# Patient Record
Sex: MALE | Race: WHITE | NOT HISPANIC OR LATINO | Employment: OTHER | URBAN - METROPOLITAN AREA
[De-identification: names, ages, dates, MRNs, and addresses within clinical notes are randomized per-mention and may not be internally consistent; named-entity substitution may affect disease eponyms.]

---

## 2017-01-03 ENCOUNTER — TELEPHONE (OUTPATIENT)
Dept: FAMILY MEDICINE | Facility: CLINIC | Age: 77
End: 2017-01-03

## 2017-01-03 NOTE — TELEPHONE ENCOUNTER
----- Message from Finn Jhaveri sent at 1/3/2017  3:46 PM CST -----  Contact: patient  Domonique Hernandez Jr.  MRN: [id number]  : 1940  PCP: Emiliano Cam  Home Phone      [phone]  Work Phone      Not on file.  Mobile          [phone]      MESSAGE: spoke with [facility] - they state they sent request for C-pap results to us on Friday -- please check for it & advise patient    Call [phone]    PCP: Dorina

## 2017-01-04 ENCOUNTER — TELEPHONE (OUTPATIENT)
Dept: FAMILY MEDICINE | Facility: CLINIC | Age: 77
End: 2017-01-04

## 2017-01-10 ENCOUNTER — OFFICE VISIT (OUTPATIENT)
Dept: NEUROLOGY | Facility: CLINIC | Age: 77
End: 2017-01-10
Payer: MEDICARE

## 2017-01-10 VITALS
HEART RATE: 70 BPM | WEIGHT: 253.31 LBS | SYSTOLIC BLOOD PRESSURE: 130 MMHG | BODY MASS INDEX: 38.39 KG/M2 | HEIGHT: 68 IN | RESPIRATION RATE: 18 BRPM | DIASTOLIC BLOOD PRESSURE: 76 MMHG

## 2017-01-10 DIAGNOSIS — G31.84 MILD COGNITIVE IMPAIRMENT: Primary | ICD-10-CM

## 2017-01-10 PROCEDURE — 99213 OFFICE O/P EST LOW 20 MIN: CPT | Mod: S$GLB,,, | Performed by: PSYCHIATRY & NEUROLOGY

## 2017-01-10 PROCEDURE — 1157F ADVNC CARE PLAN IN RCRD: CPT | Mod: S$GLB,,, | Performed by: PSYCHIATRY & NEUROLOGY

## 2017-01-10 PROCEDURE — 99999 PR PBB SHADOW E&M-EST. PATIENT-LVL III: CPT | Mod: PBBFAC,,, | Performed by: PSYCHIATRY & NEUROLOGY

## 2017-01-10 PROCEDURE — 1160F RVW MEDS BY RX/DR IN RCRD: CPT | Mod: S$GLB,,, | Performed by: PSYCHIATRY & NEUROLOGY

## 2017-01-10 PROCEDURE — 3075F SYST BP GE 130 - 139MM HG: CPT | Mod: S$GLB,,, | Performed by: PSYCHIATRY & NEUROLOGY

## 2017-01-10 PROCEDURE — 3078F DIAST BP <80 MM HG: CPT | Mod: S$GLB,,, | Performed by: PSYCHIATRY & NEUROLOGY

## 2017-01-10 PROCEDURE — 1159F MED LIST DOCD IN RCRD: CPT | Mod: S$GLB,,, | Performed by: PSYCHIATRY & NEUROLOGY

## 2017-01-10 PROCEDURE — 1125F AMNT PAIN NOTED PAIN PRSNT: CPT | Mod: S$GLB,,, | Performed by: PSYCHIATRY & NEUROLOGY

## 2017-01-10 NOTE — MR AVS SNAPSHOT
[facility]  [address] [address]  Phone: [phone]  Fax: [phone]                  Domonique Hernandez Jr.   1/10/2017 1:45 PM   Office Visit    Description:  Male : 1940   Provider:  Jewel Gusman MD   Department:  [facility]           Reason for Visit     Memory Loss           Diagnoses this Visit        Comments    Mild cognitive impairment    -  Primary            To Do List           Future Appointments        Provider Department Dept Phone    3/6/2017 10:30 AM SARIKA SAUER [facility] [phone]    3/14/2017 2:00 PM Emiliano Cam MD [facility] [phone]      Goals (5 Years of Data)     None      Follow-Up and Disposition     Return in about 6 months (around 7/10/2017).      [facility]     [facility] On Call Nurse Care Line -  Assistance  Registered nurses in the [facility] On Call Center provide clinical advisement, health education, appointment booking, and other advisory services.  Call for this free service at [phone].             Medications           Message regarding Medications     Verify the changes and/or additions to your medication regime listed below are the same as discussed with your clinician today.  If any of these changes or additions are incorrect, please notify your healthcare provider.        STOP taking these medications     venlafaxine (EFFEXOR-XR) 75 MG 24 hr capsule Take 1 capsule (75 mg total) by mouth once daily.           Verify that the below list of medications is an accurate representation of the medications you are currently taking.  If none reported, the list may be blank. If incorrect, please contact your healthcare provider. Carry this list with you in case of emergency.           Current Medications     albuterol-ipratropium 2.5mg-0.5mg/3mL (DUO-NEB) 0.5 mg-3 mg(2.5 mg base)/3 mL nebulizer solution Take 3 mLs by nebulization every 6 (six) hours as  "needed for Wheezing.    allopurinol (ZYLOPRIM) 300 MG tablet TAKE 1 TABLET ONE TIME DAILY    amlodipine (NORVASC) 2.5 MG tablet Take 1 tablet (2.5 mg total) by mouth once daily.    aspirin-caffeine (JESSICA BACK & BODY) 500-32.5 mg Tab Take by mouth.    carvedilol (COREG) 25 MG tablet Take 1 tablet (25 mg total) by mouth 2 (two) times daily with meals.    chlorthalidone (HYGROTEN) 25 MG Tab TAKE 1 TABLET ONE TIME DAILY    dutasteride (AVODART) 0.5 mg capsule Take 1 capsule (0.5 mg total) by mouth once daily.    fluticasone (FLONASE) 50 mcg/actuation nasal spray 1 spray by Each Nare route 2 (two) times daily.    levothyroxine (SYNTHROID) 88 MCG tablet TAKE 1 TABLET ONE TIME DAILY    multivitamin (ONE DAILY MULTIVITAMIN) per tablet Take 1 tablet by mouth once daily. Centrum Silver for Men    nystatin-triamcinolone (MYCOLOG II) cream Apply topically 2 (two) times daily.    simvastatin (ZOCOR) 40 MG tablet TAKE 1 TABLET EVERY EVENING    tramadol (ULTRAM) 50 mg tablet Take 1 tablet (50 mg total) by mouth every 6 (six) hours as needed.    umeclidinium-vilanterol (ANORO ELLIPTA) 62.5-25 mcg/actuation DsDv Inhale 1 Dose into the lungs once daily.    cetirizine (ZYRTEC) 10 MG tablet TAKE 1 TABLET EVERY DAY    promethazine-codeine 6.25-10 mg/5 ml (PHENERGAN WITH CODEINE) 6.25-10 mg/5 mL syrup Take 5 mLs by mouth every 6 (six) hours as needed for Cough.    sildenafil (VIAGRA) 100 MG tablet Take 1 tablet (100 mg total) by mouth daily as needed for Erectile Dysfunction.    tamsulosin (FLOMAX) 0.4 mg Cp24 TAKE 1 CAPSULE ONE TIME DAILY           Clinical Reference Information           Vital Signs - Last Recorded  Most recent update: 1/10/2017  1:53 PM by Geno Diggs MA    BP Pulse Resp Ht Wt BMI    130/76 (BP Location: Right arm, Patient Position: Sitting, BP Method: Manual) 70 18 5' 8" (1.727 m) 114.9 kg (253 lb 4.9 oz) 38.52 kg/m2      Blood Pressure          Most Recent Value    BP  130/76      Allergies as of 1/10/2017  "    Percocet [Oxycodone-acetaminophen]    Percodan [Oxycodone Hcl-oxycodone-asa]      Immunizations Administered on Date of Encounter - 1/10/2017     None

## 2017-01-10 NOTE — PROGRESS NOTES
HPI: Domonique Hernandez Jr. is a 76 y.o. male complaining of minor short term memory loss for 2 years. Suspect mild cognitive impairment.   Since the last visit, he thinks his memory is improved  He has not reason for this.   He states he has not been following diet.  He has been physically active.  Memory is forgetful of short term details   He performs all ADLs without difficulty and uses a computer without difficulty. He is noting this about 2 times per week if that.   Review of Systems   Constitutional: Negative for fever.   Eyes: Negative for double vision.   Respiratory: Negative for hemoptysis.    Cardiovascular: Negative for chest pain.   Gastrointestinal: Negative for blood in stool.   Genitourinary: Negative for hematuria.   Musculoskeletal: Negative for falls.   Skin: Negative for rash.   Neurological: Negative for seizures and headaches.   Psychiatric/Behavioral: Positive for memory loss.         I have reviewed all of this patient's past medical and surgical histories as well as family and social histories and active allergies and medications as documented in the electronic medical record.        Exam:  Gen Appearance, well developed/nourished in no apparent distress  CV: 2+ distal pulses with no edema or swelling  Neuro:  MS: Awake, alert, oriented to place, person, time, situation. Sustains attention. Recent recall is intact/remote memory intact, Language is full to spontaneous speech//comprehension. Fund of Knowledge is full.  Mood is euthymic  CN: Optic discs are flat with normal vasculature, PERRL, Extraoccular movements and visual fields are full. Normal facial sensation and strength, Hearing symmetric, Tongue and Palate are midline and strong. Shoulder Shrug symmetric and strong.  Motor: Normal bulk, tone, no abnormal movements. 5/5 strength bilateral upper/lower extremities with 2+ reflexes and clonus  Sensory: symmetric to temp, and vibration.  Romberg negative  Cerebellar: Finger-nose,Heal-shin,  Rapid alternating movements intact  Gait: Normal stance, no ataxia      Labs:TSH, B12: showed mild elevation in TSh    Imagin2016 MRI brain: Age-appropriate generalized volume loss with mild/moderate degree of T2/FLAIR signal abnormality within the supratentorial white matter  while nonspecific suggest chronic microvascular ischemic change.    No evidence for acute infarction or enhancing lesion.      Assessment/Plan: Domonique Hernandez Jr. is a 76 y.o. male complaining of minor short term memory loss for 2 years. Suspect mild cognitive impairment.   I recommend:     1. Repeat TSH for mild and non-specific elevation prior-- this is planned with upcoming labs with PCP.   3. Discussed likely diagnosis of MCI otherwise, prognosis, and treatment. He is staying physically active and will consider better diet.   4. Memory symptoms not improved with restarting Effexor, but mood is improved  5. No restrictions/ patient is functioning well.   RTC 6 months- invite significant other to next visit suggested

## 2017-01-16 ENCOUNTER — OFFICE VISIT (OUTPATIENT)
Dept: FAMILY MEDICINE | Facility: CLINIC | Age: 77
End: 2017-01-16
Payer: MEDICARE

## 2017-01-16 VITALS
BODY MASS INDEX: 38.01 KG/M2 | HEART RATE: 64 BPM | RESPIRATION RATE: 16 BRPM | DIASTOLIC BLOOD PRESSURE: 70 MMHG | SYSTOLIC BLOOD PRESSURE: 140 MMHG | TEMPERATURE: 99 F | WEIGHT: 250 LBS

## 2017-01-16 DIAGNOSIS — J44.9 CHRONIC OBSTRUCTIVE PULMONARY DISEASE, UNSPECIFIED COPD TYPE: ICD-10-CM

## 2017-01-16 DIAGNOSIS — I10 ESSENTIAL HYPERTENSION: Primary | ICD-10-CM

## 2017-01-16 DIAGNOSIS — M1A.9XX0 CHRONIC GOUT WITHOUT TOPHUS, UNSPECIFIED CAUSE, UNSPECIFIED SITE: ICD-10-CM

## 2017-01-16 DIAGNOSIS — E03.4 HYPOTHYROIDISM DUE TO ACQUIRED ATROPHY OF THYROID: ICD-10-CM

## 2017-01-16 DIAGNOSIS — J32.9 OTHER SINUSITIS, UNSPECIFIED CHRONICITY: ICD-10-CM

## 2017-01-16 PROCEDURE — G0008 ADMIN INFLUENZA VIRUS VAC: HCPCS | Mod: S$GLB,,, | Performed by: FAMILY MEDICINE

## 2017-01-16 PROCEDURE — 99499 UNLISTED E&M SERVICE: CPT | Mod: S$GLB,,, | Performed by: FAMILY MEDICINE

## 2017-01-16 PROCEDURE — 99214 OFFICE O/P EST MOD 30 MIN: CPT | Mod: 25,S$GLB,, | Performed by: FAMILY MEDICINE

## 2017-01-16 PROCEDURE — 1159F MED LIST DOCD IN RCRD: CPT | Mod: S$GLB,,, | Performed by: FAMILY MEDICINE

## 2017-01-16 PROCEDURE — 1160F RVW MEDS BY RX/DR IN RCRD: CPT | Mod: S$GLB,,, | Performed by: FAMILY MEDICINE

## 2017-01-16 PROCEDURE — 3077F SYST BP >= 140 MM HG: CPT | Mod: S$GLB,,, | Performed by: FAMILY MEDICINE

## 2017-01-16 PROCEDURE — 3078F DIAST BP <80 MM HG: CPT | Mod: S$GLB,,, | Performed by: FAMILY MEDICINE

## 2017-01-16 PROCEDURE — 1157F ADVNC CARE PLAN IN RCRD: CPT | Mod: S$GLB,,, | Performed by: FAMILY MEDICINE

## 2017-01-16 PROCEDURE — 99999 PR PBB SHADOW E&M-EST. PATIENT-LVL III: CPT | Mod: PBBFAC,,, | Performed by: FAMILY MEDICINE

## 2017-01-16 PROCEDURE — 1126F AMNT PAIN NOTED NONE PRSNT: CPT | Mod: S$GLB,,, | Performed by: FAMILY MEDICINE

## 2017-01-16 PROCEDURE — 90662 IIV NO PRSV INCREASED AG IM: CPT | Mod: S$GLB,,, | Performed by: FAMILY MEDICINE

## 2017-01-16 RX ORDER — PROMETHAZINE HYDROCHLORIDE AND CODEINE PHOSPHATE 6.25; 1 MG/5ML; MG/5ML
5 SOLUTION ORAL EVERY 6 HOURS PRN
Qty: 150 ML | Refills: 1 | Status: SHIPPED | OUTPATIENT
Start: 2017-01-16 | End: 2017-05-05 | Stop reason: SDUPTHER

## 2017-01-16 RX ORDER — AZITHROMYCIN 250 MG/1
TABLET, FILM COATED ORAL
Qty: 6 TABLET | Refills: 0 | Status: SHIPPED | OUTPATIENT
Start: 2017-01-16 | End: 2017-05-05 | Stop reason: SDUPTHER

## 2017-01-16 NOTE — PROGRESS NOTES
Subjective:       Patient ID: Domonique Hernandez Jr. is a 76 y.o. male.    Chief Complaint: Cough; Hoarse; and Immunizations    HPI Comments: Patient here for check up and follow up for HYPERTENSION.   Today his blood pressure is perfect.  He is taking Coreg and a small dose of Norvasc.  He tolerates these well.  His family is starting to drive him around.  Patient is also having trouble with memory.  His girlfriend feels like he needs be further evaluated.  He has problems remembering names and he sometimes forgets what he was going to do.  Got worse after stopping Effexor.  Seems to be more anxious.    Patient also states his CPAP machine is not working very good.  This could also be contributing to his forgetfulness.  He requires 7 cm water.  He needs a new machine.  He is urinating better now that he is on Flomax and Proscar.  He is still having delayed ejaculation.  Stopping Effexor did not make much of a difference.  He is doing well on Effexor XR 75 mg po daily.  Patient brought all of his medication in.  This was reviewed.    Cough   This is a new problem. The current episode started in the past 7 days. The problem has been gradually worsening. The cough is productive of purulent sputum. Pertinent negatives include no chest pain, chills, fever, headaches, postnasal drip, rash, sore throat or shortness of breath.   Hypertension   Pertinent negatives include no chest pain, headaches, palpitations or shortness of breath.     Review of Systems   Constitutional: Negative for activity change, chills, diaphoresis, fatigue, fever and unexpected weight change.   HENT: Negative for congestion, nosebleeds, postnasal drip, sinus pressure, sore throat, trouble swallowing and voice change.    Eyes: Negative for photophobia and visual disturbance.   Respiratory: Positive for cough. Negative for apnea, choking, chest tightness and shortness of breath.    Cardiovascular: Negative for chest pain, palpitations and leg swelling.    Gastrointestinal: Negative for abdominal pain and blood in stool.   Endocrine: Negative for cold intolerance, heat intolerance, polydipsia and polyphagia.   Genitourinary: Negative for decreased urine volume, difficulty urinating and dysuria.   Musculoskeletal: Positive for arthralgias and back pain. Negative for joint swelling.   Skin: Negative for color change, pallor and rash.   Neurological: Negative for dizziness, weakness, numbness and headaches.   Hematological: Negative for adenopathy. Does not bruise/bleed easily.   Psychiatric/Behavioral: Negative for behavioral problems, decreased concentration, dysphoric mood and sleep disturbance. The patient is not nervous/anxious.        Objective:      Vitals:    01/16/17 1002   BP: (!) 140/70   Pulse: 64   Resp: 16   Temp: 98.8 °F (37.1 °C)     Physical Exam   Constitutional: He is oriented to person, place, and time. He appears well-developed and well-nourished.   Eyes: EOM are normal. Pupils are equal, round, and reactive to light.   Neck: No JVD present.   No carotid bruits   Cardiovascular: Normal rate, regular rhythm, normal heart sounds and intact distal pulses.  Exam reveals no gallop.    No murmur heard.  Pulmonary/Chest: Effort normal and breath sounds normal.   Musculoskeletal: He exhibits no edema or tenderness.   Lymphadenopathy:     He has no cervical adenopathy.   Neurological: He is alert and oriented to person, place, and time. He has normal reflexes. He displays normal reflexes. No cranial nerve deficit. He exhibits normal muscle tone. Coordination normal.   Psychiatric: He has a normal mood and affect. His behavior is normal. Judgment and thought content normal.       Lab Results   Component Value Date    TSH 4.283 (H) 09/12/2016     BMP  Lab Results   Component Value Date     08/28/2016    K 3.6 08/28/2016     08/28/2016    CO2 22 (L) 08/28/2016    BUN 21 08/28/2016    CREATININE 0.8 08/28/2016    CALCIUM 9.6 08/28/2016    ANIONGAP  12 08/28/2016    ESTGFRAFRICA >60 08/28/2016    EGFRNONAA >60 08/28/2016       No results found for: LDLCALC    Assessment:       1. Essential hypertension    2. Other sinusitis, unspecified chronicity        Plan:   Essential hypertension  -     zoster vaccine live, PF, (ZOSTAVAX, PF,) 19,400 unit/0.65 mL injection; Inject 19,400 Units into the skin once.  Dispense: 1 vial; Refill: 0    Other sinusitis, unspecified chronicity  -     azithromycin (Z-KATIUSKA) 250 MG tablet; 2 po day 1, then 1 po q day  Dispense: 6 tablet; Refill: 0  -     promethazine-codeine 6.25-10 mg/5 ml (PHENERGAN WITH CODEINE) 6.25-10 mg/5 mL syrup; Take 5 mLs by mouth every 6 (six) hours as needed for Cough.  Dispense: 150 mL; Refill: 1    Other orders  -     Influenza - High Dose (65+) (PF) (IM)    Memory loss/MCI        Continue follow up with Dr. Gusman    Essential hypertension  Two gram sodium diet.    Weight loss discussed.    Try to walk 2 miles per day.    The following medications will be restarted today  -     carvedilol (COREG) 25 MG tablet; Take 1 tablet (25 mg total) by mouth 2 (two) times daily with meals.  -     amlodipine (NORVASC) 2.5 MG tablet; Take 1 tablet (2.5 mg total) by mouth once daily.    KARL on CPAP  Continue CPAP at 7 cm water pressure  Needs new machine    Depression, unspecified depression type  - Continue venlafaxine (EFFEXOR-XR) 75 MG 24 hr capsule; Take 1 capsule (75 mg total) by mouth once daily.  Dispense: 30 capsule; Refill: 5    Delayed ejaculation  Try stopping Flomax to see if that makes a difference.    Obstructive sleep apnea  -     CPAP FOR HOME USE    Hyperlipidemia, unspecified hyperlipidemia type  -     Lipid panel; Future    Essential hypertension  -     Comprehensive metabolic panel; Future    Chronic gout without tophus, unspecified cause, unspecified site  -     CBC auto differential; Future  -     Uric acid; Future    Hypothyroidism due to acquired atrophy of thyroid  -     TSH;  Future    Benign non-nodular prostatic hyperplasia with lower urinary tract symptoms  Continue Proscar and Flomax.      RTC in 3 months

## 2017-01-16 NOTE — MR AVS SNAPSHOT
[facility]  [address]  [facility] [address]  Phone: [phone]  Fax: [phone]                  Domonique Hernandez Jr.   2017 10:30 AM   Office Visit    Description:  Male : 1940   Provider:  Emiliano Cam MD   Department:  [facility]           Reason for Visit     Cough     Hoarse     Immunizations           Diagnoses this Visit        Comments    Essential hypertension    -  Primary     Other sinusitis, unspecified chronicity         Chronic gout without tophus, unspecified cause, unspecified site         Hypothyroidism due to acquired atrophy of thyroid         Chronic obstructive pulmonary disease, unspecified COPD type                To Do List           Future Appointments        Provider Department Dept Phone    3/6/2017 10:30 AM SARIKA SAUER [facility] [phone]    3/14/2017 2:00 PM Emiliano Cam MD [facility] [phone]    2017 1:30 PM Trey Garcia MD [facility] - Urology 4th Floor [phone]    2017 4:45 PM Jewel Gusman MD [facility] [phone]      Goals (5 Years of Data)     None       These Medications        Disp Refills Start End    azithromycin (Z-KATIUSKA) 250 MG tablet 6 tablet 0 2017     2 po day 1, then 1 po q day    Pharmacy: [facility] - [address] Ph #: [phone]       promethazine-codeine 6.25-10 mg/5 ml (PHENERGAN WITH CODEINE) 6.25-10 mg/5 mL syrup 150 mL 1 2017     Take 5 mLs by mouth every 6 (six) hours as needed for Cough. - Oral    Pharmacy: [facility] - [place], [place] - [id number] [place] [address] Ph #: [phone]       zoster vaccine live, PF, (ZOSTAVAX, PF,) 19,400 unit/0.65 mL injection 1 vial 0 2017    Inject 19,400 Units into the skin once. - Subcutaneous    Pharmacy: [facility] - [place], [place] - [address] [facility] Ph #: [phone]         [facility] On Call     [facility] On  Call Nurse Care Line -  Assistance  Registered nurses in the [facility] On Call Center provide clinical advisement, health education, appointment booking, and other advisory services.  Call for this free service at [phone].             Medications           Message regarding Medications     Verify the changes and/or additions to your medication regime listed below are the same as discussed with your clinician today.  If any of these changes or additions are incorrect, please notify your healthcare provider.        START taking these NEW medications        Refills    azithromycin (Z-KATIUSKA) 250 MG tablet 0    Si po day 1, then 1 po q day    Class: Normal    zoster vaccine live, PF, (ZOSTAVAX, PF,) 19,400 unit/0.65 mL injection 0    Sig: Inject 19,400 Units into the skin once.    Class: Print    Route: Subcutaneous           Verify that the below list of medications is an accurate representation of the medications you are currently taking.  If none reported, the list may be blank. If incorrect, please contact your healthcare provider. Carry this list with you in case of emergency.           Current Medications     allopurinol (ZYLOPRIM) 300 MG tablet TAKE 1 TABLET ONE TIME DAILY    amlodipine (NORVASC) 2.5 MG tablet Take 1 tablet (2.5 mg total) by mouth once daily.    aspirin-caffeine (JESSICA BACK & BODY) 500-32.5 mg Tab Take by mouth.    carvedilol (COREG) 25 MG tablet Take 1 tablet (25 mg total) by mouth 2 (two) times daily with meals.    cetirizine (ZYRTEC) 10 MG tablet TAKE 1 TABLET EVERY DAY    chlorthalidone (HYGROTEN) 25 MG Tab TAKE 1 TABLET ONE TIME DAILY    fluticasone (FLONASE) 50 mcg/actuation nasal spray 1 spray by Each Nare route 2 (two) times daily.    levothyroxine (SYNTHROID) 88 MCG tablet TAKE 1 TABLET ONE TIME DAILY    multivitamin (ONE DAILY MULTIVITAMIN) per tablet Take 1 tablet by mouth once daily. Centrum Silver for Men    nystatin-triamcinolone (MYCOLOG II) cream Apply topically 2 (two)  times daily.    sildenafil (VIAGRA) 100 MG tablet Take 1 tablet (100 mg total) by mouth daily as needed for Erectile Dysfunction.    simvastatin (ZOCOR) 40 MG tablet TAKE 1 TABLET EVERY EVENING    tamsulosin (FLOMAX) 0.4 mg Cp24 TAKE 1 CAPSULE ONE TIME DAILY    tramadol (ULTRAM) 50 mg tablet Take 1 tablet (50 mg total) by mouth every 6 (six) hours as needed.    umeclidinium-vilanterol (ANORO ELLIPTA) 62.5-25 mcg/actuation DsDv Inhale 1 Dose into the lungs once daily.    albuterol-ipratropium 2.5mg-0.5mg/3mL (DUO-NEB) 0.5 mg-3 mg(2.5 mg base)/3 mL nebulizer solution Take 3 mLs by nebulization every 6 (six) hours as needed for Wheezing.    azithromycin (Z-KATIUSKA) 250 MG tablet 2 po day 1, then 1 po q day    dutasteride (AVODART) 0.5 mg capsule Take 1 capsule (0.5 mg total) by mouth once daily.    promethazine-codeine 6.25-10 mg/5 ml (PHENERGAN WITH CODEINE) 6.25-10 mg/5 mL syrup Take 5 mLs by mouth every 6 (six) hours as needed for Cough.    zoster vaccine live, PF, (ZOSTAVAX, PF,) 19,400 unit/0.65 mL injection Inject 19,400 Units into the skin once.           Clinical Reference Information           Vital Signs - Last Recorded  Most recent update: 1/16/2017 10:07 AM by Eliseo Payan LPN    BP Pulse Temp Resp Wt BMI    (!) 140/70 (BP Location: Left arm, Patient Position: Sitting, BP Method: Manual) 64 98.8 °F (37.1 °C) (Tympanic) 16 113.4 kg (250 lb) 38.01 kg/m2      Blood Pressure          Most Recent Value    BP  (!)  140/70      Allergies as of 1/16/2017     Percocet [Oxycodone-acetaminophen]    Percodan [Oxycodone Hcl-oxycodone-asa]      Immunizations Administered on Date of Encounter - 1/16/2017     Name Date Dose VIS Date Route    Influenza - High Dose 1/16/2017 0.5 mL 8/7/2015 Intramuscular      Orders Placed During Today's Visit      Normal Orders This Visit    Influenza - High Dose (65+) (PF) (IM)

## 2017-02-07 RX ORDER — TRAMADOL HYDROCHLORIDE 50 MG/1
50 TABLET ORAL EVERY 6 HOURS PRN
Qty: 30 TABLET | Refills: 5 | Status: SHIPPED | OUTPATIENT
Start: 2017-02-07 | End: 2017-05-23

## 2017-03-06 ENCOUNTER — CLINICAL SUPPORT (OUTPATIENT)
Dept: FAMILY MEDICINE | Facility: CLINIC | Age: 77
End: 2017-03-06
Payer: MEDICARE

## 2017-03-06 DIAGNOSIS — I10 ESSENTIAL HYPERTENSION: ICD-10-CM

## 2017-03-06 DIAGNOSIS — E03.4 HYPOTHYROIDISM DUE TO ACQUIRED ATROPHY OF THYROID: ICD-10-CM

## 2017-03-06 DIAGNOSIS — E78.5 HYPERLIPIDEMIA, UNSPECIFIED HYPERLIPIDEMIA TYPE: ICD-10-CM

## 2017-03-06 DIAGNOSIS — M1A.9XX0 CHRONIC GOUT WITHOUT TOPHUS, UNSPECIFIED CAUSE, UNSPECIFIED SITE: ICD-10-CM

## 2017-03-06 LAB
ALBUMIN SERPL BCP-MCNC: 3.6 G/DL
ALP SERPL-CCNC: 126 U/L
ALT SERPL W/O P-5'-P-CCNC: 19 U/L
ANION GAP SERPL CALC-SCNC: 9 MMOL/L
AST SERPL-CCNC: 25 U/L
BASOPHILS # BLD AUTO: 0.04 K/UL
BASOPHILS NFR BLD: 0.6 %
BILIRUB SERPL-MCNC: 0.4 MG/DL
BUN SERPL-MCNC: 20 MG/DL
CALCIUM SERPL-MCNC: 9.9 MG/DL
CHLORIDE SERPL-SCNC: 107 MMOL/L
CHOLEST/HDLC SERPL: 4.9 {RATIO}
CO2 SERPL-SCNC: 24 MMOL/L
CREAT SERPL-MCNC: 1.1 MG/DL
DIFFERENTIAL METHOD: ABNORMAL
EOSINOPHIL # BLD AUTO: 0.3 K/UL
EOSINOPHIL NFR BLD: 3.9 %
ERYTHROCYTE [DISTWIDTH] IN BLOOD BY AUTOMATED COUNT: 14.7 %
EST. GFR  (AFRICAN AMERICAN): >60 ML/MIN/1.73 M^2
EST. GFR  (NON AFRICAN AMERICAN): >60 ML/MIN/1.73 M^2
GLUCOSE SERPL-MCNC: 90 MG/DL
HCT VFR BLD AUTO: 38.7 %
HDL/CHOLESTEROL RATIO: 20.3 %
HDLC SERPL-MCNC: 133 MG/DL
HDLC SERPL-MCNC: 27 MG/DL
HGB BLD-MCNC: 13 G/DL
LDLC SERPL CALC-MCNC: 78.2 MG/DL
LYMPHOCYTES # BLD AUTO: 1.2 K/UL
LYMPHOCYTES NFR BLD: 18 %
MCH RBC QN AUTO: 31 PG
MCHC RBC AUTO-ENTMCNC: 33.6 %
MCV RBC AUTO: 92 FL
MONOCYTES # BLD AUTO: 0.6 K/UL
MONOCYTES NFR BLD: 8.6 %
NEUTROPHILS # BLD AUTO: 4.7 K/UL
NEUTROPHILS NFR BLD: 68.9 %
NONHDLC SERPL-MCNC: 106 MG/DL
PLATELET # BLD AUTO: 205 K/UL
PMV BLD AUTO: 11.1 FL
POTASSIUM SERPL-SCNC: 4.1 MMOL/L
PROT SERPL-MCNC: 7.2 G/DL
RBC # BLD AUTO: 4.2 M/UL
SODIUM SERPL-SCNC: 140 MMOL/L
TRIGL SERPL-MCNC: 139 MG/DL
TSH SERPL DL<=0.005 MIU/L-ACNC: 3.22 UIU/ML
URATE SERPL-MCNC: 6.3 MG/DL
WBC # BLD AUTO: 6.84 K/UL

## 2017-03-06 PROCEDURE — 84550 ASSAY OF BLOOD/URIC ACID: CPT

## 2017-03-06 PROCEDURE — 36415 COLL VENOUS BLD VENIPUNCTURE: CPT | Mod: S$GLB,,, | Performed by: FAMILY MEDICINE

## 2017-03-06 PROCEDURE — 84443 ASSAY THYROID STIM HORMONE: CPT

## 2017-03-06 PROCEDURE — 80061 LIPID PANEL: CPT

## 2017-03-06 PROCEDURE — 80053 COMPREHEN METABOLIC PANEL: CPT

## 2017-03-06 PROCEDURE — 85025 COMPLETE CBC W/AUTO DIFF WBC: CPT

## 2017-03-08 DIAGNOSIS — I10 ESSENTIAL HYPERTENSION: ICD-10-CM

## 2017-03-10 RX ORDER — CARVEDILOL 25 MG/1
TABLET ORAL
Qty: 180 TABLET | Refills: 1 | Status: SHIPPED | OUTPATIENT
Start: 2017-03-10 | End: 2017-03-14 | Stop reason: SDUPTHER

## 2017-03-14 ENCOUNTER — OFFICE VISIT (OUTPATIENT)
Dept: FAMILY MEDICINE | Facility: CLINIC | Age: 77
End: 2017-03-14
Payer: MEDICARE

## 2017-03-14 VITALS
HEART RATE: 88 BPM | SYSTOLIC BLOOD PRESSURE: 128 MMHG | BODY MASS INDEX: 39.08 KG/M2 | HEIGHT: 67 IN | DIASTOLIC BLOOD PRESSURE: 72 MMHG | WEIGHT: 249 LBS

## 2017-03-14 DIAGNOSIS — G47.33 OBSTRUCTIVE SLEEP APNEA: ICD-10-CM

## 2017-03-14 DIAGNOSIS — M17.32 POST-TRAUMATIC OSTEOARTHRITIS OF LEFT KNEE: Primary | ICD-10-CM

## 2017-03-14 DIAGNOSIS — E78.5 HYPERLIPIDEMIA, UNSPECIFIED HYPERLIPIDEMIA TYPE: ICD-10-CM

## 2017-03-14 DIAGNOSIS — R05.9 COUGH: ICD-10-CM

## 2017-03-14 DIAGNOSIS — E03.4 HYPOTHYROIDISM DUE TO ACQUIRED ATROPHY OF THYROID: ICD-10-CM

## 2017-03-14 DIAGNOSIS — F32.A DEPRESSION, UNSPECIFIED DEPRESSION TYPE: ICD-10-CM

## 2017-03-14 DIAGNOSIS — N52.9 ERECTILE DYSFUNCTION, UNSPECIFIED ERECTILE DYSFUNCTION TYPE: ICD-10-CM

## 2017-03-14 DIAGNOSIS — I10 ESSENTIAL HYPERTENSION: ICD-10-CM

## 2017-03-14 DIAGNOSIS — F32.0 MAJOR DEPRESSIVE DISORDER, SINGLE EPISODE, MILD: ICD-10-CM

## 2017-03-14 PROCEDURE — 99499 UNLISTED E&M SERVICE: CPT | Mod: S$PBB,,, | Performed by: FAMILY MEDICINE

## 2017-03-14 PROCEDURE — 1159F MED LIST DOCD IN RCRD: CPT | Mod: S$GLB,,, | Performed by: FAMILY MEDICINE

## 2017-03-14 PROCEDURE — 1160F RVW MEDS BY RX/DR IN RCRD: CPT | Mod: S$GLB,,, | Performed by: FAMILY MEDICINE

## 2017-03-14 PROCEDURE — 3074F SYST BP LT 130 MM HG: CPT | Mod: S$GLB,,, | Performed by: FAMILY MEDICINE

## 2017-03-14 PROCEDURE — 3078F DIAST BP <80 MM HG: CPT | Mod: S$GLB,,, | Performed by: FAMILY MEDICINE

## 2017-03-14 PROCEDURE — 20610 DRAIN/INJ JOINT/BURSA W/O US: CPT | Mod: S$GLB,,, | Performed by: FAMILY MEDICINE

## 2017-03-14 PROCEDURE — 99999 PR PBB SHADOW E&M-EST. PATIENT-LVL II: CPT | Mod: PBBFAC,,, | Performed by: FAMILY MEDICINE

## 2017-03-14 PROCEDURE — 99214 OFFICE O/P EST MOD 30 MIN: CPT | Mod: 25,S$GLB,, | Performed by: FAMILY MEDICINE

## 2017-03-14 PROCEDURE — 1157F ADVNC CARE PLAN IN RCRD: CPT | Mod: S$GLB,,, | Performed by: FAMILY MEDICINE

## 2017-03-14 RX ORDER — CARVEDILOL 25 MG/1
TABLET ORAL
Qty: 180 TABLET | Refills: 1 | Status: SHIPPED | OUTPATIENT
Start: 2017-03-14 | End: 2017-11-29 | Stop reason: SDUPTHER

## 2017-03-14 RX ORDER — VENLAFAXINE HYDROCHLORIDE 150 MG/1
150 CAPSULE, EXTENDED RELEASE ORAL DAILY
Qty: 90 CAPSULE | Refills: 1 | Status: SHIPPED | OUTPATIENT
Start: 2017-03-14 | End: 2017-03-14 | Stop reason: SDUPTHER

## 2017-03-14 RX ORDER — BENZONATATE 100 MG/1
100 CAPSULE ORAL 3 TIMES DAILY PRN
Qty: 60 CAPSULE | Refills: 2 | Status: SHIPPED | OUTPATIENT
Start: 2017-03-14 | End: 2017-04-13

## 2017-03-14 RX ORDER — AMLODIPINE BESYLATE 2.5 MG/1
2.5 TABLET ORAL DAILY
Qty: 90 TABLET | Refills: 1 | Status: SHIPPED | OUTPATIENT
Start: 2017-03-14 | End: 2017-05-26 | Stop reason: SDUPTHER

## 2017-03-14 RX ORDER — SILDENAFIL 100 MG/1
100 TABLET, FILM COATED ORAL DAILY PRN
Qty: 18 TABLET | Refills: 3 | Status: CANCELLED | OUTPATIENT
Start: 2017-03-14 | End: 2018-03-14

## 2017-03-14 RX ORDER — VENLAFAXINE HYDROCHLORIDE 150 MG/1
150 CAPSULE, EXTENDED RELEASE ORAL DAILY
Qty: 90 CAPSULE | Refills: 1 | Status: SHIPPED | OUTPATIENT
Start: 2017-03-14 | End: 2017-05-26 | Stop reason: SDUPTHER

## 2017-03-14 NOTE — MR AVS SNAPSHOT
[facility]  [address]  [facility] [address]  Phone: [phone]  Fax: [phone]                  Domonique Hernandez Jr.   3/14/2017 2:00 PM   Office Visit    Description:  Male : 1940   Provider:  Emiliano Cam MD   [facility]           Reason for Visit     Knee Pain     Follow-up           Diagnoses this Visit        Comments    Post-traumatic osteoarthritis of left knee    -  Primary     Erectile dysfunction, unspecified erectile dysfunction type         Hyperlipidemia, unspecified hyperlipidemia type         Essential hypertension         Hypothyroidism due to acquired atrophy of thyroid         Obstructive sleep apnea         Major depressive disorder, single episode, mild         Depression, unspecified depression type         Cough                To Do List           Future Appointments        Provider Department Dept Phone    2017 1:30 PM Trey Garcia MD [facility] - Urology 4th Floor [phone]    2017 2:15 PM Emiliano Cam MD [facility] [phone]    2017 4:45 PM Jewel Gusman MD [facility] - Neurology [phone]      Goals (5 Years of Data)     None      Follow-Up and Disposition     Return in about 3 months (around 2017).       These Medications        Disp Refills Start End    amlodipine (NORVASC) 2.5 MG tablet 90 tablet 1 3/14/2017     Take 1 tablet (2.5 mg total) by mouth once daily. - Oral    Pharmacy: [facility] - [facility] [address] [facility] Ph #: [phone]       carvedilol (COREG) 25 MG tablet 180 tablet 1 3/14/2017     TAKE 1 TABLET (25 MG TOTAL) BY MOUTH 2 (TWO) TIMES DAILY WITH MEALS.    Pharmacy: [facility] - [facility] [address] [facility] Ph #: [phone]       venlafaxine (EFFEXOR-XR) 150 MG Cp24 90 capsule 1 3/14/2017 3/14/2018    Take 1 capsule (150 mg total) by mouth once daily. - Oral    Pharmacy:  [facility] - [facility] [address] [facility] Ph #: [phone]       benzonatate (TESSALON) 100 MG capsule 60 capsule 2 3/14/2017 4/13/2017    Take 1 capsule (100 mg total) by mouth 3 (three) times daily as needed for Cough. - Oral    Pharmacy: [facility] - [place], [place] - [address] [facility] Ph #: [phone]         [facility]     [facility] Care [facility] - 24/7 Assistance  Registered nurses in the [facility] provide clinical advisement, health education, appointment booking, and other advisory services.  Call for this free service at [phone].             Medications           Message regarding Medications     Verify the changes and/or additions to your medication regime listed below are the same as discussed with your clinician today.  If any of these changes or additions are incorrect, please notify your healthcare provider.        START taking these NEW medications        Refills    venlafaxine (EFFEXOR-XR) 150 MG Cp24 1    Sig: Take 1 capsule (150 mg total) by mouth once daily.    Class: Normal    Route: Oral           Verify that the below list of medications is an accurate representation of the medications you are currently taking.  If none reported, the list may be blank. If incorrect, please contact your healthcare provider. Carry this list with you in case of emergency.           Current Medications     allopurinol (ZYLOPRIM) 300 MG tablet TAKE 1 TABLET ONE TIME DAILY    amlodipine (NORVASC) 2.5 MG tablet Take 1 tablet (2.5 mg total) by mouth once daily.    aspirin-caffeine (JESSICA BACK & BODY) 500-32.5 mg Tab Take by mouth.    carvedilol (COREG) 25 MG tablet TAKE 1 TABLET (25 MG TOTAL) BY MOUTH 2 (TWO) TIMES DAILY WITH MEALS.    cetirizine (ZYRTEC) 10 MG tablet TAKE 1 TABLET EVERY DAY    chlorthalidone (HYGROTEN) 25 MG Tab TAKE 1 TABLET ONE TIME DAILY    fluticasone (FLONASE) 50 mcg/actuation nasal spray 1 spray by Each Nare route 2 (two) times  "daily.    levothyroxine (SYNTHROID) 88 MCG tablet TAKE 1 TABLET ONE TIME DAILY    multivitamin (ONE DAILY MULTIVITAMIN) per tablet Take 1 tablet by mouth once daily. Centrum Silver for Men    nystatin-triamcinolone (MYCOLOG II) cream Apply topically 2 (two) times daily.    sildenafil (VIAGRA) 100 MG tablet Take 1 tablet (100 mg total) by mouth daily as needed for Erectile Dysfunction.    simvastatin (ZOCOR) 40 MG tablet TAKE 1 TABLET EVERY EVENING    tamsulosin (FLOMAX) 0.4 mg Cp24 TAKE 1 CAPSULE ONE TIME DAILY    tramadol (ULTRAM) 50 mg tablet Take 1 tablet (50 mg total) by mouth every 6 (six) hours as needed.    umeclidinium-vilanterol (ANORO ELLIPTA) 62.5-25 mcg/actuation DsDv Inhale 1 Dose into the lungs once daily.    albuterol-ipratropium 2.5mg-0.5mg/3mL (DUO-NEB) 0.5 mg-3 mg(2.5 mg base)/3 mL nebulizer solution Take 3 mLs by nebulization every 6 (six) hours as needed for Wheezing.    azithromycin (Z-KATIUSKA) 250 MG tablet 2 po day 1, then 1 po q day    benzonatate (TESSALON) 100 MG capsule Take 1 capsule (100 mg total) by mouth 3 (three) times daily as needed for Cough.    dutasteride (AVODART) 0.5 mg capsule Take 1 capsule (0.5 mg total) by mouth once daily.    promethazine-codeine 6.25-10 mg/5 ml (PHENERGAN WITH CODEINE) 6.25-10 mg/5 mL syrup Take 5 mLs by mouth every 6 (six) hours as needed for Cough.    venlafaxine (EFFEXOR-XR) 150 MG Cp24 Take 1 capsule (150 mg total) by mouth once daily.           Clinical Reference Information           Your Vitals Were     BP Pulse Height Weight BMI    128/72 (BP Location: Left arm, Patient Position: Sitting, BP Method: Manual) 88 5' 7" (1.702 m) 112.9 kg (249 lb) 39 kg/m2      Blood Pressure          Most Recent Value    BP  128/72      Allergies as of 3/14/2017     Percocet [Oxycodone-acetaminophen]    Percodan [Oxycodone Hcl-oxycodone-asa]      Immunizations Administered on Date of Encounter - 3/14/2017     None      Orders Placed During Today's Visit      Normal " Orders This Visit    Arthrocentesis       Language Assistance Services     ATTENTION: Language assistance services are available, free of charge. Please call [phone].      ATENCIÓN: Si habla mark, tiene a avelar disposición servicios gratuitos de asistencia lingüística. Llame al [phone].     CHÚ Ý: N?u b?n nói Ti?ng Vi?t, có các d?ch v? h? tr? ngôn ng? mi?n phí dành cho b?n. G?i s? [phone].         [facility] complies with applicable Federal civil rights laws and does not discriminate on the basis of race, color, national origin, age, disability, or sex.

## 2017-03-14 NOTE — PROGRESS NOTES
Subjective:       Patient ID: Domonique Hernandez Jr. is a 76 y.o. male.    Chief Complaint: Knee Pain and Follow-up    HPI Comments: Patient here for check up and follow up for HYPERTENSION.   Today his blood pressure is perfect.  He is taking Coreg and a small dose of Norvasc.  He tolerates these well.  His family is starting to drive him around.  Patient is also having trouble with memory.  His girlfriend feels like he needs be further evaluated.  He has problems remembering names and he sometimes forgets what he was going to do.  Got worse after stopping Effexor.  Seems to be more anxious.    Patient also states his CPAP machine is not working very good.  This could also be contributing to his forgetfulness.  He requires 7 cm water.  He needs a new machine.  He is urinating better now that he is on Flomax and Proscar.  He is still having delayed ejaculation.  Stopping Effexor did not make much of a difference.  He is doing well on Effexor XR 75 mg po daily.  His left knee is bothering him.  He would like to get it injected.  He has a history of osteoarthritis.    Review of Systems   Constitutional: Negative for activity change, diaphoresis, fatigue and unexpected weight change.   HENT: Negative for congestion, nosebleeds, sinus pressure, trouble swallowing and voice change.    Eyes: Negative for photophobia and visual disturbance.   Respiratory: Negative for apnea, choking and chest tightness.    Cardiovascular: Negative for leg swelling.   Gastrointestinal: Negative for abdominal pain and blood in stool.   Endocrine: Negative for cold intolerance, heat intolerance, polydipsia and polyphagia.   Genitourinary: Negative for decreased urine volume, difficulty urinating and dysuria.   Musculoskeletal: Positive for arthralgias and back pain. Negative for joint swelling.   Skin: Negative for color change and pallor.   Neurological: Negative for dizziness and weakness.   Hematological: Negative for adenopathy. Does not  bruise/bleed easily.   Psychiatric/Behavioral: Negative for behavioral problems, decreased concentration, dysphoric mood and sleep disturbance. The patient is not nervous/anxious.        Objective:      Vitals:    03/14/17 1359   BP: 128/72   Pulse: 88     Physical Exam   Constitutional: He is oriented to person, place, and time. He appears well-developed and well-nourished.   Eyes: EOM are normal. Pupils are equal, round, and reactive to light.   Neck: No JVD present.   No carotid bruits   Cardiovascular: Normal rate, regular rhythm, normal heart sounds and intact distal pulses.  Exam reveals no gallop.    No murmur heard.  Pulmonary/Chest: Effort normal and breath sounds normal.   Musculoskeletal: He exhibits no edema or tenderness.   Lymphadenopathy:     He has no cervical adenopathy.   Neurological: He is alert and oriented to person, place, and time. He has normal reflexes. He displays normal reflexes. No cranial nerve deficit. He exhibits normal muscle tone. Coordination normal.   Psychiatric: He has a normal mood and affect. His behavior is normal. Judgment and thought content normal.       Lab Results   Component Value Date    TSH 3.220 03/06/2017     BMP  Lab Results   Component Value Date     03/06/2017    K 4.1 03/06/2017     03/06/2017    CO2 24 03/06/2017    BUN 20 03/06/2017    CREATININE 1.1 03/06/2017    CALCIUM 9.9 03/06/2017    ANIONGAP 9 03/06/2017    ESTGFRAFRICA >60 03/06/2017    EGFRNONAA >60 03/06/2017       Lab Results   Component Value Date    LDLCALC 78.2 03/06/2017       Assessment:       1. Post-traumatic osteoarthritis of left knee    2. Erectile dysfunction, unspecified erectile dysfunction type    3. Hyperlipidemia, unspecified hyperlipidemia type    4. Essential hypertension    5. Hypothyroidism due to acquired atrophy of thyroid    6. Obstructive sleep apnea    7. Major depressive disorder, single episode, mild    8. Depression, unspecified depression type    9. Cough         Plan:     Post-traumatic osteoarthritis of left knee  -     Arthrocentesis    Erectile dysfunction, unspecified erectile dysfunction type    Hyperlipidemia, unspecified hyperlipidemia type    Essential hypertension  -     amlodipine (NORVASC) 2.5 MG tablet; Take 1 tablet (2.5 mg total) by mouth once daily.  Dispense: 90 tablet; Refill: 1  -     carvedilol (COREG) 25 MG tablet; TAKE 1 TABLET (25 MG TOTAL) BY MOUTH 2 (TWO) TIMES DAILY WITH MEALS.  Dispense: 180 tablet; Refill: 1    Hypothyroidism due to acquired atrophy of thyroid    Obstructive sleep apnea    Major depressive disorder, single episode, mild    Depression, unspecified depression type  -     Discontinue: venlafaxine (EFFEXOR-XR) 150 MG Cp24; Take 1 capsule (150 mg total) by mouth once daily.  Dispense: 90 capsule; Refill: 1  -     venlafaxine (EFFEXOR-XR) 150 MG Cp24; Take 1 capsule (150 mg total) by mouth once daily.  Dispense: 90 capsule; Refill: 1    Cough  -     benzonatate (TESSALON) 100 MG capsule; Take 1 capsule (100 mg total) by mouth 3 (three) times daily as needed for Cough.  Dispense: 60 capsule; Refill: 2    Other orders  -     Cancel: sildenafil (VIAGRA) 100 MG tablet; Take 1 tablet (100 mg total) by mouth daily as needed for Erectile Dysfunction.  Dispense: 18 tablet; Refill: 3      Memory loss/MCI        Continue follow up with Dr. Gusman    Essential hypertension  Two gram sodium diet.    Weight loss discussed.    Try to walk 2 miles per day.    The following medications will be restarted today  -     carvedilol (COREG) 25 MG tablet; Take 1 tablet (25 mg total) by mouth 2 (two) times daily with meals.  -     amlodipine (NORVASC) 2.5 MG tablet; Take 1 tablet (2.5 mg total) by mouth once daily.    KARL on CPAP  Continue CPAP at 7 cm water pressure    Depression, unspecified depression type  - Continue venlafaxine (EFFEXOR-XR) 150 MG 24 hr capsule; take 1 capsule daily     Delayed ejaculation  Try stopping Flomax to see if that makes  a difference.   recommend referral to urology    Hyperlipidemia, unspecified hyperlipidemia type  Low fat diet.  Avoid sweets.  A 10 pound weight loss by the next visit as a  good goal.  Increase consumption of fruits and vegetables, fish and chicken.  Use medications below:  -     Continue Zocor 40 mg daily    Chronic gout without tophus, unspecified cause, unspecified site  Continue allopurinol 300 mg daily     Hypothyroidism due to acquired atrophy of thyroid  Continue Synthroid 88 µg by mouth daily     Benign non-nodular prostatic hyperplasia with lower urinary tract symptoms  Continue Proscar and Flomax.   recommend patient see urology for his erectile dysfunction    Osteoarthritis of the left knee   After obtaining verbal consent, and per orders of Dr. Cam, injection of left knee given by Emiliano Cam. Patient felt much better. Patient remained in clinic for 20 minutes afterwards, and did not have any adverse reactions.  Used 2 cc of marcaine and 40 mg Kenalog    RTC in 3 months.

## 2017-05-05 ENCOUNTER — OFFICE VISIT (OUTPATIENT)
Dept: FAMILY MEDICINE | Facility: CLINIC | Age: 77
End: 2017-05-05
Payer: MEDICARE

## 2017-05-05 VITALS
BODY MASS INDEX: 37.44 KG/M2 | SYSTOLIC BLOOD PRESSURE: 160 MMHG | TEMPERATURE: 98 F | RESPIRATION RATE: 18 BRPM | HEIGHT: 67 IN | WEIGHT: 238.56 LBS | HEART RATE: 66 BPM | OXYGEN SATURATION: 98 % | DIASTOLIC BLOOD PRESSURE: 98 MMHG

## 2017-05-05 DIAGNOSIS — I70.0 THORACIC AORTIC ATHEROSCLEROSIS: ICD-10-CM

## 2017-05-05 DIAGNOSIS — J20.9 SUBACUTE BRONCHITIS: Primary | ICD-10-CM

## 2017-05-05 DIAGNOSIS — R05.9 COUGH: ICD-10-CM

## 2017-05-05 DIAGNOSIS — J32.9 OTHER SINUSITIS, UNSPECIFIED CHRONICITY: ICD-10-CM

## 2017-05-05 DIAGNOSIS — J41.8 MIXED SIMPLE AND MUCOPURULENT CHRONIC BRONCHITIS: ICD-10-CM

## 2017-05-05 DIAGNOSIS — I10 ESSENTIAL HYPERTENSION: ICD-10-CM

## 2017-05-05 DIAGNOSIS — J30.1 SEASONAL ALLERGIC RHINITIS DUE TO POLLEN: ICD-10-CM

## 2017-05-05 PROCEDURE — 99499 UNLISTED E&M SERVICE: CPT | Mod: S$PBB,,, | Performed by: FAMILY MEDICINE

## 2017-05-05 PROCEDURE — 99214 OFFICE O/P EST MOD 30 MIN: CPT | Mod: 25,S$GLB,, | Performed by: FAMILY MEDICINE

## 2017-05-05 PROCEDURE — 3077F SYST BP >= 140 MM HG: CPT | Mod: S$GLB,,, | Performed by: FAMILY MEDICINE

## 2017-05-05 PROCEDURE — 3080F DIAST BP >= 90 MM HG: CPT | Mod: S$GLB,,, | Performed by: FAMILY MEDICINE

## 2017-05-05 PROCEDURE — 1159F MED LIST DOCD IN RCRD: CPT | Mod: S$GLB,,, | Performed by: FAMILY MEDICINE

## 2017-05-05 PROCEDURE — 96372 THER/PROPH/DIAG INJ SC/IM: CPT | Mod: S$GLB,,, | Performed by: FAMILY MEDICINE

## 2017-05-05 PROCEDURE — 99999 PR PBB SHADOW E&M-EST. PATIENT-LVL III: CPT | Mod: PBBFAC,,, | Performed by: FAMILY MEDICINE

## 2017-05-05 PROCEDURE — 1126F AMNT PAIN NOTED NONE PRSNT: CPT | Mod: S$GLB,,, | Performed by: FAMILY MEDICINE

## 2017-05-05 PROCEDURE — 1160F RVW MEDS BY RX/DR IN RCRD: CPT | Mod: S$GLB,,, | Performed by: FAMILY MEDICINE

## 2017-05-05 PROCEDURE — 1157F ADVNC CARE PLAN IN RCRD: CPT | Mod: S$GLB,,, | Performed by: FAMILY MEDICINE

## 2017-05-05 RX ORDER — AZITHROMYCIN 250 MG/1
TABLET, FILM COATED ORAL
Qty: 6 TABLET | Refills: 0 | Status: SHIPPED | OUTPATIENT
Start: 2017-05-05 | End: 2017-05-18

## 2017-05-05 RX ORDER — CETIRIZINE HYDROCHLORIDE 10 MG/1
10 TABLET ORAL DAILY
Qty: 90 TABLET | Refills: 1 | Status: SHIPPED | OUTPATIENT
Start: 2017-05-05 | End: 2017-05-05

## 2017-05-05 RX ORDER — CETIRIZINE HYDROCHLORIDE 10 MG/1
10 TABLET ORAL DAILY
Qty: 90 TABLET | Refills: 1 | Status: SHIPPED | OUTPATIENT
Start: 2017-05-05 | End: 2017-07-26 | Stop reason: SDUPTHER

## 2017-05-05 RX ORDER — BENZONATATE 100 MG/1
100 CAPSULE ORAL 3 TIMES DAILY PRN
Qty: 30 CAPSULE | Refills: 0 | Status: SHIPPED | OUTPATIENT
Start: 2017-05-05 | End: 2017-06-04

## 2017-05-05 RX ORDER — METHYLPREDNISOLONE ACETATE 40 MG/ML
40 INJECTION, SUSPENSION INTRA-ARTICULAR; INTRALESIONAL; INTRAMUSCULAR; SOFT TISSUE
Status: COMPLETED | OUTPATIENT
Start: 2017-05-05 | End: 2017-05-05

## 2017-05-05 RX ORDER — PROMETHAZINE HYDROCHLORIDE AND CODEINE PHOSPHATE 6.25; 1 MG/5ML; MG/5ML
5 SOLUTION ORAL EVERY 6 HOURS PRN
Qty: 150 ML | Refills: 1 | Status: SHIPPED | OUTPATIENT
Start: 2017-05-05 | End: 2018-02-26

## 2017-05-05 RX ADMIN — METHYLPREDNISOLONE ACETATE 40 MG: 40 INJECTION, SUSPENSION INTRA-ARTICULAR; INTRALESIONAL; INTRAMUSCULAR; SOFT TISSUE at 09:05

## 2017-05-05 NOTE — MR AVS SNAPSHOT
[facility]  [address]  [place] [address]  Phone: [phone]  Fax: [phone]                  Domonique Hernandez Jr.   2017 8:30 AM   Office Visit    Description:  Male : 1940   Provider:  Lucia Leach MD   Department:  [facility]           Reason for Visit     URI           Diagnoses this Visit        Comments    Subacute bronchitis    -  Primary     Seasonal allergic rhinitis due to pollen         Other sinusitis, unspecified chronicity                To Do List           Future Appointments        Provider Department Dept Phone    2017 8:45 AM Trey Garcia MD [facility] - Urology 4th Floor [phone]    2017 2:15 PM Emiliano Cam MD [facility] [phone]    2017 4:45 PM Jewel Gusman MD [facility] - Neurology [phone]      Goals (5 Years of Data)     None       These Medications        Disp Refills Start End    cetirizine (ZYRTEC) 10 MG tablet 90 tablet 1 2017     Take 1 tablet (10 mg total) by mouth once daily. - Oral    Pharmacy: [address] Ph #: [phone]       azithromycin (Z-KATIUSKA) 250 MG tablet 6 tablet 0 2017     2 po day 1, then 1 po q day    Pharmacy: [address] Ph #: [phone]       promethazine-codeine 6.25-10 mg/5 ml (PHENERGAN WITH CODEINE) 6.25-10 mg/5 mL syrup 150 mL 1 2017     Take 5 mLs by mouth every 6 (six) hours as needed for Cough. - Oral    Pharmacy: [address] Ph #: [phone]       benzonatate (TESSALON) 100 MG capsule 30 capsule 0 2017    Take 1 capsule (100 mg total) by mouth 3 (three) times daily as needed. - Oral    Pharmacy: [address] Ph #: [phone]         [facility]     [facility] Care [facility] -  Assistance  Unless otherwise directed  by your provider, please contact [facility], our nurse care line that is available for 24/7 assistance.     Registered nurses in the [facility] provide: appointment scheduling, clinical advisement, health education, and other advisory services.  Call: [phone] (toll free)               Medications           Message regarding Medications     Verify the changes and/or additions to your medication regime listed below are the same as discussed with your clinician today.  If any of these changes or additions are incorrect, please notify your healthcare provider.        START taking these NEW medications        Refills    benzonatate (TESSALON) 100 MG capsule 0    Sig: Take 1 capsule (100 mg total) by mouth 3 (three) times daily as needed.    Class: Normal    Route: Oral      These medications were administered today        Dose Freq    methylPREDNISolone acetate injection 40 mg 40 mg Clinic/HOD 1 time    Sig: Inject 1 mL (40 mg total) into the muscle one time.    Class: Normal    Route: Intramuscular      CHANGE how you are taking these medications     Start Taking Instead of    cetirizine (ZYRTEC) 10 MG tablet cetirizine (ZYRTEC) 10 MG tablet    Dosage:  Take 1 tablet (10 mg total) by mouth once daily. Dosage:  TAKE 1 TABLET EVERY DAY    Reason for Change:  Reorder       STOP taking these medications     chlorthalidone (HYGROTEN) 25 MG Tab TAKE 1 TABLET ONE TIME DAILY           Verify that the below list of medications is an accurate representation of the medications you are currently taking.  If none reported, the list may be blank. If incorrect, please contact your healthcare provider. Carry this list with you in case of emergency.           Current Medications     allopurinol (ZYLOPRIM) 300 MG tablet TAKE 1 TABLET ONE TIME DAILY    amlodipine (NORVASC) 2.5 MG tablet Take 1 tablet (2.5 mg total) by mouth once daily.    aspirin-caffeine (JESSICA BACK & BODY) 500-32.5 mg Tab Take by mouth.     "azithromycin (Z-KATIUSKA) 250 MG tablet 2 po day 1, then 1 po q day    carvedilol (COREG) 25 MG tablet TAKE 1 TABLET (25 MG TOTAL) BY MOUTH 2 (TWO) TIMES DAILY WITH MEALS.    cetirizine (ZYRTEC) 10 MG tablet Take 1 tablet (10 mg total) by mouth once daily.    fluticasone (FLONASE) 50 mcg/actuation nasal spray 1 spray by Each Nare route 2 (two) times daily.    levothyroxine (SYNTHROID) 88 MCG tablet TAKE 1 TABLET ONE TIME DAILY    multivitamin (ONE DAILY MULTIVITAMIN) per tablet Take 1 tablet by mouth once daily. Centrum Silver for Men    nystatin-triamcinolone (MYCOLOG II) cream Apply topically 2 (two) times daily.    promethazine-codeine 6.25-10 mg/5 ml (PHENERGAN WITH CODEINE) 6.25-10 mg/5 mL syrup Take 5 mLs by mouth every 6 (six) hours as needed for Cough.    sildenafil (VIAGRA) 100 MG tablet Take 1 tablet (100 mg total) by mouth daily as needed for Erectile Dysfunction.    simvastatin (ZOCOR) 40 MG tablet TAKE 1 TABLET EVERY EVENING    tamsulosin (FLOMAX) 0.4 mg Cp24 TAKE 1 CAPSULE ONE TIME DAILY    tramadol (ULTRAM) 50 mg tablet Take 1 tablet (50 mg total) by mouth every 6 (six) hours as needed.    umeclidinium-vilanterol (ANORO ELLIPTA) 62.5-25 mcg/actuation DsDv Inhale 1 Dose into the lungs once daily.    venlafaxine (EFFEXOR-XR) 150 MG Cp24 Take 1 capsule (150 mg total) by mouth once daily.    albuterol-ipratropium 2.5mg-0.5mg/3mL (DUO-NEB) 0.5 mg-3 mg(2.5 mg base)/3 mL nebulizer solution Take 3 mLs by nebulization every 6 (six) hours as needed for Wheezing.    benzonatate (TESSALON) 100 MG capsule Take 1 capsule (100 mg total) by mouth 3 (three) times daily as needed.    dutasteride (AVODART) 0.5 mg capsule Take 1 capsule (0.5 mg total) by mouth once daily.           Clinical Reference Information           Your Vitals Were     BP Pulse Temp Resp Height Weight    160/98 66 97.6 °F (36.4 °C) (Tympanic) 18 5' 7" (1.702 m) 108.2 kg (238 lb 8.6 oz)    SpO2 BMI             98% 37.36 kg/m2         Blood Pressure      "     Most Recent Value    BP  (!)  160/98      Allergies as of 5/5/2017     Percocet [Oxycodone-acetaminophen]    Percodan [Oxycodone Hcl-oxycodone-asa]      Immunizations Administered on Date of Encounter - 5/5/2017     None      Language Assistance Services     ATTENTION: Language assistance services are available, free of charge. Please call [phone].      ATENCIÓN: Si habla español, tiene a avelar disposición servicios gratuitos de asistencia lingüística. Llame al [phone].     [facility] Ý: N?u b?n nói Ti?ng Vi?t, có các d?ch v? h? tr? ngôn ng? mi?n phí dành cho b?n. G?i s? [phone].         [facility] complies with applicable Federal civil rights laws and does not discriminate on the basis of race, color, national origin, age, disability, or sex.

## 2017-05-05 NOTE — PROGRESS NOTES
Subjective:       Patient ID: Domonique Hernandez Jr. is a 76 y.o. male.    Chief Complaint: URI    HPI  76 year old male comes in with c/o 1 week of congestion, malaise, fatigue, headache. He hasn't had any fevers. He notes that he didn't take his BP meds this morning. He says he was in a hurry to get here today. He does have copd and says that he has been coughing but hasn't been using any of his regular treatments.     PMH, PSH, ALLERGIES, SH, FH reviewed in nurse's notes above  Medications reconciled in the nurse's notes      Review of Systems   Constitutional: Negative for chills and fever.   HENT: Positive for congestion, rhinorrhea and sore throat. Negative for ear pain, postnasal drip and trouble swallowing.    Eyes: Negative for redness and itching.   Respiratory: Positive for cough. Negative for shortness of breath and wheezing.    Cardiovascular: Negative for chest pain and palpitations.   Gastrointestinal: Negative for abdominal pain, diarrhea, nausea and vomiting.   Genitourinary: Negative for dysuria and frequency.   Musculoskeletal: Positive for back pain.   Skin: Negative for rash.   Neurological: Negative for weakness and headaches.       Objective:      Physical Exam   Constitutional: He is oriented to person, place, and time. He appears well-developed. No distress.   HENT:   Head: Normocephalic and atraumatic.   Eyes: Conjunctivae are normal. Pupils are equal, round, and reactive to light.   Neck: Normal range of motion. Neck supple. No thyromegaly present.   Cardiovascular: Normal rate, regular rhythm, normal heart sounds and intact distal pulses.    Pulmonary/Chest: Effort normal and breath sounds normal. No respiratory distress. He has no wheezes.   Abdominal: Soft. Bowel sounds are normal. There is no tenderness.   Musculoskeletal: Normal range of motion. He exhibits no edema.   Lymphadenopathy:     He has no cervical adenopathy.   Neurological: He is alert and oriented to person, place, and time.    Skin: Skin is warm and dry. No rash noted.   Innumerable skin lesions - appear to be SK   Psychiatric: He has a normal mood and affect. His behavior is normal.   Nursing note and vitals reviewed.       Assessment/Plan:       Domonique was seen today for uri.    Diagnoses and all orders for this visit:    Subacute bronchitis  -     methylPREDNISolone acetate injection 40 mg; Inject 1 mL (40 mg total) into the muscle one time.  -     azithromycin (Z-KATIUSKA) 250 MG tablet; 2 po day 1, then 1 po q day    Seasonal allergic rhinitis due to pollen  -     cetirizine (ZYRTEC) 10 MG tablet; Take 1 tablet (10 mg total) by mouth once daily.  -     Discontinue: cetirizine (ZYRTEC) 10 MG tablet; Take 1 tablet (10 mg total) by mouth once daily.    Other sinusitis, unspecified chronicity  -     azithromycin (Z-KATIUSKA) 250 MG tablet; 2 po day 1, then 1 po q day  -     promethazine-codeine 6.25-10 mg/5 ml (PHENERGAN WITH CODEINE) 6.25-10 mg/5 mL syrup; Take 5 mLs by mouth every 6 (six) hours as needed for Cough.    Essential hypertension    Mixed simple and mucopurulent chronic bronchitis    Cough    Thoracic aortic atherosclerosis    Other orders  -     benzonatate (TESSALON) 100 MG capsule; Take 1 capsule (100 mg total) by mouth 3 (three) times daily as needed.    RTC if condition acutely worsens or any other concerns, otherwise RTC as scheduled    Old XR reviewed - 2015. Atherosclerosis only. No major chronic lung changes.    No wheezing today, but hx of copd noted. patinet non compliant with meds.    Initial BP elevated. Discussed taking meds daily and on time.   Will monitor BP and report back if BP remains above normal limits at home.

## 2017-05-11 RX ORDER — ALBUTEROL SULFATE 90 UG/1
2 AEROSOL, METERED RESPIRATORY (INHALATION) EVERY 6 HOURS PRN
Qty: 18 G | Refills: 5 | Status: SHIPPED | OUTPATIENT
Start: 2017-05-11 | End: 2021-03-19

## 2017-05-11 NOTE — TELEPHONE ENCOUNTER
----- Message from Lindsey Nieto sent at 2017  4:35 PM CDT -----  Contact: self  Domonique Toscanomichelle Moran.  MRN: [id number]  : 1940  PCP: Emiliano Cam  Home Phone      [phone]  Work Phone      Not on file.  Mobile          [phone]      MESSAGE:   Needs a refill on albuterol inhaler last time came in forgot to get it    Phone:  [phone]    Pharmacy:  [facility]

## 2017-05-17 ENCOUNTER — OFFICE VISIT (OUTPATIENT)
Dept: UROLOGY | Facility: CLINIC | Age: 77
End: 2017-05-17
Payer: MEDICARE

## 2017-05-17 VITALS
HEART RATE: 69 BPM | DIASTOLIC BLOOD PRESSURE: 83 MMHG | BODY MASS INDEX: 36.38 KG/M2 | HEIGHT: 68 IN | WEIGHT: 240.06 LBS | SYSTOLIC BLOOD PRESSURE: 157 MMHG

## 2017-05-17 DIAGNOSIS — E78.5 HYPERLIPIDEMIA, UNSPECIFIED HYPERLIPIDEMIA TYPE: ICD-10-CM

## 2017-05-17 DIAGNOSIS — N40.1 BPH WITH URINARY OBSTRUCTION: ICD-10-CM

## 2017-05-17 DIAGNOSIS — N52.01 ERECTILE DYSFUNCTION DUE TO ARTERIAL INSUFFICIENCY: Primary | ICD-10-CM

## 2017-05-17 DIAGNOSIS — I10 ESSENTIAL HYPERTENSION: ICD-10-CM

## 2017-05-17 DIAGNOSIS — N13.8 BPH WITH URINARY OBSTRUCTION: ICD-10-CM

## 2017-05-17 PROCEDURE — 99999 PR PBB SHADOW E&M-EST. PATIENT-LVL III: CPT | Mod: PBBFAC,,, | Performed by: UROLOGY

## 2017-05-17 PROCEDURE — 1159F MED LIST DOCD IN RCRD: CPT | Mod: S$GLB,,, | Performed by: UROLOGY

## 2017-05-17 PROCEDURE — 99499 UNLISTED E&M SERVICE: CPT | Mod: S$GLB,,, | Performed by: UROLOGY

## 2017-05-17 PROCEDURE — 99214 OFFICE O/P EST MOD 30 MIN: CPT | Mod: S$GLB,,, | Performed by: UROLOGY

## 2017-05-17 PROCEDURE — 3079F DIAST BP 80-89 MM HG: CPT | Mod: S$GLB,,, | Performed by: UROLOGY

## 2017-05-17 PROCEDURE — 1126F AMNT PAIN NOTED NONE PRSNT: CPT | Mod: S$GLB,,, | Performed by: UROLOGY

## 2017-05-17 PROCEDURE — 1160F RVW MEDS BY RX/DR IN RCRD: CPT | Mod: S$GLB,,, | Performed by: UROLOGY

## 2017-05-17 PROCEDURE — 3077F SYST BP >= 140 MM HG: CPT | Mod: S$GLB,,, | Performed by: UROLOGY

## 2017-05-17 RX ORDER — TADALAFIL 20 MG/1
20 TABLET ORAL DAILY PRN
Qty: 10 TABLET | Refills: 11 | Status: SHIPPED | OUTPATIENT
Start: 2017-05-17 | End: 2017-05-23

## 2017-05-17 NOTE — PATIENT INSTRUCTIONS
Oral Medications for Erectile Dysfunction  Prescription oral medications can be used for ED. They often work well. But, like all medications, they can have side effects. Also, they cant be used if a man has certain health problems or takes certain other medications. Talk with your doctor about oral ED medication. Ask whether it is right for you.  Types of Oral ED Medications  There are three types of prescription oral ED medications. Each one increases blood flow to the penis. When the penis is stimulated, an erection results. The three types are:  · Sildenafil citrate (Viagra)  · Tadalafil (Cialis)  · Vardenafil HCl (Levitra)  What Oral ED Medications Dont Do  There are some things ED medications cant do.  · They dont cure the cause of your ED. Without the medication, youll still have trouble getting an erection.  · They cant produce an erection without sexual stimulation.  · They wont increase sexual desire. They also wont solve any other sexual issues. Psychological, emotional, or relationship issues will not be fixed.  Taking Oral ED Medications Safely  · Do not combine ED medications with other treatments unless your doctor tells you to.  · Dont take ED medications more often or in larger doses than prescribed.  · Tell your doctor your health history. Mention all medications you take. This includes over-the-counter drugs, supplements, and herbs.  · Ask your doctor about whether you can drink alcohol while taking ED medication.  Possible Side Effects of Oral ED Medications  · Headache  · Facial flushing  · Runny or stuffy nose  · Indigestion  · Distortion of your color vision for a short time  · Sudden vision loss or hearing loss (rare)  Risks of Oral ED Medications   · Do not take ED medications if you take medications containing nitrates. The combination may drop your blood pressure to a dangerous level. Nitrates include nitroglycerin (a drug for angina). They are also in poppers, an inhaled  recreational drug. If youre not sure whether youre taking nitrates, ask your doctor or pharmacist.  · Medications called alpha-blockers can interact with ED medications. They can cause a sudden drop in blood pressure. Alpha-blockers are a common treatment for prostate problems. They also treat high blood pressure. Be sure your doctor knows if you take these medications.  · If youve had a heart attack or have heart disease and you have not had sex for a while, talk to your doctor. Having sex again can put extra strain on your heart. Your doctor can confirm that your heart is healthy enough for sex.  · It is rare, but some men taking ED medications have had sudden vision loss. This may be more likely if other health problems are present. These include high blood pressure and diabetes. Ask your doctor if you are at risk for this type of vision loss.  · In rare cases, an erection may last too long. This can badly damage the blood vessels in your penis. If an erection lasts longer than 4 hours, go to the emergency room right away.       © 5167-6135 [employer], [address] [place] [address], [place] [address]. All rights reserved. This information is not intended as a substitute for professional medical care. Always follow your healthcare professional's instructions.

## 2017-05-17 NOTE — PROGRESS NOTES
"CHIEF COMPLAINT:    Mr. Hernandez is a 76 y.o. male presenting with ED.    PRESENTING ILLNESS:    Domonique Hernandez Jr. is a 76 y.o. male who presents with ED. This has been present for > 1 year. He has tried Viagra with minimal results, however he got the pills from ""[employer]." He has not had his testosterone checked.     He does have urinary frequency, urgency, nocturia x 1. Denies incontinence. He is currently taking Flomax for his LUTS. He was previously taking Avodart however has stopped this. Denies hematuria and dysuria. He is pleased with how he voids.  No hematuria.  No dysuria.    REVIEW OF SYSTEMS:    Domonique Hernandez Jr. denies any history of headache, blurred vision, fever, nausea, vomiting, chills, abdominal pain, bleeding per rectum, cough, SOB, recent loss of consciousness, recent mental status changes, seizures, dizziness, or upper or lower extremity weakness.    MAI  1. 1  2. 2  3. 2  4. 1  5. 1      PATIENT HISTORY:    Past Medical History:   Diagnosis Date    Anxiety     Arthritis     Chronic gout     COPD (chronic obstructive pulmonary disease)     Depression     Emphysema of lung     Generalized headaches     GERD (gastroesophageal reflux disease)     Hyperlipidemia     Hypertension     Hypothyroidism     Obesity     Sleep apnea     On CPAP    Thyroid disease        Past Surgical History:   Procedure Laterality Date    Bilateral mastoidectomies Bilateral 1984    for ear infection    COLONOSCOPY  2010    Eardrum repair  1984    Not sure which side    HERNIA REPAIR  1984    Umbilical-screen    KNEE ARTHROSCOPY Left 1989    ROTATOR CUFF REPAIR Left 2002    Left       Family History   Problem Relation Age of Onset    Cancer Mother      Mother    Cancer Father     No Known Problems Sister     Kidney disease Brother     Stroke Son     Stroke Son     Atrial fibrillation Son        Social History     Social History    Marital status:      Spouse name: N/A    Number of children: " N/A    Years of education: N/A     Occupational History    Not on file.     Social History Main Topics    Smoking status: Never Smoker    Smokeless tobacco: Never Used    Alcohol use No    Drug use: No    Sexual activity: Yes     Partners: Female     Other Topics Concern    Not on file     Social History Narrative       Allergies:  Percocet [oxycodone-acetaminophen] and Percodan [oxycodone hcl-oxycodone-asa]    Medications:    Current Outpatient Prescriptions:     albuterol 90 mcg/actuation inhaler, Inhale 2 puffs into the lungs every 6 (six) hours as needed for Wheezing. Rescue, Disp: 18 g, Rfl: 5    allopurinol (ZYLOPRIM) 300 MG tablet, TAKE 1 TABLET ONE TIME DAILY, Disp: 90 tablet, Rfl: 3    amlodipine (NORVASC) 2.5 MG tablet, Take 1 tablet (2.5 mg total) by mouth once daily., Disp: 90 tablet, Rfl: 1    aspirin-caffeine (JESSICA BACK & BODY) 500-32.5 mg Tab, Take by mouth., Disp: , Rfl:     azithromycin (Z-KATIUSKA) 250 MG tablet, 2 po day 1, then 1 po q day, Disp: 6 tablet, Rfl: 0    benzonatate (TESSALON) 100 MG capsule, Take 1 capsule (100 mg total) by mouth 3 (three) times daily as needed., Disp: 30 capsule, Rfl: 0    carvedilol (COREG) 25 MG tablet, TAKE 1 TABLET (25 MG TOTAL) BY MOUTH 2 (TWO) TIMES DAILY WITH MEALS., Disp: 180 tablet, Rfl: 1    cetirizine (ZYRTEC) 10 MG tablet, Take 1 tablet (10 mg total) by mouth once daily., Disp: 90 tablet, Rfl: 1    fluticasone (FLONASE) 50 mcg/actuation nasal spray, 1 spray by Each Nare route 2 (two) times daily., Disp: 1 Bottle, Rfl: 11    levothyroxine (SYNTHROID) 88 MCG tablet, TAKE 1 TABLET ONE TIME DAILY, Disp: 90 tablet, Rfl: 3    multivitamin (ONE DAILY MULTIVITAMIN) per tablet, Take 1 tablet by mouth once daily. Centrum Silver for Men, Disp: , Rfl:     nystatin-triamcinolone (MYCOLOG II) cream, Apply topically 2 (two) times daily., Disp: 1 Tube, Rfl: 3    promethazine-codeine 6.25-10 mg/5 ml (PHENERGAN WITH CODEINE) 6.25-10 mg/5 mL syrup, Take 5  mLs by mouth every 6 (six) hours as needed for Cough., Disp: 150 mL, Rfl: 1    sildenafil (VIAGRA) 100 MG tablet, Take 1 tablet (100 mg total) by mouth daily as needed for Erectile Dysfunction., Disp: 18 tablet, Rfl: 3    simvastatin (ZOCOR) 40 MG tablet, TAKE 1 TABLET EVERY EVENING, Disp: 90 tablet, Rfl: 3    tamsulosin (FLOMAX) 0.4 mg Cp24, TAKE 1 CAPSULE ONE TIME DAILY, Disp: 90 capsule, Rfl: 3    tramadol (ULTRAM) 50 mg tablet, Take 1 tablet (50 mg total) by mouth every 6 (six) hours as needed., Disp: 30 tablet, Rfl: 5    umeclidinium-vilanterol (ANORO ELLIPTA) 62.5-25 mcg/actuation DsDv, Inhale 1 Dose into the lungs once daily., Disp: 60 each, Rfl: 5    venlafaxine (EFFEXOR-XR) 150 MG Cp24, Take 1 capsule (150 mg total) by mouth once daily., Disp: 90 capsule, Rfl: 1    albuterol-ipratropium 2.5mg-0.5mg/3mL (DUO-NEB) 0.5 mg-3 mg(2.5 mg base)/3 mL nebulizer solution, Take 3 mLs by nebulization every 6 (six) hours as needed for Wheezing., Disp: 120 vial, Rfl: 5    dutasteride (AVODART) 0.5 mg capsule, Take 1 capsule (0.5 mg total) by mouth once daily., Disp: 30 capsule, Rfl: 11    PHYSICAL EXAMINATION:    The patient generally appears in good health, is appropriately interactive, and is in no apparent distress.     Eyes: anicteric sclerae, moist conjunctivae; no lid-lag; PERRLA     HENT: Atraumatic; oropharynx clear with moist mucous membranes and no mucosal ulcerations;normal hard and soft palate.  No evidence of lymphadenopathy.    Neck: Trachea midline.  No thyromegaly.    Musculoskeletal: No abnormal gait.    Skin: No lesions.    Mental: Cooperative with normal affect.  Is oriented to time, place, and person.    Neuro: Grossly intact.    Chest: Normal inspiratory effort.   No accessory muscles.  No audible wheezes.  Respirations symmetric on inspiration and expiration.    Heart: Regular rhythm.      Abdomen:  Soft, non-tender. No masses or organomegaly. Bladder is not palpable. No evidence of flank  discomfort. No evidence of inguinal hernia.    Genitourinary: The penis is not circumcised with no evidence of plaques or induration. The urethral meatus is normal. The testes, epididymides, and cord structures are normal in size and contour bilaterally. The scrotum is normal in size and contour.    Normal anal sphincter tone. No rectal mass.    The prostate is 25 g. Normal landmarks. Lateral sulci. Median furrow intact.  No nodularity or induration. Seminal vesicles are normal.    Extremities: No clubbing, cyanosis, or edema      LABS:      Lab Results   Component Value Date    PSA 1.3 12/31/2015    PSADIAG 0.73 04/11/2016       IMPRESSION:    Encounter Diagnoses   Name Primary?    Erectile dysfunction due to arterial insufficiency Yes    BPH with urinary obstruction     Essential hypertension     Hyperlipidemia, unspecified hyperlipidemia type    HTN, controlled  Hyperlipidemia, controlled    PLAN:     1. He is interested in trying Cialis for his ED. A new Rx was given. Side effects were discussed.  Discussed that pills from online are almost universally counterfeit.  2. He may continue Flomax for his LUTS.  3. Will draw a T as this hasn't been done.  4. RTC 3 months to re-evaluate.    Copy to:

## 2017-05-17 NOTE — MR AVS SNAPSHOT
[facility] - Urology 4th Floor  [address]  [facility] [address]  Phone: [phone]                  Domonique Hernandez Jr.   2017 8:45 AM   Office Visit    Description:  Male : 1940   Provider:  Trey Garcia MD   Department:  [facility] - Urology 4th Floor           Diagnoses this Visit        Comments    Erectile dysfunction due to arterial insufficiency    -  Primary     BPH with urinary obstruction         Essential hypertension         Hyperlipidemia, unspecified hyperlipidemia type                To Do List           Future Appointments        Provider Department Dept Phone    2017 2:15 PM Emiliano Cam MD [facility] Family Medicine [phone]    2017 4:45 PM Jewel Gusman MD [facility] - Neurology [phone]      Goals (5 Years of Data)     None      Follow-Up and Disposition     Return in about 3 months (around 2017).       These Medications        Disp Refills Start End    tadalafil (CIALIS) 20 MG Tab 10 tablet 11 2017     Take 1 tablet (20 mg total) by mouth daily as needed. Take 1 hour before intercourse - Oral    Pharmacy: [facility] - [address] Ph #: [phone]         [facility] On Call     [facility] On Call Nurse Care Line -  Assistance  Unless otherwise directed by your provider, please contact [facility], our nurse care line that is available for  assistance.     Registered nurses in the [facility] provide: appointment scheduling, clinical advisement, health education, and other advisory services.  Call: [phone] (toll free)               Medications           Message regarding Medications     Verify the changes and/or additions to your medication regime listed below are the same as discussed with your clinician today.  If any of these changes or additions are incorrect, please notify your healthcare provider.        START taking these NEW medications         Refills    tadalafil (CIALIS) 20 MG Tab 11    Sig: Take 1 tablet (20 mg total) by mouth daily as needed. Take 1 hour before intercourse    Class: Normal    Route: Oral           Verify that the below list of medications is an accurate representation of the medications you are currently taking.  If none reported, the list may be blank. If incorrect, please contact your healthcare provider. Carry this list with you in case of emergency.           Current Medications     albuterol 90 mcg/actuation inhaler Inhale 2 puffs into the lungs every 6 (six) hours as needed for Wheezing. Rescue    albuterol-ipratropium 2.5mg-0.5mg/3mL (DUO-NEB) 0.5 mg-3 mg(2.5 mg base)/3 mL nebulizer solution Take 3 mLs by nebulization every 6 (six) hours as needed for Wheezing.    allopurinol (ZYLOPRIM) 300 MG tablet TAKE 1 TABLET ONE TIME DAILY    amlodipine (NORVASC) 2.5 MG tablet Take 1 tablet (2.5 mg total) by mouth once daily.    aspirin-caffeine (JESSICA BACK & BODY) 500-32.5 mg Tab Take by mouth.    azithromycin (Z-KATIUSKA) 250 MG tablet 2 po day 1, then 1 po q day    benzonatate (TESSALON) 100 MG capsule Take 1 capsule (100 mg total) by mouth 3 (three) times daily as needed.    carvedilol (COREG) 25 MG tablet TAKE 1 TABLET (25 MG TOTAL) BY MOUTH 2 (TWO) TIMES DAILY WITH MEALS.    cetirizine (ZYRTEC) 10 MG tablet Take 1 tablet (10 mg total) by mouth once daily.    dutasteride (AVODART) 0.5 mg capsule Take 1 capsule (0.5 mg total) by mouth once daily.    fluticasone (FLONASE) 50 mcg/actuation nasal spray 1 spray by Each Nare route 2 (two) times daily.    levothyroxine (SYNTHROID) 88 MCG tablet TAKE 1 TABLET ONE TIME DAILY    multivitamin (ONE DAILY MULTIVITAMIN) per tablet Take 1 tablet by mouth once daily. Centrum Silver for Men    nystatin-triamcinolone (MYCOLOG II) cream Apply topically 2 (two) times daily.    promethazine-codeine 6.25-10 mg/5 ml (PHENERGAN WITH CODEINE) 6.25-10 mg/5 mL syrup Take 5 mLs by mouth every 6 (six) hours as  "needed for Cough.    sildenafil (VIAGRA) 100 MG tablet Take 1 tablet (100 mg total) by mouth daily as needed for Erectile Dysfunction.    simvastatin (ZOCOR) 40 MG tablet TAKE 1 TABLET EVERY EVENING    tadalafil (CIALIS) 20 MG Tab Take 1 tablet (20 mg total) by mouth daily as needed. Take 1 hour before intercourse    tamsulosin (FLOMAX) 0.4 mg Cp24 TAKE 1 CAPSULE ONE TIME DAILY    tramadol (ULTRAM) 50 mg tablet Take 1 tablet (50 mg total) by mouth every 6 (six) hours as needed.    umeclidinium-vilanterol (ANORO ELLIPTA) 62.5-25 mcg/actuation DsDv Inhale 1 Dose into the lungs once daily.    venlafaxine (EFFEXOR-XR) 150 MG Cp24 Take 1 capsule (150 mg total) by mouth once daily.           Clinical Reference Information           Your Vitals Were     BP Pulse Height Weight BMI    157/83 (BP Location: Left arm, Patient Position: Sitting, BP Method: Automatic) 69 5' 8" (1.727 m) 108.9 kg (240 lb 1.3 oz) 36.5 kg/m2      Blood Pressure          Most Recent Value    BP  (!)  157/83      Allergies as of 5/17/2017     Percocet [Oxycodone-acetaminophen]    Percodan [Oxycodone Hcl-oxycodone-asa]      Immunizations Administered on Date of Encounter - 5/17/2017     None      Orders Placed During Today's Visit     Future Labs/Procedures Expected by Expires    Testosterone  5/17/2017 7/16/2018      Instructions    Oral Medications for Erectile Dysfunction  Prescription oral medications can be used for ED. They often work well. But, like all medications, they can have side effects. Also, they cant be used if a man has certain health problems or takes certain other medications. Talk with your doctor about oral ED medication. Ask whether it is right for you.  Types of Oral ED Medications  There are three types of prescription oral ED medications. Each one increases blood flow to the penis. When the penis is stimulated, an erection results. The three types are:  · Sildenafil citrate (Viagra)  · Tadalafil (Cialis)  · Vardenafil HCl " (Levitra)  What Oral ED Medications Dont Do  There are some things ED medications cant do.  · They dont cure the cause of your ED. Without the medication, youll still have trouble getting an erection.  · They cant produce an erection without sexual stimulation.  · They wont increase sexual desire. They also wont solve any other sexual issues. Psychological, emotional, or relationship issues will not be fixed.  Taking Oral ED Medications Safely  · Do not combine ED medications with other treatments unless your doctor tells you to.  · Dont take ED medications more often or in larger doses than prescribed.  · Tell your doctor your health history. Mention all medications you take. This includes over-the-counter drugs, supplements, and herbs.  · Ask your doctor about whether you can drink alcohol while taking ED medication.  Possible Side Effects of Oral ED Medications  · Headache  · Facial flushing  · Runny or stuffy nose  · Indigestion  · Distortion of your color vision for a short time  · Sudden vision loss or hearing loss (rare)  Risks of Oral ED Medications   · Do not take ED medications if you take medications containing nitrates. The combination may drop your blood pressure to a dangerous level. Nitrates include nitroglycerin (a drug for angina). They are also in poppers, an inhaled recreational drug. If youre not sure whether youre taking nitrates, ask your doctor or pharmacist.  · Medications called alpha-blockers can interact with ED medications. They can cause a sudden drop in blood pressure. Alpha-blockers are a common treatment for prostate problems. They also treat high blood pressure. Be sure your doctor knows if you take these medications.  · If youve had a heart attack or have heart disease and you have not had sex for a while, talk to your doctor. Having sex again can put extra strain on your heart. Your doctor can confirm that your heart is healthy enough for sex.  · It is rare, but some  men taking ED medications have had sudden vision loss. This may be more likely if other health problems are present. These include high blood pressure and diabetes. Ask your doctor if you are at risk for this type of vision loss.  · In rare cases, an erection may last too long. This can badly damage the blood vessels in your penis. If an erection lasts longer than 4 hours, go to the emergency room right away.       © 2374-9161 [employer], [address] [place] [address], [place] [address]. All rights reserved. This information is not intended as a substitute for professional medical care. Always follow your healthcare professional's instructions.         Language Assistance Services     ATTENTION: Language assistance services are available, free of charge. Please call [phone].      ATENCIÓN: Si thola mark, tiene a avelar disposición servicios gratuitos de asistencia lingüística. Llame al [phone].     CHELA Ý: N?u b?n nói Ti?ng Vi?t, có các d?ch v? h? tr? ngôn ng? mi?n phí dành cho b?n. G?i s? [phone].         [facility] 4th Floor complies with applicable Federal civil rights laws and does not discriminate on the basis of race, color, national origin, age, disability, or sex.

## 2017-05-18 ENCOUNTER — TELEPHONE (OUTPATIENT)
Dept: UROLOGY | Facility: CLINIC | Age: 77
End: 2017-05-18

## 2017-05-18 ENCOUNTER — HOSPITAL ENCOUNTER (EMERGENCY)
Facility: HOSPITAL | Age: 77
Discharge: HOME | End: 2017-05-18
Attending: SURGERY
Payer: MEDICARE

## 2017-05-18 VITALS
HEIGHT: 68 IN | BODY MASS INDEX: 36.07 KG/M2 | SYSTOLIC BLOOD PRESSURE: 129 MMHG | DIASTOLIC BLOOD PRESSURE: 94 MMHG | HEART RATE: 86 BPM | TEMPERATURE: 97 F | WEIGHT: 238 LBS

## 2017-05-18 DIAGNOSIS — M54.50 LOW BACK PAIN: ICD-10-CM
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
## 2017-05-18 DIAGNOSIS — N52.01 ERECTILE DYSFUNCTION DUE TO ARTERIAL INSUFFICIENCY: Primary | ICD-10-CM

## 2017-05-18 DIAGNOSIS — S39.012A LUMBAR STRAIN, INITIAL ENCOUNTER: Primary | ICD-10-CM

## 2017-05-18 PROCEDURE — 96372 THER/PROPH/DIAG INJ SC/IM: CPT

## 2017-05-18 PROCEDURE — 99283 EMERGENCY DEPT VISIT LOW MDM: CPT | Mod: 25

## 2017-05-18 PROCEDURE — 63600175 PHARM REV CODE 636 W HCPCS: Performed by: SURGERY

## 2017-05-18 RX ORDER — KETOROLAC TROMETHAMINE 30 MG/ML
30 INJECTION, SOLUTION INTRAMUSCULAR; INTRAVENOUS
Status: COMPLETED | OUTPATIENT
Start: 2017-05-18 | End: 2017-05-18

## 2017-05-18 RX ORDER — HYDROCODONE BITARTRATE AND ACETAMINOPHEN 5; 325 MG/1; MG/1
1 TABLET ORAL EVERY 4 HOURS PRN
Qty: 20 TABLET | Refills: 0 | Status: SHIPPED | OUTPATIENT
Start: 2017-05-18 | End: 2018-01-24 | Stop reason: SDUPTHER

## 2017-05-18 RX ADMIN — KETOROLAC TROMETHAMINE 30 MG: 30 INJECTION, SOLUTION INTRAMUSCULAR at 08:05
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
## 2017-05-19 NOTE — DISCHARGE INSTRUCTIONS
Back Sprain or Strain    Injury to the muscles (strain) or ligaments (sprain) around the spine can be troubling. Injury may occur after a sudden forceful twisting or bending force such as in a car accident, after a simple awkward movement, or after lifting something heavy with poor body positioning. In any case, muscle spasm is often present and adds to the pain.  Thankfully, most people feel better in 1 to 2 weeks, and most of the rest in 1 to 2 months. Most people can remain active. Unless you had a forceful or traumatic physical injury such as a car accident or fall, X-rays may not be ordered for the first evaluation of a back sprain or strain. If pain continues and does not respond to medical treatment, your healthcare provider may then order X-rays and other tests.  Home care  The following guidelines will help you care for your injury at home:  · When in bed, try to find a comfortable position. A firm mattress is best. Try lying flat on your back with pillows under your knees. You can also try lying on your side with your knees bent up toward your chest and a pillow between your knees.  · Don't sit for long periods. Try not to take long car rides or take other trips that have you sitting for a long time. This puts more stress on the lower back than standing or walking.  · During the first 24 to 72 hours after an injury or flare-up, apply an ice pack to the painful area for 20 minutes. Then remove it for 20 minutes. Do this for 60 to 90 minutes, or several times a day. This will reduce swelling and pain. Be sure to wrap the ice pack in a thin towel or plastic to protect your skin.  · You can start with ice, then switch to heat. Heat from a hot shower, hot bath, or heating pad reduces pain and works well for muscle spasms. Put heat on the painful area for 20 minutes, then remove for 20 minutes. Do this for 60 to 90 minutes, or several times a day. Do not use a heating pad while sleeping. It can burn the  skin.  · You can alternate the ice and heat. Talk with your healthcare provider to find out the best treatment or therapy for your back pain.  · Therapeutic massage will help relax the back muscles without stretching them.  · Be aware of safe lifting methods. Do not lift anything over 15 pounds until all of the pain is gone.  Medicines  Talk to your healthcare provider before using medicines, especially if you have other health problems or are taking other medicines.  · You may use acetaminophen or ibuprofen to control pain, unless another pain medicine was prescribed. If you have chronic conditions like diabetes, liver or kidney disease, stomach ulcers, or gastrointestinal bleeding, or are taking blood-thinner medicines, talk with your doctor before taking any medicines.  · Be careful if you are given prescription medicines, narcotics, or medicine for muscle spasm. They can cause drowsiness, and affect your coordination, reflexes, and judgment. Do not drive or operate heavy machinery when taking these types of medicines. Only take pain medicine as prescribed by your healthcare provider.  Follow-up care  Follow up with your healthcare provider, or as advised. You may need physical therapy or more tests if your symptoms get worse.  If you had X-rays your healthcare provider may be checking for any broken bones, breaks, or fractures. Bruises and sprains can sometimes hurt as much as a fracture. These injuries can take time to heal completely. If your symptoms dont improve or they get worse, talk with your healthcare provider. You may need a repeat X-ray or other tests.  Call 911  Call for emergency care if any of the following occur:  · Trouble breathing  · Confused  · Very drowsy or trouble awakening  · Fainting or loss of consciousness  · Rapid or very slow heart rate  · Loss of bowel or bladder control  When to seek medical advice  Call your healthcare provider right away if any of the following occur:  · Pain  gets worse or spreads to your arms or legs  · Weakness or numbness in one or both arms or legs  · Numbness in the groin or genital area  Date Last Reviewed: 6/1/2016 © 2000-2016 [employer]. [address] [place] [address], [place] [address]. All rights reserved. This information is not intended as a substitute for professional medical care. Always follow your healthcare professional's instructions.          Self-Care for Low Back Pain    Most people have low back pain now and then. In many cases, it isnt serious and self-care can help. Sometimes low back pain can be a sign of a bigger problem. Call your healthcare provider if your pain returns often or gets worse over time. For the long-term care of your back, get regular exercise, lose any excess weight and learn good posture.  Take a short rest  Lying down during the day may be beneficial for short periods of time if severe pain increases with sitting or standing. Long-term bed rest could be detrimental.  Reduce pain and swelling  Cold reduces swelling. Both cold and heat can reduce pain. Protect your skin by placing a towel between your body and the ice or heat source.  · For the first few days, apply an ice pack for 15 to 20 minutes .  · After the first few days, try heat for 15 minutes at a time to ease pain. Never sleep on a heating pad.  · Over-the-counter medicine can help control pain and swelling. Try aspirin or ibuprofen.  Exercise  Exercise can help your back heal. It also helps your back get stronger and more flexible, preventing any reinjury. Ask your healthcare provider about specific exercises for your back.  Use good posture to avoid reinjury  · When moving, bend at the hips and knees. Dont bend at the waist or twist around.  · When lifting, keep the object close to your body. Dont try to lift more than you can handle.  · When sitting, keep your lower back supported. Use a rolled-up towel as needed.  Seek immediate medical care  if:  · Youre unable to stand or walk.  · You have a temperature over 100.4°F (38.0°C)  · You have frequent, painful, or bloody urination.  · You have severe abdominal pain.  · You have a sharp, stabbing pain.  · Your pain is constant.  · You have pain or numbness in your leg.  · You feel pain in a new area of your back.  · You notice that the pain isnt decreasing after more than a week.   Date Last Reviewed: 9/29/2015 © 2000-2016 [employer]. [address] [place] [address]. All rights reserved. This information is not intended as a substitute for professional medical care. Always follow your healthcare professional's instructions.
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
## 2017-05-22 ENCOUNTER — TELEPHONE (OUTPATIENT)
Dept: UROLOGY | Facility: CLINIC | Age: 77
End: 2017-05-22

## 2017-05-22 NOTE — TELEPHONE ENCOUNTER
Pt notified of low testosterone level, and repeat labs needed. F/u appts scheduled. Pt aware and verbalized understanding.

## 2017-05-23 ENCOUNTER — OFFICE VISIT (OUTPATIENT)
Dept: FAMILY MEDICINE | Facility: CLINIC | Age: 77
End: 2017-05-23
Payer: MEDICARE

## 2017-05-23 VITALS
SYSTOLIC BLOOD PRESSURE: 117 MMHG | HEIGHT: 68 IN | BODY MASS INDEX: 36.43 KG/M2 | RESPIRATION RATE: 20 BRPM | WEIGHT: 240.38 LBS | HEART RATE: 58 BPM | DIASTOLIC BLOOD PRESSURE: 74 MMHG

## 2017-05-23 DIAGNOSIS — N52.01 ERECTILE DYSFUNCTION DUE TO ARTERIAL INSUFFICIENCY: ICD-10-CM

## 2017-05-23 DIAGNOSIS — L30.4 INTERTRIGO: ICD-10-CM

## 2017-05-23 DIAGNOSIS — S39.012A LUMBAR STRAIN, INITIAL ENCOUNTER: ICD-10-CM
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
## 2017-05-23 LAB
PROLACTIN SERPL IA-MCNC: 10.9 NG/ML
TESTOST SERPL-MCNC: 277 NG/DL

## 2017-05-23 PROCEDURE — 1160F RVW MEDS BY RX/DR IN RCRD: CPT | Mod: S$GLB,,, | Performed by: FAMILY MEDICINE

## 2017-05-23 PROCEDURE — 1157F ADVNC CARE PLAN IN RCRD: CPT | Mod: S$GLB,,, | Performed by: FAMILY MEDICINE

## 2017-05-23 PROCEDURE — 3078F DIAST BP <80 MM HG: CPT | Mod: S$GLB,,, | Performed by: FAMILY MEDICINE

## 2017-05-23 PROCEDURE — 99213 OFFICE O/P EST LOW 20 MIN: CPT | Mod: S$GLB,,, | Performed by: FAMILY MEDICINE

## 2017-05-23 PROCEDURE — 99999 PR PBB SHADOW E&M-EST. PATIENT-LVL III: CPT | Mod: PBBFAC,,, | Performed by: FAMILY MEDICINE

## 2017-05-23 PROCEDURE — 99499 UNLISTED E&M SERVICE: CPT | Mod: S$GLB,,, | Performed by: FAMILY MEDICINE

## 2017-05-23 PROCEDURE — 84403 ASSAY OF TOTAL TESTOSTERONE: CPT

## 2017-05-23 PROCEDURE — 36415 COLL VENOUS BLD VENIPUNCTURE: CPT | Mod: S$GLB,,, | Performed by: FAMILY MEDICINE

## 2017-05-23 PROCEDURE — 84146 ASSAY OF PROLACTIN: CPT

## 2017-05-23 PROCEDURE — 3074F SYST BP LT 130 MM HG: CPT | Mod: S$GLB,,, | Performed by: FAMILY MEDICINE

## 2017-05-23 PROCEDURE — 1159F MED LIST DOCD IN RCRD: CPT | Mod: S$GLB,,, | Performed by: FAMILY MEDICINE

## 2017-05-23 RX ORDER — NYSTATIN 100000 [USP'U]/G
POWDER TOPICAL 2 TIMES DAILY
Qty: 60 G | Refills: 5 | Status: SHIPPED | OUTPATIENT
Start: 2017-05-23 | End: 2017-08-14 | Stop reason: SDUPTHER
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
## 2017-05-24 ENCOUNTER — TELEPHONE (OUTPATIENT)
Dept: UROLOGY | Facility: CLINIC | Age: 77
End: 2017-05-24

## 2017-05-24 NOTE — TELEPHONE ENCOUNTER
Pt notified of results. F/U appt scheduled for 5/31/17 10am to discuss with . Pt verbalized understanding.

## 2017-05-24 NOTE — TELEPHONE ENCOUNTER
----- Message from Connie Butler sent at 5/24/2017  1:37 PM CDT -----  Contact: Self   Pt is returning call and would like to be called back today he says it's important [phone]

## 2017-05-24 NOTE — TELEPHONE ENCOUNTER
Returned pts call, pt inquiring if we called him this morning. Informed pt that I had not. Pt inquiring if labs results available. Pt informed results have not been reviewed by . As soon as results were reviewed, we would be able to let him know. Pt verbalized understanding.

## 2017-05-26 DIAGNOSIS — I10 ESSENTIAL HYPERTENSION: ICD-10-CM

## 2017-05-26 DIAGNOSIS — F32.A DEPRESSION, UNSPECIFIED DEPRESSION TYPE: ICD-10-CM

## 2017-05-26 RX ORDER — VENLAFAXINE HYDROCHLORIDE 150 MG/1
CAPSULE, EXTENDED RELEASE ORAL
Qty: 90 CAPSULE | Refills: 1 | Status: SHIPPED | OUTPATIENT
Start: 2017-05-26 | End: 2017-12-04 | Stop reason: SDUPTHER

## 2017-05-26 RX ORDER — AMLODIPINE BESYLATE 2.5 MG/1
TABLET ORAL
Qty: 90 TABLET | Refills: 1 | Status: SHIPPED | OUTPATIENT
Start: 2017-05-26 | End: 2017-11-23

## 2017-05-31 ENCOUNTER — OFFICE VISIT (OUTPATIENT)
Dept: UROLOGY | Facility: CLINIC | Age: 77
End: 2017-05-31
Payer: MEDICARE

## 2017-05-31 VITALS
WEIGHT: 240.75 LBS | DIASTOLIC BLOOD PRESSURE: 78 MMHG | HEIGHT: 68 IN | SYSTOLIC BLOOD PRESSURE: 147 MMHG | HEART RATE: 71 BPM | BODY MASS INDEX: 36.49 KG/M2

## 2017-05-31 DIAGNOSIS — I10 ESSENTIAL HYPERTENSION: ICD-10-CM

## 2017-05-31 DIAGNOSIS — N40.1 BPH WITH URINARY OBSTRUCTION: ICD-10-CM

## 2017-05-31 DIAGNOSIS — E78.5 HYPERLIPIDEMIA, UNSPECIFIED HYPERLIPIDEMIA TYPE: ICD-10-CM

## 2017-05-31 DIAGNOSIS — Z79.899 ENCOUNTER FOR LONG-TERM CURRENT USE OF MEDICATION: ICD-10-CM

## 2017-05-31 DIAGNOSIS — N13.8 BPH WITH URINARY OBSTRUCTION: ICD-10-CM

## 2017-05-31 DIAGNOSIS — N52.01 ERECTILE DYSFUNCTION DUE TO ARTERIAL INSUFFICIENCY: ICD-10-CM

## 2017-05-31 DIAGNOSIS — E29.1 MALE HYPOGONADISM: Primary | ICD-10-CM

## 2017-05-31 PROCEDURE — 99999 PR PBB SHADOW E&M-EST. PATIENT-LVL III: CPT | Mod: PBBFAC,,, | Performed by: UROLOGY

## 2017-05-31 PROCEDURE — 1126F AMNT PAIN NOTED NONE PRSNT: CPT | Mod: S$GLB,,, | Performed by: UROLOGY

## 2017-05-31 PROCEDURE — 99214 OFFICE O/P EST MOD 30 MIN: CPT | Mod: S$GLB,,, | Performed by: UROLOGY

## 2017-05-31 PROCEDURE — 1159F MED LIST DOCD IN RCRD: CPT | Mod: S$GLB,,, | Performed by: UROLOGY

## 2017-05-31 PROCEDURE — 99499 UNLISTED E&M SERVICE: CPT | Mod: S$GLB,,, | Performed by: UROLOGY

## 2017-05-31 RX ORDER — TESTOSTERONE 20.25 MG/1.25G
GEL TOPICAL
Qty: 1 BOTTLE | Refills: 5 | Status: SHIPPED | OUTPATIENT
Start: 2017-05-31 | End: 2017-09-06 | Stop reason: SDUPTHER

## 2017-05-31 NOTE — PATIENT INSTRUCTIONS
What is this medicine?  TESTOSTERONE (ingrid TOS ter one) is the main male hormone. It supports normal male traits such as muscle growth, facial hair, and deep voice. This gel is used in males to treat low testosterone levels.  This medicine may be used for other purposes; ask your health care provider or pharmacist if you have questions.  What should I tell my health care provider before I take this medicine?  They need to know if you have any of these conditions:  · breast cancer  · diabetes  · heart disease  · if a female partner is pregnant or trying to get pregnant  · kidney disease  · liver disease  · lung disease  · prostate cancer, enlargement  · an unusual or allergic reaction to testosterone, soy proteins, other medicines, foods, dyes, or preservatives  · pregnant or trying to get pregnant  · breast-feeding  How should I use this medicine?  This medicine is for external use only. This medicine is applied at the same time every day (preferably in the morning) to clean, dry, intact skin. If you take a bath or shower in the morning, apply the gel after the bath or shower. Follow the directions on the prescription label. Make sure that you are using your testosterone gel product correctly and applying it only to the appropriate skin area (see below). Allow the skin to dry a few minutes then cover with clothing to prevent others from coming in contact with the medicine on your skin. The gel is flammable. Avoid fire, flame, or smoking until the gel has dried. Wash your hands with soap and water after use.  For AndroGel Packets: Open the packet(s) needed for your dose. You can put the entire dose into your palm all at once or just a little at a time to apply. If you prefer, you can instead squeeze the gel directly onto the area you are applying it to. Apply on the shoulders as directed. Do not apply to the scrotum or genitals. Be sure you use the correct total dose. It is best to wait 5 to 6 hours after application  of the gel before showering or swimming.  For AndroGel 1%: Pump the dose into the palm of your hand. You can put the entire dose into your palm all at once or just a little at a time to apply. If you prefer, you can instead pump the gel directly onto the area you are applying it to. Apply on the shoulders as directed. Do not apply to the scrotum or genitals. Be sure you use the correct total dose. It is best to wait for 5 to 6 hours after application of the gel before showering or swimming.  For AndroGel 1.62%: Pump the dose into the palm of your hand. Dispense one pump of gel at a time into the palm of your hand before applying it. If you prefer, you can instead pump the gel directly onto the area you are applying it to. Apply on the shoulders and upper arms as directed. Do not apply to other parts of the body including the abdomen or genitals. Be sure you use the correct total dose. It is best to wait 2 hours after application of the gel before washing, showering, or swimming.  For Testim: Open the tube(s) needed for your dose. Squeeze the gel from the tube into the palm of your hand. Apply on the shoulders or upper arms as directed. Do not apply to the scrotum, genitals, or abdomen. Be sure you use the correct total dose. Do not shower or swim for at least 2 hours after application of the gel.  For Fortesta: Use the multi-dose pump to pump the gel directly onto the area you are applying it to. Apply on the thighs as directed. Do not apply to the abdomen, penis, scrotum, shoulders or upper arms. Gently rub the gel onto the skin using your finger. Be sure you use the correct total dose. Do not shower or swim for at least 2 hours after application of the gel.  For Axiron: Use the multi-dose pump to pump the gel into the applicator.  Apply under the arm as directed.  Do not apply to other locations.  Do not shower or swim for at least 2 hours after application of the gel.  A special MedGuide will be given to you by  the pharmacist with each prescription and refill. Be sure to read this information carefully each time.  Talk to your pediatrician regarding the use of this medicine in children. Special care may be needed.  Overdosage: If you think you have taken too much of this medicine contact a poison control center or emergency room at once.  NOTE: This medicine is only for you. Do not share this medicine with others.  What if I miss a dose?  If you miss a dose, use it as soon as you can. If it is almost time for your next dose, use only that dose. Do not use double or extra doses.  What may interact with this medicine?  · medicines for diabetes  · medicines that treat or prevent blood clots like warfarin  · oxyphenbutazone  · propranolol  · steroid medicines like prednisone or cortisone  This list may not describe all possible interactions. Give your health care provider a list of all the medicines, herbs, non-prescription drugs, or dietary supplements you use. Also tell them if you smoke, drink alcohol, or use illegal drugs. Some items may interact with your medicine.  What should I watch for while using this medicine?  Visit your doctor or health care professional for regular checks on your progress. They will need to check the level of testosterone in your blood.  This medicine can transfer from your body to others. If a person or pet comes in contact with the area where this medicine was applied to your skin, they may have a serious risk of side effects. If you cannot avoid skin-to-skin contact with another person, make sure the site where this medicine was applied is covered with clothing. If accidental contact happens, the skin of the person or pet should be washed right away with soap and water. Also, a female partner who is pregnant or trying to get pregnant should avoid contact with the gel or treated skin.  This medicine may affect blood sugar levels. If you have diabetes, check with your doctor or health care  professional before you change your diet or the dose of your diabetic medicine.  This drug is banned from use in athletes by most athletic organizations.  What side effects may I notice from receiving this medicine?  Side effects that you should report to your doctor or health care professional as soon as possible:  · allergic reactions like skin rash, itching or hives, swelling of the face, lips, or tongue  · breast enlargement  · breathing problems  · changes in mood, especially anger, depression, or rage  · dark urine  · general ill feeling or flu-like symptoms  · light-colored stools  · loss of appetite, nausea  · nausea, vomiting  · right upper belly pain  · stomach pain  · swelling of ankles  · too frequent or persistent erections  · trouble passing urine or change in the amount of urine  · unusually weak or tired  · yellowing of the eyes or skin  Side effects that usually do not require medical attention (report to your doctor or health care professional if they continue or are bothersome):  · acne  · change in sex drive or performance  · hair loss  · headache  This list may not describe all possible side effects. Call your doctor for medical advice about side effects. You may report side effects to FDA at 6-933-FDA-1241.  Where should I keep my medicine?  Keep out of the reach of children. This medicine can be abused. Keep your medicine in a safe place to protect it from theft. Do not share this medicine with anyone. Selling or giving away this medicine is dangerous and against the law.  Store at room temperature between 15 to 30 degrees C (59 to 86 degrees F). Keep closed until use. Protect from heat and light. This medicine is flammable. Avoid exposure to heat, fire, flame, and smoking. Throw away any unused medicine after the expiration date.  NOTE:This sheet is a summary. It may not cover all possible information. If you have questions about this medicine, talk to your doctor, pharmacist, or health care  provider. Copyright© 2011 [employer]

## 2017-05-31 NOTE — PROGRESS NOTES
"CHIEF COMPLAINT:    Mr. Hernandez is a 77 y.o. male presenting with ED.    PRESENTING ILLNESS:    Domonique Hernandez Jr. is a 77 y.o. male who presents with ED. This has been present for > 1 year. He has tried Viagra with minimal results, however he got the pills from "[facility] His T was checked which returned low.  He's here to discuss.     He does have urinary frequency, urgency, nocturia x 1. Denies incontinence. He is currently taking Flomax for his LUTS. He was previously taking Avodart however has stopped this. Denies hematuria and dysuria. He is pleased with how he voids.  No hematuria.  No dysuria.    REVIEW OF SYSTEMS:    Domonique Hernandez Jr. denies any history of headache, blurred vision, fever, nausea, vomiting, chills, abdominal pain, bleeding per rectum, cough, SOB, recent loss of consciousness, recent mental status changes, seizures, dizziness, or upper or lower extremity weakness.    MAI  1. 1  2. 1  3. 1  4. 1  5. 1     PATIENT HISTORY:    Past Medical History:   Diagnosis Date    Anxiety     Arthritis     Chronic gout     COPD (chronic obstructive pulmonary disease)     Depression     Emphysema of lung     Generalized headaches     GERD (gastroesophageal reflux disease)     Hyperlipidemia     Hypertension     Hypothyroidism     Obesity     Sleep apnea     On CPAP    Thyroid disease        Past Surgical History:   Procedure Laterality Date    Bilateral mastoidectomies Bilateral 1984    for ear infection    COLONOSCOPY  2010    Eardrum repair  1984    Not sure which side    HERNIA REPAIR  1984    Umbilical-screen    KNEE ARTHROSCOPY Left 1989    ROTATOR CUFF REPAIR Left 2002    Left       Family History   Problem Relation Age of Onset    Cancer Mother      Mother    Cancer Father     No Known Problems Sister     Kidney disease Brother     Stroke Son     Stroke Son     Atrial fibrillation Son        Social History     Social History    Marital status:      Spouse name: N/A    " Number of children: N/A    Years of education: N/A     Occupational History    Not on file.     Social History Main Topics    Smoking status: Never Smoker    Smokeless tobacco: Never Used    Alcohol use No    Drug use: No    Sexual activity: Yes     Partners: Female     Other Topics Concern    Not on file     Social History Narrative    No narrative on file       Allergies:  Percocet [oxycodone-acetaminophen] and Percodan [oxycodone hcl-oxycodone-asa]    Medications:    Current Outpatient Prescriptions:     albuterol 90 mcg/actuation inhaler, Inhale 2 puffs into the lungs every 6 (six) hours as needed for Wheezing. Rescue, Disp: 18 g, Rfl: 5    albuterol-ipratropium 2.5mg-0.5mg/3mL (DUO-NEB) 0.5 mg-3 mg(2.5 mg base)/3 mL nebulizer solution, Take 3 mLs by nebulization every 6 (six) hours as needed for Wheezing., Disp: 120 vial, Rfl: 5    allopurinol (ZYLOPRIM) 300 MG tablet, TAKE 1 TABLET ONE TIME DAILY, Disp: 90 tablet, Rfl: 3    amlodipine (NORVASC) 2.5 MG tablet, TAKE 1 TABLET EVERY DAY, Disp: 90 tablet, Rfl: 1    aspirin-caffeine (JESSICA BACK & BODY) 500-32.5 mg Tab, Take by mouth., Disp: , Rfl:     benzonatate (TESSALON) 100 MG capsule, Take 1 capsule (100 mg total) by mouth 3 (three) times daily as needed., Disp: 30 capsule, Rfl: 0    carvedilol (COREG) 25 MG tablet, TAKE 1 TABLET (25 MG TOTAL) BY MOUTH 2 (TWO) TIMES DAILY WITH MEALS., Disp: 180 tablet, Rfl: 1    cetirizine (ZYRTEC) 10 MG tablet, Take 1 tablet (10 mg total) by mouth once daily., Disp: 90 tablet, Rfl: 1    fluticasone (FLONASE) 50 mcg/actuation nasal spray, 1 spray by Each Nare route 2 (two) times daily., Disp: 1 Bottle, Rfl: 11    hydrocodone-acetaminophen 5-325mg (NORCO) 5-325 mg per tablet, Take 1 tablet by mouth every 4 (four) hours as needed for Pain., Disp: 20 tablet, Rfl: 0    levothyroxine (SYNTHROID) 88 MCG tablet, TAKE 1 TABLET ONE TIME DAILY, Disp: 90 tablet, Rfl: 3    multivitamin (ONE DAILY MULTIVITAMIN) per  tablet, Take 1 tablet by mouth once daily. Centrum Silver for Men, Disp: , Rfl:     nystatin (MYCOSTATIN) powder, Apply topically 2 (two) times daily., Disp: 60 g, Rfl: 5    nystatin-triamcinolone (MYCOLOG II) cream, Apply topically 2 (two) times daily., Disp: 1 Tube, Rfl: 3    promethazine-codeine 6.25-10 mg/5 ml (PHENERGAN WITH CODEINE) 6.25-10 mg/5 mL syrup, Take 5 mLs by mouth every 6 (six) hours as needed for Cough., Disp: 150 mL, Rfl: 1    simvastatin (ZOCOR) 40 MG tablet, TAKE 1 TABLET EVERY EVENING, Disp: 90 tablet, Rfl: 3    tamsulosin (FLOMAX) 0.4 mg Cp24, TAKE 1 CAPSULE ONE TIME DAILY, Disp: 90 capsule, Rfl: 3    testosterone (ANDROGEL) 20.25 mg/1.25 gram (1.62 %) GlPm, Apply 2 pumps to shoulders daily, Disp: 1 Bottle, Rfl: 5    umeclidinium-vilanterol (ANORO ELLIPTA) 62.5-25 mcg/actuation DsDv, Inhale 1 Dose into the lungs once daily., Disp: 60 each, Rfl: 5    venlafaxine (EFFEXOR-XR) 150 MG Cp24, TAKE 1 CAPSULE EVERY DAY, Disp: 90 capsule, Rfl: 1    PHYSICAL EXAMINATION:    The patient generally appears in good health, is appropriately interactive, and is in no apparent distress.     Eyes: anicteric sclerae, moist conjunctivae; no lid-lag; PERRLA     HENT: Atraumatic; oropharynx clear with moist mucous membranes and no mucosal ulcerations;normal hard and soft palate.  No evidence of lymphadenopathy.    Neck: Trachea midline.  No thyromegaly.    Musculoskeletal: No abnormal gait.    Skin: No lesions.    Mental: Cooperative with normal affect.  Is oriented to time, place, and person.    Neuro: Grossly intact.    Chest: Normal inspiratory effort.   No accessory muscles.  No audible wheezes.  Respirations symmetric on inspiration and expiration.    Heart: Regular rhythm.      Abdomen:  Soft, non-tender. No masses or organomegaly. Bladder is not palpable. No evidence of flank discomfort. No evidence of inguinal hernia.    Genitourinary: The penis is not circumcised with no evidence of plaques or  induration. The urethral meatus is normal. The testes, epididymides, and cord structures are normal in size and contour bilaterally. The scrotum is normal in size and contour.    Normal anal sphincter tone. No rectal mass.    The prostate is 25 g. Normal landmarks. Lateral sulci. Median furrow intact.  No nodularity or induration. Seminal vesicles are normal.    Extremities: No clubbing, cyanosis, or edema      LABS:      Lab Results   Component Value Date    PSA 1.3 12/31/2015    PSADIAG 0.73 04/11/2016       IMPRESSION:    Encounter Diagnoses   Name Primary?    Male hypogonadism Yes    Erectile dysfunction due to arterial insufficiency     BPH with urinary obstruction     Hyperlipidemia, unspecified hyperlipidemia type     Essential hypertension     Encounter for long-term current use of medication    HTN, controlled  Hyperlipidemia, controlled    PLAN:     1. Continue to try Cialis for his ED.   2. He may continue Flomax for his LUTS.  3. Discussed the risks and benefits of testosterone replacement today.  This included possible cardiac risks.  He would like to try this.  Will check a T in 2 weeks and adjust the dose of TRT if necessary.  He can then RTC 3 months with T, PSA, CBC, hepatic panel, lipid panel.  A new Rx for Androgel 1.62% was given today.     Copy to:

## 2017-05-31 NOTE — LETTER
May 31, 2017      Emiliano Cam MD  [address] [place] [address]           [facility] - Urology 4th Floor  [address]  [place] [address]  Phone: [phone]          Patient: Domonique Hernandez Jr.   MR Number: [id number]   YOB: 1940   Date of Visit: 5/31/2017       Dear Dr. Emiliano Cam:    Thank you for referring Domonique Hernandez to me for evaluation. Attached you will find relevant portions of my assessment and plan of care.    If you have questions, please do not hesitate to call me. I look forward to following Domonique Hernandez along with you.    Sincerely,    Trey Garcia MD    Enclosure  CC:  No Recipients    If you would like to receive this communication electronically, please contact [email or web address] or [phone] to request more information on [employer] Link access.    For providers and/or their staff who would like to refer a patient to [facility], please contact us through our one-stop-shop provider referral line, [facility], at [phone].    If you feel you have received this communication in error or would no longer like to receive these types of communications, please e-mail [email or web address]

## 2017-06-14 ENCOUNTER — OFFICE VISIT (OUTPATIENT)
Dept: FAMILY MEDICINE | Facility: CLINIC | Age: 77
End: 2017-06-14
Payer: MEDICARE

## 2017-06-14 VITALS
BODY MASS INDEX: 36.43 KG/M2 | SYSTOLIC BLOOD PRESSURE: 158 MMHG | DIASTOLIC BLOOD PRESSURE: 84 MMHG | RESPIRATION RATE: 18 BRPM | HEIGHT: 68 IN | HEART RATE: 72 BPM | WEIGHT: 240.38 LBS

## 2017-06-14 DIAGNOSIS — E03.4 HYPOTHYROIDISM DUE TO ACQUIRED ATROPHY OF THYROID: ICD-10-CM

## 2017-06-14 DIAGNOSIS — L82.1 SEBORRHEIC KERATOSIS: ICD-10-CM

## 2017-06-14 DIAGNOSIS — F32.0 MAJOR DEPRESSIVE DISORDER, SINGLE EPISODE, MILD: ICD-10-CM

## 2017-06-14 DIAGNOSIS — M1A.9XX0 CHRONIC GOUT WITHOUT TOPHUS, UNSPECIFIED CAUSE, UNSPECIFIED SITE: Primary | ICD-10-CM

## 2017-06-14 DIAGNOSIS — I10 ESSENTIAL HYPERTENSION: ICD-10-CM

## 2017-06-14 DIAGNOSIS — J41.8 MIXED SIMPLE AND MUCOPURULENT CHRONIC BRONCHITIS: ICD-10-CM

## 2017-06-14 DIAGNOSIS — E78.5 HYPERLIPIDEMIA, UNSPECIFIED HYPERLIPIDEMIA TYPE: ICD-10-CM

## 2017-06-14 PROCEDURE — 99214 OFFICE O/P EST MOD 30 MIN: CPT | Mod: 25,S$GLB,, | Performed by: FAMILY MEDICINE

## 2017-06-14 PROCEDURE — 99499 UNLISTED E&M SERVICE: CPT | Mod: S$GLB,,, | Performed by: FAMILY MEDICINE

## 2017-06-14 PROCEDURE — 99999 PR PBB SHADOW E&M-EST. PATIENT-LVL II: CPT | Mod: PBBFAC,,, | Performed by: FAMILY MEDICINE

## 2017-06-14 PROCEDURE — 17000 DESTRUCT PREMALG LESION: CPT | Mod: S$GLB,,, | Performed by: FAMILY MEDICINE

## 2017-06-14 PROCEDURE — 1159F MED LIST DOCD IN RCRD: CPT | Mod: S$GLB,,, | Performed by: FAMILY MEDICINE

## 2017-06-14 NOTE — PROGRESS NOTES
Subjective:       Patient ID: Domonique Hernandez Jr. is a 77 y.o. male.    Chief Complaint: Follow-up (3month) and Otalgia (Itching)    Patient here for check up and follow up for HYPERTENSION.   Today his blood pressure is perfect.  He is taking Coreg and a small dose of Norvasc.  He tolerates these well.  His family is starting to drive him around.  Patient is also having trouble with memory.  His girlfriend feels like he needs be further evaluated.  He has problems remembering names and he sometimes forgets what he was going to do.  Got worse after stopping Effexor.  Seems to be more anxious.    Patient also states his CPAP machine is not working very good.  This could also be contributing to his forgetfulness.  He requires 7 cm water.  He needs a new machine.  He is urinating better now that he is on Flomax and Proscar.  He is still having delayed ejaculation.  Stopping Effexor did not make much of a difference.  He is doing well on Effexor XR 75 mg po daily.  Patient brought all of his medication in.  This was reviewed.      Cough   This is a new problem. The current episode started in the past 7 days. The problem has been gradually worsening. The cough is productive of purulent sputum. Associated symptoms include ear pain. Pertinent negatives include no chest pain, chills, fever, headaches, postnasal drip, rash, sore throat or shortness of breath.   Hypertension   Pertinent negatives include no chest pain, headaches, palpitations or shortness of breath.   Otalgia    Associated symptoms include coughing. Pertinent negatives include no abdominal pain, headaches, rash or sore throat.     Review of Systems   Constitutional: Negative for activity change, chills, diaphoresis, fatigue, fever and unexpected weight change.   HENT: Positive for ear pain. Negative for congestion, nosebleeds, postnasal drip, sinus pressure, sore throat, trouble swallowing and voice change.    Eyes: Negative for photophobia and visual  disturbance.   Respiratory: Positive for cough. Negative for apnea, choking, chest tightness and shortness of breath.    Cardiovascular: Negative for chest pain, palpitations and leg swelling.   Gastrointestinal: Negative for abdominal pain and blood in stool.   Endocrine: Negative for cold intolerance, heat intolerance, polydipsia and polyphagia.   Genitourinary: Negative for decreased urine volume, difficulty urinating and dysuria.   Musculoskeletal: Positive for arthralgias and back pain. Negative for joint swelling.   Skin: Negative for color change, pallor and rash.   Neurological: Negative for dizziness, weakness, numbness and headaches.   Hematological: Negative for adenopathy. Does not bruise/bleed easily.   Psychiatric/Behavioral: Negative for behavioral problems, decreased concentration, dysphoric mood and sleep disturbance. The patient is not nervous/anxious.        Objective:      Vitals:    06/14/17 1431   BP: (!) 158/84   Pulse: 72   Resp: 18     Physical Exam   Constitutional: He is oriented to person, place, and time. He appears well-developed and well-nourished.   Eyes: EOM are normal. Pupils are equal, round, and reactive to light.   Neck: No JVD present.   No carotid bruits   Cardiovascular: Normal rate, regular rhythm, normal heart sounds and intact distal pulses.  Exam reveals no gallop.    No murmur heard.  Pulmonary/Chest: Effort normal and breath sounds normal.   Musculoskeletal: He exhibits no edema or tenderness.   Lymphadenopathy:     He has no cervical adenopathy.   Neurological: He is alert and oriented to person, place, and time. He has normal reflexes. He displays normal reflexes. No cranial nerve deficit. He exhibits normal muscle tone. Coordination normal.   Psychiatric: He has a normal mood and affect. His behavior is normal. Judgment and thought content normal.       Lab Results   Component Value Date    TSH 3.220 03/06/2017     BMP  Lab Results   Component Value Date      03/06/2017    K 4.1 03/06/2017     03/06/2017    CO2 24 03/06/2017    BUN 20 03/06/2017    CREATININE 1.1 03/06/2017    CALCIUM 9.9 03/06/2017    ANIONGAP 9 03/06/2017    ESTGFRAFRICA >60 03/06/2017    EGFRNONAA >60 03/06/2017       Lab Results   Component Value Date    LDLCALC 78.2 03/06/2017       Assessment:       1. Chronic gout without tophus, unspecified cause, unspecified site    2. Mixed simple and mucopurulent chronic bronchitis    3. Hyperlipidemia, unspecified hyperlipidemia type    4. Essential hypertension    5. Hypothyroidism due to acquired atrophy of thyroid    6. Major depressive disorder, single episode, mild    7. Seborrheic keratosis        Plan:   Essential hypertension  -     zoster vaccine live, PF, (ZOSTAVAX, PF,) 19,400 unit/0.65 mL injection; Inject 19,400 Units into the skin once.  Dispense: 1 vial; Refill: 0    Other sinusitis, unspecified chronicity  -     azithromycin (Z-KATIUSKA) 250 MG tablet; 2 po day 1, then 1 po q day  Dispense: 6 tablet; Refill: 0  -     promethazine-codeine 6.25-10 mg/5 ml (PHENERGAN WITH CODEINE) 6.25-10 mg/5 mL syrup; Take 5 mLs by mouth every 6 (six) hours as needed for Cough.  Dispense: 150 mL;       Refill: 1    Memory loss/MCI        Continue follow up with Dr. Gusman    Essential hypertension  Two gram sodium diet.    Weight loss discussed.    Try to walk 2 miles per day.    The following medications will be restarted today  -     carvedilol (COREG) 25 MG tablet; Take 1 tablet (25 mg total) by mouth 2 (two) times daily with meals.  -     amlodipine (NORVASC) 2.5 MG tablet; Take 1 tablet (2.5 mg total) by mouth once daily.    KARL on CPAP  Continue CPAP at 7 cm water pressure  Needs new machine    Depression, unspecified depression type  - Continue venlafaxine (EFFEXOR-XR) 150 MG 24 hr capsule; Take 1 capsule by mouth once daily.  Dispense: 30 capsule; Refill: 5    Delayed ejaculation  Try stopping Flomax to see if that makes a  difference.    Obstructive sleep apnea  -     CPAP FOR HOME USE    Hyperlipidemia, unspecified hyperlipidemia type  -     Lipid panel; Future    Essential hypertension  -     Comprehensive metabolic panel; Future    Chronic gout without tophus, unspecified cause, unspecified site  -     CBC auto differential; Future  -     Uric acid; Future    Hypothyroidism due to acquired atrophy of thyroid  -     TSH; Future    Benign non-nodular prostatic hyperplasia with lower urinary tract symptoms  Continue Proscar and Flomax.    RTC in 3 months

## 2017-07-11 ENCOUNTER — OFFICE VISIT (OUTPATIENT)
Dept: NEUROLOGY | Facility: CLINIC | Age: 77
End: 2017-07-11
Payer: MEDICARE

## 2017-07-11 VITALS
HEIGHT: 68 IN | WEIGHT: 245.38 LBS | DIASTOLIC BLOOD PRESSURE: 88 MMHG | SYSTOLIC BLOOD PRESSURE: 136 MMHG | BODY MASS INDEX: 37.19 KG/M2 | HEART RATE: 72 BPM | RESPIRATION RATE: 14 BRPM

## 2017-07-11 DIAGNOSIS — G31.84 MILD COGNITIVE IMPAIRMENT: Primary | ICD-10-CM

## 2017-07-11 PROCEDURE — 99213 OFFICE O/P EST LOW 20 MIN: CPT | Mod: S$GLB,,, | Performed by: PSYCHIATRY & NEUROLOGY

## 2017-07-11 PROCEDURE — 99999 PR PBB SHADOW E&M-EST. PATIENT-LVL IV: CPT | Mod: PBBFAC,,, | Performed by: PSYCHIATRY & NEUROLOGY

## 2017-07-11 PROCEDURE — 1125F AMNT PAIN NOTED PAIN PRSNT: CPT | Mod: S$GLB,,, | Performed by: PSYCHIATRY & NEUROLOGY

## 2017-07-11 PROCEDURE — 1159F MED LIST DOCD IN RCRD: CPT | Mod: S$GLB,,, | Performed by: PSYCHIATRY & NEUROLOGY
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
## 2017-07-26 DIAGNOSIS — J30.1 SEASONAL ALLERGIC RHINITIS DUE TO POLLEN: ICD-10-CM

## 2017-08-10 ENCOUNTER — OFFICE VISIT (OUTPATIENT)
Dept: DERMATOLOGY | Facility: CLINIC | Age: 77
End: 2017-08-10
Payer: MEDICARE

## 2017-08-10 DIAGNOSIS — L30.4 INTERTRIGO: ICD-10-CM

## 2017-08-10 DIAGNOSIS — L82.1 SEBORRHEIC KERATOSIS: Primary | ICD-10-CM

## 2017-08-10 DIAGNOSIS — L91.8 SKIN TAG: ICD-10-CM

## 2017-08-10 DIAGNOSIS — Z12.83 SCREENING EXAM FOR SKIN CANCER: ICD-10-CM

## 2017-08-10 DIAGNOSIS — R20.9 SKIN SENSATION DISTURBANCE: ICD-10-CM

## 2017-08-10 PROCEDURE — 1126F AMNT PAIN NOTED NONE PRSNT: CPT | Mod: S$GLB,,, | Performed by: DERMATOLOGY

## 2017-08-10 PROCEDURE — 1159F MED LIST DOCD IN RCRD: CPT | Mod: S$GLB,,, | Performed by: DERMATOLOGY

## 2017-08-10 PROCEDURE — 99999 PR PBB SHADOW E&M-EST. PATIENT-LVL II: CPT | Mod: PBBFAC,,, | Performed by: DERMATOLOGY

## 2017-08-10 PROCEDURE — 3008F BODY MASS INDEX DOCD: CPT | Mod: S$GLB,,, | Performed by: DERMATOLOGY

## 2017-08-10 PROCEDURE — 11200 RMVL SKIN TAGS UP TO&INC 15: CPT | Mod: S$GLB,,, | Performed by: DERMATOLOGY

## 2017-08-10 PROCEDURE — 99202 OFFICE O/P NEW SF 15 MIN: CPT | Mod: 25,S$GLB,, | Performed by: DERMATOLOGY

## 2017-08-10 RX ORDER — CETIRIZINE HYDROCHLORIDE 10 MG/1
TABLET ORAL
Qty: 90 TABLET | Refills: 1 | Status: SHIPPED | OUTPATIENT
Start: 2017-08-10 | End: 2018-01-13 | Stop reason: SDUPTHER

## 2017-08-10 NOTE — PROGRESS NOTES
Subjective:       Patient ID:  Domonique Hernandez Jr. is a 77 y.o. male who presents for   Chief Complaint   Patient presents with    Skin Check     UBSE       Patient complains of lesion(s)  Location: scalp and groin   Duration: 3mos  Symptoms: itching/ flaking  Relieving factors/Previous treatments: ketoconazole shampoo & Nystatin powder    Last seen derm 3 mos ago in [place] for itching of scalp and groin- significantly improved since using medication.    Denies any personal h/o or fam h/o NMSC or Mm.           Review of Systems   Skin: Positive for itching. Negative for daily sunscreen use and activity-related sunscreen use.   Hematologic/Lymphatic: Bruises/bleeds easily.        Objective:    Physical Exam   Constitutional: He appears well-developed and well-nourished. No distress.   Neurological: He is alert and oriented to person, place, and time. He is not disoriented.   Psychiatric: He has a normal mood and affect.   Skin:   Areas Examined (abnormalities noted in diagram):   Scalp / Hair Palpated and Inspected  Head / Face Inspection Performed  Neck Inspection Performed  Chest / Axilla Inspection Performed  Abdomen Inspection Performed  Back Inspection Performed  RUE Inspected  LUE Inspection Performed  Nails and Digits Inspection Performed                   Diagram Legend     Erythematous scaling macule/papule c/w actinic keratosis       Vascular papule c/w angioma      Pigmented verrucoid papule/plaque c/w seborrheic keratosis      Yellow umbilicated papule c/w sebaceous hyperplasia      Irregularly shaped tan macule c/w lentigo     1-2 mm smooth white papules consistent with Milia      Movable subcutaneous cyst with punctum c/w epidermal inclusion cyst      Subcutaneous movable cyst c/w pilar cyst      Firm pink to brown papule c/w dermatofibroma      Pedunculated fleshy papule(s) c/w skin tag(s)      Evenly pigmented macule c/w junctional nevus     Mildly variegated pigmented, slightly irregular-bordered  macule c/w mildly atypical nevus      Flesh colored to evenly pigmented papule c/w intradermal nevus       Pink pearly papule/plaque c/w basal cell carcinoma      Erythematous hyperkeratotic cursted plaque c/w SCC      Surgical scar with no sign of skin cancer recurrence      Open and closed comedones      Inflammatory papules and pustules      Verrucoid papule consistent consistent with wart     Erythematous eczematous patches and plaques     Dystrophic onycholytic nail with subungual debris c/w onychomycosis     Umbilicated papule    Erythematous-base heme-crusted tan verrucoid plaque consistent with inflamed seborrheic keratosis     Erythematous Silvery Scaling Plaque c/w Psoriasis     See annotation      Assessment / Plan:        Seborrheic keratosis  These are benign inherited growths without a malignant potential. Reassurance given to patient. No treatment is necessary.     Skin Tags/pedunculated Sk's with skin sensation disturbance  Verbal consent obtained. 10 lesions removed with scissor snip removal after anesthesia with 1% lidocaine with epinephrine. Hemostasis achieved with aluminum chloride and hyfrecation. No complications.    Screening exam for skin cancer  Area(s) of previous NMSC evaluated with no signs of recurrence.    Upper body skin examination performed today including at least 6 points as noted in physical examination. No lesions suspicious for malignancy noted.      Intertrigo - no rash; + itching  Cool blow dry after showering. Once clear, use Zeasorb AF powder for maintenance.             Return in about 1 year (around 8/10/2018).

## 2017-08-10 NOTE — PATIENT INSTRUCTIONS
Cool blow dry after showering. Once clear, use Zeasorb AF powder for maintenance.    Wound Care    A band aid and vaseline ointment has been placed over the site.  This should remain in place for 24 hours.  It is recommended that you keep the area dry for the first 24 hours.  After 24 hours, you may remove the band aid and wash the area with warm soap and water and apply Vaseline jelly.  Many patients prefer to use Neosporin or Bacitracin ointment.  This is acceptable; however, know that you can develop an allergy to this medication even if you have used it safely for years.  It is important to keep the area moist.  Letting it dry out and get air slows healing time, and will worsen the scar.  Band aid is optional after first 24 hours.      If you notice increasing redness, tenderness, pain, or yellow drainage at the site, please notify your doctor.  These are signs of an infection.    If your site is bleeding, apply firm pressure for 15 minutes straight.  Repeat for another 15 minutes, if it is still bleeding.   If the surgical site continues to bleed, then please contact your doctor.      [address] [place] [address]/ [phone] [email or web address]/ [email or web address]

## 2017-08-11 ENCOUNTER — NURSE TRIAGE (OUTPATIENT)
Dept: ADMINISTRATIVE | Facility: CLINIC | Age: 77
End: 2017-08-11

## 2017-08-11 NOTE — TELEPHONE ENCOUNTER
Reason for Disposition   [1] Prescription not at pharmacy AND [2] was prescribed today by PCP    Protocols used: ST MEDICATION QUESTION CALL-A-AH    Patient called for prescription for Nystatin. Also, he signed up for my [facility], but he does not have access to the AVS as was discussed by Dr. Greene his dermatologist on yesterday. Patient informed a message would be sent to Dr. Cam and Dr. Greene concerning his request for Nystatin. Patient verbalized understanding.

## 2017-08-14 ENCOUNTER — TELEPHONE (OUTPATIENT)
Dept: FAMILY MEDICINE | Facility: CLINIC | Age: 77
End: 2017-08-14

## 2017-08-14 DIAGNOSIS — L30.4 INTERTRIGO: ICD-10-CM

## 2017-08-14 RX ORDER — NYSTATIN 100000 [USP'U]/G
POWDER TOPICAL 2 TIMES DAILY
Qty: 60 G | Refills: 5 | Status: SHIPPED | OUTPATIENT
Start: 2017-08-14 | End: 2017-08-15 | Stop reason: SDUPTHER

## 2017-08-15 DIAGNOSIS — L30.4 INTERTRIGO: ICD-10-CM

## 2017-08-16 RX ORDER — NYSTATIN 100000 [USP'U]/G
POWDER TOPICAL 2 TIMES DAILY
Qty: 60 G | Refills: 5 | Status: SHIPPED | OUTPATIENT
Start: 2017-08-16 | End: 2018-09-14 | Stop reason: SDUPTHER

## 2017-08-30 ENCOUNTER — LAB VISIT (OUTPATIENT)
Dept: LAB | Facility: HOSPITAL | Age: 77
End: 2017-08-30
Attending: UROLOGY
Payer: MEDICARE

## 2017-08-30 DIAGNOSIS — E29.1 MALE HYPOGONADISM: ICD-10-CM

## 2017-08-30 DIAGNOSIS — Z79.899 ENCOUNTER FOR LONG-TERM CURRENT USE OF MEDICATION: ICD-10-CM

## 2017-08-30 DIAGNOSIS — N13.8 BPH WITH URINARY OBSTRUCTION: ICD-10-CM

## 2017-08-30 DIAGNOSIS — N40.1 BPH WITH URINARY OBSTRUCTION: ICD-10-CM

## 2017-08-30 LAB
ALBUMIN SERPL BCP-MCNC: 3.5 G/DL
ALP SERPL-CCNC: 108 U/L
ALT SERPL W/O P-5'-P-CCNC: 18 U/L
AST SERPL-CCNC: 19 U/L
BASOPHILS # BLD AUTO: 0.03 K/UL
BASOPHILS NFR BLD: 0.4 %
BILIRUB DIRECT SERPL-MCNC: 0.2 MG/DL
BILIRUB SERPL-MCNC: 0.4 MG/DL
CHOLEST SERPL-MCNC: 133 MG/DL
CHOLEST/HDLC SERPL: 4.2 {RATIO}
COMPLEXED PSA SERPL-MCNC: 1.4 NG/ML
DIFFERENTIAL METHOD: ABNORMAL
EOSINOPHIL # BLD AUTO: 0.3 K/UL
EOSINOPHIL NFR BLD: 4.2 %
ERYTHROCYTE [DISTWIDTH] IN BLOOD BY AUTOMATED COUNT: 14.2 %
HCT VFR BLD AUTO: 42.4 %
HDLC SERPL-MCNC: 32 MG/DL
HDLC SERPL: 24.1 %
HGB BLD-MCNC: 14.1 G/DL
LDLC SERPL CALC-MCNC: 82.2 MG/DL
LYMPHOCYTES # BLD AUTO: 1.4 K/UL
LYMPHOCYTES NFR BLD: 18.9 %
MCH RBC QN AUTO: 31.5 PG
MCHC RBC AUTO-ENTMCNC: 33.3 G/DL
MCV RBC AUTO: 95 FL
MONOCYTES # BLD AUTO: 0.4 K/UL
MONOCYTES NFR BLD: 6 %
NEUTROPHILS # BLD AUTO: 5.2 K/UL
NEUTROPHILS NFR BLD: 70.5 %
NONHDLC SERPL-MCNC: 101 MG/DL
PLATELET # BLD AUTO: 166 K/UL
PMV BLD AUTO: 10.3 FL
PROT SERPL-MCNC: 6.9 G/DL
RBC # BLD AUTO: 4.47 M/UL
TESTOST SERPL-MCNC: 360 NG/DL
TRIGL SERPL-MCNC: 94 MG/DL
WBC # BLD AUTO: 7.39 K/UL

## 2017-08-30 PROCEDURE — 80076 HEPATIC FUNCTION PANEL: CPT

## 2017-08-30 PROCEDURE — 84153 ASSAY OF PSA TOTAL: CPT

## 2017-08-30 PROCEDURE — 36415 COLL VENOUS BLD VENIPUNCTURE: CPT

## 2017-08-30 PROCEDURE — 80061 LIPID PANEL: CPT

## 2017-08-30 PROCEDURE — 85025 COMPLETE CBC W/AUTO DIFF WBC: CPT

## 2017-08-30 PROCEDURE — 84403 ASSAY OF TOTAL TESTOSTERONE: CPT

## 2017-09-01 ENCOUNTER — TELEPHONE (OUTPATIENT)
Dept: UROLOGY | Facility: CLINIC | Age: 77
End: 2017-09-01

## 2017-09-05 ENCOUNTER — CLINICAL SUPPORT (OUTPATIENT)
Dept: FAMILY MEDICINE | Facility: CLINIC | Age: 77
End: 2017-09-05
Payer: MEDICARE

## 2017-09-05 DIAGNOSIS — E78.5 HYPERLIPIDEMIA, UNSPECIFIED HYPERLIPIDEMIA TYPE: ICD-10-CM

## 2017-09-05 DIAGNOSIS — I10 ESSENTIAL HYPERTENSION: ICD-10-CM

## 2017-09-05 DIAGNOSIS — M1A.9XX0 CHRONIC GOUT WITHOUT TOPHUS, UNSPECIFIED CAUSE, UNSPECIFIED SITE: ICD-10-CM

## 2017-09-05 LAB
ALT SERPL W/O P-5'-P-CCNC: 20 U/L
ANION GAP SERPL CALC-SCNC: 8 MMOL/L
BUN SERPL-MCNC: 16 MG/DL
CALCIUM SERPL-MCNC: 9.9 MG/DL
CHLORIDE SERPL-SCNC: 106 MMOL/L
CHOLEST SERPL-MCNC: 136 MG/DL
CHOLEST/HDLC SERPL: 4.5 {RATIO}
CO2 SERPL-SCNC: 26 MMOL/L
CREAT SERPL-MCNC: 0.9 MG/DL
EST. GFR  (AFRICAN AMERICAN): >60 ML/MIN/1.73 M^2
EST. GFR  (NON AFRICAN AMERICAN): >60 ML/MIN/1.73 M^2
GLUCOSE SERPL-MCNC: 94 MG/DL
HDLC SERPL-MCNC: 30 MG/DL
HDLC SERPL: 22.1 %
LDLC SERPL CALC-MCNC: 77.6 MG/DL
NONHDLC SERPL-MCNC: 106 MG/DL
POTASSIUM SERPL-SCNC: 3.9 MMOL/L
SODIUM SERPL-SCNC: 140 MMOL/L
TRIGL SERPL-MCNC: 142 MG/DL
URATE SERPL-MCNC: 5.5 MG/DL

## 2017-09-05 PROCEDURE — 80061 LIPID PANEL: CPT

## 2017-09-05 PROCEDURE — 36415 COLL VENOUS BLD VENIPUNCTURE: CPT | Mod: S$GLB,,, | Performed by: FAMILY MEDICINE

## 2017-09-05 PROCEDURE — 80048 BASIC METABOLIC PNL TOTAL CA: CPT

## 2017-09-05 PROCEDURE — 84460 ALANINE AMINO (ALT) (SGPT): CPT

## 2017-09-05 PROCEDURE — 84550 ASSAY OF BLOOD/URIC ACID: CPT

## 2017-09-06 ENCOUNTER — OFFICE VISIT (OUTPATIENT)
Dept: UROLOGY | Facility: CLINIC | Age: 77
End: 2017-09-06
Payer: MEDICARE

## 2017-09-06 VITALS
HEART RATE: 62 BPM | WEIGHT: 247.13 LBS | HEIGHT: 68 IN | BODY MASS INDEX: 37.46 KG/M2 | SYSTOLIC BLOOD PRESSURE: 151 MMHG | DIASTOLIC BLOOD PRESSURE: 73 MMHG

## 2017-09-06 DIAGNOSIS — N40.1 BPH WITH URINARY OBSTRUCTION: ICD-10-CM

## 2017-09-06 DIAGNOSIS — N52.01 ERECTILE DYSFUNCTION DUE TO ARTERIAL INSUFFICIENCY: ICD-10-CM

## 2017-09-06 DIAGNOSIS — Z79.899 ENCOUNTER FOR LONG-TERM (CURRENT) USE OF HIGH-RISK MEDICATION: ICD-10-CM

## 2017-09-06 DIAGNOSIS — I10 ESSENTIAL HYPERTENSION: ICD-10-CM

## 2017-09-06 DIAGNOSIS — E78.5 HYPERLIPIDEMIA, UNSPECIFIED HYPERLIPIDEMIA TYPE: ICD-10-CM

## 2017-09-06 DIAGNOSIS — N13.8 BPH WITH URINARY OBSTRUCTION: ICD-10-CM

## 2017-09-06 DIAGNOSIS — E29.1 MALE HYPOGONADISM: Primary | ICD-10-CM

## 2017-09-06 PROCEDURE — 99214 OFFICE O/P EST MOD 30 MIN: CPT | Mod: S$GLB,,, | Performed by: UROLOGY

## 2017-09-06 PROCEDURE — 3077F SYST BP >= 140 MM HG: CPT | Mod: S$GLB,,, | Performed by: UROLOGY

## 2017-09-06 PROCEDURE — 1159F MED LIST DOCD IN RCRD: CPT | Mod: S$GLB,,, | Performed by: UROLOGY

## 2017-09-06 PROCEDURE — 3078F DIAST BP <80 MM HG: CPT | Mod: S$GLB,,, | Performed by: UROLOGY

## 2017-09-06 PROCEDURE — 1126F AMNT PAIN NOTED NONE PRSNT: CPT | Mod: S$GLB,,, | Performed by: UROLOGY

## 2017-09-06 PROCEDURE — 99999 PR PBB SHADOW E&M-EST. PATIENT-LVL III: CPT | Mod: PBBFAC,,, | Performed by: UROLOGY

## 2017-09-06 PROCEDURE — 99499 UNLISTED E&M SERVICE: CPT | Mod: S$GLB,,, | Performed by: UROLOGY

## 2017-09-06 PROCEDURE — 3008F BODY MASS INDEX DOCD: CPT | Mod: S$GLB,,, | Performed by: UROLOGY

## 2017-09-06 RX ORDER — TESTOSTERONE 20.25 MG/1.25G
GEL TOPICAL
Qty: 1 BOTTLE | Refills: 5 | Status: SHIPPED | OUTPATIENT
Start: 2017-09-06 | End: 2019-03-04 | Stop reason: SDUPTHER

## 2017-09-06 NOTE — PROGRESS NOTES
"CHIEF COMPLAINT:    Mr. Hernandez is a 77 y.o. male presenting with ED.    PRESENTING ILLNESS:    Domonique Hernandez Jr. is a 77 y.o. male who presents with ED. This has been present for > 1 year. He has tried Viagra with minimal results, however he got the pills from "[facility] His T was checked which returned low.  He was then started on TRT.  While on TRT, he has better erections and more energy.  His erections are better, but they're not great.  However, this doesn't bother him.    He does have urinary frequency, urgency, nocturia x 1. Denies incontinence. He is currently taking Flomax for his LUTS. He was previously taking Avodart however has stopped this. Denies hematuria and dysuria. He is pleased with how he voids.  No hematuria.  No dysuria.    REVIEW OF SYSTEMS:    Domonique Hernandez Jr. denies any history of headache, blurred vision, fever, nausea, vomiting, chills, abdominal pain, bleeding per rectum, cough, SOB, recent loss of consciousness, recent mental status changes, seizures, dizziness, or upper or lower extremity weakness.    MAI Questionnaire   1. 4   2. 2   3. 4   4. 3   5. 2      PATIENT HISTORY:    Past Medical History:   Diagnosis Date    Anxiety     Arthritis     Chronic gout     COPD (chronic obstructive pulmonary disease)     Depression     Emphysema of lung     Generalized headaches     GERD (gastroesophageal reflux disease)     Hyperlipidemia     Hypertension     Hypothyroidism     Obesity     Sleep apnea     On CPAP    Thyroid disease        Past Surgical History:   Procedure Laterality Date    Bilateral mastoidectomies Bilateral 1984    for ear infection    COLONOSCOPY  2010    Eardrum repair  1984    Not sure which side    HERNIA REPAIR  1984    Umbilical-screen    KNEE ARTHROSCOPY Left 1989    ROTATOR CUFF REPAIR Left 2002    Left       Family History   Problem Relation Age of Onset    Cancer Mother      Mother    Cancer Father     No Known Problems Sister     Kidney " disease Brother     Stroke Son     Stroke Son     Atrial fibrillation Son     Melanoma Neg Hx        Social History     Social History    Marital status:      Spouse name: N/A    Number of children: N/A    Years of education: N/A     Occupational History    Not on file.     Social History Main Topics    Smoking status: Never Smoker    Smokeless tobacco: Never Used    Alcohol use No    Drug use: No    Sexual activity: Yes     Partners: Female     Other Topics Concern    Not on file     Social History Narrative    No narrative on file       Allergies:  Percocet [oxycodone-acetaminophen] and Percodan [oxycodone hcl-oxycodone-asa]    Medications:    Current Outpatient Prescriptions:     albuterol 90 mcg/actuation inhaler, Inhale 2 puffs into the lungs every 6 (six) hours as needed for Wheezing. Rescue, Disp: 18 g, Rfl: 5    allopurinol (ZYLOPRIM) 300 MG tablet, TAKE 1 TABLET ONE TIME DAILY, Disp: 90 tablet, Rfl: 3    amlodipine (NORVASC) 2.5 MG tablet, TAKE 1 TABLET EVERY DAY, Disp: 90 tablet, Rfl: 1    aspirin-caffeine (JESSICA BACK & BODY) 500-32.5 mg Tab, Take by mouth., Disp: , Rfl:     carvedilol (COREG) 25 MG tablet, TAKE 1 TABLET (25 MG TOTAL) BY MOUTH 2 (TWO) TIMES DAILY WITH MEALS., Disp: 180 tablet, Rfl: 1    cetirizine (ZYRTEC) 10 MG tablet, TAKE 1 TABLET EVERY DAY, Disp: 90 tablet, Rfl: 1    fluticasone (FLONASE) 50 mcg/actuation nasal spray, 1 spray by Each Nare route 2 (two) times daily., Disp: 1 Bottle, Rfl: 11    hydrocodone-acetaminophen 5-325mg (NORCO) 5-325 mg per tablet, Take 1 tablet by mouth every 4 (four) hours as needed for Pain., Disp: 20 tablet, Rfl: 0    levothyroxine (SYNTHROID) 88 MCG tablet, TAKE 1 TABLET ONE TIME DAILY, Disp: 90 tablet, Rfl: 3    multivitamin (ONE DAILY MULTIVITAMIN) per tablet, Take 1 tablet by mouth once daily. Centrum Silver for Men, Disp: , Rfl:     nystatin (MYCOSTATIN) powder, Apply topically 2 (two) times daily., Disp: 60 g, Rfl: 5     promethazine-codeine 6.25-10 mg/5 ml (PHENERGAN WITH CODEINE) 6.25-10 mg/5 mL syrup, Take 5 mLs by mouth every 6 (six) hours as needed for Cough., Disp: 150 mL, Rfl: 1    simvastatin (ZOCOR) 40 MG tablet, TAKE 1 TABLET EVERY EVENING, Disp: 90 tablet, Rfl: 3    tamsulosin (FLOMAX) 0.4 mg Cp24, TAKE 1 CAPSULE ONE TIME DAILY, Disp: 90 capsule, Rfl: 3    testosterone (ANDROGEL) 20.25 mg/1.25 gram (1.62 %) GlPm, Apply 2 pumps to shoulders daily, Disp: 1 Bottle, Rfl: 5    umeclidinium-vilanterol (ANORO ELLIPTA) 62.5-25 mcg/actuation DsDv, Inhale 1 Dose into the lungs once daily., Disp: 60 each, Rfl: 5    venlafaxine (EFFEXOR-XR) 150 MG Cp24, TAKE 1 CAPSULE EVERY DAY, Disp: 90 capsule, Rfl: 1    albuterol-ipratropium 2.5mg-0.5mg/3mL (DUO-NEB) 0.5 mg-3 mg(2.5 mg base)/3 mL nebulizer solution, Take 3 mLs by nebulization every 6 (six) hours as needed for Wheezing., Disp: 120 vial, Rfl: 5    PHYSICAL EXAMINATION:    The patient generally appears in good health, is appropriately interactive, and is in no apparent distress.     Eyes: anicteric sclerae, moist conjunctivae; no lid-lag; PERRLA     HENT: Atraumatic; oropharynx clear with moist mucous membranes and no mucosal ulcerations;normal hard and soft palate.  No evidence of lymphadenopathy.    Neck: Trachea midline.  No thyromegaly.    Musculoskeletal: No abnormal gait.    Skin: No lesions.    Mental: Cooperative with normal affect.  Is oriented to time, place, and person.    Neuro: Grossly intact.    Chest: Normal inspiratory effort.   No accessory muscles.  No audible wheezes.  Respirations symmetric on inspiration and expiration.    Heart: Regular rhythm.      Abdomen:  Soft, non-tender. No masses or organomegaly. Bladder is not palpable. No evidence of flank discomfort. No evidence of inguinal hernia.    Genitourinary: The penis is not circumcised with no evidence of plaques or induration. The urethral meatus is normal. The testes, epididymides, and cord structures  are normal in size and contour bilaterally. The scrotum is normal in size and contour.    Normal anal sphincter tone. No rectal mass.    The prostate is 25 g. Normal landmarks. Lateral sulci. Median furrow intact.  No nodularity or induration. Seminal vesicles are normal.    Extremities: No clubbing, cyanosis, or edema      LABS:      Lab Results   Component Value Date    PSA 1.3 12/31/2015    PSADIAG 1.4 08/30/2017    PSADIAG 0.73 04/11/2016       IMPRESSION:    Encounter Diagnoses   Name Primary?    Male hypogonadism Yes    Erectile dysfunction due to arterial insufficiency     BPH with urinary obstruction     Hyperlipidemia, unspecified hyperlipidemia type     Essential hypertension     Encounter for long-term (current) use of high-risk medication    HTN, controlled  Hyperlipidemia, controlled    PLAN:     1. He may continue Flomax for his LUTS.  2. Continue Androgel for the TRT.  Refilled today.  3. Will observe his ED as it doesn't bother him.  4. RTC 6 months with T, PSA, CBC, hepatic panel, lipid panel.    Copy to:

## 2017-09-12 ENCOUNTER — OFFICE VISIT (OUTPATIENT)
Dept: FAMILY MEDICINE | Facility: CLINIC | Age: 77
End: 2017-09-12
Payer: MEDICARE

## 2017-09-12 VITALS
HEIGHT: 68 IN | HEART RATE: 80 BPM | BODY MASS INDEX: 37.41 KG/M2 | RESPIRATION RATE: 20 BRPM | DIASTOLIC BLOOD PRESSURE: 75 MMHG | SYSTOLIC BLOOD PRESSURE: 150 MMHG | WEIGHT: 246.81 LBS

## 2017-09-12 DIAGNOSIS — E03.4 HYPOTHYROIDISM DUE TO ACQUIRED ATROPHY OF THYROID: ICD-10-CM

## 2017-09-12 DIAGNOSIS — R30.0 DYSURIA: ICD-10-CM

## 2017-09-12 DIAGNOSIS — K21.9 GASTROESOPHAGEAL REFLUX DISEASE, ESOPHAGITIS PRESENCE NOT SPECIFIED: ICD-10-CM

## 2017-09-12 DIAGNOSIS — M1A.9XX0 CHRONIC GOUT WITHOUT TOPHUS, UNSPECIFIED CAUSE, UNSPECIFIED SITE: Primary | ICD-10-CM

## 2017-09-12 DIAGNOSIS — I10 ESSENTIAL HYPERTENSION: ICD-10-CM

## 2017-09-12 DIAGNOSIS — G47.33 OBSTRUCTIVE SLEEP APNEA: ICD-10-CM

## 2017-09-12 DIAGNOSIS — N39.0 URINARY TRACT INFECTION WITHOUT HEMATURIA, SITE UNSPECIFIED: ICD-10-CM

## 2017-09-12 DIAGNOSIS — E78.5 HYPERLIPIDEMIA, UNSPECIFIED HYPERLIPIDEMIA TYPE: ICD-10-CM

## 2017-09-12 DIAGNOSIS — R82.90 ABNORMAL URINALYSIS: ICD-10-CM

## 2017-09-12 DIAGNOSIS — F41.9 ANXIETY: ICD-10-CM

## 2017-09-12 LAB
BACTERIA SPEC CULT: NORMAL
BILIRUB SERPL-MCNC: NORMAL MG/DL
BLOOD URINE, POC: 250
CASTS: NORMAL
COLOR, POC UA: NORMAL
CRYSTALS: NORMAL
GLUCOSE UR QL STRIP: NORMAL
KETONES UR QL STRIP: NORMAL
LEUKOCYTE ESTERASE URINE, POC: NORMAL
NITRITE, POC UA: NORMAL
PH, POC UA: 5
PROTEIN, POC: 500
RBC CELLS COUNTED: NORMAL
SPECIFIC GRAVITY, POC UA: 1.02
UROBILINOGEN, POC UA: NORMAL
WHITE BLOOD CELLS: NORMAL

## 2017-09-12 PROCEDURE — 99999 PR PBB SHADOW E&M-EST. PATIENT-LVL III: CPT | Mod: PBBFAC,,, | Performed by: FAMILY MEDICINE

## 2017-09-12 PROCEDURE — 3078F DIAST BP <80 MM HG: CPT | Mod: S$GLB,,, | Performed by: FAMILY MEDICINE

## 2017-09-12 PROCEDURE — 87086 URINE CULTURE/COLONY COUNT: CPT

## 2017-09-12 PROCEDURE — 3077F SYST BP >= 140 MM HG: CPT | Mod: S$GLB,,, | Performed by: FAMILY MEDICINE

## 2017-09-12 PROCEDURE — 1126F AMNT PAIN NOTED NONE PRSNT: CPT | Mod: S$GLB,,, | Performed by: FAMILY MEDICINE

## 2017-09-12 PROCEDURE — 1159F MED LIST DOCD IN RCRD: CPT | Mod: S$GLB,,, | Performed by: FAMILY MEDICINE

## 2017-09-12 PROCEDURE — 99214 OFFICE O/P EST MOD 30 MIN: CPT | Mod: 25,S$GLB,, | Performed by: FAMILY MEDICINE

## 2017-09-12 PROCEDURE — 3008F BODY MASS INDEX DOCD: CPT | Mod: S$GLB,,, | Performed by: FAMILY MEDICINE

## 2017-09-12 PROCEDURE — 87088 URINE BACTERIA CULTURE: CPT

## 2017-09-12 PROCEDURE — 99499 UNLISTED E&M SERVICE: CPT | Mod: S$GLB,,, | Performed by: FAMILY MEDICINE

## 2017-09-12 PROCEDURE — 87147 CULTURE TYPE IMMUNOLOGIC: CPT

## 2017-09-12 PROCEDURE — 81002 URINALYSIS NONAUTO W/O SCOPE: CPT | Mod: S$GLB,,, | Performed by: FAMILY MEDICINE

## 2017-09-12 RX ORDER — CIPROFLOXACIN 500 MG/1
500 TABLET ORAL 2 TIMES DAILY
Qty: 20 TABLET | Refills: 0 | Status: SHIPPED | OUTPATIENT
Start: 2017-09-12 | End: 2017-09-22

## 2017-09-12 NOTE — PROGRESS NOTES
Subjective:       Patient ID: Domonique Hernandez Jr. is a 77 y.o. male.    Chief Complaint: Follow-up (3 mo check)    Patient here for check up and follow up for HYPERTENSION.   Today his blood pressure is perfect.  He is taking Coreg and a small dose of Norvasc.  He tolerates these well.    Patient is also having trouble with memory.  His girlfriend feels like he needs be further evaluated.  He has problems remembering names and he sometimes forgets what he was going to do.  He saw neurologist and so far not dimentia.   Got worse after stopping Effexor.  He is now back on it and feels better.  Patient also states his CPAP machine is not working very good.  This could also be contributing to his forgetfulness.  He requires 7 cm water.  He needs a new machine.  He is urinating better now that he is on Flomax and Proscar.  He is still having delayed ejaculation.  Stopping Effexor did not make much of a difference.  He is doing well on Effexor XR 75 mg po daily.  Patient brought all of his medication in.  This was reviewed.  Patient uses testosterone gel.  He tolerates it well.  His urologist feels it is okay to take this.  Having dysuria      Otalgia    Associated symptoms include coughing. Pertinent negatives include no abdominal pain, headaches, rash or sore throat.   Cough   This is a new problem. The current episode started in the past 7 days. The problem has been gradually worsening. The cough is productive of purulent sputum. Associated symptoms include ear pain. Pertinent negatives include no chest pain, chills, fever, headaches, postnasal drip, rash, sore throat or shortness of breath.   Hypertension   Pertinent negatives include no chest pain, headaches, palpitations or shortness of breath.     Review of Systems   Constitutional: Negative for activity change, chills, diaphoresis, fatigue, fever and unexpected weight change.   HENT: Positive for ear pain. Negative for congestion, nosebleeds, postnasal drip, sinus  pressure, sore throat, trouble swallowing and voice change.    Eyes: Negative for photophobia and visual disturbance.   Respiratory: Positive for cough. Negative for apnea, choking, chest tightness and shortness of breath.    Cardiovascular: Negative for chest pain, palpitations and leg swelling.   Gastrointestinal: Negative for abdominal pain and blood in stool.   Endocrine: Negative for cold intolerance, heat intolerance, polydipsia and polyphagia.   Genitourinary: Negative for decreased urine volume, difficulty urinating and dysuria.   Musculoskeletal: Positive for arthralgias and back pain. Negative for joint swelling.   Skin: Negative for color change, pallor and rash.   Neurological: Negative for dizziness, weakness, numbness and headaches.   Hematological: Negative for adenopathy. Does not bruise/bleed easily.   Psychiatric/Behavioral: Negative for behavioral problems, decreased concentration, dysphoric mood and sleep disturbance. The patient is not nervous/anxious.        Objective:      Vitals:    09/12/17 1408   BP: (!) 150/75   Pulse: 80   Resp:      Physical Exam   Constitutional: He is oriented to person, place, and time. He appears well-developed and well-nourished.   Eyes: EOM are normal. Pupils are equal, round, and reactive to light.   Neck: No JVD present.   No carotid bruits   Cardiovascular: Normal rate, regular rhythm, normal heart sounds and intact distal pulses.  Exam reveals no gallop.    No murmur heard.  Pulmonary/Chest: Effort normal and breath sounds normal.   Musculoskeletal: He exhibits no edema or tenderness.   Lymphadenopathy:     He has no cervical adenopathy.   Neurological: He is alert and oriented to person, place, and time. He has normal reflexes. He displays normal reflexes. No cranial nerve deficit. He exhibits normal muscle tone. Coordination normal.   Psychiatric: He has a normal mood and affect. His behavior is normal. Judgment and thought content normal.       Lab Results    Component Value Date    TSH 3.220 03/06/2017     BMP  Lab Results   Component Value Date     09/05/2017    K 3.9 09/05/2017     09/05/2017    CO2 26 09/05/2017    BUN 16 09/05/2017    CREATININE 0.9 09/05/2017    CALCIUM 9.9 09/05/2017    ANIONGAP 8 09/05/2017    ESTGFRAFRICA >60 09/05/2017    EGFRNONAA >60 09/05/2017       Lab Results   Component Value Date    LDLCALC 77.6 09/05/2017     UA - TNTC WBC's  Assessment:       1. Chronic gout without tophus, unspecified cause, unspecified site    2. Anxiety    3. Gastroesophageal reflux disease, esophagitis presence not specified    4. Hyperlipidemia, unspecified hyperlipidemia type    5. Essential hypertension    6. 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 Obstructive sleep apnea    8. Dysuria    9. Abnormal urinalysis    10. Urinary tract infection without hematuria, site unspecified        Plan:   Essential hypertension  -     zoster vaccine live, PF, (ZOSTAVAX, PF,) 19,400 unit/0.65 mL injection; Inject 19,400 Units into the skin once.  Dispense: 1 vial; Refill: 0    Other sinusitis, unspecified chronicity  -     azithromycin (Z-KATIUSKA) 250 MG tablet; 2 po day 1, then 1 po q day  Dispense: 6 tablet; Refill: 0  -     promethazine-codeine 6.25-10 mg/5 ml (PHENERGAN WITH CODEINE) 6.25-10 mg/5 mL syrup; Take 5 mLs by mouth every 6 (six) hours as needed for Cough.  Dispense: 150 mL;       Refill: 1    Memory loss/MCI        Continue follow up with Dr. Gusman    Essential hypertension  Two gram sodium diet.    Weight loss discussed.    Try to walk 2 miles per day.    The following medications will be restarted today  -     carvedilol (COREG) 25 MG tablet; Take 1 tablet (25 mg total) by mouth 2 (two) times daily with meals.  -     amlodipine (NORVASC) 2.5 MG tablet; Take 1 tablet (2.5 mg total) by mouth once daily.    KARL on CPAP  Continue CPAP at 7 cm water pressure  Needs new machine    Depression, unspecified depression type  - Continue  venlafaxine (EFFEXOR-XR) 150 MG 24 hr capsule; Take 1 capsule by mouth once daily.  Dispense: 30 capsule; Refill: 5    Delayed ejaculation  Try stopping Flomax to see if that makes a difference.    Obstructive sleep apnea  -     CPAP FOR HOME USE    Hyperlipidemia, unspecified hyperlipidemia type  -     Lipid panel; Future    Essential hypertension  -     Comprehensive metabolic panel; Future    Chronic gout without tophus, unspecified cause, unspecified site  -     CBC auto differential; Future  -     Uric acid; Future    Hypothyroidism due to acquired atrophy of thyroid  -     TSH; Future    Benign non-nodular prostatic hyperplasia with lower urinary tract symptoms  Continue Proscar and Flomax.  Keep appointment with urology    Chronic gout without tophus, unspecified cause, unspecified site  -     Uric acid; Future    Anxiety    Gastroesophageal reflux disease, esophagitis presence not specified    Hyperlipidemia, unspecified hyperlipidemia type  -     ALT (SGPT); Future  -     Basic metabolic panel; Future  -     Lipid panel; Future    Essential hypertension    Hypothyroidism due to acquired atrophy of thyroid  -     TSH; Future    Obstructive sleep apnea    Dysuria  -     POCT urinalysis, dipstick or tablet reag  -     POCT URINE SEDIMENT EXAM  -     ciprofloxacin HCl (CIPRO) 500 MG tablet; Take 1 tablet (500 mg total) by mouth 2 (two) times daily.  Dispense: 20 tablet; Refill: 0    Abnormal urinalysis  -     Urine culture    Urinary tract infection without hematuria, site unspecified  -     ciprofloxacin HCl (CIPRO) 500 MG tablet; Take 1 tablet (500 mg total) by mouth 2 (two) times daily.  Dispense: 20 tablet; Refill: 0    RTC in 6 months

## 2017-09-14 LAB — BACTERIA UR CULT: NORMAL

## 2017-09-20 ENCOUNTER — TELEPHONE (OUTPATIENT)
Dept: FAMILY MEDICINE | Facility: CLINIC | Age: 77
End: 2017-09-20

## 2017-09-20 ENCOUNTER — OFFICE VISIT (OUTPATIENT)
Dept: FAMILY MEDICINE | Facility: CLINIC | Age: 77
End: 2017-09-20
Payer: MEDICARE

## 2017-09-20 VITALS
HEIGHT: 68 IN | HEART RATE: 72 BPM | WEIGHT: 245.19 LBS | BODY MASS INDEX: 37.16 KG/M2 | SYSTOLIC BLOOD PRESSURE: 138 MMHG | DIASTOLIC BLOOD PRESSURE: 82 MMHG | RESPIRATION RATE: 18 BRPM

## 2017-09-20 DIAGNOSIS — T36.8X5A: ICD-10-CM

## 2017-09-20 DIAGNOSIS — N30.00 ACUTE CYSTITIS WITHOUT HEMATURIA: ICD-10-CM

## 2017-09-20 DIAGNOSIS — R07.9 CHEST PAIN, UNSPECIFIED TYPE: ICD-10-CM

## 2017-09-20 DIAGNOSIS — K21.9 GASTROESOPHAGEAL REFLUX DISEASE WITHOUT ESOPHAGITIS: Primary | ICD-10-CM

## 2017-09-20 PROCEDURE — 3075F SYST BP GE 130 - 139MM HG: CPT | Mod: S$GLB,,, | Performed by: FAMILY MEDICINE

## 2017-09-20 PROCEDURE — 3008F BODY MASS INDEX DOCD: CPT | Mod: S$GLB,,, | Performed by: FAMILY MEDICINE

## 2017-09-20 PROCEDURE — 1159F MED LIST DOCD IN RCRD: CPT | Mod: S$GLB,,, | Performed by: FAMILY MEDICINE

## 2017-09-20 PROCEDURE — 3079F DIAST BP 80-89 MM HG: CPT | Mod: S$GLB,,, | Performed by: FAMILY MEDICINE

## 2017-09-20 PROCEDURE — 99214 OFFICE O/P EST MOD 30 MIN: CPT | Mod: S$GLB,,, | Performed by: FAMILY MEDICINE

## 2017-09-20 PROCEDURE — 99999 PR PBB SHADOW E&M-EST. PATIENT-LVL IV: CPT | Mod: PBBFAC,,, | Performed by: FAMILY MEDICINE

## 2017-09-20 PROCEDURE — 81000 URINALYSIS NONAUTO W/SCOPE: CPT | Mod: S$GLB,,, | Performed by: FAMILY MEDICINE

## 2017-09-20 PROCEDURE — 1125F AMNT PAIN NOTED PAIN PRSNT: CPT | Mod: S$GLB,,, | Performed by: FAMILY MEDICINE

## 2017-09-20 PROCEDURE — 99499 UNLISTED E&M SERVICE: CPT | Mod: S$GLB,,, | Performed by: FAMILY MEDICINE

## 2017-09-20 RX ORDER — PANTOPRAZOLE SODIUM 40 MG/1
40 TABLET, DELAYED RELEASE ORAL DAILY
Qty: 30 TABLET | Refills: 5 | Status: SHIPPED | OUTPATIENT
Start: 2017-09-20 | End: 2017-11-17

## 2017-09-20 NOTE — TELEPHONE ENCOUNTER
----- Message from Finn Jhaveri sent at 2017 12:00 PM CDT -----  Contact: Patient  Domonique Hernandez Jr.  MRN: [id number]  : 1940  PCP: Emiliano Cam  Home Phone      [phone]  Work Phone      Not on file.  Mobile          [phone]      MESSAGE: weakness - excessive sweating -- requesting appt with Dr Emiliano ALMEIDA    Call [phone]    PCP: Dorina

## 2017-09-21 ENCOUNTER — TELEPHONE (OUTPATIENT)
Dept: FAMILY MEDICINE | Facility: CLINIC | Age: 77
End: 2017-09-21

## 2017-09-21 NOTE — TELEPHONE ENCOUNTER
----- Message from Finn Jhaveri sent at 2017 11:39 AM CDT -----  Contact: Patient  Domonique Hernandez Jr.  MRN: [id number]  : 1940  PCP: Emiliano Cam  Home Phone      [phone]  Work Phone      Not on file.  Mobile          [phone]      MESSAGE: was seen yesterday by Dr Cummings -- has decided to see a different cardiologist - they are requesting a patient history from him -- please advise    Call [phone]    PCP: Dorina

## 2017-09-22 LAB
BACTERIA SPEC CULT: NORMAL
BILIRUB SERPL-MCNC: NORMAL MG/DL
BLOOD URINE, POC: 50
CASTS: NORMAL
COLOR, POC UA: YELLOW
CRYSTALS: NORMAL
GLUCOSE UR QL STRIP: NORMAL
KETONES UR QL STRIP: NORMAL
LEUKOCYTE ESTERASE URINE, POC: POSITIVE
NITRITE, POC UA: NORMAL
PH, POC UA: 5
PROTEIN, POC: NORMAL
RBC CELLS COUNTED: 5
SPECIFIC GRAVITY, POC UA: 1.02
UROBILINOGEN, POC UA: NORMAL
WHITE BLOOD CELLS: 10

## 2017-10-11 ENCOUNTER — OFFICE VISIT (OUTPATIENT)
Dept: FAMILY MEDICINE | Facility: CLINIC | Age: 77
End: 2017-10-11
Payer: MEDICARE

## 2017-10-11 VITALS
HEIGHT: 68 IN | HEART RATE: 80 BPM | SYSTOLIC BLOOD PRESSURE: 118 MMHG | BODY MASS INDEX: 36.83 KG/M2 | DIASTOLIC BLOOD PRESSURE: 60 MMHG | WEIGHT: 243 LBS | RESPIRATION RATE: 18 BRPM

## 2017-10-11 DIAGNOSIS — I10 ESSENTIAL HYPERTENSION: ICD-10-CM

## 2017-10-11 DIAGNOSIS — G47.33 OBSTRUCTIVE SLEEP APNEA: ICD-10-CM

## 2017-10-11 DIAGNOSIS — J41.8 MIXED SIMPLE AND MUCOPURULENT CHRONIC BRONCHITIS: ICD-10-CM

## 2017-10-11 DIAGNOSIS — E78.5 HYPERLIPIDEMIA, UNSPECIFIED HYPERLIPIDEMIA TYPE: ICD-10-CM

## 2017-10-11 DIAGNOSIS — M47.816 LUMBAR FACET ARTHROPATHY: Primary | ICD-10-CM

## 2017-10-11 DIAGNOSIS — F32.0 MAJOR DEPRESSIVE DISORDER, SINGLE EPISODE, MILD: ICD-10-CM

## 2017-10-11 PROCEDURE — 99214 OFFICE O/P EST MOD 30 MIN: CPT | Mod: S$GLB,,, | Performed by: FAMILY MEDICINE

## 2017-10-11 PROCEDURE — 90662 IIV NO PRSV INCREASED AG IM: CPT | Mod: S$GLB,,, | Performed by: FAMILY MEDICINE

## 2017-10-11 PROCEDURE — 99499 UNLISTED E&M SERVICE: CPT | Mod: S$GLB,,, | Performed by: FAMILY MEDICINE

## 2017-10-11 PROCEDURE — G0008 ADMIN INFLUENZA VIRUS VAC: HCPCS | Mod: S$GLB,,, | Performed by: FAMILY MEDICINE

## 2017-10-11 PROCEDURE — 99999 PR PBB SHADOW E&M-EST. PATIENT-LVL III: CPT | Mod: PBBFAC,,, | Performed by: FAMILY MEDICINE

## 2017-10-11 RX ORDER — HYDROCODONE BITARTRATE AND ACETAMINOPHEN 5; 325 MG/1; MG/1
1 TABLET ORAL EVERY 4 HOURS PRN
Qty: 20 TABLET | Refills: 0 | Status: CANCELLED | OUTPATIENT
Start: 2017-10-11

## 2017-10-11 RX ORDER — TRAMADOL HYDROCHLORIDE 50 MG/1
50 TABLET ORAL EVERY 6 HOURS PRN
Qty: 30 TABLET | Refills: 5 | Status: SHIPPED | OUTPATIENT
Start: 2017-10-11 | End: 2018-06-26 | Stop reason: SDUPTHER

## 2017-10-11 NOTE — PROGRESS NOTES
Subjective:       Patient ID: Domonique Hernandez Jr. is a 77 y.o. male.    Chief Complaint: Follow-up (3 week f/u) and Sinus Problem    Patient here for check up and follow up for chest pain and not feeling well while taking Cipro.  He stopped taking the Cipro and his symptoms resolved.  He sees cardiology on a regular basis.  Patient has HYPERTENSION.   Today his blood pressure is perfect.  He is taking Coreg and a small dose of Norvasc.  He tolerates these well.    Patient is also having trouble with memory.  His girlfriend feels like he needs be further evaluated.  He has problems remembering names and he sometimes forgets what he was going to do.  He saw neurologist and so far not dimentia.   Got worse after stopping Effexor.  He is now back on it and feels better.  Patient also states his CPAP machine is not working very good.  This could also be contributing to his forgetfulness.  He requires 7 cm water.  He needs a new machine.  He is urinating better now that he is on Flomax and Proscar.  He is still having delayed ejaculation.  Stopping Effexor did not make much of a difference.  He is doing well on Effexor XR 75 mg po daily.  Patient brought all of his medication in.  This was reviewed.  Patient uses testosterone gel.  He tolerates it well.  His urologist feels it is okay to take this.  Having dysuria      Otalgia    Associated symptoms include coughing. Pertinent negatives include no abdominal pain, headaches, rash or sore throat.   Cough   This is a new problem. The current episode started in the past 7 days. The problem has been gradually worsening. The cough is productive of purulent sputum. Associated symptoms include ear pain. Pertinent negatives include no chest pain, chills, fever, headaches, postnasal drip, rash, sore throat or shortness of breath.   Hypertension   Pertinent negatives include no chest pain, headaches, palpitations or shortness of breath.   Sinus Problem   Associated symptoms include  coughing and ear pain. Pertinent negatives include no chills, congestion, diaphoresis, headaches, shortness of breath, sinus pressure or sore throat.     Review of Systems   Constitutional: Negative for activity change, chills, diaphoresis, fatigue, fever and unexpected weight change.   HENT: Positive for ear pain. Negative for congestion, nosebleeds, postnasal drip, sinus pressure, sore throat, trouble swallowing and voice change.    Eyes: Negative for photophobia and visual disturbance.   Respiratory: Positive for cough. Negative for apnea, choking, chest tightness and shortness of breath.    Cardiovascular: Negative for chest pain, palpitations and leg swelling.   Gastrointestinal: Negative for abdominal pain and blood in stool.   Endocrine: Negative for cold intolerance, heat intolerance, polydipsia and polyphagia.   Genitourinary: Negative for decreased urine volume, difficulty urinating and dysuria.   Musculoskeletal: Positive for arthralgias and back pain. Negative for joint swelling.   Skin: Negative for color change, pallor and rash.   Neurological: Negative for dizziness, weakness, numbness and headaches.   Hematological: Negative for adenopathy. Does not bruise/bleed easily.   Psychiatric/Behavioral: Negative for behavioral problems, decreased concentration, dysphoric mood and sleep disturbance. The patient is not nervous/anxious.        Objective:      Vitals:    10/11/17 1049   BP: 118/60   Pulse: 80   Resp: 18     Physical Exam   Constitutional: He is oriented to person, place, and time. He appears well-developed and well-nourished.   Eyes: EOM are normal. Pupils are equal, round, and reactive to light.   Neck: No JVD present.   No carotid bruits   Cardiovascular: Normal rate, regular rhythm, normal heart sounds and intact distal pulses.  Exam reveals no gallop.    No murmur heard.  Pulmonary/Chest: Effort normal and breath sounds normal.   Musculoskeletal: He exhibits no edema or tenderness.    Lymphadenopathy:     He has no cervical adenopathy.   Neurological: He is alert and oriented to person, place, and time. He has normal reflexes. He displays normal reflexes. No cranial nerve deficit. He exhibits normal muscle tone. Coordination normal.   Psychiatric: He has a normal mood and affect. His behavior is normal. Judgment and thought content normal.       Lab Results   Component Value Date    TSH 3.220 03/06/2017     BMP  Lab Results   Component Value Date     09/05/2017    K 3.9 09/05/2017     09/05/2017    CO2 26 09/05/2017    BUN 16 09/05/2017    CREATININE 0.9 09/05/2017    CALCIUM 9.9 09/05/2017    ANIONGAP 8 09/05/2017    ESTGFRAFRICA >60 09/05/2017    EGFRNONAA >60 09/05/2017       Lab Results   Component Value Date    LDLCALC 77.6 09/05/2017     Assessment:       1. Lumbar facet arthropathy    2. Essential hypertension    3. Hyperlipidemia, unspecified hyperlipidemia type    4. Mixed simple and mucopurulent chronic bronchitis    5. Major depressive disorder, single episode, mild    6. Obstructive sleep apnea        Plan:   Essential hypertension  Amlodipine 2.5 mg po daily  Coreg 25 mg po bid    Memory loss/MCI        Continue follow up with Dr. Gusman    Essential hypertension  Two gram sodium diet.    Weight loss discussed.    Try to walk 2 miles per day.    The following medications will be restarted today  -     carvedilol (COREG) 25 MG tablet; Take 1 tablet (25 mg total) by mouth 2 (two) times daily with meals.  -     amlodipine (NORVASC) 2.5 MG tablet; Take 1 tablet (2.5 mg total) by mouth once daily.    KARL on CPAP  Continue CPAP at 7 cm water pressure  Needs new machine    Depression, unspecified depression type  - Continue venlafaxine (EFFEXOR-XR) 150 MG 24 hr capsule; Take 1 capsule by mouth once daily.  Dispense: 30 capsule; Refill: 5    Delayed ejaculation  Try stopping Flomax to see if that makes a difference.    Obstructive sleep apnea  -     CPAP FOR HOME  USE    Hyperlipidemia, unspecified hyperlipidemia type  -     Lipid panel; Future  Zocor 40 mg daily    Essential hypertension  -     Comprehensive metabolic panel; Future    Chronic gout without tophus, unspecified cause, unspecified site  -     CBC auto differential; Future  -     Uric acid; Future    Hypothyroidism due to acquired atrophy of thyroid  -     TSH; Future    Benign non-nodular prostatic hyperplasia with lower urinary tract symptoms  Continue Proscar and Flomax.  Keep appointment with urology    Lumbar facet arthropathy  -     tramadol (ULTRAM) 50 mg tablet; Take 1 tablet (50 mg total) by mouth every 6 (six) hours as needed.  Dispense: 30 tablet; Refill: 5    Essential hypertension    Hyperlipidemia, unspecified hyperlipidemia type    Mixed simple and mucopurulent chronic bronchitis    Major depressive disorder, single episode, mild    Obstructive sleep apnea    Other orders  -     Cancel: hydrocodone-acetaminophen 5-325mg (NORCO) 5-325 mg per tablet; Take 1 tablet by mouth every 4 (four) hours as needed for Pain.  Dispense: 20 tablet; Refill: 0    RTC in 6 months

## 2017-10-17 ENCOUNTER — OFFICE VISIT (OUTPATIENT)
Dept: FAMILY MEDICINE | Facility: CLINIC | Age: 77
End: 2017-10-17
Payer: MEDICARE

## 2017-10-17 VITALS
WEIGHT: 248.63 LBS | HEIGHT: 68 IN | SYSTOLIC BLOOD PRESSURE: 124 MMHG | BODY MASS INDEX: 37.68 KG/M2 | RESPIRATION RATE: 18 BRPM | DIASTOLIC BLOOD PRESSURE: 66 MMHG | HEART RATE: 68 BPM

## 2017-10-17 DIAGNOSIS — M76.892 TENDINITIS OF LEFT KNEE: Primary | ICD-10-CM

## 2017-10-17 PROCEDURE — 99212 OFFICE O/P EST SF 10 MIN: CPT | Mod: 25,S$GLB,, | Performed by: FAMILY MEDICINE

## 2017-10-17 PROCEDURE — 99999 PR PBB SHADOW E&M-EST. PATIENT-LVL II: CPT | Mod: PBBFAC,,, | Performed by: FAMILY MEDICINE

## 2017-10-17 PROCEDURE — 20551 NJX 1 TENDON ORIGIN/INSJ: CPT | Mod: S$GLB,,, | Performed by: FAMILY MEDICINE

## 2017-10-17 RX ORDER — BUPIVACAINE HYDROCHLORIDE 5 MG/ML
2 INJECTION, SOLUTION EPIDURAL; INTRACAUDAL
Status: COMPLETED | OUTPATIENT
Start: 2017-10-17 | End: 2017-10-17

## 2017-10-17 RX ORDER — TRIAMCINOLONE ACETONIDE 40 MG/ML
40 INJECTION, SUSPENSION INTRA-ARTICULAR; INTRAMUSCULAR
Status: COMPLETED | OUTPATIENT
Start: 2017-10-17 | End: 2017-10-17

## 2017-10-17 RX ADMIN — BUPIVACAINE HYDROCHLORIDE 10 MG: 5 INJECTION, SOLUTION EPIDURAL; INTRACAUDAL at 09:10

## 2017-10-17 RX ADMIN — TRIAMCINOLONE ACETONIDE 40 MG: 40 INJECTION, SUSPENSION INTRA-ARTICULAR; INTRAMUSCULAR at 09:10

## 2017-10-17 NOTE — PROGRESS NOTES
SUBJECTIVE:  Domonique Hernandez Jr. is a 77 y.o. male who sustained a left knee injury 1 week(s) ago. Mechanism of injury: walking. Immediate symptoms: delayed pain. Symptoms have been gradual since that time. Prior history of related problems: no prior problems with this area in the past.    OBJECTIVE:  Vital signs as noted above.  Appearance: alert, well appearing, and in no distress, oriented to person, place, and time and overweight.  Knee exam: normal exam, no swelling, tenderness, instability; ligaments intact, FROM, antalgic gait, soft tissue tenderness over pez ansuerinus.  X-ray: not indicated.    ASSESSMENT:  Knee ligament injury    PLAN:  rest the injured area as much as practical, apply ice packs  See orders for this visit as documented in the electronic medical record.  After obtaining verbal consent, and per orders of Dr. Cam, injection of left medial knee given by Emiliano Cam. Patient felt much better. Patient remained in clinic for 20 minutes afterwards, and did not have any adverse reactions.  Used 2 cc of marcaine and 40 mg Kenalog

## 2017-10-30 ENCOUNTER — TELEPHONE (OUTPATIENT)
Dept: FAMILY MEDICINE | Facility: CLINIC | Age: 77
End: 2017-10-30

## 2017-10-30 ENCOUNTER — OFFICE VISIT (OUTPATIENT)
Dept: FAMILY MEDICINE | Facility: CLINIC | Age: 77
End: 2017-10-30
Payer: MEDICARE

## 2017-10-30 VITALS
HEART RATE: 84 BPM | DIASTOLIC BLOOD PRESSURE: 82 MMHG | WEIGHT: 246 LBS | TEMPERATURE: 97 F | SYSTOLIC BLOOD PRESSURE: 148 MMHG | HEIGHT: 68 IN | BODY MASS INDEX: 37.28 KG/M2 | RESPIRATION RATE: 20 BRPM

## 2017-10-30 DIAGNOSIS — J41.8 MIXED SIMPLE AND MUCOPURULENT CHRONIC BRONCHITIS: ICD-10-CM

## 2017-10-30 DIAGNOSIS — J01.90 ACUTE SINUSITIS, RECURRENCE NOT SPECIFIED, UNSPECIFIED LOCATION: Primary | ICD-10-CM

## 2017-10-30 PROCEDURE — 99999 PR PBB SHADOW E&M-EST. PATIENT-LVL III: CPT | Mod: PBBFAC,,, | Performed by: FAMILY MEDICINE

## 2017-10-30 PROCEDURE — 99499 UNLISTED E&M SERVICE: CPT | Mod: S$GLB,,, | Performed by: FAMILY MEDICINE

## 2017-10-30 PROCEDURE — 99213 OFFICE O/P EST LOW 20 MIN: CPT | Mod: S$GLB,,, | Performed by: FAMILY MEDICINE

## 2017-10-30 RX ORDER — AZITHROMYCIN 250 MG/1
TABLET, FILM COATED ORAL
Qty: 6 TABLET | Refills: 0 | Status: SHIPPED | OUTPATIENT
Start: 2017-10-30 | End: 2017-11-17

## 2017-10-30 NOTE — PROGRESS NOTES
Chief complaint: Sinus congestion    History of present illness: Pt is 75 y.o. male follow-up for severe coughing.  He was doing much better.  He went to [facility] and content of respiratory infection and now he is coughing again.  Cough is nonproductive and better.  Patient is better.  Less coughing and wheezing.  This is worse at night.  Using Duoneb 2-3 times per day and Pulmicort once daily.  He saw ENT and is getting better.    Review of systems:  Constitutional-no weight loss, weight gain  HEENT-allergy symptoms such as itchy watery eyes, post nasal drip, itchy palate, come and go.  Respiratory-no wheezing, see history of present illness  Neurological-no weakness or numbness    Past medical history, family history, social history-same as note dated today    Medications-all reviewed and verified in nurses notes.    Physical exam:   Filed Vitals:    10/20/15 1735   BP: 134/86   Pulse: 96   Temp: 98.7 °F (37.1 °C)   Resp: 20     Gen.-alert, oriented, no apparent distress.  Coughing.  Head-positive facial tenderness over the frontal and maxillary sinuses  Eyes: Pupils equal round reactive to light and accommodation, extraocular muscles intact, conjunctiva clear  Ears: Tympanic membranes are clear and mobile, no fluid present.  Nose: Injected mucous membranes, erythematous, mucopurulent discharge  Throat:  Injected red streaky mucosa,  tonsils normal  Neck: Shotty, tender anterior lymphadenopathy  Heart: Regular rate and rhythm, no murmurs, rubs or gallops  Lungs: expiratory ronchi     Assessment/Plan:   Acute sinusitis, recurrence not specified, unspecified location  - Cee    COPD (chronic obstructive pulmonary disease)  Pulmicort to twice daily  Duoneb to three times daily    Keep appointment

## 2017-10-30 NOTE — TELEPHONE ENCOUNTER
----- Message from Marti Larios sent at 10/30/2017  1:23 PM CDT -----  Contact: Self  Domonique Hernandez Jr.  MRN: [id number]  : 1940  PCP: Emiliano Cam  Home Phone      [phone]  Work Phone      Not on file.  Mobile          [phone]    MESSAGE:   Requesting appointment today for upper respiratory problems, coughing, congestion since Friday morning.  Would like to come in today if possible.    Phone:  [phone]

## 2017-11-17 ENCOUNTER — OFFICE VISIT (OUTPATIENT)
Dept: FAMILY MEDICINE | Facility: CLINIC | Age: 77
End: 2017-11-17
Payer: MEDICARE

## 2017-11-17 VITALS
BODY MASS INDEX: 37.83 KG/M2 | DIASTOLIC BLOOD PRESSURE: 82 MMHG | RESPIRATION RATE: 18 BRPM | HEART RATE: 80 BPM | WEIGHT: 249.63 LBS | HEIGHT: 68 IN | SYSTOLIC BLOOD PRESSURE: 148 MMHG

## 2017-11-17 DIAGNOSIS — R30.0 DYSURIA: Primary | ICD-10-CM

## 2017-11-17 DIAGNOSIS — N40.1 BPH WITH OBSTRUCTION/LOWER URINARY TRACT SYMPTOMS: ICD-10-CM

## 2017-11-17 DIAGNOSIS — N13.8 BPH WITH OBSTRUCTION/LOWER URINARY TRACT SYMPTOMS: ICD-10-CM

## 2017-11-17 DIAGNOSIS — N39.0 URINARY TRACT INFECTION WITHOUT HEMATURIA, SITE UNSPECIFIED: ICD-10-CM

## 2017-11-17 PROCEDURE — 81000 URINALYSIS NONAUTO W/SCOPE: CPT | Mod: S$GLB,,, | Performed by: FAMILY MEDICINE

## 2017-11-17 PROCEDURE — 99214 OFFICE O/P EST MOD 30 MIN: CPT | Mod: S$GLB,,, | Performed by: FAMILY MEDICINE

## 2017-11-17 PROCEDURE — 87086 URINE CULTURE/COLONY COUNT: CPT

## 2017-11-17 PROCEDURE — 99999 PR PBB SHADOW E&M-EST. PATIENT-LVL III: CPT | Mod: PBBFAC,,, | Performed by: FAMILY MEDICINE

## 2017-11-17 RX ORDER — CIPROFLOXACIN 500 MG/1
500 TABLET ORAL EVERY 12 HOURS
Qty: 20 TABLET | Refills: 0 | Status: SHIPPED | OUTPATIENT
Start: 2017-11-17 | End: 2018-01-24

## 2017-11-17 RX ORDER — CIPROFLOXACIN 500 MG/1
TABLET ORAL
COMMUNITY
Start: 2017-09-12 | End: 2017-11-17

## 2017-11-17 NOTE — PROGRESS NOTES
Subjective:       Patient ID: Domonique Hernandez Jr. is a 77 y.o. male.    Chief Complaint: Urinary Frequency    Having increased urinary frequency.  Mild dysuria.  Backache.      Review of Systems   Respiratory: Negative for shortness of breath.    Cardiovascular: Negative for chest pain and palpitations.   Genitourinary: Positive for decreased urine volume, difficulty urinating and urgency. Negative for dysuria and hematuria.       Objective:      Vitals:    11/17/17 1603   BP: (!) 148/82   Pulse: 80   Resp: 18     Physical Exam   Constitutional: He appears well-developed and well-nourished.   Cardiovascular: Normal rate, regular rhythm, normal heart sounds and intact distal pulses.    No murmur heard.  Pulmonary/Chest: Effort normal and breath sounds normal.   Musculoskeletal: He exhibits no edema.       Assessment:       1. Dysuria    2. Urinary tract infection without hematuria, site unspecified    3. BPH with obstruction/lower urinary tract symptoms        Plan:   Domonique was seen today for urinary frequency.    Diagnoses and all orders for this visit:    Dysuria  -     POCT URINE DIPSTICK WITH MICROSCOPE, AUTOMATED  -     POCT Urine Sediment Exam  -     Urine culture  -     ciprofloxacin HCl (CIPRO) 500 MG tablet; Take 1 tablet (500 mg total) by mouth every 12 (twelve) hours.  -     Ambulatory referral to Urology    Urinary tract infection without hematuria, site unspecified  -     Urine culture  -     ciprofloxacin HCl (CIPRO) 500 MG tablet; Take 1 tablet (500 mg total) by mouth every 12 (twelve) hours.  -     Ambulatory referral to Urology    BPH with obstruction/lower urinary tract symptoms  -     Urine culture  -     ciprofloxacin HCl (CIPRO) 500 MG tablet; Take 1 tablet (500 mg total) by mouth every 12 (twelve) hours.  -     Ambulatory referral to Urology

## 2017-11-19 LAB — BACTERIA UR CULT: NORMAL

## 2017-11-20 ENCOUNTER — TELEPHONE (OUTPATIENT)
Dept: UROLOGY | Facility: CLINIC | Age: 77
End: 2017-11-20

## 2017-11-20 LAB
BILIRUB SERPL-MCNC: NORMAL MG/DL
BLOOD URINE, POC: 250
COLOR, POC UA: NORMAL
GLUCOSE UR QL STRIP: NORMAL
KETONES UR QL STRIP: NORMAL
LEUKOCYTE ESTERASE URINE, POC: NORMAL
NITRITE, POC UA: NORMAL
PH, POC UA: 5
PROTEIN, POC: 30
SPECIFIC GRAVITY, POC UA: 1.02
UROBILINOGEN, POC UA: NORMAL

## 2017-11-20 NOTE — TELEPHONE ENCOUNTER
Returned pts call. Spoke with pt. Pt request call back due to not able to hear due to static on phone line.

## 2017-11-20 NOTE — TELEPHONE ENCOUNTER
----- Message from Rae Betancourt sent at 11/20/2017  8:19 AM CST -----  Contact: self  Pt stated that his PCP told him to be seen asap by Dr Garcia.  Pt has an infected prostate and is in pain. He is currently on antibiotics and would like to be seen today if possible.     Please call pt at [phone]

## 2017-11-20 NOTE — TELEPHONE ENCOUNTER
Spoke with pt again. Pt still unable to understand/hear due to poor phone connection. Pt request call back.

## 2017-11-22 ENCOUNTER — OFFICE VISIT (OUTPATIENT)
Dept: UROLOGY | Facility: CLINIC | Age: 77
End: 2017-11-22
Payer: MEDICARE

## 2017-11-22 VITALS
SYSTOLIC BLOOD PRESSURE: 148 MMHG | HEIGHT: 68 IN | BODY MASS INDEX: 37.79 KG/M2 | HEART RATE: 78 BPM | DIASTOLIC BLOOD PRESSURE: 91 MMHG | WEIGHT: 249.31 LBS

## 2017-11-22 DIAGNOSIS — N40.1 BENIGN PROSTATIC HYPERPLASIA WITH NOCTURIA: ICD-10-CM

## 2017-11-22 DIAGNOSIS — N41.9 PROSTATITIS, UNSPECIFIED PROSTATITIS TYPE: Primary | ICD-10-CM

## 2017-11-22 DIAGNOSIS — R35.1 BENIGN PROSTATIC HYPERPLASIA WITH NOCTURIA: ICD-10-CM

## 2017-11-22 DIAGNOSIS — N52.01 ERECTILE DYSFUNCTION DUE TO ARTERIAL INSUFFICIENCY: ICD-10-CM

## 2017-11-22 PROCEDURE — 99499 UNLISTED E&M SERVICE: CPT | Mod: S$GLB,,, | Performed by: NURSE PRACTITIONER

## 2017-11-22 PROCEDURE — 99999 PR PBB SHADOW E&M-EST. PATIENT-LVL V: CPT | Mod: PBBFAC,,, | Performed by: NURSE PRACTITIONER

## 2017-11-22 PROCEDURE — 99213 OFFICE O/P EST LOW 20 MIN: CPT | Mod: S$GLB,,, | Performed by: NURSE PRACTITIONER

## 2017-11-22 RX ORDER — SILDENAFIL CITRATE 20 MG/1
TABLET ORAL
Qty: 90 TABLET | Refills: 3 | Status: SHIPPED | OUTPATIENT
Start: 2017-11-22 | End: 2017-11-27 | Stop reason: SDUPTHER

## 2017-11-22 RX ORDER — DUTASTERIDE 0.5 MG/1
0.5 CAPSULE, LIQUID FILLED ORAL DAILY
Qty: 30 CAPSULE | Refills: 11 | Status: SHIPPED | OUTPATIENT
Start: 2017-11-22 | End: 2018-04-13 | Stop reason: SDUPTHER

## 2017-11-22 NOTE — PATIENT INSTRUCTIONS
Prostatitis    The prostate gland is located deep inside the body at the base of the bladder. Prostatitis is an inflammation of the prostate gland. This can occur with or without infection. Most cases of prostatitis are long term (chronic). Most do not involve a bacterial infection.  · Chronic prostatitis is more common in older men. It is usually an inflammatory condition and not an infection. But, bacterial infection can also cause chronic prostatitis. It can cause pain in the rectum, urethra, bladder, or scrotum. It can also make you unable to fully empty the bladder.  You may urinate often, or have burning with urination. Prostatitis may also cause painful ejaculation and erectile dysfunction.  · Sudden onset (acute) prostatitis usually occurs in men younger than 35. It is from a bacterial infection. You may have severe symptoms such as fever, chills, muscle aches, and pain in the area between the scrotum and anus (perineum). You may have a hard time urinating, or have pain or burning when urinating. There may be blood or pus in the urine.  Your healthcare provider may do a culture test on prostate fluids or discharge from the penis. This will help determine if bacteria are the cause. Treatment can include antibiotics, anti-inflammatory medicine, prostate medicines, and stool softeners.  Home care  These guidelines will help you care for yourself at home:  · Rest at home until the fever is gone and you are feeling better.  · A hot sitz bath may offer some relief. Fill a tub with 6 inches of hot water. Allow the water to run so you can keep it hot for 10 to 15 minutes.  · Drink plenty of fluids. Do not drink alcohol or caffeine until all symptoms are gone.  · If your healthcare gives you an antibiotic, take it exactly as you are told. Take it until it is all gone.  · Constipation causes straining and pain. Avoid constipation by eating natural laxatives such as prunes, fresh fruits, and whole-grain cereals. If  needed, use a mild over-the-counter (OTC) laxative for constipation. An OTC stool softener may be used to keep the stools soft.  · If sex is uncomfortable or painful, avoid until symptoms get better.  · You may use OTC medicines for pain and fever, unless another medicine was given. If you have chronic liver or kidney disease, talk with your healthcare provider before using these medicines. Also talk with your provider if you've ever had a stomach ulcer or GI bleeding.  Follow-up care  Follow up with your healthcare provider, a urologist, or as advised to be sure you are responding to treatment. Your healthcare provider may want to see you after you finish your antibiotics to be sure the infection has cleared. If a culture was taken, you may call for the results as directed. A culture test can help your healthcare provider know if you are on the correct antibiotic.  Call 911  Call 911 if any of these occur:  · Weakness, dizziness, or fainting  When to seek medical advice  Call your healthcare provider right away if any of these occur:  · Fever of 101.4°F (38°C) or higher after 3 days of treatment, or as advised  · Unable to pass urine for 8 hours  · Pressure or pain in your bladder gets worse  · Painful swelling of the testicle or scrotum  Date Last Reviewed: 10/1/2016  © 6698-0612 [employer]. [address] [place] [address], [place] [address]. All rights reserved. This information is not intended as a substitute for professional medical care. Always follow your healthcare professional's instructions.

## 2017-11-22 NOTE — PROGRESS NOTES
"CHIEF COMPLAINT:    Mr. Hernandez is a 77 y.o. male presenting with prostatitis.     PRESENTING ILLNESS:    Domonique Hernandez Jr. is a 77 y.o. male who presents with prostatitis.   Last seen with Dr. Garcia on 9/2017.     He reports today for difficulty urinating, frequency, nocturia every 2 hours, and urgency since last week. Denies dysuria and hematuria. Denies fever, chills, nausea, or vomiting. Reports he has a good FOS 80% of the time and complete emptying. He was seen with Dr. Cam on 11/17/17 and diagnosed with prostatitis. He was started on Cipro BID.  He states since taking cipro his symptoms have improved. He takes flomax daily.     ED has been present for > 1 year. He has tried Viagra with minimal results, however he got the pills from "[facility] His T was checked which returned low.  He was then started on TRT.  While on TRT, he has better erections and more energy.  His erections are better, but they're not great.  He is interested in meds today. Denies chest pain and the use of nitroglycerin     REVIEW OF SYSTEMS:    Domonique Hernandez Jr. denies any history of headache, blurred vision, fever, nausea, vomiting, chills, abdominal pain, bleeding per rectum, cough, SOB, recent loss of consciousness, recent mental status changes, seizures, dizziness, or upper or lower extremity weakness.    MAI Questionnaire   1. 4   2. 2   3. 4   4. 3   5. 2      PATIENT HISTORY:    Past Medical History:   Diagnosis Date    Anxiety     Arthritis     Chronic gout     COPD (chronic obstructive pulmonary disease)     Depression     Emphysema of lung     Generalized headaches     GERD (gastroesophageal reflux disease)     Hyperlipidemia     Hypertension     Hypothyroidism     Obesity     Sleep apnea     On CPAP    Thyroid disease        Past Surgical History:   Procedure Laterality Date    Bilateral mastoidectomies Bilateral 1984    for ear infection    COLONOSCOPY  2010    Eardrum repair  1984    Not sure which " side    HERNIA REPAIR  1984    Umbilical-screen    KNEE ARTHROSCOPY Left 1989    ROTATOR CUFF REPAIR Left 2002    Left       Family History   Problem Relation Age of Onset    Cancer Mother      Mother    Cancer Father     No Known Problems Sister     Kidney disease Brother     Stroke Son     Stroke Son     Atrial fibrillation Son     Melanoma Neg Hx        Social History     Social History    Marital status:      Spouse name: N/A    Number of children: N/A    Years of education: N/A     Occupational History    Not on file.     Social History Main Topics    Smoking status: Never Smoker    Smokeless tobacco: Never Used    Alcohol use No    Drug use: No    Sexual activity: Yes     Partners: Female     Other Topics Concern    Not on file     Social History Narrative    No narrative on file       Allergies:  Percocet [oxycodone-acetaminophen] and Percodan [oxycodone hcl-oxycodone-asa]    Medications:    Current Outpatient Prescriptions:     albuterol 90 mcg/actuation inhaler, Inhale 2 puffs into the lungs every 6 (six) hours as needed for Wheezing. Rescue, Disp: 18 g, Rfl: 5    allopurinol (ZYLOPRIM) 300 MG tablet, TAKE 1 TABLET ONE TIME DAILY, Disp: 90 tablet, Rfl: 3    amlodipine (NORVASC) 2.5 MG tablet, TAKE 1 TABLET EVERY DAY, Disp: 90 tablet, Rfl: 1    aspirin-caffeine (JESSICA BACK & BODY) 500-32.5 mg Tab, Take by mouth., Disp: , Rfl:     carvedilol (COREG) 25 MG tablet, TAKE 1 TABLET (25 MG TOTAL) BY MOUTH 2 (TWO) TIMES DAILY WITH MEALS., Disp: 180 tablet, Rfl: 1    cetirizine (ZYRTEC) 10 MG tablet, TAKE 1 TABLET EVERY DAY, Disp: 90 tablet, Rfl: 1    ciprofloxacin HCl (CIPRO) 500 MG tablet, Take 1 tablet (500 mg total) by mouth every 12 (twelve) hours., Disp: 20 tablet, Rfl: 0    fluticasone (FLONASE) 50 mcg/actuation nasal spray, 1 spray by Each Nare route 2 (two) times daily., Disp: 1 Bottle, Rfl: 11    hydrocodone-acetaminophen 5-325mg (NORCO) 5-325 mg per tablet, Take 1  tablet by mouth every 4 (four) hours as needed for Pain., Disp: 20 tablet, Rfl: 0    levothyroxine (SYNTHROID) 88 MCG tablet, TAKE 1 TABLET ONE TIME DAILY, Disp: 90 tablet, Rfl: 3    multivitamin (ONE DAILY MULTIVITAMIN) per tablet, Take 1 tablet by mouth once daily. Centrum Silver for Men, Disp: , Rfl:     nystatin (MYCOSTATIN) powder, Apply topically 2 (two) times daily., Disp: 60 g, Rfl: 5    promethazine-codeine 6.25-10 mg/5 ml (PHENERGAN WITH CODEINE) 6.25-10 mg/5 mL syrup, Take 5 mLs by mouth every 6 (six) hours as needed for Cough., Disp: 150 mL, Rfl: 1    simvastatin (ZOCOR) 40 MG tablet, TAKE 1 TABLET EVERY EVENING, Disp: 90 tablet, Rfl: 3    tamsulosin (FLOMAX) 0.4 mg Cp24, TAKE 1 CAPSULE ONE TIME DAILY, Disp: 90 capsule, Rfl: 3    testosterone (ANDROGEL) 20.25 mg/1.25 gram (1.62 %) GlPm, Apply 2 pumps to shoulders daily, Disp: 1 Bottle, Rfl: 5    tramadol (ULTRAM) 50 mg tablet, Take 1 tablet (50 mg total) by mouth every 6 (six) hours as needed., Disp: 30 tablet, Rfl: 5    umeclidinium-vilanterol (ANORO ELLIPTA) 62.5-25 mcg/actuation DsDv, Inhale 1 Dose into the lungs once daily., Disp: 60 each, Rfl: 5    venlafaxine (EFFEXOR-XR) 150 MG Cp24, TAKE 1 CAPSULE EVERY DAY, Disp: 90 capsule, Rfl: 1    albuterol-ipratropium 2.5mg-0.5mg/3mL (DUO-NEB) 0.5 mg-3 mg(2.5 mg base)/3 mL nebulizer solution, Take 3 mLs by nebulization every 6 (six) hours as needed for Wheezing., Disp: 120 vial, Rfl: 5    dutasteride (AVODART) 0.5 mg capsule, Take 1 capsule (0.5 mg total) by mouth once daily., Disp: 30 capsule, Rfl: 11    sildenafil (REVATIO) 20 mg Tab, Take 3-5 tablets PO, Disp: 90 tablet, Rfl: 3    PHYSICAL EXAMINATION:    The patient generally appears in good health, is appropriately interactive, and is in no apparent distress.     Eyes: anicteric sclerae, moist conjunctivae; no lid-lag; PERRLA     HENT: Atraumatic; oropharynx clear with moist mucous membranes and no mucosal ulcerations;normal hard and soft  palate.  No evidence of lymphadenopathy.    Neck: Trachea midline.  No thyromegaly.    Musculoskeletal: No abnormal gait.    Skin: No lesions.    Mental: Cooperative with normal affect.  Is oriented to time, place, and person.    Neuro: Grossly intact.    Chest: Normal inspiratory effort.   No accessory muscles.  No audible wheezes.  Respirations symmetric on inspiration and expiration.    Heart: Regular rhythm.      Abdomen:  Soft, non-tender. No masses or organomegaly. Bladder is not palpable. No evidence of flank discomfort.     Extremities: No clubbing, cyanosis, or edema      LABS:  UA today + blood and no bacteria.     Lab Results   Component Value Date    PSA 1.3 12/31/2015    PSADIAG 1.4 08/30/2017    PSADIAG 0.73 04/11/2016       IMPRESSION:    Encounter Diagnoses   Name Primary?    Prostatitis, unspecified prostatitis type Yes    Erectile dysfunction due to arterial insufficiency     Benign prostatic hyperplasia with nocturia       HTN, controlled  Hyperlipidemia, controlled    PLAN:   -Discussed prostatitis- nonbacterial vs bacterial.   -Discussed scheduled NSAIDS BID for 2 weeks.  -Continue Flomax for his LUTS.  -He wants to restart avodart. Discussed side effects, indications, and MOA for avodart. Prescription sent to the pharmacy. Pt verbalized understanding.  -Sildenafil sent for ED. Discussed side effects, indications, and MOA for sildenafil. Prescription handed to him. Pt verbalized understanding.        -Continue Androgel for the TRT.    -RTC 1 month to reassess symtpoms.

## 2017-11-23 ENCOUNTER — HOSPITAL ENCOUNTER (EMERGENCY)
Facility: HOSPITAL | Age: 77
Discharge: HOME | End: 2017-11-23
Attending: SURGERY
Payer: MEDICARE

## 2017-11-23 VITALS — SYSTOLIC BLOOD PRESSURE: 171 MMHG | HEART RATE: 57 BPM | DIASTOLIC BLOOD PRESSURE: 89 MMHG

## 2017-11-23 DIAGNOSIS — I10 HTN (HYPERTENSION): ICD-10-CM

## 2017-11-23 DIAGNOSIS — I10 ESSENTIAL HYPERTENSION: ICD-10-CM

## 2017-11-23 LAB
ALBUMIN SERPL BCP-MCNC: 3.4 G/DL
ALP SERPL-CCNC: 106 U/L
ALT SERPL W/O P-5'-P-CCNC: 20 U/L
ANION GAP SERPL CALC-SCNC: 10 MMOL/L
AST SERPL-CCNC: 17 U/L
BACTERIA #/AREA URNS HPF: ABNORMAL /HPF
BASOPHILS # BLD AUTO: 0.04 K/UL
BASOPHILS NFR BLD: 0.4 %
BILIRUB SERPL-MCNC: 0.4 MG/DL
BILIRUB UR QL STRIP: NEGATIVE
BNP SERPL-MCNC: 33 PG/ML
BUN SERPL-MCNC: 20 MG/DL
CALCIUM SERPL-MCNC: 10 MG/DL
CHLORIDE SERPL-SCNC: 105 MMOL/L
CK MB SERPL-MCNC: 2.9 NG/ML
CK MB SERPL-RTO: 3.8 %
CK SERPL-CCNC: 77 U/L
CK SERPL-CCNC: 77 U/L
CLARITY UR: CLEAR
CO2 SERPL-SCNC: 26 MMOL/L
COLOR UR: YELLOW
CREAT SERPL-MCNC: 1 MG/DL
DIFFERENTIAL METHOD: ABNORMAL
EOSINOPHIL # BLD AUTO: 0.3 K/UL
EOSINOPHIL NFR BLD: 2.8 %
ERYTHROCYTE [DISTWIDTH] IN BLOOD BY AUTOMATED COUNT: 14.8 %
EST. GFR  (AFRICAN AMERICAN): >60 ML/MIN/1.73 M^2
EST. GFR  (NON AFRICAN AMERICAN): >60 ML/MIN/1.73 M^2
GLUCOSE SERPL-MCNC: 129 MG/DL
GLUCOSE UR QL STRIP: NEGATIVE
HCT VFR BLD AUTO: 45.5 %
HGB BLD-MCNC: 15.1 G/DL
HGB UR QL STRIP: ABNORMAL
HYALINE CASTS #/AREA URNS LPF: 0 /LPF
KETONES UR QL STRIP: NEGATIVE
LEUKOCYTE ESTERASE UR QL STRIP: NEGATIVE
LYMPHOCYTES # BLD AUTO: 1.6 K/UL
LYMPHOCYTES NFR BLD: 17.2 %
MCH RBC QN AUTO: 30.5 PG
MCHC RBC AUTO-ENTMCNC: 33.2 G/DL
MCV RBC AUTO: 92 FL
MICROSCOPIC COMMENT: ABNORMAL
MONOCYTES # BLD AUTO: 0.7 K/UL
MONOCYTES NFR BLD: 7.9 %
NEUTROPHILS # BLD AUTO: 6.6 K/UL
NEUTROPHILS NFR BLD: 71.7 %
NITRITE UR QL STRIP: NEGATIVE
PH UR STRIP: 7 [PH] (ref 5–8)
PLATELET # BLD AUTO: 179 K/UL
PMV BLD AUTO: 10.6 FL
POTASSIUM SERPL-SCNC: 4 MMOL/L
PROT SERPL-MCNC: 6.9 G/DL
PROT UR QL STRIP: ABNORMAL
RBC # BLD AUTO: 4.95 M/UL
RBC #/AREA URNS HPF: 15 /HPF (ref 0–4)
SODIUM SERPL-SCNC: 141 MMOL/L
SP GR UR STRIP: 1.02 (ref 1–1.03)
TROPONIN I SERPL DL<=0.01 NG/ML-MCNC: 0.01 NG/ML
URN SPEC COLLECT METH UR: ABNORMAL
UROBILINOGEN UR STRIP-ACNC: NEGATIVE EU/DL
WBC # BLD AUTO: 9.24 K/UL
WBC #/AREA URNS HPF: 5 /HPF (ref 0–5)

## 2017-11-23 PROCEDURE — 93005 ELECTROCARDIOGRAM TRACING: CPT

## 2017-11-23 PROCEDURE — 85025 COMPLETE CBC W/AUTO DIFF WBC: CPT

## 2017-11-23 PROCEDURE — 36415 COLL VENOUS BLD VENIPUNCTURE: CPT

## 2017-11-23 PROCEDURE — 80053 COMPREHEN METABOLIC PANEL: CPT

## 2017-11-23 PROCEDURE — 84484 ASSAY OF TROPONIN QUANT: CPT

## 2017-11-23 PROCEDURE — 25000003 PHARM REV CODE 250: Performed by: SURGERY

## 2017-11-23 PROCEDURE — 83880 ASSAY OF NATRIURETIC PEPTIDE: CPT

## 2017-11-23 PROCEDURE — 82553 CREATINE MB FRACTION: CPT

## 2017-11-23 PROCEDURE — 81000 URINALYSIS NONAUTO W/SCOPE: CPT

## 2017-11-23 PROCEDURE — 93010 ELECTROCARDIOGRAM REPORT: CPT | Mod: ,,, | Performed by: INTERNAL MEDICINE

## 2017-11-23 PROCEDURE — 99284 EMERGENCY DEPT VISIT MOD MDM: CPT

## 2017-11-23 RX ORDER — AMLODIPINE BESYLATE 5 MG/1
5 TABLET ORAL
Status: DISCONTINUED | OUTPATIENT
Start: 2017-11-23 | End: 2017-11-23

## 2017-11-23 RX ORDER — AMLODIPINE BESYLATE 5 MG/1
5 TABLET ORAL DAILY
Qty: 30 TABLET | Refills: 0 | Status: SHIPPED | OUTPATIENT
Start: 2017-11-23 | End: 2017-11-29 | Stop reason: SDUPTHER

## 2017-11-23 RX ORDER — CLONIDINE HYDROCHLORIDE 0.1 MG/1
0.2 TABLET ORAL
Status: COMPLETED | OUTPATIENT
Start: 2017-11-23 | End: 2017-11-23

## 2017-11-23 RX ORDER — CLONIDINE HYDROCHLORIDE 0.1 MG/1
0.1 TABLET ORAL
Status: COMPLETED | OUTPATIENT
Start: 2017-11-23 | End: 2017-11-23

## 2017-11-23 RX ADMIN — CLONIDINE HYDROCHLORIDE 0.1 MG: 0.1 TABLET ORAL at 04:11

## 2017-11-23 RX ADMIN — CLONIDINE HYDROCHLORIDE 0.2 MG: 0.1 TABLET ORAL at 02:11

## 2017-11-23 NOTE — ED TRIAGE NOTES
Patient's bloodpressure has been high.  He states he has been feeling tired and nauseated and attributes it to the hypertension.  This has been going on for 1 day.

## 2017-11-23 NOTE — ED PROVIDER NOTES
[facility]                                     November 23, 2017                   Chief Complaint  77 y.o. male with Hypertension     History of Present Illness  Domonique Hernandez JrTiffanie presents to the emergency room with elevated blood pressure today   Patient and his blood pressure home, states that his wrist BP machine read elevated  Pt is currently asymptomatic, states he has long-standing issues with hypertension  Patient has no chest pain, shortness of breath and a normal neurologic exam today   The patient is normal sinus rhythm on EKG, his only complaint is hypertension PTA    The history is provided by the patient    Past Medical History   -- Anxiety    -- Arthritis    -- Chronic gout    -- COPD (chronic obstructive pulmonary disease)    -- Depression    -- Emphysema of lung    -- Generalized headaches    -- GERD (gastroesophageal reflux disease)    -- Hyperlipidemia    -- Hypertension    -- Hypothyroidism    -- Obesity    -- Sleep apnea    -- Thyroid disease      Past Surgical History   -- Bilateral mastoidectomies     -- COLONOSCOPY     -- Eardrum repair     -- HERNIA REPAIR     -- KNEE ARTHROSCOPY     -- ROTATOR CUFF REPAIR        ALLERGIES: Percocet and Percodan    Review of Systems and Physical Exam     Review of Systems  -- Constitution - no fever, denies fatigue, no weakness, no chills  -- Eyes - no tearing or redness, no visual disturbance  -- Ear, Nose - no tinnitus or earache, no nasal congestion or discharge  -- Mouth,Throat - no sore throat, no toothache, normal voice, normal swallowing  -- Respiratory - denies cough and congestion, no shortness of breath, no COOL  -- Cardiovascular - denies chest pain, no palpitations, denies claudication  -- Gastrointestinal - denies abdominal pain, nausea, vomiting, or diarrhea  -- Musculoskeletal - denies back pain, negative for myalgias and arthralgias   -- Neurological - no headache, denies weakness or seizure; no LOC  -- Skin - denies  pallor, rash, or changes in skin. no hives or welts noted    Vital Signs  -- Blood pressure is 171/89 and his pulse is 57     Physical Exam  -- Nursing note and vitals reviewed  -- Constitutional: Appears well-developed and well-nourished  -- Head: Atraumatic. Normocephalic. No obvious abnormality  -- Eyes: Pupils are equal and reactive to light. Normal conjunctiva and lids  -- Nose: Nose normal in appearance, nares grossly normal. No discharge  -- Throat: Mucous membranes moist, pharynx normal, normal tonsils. No lesions   -- Ears: External ears and TM normal bilaterally. Normal hearing and no drainage  -- Neck: Normal range of motion. Neck supple. No masses, trachea midline  -- Cardiac: Normal rate, regular rhythm and normal heart sounds  -- Pulmonary: Normal respiratory effort, breath sounds clear to auscultation  -- Abdominal: Soft, no tenderness. Normal bowel sounds. Normal liver edge  -- Musculoskeletal: Normal range of motion, no effusions. Joints stable   -- Neurological: No focal deficits. Showed good interaction with staff  -- Vascular: Posterior tibial, dorsalis pedis and radial pulses 2+ bilaterally      Emergency Room Course     Labs  --    -- K 4.0   --    -- CO2 26   -- BUN 20   -- CREATININE 1.0   --  (H)   -- ALKPHOS 106   -- AST 17   -- ALT 20   -- BILITOT 0.4   -- ALBUMIN 3.4 (L)   -- PROT 6.9   -- WBC 9.24   -- HGB 15.1   -- HCT 45.5   --    -- CPK 77   -- CPK 77   -- CPKMB 2.9   -- TROPONINI 0.007   -- BNP 33     EKG  -- The EKG findings today were without concerning findings from baseline    Radiology  -- The CT of the head performed in the ER today was negative for acute pathology  -- Chest x-ray showed no infiltrate and showed no acute pathology    Medications Given  -- amLODIPine tablet 5 mg (not administered)   -- cloNIDine tablet 0.2 mg (0.2 mg Oral Given 11/23/17 1401)   -- cloNIDine tablet 0.1 mg (0.1 mg Oral Given 11/23/17 1601)     Diagnosis  -- Diagnoses of  HTN (hypertension) and Essential hypertension were pertinent to this visit.    Disposition and Plan  -- Disposition: home  -- Condition: stable  -- Follow-up: Patient to follow up with Emiliano Cam MD in 1-2 days.  -- I advised the patient that we have found no life threatening condition today  -- At this time, I believe the patient is clinically stable for discharge.   -- The patient acknowledges that close follow up with a MD is required   -- Patient agrees to comply with all instruction and direction given in the ER    This note is dictated on Dragon Natural Speaking word recognition program.  There are word recognition mistakes that are occasionally missed on review.           Uriel Damico MD  11/23/17 1981

## 2017-11-27 DIAGNOSIS — N52.01 ERECTILE DYSFUNCTION DUE TO ARTERIAL INSUFFICIENCY: ICD-10-CM

## 2017-11-27 NOTE — TELEPHONE ENCOUNTER
----- Message from Linda Toussaint sent at 2017 10:41 AM CST -----  Contact: SELF  .Domonique Hernandez Jr.  MRN: [id number]  : 1940  PCP: Emiliano Cam  Home Phone      [phone]  Work Phone      Not on file.  Mobile          [phone]      MESSAGE: WANTS TO SPEAK WITH NURSE ABOUT MEDICATION. PLEASE CALL     PHONE: [phone]

## 2017-11-28 RX ORDER — SILDENAFIL CITRATE 20 MG/1
TABLET ORAL
Qty: 90 TABLET | Refills: 3 | Status: SHIPPED | OUTPATIENT
Start: 2017-11-28 | End: 2018-01-24 | Stop reason: SDUPTHER

## 2017-11-29 ENCOUNTER — OFFICE VISIT (OUTPATIENT)
Dept: FAMILY MEDICINE | Facility: CLINIC | Age: 77
End: 2017-11-29
Payer: MEDICARE

## 2017-11-29 VITALS
WEIGHT: 250 LBS | RESPIRATION RATE: 18 BRPM | BODY MASS INDEX: 37.89 KG/M2 | SYSTOLIC BLOOD PRESSURE: 132 MMHG | HEIGHT: 68 IN | DIASTOLIC BLOOD PRESSURE: 84 MMHG | HEART RATE: 72 BPM

## 2017-11-29 DIAGNOSIS — I10 ESSENTIAL HYPERTENSION: ICD-10-CM

## 2017-11-29 PROCEDURE — 99999 PR PBB SHADOW E&M-EST. PATIENT-LVL IV: CPT | Mod: PBBFAC,,, | Performed by: FAMILY MEDICINE

## 2017-11-29 PROCEDURE — 99499 UNLISTED E&M SERVICE: CPT | Mod: S$GLB,,, | Performed by: FAMILY MEDICINE

## 2017-11-29 PROCEDURE — 99214 OFFICE O/P EST MOD 30 MIN: CPT | Mod: S$GLB,,, | Performed by: FAMILY MEDICINE

## 2017-11-29 RX ORDER — CARVEDILOL 25 MG/1
TABLET ORAL
Qty: 180 TABLET | Refills: 1 | Status: SHIPPED | OUTPATIENT
Start: 2017-11-29 | End: 2018-01-24 | Stop reason: SDUPTHER

## 2017-11-29 RX ORDER — VALSARTAN AND HYDROCHLOROTHIAZIDE 80; 12.5 MG/1; MG/1
1 TABLET, FILM COATED ORAL DAILY
Qty: 30 TABLET | Refills: 2 | Status: SHIPPED | OUTPATIENT
Start: 2017-11-29 | End: 2017-12-27 | Stop reason: DRUGHIGH

## 2017-11-29 RX ORDER — AMLODIPINE BESYLATE 5 MG/1
5 TABLET ORAL DAILY
Qty: 30 TABLET | Refills: 2 | Status: SHIPPED | OUTPATIENT
Start: 2017-11-29 | End: 2018-01-24 | Stop reason: SDUPTHER

## 2017-11-29 RX ORDER — AMLODIPINE BESYLATE 2.5 MG/1
TABLET ORAL
COMMUNITY
Start: 2017-10-04 | End: 2017-11-29

## 2017-11-29 NOTE — PROGRESS NOTES
Subjective:       Patient ID: Domonique Hernandez Jr. is a 77 y.o. male.    Chief Complaint: Hypertension    Patient here for check up and follow up for HYPERTENSION.   Went to ER for elevated BP.  He is taking Coreg and a small dose of Norvasc.  He was give clonindine and increased his Norvasc to 5 mg daily.  He tolerates these well.   Patient is doing well on Effexor  mg daily.    Patient states his CPAP machine is working very good.  He requires 7 cm water.   He is urinating better now that he is on Flomax and Avodart.  He is still having delayed ejaculation.  Stopping Effexor did not make much of a difference.  Patient uses Viagra for EGD.  He takes Synthroid for his hypothyroidism.  He tolerates this well.  He denies having symptoms such as heat or cold intolerance.  His weight is stable.  He denies any swelling in her thyroid.  Patient is taking His statin every day for hyperlipidemia.  She is feeling well on the medication.  He denies side effects such as myalgias.          Otalgia    Associated symptoms include coughing. Pertinent negatives include no abdominal pain, headaches, rash or sore throat.   Cough   This is a new problem. The current episode started in the past 7 days. The problem has been gradually worsening. The cough is productive of purulent sputum. Associated symptoms include ear pain. Pertinent negatives include no chest pain, chills, fever, headaches, postnasal drip, rash, sore throat or shortness of breath.   Hypertension   Pertinent negatives include no chest pain, headaches, palpitations or shortness of breath.     Review of Systems   Constitutional: Negative for activity change, chills, diaphoresis, fatigue, fever and unexpected weight change.   HENT: Positive for ear pain. Negative for congestion, nosebleeds, postnasal drip, sinus pressure, sore throat, trouble swallowing and voice change.    Eyes: Negative for photophobia and visual disturbance.   Respiratory: Positive for cough.  Negative for apnea, choking, chest tightness and shortness of breath.    Cardiovascular: Negative for chest pain, palpitations and leg swelling.   Gastrointestinal: Negative for abdominal pain and blood in stool.   Endocrine: Negative for cold intolerance, heat intolerance, polydipsia and polyphagia.   Genitourinary: Negative for decreased urine volume, difficulty urinating and dysuria.   Musculoskeletal: Positive for arthralgias and back pain. Negative for joint swelling.   Skin: Negative for color change, pallor and rash.   Neurological: Negative for dizziness, weakness, numbness and headaches.   Hematological: Negative for adenopathy. Does not bruise/bleed easily.   Psychiatric/Behavioral: Negative for behavioral problems, decreased concentration, dysphoric mood and sleep disturbance. The patient is not nervous/anxious.        Objective:      Vitals:    11/29/17 0909   BP: 132/84   Pulse: 72   Resp: 18   repeat /90  Physical Exam   Constitutional: He is oriented to person, place, and time. He appears well-developed and well-nourished.   Eyes: EOM are normal. Pupils are equal, round, and reactive to light.   Neck: No JVD present.   No carotid bruits   Cardiovascular: Normal rate, regular rhythm, normal heart sounds and intact distal pulses.  Exam reveals no gallop.    No murmur heard.  Pulmonary/Chest: Effort normal and breath sounds normal.   Musculoskeletal: He exhibits no edema or tenderness.   Lymphadenopathy:     He has no cervical adenopathy.   Neurological: He is alert and oriented to person, place, and time. He has normal reflexes. He displays normal reflexes. No cranial nerve deficit. He exhibits normal muscle tone. Coordination normal.   Psychiatric: He has a normal mood and affect. His behavior is normal. Judgment and thought content normal.       Lab Results   Component Value Date    TSH 3.220 03/06/2017     BMP  Lab Results   Component Value Date     11/23/2017    K 4.0 11/23/2017    CL  105 11/23/2017    CO2 26 11/23/2017    BUN 20 11/23/2017    CREATININE 1.0 11/23/2017    CALCIUM 10.0 11/23/2017    ANIONGAP 10 11/23/2017    ESTGFRAFRICA >60 11/23/2017    EGFRNONAA >60 11/23/2017       Lab Results   Component Value Date    LDLCALC 77.6 09/05/2017       Assessment:       1. Essential hypertension        Plan:     Essential hypertension  Two gram sodium diet.    Weight loss discussed.    Try to walk 2 miles per day.    The following medications will be restarted today  -     carvedilol (COREG) 25 MG tablet; Take 1 tablet (25 mg total) by mouth 2 (two) times daily with meals.  -     amlodipine (NORVASC) 5 MG tablet; Take 1 tablet by mouth once daily.  -     Add Diovan HCT 80/12.5 mg  daily    KARL on CPAP  Continue CPAP at 7 cm water pressure  Needs new machine    Depression, unspecified depression type  - Continue venlafaxine (EFFEXOR-XR) 150 MG 24 hr capsule; Take 1 capsule by mouth once daily.  Dispense: 30 capsule; Refill: 5    Hyperlipidemia, unspecified hyperlipidemia type  -     Lipid panel; Future    Chronic gout without tophus, unspecified cause, unspecified site  -     CBC auto differential; Future  -     Uric acid; Future    Hypothyroidism due to acquired atrophy of thyroid  -   Continue Synthroid 88 µg by mouth daily    Benign non-nodular prostatic hyperplasia with lower urinary tract symptoms  Continue Avodart and Flomax.    RTC in 4 weeks

## 2017-12-04 DIAGNOSIS — F32.A DEPRESSION, UNSPECIFIED DEPRESSION TYPE: ICD-10-CM

## 2017-12-05 RX ORDER — AMLODIPINE BESYLATE 2.5 MG/1
TABLET ORAL
Qty: 90 TABLET | Refills: 1 | Status: SHIPPED | OUTPATIENT
Start: 2017-12-05 | End: 2017-12-27

## 2017-12-05 RX ORDER — VENLAFAXINE HYDROCHLORIDE 150 MG/1
CAPSULE, EXTENDED RELEASE ORAL
Qty: 90 CAPSULE | Refills: 1 | Status: SHIPPED | OUTPATIENT
Start: 2017-12-05 | End: 2018-09-06 | Stop reason: SDUPTHER

## 2017-12-20 ENCOUNTER — OFFICE VISIT (OUTPATIENT)
Dept: UROLOGY | Facility: CLINIC | Age: 77
End: 2017-12-20
Payer: MEDICARE

## 2017-12-20 VITALS — BODY MASS INDEX: 38.09 KG/M2 | WEIGHT: 251.31 LBS | HEIGHT: 68 IN

## 2017-12-20 DIAGNOSIS — N40.1 BPH WITH URINARY OBSTRUCTION: Primary | ICD-10-CM

## 2017-12-20 DIAGNOSIS — N13.8 BPH WITH URINARY OBSTRUCTION: Primary | ICD-10-CM

## 2017-12-20 DIAGNOSIS — N52.01 ERECTILE DYSFUNCTION DUE TO ARTERIAL INSUFFICIENCY: ICD-10-CM

## 2017-12-20 PROCEDURE — 99499 UNLISTED E&M SERVICE: CPT | Mod: S$GLB,,, | Performed by: NURSE PRACTITIONER

## 2017-12-20 PROCEDURE — 99999 PR PBB SHADOW E&M-EST. PATIENT-LVL IV: CPT | Mod: PBBFAC,,, | Performed by: NURSE PRACTITIONER

## 2017-12-20 PROCEDURE — 99212 OFFICE O/P EST SF 10 MIN: CPT | Mod: S$GLB,,, | Performed by: NURSE PRACTITIONER

## 2017-12-20 NOTE — PROGRESS NOTES
CHIEF COMPLAINT:    Mr. Hernandez is a 77 y.o. male presenting with f/u prostatitis.     PRESENTING ILLNESS:    Domonique Hernandez Jr. is a 77 y.o. male who presents with f/u prostatitis.   Last seen with Dr. Garcia on 9/2017 and me on 11/22/17.     Today he reports improvements with his urination and pleased. He denies difficulty urinating, frequency, nocturia, and urgency. Denies dysuria and hematuria. Reports he has a good FOS 80% of the time and complete emptying. He completed 2 weeks of NSAIDS and was taking cipro off and on. He takes flomax and avodart daily.    ED has been present for > 1 year. He states that Sildenafil 40 mg works well. He is pleased with the results. He gets it for an affordable cost. His T was checked which returned low.  He was then started on TRT.  While on TRT, he has better erections and more energy.  His erections are better, but they're not great.  Denies chest pain and the use of nitroglycerin.     REVIEW OF SYSTEMS:    Domonique Hernandez Jr. denies any history of headache, blurred vision, fever, nausea, vomiting, chills, abdominal pain, bleeding per rectum, cough, SOB, recent loss of consciousness, recent mental status changes, seizures, dizziness, or upper or lower extremity weakness.    MAI Questionnaire   1. 4   2. 2   3. 4   4. 3   5. 2      PATIENT HISTORY:    Past Medical History:   Diagnosis Date    Anxiety     Arthritis     Chronic gout     COPD (chronic obstructive pulmonary disease)     Depression     Emphysema of lung     Generalized headaches     GERD (gastroesophageal reflux disease)     Hyperlipidemia     Hypertension     Hypothyroidism     Obesity     Sleep apnea     On CPAP    Thyroid disease        Past Surgical History:   Procedure Laterality Date    Bilateral mastoidectomies Bilateral 1984    for ear infection    COLONOSCOPY  2010    Eardrum repair  1984    Not sure which side    HERNIA REPAIR  1984    Umbilical-screen    KNEE ARTHROSCOPY Left 1989     ROTATOR CUFF REPAIR Left 2002    Left       Family History   Problem Relation Age of Onset    Cancer Mother      Mother    Cancer Father     No Known Problems Sister     Kidney disease Brother     Stroke Son     Stroke Son     Atrial fibrillation Son     Melanoma Neg Hx        Social History     Social History    Marital status:      Spouse name: N/A    Number of children: N/A    Years of education: N/A     Occupational History    Not on file.     Social History Main Topics    Smoking status: Never Smoker    Smokeless tobacco: Never Used    Alcohol use No    Drug use: No    Sexual activity: Yes     Partners: Female     Other Topics Concern    Not on file     Social History Narrative    No narrative on file       Allergies:  Percocet [oxycodone-acetaminophen] and Percodan [oxycodone hcl-oxycodone-asa]    Medications:    Current Outpatient Prescriptions:     albuterol 90 mcg/actuation inhaler, Inhale 2 puffs into the lungs every 6 (six) hours as needed for Wheezing. Rescue, Disp: 18 g, Rfl: 5    allopurinol (ZYLOPRIM) 300 MG tablet, TAKE 1 TABLET ONE TIME DAILY, Disp: 90 tablet, Rfl: 3    amLODIPine (NORVASC) 2.5 MG tablet, TAKE 1 TABLET EVERY DAY, Disp: 90 tablet, Rfl: 1    amLODIPine (NORVASC) 5 MG tablet, Take 1 tablet (5 mg total) by mouth once daily., Disp: 30 tablet, Rfl: 2    aspirin-caffeine (JESSICA BACK & BODY) 500-32.5 mg Tab, Take by mouth., Disp: , Rfl:     carvedilol (COREG) 25 MG tablet, TAKE 1 TABLET (25 MG TOTAL) BY MOUTH 2 (TWO) TIMES DAILY WITH MEALS., Disp: 180 tablet, Rfl: 1    cetirizine (ZYRTEC) 10 MG tablet, TAKE 1 TABLET EVERY DAY, Disp: 90 tablet, Rfl: 1    ciprofloxacin HCl (CIPRO) 500 MG tablet, Take 1 tablet (500 mg total) by mouth every 12 (twelve) hours., Disp: 20 tablet, Rfl: 0    dutasteride (AVODART) 0.5 mg capsule, Take 1 capsule (0.5 mg total) by mouth once daily., Disp: 30 capsule, Rfl: 11    fluticasone (FLONASE) 50 mcg/actuation nasal spray, 1  spray by Each Nare route 2 (two) times daily., Disp: 1 Bottle, Rfl: 11    hydrocodone-acetaminophen 5-325mg (NORCO) 5-325 mg per tablet, Take 1 tablet by mouth every 4 (four) hours as needed for Pain., Disp: 20 tablet, Rfl: 0    levothyroxine (SYNTHROID) 88 MCG tablet, TAKE 1 TABLET ONE TIME DAILY, Disp: 90 tablet, Rfl: 3    multivitamin (ONE DAILY MULTIVITAMIN) per tablet, Take 1 tablet by mouth once daily. Centrum Silver for Men, Disp: , Rfl:     nystatin (MYCOSTATIN) powder, Apply topically 2 (two) times daily., Disp: 60 g, Rfl: 5    promethazine-codeine 6.25-10 mg/5 ml (PHENERGAN WITH CODEINE) 6.25-10 mg/5 mL syrup, Take 5 mLs by mouth every 6 (six) hours as needed for Cough., Disp: 150 mL, Rfl: 1    sildenafil (REVATIO) 20 mg Tab, Take 3-5 tablets PO, Disp: 90 tablet, Rfl: 3    simvastatin (ZOCOR) 40 MG tablet, TAKE 1 TABLET EVERY EVENING, Disp: 90 tablet, Rfl: 3    tamsulosin (FLOMAX) 0.4 mg Cp24, TAKE 1 CAPSULE ONE TIME DAILY, Disp: 90 capsule, Rfl: 3    testosterone (ANDROGEL) 20.25 mg/1.25 gram (1.62 %) GlPm, Apply 2 pumps to shoulders daily, Disp: 1 Bottle, Rfl: 5    tramadol (ULTRAM) 50 mg tablet, Take 1 tablet (50 mg total) by mouth every 6 (six) hours as needed., Disp: 30 tablet, Rfl: 5    umeclidinium-vilanterol (ANORO ELLIPTA) 62.5-25 mcg/actuation DsDv, Inhale 1 Dose into the lungs once daily., Disp: 60 each, Rfl: 5    valsartan-hydrochlorothiazide (DIOVAN-HCT) 80-12.5 mg per tablet, Take 1 tablet by mouth once daily., Disp: 30 tablet, Rfl: 2    venlafaxine (EFFEXOR-XR) 150 MG Cp24, TAKE 1 CAPSULE EVERY DAY, Disp: 90 capsule, Rfl: 1    albuterol-ipratropium 2.5mg-0.5mg/3mL (DUO-NEB) 0.5 mg-3 mg(2.5 mg base)/3 mL nebulizer solution, Take 3 mLs by nebulization every 6 (six) hours as needed for Wheezing., Disp: 120 vial, Rfl: 5    PHYSICAL EXAMINATION:    The patient generally appears in good health, is appropriately interactive, and is in no apparent distress.     Eyes: anicteric sclerae,  moist conjunctivae; no lid-lag; PERRLA     HENT: Atraumatic; oropharynx clear with moist mucous membranes and no mucosal ulcerations;normal hard and soft palate.  No evidence of lymphadenopathy.    Neck: Trachea midline.  No thyromegaly.    Musculoskeletal: No abnormal gait.    Skin: No lesions.    Mental: Cooperative with normal affect.  Is oriented to time, place, and person.    Neuro: Grossly intact.    Chest: Normal inspiratory effort.   No accessory muscles.  No audible wheezes.  Respirations symmetric on inspiration and expiration.    Heart: Regular rhythm.      Abdomen:  Soft, non-tender. No masses or organomegaly. Bladder is not palpable. No evidence of flank discomfort.     Extremities: No clubbing, cyanosis, or edema      LABS:  UA today no blood or bacteria.     Lab Results   Component Value Date    PSA 1.3 12/31/2015    PSADIAG 1.4 08/30/2017    PSADIAG 0.73 04/11/2016       IMPRESSION:    Encounter Diagnoses   Name Primary?    BPH with urinary obstruction Yes    Erectile dysfunction due to arterial insufficiency       HTN, controlled  Hyperlipidemia, controlled    PLAN:   -Discussed plan of care,   -Discussed antibiotic resistance and to stop taking cipro prn.   -Continue Flomax BID and avodart for his LUTS  -Continue Sildenafil for ED        -Continue Androgel for the TRT.    -RTC on 4/2018 with labs to monitor T.

## 2017-12-27 ENCOUNTER — OFFICE VISIT (OUTPATIENT)
Dept: FAMILY MEDICINE | Facility: CLINIC | Age: 77
End: 2017-12-27
Payer: MEDICARE

## 2017-12-27 VITALS
HEIGHT: 68 IN | HEART RATE: 84 BPM | DIASTOLIC BLOOD PRESSURE: 68 MMHG | SYSTOLIC BLOOD PRESSURE: 116 MMHG | BODY MASS INDEX: 38.31 KG/M2 | WEIGHT: 252.81 LBS

## 2017-12-27 DIAGNOSIS — E03.4 HYPOTHYROIDISM DUE TO ACQUIRED ATROPHY OF THYROID: ICD-10-CM

## 2017-12-27 DIAGNOSIS — I10 ESSENTIAL HYPERTENSION: Primary | ICD-10-CM

## 2017-12-27 DIAGNOSIS — E78.5 HYPERLIPIDEMIA, UNSPECIFIED HYPERLIPIDEMIA TYPE: ICD-10-CM

## 2017-12-27 LAB
ANION GAP SERPL CALC-SCNC: 8 MMOL/L
BUN SERPL-MCNC: 19 MG/DL
CALCIUM SERPL-MCNC: 9.5 MG/DL
CHLORIDE SERPL-SCNC: 106 MMOL/L
CO2 SERPL-SCNC: 27 MMOL/L
CREAT SERPL-MCNC: 1 MG/DL
EST. GFR  (AFRICAN AMERICAN): >60 ML/MIN/1.73 M^2
EST. GFR  (NON AFRICAN AMERICAN): >60 ML/MIN/1.73 M^2
GLUCOSE SERPL-MCNC: 159 MG/DL
POTASSIUM SERPL-SCNC: 4 MMOL/L
SODIUM SERPL-SCNC: 141 MMOL/L

## 2017-12-27 PROCEDURE — 99999 PR PBB SHADOW E&M-EST. PATIENT-LVL III: CPT | Mod: PBBFAC,,, | Performed by: FAMILY MEDICINE

## 2017-12-27 PROCEDURE — 99214 OFFICE O/P EST MOD 30 MIN: CPT | Mod: S$GLB,,, | Performed by: FAMILY MEDICINE

## 2017-12-27 PROCEDURE — 99499 UNLISTED E&M SERVICE: CPT | Mod: S$GLB,,, | Performed by: FAMILY MEDICINE

## 2017-12-27 PROCEDURE — 36415 COLL VENOUS BLD VENIPUNCTURE: CPT | Mod: S$GLB,,, | Performed by: FAMILY MEDICINE

## 2017-12-27 PROCEDURE — 80048 BASIC METABOLIC PNL TOTAL CA: CPT

## 2017-12-27 RX ORDER — VALSARTAN 80 MG/1
80 TABLET ORAL DAILY
Qty: 30 TABLET | Refills: 1 | Status: SHIPPED | OUTPATIENT
Start: 2017-12-27 | End: 2018-01-24 | Stop reason: SDUPTHER

## 2017-12-27 NOTE — PROGRESS NOTES
Subjective:       Patient ID: Domonique Hernandez Jr. is a 77 y.o. male.    Chief Complaint: Follow-up (4 week)    Patient here for check up and follow up for HYPERTENSION.   Went to ER for elevated BP one month ago.  I saw him the next day and increased his Coreg to 25 mg twice daily, increase amlodipine to 5 mg once daily.  Diovan HCT 80/12.5 mg was added.  Now his blood pressure is almost too low.  He is feeling fine on current medications however.  Patient is doing well on Effexor  mg daily.    Patient states his CPAP machine is working very good.  He requires 7 cm water.   He is urinating better now that he is on Flomax and Avodart.  He is still having delayed ejaculation.  Stopping Effexor did not make much of a difference.  Patient uses Viagra for EGD.  He takes Synthroid for his hypothyroidism.  He tolerates this well.  He denies having symptoms such as heat or cold intolerance.  His weight is stable.  He denies any swelling in her thyroid.  Patient is taking His statin every day for hyperlipidemia.  She is feeling well on the medication.  He denies side effects such as myalgias.      Hypertension   Pertinent negatives include no chest pain, headaches, palpitations or shortness of breath.   Otalgia    Pertinent negatives include no abdominal pain, coughing, headaches, rash or sore throat.   Cough   This is a new problem. The current episode started in the past 7 days. The problem has been gradually worsening. The cough is productive of purulent sputum. Pertinent negatives include no chest pain, chills, ear pain, fever, headaches, postnasal drip, rash, sore throat or shortness of breath.     Review of Systems   Constitutional: Negative for activity change, chills, diaphoresis, fatigue, fever and unexpected weight change.   HENT: Negative for congestion, ear pain, nosebleeds, postnasal drip, sinus pressure, sore throat, trouble swallowing and voice change.    Eyes: Negative for photophobia and visual  disturbance.   Respiratory: Negative for apnea, cough, choking, chest tightness and shortness of breath.    Cardiovascular: Negative for chest pain, palpitations and leg swelling.   Gastrointestinal: Negative for abdominal pain and blood in stool.   Endocrine: Negative for cold intolerance, heat intolerance, polydipsia and polyphagia.   Genitourinary: Negative for decreased urine volume, difficulty urinating and dysuria.   Musculoskeletal: Positive for arthralgias and back pain. Negative for joint swelling.   Skin: Negative for color change, pallor and rash.   Neurological: Negative for dizziness, weakness, numbness and headaches.   Hematological: Negative for adenopathy. Does not bruise/bleed easily.   Psychiatric/Behavioral: Negative for behavioral problems, decreased concentration, dysphoric mood and sleep disturbance. The patient is not nervous/anxious.        Objective:      Vitals:    12/27/17 1107   BP: 116/68   Pulse: 84   repeat /90  Physical Exam   Constitutional: He is oriented to person, place, and time. He appears well-developed and well-nourished.   Eyes: EOM are normal. Pupils are equal, round, and reactive to light.   Neck: No JVD present.   No carotid bruits   Cardiovascular: Normal rate, regular rhythm, normal heart sounds and intact distal pulses.  Exam reveals no gallop and no friction rub.    No murmur heard.  Pulmonary/Chest: Effort normal and breath sounds normal.   Musculoskeletal: He exhibits no edema or tenderness.   Lymphadenopathy:     He has no cervical adenopathy.   Neurological: He is alert and oriented to person, place, and time. He has normal reflexes. He displays normal reflexes. No cranial nerve deficit. He exhibits normal muscle tone. Coordination normal.   Psychiatric: He has a normal mood and affect. His behavior is normal. Judgment and thought content normal.       Lab Results   Component Value Date    TSH 3.220 03/06/2017     BMP  Lab Results   Component Value Date    NA  141 11/23/2017    K 4.0 11/23/2017     11/23/2017    CO2 26 11/23/2017    BUN 20 11/23/2017    CREATININE 1.0 11/23/2017    CALCIUM 10.0 11/23/2017    ANIONGAP 10 11/23/2017    ESTGFRAFRICA >60 11/23/2017    EGFRNONAA >60 11/23/2017       Lab Results   Component Value Date    LDLCALC 77.6 09/05/2017       Assessment:       1. Essential hypertension    2. Hyperlipidemia, unspecified hyperlipidemia type    3. Hypothyroidism due to acquired atrophy of thyroid        Plan:     Essential hypertension  Two gram sodium diet.    Weight loss discussed.    Try to walk 2 miles per day.    The following medications will be restarted today  -     carvedilol (COREG) 25 MG tablet; Take 1 tablet (25 mg total) by mouth 2 (two) times daily with meals.  -     amlodipine (NORVASC) 5 MG tablet; Take 1 tablet by mouth once daily.  -     Stop Diovan HCT 80/12.5 mg  Daily  -     Start Diovan 80 mg po daily  Check BMP    KARL on CPAP  Continue CPAP at 7 cm water pressure    Depression, unspecified depression type  - Continue venlafaxine (EFFEXOR-XR) 150 MG 24 hr capsule; Take 1 capsule by mouth once daily.  Dispense: 30 capsule; Refill: 5    Hyperlipidemia, unspecified hyperlipidemia type  Continue Zocor 40 mg by mouth daily    Chronic gout without tophus, unspecified cause, unspecified site  Continue allopurinol 300 mg daily    Hypothyroidism due to acquired atrophy of thyroid  -   Continue Synthroid 88 µg by mouth daily    Benign non-nodular prostatic hyperplasia with lower urinary tract symptoms  Continue Avodart and Flomax.    RTC in 4 weeks

## 2017-12-29 NOTE — PROGRESS NOTES
Patient, Domonique Hernandez Jr. (MRN #[id number]), presented with a recorded BMI of 38.44 kg/m^2 and a documented comorbidity(s):  - Hypertension  - Hyperlipidemia  to which the severe obesity is a contributing factor. This is consistent with the definition of severe obesity (BMI 35.0-35.9) with comorbidity (ICD-10 E66.01, Z68.35). The patient's severe obesity was monitored, evaluated, addressed and/or treated. This addendum to the medical record is made on 12/29/2017.

## 2018-01-09 RX ORDER — TAMSULOSIN HYDROCHLORIDE 0.4 MG/1
1 CAPSULE ORAL DAILY
Qty: 90 CAPSULE | Refills: 3 | Status: SHIPPED | OUTPATIENT
Start: 2018-01-09 | End: 2018-03-12 | Stop reason: SDUPTHER

## 2018-01-09 NOTE — TELEPHONE ENCOUNTER
----- Message from Lucia Gonzales MA sent at 2018  8:16 AM CST -----  Contact: Self  Domonique Hernandez Jr.  MRN: [id number]  : 1940  PCP: Emiliano Cam  Home Phone      [phone]  Work Phone      Not on file.  Mobile          [phone]      MESSAGE: Needs refill on Flomax  Pharmacy : [facility]

## 2018-01-13 DIAGNOSIS — J30.1 SEASONAL ALLERGIC RHINITIS DUE TO POLLEN: ICD-10-CM

## 2018-01-15 RX ORDER — CETIRIZINE HYDROCHLORIDE 10 MG/1
TABLET ORAL
Qty: 90 TABLET | Refills: 1 | Status: SHIPPED | OUTPATIENT
Start: 2018-01-15 | End: 2018-09-10 | Stop reason: SDUPTHER

## 2018-01-24 ENCOUNTER — OFFICE VISIT (OUTPATIENT)
Dept: FAMILY MEDICINE | Facility: CLINIC | Age: 78
End: 2018-01-24
Payer: MEDICARE

## 2018-01-24 VITALS
HEIGHT: 68 IN | WEIGHT: 258 LBS | SYSTOLIC BLOOD PRESSURE: 126 MMHG | HEART RATE: 76 BPM | DIASTOLIC BLOOD PRESSURE: 68 MMHG | BODY MASS INDEX: 39.1 KG/M2

## 2018-01-24 DIAGNOSIS — E03.4 HYPOTHYROIDISM DUE TO ACQUIRED ATROPHY OF THYROID: Primary | ICD-10-CM

## 2018-01-24 DIAGNOSIS — N52.01 ERECTILE DYSFUNCTION DUE TO ARTERIAL INSUFFICIENCY: ICD-10-CM

## 2018-01-24 DIAGNOSIS — M1A.9XX0 CHRONIC GOUT WITHOUT TOPHUS, UNSPECIFIED CAUSE, UNSPECIFIED SITE: ICD-10-CM

## 2018-01-24 DIAGNOSIS — E78.5 HYPERLIPIDEMIA, UNSPECIFIED HYPERLIPIDEMIA TYPE: ICD-10-CM

## 2018-01-24 DIAGNOSIS — R73.9 ELEVATED BLOOD SUGAR: ICD-10-CM

## 2018-01-24 DIAGNOSIS — I10 ESSENTIAL HYPERTENSION: ICD-10-CM

## 2018-01-24 DIAGNOSIS — M47.816 LUMBAR FACET ARTHROPATHY: ICD-10-CM

## 2018-01-24 LAB
ANION GAP SERPL CALC-SCNC: 9 MMOL/L
BUN SERPL-MCNC: 18 MG/DL
CALCIUM SERPL-MCNC: 9.9 MG/DL
CHLORIDE SERPL-SCNC: 101 MMOL/L
CO2 SERPL-SCNC: 28 MMOL/L
CREAT SERPL-MCNC: 0.9 MG/DL
EST. GFR  (AFRICAN AMERICAN): >60 ML/MIN/1.73 M^2
EST. GFR  (NON AFRICAN AMERICAN): >60 ML/MIN/1.73 M^2
ESTIMATED AVG GLUCOSE: 108 MG/DL
GLUCOSE SERPL-MCNC: 90 MG/DL
HBA1C MFR BLD HPLC: 5.4 %
POTASSIUM SERPL-SCNC: 4.2 MMOL/L
SODIUM SERPL-SCNC: 138 MMOL/L

## 2018-01-24 PROCEDURE — 36415 COLL VENOUS BLD VENIPUNCTURE: CPT | Mod: S$GLB,,, | Performed by: FAMILY MEDICINE

## 2018-01-24 PROCEDURE — 83036 HEMOGLOBIN GLYCOSYLATED A1C: CPT

## 2018-01-24 PROCEDURE — 80048 BASIC METABOLIC PNL TOTAL CA: CPT

## 2018-01-24 PROCEDURE — 99499 UNLISTED E&M SERVICE: CPT | Mod: S$GLB,,, | Performed by: FAMILY MEDICINE

## 2018-01-24 PROCEDURE — 99999 PR PBB SHADOW E&M-EST. PATIENT-LVL III: CPT | Mod: PBBFAC,,, | Performed by: FAMILY MEDICINE

## 2018-01-24 PROCEDURE — 99214 OFFICE O/P EST MOD 30 MIN: CPT | Mod: S$GLB,,, | Performed by: FAMILY MEDICINE

## 2018-01-24 RX ORDER — AMLODIPINE BESYLATE 2.5 MG/1
TABLET ORAL
COMMUNITY
Start: 2017-12-06 | End: 2018-01-24

## 2018-01-24 RX ORDER — ALLOPURINOL 300 MG/1
TABLET ORAL
Qty: 90 TABLET | Refills: 1 | Status: SHIPPED | OUTPATIENT
Start: 2018-01-24 | End: 2018-06-26 | Stop reason: SDUPTHER

## 2018-01-24 RX ORDER — SIMVASTATIN 40 MG/1
40 TABLET, FILM COATED ORAL NIGHTLY
Qty: 90 TABLET | Refills: 1 | Status: SHIPPED | OUTPATIENT
Start: 2018-01-24 | End: 2018-09-10 | Stop reason: SDUPTHER

## 2018-01-24 RX ORDER — HYDROCODONE BITARTRATE AND ACETAMINOPHEN 5; 325 MG/1; MG/1
1 TABLET ORAL EVERY 4 HOURS PRN
Qty: 20 TABLET | Refills: 0 | Status: SHIPPED | OUTPATIENT
Start: 2018-01-24 | End: 2018-08-28

## 2018-01-24 RX ORDER — VALSARTAN 80 MG/1
80 TABLET ORAL DAILY
Qty: 90 TABLET | Refills: 1 | Status: SHIPPED | OUTPATIENT
Start: 2018-01-24 | End: 2018-04-13 | Stop reason: SDUPTHER

## 2018-01-24 RX ORDER — SILDENAFIL CITRATE 20 MG/1
TABLET ORAL
Qty: 90 TABLET | Refills: 3 | Status: SHIPPED | OUTPATIENT
Start: 2018-01-24 | End: 2019-03-04 | Stop reason: SDUPTHER

## 2018-01-24 RX ORDER — LEVOTHYROXINE SODIUM 88 UG/1
TABLET ORAL
Qty: 90 TABLET | Refills: 1 | Status: SHIPPED | OUTPATIENT
Start: 2018-01-24 | End: 2018-03-12 | Stop reason: SDUPTHER

## 2018-01-24 RX ORDER — VALSARTAN AND HYDROCHLOROTHIAZIDE 80; 12.5 MG/1; MG/1
TABLET, FILM COATED ORAL
COMMUNITY
Start: 2018-01-10 | End: 2018-04-13 | Stop reason: SDUPTHER

## 2018-01-24 RX ORDER — CARVEDILOL 25 MG/1
TABLET ORAL
Qty: 180 TABLET | Refills: 1 | Status: SHIPPED | OUTPATIENT
Start: 2018-01-24 | End: 2018-09-06 | Stop reason: SDUPTHER

## 2018-01-24 RX ORDER — AMLODIPINE BESYLATE 5 MG/1
5 TABLET ORAL DAILY
Qty: 90 TABLET | Refills: 1 | Status: SHIPPED | OUTPATIENT
Start: 2018-01-24 | End: 2018-06-26 | Stop reason: SDUPTHER

## 2018-01-24 NOTE — PROGRESS NOTES
Subjective:       Patient ID: Domonique Hernandez Jr. is a 77 y.o. male.    Chief Complaint: Follow-up (4 week f/u)    Patient here for check up and follow up for HYPERTENSION.   Went to ER for elevated BP one month ago.  I saw him the next day and increased his Coreg to 25 mg twice daily, increase amlodipine to 5 mg once daily.  Diovan HCT 80 mg was added.  Now his blood pressure is almost too low.  He is feeling fine on current medications however.  Patient is doing well on Effexor  mg daily.    Patient states his CPAP machine is working very good.  He requires 7 cm water.   He is urinating better now that he is on Flomax and Avodart.  He is still having delayed ejaculation.  Stopping Effexor did not make much of a difference.  Patient uses Viagra for EGD.  He takes Synthroid for his hypothyroidism.  He tolerates this well.  He denies having symptoms such as heat or cold intolerance.  His weight is stable.  He denies any swelling in her thyroid.  Patient is taking His statin every day for hyperlipidemia.  She is feeling well on the medication.  He denies side effects such as myalgias.      Hypertension   Pertinent negatives include no chest pain, headaches, palpitations or shortness of breath.   Otalgia    Pertinent negatives include no abdominal pain, coughing, headaches, rash or sore throat.   Cough   This is a new problem. The current episode started in the past 7 days. The problem has been gradually worsening. The cough is productive of purulent sputum. Pertinent negatives include no chest pain, chills, ear pain, fever, headaches, postnasal drip, rash, sore throat or shortness of breath.     Review of Systems   Constitutional: Negative for activity change, chills, diaphoresis, fatigue, fever and unexpected weight change.   HENT: Negative for congestion, ear pain, nosebleeds, postnasal drip, sinus pressure, sore throat, trouble swallowing and voice change.    Eyes: Negative for photophobia and visual  disturbance.   Respiratory: Negative for apnea, cough, choking, chest tightness and shortness of breath.    Cardiovascular: Negative for chest pain, palpitations and leg swelling.   Gastrointestinal: Negative for abdominal pain and blood in stool.   Endocrine: Negative for cold intolerance, heat intolerance, polydipsia and polyphagia.   Genitourinary: Negative for decreased urine volume, difficulty urinating and dysuria.   Musculoskeletal: Positive for arthralgias and back pain. Negative for joint swelling.   Skin: Negative for color change, pallor and rash.   Neurological: Negative for dizziness, weakness, numbness and headaches.   Hematological: Negative for adenopathy. Does not bruise/bleed easily.   Psychiatric/Behavioral: Negative for behavioral problems, decreased concentration, dysphoric mood and sleep disturbance. The patient is not nervous/anxious.        Objective:      Vitals:    01/24/18 1020   BP: 126/68   Pulse: 76   repeat /90  Physical Exam   Constitutional: He is oriented to person, place, and time. He appears well-developed and well-nourished.   Eyes: EOM are normal. Pupils are equal, round, and reactive to light.   Neck: No JVD present.   No carotid bruits   Cardiovascular: Normal rate, regular rhythm, normal heart sounds and intact distal pulses.  Exam reveals no gallop and no friction rub.    No murmur heard.  Pulmonary/Chest: Effort normal and breath sounds normal.   Musculoskeletal: He exhibits no edema or tenderness.   Lymphadenopathy:     He has no cervical adenopathy.   Neurological: He is alert and oriented to person, place, and time. He has normal reflexes. He displays normal reflexes. No cranial nerve deficit. He exhibits normal muscle tone. Coordination normal.   Psychiatric: He has a normal mood and affect. His behavior is normal. Judgment and thought content normal.       Lab Results   Component Value Date    TSH 3.220 03/06/2017     BMP  Lab Results   Component Value Date    NA  141 12/27/2017    K 4.0 12/27/2017     12/27/2017    CO2 27 12/27/2017    BUN 19 12/27/2017    CREATININE 1.0 12/27/2017    CALCIUM 9.5 12/27/2017    ANIONGAP 8 12/27/2017    ESTGFRAFRICA >60 12/27/2017    EGFRNONAA >60 12/27/2017       Lab Results   Component Value Date    LDLCALC 77.6 09/05/2017       Assessment:       1. Hypothyroidism due to acquired atrophy of thyroid    2. Essential hypertension    3. Lumbar facet arthropathy    4. Erectile dysfunction due to arterial insufficiency    5. Hyperlipidemia, unspecified hyperlipidemia type    6. Chronic gout without tophus, unspecified cause, unspecified site    7. Elevated blood sugar        Plan:     Essential hypertension  Two gram sodium diet.    Weight loss discussed.    Try to walk 2 miles per day.    The following medications will be restarted today  -     carvedilol (COREG) 25 MG tablet; Take 1 tablet (25 mg total) by mouth 2 (two) times daily with meals.  -     amlodipine (NORVASC) 5 MG tablet; Take 1 tablet by mouth once daily.  -     Diovan 80 mg po daily  Check BMP    KARL on CPAP  Continue CPAP at 7 cm water pressure    Depression, unspecified depression type  - Continue venlafaxine (EFFEXOR-XR) 150 MG 24 hr capsule; Take 1 capsule by mouth once daily.  Dispense: 30 capsule; Refill: 5    Hyperlipidemia, unspecified hyperlipidemia type  Continue Zocor 40 mg by mouth daily    Chronic gout without tophus, unspecified cause, unspecified site  Continue allopurinol 300 mg daily    Hypothyroidism due to acquired atrophy of thyroid  -   Continue Synthroid 88 µg by mouth daily    Benign non-nodular prostatic hyperplasia with lower urinary tract symptoms  Continue Avodart and Flomax.    Hypothyroidism due to acquired atrophy of thyroid  -     levothyroxine (SYNTHROID) 88 MCG tablet; TAKE 1 TABLET ONE TIME DAILY  Dispense: 90 tablet; Refill: 1    Essential hypertension  -     valsartan (DIOVAN) 80 MG tablet; Take 1 tablet (80 mg total) by mouth once daily.   Dispense: 90 tablet; Refill: 1  -     carvedilol (COREG) 25 MG tablet; TAKE 1 TABLET (25 MG TOTAL) BY MOUTH 2 (TWO) TIMES DAILY WITH MEALS.  Dispense: 180 tablet; Refill: 1  -     amLODIPine (NORVASC) 5 MG tablet; Take 1 tablet (5 mg total) by mouth once daily.  Dispense: 90 tablet; Refill: 1    Lumbar facet arthropathy  -     hydrocodone-acetaminophen 5-325mg (NORCO) 5-325 mg per tablet; Take 1 tablet by mouth every 4 (four) hours as needed for Pain.  Dispense: 20 tablet; Refill: 0    Erectile dysfunction due to arterial insufficiency  -     sildenafil, antihypertensive, (REVATIO) 20 mg Tab; Take 3-5 tablets PO  Dispense: 90 tablet; Refill: 3    Hyperlipidemia, unspecified hyperlipidemia type  -     simvastatin (ZOCOR) 40 MG tablet; Take 1 tablet (40 mg total) by mouth every evening.  Dispense: 90 tablet; Refill: 1  -     Basic metabolic panel; Future    Chronic gout without tophus, unspecified cause, unspecified site  -     allopurinol (ZYLOPRIM) 300 MG tablet; TAKE 1 TABLET ONE TIME DAILY  Dispense: 90 tablet; Refill: 1    Elevated blood sugar  -     Hemoglobin A1c; Future      RTC 3 months

## 2018-02-12 ENCOUNTER — CLINICAL SUPPORT (OUTPATIENT)
Dept: FAMILY MEDICINE | Facility: CLINIC | Age: 78
End: 2018-02-12
Payer: MEDICARE

## 2018-02-12 DIAGNOSIS — E03.4 HYPOTHYROIDISM DUE TO ACQUIRED ATROPHY OF THYROID: ICD-10-CM

## 2018-02-12 DIAGNOSIS — M1A.9XX0 CHRONIC GOUT WITHOUT TOPHUS, UNSPECIFIED CAUSE, UNSPECIFIED SITE: ICD-10-CM

## 2018-02-12 DIAGNOSIS — E78.5 HYPERLIPIDEMIA, UNSPECIFIED HYPERLIPIDEMIA TYPE: ICD-10-CM

## 2018-02-12 LAB
ALT SERPL W/O P-5'-P-CCNC: 19 U/L
ANION GAP SERPL CALC-SCNC: 9 MMOL/L
BUN SERPL-MCNC: 30 MG/DL
CALCIUM SERPL-MCNC: 9.5 MG/DL
CHLORIDE SERPL-SCNC: 107 MMOL/L
CHOLEST SERPL-MCNC: 147 MG/DL
CHOLEST/HDLC SERPL: 5.1 {RATIO}
CO2 SERPL-SCNC: 24 MMOL/L
CREAT SERPL-MCNC: 1.3 MG/DL
EST. GFR  (AFRICAN AMERICAN): >60 ML/MIN/1.73 M^2
EST. GFR  (NON AFRICAN AMERICAN): 53 ML/MIN/1.73 M^2
GLUCOSE SERPL-MCNC: 134 MG/DL
HDLC SERPL-MCNC: 29 MG/DL
HDLC SERPL: 19.7 %
LDLC SERPL CALC-MCNC: 84.6 MG/DL
NONHDLC SERPL-MCNC: 118 MG/DL
POTASSIUM SERPL-SCNC: 4.2 MMOL/L
SODIUM SERPL-SCNC: 140 MMOL/L
T4 FREE SERPL-MCNC: 0.93 NG/DL
TRIGL SERPL-MCNC: 167 MG/DL
TSH SERPL DL<=0.005 MIU/L-ACNC: 5.39 UIU/ML
URATE SERPL-MCNC: 6.6 MG/DL

## 2018-02-12 PROCEDURE — 84439 ASSAY OF FREE THYROXINE: CPT

## 2018-02-12 PROCEDURE — 80048 BASIC METABOLIC PNL TOTAL CA: CPT

## 2018-02-12 PROCEDURE — 99999 PR PBB SHADOW E&M-EST. PATIENT-LVL I: CPT | Mod: PBBFAC,,,

## 2018-02-12 PROCEDURE — 80061 LIPID PANEL: CPT

## 2018-02-12 PROCEDURE — 84550 ASSAY OF BLOOD/URIC ACID: CPT

## 2018-02-12 PROCEDURE — 84443 ASSAY THYROID STIM HORMONE: CPT

## 2018-02-12 PROCEDURE — 36415 COLL VENOUS BLD VENIPUNCTURE: CPT | Mod: S$GLB,,, | Performed by: FAMILY MEDICINE

## 2018-02-12 PROCEDURE — 84460 ALANINE AMINO (ALT) (SGPT): CPT

## 2018-02-12 NOTE — PROGRESS NOTES
Tell patient that labs TSH is a little elevated.  Is he taking his synthroid daily.  If he is the increase dose by taking 2 tabs of synthroid on Sunday, one tab daily Mon-Sat

## 2018-02-14 ENCOUNTER — TELEPHONE (OUTPATIENT)
Dept: FAMILY MEDICINE | Facility: CLINIC | Age: 78
End: 2018-02-14

## 2018-02-14 NOTE — TELEPHONE ENCOUNTER
----- Message from Alejandra Morales sent at 2018  2:47 PM CST -----  Contact: Self  Domonique Hernandez Jr.  MRN: [id number]  : 1940  PCP: Emiliano Cam  Home Phone      [phone]  Work Phone      Not on file.  Mobile          [phone]      MESSAGE:   Patient was returning a call with information about a medication he was taking. He couldn't remember who it was. Please advise,    Domonique [phone]

## 2018-02-22 ENCOUNTER — TELEPHONE (OUTPATIENT)
Dept: FAMILY MEDICINE | Facility: CLINIC | Age: 78
End: 2018-02-22

## 2018-02-22 NOTE — TELEPHONE ENCOUNTER
----- Message from Finn Jhaveri sent at 2018 11:40 AM CST -----  Contact: Patient  Domonique Hernandez Jr.  MRN: [id number]  : 1940  PCP: Emiliano Cam  Home Phone      [phone]  Work Phone      Not on file.  Mobile          [phone]      MESSAGE: mouth & side of face swollen - sinus issues -- requesting appt today    Call [phone]    PCP: Dorina

## 2018-02-26 ENCOUNTER — PES CALL (OUTPATIENT)
Dept: ADMINISTRATIVE | Facility: CLINIC | Age: 78
End: 2018-02-26

## 2018-02-26 ENCOUNTER — OFFICE VISIT (OUTPATIENT)
Dept: FAMILY MEDICINE | Facility: CLINIC | Age: 78
End: 2018-02-26
Payer: MEDICARE

## 2018-02-26 VITALS
RESPIRATION RATE: 18 BRPM | HEART RATE: 72 BPM | WEIGHT: 257 LBS | SYSTOLIC BLOOD PRESSURE: 132 MMHG | BODY MASS INDEX: 38.95 KG/M2 | DIASTOLIC BLOOD PRESSURE: 64 MMHG | HEIGHT: 68 IN | TEMPERATURE: 98 F

## 2018-02-26 DIAGNOSIS — L82.1 SEBORRHEIC KERATOSES: ICD-10-CM

## 2018-02-26 DIAGNOSIS — J01.90 ACUTE SINUSITIS, RECURRENCE NOT SPECIFIED, UNSPECIFIED LOCATION: ICD-10-CM

## 2018-02-26 DIAGNOSIS — J41.1 MUCOPURULENT CHRONIC BRONCHITIS: Primary | ICD-10-CM

## 2018-02-26 DIAGNOSIS — L91.8 SKIN TAG: ICD-10-CM

## 2018-02-26 PROCEDURE — 99213 OFFICE O/P EST LOW 20 MIN: CPT | Mod: 25,S$GLB,, | Performed by: FAMILY MEDICINE

## 2018-02-26 PROCEDURE — 3008F BODY MASS INDEX DOCD: CPT | Mod: S$GLB,,, | Performed by: FAMILY MEDICINE

## 2018-02-26 PROCEDURE — 1126F AMNT PAIN NOTED NONE PRSNT: CPT | Mod: S$GLB,,, | Performed by: FAMILY MEDICINE

## 2018-02-26 PROCEDURE — 99999 PR PBB SHADOW E&M-EST. PATIENT-LVL III: CPT | Mod: PBBFAC,,, | Performed by: FAMILY MEDICINE

## 2018-02-26 PROCEDURE — 1159F MED LIST DOCD IN RCRD: CPT | Mod: S$GLB,,, | Performed by: FAMILY MEDICINE

## 2018-02-26 PROCEDURE — 17110 DESTRUCTION B9 LES UP TO 14: CPT | Mod: S$GLB,,, | Performed by: FAMILY MEDICINE

## 2018-02-26 PROCEDURE — 99499 UNLISTED E&M SERVICE: CPT | Mod: S$GLB,,, | Performed by: FAMILY MEDICINE

## 2018-02-26 PROCEDURE — 96372 THER/PROPH/DIAG INJ SC/IM: CPT | Mod: 59,S$GLB,, | Performed by: FAMILY MEDICINE

## 2018-02-26 RX ORDER — AMLODIPINE BESYLATE 2.5 MG/1
2.5 TABLET ORAL 2 TIMES DAILY
COMMUNITY
Start: 2018-02-12 | End: 2018-07-11 | Stop reason: SDUPTHER

## 2018-02-26 RX ORDER — AZITHROMYCIN 250 MG/1
TABLET, FILM COATED ORAL
Qty: 6 TABLET | Refills: 0 | Status: SHIPPED | OUTPATIENT
Start: 2018-02-26 | End: 2018-03-26

## 2018-02-26 RX ORDER — METHYLPREDNISOLONE ACETATE 40 MG/ML
40 INJECTION, SUSPENSION INTRA-ARTICULAR; INTRALESIONAL; INTRAMUSCULAR; SOFT TISSUE
Status: COMPLETED | OUTPATIENT
Start: 2018-02-26 | End: 2018-02-26

## 2018-02-26 RX ADMIN — METHYLPREDNISOLONE ACETATE 40 MG: 40 INJECTION, SUSPENSION INTRA-ARTICULAR; INTRALESIONAL; INTRAMUSCULAR; SOFT TISSUE at 02:02

## 2018-02-26 NOTE — PROGRESS NOTES
Chief complaint: Sinus congestion    History of present illness: Pt is 77 y.o. male complaints of sinus congestion, facial pressure, sore throat, ear ache.  Patient states glands are swollen and neck.  Patient has a cough.  This started 5 days ago.  Patient denies chest pain, shortness of breath, fever.  Patient has itchy watery eyes, itchy scratchy throat.  The patient complains of decreased hearing.  Cough is productive every morning.  Productive of mucous.  Patient has an irritating skin tag on his waistline and an irritating seborrheic kerotisis lesion on left ear lobe    Review of systems:  Constitutional-no weight loss, weight gain  HEENT-allergy symptoms such as itchy watery eyes, post nasal drip, itchy palate, come and go.  Respiratory-no wheezing, see history of present illness  Neurological-no weakness or numbness    Past medical history, family history, social history-same as note dated today    Medications-all reviewed and verified in nurses notes.    Physical exam: Vital signs-reviewed and verified in nurses notes  Gen.-alert, oriented, no apparent distress.  Coughing.  Head-positive facial tenderness over the frontal and maxillary sinuses  Eyes: Pupils equal round reactive to light and accommodation, extraocular muscles intact, conjunctiva clear  Ears: Tympanic membranes are clear and mobile, no fluid present.  Nose: Injected mucous membranes, erythematous, mucopurulent discharge  Throat:  Injected red streaky mucosa,  tonsils normal  Neck: Shotty, tender anterior lymphadenopathy  Heart: Regular rate and rhythm, no murmurs, rubs or gallops  Lungs:Lungs were clear to auscultation and percussion, and with normal diaphragmatic excursion. No wheezes or rales were noted.   Skin tag on waist line is irritated.  SK on left ear lobe inflamed    Assessment/Plan:   Mucopurulent chronic bronchitis  -     methylPREDNISolone acetate injection 40 mg; Inject 1 mL (40 mg total) into the muscle one time.  -      azithromycin (Z-KATIUSKA) 250 MG tablet; 2 po day 1, then 1 po q day  Dispense: 6 tablet; Refill: 0  -     dextromethorphan-guaifenesin  mg (MUCINEX DM)  mg per 12 hr tablet; Take 1 tablet by mouth 2 (two) times daily as needed.  Dispense: 20 tablet; Refill: 2    Acute sinusitis, recurrence not specified, unspecified location  -     methylPREDNISolone acetate injection 40 mg; Inject 1 mL (40 mg total) into the muscle one time.  -     azithromycin (Z-KATIUSKA) 250 MG tablet; 2 po day 1, then 1 po q day  Dispense: 6 tablet; Refill: 0  -     dextromethorphan-guaifenesin  mg (MUCINEX DM)  mg per 12 hr tablet; Take 1 tablet by mouth 2 (two) times daily as needed.  Dispense: 20 tablet; Refill: 2    Add Duoneb treatments bid    Simply saline nasal lavage q.2 to 3 hours p.r.n.  Avoid dust, allergens, other sinus irritants.  Handwashing technique discussed to prevent spreading germs.    SK on ear destroyed  Skin tag on left ear destroyed

## 2018-03-02 ENCOUNTER — OFFICE VISIT (OUTPATIENT)
Dept: OTOLARYNGOLOGY | Facility: CLINIC | Age: 78
End: 2018-03-02
Payer: MEDICARE

## 2018-03-02 ENCOUNTER — CLINICAL SUPPORT (OUTPATIENT)
Dept: AUDIOLOGY | Facility: CLINIC | Age: 78
End: 2018-03-02
Payer: MEDICARE

## 2018-03-02 VITALS
WEIGHT: 255.75 LBS | HEART RATE: 68 BPM | SYSTOLIC BLOOD PRESSURE: 135 MMHG | BODY MASS INDEX: 38.88 KG/M2 | DIASTOLIC BLOOD PRESSURE: 78 MMHG

## 2018-03-02 DIAGNOSIS — H61.23 EXCESSIVE CERUMEN IN BOTH EAR CANALS: ICD-10-CM

## 2018-03-02 DIAGNOSIS — L29.9 EAR ITCHING: Primary | ICD-10-CM

## 2018-03-02 DIAGNOSIS — H90.3 SENSORINEURAL HEARING LOSS, BILATERAL: Primary | ICD-10-CM

## 2018-03-02 DIAGNOSIS — H90.3 SENSORINEURAL HEARING LOSS (SNHL) OF BOTH EARS: ICD-10-CM

## 2018-03-02 PROCEDURE — 99999 PR PBB SHADOW E&M-EST. PATIENT-LVL IV: CPT | Mod: PBBFAC,,, | Performed by: NURSE PRACTITIONER

## 2018-03-02 PROCEDURE — 99203 OFFICE O/P NEW LOW 30 MIN: CPT | Mod: 25,S$GLB,, | Performed by: NURSE PRACTITIONER

## 2018-03-02 PROCEDURE — 3075F SYST BP GE 130 - 139MM HG: CPT | Mod: S$GLB,,, | Performed by: NURSE PRACTITIONER

## 2018-03-02 PROCEDURE — 3078F DIAST BP <80 MM HG: CPT | Mod: S$GLB,,, | Performed by: NURSE PRACTITIONER

## 2018-03-02 PROCEDURE — 69210 REMOVE IMPACTED EAR WAX UNI: CPT | Mod: S$GLB,,, | Performed by: NURSE PRACTITIONER

## 2018-03-02 PROCEDURE — 92550 TYMPANOMETRY & REFLEX THRESH: CPT | Mod: S$GLB,,, | Performed by: AUDIOLOGIST

## 2018-03-02 PROCEDURE — 92557 COMPREHENSIVE HEARING TEST: CPT | Mod: S$GLB,,, | Performed by: AUDIOLOGIST

## 2018-03-02 RX ORDER — BETAMETHASONE VALERATE 0.1 %
LOTION (ML) TOPICAL 2 TIMES DAILY
Qty: 60 ML | Refills: 3 | Status: SHIPPED | OUTPATIENT
Start: 2018-03-02 | End: 2018-04-18

## 2018-03-02 NOTE — PROGRESS NOTES
Subjective:       Patient ID: Domonique Hernandez Jr. is a 77 y.o. male.    Chief Complaint: ITCHY EAR    Hearing Loss:   Chronicity:  Chronic  Onset:  Over 10 years ago  Progression since onset:  Gradually worsening  Frequency:  Constantly  Pain scale:  0/10  Hearing loss characteristics:  Moderate, trouble hearing TV, difficult on telephone, impaired select discrimination and worse background noiseNo dizziness, no vertigo, no ear congestion, no ear pain, no fever, no headaches, no tinnitus, no buzzing, no rhinorrhea, no aural fullness, no fluctuance, no imbalance, no otalgia, no otorrhea, no facial weakness, no visual disturbances and not masked by noise.  Aggravated by:  Nothing  Treatments tried:  NothingNo stroke, TIA, or systemic emboli, no neurologic disease, no noise exposure, no dizziness, no ear surgery, no environmental allergies, no ear tubes, no ototoxic drugs, no ear infections and no recent URI.     He also c/o a several week h/o daily, worsening B ear itching. Nothing aggravates or alleviates symptoms. No treatments tried.    Past Medical History: Patient has a past medical history of Anxiety; Arthritis; Chronic gout; COPD (chronic obstructive pulmonary disease); Depression; Emphysema of lung; Generalized headaches; GERD (gastroesophageal reflux disease); Hyperlipidemia; Hypertension; Hypothyroidism; Obesity; Sleep apnea; and Thyroid disease.    Past Surgical History: Patient has a past surgical history that includes Bilateral mastoidectomies (Bilateral, 1984); Eardrum repair (1984); Rotator cuff repair (Left, 2002); Colonoscopy (2010); Knee arthroscopy (Left, 1989); and Hernia repair (1984).    Social History: Patient reports that he has never smoked. He has never used smokeless tobacco. He reports that he does not drink alcohol or use drugs.    Family History: family history includes Atrial fibrillation in his son; Cancer in his father and mother; Kidney disease in his brother; No Known Problems in his  sister; Stroke in his son and son.    Medications:   Current Outpatient Prescriptions   Medication Sig    albuterol 90 mcg/actuation inhaler Inhale 2 puffs into the lungs every 6 (six) hours as needed for Wheezing. Rescue    albuterol-ipratropium 2.5mg-0.5mg/3mL (DUO-NEB) 0.5 mg-3 mg(2.5 mg base)/3 mL nebulizer solution Take 3 mLs by nebulization every 6 (six) hours as needed for Wheezing.    allopurinol (ZYLOPRIM) 300 MG tablet TAKE 1 TABLET ONE TIME DAILY    amLODIPine (NORVASC) 2.5 MG tablet     amLODIPine (NORVASC) 5 MG tablet Take 1 tablet (5 mg total) by mouth once daily.    aspirin-caffeine (JESSICA BACK & BODY) 500-32.5 mg Tab Take by mouth.    azithromycin (Z-KATIUSKA) 250 MG tablet 2 po day 1, then 1 po q day    betamethasone valerate 0.1% (VALISONE) 0.1 % Lotn Apply topically 2 (two) times daily.    carvedilol (COREG) 25 MG tablet TAKE 1 TABLET (25 MG TOTAL) BY MOUTH 2 (TWO) TIMES DAILY WITH MEALS.    cetirizine (ZYRTEC) 10 MG tablet TAKE 1 TABLET EVERY DAY    dextromethorphan-guaifenesin  mg (MUCINEX DM)  mg per 12 hr tablet Take 1 tablet by mouth 2 (two) times daily as needed.    dutasteride (AVODART) 0.5 mg capsule Take 1 capsule (0.5 mg total) by mouth once daily.    fluticasone (FLONASE) 50 mcg/actuation nasal spray 1 spray by Each Nare route 2 (two) times daily.    hydrocodone-acetaminophen 5-325mg (NORCO) 5-325 mg per tablet Take 1 tablet by mouth every 4 (four) hours as needed for Pain.    levothyroxine (SYNTHROID) 88 MCG tablet TAKE 1 TABLET ONE TIME DAILY    multivitamin (ONE DAILY MULTIVITAMIN) per tablet Take 1 tablet by mouth once daily. Centrum Silver for Men    nystatin (MYCOSTATIN) powder Apply topically 2 (two) times daily.    sildenafil, antihypertensive, (REVATIO) 20 mg Tab Take 3-5 tablets PO    simvastatin (ZOCOR) 40 MG tablet Take 1 tablet (40 mg total) by mouth every evening.    tamsulosin (FLOMAX) 0.4 mg Cp24 Take 1 capsule (0.4 mg total) by mouth once  daily.    testosterone (ANDROGEL) 20.25 mg/1.25 gram (1.62 %) GlPm Apply 2 pumps to shoulders daily    tramadol (ULTRAM) 50 mg tablet Take 1 tablet (50 mg total) by mouth every 6 (six) hours as needed.    umeclidinium-vilanterol (ANORO ELLIPTA) 62.5-25 mcg/actuation DsDv Inhale 1 Dose into the lungs once daily.    valsartan (DIOVAN) 80 MG tablet Take 1 tablet (80 mg total) by mouth once daily.    valsartan-hydrochlorothiazide (DIOVAN-HCT) 80-12.5 mg per tablet     venlafaxine (EFFEXOR-XR) 150 MG Cp24 TAKE 1 CAPSULE EVERY DAY     No current facility-administered medications for this visit.        Allergies: Patient is allergic to percocet [oxycodone-acetaminophen] and percodan [oxycodone hcl-oxycodone-asa].    Review of Systems   Constitutional: Negative for activity change, appetite change, chills, diaphoresis, fatigue, fever and unexpected weight change.   HENT: Positive for hearing loss. Negative for congestion, dental problem, drooling, ear discharge, ear pain, facial swelling, mouth sores, nosebleeds, postnasal drip, rhinorrhea, sinus pain, sinus pressure, sneezing, sore throat, tinnitus, trouble swallowing and voice change.         B ear itching   Eyes: Negative for pain and visual disturbance.   Respiratory: Negative for cough, choking, chest tightness, shortness of breath, wheezing and stridor.    Cardiovascular: Negative for chest pain.   Gastrointestinal: Negative for nausea and vomiting.   Musculoskeletal: Negative for gait problem, neck pain and neck stiffness.   Skin: Negative for color change, rash and wound.   Allergic/Immunologic: Negative for environmental allergies.   Neurological: Negative for dizziness, vertigo, seizures, syncope, facial asymmetry, speech difficulty, weakness, light-headedness, numbness and headaches.   Psychiatric/Behavioral: Negative for agitation, confusion and decreased concentration. The patient is not nervous/anxious.        Objective:       /78 (BP Location:  Right arm, Patient Position: Sitting, BP Method: Large (Automatic))   Pulse 68   Wt 116 kg (255 lb 11.7 oz)   BMI 38.88 kg/m²     Physical Exam   Constitutional: He is oriented to person, place, and time. He appears well-developed and well-nourished.   HENT:   Head: Normocephalic and atraumatic. Not macrocephalic and not microcephalic. Head is without raccoon's eyes, without Merchant's sign, without abrasion, without contusion, without laceration, without right periorbital erythema and without left periorbital erythema. Hair is normal.   Right Ear: Tympanic membrane, external ear and ear canal normal. No lacerations. No drainage, swelling or tenderness. No foreign bodies. No mastoid tenderness. Tympanic membrane is not injected, not scarred, not perforated, not erythematous, not retracted and not bulging. Tympanic membrane mobility is normal. No middle ear effusion. No hemotympanum. Decreased hearing is noted.   Left Ear: Tympanic membrane, external ear and ear canal normal. No lacerations. No drainage, swelling or tenderness. No foreign bodies. No mastoid tenderness. Tympanic membrane is not injected, not scarred, not perforated, not erythematous, not retracted and not bulging. Tympanic membrane mobility is normal.  No middle ear effusion. No hemotympanum. Decreased hearing is noted.   Nose: Nose normal. No mucosal edema, rhinorrhea, nose lacerations, sinus tenderness, nasal deformity, septal deviation or nasal septal hematoma. No epistaxis.  No foreign bodies. Right sinus exhibits no maxillary sinus tenderness and no frontal sinus tenderness. Left sinus exhibits no maxillary sinus tenderness and no frontal sinus tenderness.   Mouth/Throat: Uvula is midline, oropharynx is clear and moist and mucous membranes are normal. Mucous membranes are not pale, not dry and not cyanotic. No oral lesions. No trismus in the jaw. No dental abscesses, uvula swelling, lacerations or dental caries. No oropharyngeal exudate,  posterior oropharyngeal edema, posterior oropharyngeal erythema or tonsillar abscesses.   PROCEDURE NOTE:  Excessive dry, flaky ceruminosis is noted in both EACs.  Wax was removed by manual debridement and suctioning utilizing the assistance of binocular microscopy revealing EACs and TMs WNL. The patient tolerated this procedure without difficulty.       No AOM or OE.  No mastoid, tenderness, swelling, or redness.  Facial nerve intact.     Eyes: Conjunctivae, EOM and lids are normal. Pupils are equal, round, and reactive to light.   Neck: Trachea normal and normal range of motion. Neck supple. No spinous process tenderness and no muscular tenderness present. No neck rigidity. No edema, no erythema and normal range of motion present. No thyroid mass and no thyromegaly present.   Pulmonary/Chest: Effort normal.   Abdominal: Soft.   Musculoskeletal: Normal range of motion.   Lymphadenopathy:        Head (right side): No submental, no submandibular, no tonsillar, no preauricular and no posterior auricular adenopathy present.        Head (left side): No submental, no submandibular, no tonsillar, no preauricular, no posterior auricular and no occipital adenopathy present.     He has no cervical adenopathy.   Neurological: He is alert and oriented to person, place, and time. No cranial nerve deficit or sensory deficit.   Skin: Skin is warm and dry.   Psychiatric: He has a normal mood and affect. His behavior is normal. Judgment and thought content normal.   Nursing note and vitals reviewed.      As a result of this patients history and examination findings, a comprehensive audiogram was ordered to determine the level of hearing/hearing loss.        Assessment:       1. Ear itching    2. Excessive cerumen in both ear canals    3. Sensorineural hearing loss (SNHL) of both ears        Plan:       Valisone lotion twice per day to both ears.  Audiogram Reviewed: B Mild to Profound SNHL.Type A AU tympanograms.  Noise and Hearing  Protection Pamphlet provided.  Hearing conservation strongly recommended.  Trial of amplification bilaterally also recommended.  Re-check of hearing in 18-24 months or sooner if subjective change noted.  Schedule HAC.  F/U with PCP as per schedule.  RTC prn.

## 2018-03-02 NOTE — PATIENT INSTRUCTIONS
Valisone lotion twice per day to both ears.  Audiogram Reviewed: B Mild to Profound SNHL.Type A AU tympanograms.  Noise and Hearing Protection Pamphlet provided.  Hearing conservation strongly recommended.  Trial of amplification bilaterally also recommended.  Re-check of hearing in 18-24 months or sooner if subjective change noted.  Schedule HAC.  F/U with PCP as per schedule.  RTC prn.

## 2018-03-12 ENCOUNTER — OFFICE VISIT (OUTPATIENT)
Dept: FAMILY MEDICINE | Facility: CLINIC | Age: 78
End: 2018-03-12
Payer: MEDICARE

## 2018-03-12 VITALS
BODY MASS INDEX: 38.77 KG/M2 | WEIGHT: 255.81 LBS | DIASTOLIC BLOOD PRESSURE: 76 MMHG | HEART RATE: 78 BPM | HEIGHT: 68 IN | RESPIRATION RATE: 16 BRPM | OXYGEN SATURATION: 97 % | SYSTOLIC BLOOD PRESSURE: 126 MMHG

## 2018-03-12 DIAGNOSIS — G47.33 OBSTRUCTIVE SLEEP APNEA: ICD-10-CM

## 2018-03-12 DIAGNOSIS — J41.8 MIXED SIMPLE AND MUCOPURULENT CHRONIC BRONCHITIS: ICD-10-CM

## 2018-03-12 DIAGNOSIS — R73.9 ELEVATED BLOOD SUGAR: ICD-10-CM

## 2018-03-12 DIAGNOSIS — E03.4 HYPOTHYROIDISM DUE TO ACQUIRED ATROPHY OF THYROID: ICD-10-CM

## 2018-03-12 DIAGNOSIS — E78.5 HYPERLIPIDEMIA, UNSPECIFIED HYPERLIPIDEMIA TYPE: ICD-10-CM

## 2018-03-12 DIAGNOSIS — N40.1 BPH WITH URINARY OBSTRUCTION: Primary | ICD-10-CM

## 2018-03-12 DIAGNOSIS — N13.8 BPH WITH URINARY OBSTRUCTION: Primary | ICD-10-CM

## 2018-03-12 DIAGNOSIS — M1A.9XX0 CHRONIC GOUT WITHOUT TOPHUS, UNSPECIFIED CAUSE, UNSPECIFIED SITE: ICD-10-CM

## 2018-03-12 DIAGNOSIS — I10 ESSENTIAL HYPERTENSION: ICD-10-CM

## 2018-03-12 PROCEDURE — 99499 UNLISTED E&M SERVICE: CPT | Mod: S$GLB,,, | Performed by: FAMILY MEDICINE

## 2018-03-12 PROCEDURE — 99999 PR PBB SHADOW E&M-EST. PATIENT-LVL III: CPT | Mod: PBBFAC,,, | Performed by: FAMILY MEDICINE

## 2018-03-12 PROCEDURE — 99214 OFFICE O/P EST MOD 30 MIN: CPT | Mod: S$GLB,,, | Performed by: FAMILY MEDICINE

## 2018-03-12 PROCEDURE — 3078F DIAST BP <80 MM HG: CPT | Mod: CPTII,S$GLB,, | Performed by: FAMILY MEDICINE

## 2018-03-12 PROCEDURE — 36415 COLL VENOUS BLD VENIPUNCTURE: CPT | Mod: S$GLB,,, | Performed by: FAMILY MEDICINE

## 2018-03-12 PROCEDURE — 3074F SYST BP LT 130 MM HG: CPT | Mod: CPTII,S$GLB,, | Performed by: FAMILY MEDICINE

## 2018-03-12 PROCEDURE — 83036 HEMOGLOBIN GLYCOSYLATED A1C: CPT

## 2018-03-12 RX ORDER — TAMSULOSIN HYDROCHLORIDE 0.4 MG/1
CAPSULE ORAL
Qty: 90 CAPSULE | Refills: 1 | Status: SHIPPED | OUTPATIENT
Start: 2018-03-12 | End: 2018-09-06 | Stop reason: SDUPTHER

## 2018-03-12 RX ORDER — LEVOTHYROXINE SODIUM 100 UG/1
TABLET ORAL
Qty: 90 TABLET | Refills: 1 | Status: SHIPPED | OUTPATIENT
Start: 2018-03-12 | End: 2018-04-13 | Stop reason: SDUPTHER

## 2018-03-12 NOTE — PROGRESS NOTES
Subjective:       Patient ID: Domonique Hernandez Jr. is a 77 y.o. male.    Chief Complaint: Follow-up (6m checkup)    Patient here for check up and follow up for HYPERTENSION.   Went to ER for elevated BP one month ago.  I saw him the next day and increased his Coreg to 25 mg twice daily, increase amlodipine to 5 mg once daily.  Diovan HCT 80 mg was added.  Now his blood pressure is almost too low.  He is feeling fine on current medications however.  Patient is doing well on Effexor  mg daily.    Patient states his CPAP machine is working very good.  He requires 7 cm water.   He is urinating better now that he is on Flomax and Avodart.  He is still having delayed ejaculation.  Stopping Effexor did not make much of a difference.  Patient uses Viagra for EGD.  He takes Synthroid for his hypothyroidism.  He tolerates this well.  He denies having symptoms such as heat or cold intolerance.  His weight is stable.  He denies any swelling in her thyroid.  Patient is taking His statin every day for hyperlipidemia.  She is feeling well on the medication.  He denies side effects such as myalgias.      Hypertension   Pertinent negatives include no chest pain, headaches, palpitations or shortness of breath.   Otalgia    Pertinent negatives include no abdominal pain, coughing, headaches, rash or sore throat.   Cough   This is a new problem. The current episode started in the past 7 days. The problem has been gradually worsening. The cough is productive of purulent sputum. Pertinent negatives include no chest pain, chills, ear pain, fever, headaches, postnasal drip, rash, sore throat or shortness of breath.     Review of Systems   Constitutional: Negative for activity change, chills, diaphoresis, fatigue, fever and unexpected weight change.   HENT: Negative for congestion, ear pain, nosebleeds, postnasal drip, sinus pressure, sore throat, trouble swallowing and voice change.    Eyes: Negative for photophobia and visual  disturbance.   Respiratory: Negative for apnea, cough, choking, chest tightness and shortness of breath.    Cardiovascular: Negative for chest pain, palpitations and leg swelling.   Gastrointestinal: Negative for abdominal pain and blood in stool.   Endocrine: Negative for cold intolerance, heat intolerance, polydipsia and polyphagia.   Genitourinary: Negative for decreased urine volume, difficulty urinating and dysuria.   Musculoskeletal: Positive for arthralgias and back pain. Negative for joint swelling.   Skin: Negative for color change, pallor and rash.   Neurological: Negative for dizziness, weakness, numbness and headaches.   Hematological: Negative for adenopathy. Does not bruise/bleed easily.   Psychiatric/Behavioral: Negative for behavioral problems, decreased concentration, dysphoric mood and sleep disturbance. The patient is not nervous/anxious.        Objective:      Vitals:    03/12/18 1423   BP: 126/76   Pulse: 78   Resp: 16     Physical Exam   Constitutional: He is oriented to person, place, and time. He appears well-developed and well-nourished.   Eyes: EOM are normal. Pupils are equal, round, and reactive to light.   Neck: No JVD present.   No carotid bruits   Cardiovascular: Normal rate, regular rhythm, normal heart sounds and intact distal pulses.  Exam reveals no gallop and no friction rub.    No murmur heard.  Pulmonary/Chest: Effort normal and breath sounds normal.   Musculoskeletal: He exhibits no edema or tenderness.   Lymphadenopathy:     He has no cervical adenopathy.   Neurological: He is alert and oriented to person, place, and time. He has normal reflexes. He displays normal reflexes. No cranial nerve deficit. He exhibits normal muscle tone. Coordination normal.   Psychiatric: He has a normal mood and affect. His behavior is normal. Judgment and thought content normal.       Lab Results   Component Value Date    TSH 5.389 (H) 02/12/2018     BMP  Lab Results   Component Value Date    NA  140 02/12/2018    K 4.2 02/12/2018     02/12/2018    CO2 24 02/12/2018    BUN 30 (H) 02/12/2018    CREATININE 1.3 02/12/2018    CALCIUM 9.5 02/12/2018    ANIONGAP 9 02/12/2018    ESTGFRAFRICA >60 02/12/2018    EGFRNONAA 53 (A) 02/12/2018       Lab Results   Component Value Date    LDLCALC 84.6 02/12/2018     Lab Results   Component Value Date    URICACID 6.6 02/12/2018       Assessment:       1. BPH with urinary obstruction    2. Hyperlipidemia, unspecified hyperlipidemia type    3. Essential hypertension    4. Hypothyroidism due to acquired atrophy of thyroid    5. Obstructive sleep apnea    6. Chronic gout without tophus, unspecified cause, unspecified site    7. Mixed simple and mucopurulent chronic bronchitis    8. Elevated blood sugar        Plan:     Essential hypertension  Two gram sodium diet.    Weight loss discussed.    Try to walk 2 miles per day.    The following medications will be restarted today  -     carvedilol (COREG) 25 MG tablet; Take 1 tablet (25 mg total) by mouth 2 (two) times daily with meals.  -     amlodipine (NORVASC) 5 MG tablet; Take 1 tablet by mouth once daily.  -     Diovan 80 mg po daily  Check BMP    KARL on CPAP  Continue CPAP at 7 cm water pressure    Depression, unspecified depression type  - Continue venlafaxine (EFFEXOR-XR) 150 MG 24 hr capsule; Take 1 capsule by mouth once daily.  Dispense: 30 capsule; Refill: 5    Hyperlipidemia, unspecified hyperlipidemia type  Continue Zocor 40 mg by mouth daily    Chronic gout without tophus, unspecified cause, unspecified site  Continue allopurinol 300 mg daily    Hypothyroidism due to acquired atrophy of thyroid  -   Increase Synthroid to 100 µg by mouth daily    Benign non-nodular prostatic hyperplasia with lower urinary tract symptoms  Continue Avodart and Flomax.    BPH with urinary obstruction  -     tamsulosin (FLOMAX) 0.4 mg Cp24; 1 to 2 tabs po daily  Dispense: 90 capsule; Refill: 1    Hyperlipidemia, unspecified  hyperlipidemia type  -     ALT (SGPT); Future  -     Lipid panel; Future    Essential hypertension  -     Basic metabolic panel; Future    Hypothyroidism due to acquired atrophy of thyroid  -     TSH; Future  -     T4, free; Future  -     levothyroxine (SYNTHROID) 100 MCG tablet; TAKE 1 TABLET ONE TIME DAILY  Dispense: 90 tablet; Refill: 1    Obstructive sleep apnea    Chronic gout without tophus, unspecified cause, unspecified site  -     Uric acid; Future    Mixed simple and mucopurulent chronic bronchitis    Elevated blood sugar  -     Hemoglobin A1c; Future      RTC 3 months

## 2018-03-13 LAB
ESTIMATED AVG GLUCOSE: 108 MG/DL
HBA1C MFR BLD HPLC: 5.4 %

## 2018-03-13 NOTE — PROGRESS NOTES
Patient, Domonique Hernandez Jr. (MRN #[id number]), presented with a recorded BMI of 38.89 kg/m^2 and a documented comorbidity(s):  - Obstructive Sleep Apnea  - Hypertension  - Hyperlipidemia  to which the severe obesity is a contributing factor. This is consistent with the definition of severe obesity (BMI 35.0-35.9) with comorbidity (ICD-10 E66.01, Z68.35). The patient's severe obesity was monitored, evaluated, addressed and/or treated. This addendum to the medical record is made on 03/13/2018.

## 2018-03-26 ENCOUNTER — OFFICE VISIT (OUTPATIENT)
Dept: FAMILY MEDICINE | Facility: CLINIC | Age: 78
End: 2018-03-26
Payer: MEDICARE

## 2018-03-26 VITALS
TEMPERATURE: 99 F | RESPIRATION RATE: 18 BRPM | HEIGHT: 68 IN | WEIGHT: 257.63 LBS | HEART RATE: 70 BPM | OXYGEN SATURATION: 98 % | SYSTOLIC BLOOD PRESSURE: 116 MMHG | BODY MASS INDEX: 39.05 KG/M2 | DIASTOLIC BLOOD PRESSURE: 64 MMHG

## 2018-03-26 DIAGNOSIS — H66.001 ACUTE SUPPURATIVE OTITIS MEDIA OF RIGHT EAR WITHOUT SPONTANEOUS RUPTURE OF TYMPANIC MEMBRANE, RECURRENCE NOT SPECIFIED: Primary | ICD-10-CM

## 2018-03-26 PROCEDURE — 99999 PR PBB SHADOW E&M-EST. PATIENT-LVL III: CPT | Mod: PBBFAC,,, | Performed by: FAMILY MEDICINE

## 2018-03-26 PROCEDURE — 99213 OFFICE O/P EST LOW 20 MIN: CPT | Mod: 25,S$GLB,, | Performed by: FAMILY MEDICINE

## 2018-03-26 PROCEDURE — 96372 THER/PROPH/DIAG INJ SC/IM: CPT | Mod: S$GLB,,, | Performed by: FAMILY MEDICINE

## 2018-03-26 RX ORDER — METHYLPREDNISOLONE ACETATE 40 MG/ML
40 INJECTION, SUSPENSION INTRA-ARTICULAR; INTRALESIONAL; INTRAMUSCULAR; SOFT TISSUE
Status: COMPLETED | OUTPATIENT
Start: 2018-03-26 | End: 2018-03-26

## 2018-03-26 RX ORDER — AMOXICILLIN AND CLAVULANATE POTASSIUM 875; 125 MG/1; MG/1
1 TABLET, FILM COATED ORAL 2 TIMES DAILY
Qty: 20 TABLET | Refills: 0 | Status: SHIPPED | OUTPATIENT
Start: 2018-03-26 | End: 2018-04-18 | Stop reason: ALTCHOICE

## 2018-03-26 RX ADMIN — METHYLPREDNISOLONE ACETATE 40 MG: 40 INJECTION, SUSPENSION INTRA-ARTICULAR; INTRALESIONAL; INTRAMUSCULAR; SOFT TISSUE at 10:03

## 2018-04-13 DIAGNOSIS — I10 ESSENTIAL HYPERTENSION: ICD-10-CM

## 2018-04-13 DIAGNOSIS — R35.1 BENIGN PROSTATIC HYPERPLASIA WITH NOCTURIA: ICD-10-CM

## 2018-04-13 DIAGNOSIS — N40.1 BENIGN PROSTATIC HYPERPLASIA WITH NOCTURIA: ICD-10-CM

## 2018-04-13 DIAGNOSIS — E03.4 HYPOTHYROIDISM DUE TO ACQUIRED ATROPHY OF THYROID: ICD-10-CM

## 2018-04-13 RX ORDER — VALSARTAN 80 MG/1
80 TABLET ORAL DAILY
Qty: 90 TABLET | Refills: 1 | Status: SHIPPED | OUTPATIENT
Start: 2018-04-13 | End: 2018-09-10 | Stop reason: SDUPTHER

## 2018-04-13 RX ORDER — LEVOTHYROXINE SODIUM 100 UG/1
TABLET ORAL
Qty: 90 TABLET | Refills: 1 | Status: SHIPPED | OUTPATIENT
Start: 2018-04-13 | End: 2018-06-26 | Stop reason: SDUPTHER

## 2018-04-13 RX ORDER — DUTASTERIDE 0.5 MG/1
0.5 CAPSULE, LIQUID FILLED ORAL DAILY
Qty: 90 CAPSULE | Refills: 1 | Status: SHIPPED | OUTPATIENT
Start: 2018-04-13 | End: 2018-09-10 | Stop reason: SDUPTHER

## 2018-04-18 ENCOUNTER — OFFICE VISIT (OUTPATIENT)
Dept: INTERNAL MEDICINE | Facility: CLINIC | Age: 78
End: 2018-04-18
Payer: MEDICARE

## 2018-04-18 VITALS
HEIGHT: 67 IN | HEART RATE: 66 BPM | WEIGHT: 255.5 LBS | RESPIRATION RATE: 18 BRPM | SYSTOLIC BLOOD PRESSURE: 144 MMHG | TEMPERATURE: 99 F | BODY MASS INDEX: 40.1 KG/M2 | DIASTOLIC BLOOD PRESSURE: 86 MMHG | OXYGEN SATURATION: 98 %

## 2018-04-18 DIAGNOSIS — N18.30 STAGE 3 CHRONIC KIDNEY DISEASE: ICD-10-CM

## 2018-04-18 DIAGNOSIS — H90.3 SENSORINEURAL HEARING LOSS (SNHL) OF BOTH EARS: ICD-10-CM

## 2018-04-18 DIAGNOSIS — F41.9 ANXIETY: ICD-10-CM

## 2018-04-18 DIAGNOSIS — E29.1 MALE HYPOGONADISM: ICD-10-CM

## 2018-04-18 DIAGNOSIS — F32.0 MAJOR DEPRESSIVE DISORDER, SINGLE EPISODE, MILD: ICD-10-CM

## 2018-04-18 DIAGNOSIS — E78.2 MIXED HYPERLIPIDEMIA: ICD-10-CM

## 2018-04-18 DIAGNOSIS — I10 ESSENTIAL HYPERTENSION: ICD-10-CM

## 2018-04-18 DIAGNOSIS — J41.8 MIXED SIMPLE AND MUCOPURULENT CHRONIC BRONCHITIS: ICD-10-CM

## 2018-04-18 DIAGNOSIS — M17.32 POST-TRAUMATIC OSTEOARTHRITIS OF LEFT KNEE: ICD-10-CM

## 2018-04-18 DIAGNOSIS — I65.23 CAROTID ARTERY CALCIFICATION, BILATERAL: ICD-10-CM

## 2018-04-18 DIAGNOSIS — N40.1 BPH WITH URINARY OBSTRUCTION: ICD-10-CM

## 2018-04-18 DIAGNOSIS — F52.32 DELAYED EJACULATION: ICD-10-CM

## 2018-04-18 DIAGNOSIS — M1A.49X0 OTHER SECONDARY CHRONIC GOUT OF MULTIPLE SITES WITHOUT TOPHUS: ICD-10-CM

## 2018-04-18 DIAGNOSIS — E03.4 HYPOTHYROIDISM DUE TO ACQUIRED ATROPHY OF THYROID: ICD-10-CM

## 2018-04-18 DIAGNOSIS — I70.0 AORTIC CALCIFICATION: ICD-10-CM

## 2018-04-18 DIAGNOSIS — M47.816 LUMBAR FACET ARTHROPATHY: ICD-10-CM

## 2018-04-18 DIAGNOSIS — Z87.81 HISTORY OF VERTEBRAL COMPRESSION FRACTURE: ICD-10-CM

## 2018-04-18 DIAGNOSIS — K21.9 GASTROESOPHAGEAL REFLUX DISEASE WITHOUT ESOPHAGITIS: ICD-10-CM

## 2018-04-18 DIAGNOSIS — G47.33 OSA ON CPAP: ICD-10-CM

## 2018-04-18 DIAGNOSIS — E66.2 CLASS 2 OBESITY WITH ALVEOLAR HYPOVENTILATION, SERIOUS COMORBIDITY, AND BODY MASS INDEX (BMI) OF 39.0 TO 39.9 IN ADULT: ICD-10-CM

## 2018-04-18 DIAGNOSIS — N52.01 ERECTILE DYSFUNCTION DUE TO ARTERIAL INSUFFICIENCY: ICD-10-CM

## 2018-04-18 DIAGNOSIS — M85.80 OSTEOPENIA DETERMINED BY X-RAY: ICD-10-CM

## 2018-04-18 DIAGNOSIS — I70.0 THORACIC AORTIC ATHEROSCLEROSIS: ICD-10-CM

## 2018-04-18 DIAGNOSIS — Z00.00 ENCOUNTER FOR PREVENTIVE HEALTH EXAMINATION: Primary | ICD-10-CM

## 2018-04-18 DIAGNOSIS — N13.8 BPH WITH URINARY OBSTRUCTION: ICD-10-CM

## 2018-04-18 PROCEDURE — 3077F SYST BP >= 140 MM HG: CPT | Mod: CPTII,S$GLB,, | Performed by: NURSE PRACTITIONER

## 2018-04-18 PROCEDURE — 3079F DIAST BP 80-89 MM HG: CPT | Mod: CPTII,S$GLB,, | Performed by: NURSE PRACTITIONER

## 2018-04-18 PROCEDURE — 99999 PR PBB SHADOW E&M-EST. PATIENT-LVL V: CPT | Mod: PBBFAC,,, | Performed by: NURSE PRACTITIONER

## 2018-04-18 PROCEDURE — 1126F AMNT PAIN NOTED NONE PRSNT: CPT | Mod: S$GLB,,, | Performed by: NURSE PRACTITIONER

## 2018-04-18 PROCEDURE — 1170F FXNL STATUS ASSESSED: CPT | Mod: S$GLB,,, | Performed by: NURSE PRACTITIONER

## 2018-04-18 PROCEDURE — 1160F RVW MEDS BY RX/DR IN RCRD: CPT | Mod: S$GLB,,, | Performed by: NURSE PRACTITIONER

## 2018-04-18 PROCEDURE — 99499 UNLISTED E&M SERVICE: CPT | Mod: S$GLB,,, | Performed by: NURSE PRACTITIONER

## 2018-04-18 PROCEDURE — G0439 PPPS, SUBSEQ VISIT: HCPCS | Mod: S$GLB,,, | Performed by: NURSE PRACTITIONER

## 2018-04-18 PROCEDURE — 1159F MED LIST DOCD IN RCRD: CPT | Mod: S$GLB,,, | Performed by: NURSE PRACTITIONER

## 2018-04-18 PROCEDURE — 3048F LDL-C <100 MG/DL: CPT | Mod: S$GLB,,, | Performed by: NURSE PRACTITIONER

## 2018-04-18 PROCEDURE — 1158F ADVNC CARE PLAN TLK DOCD: CPT | Mod: S$GLB,,, | Performed by: NURSE PRACTITIONER

## 2018-04-18 NOTE — PATIENT INSTRUCTIONS
Counseling and Referral of Other Preventative  (Italic type indicates deductible and co-insurance are waived)    Patient Name: Domonique Hernandez  Today's Date: 4/18/2018    Health Maintenance       Date Due Completion Date    Pneumococcal (65+) (2 of 2 - PCV13) 08/22/2014 8/22/2013    High Dose Statin 04/18/2019 4/18/2018    Colonoscopy 12/03/2022 12/3/2012    Override on 2/23/2012: (N/S) (diverticulosis)    Override on 1/23/2012: Done    Lipid Panel 02/12/2023 2/12/2018    TETANUS VACCINE 04/18/2028 4/18/2018 (Not Clinical)    Override on 4/18/2018: Not Clinically Appropriate        No orders of the defined types were placed in this encounter.    The following information is provided to all patients.  This information is to help you find resources for any of the problems found today that may be affecting your health:                Living healthy guide: www.[facility].[place].gov      Understanding Diabetes: www.diabetes.org      Eating healthy: www.cdc.gov/healthyweight      CDC home safety checklist: www.cdc.gov/steadi/patient.html      Agency on Aging: www.goea.[place].[facility]      Alcoholics anonymous (AA): www.aa.org      Physical Activity: www.violet.nih.gov/yz0yene      Tobacco use: www.quitwithusla.org

## 2018-04-18 NOTE — PROGRESS NOTES
"Domonique Hernandez presented for a  Medicare AWV and comprehensive Health Risk Assessment today. The following components were reviewed and updated:    · Medical history  · Family History  · Social history  · Allergies and Current Medications  · Health Risk Assessment  · Health Maintenance  · Care Team     ** See Completed Assessments for Annual Wellness Visit within the encounter summary.**       The following assessments were completed:  · Living Situation  · CAGE  · Depression Screening  · Timed Get Up and Go  · Whisper Test  · Cognitive Function Screening  · Nutrition Screening  · ADL Screening  · PAQ Screening    Vitals:    04/18/18 1444   BP: (!) 144/86   Pulse: 66   Resp: 18   Temp: 98.9 °F (37.2 °C)   TempSrc: Tympanic   SpO2: 98%   Weight: 115.9 kg (255 lb 8.2 oz)   Height: 5' 7.32" (1.71 m)     Body mass index is 39.64 kg/m².  Physical Exam   Constitutional: He is oriented to person, place, and time. He appears well-developed and well-nourished.   Eyes: EOM are normal. Pupils are equal, round, and reactive to light.   Neck: No JVD present.   No carotid bruits   Cardiovascular: Normal rate, regular rhythm, normal heart sounds and intact distal pulses.  Exam reveals no gallop and no friction rub.    No murmur heard.  Pulmonary/Chest: Effort normal and breath sounds normal.   Musculoskeletal: He exhibits no edema or tenderness.   Lymphadenopathy:     He has no cervical adenopathy.   Neurological: He is alert and oriented to person, place, and time. He has normal reflexes. He displays normal reflexes. No cranial nerve deficit. He exhibits normal muscle tone. Coordination normal.   Psychiatric: He has a normal mood and affect. His behavior is normal. Judgment and thought content normal.         Diagnoses and health risks identified today and associated recommendations/orders:    1. Encounter for preventive health examination  PLAN: begin progressive daily aerobic exercise program, follow a low fat, low cholesterol " diet, attempt to lose weight, reduce salt in diet and cooking, reduce exposure to stress, improve dietary compliance, use calcium 1 gram daily with Vit D, continue current medications, continue current healthy lifestyle patterns and return for routine annual checkups    2. Essential hypertension    3. Mixed hyperlipidemia    4. Carotid artery calcification, bilateral    5. Aortic calcification    6. Thoracic aortic atherosclerosis    7. Mixed simple and mucopurulent chronic bronchitis    8. Hypothyroidism due to acquired atrophy of thyroid    9. Stage 3 chronic kidney disease    10. Other secondary chronic gout of multiple sites without tophus    11. BPH with urinary obstruction    12. KARL on CPAP    13. Class 2 obesity with alveolar hypoventilation, serious comorbidity, and body mass index (BMI) of 39.0 to 39.9 in adult    14. Gastroesophageal reflux disease without esophagitis    15. Lumbar facet arthropathy    16. Post-traumatic osteoarthritis of left knee    17. Osteopenia determined by x-ray    18. History of vertebral compression fracture    19. Sensorineural hearing loss (SNHL) of both ears    20. Major depressive disorder, single episode, mild    21. Anxiety    22. Male hypogonadism    23. Delayed ejaculation    24. Erectile dysfunction due to arterial insufficiency      Provided Tarro with a 5-10 year written screening schedule and personal prevention plan. Recommendations were developed using the USPSTF age appropriate recommendations. Education, counseling, and referrals were provided as needed. After Visit Summary printed and given to patient which includes a list of additional screenings\tests needed.    No Follow-up on file.    Gretta Rodas NP

## 2018-06-06 NOTE — PROGRESS NOTES
Chief complaint: Sinus congestion    History of present illness: Pt is 77 y.o. male complaints of sinus congestion, facial pressure, sore throat, ear ache.  Patient states glands are swollen and neck.  Patient has a cough.  This started 5 days ago.  Patient denies chest pain, shortness of breath, fever.  Patient has itchy watery eyes, itchy scratchy throat.  The patient complains of decreased hearing.  Cough is nonproductive    Review of systems:  Constitutional-no weight loss, weight gain  HEENT-allergy symptoms such as itchy watery eyes, post nasal drip, itchy palate, come and go.  Respiratory-no wheezing, see history of present illness  Neurological-no weakness or numbness    Past medical history, family history, social history-same as note dated today    Medications-all reviewed and verified in nurses notes.    Physical exam:   Vitals:    03/26/18 0953   BP: 116/64   Pulse: 70   Resp: 18   Temp: 98.5 °F (36.9 °C)       Gen.-alert, oriented, no apparent distress.  Coughing.  Head-positive facial tenderness over the frontal and maxillary sinuses  Eyes: Pupils equal round reactive to light and accommodation, extraocular muscles intact, conjunctiva clear  Ears: Tympanic membranes are clear and mobile on the left.  Right TM dull, full of fluid  Nose: Injected mucous membranes, erythematous, mucopurulent discharge  Throat:  Injected red streaky mucosa,  tonsils normal  Neck: Shotty, tender anterior lymphadenopathy  Heart: Regular rate and rhythm, no murmurs, rubs or gallops  Lungs:Lungs were clear to auscultation and percussion, and with normal diaphragmatic excursion. No wheezes or rales were noted.     Assessment/Plan:   Acute suppurative otitis media of right ear without spontaneous rupture of tympanic membrane, recurrence not specified  -     amoxicillin-clavulanate 875-125mg (AUGMENTIN) 875-125 mg per tablet; Take 1 tablet by mouth 2 (two) times daily.  Dispense: 20 tablet; Refill: 0  -     methylPREDNISolone  acetate injection 40 mg; Inject 1 mL (40 mg total) into the muscle one time.      Sinusitis, acute  - Augmentin 875 mg po bid  - cetirizine (ZYRTEC) 10 MG tablet; Take 1 tablet (10 mg total) by mouth once daily.  - diphenhydrAMINE (BENADRYL) 25 mg capsule; Take 1 each (25 mg total) by mouth nightly as needed for Itching.  - methylPREDNISolone acetate injection 60 mg; Inject 1.5 mLs (60 mg total) into the muscle one time.  Mucinex  Simply saline nasal lavage q.2 to 3 hours p.r.n.  Avoid dust, allergens, other sinus irritants.  Handwashing technique discussed to prevent spreading germs.       Other

## 2018-06-19 ENCOUNTER — OFFICE VISIT (OUTPATIENT)
Dept: FAMILY MEDICINE | Facility: CLINIC | Age: 78
End: 2018-06-19
Payer: MEDICARE

## 2018-06-19 VITALS
WEIGHT: 259.63 LBS | HEIGHT: 67 IN | DIASTOLIC BLOOD PRESSURE: 82 MMHG | SYSTOLIC BLOOD PRESSURE: 136 MMHG | RESPIRATION RATE: 20 BRPM | BODY MASS INDEX: 40.75 KG/M2 | HEART RATE: 78 BPM

## 2018-06-19 DIAGNOSIS — S29.019A THORACIC MYOFASCIAL STRAIN, INITIAL ENCOUNTER: Primary | ICD-10-CM

## 2018-06-19 PROCEDURE — 99999 PR PBB SHADOW E&M-EST. PATIENT-LVL IV: CPT | Mod: PBBFAC,,, | Performed by: FAMILY MEDICINE

## 2018-06-19 PROCEDURE — 3075F SYST BP GE 130 - 139MM HG: CPT | Mod: CPTII,S$GLB,, | Performed by: FAMILY MEDICINE

## 2018-06-19 PROCEDURE — 99213 OFFICE O/P EST LOW 20 MIN: CPT | Mod: 25,S$GLB,, | Performed by: FAMILY MEDICINE

## 2018-06-19 PROCEDURE — 3079F DIAST BP 80-89 MM HG: CPT | Mod: CPTII,S$GLB,, | Performed by: FAMILY MEDICINE

## 2018-06-19 PROCEDURE — 20550 NJX 1 TENDON SHEATH/LIGAMENT: CPT | Mod: RT,S$GLB,, | Performed by: FAMILY MEDICINE

## 2018-06-19 RX ORDER — BUPIVACAINE HYDROCHLORIDE 5 MG/ML
2 INJECTION, SOLUTION EPIDURAL; INTRACAUDAL
Status: COMPLETED | OUTPATIENT
Start: 2018-06-19 | End: 2018-06-19

## 2018-06-19 RX ORDER — TRIAMCINOLONE ACETONIDE 40 MG/ML
40 INJECTION, SUSPENSION INTRA-ARTICULAR; INTRAMUSCULAR
Status: COMPLETED | OUTPATIENT
Start: 2018-06-19 | End: 2018-06-19

## 2018-06-19 RX ADMIN — TRIAMCINOLONE ACETONIDE 40 MG: 40 INJECTION, SUSPENSION INTRA-ARTICULAR; INTRAMUSCULAR at 06:06

## 2018-06-19 RX ADMIN — BUPIVACAINE HYDROCHLORIDE 10 MG: 5 INJECTION, SOLUTION EPIDURAL; INTRACAUDAL at 06:06

## 2018-06-19 NOTE — PROGRESS NOTES
Subjective:       Patient ID: Domonique Hernandez Jr. is a 78 y.o. male.    Chief Complaint: Shoulder Pain (R shoulder )    Been doing a lot of lifting.  Now with pain right medial shoulder pain.        Review of Systems   Constitutional: Negative for unexpected weight change.   Respiratory: Negative for cough, chest tightness and shortness of breath.    Cardiovascular: Negative for chest pain and leg swelling.   Gastrointestinal: Negative for abdominal pain.   Genitourinary: Negative for difficulty urinating.   Musculoskeletal: Positive for arthralgias and back pain. Negative for joint swelling.   Skin: Negative for rash.   Neurological: Negative for numbness.   Hematological: Negative for adenopathy.       Objective:      Vitals:    06/19/18 1736   BP: 136/82   Pulse: 78   Resp: 20     Physical Exam   Constitutional: He is oriented to person, place, and time. He appears well-developed and well-nourished.   Eyes: EOM are normal.   Cardiovascular: Normal rate, regular rhythm, normal heart sounds and intact distal pulses.  Exam reveals no gallop and no friction rub.    No murmur heard.  Pulmonary/Chest: Effort normal and breath sounds normal.   Musculoskeletal: He exhibits tenderness. He exhibits no edema.        Arms:  Area of pain   Neurological: He is alert and oriented to person, place, and time. He has normal reflexes. No cranial nerve deficit.       Assessment:       1. Thoracic myofascial strain, initial encounter        Plan:   Domonique was seen today for shoulder pain.    Diagnoses and all orders for this visit:    Thoracic myofascial strain, initial encounter  -     Arthrocentesis    After obtaining verbal consent, and per orders of Dr. Cam, injection of medial shoulder blade area given by Emiliano Cam. Patient felt much better. Patient remained in clinic for 20 minutes afterwards, and did not have any adverse reactions.  Used 2 cc of marcaine and 40 mg Kenalog

## 2018-06-26 DIAGNOSIS — M47.816 LUMBAR FACET ARTHROPATHY: ICD-10-CM

## 2018-06-26 DIAGNOSIS — M1A.9XX0 CHRONIC GOUT WITHOUT TOPHUS, UNSPECIFIED CAUSE, UNSPECIFIED SITE: ICD-10-CM

## 2018-06-26 DIAGNOSIS — I10 ESSENTIAL HYPERTENSION: ICD-10-CM

## 2018-06-26 DIAGNOSIS — E03.4 HYPOTHYROIDISM DUE TO ACQUIRED ATROPHY OF THYROID: ICD-10-CM

## 2018-06-26 RX ORDER — LEVOTHYROXINE SODIUM 100 UG/1
TABLET ORAL
Qty: 90 TABLET | Refills: 1 | Status: SHIPPED | OUTPATIENT
Start: 2018-06-26 | End: 2018-09-10 | Stop reason: SDUPTHER

## 2018-06-26 RX ORDER — TRAMADOL HYDROCHLORIDE 50 MG/1
50 TABLET ORAL EVERY 6 HOURS PRN
Qty: 30 TABLET | Refills: 5 | Status: SHIPPED | OUTPATIENT
Start: 2018-06-26 | End: 2018-09-10 | Stop reason: SDUPTHER

## 2018-06-26 RX ORDER — AMLODIPINE BESYLATE 5 MG/1
5 TABLET ORAL DAILY
Qty: 90 TABLET | Refills: 1 | Status: SHIPPED | OUTPATIENT
Start: 2018-06-26 | End: 2018-09-10 | Stop reason: SDUPTHER

## 2018-06-26 RX ORDER — ALLOPURINOL 300 MG/1
TABLET ORAL
Qty: 90 TABLET | Refills: 1 | Status: SHIPPED | OUTPATIENT
Start: 2018-06-26 | End: 2018-09-10 | Stop reason: SDUPTHER

## 2018-06-26 NOTE — TELEPHONE ENCOUNTER
----- Message from Marti Larios sent at 2018 11:03 AM CDT -----  Contact: Self  Domonique Hernandez Jr.  MRN: [id number]  : 1940  PCP: Emiliano Cam  Home Phone      [phone]  Work Phone      Not on file.  Mobile          [phone]    MESSAGE:   Needs RX  allopurinol (ZYLOPRIM) 300 MG tablet  amLODIPine (NORVASC) 5 MG tablet  levothyroxine (SYNTHROID) 100 MCG tablet  tramadol (ULTRAM) 50 mg tablet    Pharmacy:[facility]    Phone: [phone]

## 2018-07-11 ENCOUNTER — OFFICE VISIT (OUTPATIENT)
Dept: FAMILY MEDICINE | Facility: CLINIC | Age: 78
End: 2018-07-11
Payer: MEDICARE

## 2018-07-11 VITALS
SYSTOLIC BLOOD PRESSURE: 121 MMHG | DIASTOLIC BLOOD PRESSURE: 68 MMHG | RESPIRATION RATE: 20 BRPM | HEART RATE: 80 BPM | WEIGHT: 252.81 LBS | BODY MASS INDEX: 39.68 KG/M2 | HEIGHT: 67 IN

## 2018-07-11 DIAGNOSIS — M17.32 POST-TRAUMATIC OSTEOARTHRITIS OF LEFT KNEE: Primary | ICD-10-CM

## 2018-07-11 PROCEDURE — 3074F SYST BP LT 130 MM HG: CPT | Mod: CPTII,S$GLB,, | Performed by: FAMILY MEDICINE

## 2018-07-11 PROCEDURE — 99999 PR PBB SHADOW E&M-EST. PATIENT-LVL III: CPT | Mod: PBBFAC,,, | Performed by: FAMILY MEDICINE

## 2018-07-11 PROCEDURE — 20610 DRAIN/INJ JOINT/BURSA W/O US: CPT | Mod: LT,S$GLB,, | Performed by: FAMILY MEDICINE

## 2018-07-11 PROCEDURE — 3078F DIAST BP <80 MM HG: CPT | Mod: CPTII,S$GLB,, | Performed by: FAMILY MEDICINE

## 2018-07-11 PROCEDURE — 99212 OFFICE O/P EST SF 10 MIN: CPT | Mod: 25,S$GLB,, | Performed by: FAMILY MEDICINE

## 2018-07-11 RX ORDER — TRIAMCINOLONE ACETONIDE 40 MG/ML
40 INJECTION, SUSPENSION INTRA-ARTICULAR; INTRAMUSCULAR
Status: COMPLETED | OUTPATIENT
Start: 2018-07-11 | End: 2018-07-11

## 2018-07-11 RX ORDER — BUPIVACAINE HYDROCHLORIDE 5 MG/ML
2 INJECTION, SOLUTION EPIDURAL; INTRACAUDAL
Status: COMPLETED | OUTPATIENT
Start: 2018-07-11 | End: 2018-07-11

## 2018-07-11 RX ADMIN — TRIAMCINOLONE ACETONIDE 40 MG: 40 INJECTION, SUSPENSION INTRA-ARTICULAR; INTRAMUSCULAR at 04:07

## 2018-07-11 RX ADMIN — BUPIVACAINE HYDROCHLORIDE 10 MG: 5 INJECTION, SOLUTION EPIDURAL; INTRACAUDAL at 04:07

## 2018-07-11 NOTE — PROGRESS NOTES
Subjective:       Patient ID: Domonique Hernandez Jr. is a 78 y.o. male.    Chief Complaint: Knee Pain (left knee; requesting steroid inj)    His left knee is bothering him.  He would like to get it injected.  He has a history of osteoarthritis.      Review of Systems   Constitutional: Negative for activity change, diaphoresis, fatigue and unexpected weight change.   HENT: Negative for congestion, nosebleeds, sinus pressure, trouble swallowing and voice change.    Eyes: Negative for photophobia and visual disturbance.   Respiratory: Negative for apnea, choking and chest tightness.    Cardiovascular: Negative for leg swelling.   Gastrointestinal: Negative for abdominal pain and blood in stool.   Endocrine: Negative for cold intolerance, heat intolerance, polydipsia and polyphagia.   Genitourinary: Negative for decreased urine volume, difficulty urinating and dysuria.   Musculoskeletal: Positive for arthralgias and back pain. Negative for joint swelling.   Skin: Negative for color change and pallor.   Neurological: Negative for dizziness and weakness.   Hematological: Negative for adenopathy. Does not bruise/bleed easily.   Psychiatric/Behavioral: Negative for behavioral problems, decreased concentration, dysphoric mood and sleep disturbance. The patient is not nervous/anxious.        Objective:      Vitals:    07/11/18 1552   BP: 121/68   Pulse: 80   Resp: 20     Physical Exam   Constitutional: He is oriented to person, place, and time. He appears well-developed and well-nourished.   Eyes: EOM are normal. Pupils are equal, round, and reactive to light.   Neck: No JVD present.   No carotid bruits   Cardiovascular: Normal rate, regular rhythm, normal heart sounds and intact distal pulses.  Exam reveals no gallop and no friction rub.    No murmur heard.  Pulmonary/Chest: Effort normal and breath sounds normal.   Musculoskeletal: He exhibits tenderness. He exhibits no edema.        Left knee: He exhibits abnormal alignment.  Tenderness found.   Lymphadenopathy:     He has no cervical adenopathy.   Neurological: He is alert and oriented to person, place, and time. He has normal reflexes. He displays normal reflexes. No cranial nerve deficit. He exhibits normal muscle tone. Coordination normal.   Psychiatric: He has a normal mood and affect. His behavior is normal. Judgment and thought content normal.       Lab Results   Component Value Date    TSH 5.389 (H) 02/12/2018     BMP  Lab Results   Component Value Date     02/12/2018    K 4.2 02/12/2018     02/12/2018    CO2 24 02/12/2018    BUN 30 (H) 02/12/2018    CREATININE 1.3 02/12/2018    CALCIUM 9.5 02/12/2018    ANIONGAP 9 02/12/2018    ESTGFRAFRICA >60 02/12/2018    EGFRNONAA 53 (A) 02/12/2018       Lab Results   Component Value Date    LDLCALC 84.6 02/12/2018       Assessment:       1. Post-traumatic osteoarthritis of left knee        Plan:     Post-traumatic osteoarthritis of left knee  -     Arthrocentesis      Osteoarthritis of the left knee   After obtaining verbal consent, and per orders of Dr. Cam, injection of left knee given by Emiliano Cam. Patient felt much better. Patient remained in clinic for 20 minutes afterwards, and did not have any adverse reactions.  Used 2 cc of marcaine and 40 mg Kenalog    RTC in 3 months.

## 2018-07-30 ENCOUNTER — OFFICE VISIT (OUTPATIENT)
Dept: FAMILY MEDICINE | Facility: CLINIC | Age: 78
End: 2018-07-30
Payer: MEDICARE

## 2018-07-30 VITALS
HEART RATE: 80 BPM | WEIGHT: 251.81 LBS | HEIGHT: 67 IN | SYSTOLIC BLOOD PRESSURE: 124 MMHG | DIASTOLIC BLOOD PRESSURE: 68 MMHG | RESPIRATION RATE: 18 BRPM | BODY MASS INDEX: 39.52 KG/M2

## 2018-07-30 DIAGNOSIS — I87.8 VENOUS STASIS OF LOWER EXTREMITY: Primary | ICD-10-CM

## 2018-07-30 PROCEDURE — 99213 OFFICE O/P EST LOW 20 MIN: CPT | Mod: S$GLB,,, | Performed by: FAMILY MEDICINE

## 2018-07-30 PROCEDURE — 3078F DIAST BP <80 MM HG: CPT | Mod: CPTII,S$GLB,, | Performed by: FAMILY MEDICINE

## 2018-07-30 PROCEDURE — 99999 PR PBB SHADOW E&M-EST. PATIENT-LVL III: CPT | Mod: PBBFAC,,, | Performed by: FAMILY MEDICINE

## 2018-07-30 PROCEDURE — 3074F SYST BP LT 130 MM HG: CPT | Mod: CPTII,S$GLB,, | Performed by: FAMILY MEDICINE

## 2018-07-30 NOTE — PROGRESS NOTES
Subjective:       Patient ID: Domonique Hernandez Jr. is a 78 y.o. male.    Chief Complaint: Foot Swelling (left foot)    4 day history of left leg tenderness and swelling.      Review of Systems   Constitutional: Negative for activity change, chills, fatigue, fever and unexpected weight change.   HENT: Negative for sore throat and trouble swallowing.    Respiratory: Negative for cough, chest tightness and shortness of breath.    Cardiovascular: Positive for leg swelling. Negative for chest pain and palpitations.   Gastrointestinal: Negative for abdominal pain.   Endocrine: Negative for cold intolerance and heat intolerance.   Genitourinary: Negative for difficulty urinating.   Musculoskeletal: Negative for back pain and joint swelling.   Skin: Negative for rash.   Neurological: Negative for numbness.   Hematological: Negative for adenopathy.   Psychiatric/Behavioral: Negative for decreased concentration.       Objective:      Vitals:    07/30/18 1624   BP: 124/68   Pulse: 80   Resp: 18     Physical Exam   Constitutional: He appears well-developed and well-nourished.   Eyes: EOM are normal.   Cardiovascular: Normal rate, regular rhythm, normal heart sounds and intact distal pulses.  Exam reveals no gallop and no friction rub.    No murmur heard.  Pulmonary/Chest: Effort normal and breath sounds normal.   Musculoskeletal: He exhibits edema and tenderness.   Negative Carolina's sign.    Left leg with mild swelling compared to right   Neurological: He has normal reflexes.       Assessment:       1. Venous stasis of lower extremity        Plan:   Domonique was seen today for foot swelling.    Diagnoses and all orders for this visit:    Venous stasis of lower extremity of left leg  -     COMPRESSION STOCKINGS  Elevate

## 2018-08-14 ENCOUNTER — TELEPHONE (OUTPATIENT)
Dept: FAMILY MEDICINE | Facility: CLINIC | Age: 78
End: 2018-08-14

## 2018-08-14 DIAGNOSIS — M17.32 POST-TRAUMATIC OSTEOARTHRITIS OF LEFT KNEE: Primary | ICD-10-CM

## 2018-08-14 DIAGNOSIS — M47.816 LUMBAR FACET ARTHROPATHY: ICD-10-CM

## 2018-08-14 NOTE — TELEPHONE ENCOUNTER
----- Message from Finn Jhaveri sent at 2018  4:02 PM CDT -----  Contact: Patient  Domonique Hernandez Jr.  MRN: [id number]  : 1940  PCP: Emiliano Cam  Home Phone      [phone]  Work Phone      Not on file.  Mobile          [phone]      MESSAGE: requesting referral to Orthopedic for back & knee     Call [phone]    PCP: Dorina

## 2018-08-14 NOTE — TELEPHONE ENCOUNTER
Post-traumatic osteoarthritis of left knee  -     Ambulatory referral to Orthopedics    Lumbar facet arthropathy  -     Ambulatory referral to Orthopedics

## 2018-08-16 NOTE — TELEPHONE ENCOUNTER
appt with Dr Sandoval in [place] office for 8/29/18 @3:00pm       Pt's referral, progress notes faxed-pt notified

## 2018-08-28 ENCOUNTER — OFFICE VISIT (OUTPATIENT)
Dept: NEUROLOGY | Facility: CLINIC | Age: 78
End: 2018-08-28
Payer: MEDICARE

## 2018-08-28 VITALS
WEIGHT: 249 LBS | SYSTOLIC BLOOD PRESSURE: 128 MMHG | HEIGHT: 67 IN | HEART RATE: 70 BPM | OXYGEN SATURATION: 98 % | BODY MASS INDEX: 39.08 KG/M2 | RESPIRATION RATE: 14 BRPM | DIASTOLIC BLOOD PRESSURE: 72 MMHG

## 2018-08-28 DIAGNOSIS — E29.1 MALE HYPOGONADISM: ICD-10-CM

## 2018-08-28 DIAGNOSIS — E03.4 HYPOTHYROIDISM DUE TO ACQUIRED ATROPHY OF THYROID: ICD-10-CM

## 2018-08-28 DIAGNOSIS — F41.9 ANXIETY: ICD-10-CM

## 2018-08-28 DIAGNOSIS — N18.30 STAGE 3 CHRONIC KIDNEY DISEASE: ICD-10-CM

## 2018-08-28 DIAGNOSIS — G47.33 OSA ON CPAP: ICD-10-CM

## 2018-08-28 DIAGNOSIS — J41.8 MIXED SIMPLE AND MUCOPURULENT CHRONIC BRONCHITIS: ICD-10-CM

## 2018-08-28 DIAGNOSIS — G31.84 MILD COGNITIVE IMPAIRMENT: ICD-10-CM

## 2018-08-28 DIAGNOSIS — F32.0 MAJOR DEPRESSIVE DISORDER, SINGLE EPISODE, MILD: Primary | ICD-10-CM

## 2018-08-28 DIAGNOSIS — E78.2 MIXED HYPERLIPIDEMIA: ICD-10-CM

## 2018-08-28 DIAGNOSIS — I10 ESSENTIAL HYPERTENSION: ICD-10-CM

## 2018-08-28 DIAGNOSIS — M85.80 OSTEOPENIA DETERMINED BY X-RAY: ICD-10-CM

## 2018-08-28 PROCEDURE — 99214 OFFICE O/P EST MOD 30 MIN: CPT | Mod: S$GLB,,, | Performed by: NURSE PRACTITIONER

## 2018-08-28 PROCEDURE — 99999 PR PBB SHADOW E&M-EST. PATIENT-LVL V: CPT | Mod: PBBFAC,,, | Performed by: NURSE PRACTITIONER

## 2018-08-28 PROCEDURE — 3078F DIAST BP <80 MM HG: CPT | Mod: CPTII,S$GLB,, | Performed by: NURSE PRACTITIONER

## 2018-08-28 PROCEDURE — 99499 UNLISTED E&M SERVICE: CPT | Mod: S$GLB,,, | Performed by: NURSE PRACTITIONER

## 2018-08-28 PROCEDURE — 3074F SYST BP LT 130 MM HG: CPT | Mod: CPTII,S$GLB,, | Performed by: NURSE PRACTITIONER

## 2018-08-28 NOTE — PROGRESS NOTES
HPI: Domonique Hernandez Jr. is a 77 y.o. male complaining of minor short term memory loss since 2014. Suspect mild cognitive impairment. He has HTN, hyperlipidemia, CKD Stage III, hypothyroidism, hypogonadism, KARL, and osteopenia.     He presents today for a yearly follow up visit. His memory is improved since his last visit. Mood is stable; however, his brother passed away last night, and his son is dying of lung cancer currently. His wife passed away a few years ago.     He does his own bills and keeping up his with his house without difficulty. He has a good appetite, and is sleeping well.    Review of Systems   Constitutional: Negative for fever.   Eyes: Negative for double vision.   Respiratory: Negative for hemoptysis.    Cardiovascular: Negative for chest pain.   Gastrointestinal: Negative for blood in stool.   Genitourinary: Negative for hematuria.   Musculoskeletal: Negative for falls.   Skin: Negative for rash.   Neurological: Negative for seizures and headaches.   Psychiatric/Behavioral: The patient does not have insomnia.        I have reviewed all of this patient's past medical and surgical histories as well as family and social histories and active allergies and medications as documented in the electronic medical record.    Exam:  Gen Appearance, well developed/nourished in no apparent distress  CV: 2+ distal pulses with no edema or swelling  Neuro:  MS: Awake, alert, oriented to place, person, time, situation. Sustains attention. Recent recall is intact/remote memory intact, Language is full to spontaneous speech/comprehension. Fund of Knowledge is full. Mood is euthymic  CN: Optic discs are flat with normal vasculature, PERRL, Extraoccular movements and visual fields are full. Normal facial sensation and strength, Hearing symmetric, Tongue and Palate are midline and strong. Shoulder Shrug symmetric and strong.  Motor: Normal bulk, tone, no abnormal movements. 5/5 strength bilateral upper/lower extremities  with 2+ reflexes and clonus  Sensory: symmetric to temp, and vibration.  Romberg negative  Cerebellar: Finger-nose,Heal-shin, Rapid alternating movements intact  Gait: Normal stance, no ataxia    Labs:  3/2017 TSH normal    Imagin/2016 MRI brain: Age-appropriate generalized volume loss with mild/moderate degree of T2/FLAIR signal abnormality within the supratentorial white matter  while nonspecific suggest chronic microvascular ischemic change.    No evidence for acute infarction or enhancing lesion.    Assessment/Plan: Domonique Hernandez Jr. is a 78 y.o. male complaining of minor short term memory loss since . Suspect mild cognitive impairment. He has HTN, hyperlipidemia, CKD Stage III, hypothyroidism, hypogonadism, KARL, and osteopenia.     I recommend:   1. Memory improved since last visit. Mood is stable. MCI suspected at last visit.   2. He continues on Effexor for his mood.   3. No restrictions/ patient is functioning well.   4. Consider his hypothyroidism, hypogonadism, KARL, and CKD, which any worsening of his memory.     RTC 1 year

## 2018-08-29 ENCOUNTER — HOSPITAL ENCOUNTER (OUTPATIENT)
Dept: RADIOLOGY | Facility: HOSPITAL | Age: 78
Discharge: HOME | End: 2018-08-29
Attending: ORTHOPAEDIC SURGERY
Payer: MEDICARE

## 2018-08-29 DIAGNOSIS — M25.562 LEFT KNEE PAIN: ICD-10-CM

## 2018-08-29 PROCEDURE — 73562 X-RAY EXAM OF KNEE 3: CPT | Mod: TC,LT

## 2018-09-06 DIAGNOSIS — I10 ESSENTIAL HYPERTENSION: ICD-10-CM

## 2018-09-06 DIAGNOSIS — N13.8 BPH WITH URINARY OBSTRUCTION: ICD-10-CM

## 2018-09-06 DIAGNOSIS — N40.1 BPH WITH URINARY OBSTRUCTION: ICD-10-CM

## 2018-09-06 DIAGNOSIS — F32.A DEPRESSION, UNSPECIFIED DEPRESSION TYPE: ICD-10-CM

## 2018-09-07 RX ORDER — VENLAFAXINE HYDROCHLORIDE 150 MG/1
CAPSULE, EXTENDED RELEASE ORAL
Qty: 90 CAPSULE | Refills: 1 | Status: SHIPPED | OUTPATIENT
Start: 2018-09-07 | End: 2019-03-04 | Stop reason: SDUPTHER

## 2018-09-07 RX ORDER — TAMSULOSIN HYDROCHLORIDE 0.4 MG/1
CAPSULE ORAL
Qty: 180 CAPSULE | Refills: 1 | Status: SHIPPED | OUTPATIENT
Start: 2018-09-07 | End: 2019-03-04 | Stop reason: SDUPTHER

## 2018-09-07 RX ORDER — CARVEDILOL 25 MG/1
TABLET ORAL
Qty: 180 TABLET | Refills: 1 | Status: SHIPPED | OUTPATIENT
Start: 2018-09-07 | End: 2019-03-04 | Stop reason: SDUPTHER

## 2018-09-10 ENCOUNTER — OFFICE VISIT (OUTPATIENT)
Dept: FAMILY MEDICINE | Facility: CLINIC | Age: 78
End: 2018-09-10
Payer: MEDICARE

## 2018-09-10 VITALS
RESPIRATION RATE: 18 BRPM | HEIGHT: 67 IN | BODY MASS INDEX: 39.11 KG/M2 | SYSTOLIC BLOOD PRESSURE: 158 MMHG | HEART RATE: 88 BPM | DIASTOLIC BLOOD PRESSURE: 70 MMHG | WEIGHT: 249.19 LBS

## 2018-09-10 DIAGNOSIS — N40.1 BENIGN PROSTATIC HYPERPLASIA WITH NOCTURIA: ICD-10-CM

## 2018-09-10 DIAGNOSIS — M47.816 LUMBAR FACET ARTHROPATHY: ICD-10-CM

## 2018-09-10 DIAGNOSIS — E03.4 HYPOTHYROIDISM DUE TO ACQUIRED ATROPHY OF THYROID: ICD-10-CM

## 2018-09-10 DIAGNOSIS — E78.5 HYPERLIPIDEMIA, UNSPECIFIED HYPERLIPIDEMIA TYPE: ICD-10-CM

## 2018-09-10 DIAGNOSIS — J30.1 SEASONAL ALLERGIC RHINITIS DUE TO POLLEN: ICD-10-CM

## 2018-09-10 DIAGNOSIS — I10 ESSENTIAL HYPERTENSION: ICD-10-CM

## 2018-09-10 DIAGNOSIS — R35.1 BENIGN PROSTATIC HYPERPLASIA WITH NOCTURIA: ICD-10-CM

## 2018-09-10 DIAGNOSIS — M1A.9XX0 CHRONIC GOUT WITHOUT TOPHUS, UNSPECIFIED CAUSE, UNSPECIFIED SITE: ICD-10-CM

## 2018-09-10 LAB
ALT SERPL W/O P-5'-P-CCNC: 23 U/L
ANION GAP SERPL CALC-SCNC: 8 MMOL/L
BUN SERPL-MCNC: 22 MG/DL
CALCIUM SERPL-MCNC: 9.6 MG/DL
CHLORIDE SERPL-SCNC: 108 MMOL/L
CHOLEST SERPL-MCNC: 228 MG/DL
CHOLEST/HDLC SERPL: 8.8 {RATIO}
CO2 SERPL-SCNC: 23 MMOL/L
CREAT SERPL-MCNC: 1.1 MG/DL
EST. GFR  (AFRICAN AMERICAN): >60 ML/MIN/1.73 M^2
EST. GFR  (NON AFRICAN AMERICAN): >60 ML/MIN/1.73 M^2
GLUCOSE SERPL-MCNC: 102 MG/DL
HDLC SERPL-MCNC: 26 MG/DL
HDLC SERPL: 11.4 %
LDLC SERPL CALC-MCNC: 142.6 MG/DL
NONHDLC SERPL-MCNC: 202 MG/DL
POTASSIUM SERPL-SCNC: 3.6 MMOL/L
SODIUM SERPL-SCNC: 139 MMOL/L
T4 FREE SERPL-MCNC: 0.9 NG/DL
TRIGL SERPL-MCNC: 297 MG/DL
TSH SERPL DL<=0.005 MIU/L-ACNC: 2.74 UIU/ML
TSH SERPL DL<=0.005 MIU/L-ACNC: 2.74 UIU/ML
URATE SERPL-MCNC: 8.4 MG/DL

## 2018-09-10 PROCEDURE — 84439 ASSAY OF FREE THYROXINE: CPT

## 2018-09-10 PROCEDURE — 36415 COLL VENOUS BLD VENIPUNCTURE: CPT | Mod: PBBFAC

## 2018-09-10 PROCEDURE — 99214 OFFICE O/P EST MOD 30 MIN: CPT | Mod: S$PBB,,, | Performed by: FAMILY MEDICINE

## 2018-09-10 PROCEDURE — 80061 LIPID PANEL: CPT

## 2018-09-10 PROCEDURE — 1101F PT FALLS ASSESS-DOCD LE1/YR: CPT | Mod: CPTII,,, | Performed by: FAMILY MEDICINE

## 2018-09-10 PROCEDURE — 99213 OFFICE O/P EST LOW 20 MIN: CPT | Mod: PBBFAC | Performed by: FAMILY MEDICINE

## 2018-09-10 PROCEDURE — 84460 ALANINE AMINO (ALT) (SGPT): CPT

## 2018-09-10 PROCEDURE — 3077F SYST BP >= 140 MM HG: CPT | Mod: CPTII,,, | Performed by: FAMILY MEDICINE

## 2018-09-10 PROCEDURE — 84443 ASSAY THYROID STIM HORMONE: CPT

## 2018-09-10 PROCEDURE — 84550 ASSAY OF BLOOD/URIC ACID: CPT

## 2018-09-10 PROCEDURE — 80048 BASIC METABOLIC PNL TOTAL CA: CPT

## 2018-09-10 PROCEDURE — 3078F DIAST BP <80 MM HG: CPT | Mod: CPTII,,, | Performed by: FAMILY MEDICINE

## 2018-09-10 PROCEDURE — 99999 PR PBB SHADOW E&M-EST. PATIENT-LVL III: CPT | Mod: PBBFAC,,, | Performed by: FAMILY MEDICINE

## 2018-09-10 RX ORDER — AMLODIPINE BESYLATE 5 MG/1
5 TABLET ORAL DAILY
Qty: 90 TABLET | Refills: 1 | Status: SHIPPED | OUTPATIENT
Start: 2018-09-10 | End: 2019-03-04 | Stop reason: SDUPTHER

## 2018-09-10 RX ORDER — TRAMADOL HYDROCHLORIDE 50 MG/1
50 TABLET ORAL EVERY 6 HOURS PRN
Qty: 30 TABLET | Refills: 5 | Status: SHIPPED | OUTPATIENT
Start: 2018-09-10 | End: 2018-10-17 | Stop reason: SDUPTHER

## 2018-09-10 RX ORDER — CETIRIZINE HYDROCHLORIDE 10 MG/1
10 TABLET ORAL DAILY
Qty: 90 TABLET | Refills: 1 | Status: SHIPPED | OUTPATIENT
Start: 2018-09-10 | End: 2019-03-04 | Stop reason: SDUPTHER

## 2018-09-10 RX ORDER — ALLOPURINOL 300 MG/1
TABLET ORAL
Qty: 90 TABLET | Refills: 1 | Status: SHIPPED | OUTPATIENT
Start: 2018-09-10 | End: 2019-03-04 | Stop reason: SDUPTHER

## 2018-09-10 RX ORDER — DUTASTERIDE 0.5 MG/1
0.5 CAPSULE, LIQUID FILLED ORAL DAILY
Qty: 90 CAPSULE | Refills: 1 | Status: SHIPPED | OUTPATIENT
Start: 2018-09-10 | End: 2019-03-04 | Stop reason: SDUPTHER

## 2018-09-10 RX ORDER — SIMVASTATIN 40 MG/1
40 TABLET, FILM COATED ORAL NIGHTLY
Qty: 90 TABLET | Refills: 1 | Status: SHIPPED | OUTPATIENT
Start: 2018-09-10 | End: 2018-09-11 | Stop reason: SDUPTHER

## 2018-09-10 RX ORDER — LEVOTHYROXINE SODIUM 100 UG/1
TABLET ORAL
Qty: 90 TABLET | Refills: 1 | Status: SHIPPED | OUTPATIENT
Start: 2018-09-10 | End: 2019-03-04 | Stop reason: SDUPTHER

## 2018-09-10 RX ORDER — VALSARTAN 80 MG/1
80 TABLET ORAL DAILY
Qty: 90 TABLET | Refills: 1 | Status: SHIPPED | OUTPATIENT
Start: 2018-09-10 | End: 2019-03-04 | Stop reason: SDUPTHER

## 2018-09-10 NOTE — PROGRESS NOTES
Subjective:       Patient ID: Domonique Hernandez Jr. is a 78 y.o. male.    Chief Complaint: Follow-up (6 month follow up)    Patient here for check up and follow up for HYPERTENSION.   Went to ER for elevated BP one month ago.  I saw him the next day and increased his Coreg to 25 mg twice daily, increase amlodipine to 5 mg once daily.  Diovan HCT 80 mg was added.  Now his blood pressure is almost too low.  He is feeling fine on current medications however.  Patient is doing well on Effexor  mg daily.    Patient states his CPAP machine is working very good.  He requires 7 cm water.   He is urinating better now that he is on Flomax and Avodart.  He is still having delayed ejaculation.  Stopping Effexor did not make much of a difference.  Patient uses Viagra for EGD.  He takes Synthroid for his hypothyroidism.  He tolerates this well.  He denies having symptoms such as heat or cold intolerance.  His weight is stable.  He denies any swelling in her thyroid.  Patient is taking His statin every day for hyperlipidemia.  She is feeling well on the medication.  He denies side effects such as myalgias.      Hypertension   Pertinent negatives include no chest pain, headaches, palpitations or shortness of breath.   Otalgia    Pertinent negatives include no abdominal pain, coughing, headaches, rash or sore throat.   Cough   This is a new problem. The current episode started in the past 7 days. The problem has been gradually worsening. The cough is productive of purulent sputum. Pertinent negatives include no chest pain, chills, ear pain, fever, headaches, postnasal drip, rash, sore throat or shortness of breath.     Review of Systems   Constitutional: Negative for activity change, chills, diaphoresis, fatigue, fever and unexpected weight change.   HENT: Negative for congestion, ear pain, nosebleeds, postnasal drip, sinus pressure, sore throat, trouble swallowing and voice change.    Eyes: Negative for photophobia and visual  disturbance.   Respiratory: Negative for apnea, cough, choking, chest tightness and shortness of breath.    Cardiovascular: Negative for chest pain, palpitations and leg swelling.   Gastrointestinal: Negative for abdominal pain and blood in stool.   Endocrine: Negative for cold intolerance, heat intolerance, polydipsia and polyphagia.   Genitourinary: Negative for decreased urine volume, difficulty urinating and dysuria.   Musculoskeletal: Positive for arthralgias and back pain. Negative for joint swelling.   Skin: Negative for color change, pallor and rash.   Neurological: Negative for dizziness, weakness, numbness and headaches.   Hematological: Negative for adenopathy. Does not bruise/bleed easily.   Psychiatric/Behavioral: Negative for behavioral problems, decreased concentration, dysphoric mood and sleep disturbance. The patient is not nervous/anxious.        Objective:      Vitals:    09/10/18 1435   BP: (!) 158/70   Pulse: 88   Resp: 18     Physical Exam   Constitutional: He is oriented to person, place, and time. He appears well-developed and well-nourished.   Eyes: EOM are normal. Pupils are equal, round, and reactive to light.   Neck: No JVD present.   No carotid bruits   Cardiovascular: Normal rate, regular rhythm, normal heart sounds and intact distal pulses. Exam reveals no gallop and no friction rub.   No murmur heard.  Pulmonary/Chest: Effort normal and breath sounds normal.   Musculoskeletal: He exhibits no edema or tenderness.   Lymphadenopathy:     He has no cervical adenopathy.   Neurological: He is alert and oriented to person, place, and time. He has normal reflexes. He displays normal reflexes. No cranial nerve deficit. He exhibits normal muscle tone. Coordination normal.   Psychiatric: He has a normal mood and affect. His behavior is normal. Judgment and thought content normal.       Lab Results   Component Value Date    TSH 5.389 (H) 02/12/2018     BMP  Lab Results   Component Value Date      02/12/2018    K 4.2 02/12/2018     02/12/2018    CO2 24 02/12/2018    BUN 30 (H) 02/12/2018    CREATININE 1.3 02/12/2018    CALCIUM 9.5 02/12/2018    ANIONGAP 9 02/12/2018    ESTGFRAFRICA >60 02/12/2018    EGFRNONAA 53 (A) 02/12/2018       Lab Results   Component Value Date    LDLCALC 84.6 02/12/2018     Lab Results   Component Value Date    URICACID 6.6 02/12/2018       Assessment:       1. Chronic gout without tophus, unspecified cause, unspecified site    2. Essential hypertension    3. Seasonal allergic rhinitis due to pollen    4. Benign prostatic hyperplasia with nocturia    5. Hypothyroidism due to acquired atrophy of thyroid    6. Hyperlipidemia, unspecified hyperlipidemia type    7. Lumbar facet arthropathy        Plan:     Essential hypertension  Two gram sodium diet.    Weight loss discussed.    Try to walk 2 miles per day.    The following medications will be restarted today  -     carvedilol (COREG) 25 MG tablet; Take 1 tablet (25 mg total) by mouth 2 (two) times daily with meals.  -     amlodipine (NORVASC) 5 MG tablet; Take 1 tablet by mouth once daily.  -     Diovan 80 mg po daily  Check BMP    KARL on CPAP  Continue CPAP at 7 cm water pressure    Depression, unspecified depression type  - Continue venlafaxine (EFFEXOR-XR) 150 MG 24 hr capsule; Take 1 capsule by mouth once daily.  Dispense: 30 capsule; Refill: 5    Hyperlipidemia, unspecified hyperlipidemia type  Continue Zocor 40 mg by mouth daily    Chronic gout without tophus, unspecified cause, unspecified site  Continue allopurinol 300 mg daily    Hypothyroidism due to acquired atrophy of thyroid  -  Synthroid to 100 µg by mouth daily    Benign non-nodular prostatic hyperplasia with lower urinary tract symptoms  Continue Avodart and Flomax.    Chronic gout without tophus, unspecified cause, unspecified site  -     allopurinol (ZYLOPRIM) 300 MG tablet; TAKE 1 TABLET ONE TIME DAILY  Dispense: 90 tablet; Refill: 1  -     Uric acid;  Future    Essential hypertension  -     amLODIPine (NORVASC) 5 MG tablet; Take 1 tablet (5 mg total) by mouth once daily.  Dispense: 90 tablet; Refill: 1  -     valsartan (DIOVAN) 80 MG tablet; Take 1 tablet (80 mg total) by mouth once daily.  Dispense: 90 tablet; Refill: 1  -     ALT (SGPT); Future  -     Basic metabolic panel; Future    Seasonal allergic rhinitis due to pollen  -     cetirizine (ZYRTEC) 10 MG tablet; Take 1 tablet (10 mg total) by mouth once daily.  Dispense: 90 tablet; Refill: 1    Benign prostatic hyperplasia with nocturia  -     dutasteride (AVODART) 0.5 mg capsule; Take 1 capsule (0.5 mg total) by mouth once daily.  Dispense: 90 capsule; Refill: 1    Hypothyroidism due to acquired atrophy of thyroid  -     levothyroxine (SYNTHROID) 100 MCG tablet; TAKE 1 TABLET ONE TIME DAILY  Dispense: 90 tablet; Refill: 1  -     TSH; Future    Hyperlipidemia, unspecified hyperlipidemia type  -     simvastatin (ZOCOR) 40 MG tablet; Take 1 tablet (40 mg total) by mouth every evening.  Dispense: 90 tablet; Refill: 1  -     Lipid panel; Future    Lumbar facet arthropathy  -     traMADol (ULTRAM) 50 mg tablet; Take 1 tablet (50 mg total) by mouth every 6 (six) hours as needed.  Dispense: 30 tablet; Refill: 5      RTC 6 months

## 2018-09-11 DIAGNOSIS — E78.5 HYPERLIPIDEMIA, UNSPECIFIED HYPERLIPIDEMIA TYPE: ICD-10-CM

## 2018-09-11 RX ORDER — SIMVASTATIN 40 MG/1
40 TABLET, FILM COATED ORAL NIGHTLY
Qty: 90 TABLET | Refills: 1 | Status: SHIPPED | OUTPATIENT
Start: 2018-09-11 | End: 2019-03-04 | Stop reason: SDUPTHER

## 2018-09-11 NOTE — TELEPHONE ENCOUNTER
----- Message from Emiliano Cam MD sent at 9/11/2018  8:02 AM CDT -----  Cholesterol and uric acid level is elevated.  Is he taking his Zocor?  Is he taking his allopurinol?  I do not think so.  Tell him to restart these.       full weight-bearing

## 2018-09-11 NOTE — PROGRESS NOTES
Cholesterol and uric acid level is elevated.  Is he taking his Zocor?  Is he taking his allopurinol?  I do not think so.  Tell him to restart these.

## 2018-09-14 DIAGNOSIS — L30.4 INTERTRIGO: ICD-10-CM

## 2018-09-14 RX ORDER — NYSTATIN 100000 [USP'U]/G
POWDER TOPICAL 2 TIMES DAILY
Qty: 15 G | Refills: 10 | Status: SHIPPED | OUTPATIENT
Start: 2018-09-14 | End: 2019-05-17

## 2018-10-10 ENCOUNTER — OFFICE VISIT (OUTPATIENT)
Dept: FAMILY MEDICINE | Facility: CLINIC | Age: 78
End: 2018-10-10
Payer: MEDICARE

## 2018-10-10 VITALS
RESPIRATION RATE: 16 BRPM | WEIGHT: 254.44 LBS | HEIGHT: 67 IN | BODY MASS INDEX: 39.93 KG/M2 | SYSTOLIC BLOOD PRESSURE: 134 MMHG | HEART RATE: 68 BPM | DIASTOLIC BLOOD PRESSURE: 78 MMHG

## 2018-10-10 DIAGNOSIS — M54.50 LUMBAR PAIN: Primary | ICD-10-CM

## 2018-10-10 PROCEDURE — 3078F DIAST BP <80 MM HG: CPT | Mod: CPTII,,, | Performed by: FAMILY MEDICINE

## 2018-10-10 PROCEDURE — 3075F SYST BP GE 130 - 139MM HG: CPT | Mod: CPTII,,, | Performed by: FAMILY MEDICINE

## 2018-10-10 PROCEDURE — 99214 OFFICE O/P EST MOD 30 MIN: CPT | Mod: S$PBB,,, | Performed by: FAMILY MEDICINE

## 2018-10-10 PROCEDURE — 99999 PR PBB SHADOW E&M-EST. PATIENT-LVL V: CPT | Mod: PBBFAC,,, | Performed by: FAMILY MEDICINE

## 2018-10-10 PROCEDURE — 90662 IIV NO PRSV INCREASED AG IM: CPT | Mod: PBBFAC

## 2018-10-10 PROCEDURE — 1101F PT FALLS ASSESS-DOCD LE1/YR: CPT | Mod: CPTII,,, | Performed by: FAMILY MEDICINE

## 2018-10-10 PROCEDURE — 99215 OFFICE O/P EST HI 40 MIN: CPT | Mod: PBBFAC | Performed by: FAMILY MEDICINE

## 2018-10-10 RX ORDER — DICLOFENAC SODIUM 10 MG/G
2 GEL TOPICAL 4 TIMES DAILY
Qty: 3 TUBE | Refills: 5 | Status: SHIPPED | OUTPATIENT
Start: 2018-10-10 | End: 2018-11-18

## 2018-10-10 NOTE — PROGRESS NOTES
Subjective:       Patient ID: Domonique Hernandez Jr. is a 78 y.o. male.    Chief Complaint: Back Pain (lower back )    HPI  78 year old male comes in with c/o lower back pain. He says that about a year ago he had an accident. He says that he was seen by a physician at that point and did better. However, he says that over the last year he has had pain here and there. He now notes that his pain has just been persistent so he would like to do something about it. He says that he has worsened pain with riding in the car. The pain is on the bottom bilatearlly and does radiate up on the right side. No bowel/bladder incontinence. He has been taking aleve for his pain and takes tramadol which does help.     PMH, PSH, ALLERGIES, SH, FH reviewed in nurse's notes above  Medications reconciled in the nurse's notes      Review of Systems   Constitutional: Negative for chills and fever.   HENT: Negative for congestion, ear pain, postnasal drip, rhinorrhea, sore throat and trouble swallowing.    Eyes: Negative for redness and itching.   Respiratory: Negative for cough, shortness of breath and wheezing.    Cardiovascular: Negative for chest pain and palpitations.   Gastrointestinal: Negative for abdominal pain, diarrhea, nausea and vomiting.   Genitourinary: Negative for dysuria and frequency.        Urinary hesitancy   Musculoskeletal: Positive for back pain and gait problem.   Skin: Negative for rash.   Neurological: Negative for weakness and headaches.       Objective:      Physical Exam   Constitutional: He is oriented to person, place, and time. He appears well-developed. No distress.   HENT:   Head: Normocephalic and atraumatic.   Eyes: Conjunctivae are normal. Pupils are equal, round, and reactive to light.   Neck: Normal range of motion. Neck supple. No thyromegaly present.   Cardiovascular: Normal rate, regular rhythm, normal heart sounds and intact distal pulses.   Pulmonary/Chest: Effort normal and breath sounds normal. No  respiratory distress. He has no wheezes.   Abdominal: Soft. Bowel sounds are normal. There is no tenderness.   Musculoskeletal: Normal range of motion. He exhibits no edema.   No focal spine tenderness    Neg SLR    Antalgic gait and slow to stand   Lymphadenopathy:     He has no cervical adenopathy.   Neurological: He is alert and oriented to person, place, and time.   Skin: Skin is warm and dry. No rash noted.   Psychiatric: He has a normal mood and affect. His behavior is normal.   Nursing note and vitals reviewed.       Assessment/Plan:       Problem List Items Addressed This Visit     None      Visit Diagnoses     Lumbar pain    -  Primary    Relevant Medications    diclofenac sodium (VOLTAREN) 1 % Gel    Other Relevant Orders    X-Ray Lumbar Spine AP And Lateral    Ambulatory Referral to Physical/Occupational Therapy        RTC if condition acutely worsens or any other concerns, otherwise RTC as scheduled    H/o L1 fracture. If xr neg and still with pain after pt - needs to have MRI.

## 2018-10-11 ENCOUNTER — APPOINTMENT (OUTPATIENT)
Dept: RADIOLOGY | Facility: CLINIC | Age: 78
End: 2018-10-11
Attending: FAMILY MEDICINE
Payer: MEDICARE

## 2018-10-11 DIAGNOSIS — M54.50 LUMBAR PAIN: ICD-10-CM

## 2018-10-11 PROCEDURE — 72100 X-RAY EXAM L-S SPINE 2/3 VWS: CPT | Mod: TC,PO

## 2018-10-11 PROCEDURE — 72100 X-RAY EXAM L-S SPINE 2/3 VWS: CPT | Mod: 26,,, | Performed by: RADIOLOGY

## 2018-10-12 ENCOUNTER — TELEPHONE (OUTPATIENT)
Dept: FAMILY MEDICINE | Facility: CLINIC | Age: 78
End: 2018-10-12

## 2018-10-12 NOTE — TELEPHONE ENCOUNTER
----- Message from Finn Jhaveri sent at 10/12/2018 11:52 AM CDT -----  Contact: Patient  Domonique Hernandez Jr.  MRN: [id number]  : 1940  PCP: Emiliano Cam  Home Phone      [phone]  Work Phone      Not on file.  Mobile          [phone]      MESSAGE: requesting appt with Dr Leach -- needs meds & MRI -- would like appt ASAP    Call [phone]    PCP: Dorina

## 2018-10-12 NOTE — TELEPHONE ENCOUNTER
Spoke to pt, appt made to see Dr. Leach to discuss getting tests and MRI on 10/17/18. Pt aware, verbalized understanding.

## 2018-10-17 ENCOUNTER — OFFICE VISIT (OUTPATIENT)
Dept: FAMILY MEDICINE | Facility: CLINIC | Age: 78
End: 2018-10-17
Payer: MEDICARE

## 2018-10-17 VITALS
HEART RATE: 68 BPM | WEIGHT: 251.56 LBS | RESPIRATION RATE: 20 BRPM | BODY MASS INDEX: 39.48 KG/M2 | DIASTOLIC BLOOD PRESSURE: 80 MMHG | SYSTOLIC BLOOD PRESSURE: 140 MMHG | HEIGHT: 67 IN

## 2018-10-17 DIAGNOSIS — M47.816 LUMBAR FACET ARTHROPATHY: ICD-10-CM

## 2018-10-17 PROCEDURE — 99213 OFFICE O/P EST LOW 20 MIN: CPT | Mod: S$PBB,,, | Performed by: FAMILY MEDICINE

## 2018-10-17 PROCEDURE — 1101F PT FALLS ASSESS-DOCD LE1/YR: CPT | Mod: CPTII,,, | Performed by: FAMILY MEDICINE

## 2018-10-17 PROCEDURE — 3077F SYST BP >= 140 MM HG: CPT | Mod: CPTII,,, | Performed by: FAMILY MEDICINE

## 2018-10-17 PROCEDURE — 3079F DIAST BP 80-89 MM HG: CPT | Mod: CPTII,,, | Performed by: FAMILY MEDICINE

## 2018-10-17 PROCEDURE — 99999 PR PBB SHADOW E&M-EST. PATIENT-LVL IV: CPT | Mod: PBBFAC,,, | Performed by: FAMILY MEDICINE

## 2018-10-17 PROCEDURE — 99214 OFFICE O/P EST MOD 30 MIN: CPT | Mod: PBBFAC | Performed by: FAMILY MEDICINE

## 2018-10-17 RX ORDER — TRAMADOL HYDROCHLORIDE 50 MG/1
50 TABLET ORAL EVERY 6 HOURS PRN
Qty: 30 TABLET | Refills: 5 | OUTPATIENT
Start: 2018-10-17 | End: 2018-11-18

## 2018-10-17 NOTE — PROGRESS NOTES
Subjective:       Patient ID: Domonique Hernandez Jr. is a 78 y.o. male.    Chief Complaint: Follow-up    HPI  78 year old male comes in with c/o lower back pain. He says that about a year ago he had an accident. He says that he was seen by a physician at that point and did better. However, he says that over the last year he has had pain here and there. He now notes that his pain has just been persistent so he would like to do something about it. He says that he has worsened pain with riding in the car. The pain is on the bottom bilatearlly and does radiate up on the right side. No bowel/bladder incontinence. He has been taking aleve for his pain and takes tramadol which does help.     Today he comes back and says he is feeling much better. He has started with therapy and feels good.    PMH, PSH, ALLERGIES, SH, FH reviewed in nurse's notes above  Medications reconciled in the nurse's notes      Review of Systems   Constitutional: Negative for chills and fever.   HENT: Negative for congestion, ear pain, postnasal drip, rhinorrhea, sore throat and trouble swallowing.    Eyes: Negative for redness and itching.   Respiratory: Negative for cough, shortness of breath and wheezing.    Cardiovascular: Negative for chest pain and palpitations.   Gastrointestinal: Negative for abdominal pain, diarrhea, nausea and vomiting.   Genitourinary: Negative for dysuria and frequency.        Urinary hesitancy   Musculoskeletal: Positive for back pain and gait problem.   Skin: Negative for rash.   Neurological: Negative for weakness and headaches.       Objective:      Physical Exam   Constitutional: He is oriented to person, place, and time. He appears well-developed. No distress.   HENT:   Head: Normocephalic and atraumatic.   Eyes: Conjunctivae are normal. Pupils are equal, round, and reactive to light.   Neck: Normal range of motion. Neck supple. No thyromegaly present.   Cardiovascular: Normal rate, regular rhythm, normal heart sounds  and intact distal pulses.   Pulmonary/Chest: Effort normal and breath sounds normal. No respiratory distress. He has no wheezes.   Abdominal: Soft. Bowel sounds are normal. There is no tenderness.   Musculoskeletal: Normal range of motion. He exhibits no edema.   No focal spine tenderness    Neg SLR    Antalgic gait and slow to stand   Lymphadenopathy:     He has no cervical adenopathy.   Neurological: He is alert and oriented to person, place, and time.   Skin: Skin is warm and dry. No rash noted.   Psychiatric: He has a normal mood and affect. His behavior is normal.   Nursing note and vitals reviewed.       Assessment/Plan:       Problem List Items Addressed This Visit        Orthopedic    Lumbar facet arthropathy    Relevant Medications    traMADol (ULTRAM) 50 mg tablet        RTC if condition acutely worsens or any other concerns, otherwise RTC as scheduled    XR with degeneration. Today with improved symptoms. Cont on PT. Still with concern for stenosis. If still with symptoms, discussed need for potential MRI.

## 2018-10-25 ENCOUNTER — OFFICE VISIT (OUTPATIENT)
Dept: FAMILY MEDICINE | Facility: CLINIC | Age: 78
End: 2018-10-25
Payer: MEDICARE

## 2018-10-25 VITALS
HEART RATE: 60 BPM | RESPIRATION RATE: 20 BRPM | DIASTOLIC BLOOD PRESSURE: 80 MMHG | BODY MASS INDEX: 40.71 KG/M2 | HEIGHT: 67 IN | SYSTOLIC BLOOD PRESSURE: 142 MMHG | WEIGHT: 259.38 LBS

## 2018-10-25 DIAGNOSIS — Z87.81 HISTORY OF VERTEBRAL COMPRESSION FRACTURE: Primary | ICD-10-CM

## 2018-10-25 DIAGNOSIS — M54.50 LUMBAR PAIN: ICD-10-CM

## 2018-10-25 DIAGNOSIS — M48.061 SPINAL STENOSIS OF LUMBAR REGION WITHOUT NEUROGENIC CLAUDICATION: ICD-10-CM

## 2018-10-25 DIAGNOSIS — M47.816 LUMBAR FACET ARTHROPATHY: ICD-10-CM

## 2018-10-25 PROCEDURE — 99213 OFFICE O/P EST LOW 20 MIN: CPT | Mod: S$PBB,,, | Performed by: FAMILY MEDICINE

## 2018-10-25 PROCEDURE — 3079F DIAST BP 80-89 MM HG: CPT | Mod: CPTII,,, | Performed by: FAMILY MEDICINE

## 2018-10-25 PROCEDURE — 99214 OFFICE O/P EST MOD 30 MIN: CPT | Mod: PBBFAC | Performed by: FAMILY MEDICINE

## 2018-10-25 PROCEDURE — 99999 PR PBB SHADOW E&M-EST. PATIENT-LVL IV: CPT | Mod: PBBFAC,,, | Performed by: FAMILY MEDICINE

## 2018-10-25 PROCEDURE — 3077F SYST BP >= 140 MM HG: CPT | Mod: CPTII,,, | Performed by: FAMILY MEDICINE

## 2018-10-25 PROCEDURE — 1101F PT FALLS ASSESS-DOCD LE1/YR: CPT | Mod: CPTII,,, | Performed by: FAMILY MEDICINE

## 2018-10-25 NOTE — PROGRESS NOTES
Subjective:       Patient ID: Domonique Hernandez Jr. is a 78 y.o. male.    Chief Complaint: Follow-up    HPI  78 year old male comes in for f/u of his back pain. He has been taking a bc powder and aleve for his back pain. He says that he has been doing well with PT he has gone a couple of times. He says jaime this pain is improving.    PMH, PSH, ALLERGIES, SH, FH reviewed in nurse's notes above  Medications reconciled in the nurse's notes    Review of Systems   Constitutional: Negative for chills and fever.   Respiratory: Negative for cough, shortness of breath and wheezing.    Cardiovascular: Negative for chest pain and palpitations.   Gastrointestinal: Negative for abdominal pain, diarrhea, nausea and vomiting.   Genitourinary: Negative for dysuria and frequency.   Musculoskeletal: Positive for back pain.   Neurological: Negative for weakness and headaches.       Objective:      Physical Exam   Constitutional: He is oriented to person, place, and time. He appears well-developed. No distress.   HENT:   Head: Normocephalic and atraumatic.   Eyes: Conjunctivae are normal. Pupils are equal, round, and reactive to light.   Neck: Normal range of motion. Neck supple. No thyromegaly present.   Cardiovascular: Normal rate, regular rhythm, normal heart sounds and intact distal pulses.   Pulmonary/Chest: Effort normal and breath sounds normal. No respiratory distress. He has no wheezes.   Abdominal: Soft. Bowel sounds are normal. There is no tenderness.   Musculoskeletal: Normal range of motion. He exhibits no edema.   Lymphadenopathy:     He has no cervical adenopathy.   Neurological: He is alert and oriented to person, place, and time.   Skin: Skin is warm and dry. No rash noted.   Psychiatric: He has a normal mood and affect. His behavior is normal.   Nursing note and vitals reviewed.       Assessment/Plan:       Problem List Items Addressed This Visit        Neuro    History of vertebral compression fracture - Primary        Orthopedic    Lumbar facet arthropathy      Other Visit Diagnoses     Lumbar pain        Spinal stenosis of lumbar region without neurogenic claudication          cont on pt until completion - 6 weeks.    RTC if condition acutely worsens or any other concerns, otherwise RTC as scheduled if not improved at end of therapy

## 2018-11-16 ENCOUNTER — TELEPHONE (OUTPATIENT)
Dept: FAMILY MEDICINE | Facility: CLINIC | Age: 78
End: 2018-11-16

## 2018-11-16 NOTE — TELEPHONE ENCOUNTER
----- Message from Madison Miller sent at 2018  8:23 AM CST -----  Contact: Self  Domonique Hernandez Jr.  MRN: [id number]  : 1940  PCP: Emiliano Cam  Home Phone      [phone]  Work Phone      Not on file.  Mobile          [phone]      MESSAGE:   Pt requests a sooner appointment than the  can schedule.  Does patient feel like they need to be seen today:  yes  What is the nature of the appointment:  Fell, hit chest, very painful  What visit type:  est  Did you check other providers/department schedules for availability:   Yes, only would like to see Dr. Cummings  Comments:     Phone:  [phone]

## 2018-11-18 ENCOUNTER — HOSPITAL ENCOUNTER (EMERGENCY)
Facility: HOSPITAL | Age: 78
Discharge: HOME | End: 2018-11-18
Attending: SURGERY
Payer: MEDICARE

## 2018-11-18 VITALS
RESPIRATION RATE: 18 BRPM | OXYGEN SATURATION: 95 % | DIASTOLIC BLOOD PRESSURE: 81 MMHG | SYSTOLIC BLOOD PRESSURE: 150 MMHG | HEART RATE: 79 BPM | TEMPERATURE: 98 F

## 2018-11-18 DIAGNOSIS — S20.212A RIB CONTUSION, LEFT, INITIAL ENCOUNTER: Primary | ICD-10-CM

## 2018-11-18 PROCEDURE — 96372 THER/PROPH/DIAG INJ SC/IM: CPT | Mod: HCWC

## 2018-11-18 PROCEDURE — 99284 EMERGENCY DEPT VISIT MOD MDM: CPT | Mod: 25,HCWC

## 2018-11-18 PROCEDURE — 63600175 PHARM REV CODE 636 W HCPCS: Mod: HCWC | Performed by: SURGERY

## 2018-11-18 RX ORDER — CYCLOBENZAPRINE HCL 10 MG
10 TABLET ORAL 3 TIMES DAILY PRN
Qty: 10 TABLET | Refills: 0 | Status: SHIPPED | OUTPATIENT
Start: 2018-11-18 | End: 2018-11-23

## 2018-11-18 RX ORDER — KETOROLAC TROMETHAMINE 30 MG/ML
30 INJECTION, SOLUTION INTRAMUSCULAR; INTRAVENOUS
Status: COMPLETED | OUTPATIENT
Start: 2018-11-18 | End: 2018-11-18

## 2018-11-18 RX ORDER — KETOROLAC TROMETHAMINE 10 MG/1
10 TABLET, FILM COATED ORAL EVERY 6 HOURS PRN
Qty: 15 TABLET | Refills: 0 | Status: SHIPPED | OUTPATIENT
Start: 2018-11-18 | End: 2018-11-29

## 2018-11-18 RX ORDER — METHYLPREDNISOLONE 4 MG/1
TABLET ORAL
Qty: 1 PACKAGE | Refills: 0 | Status: SHIPPED | OUTPATIENT
Start: 2018-11-18 | End: 2018-12-03

## 2018-11-18 RX ADMIN — KETOROLAC TROMETHAMINE 30 MG: 30 INJECTION, SOLUTION INTRAMUSCULAR; INTRAVENOUS at 10:11

## 2018-11-18 NOTE — ED PROVIDER NOTES
[facility]                                                 Chief Complaint  78 y.o. male with Chest Injury    History of Present Illness  Domonique Hernandez JrTiffanie presents to the emergency room with right rib pain this week  Patient had [place] this [place] with right rib pain  Patient denies any head trauma with the fall, denies any loss of consciousness  Patient awkwardly hit his right rib area with residual pain afterwards per history  Patient has clear lung sounds in all fields, normal cardiac evaluation in the ER  Patient states pain is elicited with any activity or deep breath after the injury    The history is provided by the patient   device was not used during this ER visit    Past Medical History   -- Anxiety     -- Arthritis     -- Chronic gout     -- COPD (chronic obstructive pulmonary disease)     -- Depression     -- Emphysema of lung     -- Generalized headaches     -- GERD (gastroesophageal reflux disease)     -- Hyperlipidemia     -- Hypertension     -- Hypothyroidism     -- Obesity     -- Sleep apnea     -- Thyroid disease        Past Surgical History   -- Bilateral mastoidectomies       -- COLONOSCOPY       -- Eardrum repair       -- HERNIA REPAIR       -- KNEE ARTHROSCOPY       -- ROTATOR CUFF REPAIR          ALLERGIES: Percocet and Percodan    Review of Systems and Physical Exam      Review of Systems  -- Constitution - no fever, denies fatigue, no weakness, no chills  -- Eyes - no tearing or redness, no visual disturbance  -- Ear, Nose - no tinnitus or earache, no nasal congestion or discharge  -- Mouth,Throat - no sore throat, no toothache, normal voice, normal swallowing  -- Respiratory - denies cough and congestion, no shortness of breath, no COOL  -- Cardiovascular - denies chest pain, no palpitations, denies claudication  -- Gastrointestinal - denies abdominal pain, nausea, vomiting, or diarrhea  -- Genitourinary - no dysuria,  denies flank pain, no hematuria, no STD risk  -- Musculoskeletal - right rib pain, negative for myalgias and arthralgias   -- Neurological - no headache, denies weakness or seizure; no LOC  -- Skin - denies pallor, rash, or changes in skin. no hives or welts noted  -- Psychiatric - Denies SI or HI, no psychosis or fractured thought noted     Vital Signs  His blood pressure is 150/81 and his pulse is 79.   His respiration is 18 and oxygen saturation is 95%.     Physical Exam  -- Nursing note and vitals reviewed  -- Constitutional: Appears well-developed and well-nourished  -- Head: Atraumatic. Normocephalic. No obvious abnormality  -- Eyes: Pupils are equal and reactive to light. Normal conjunctiva and lids  -- Cardiac: Normal rate, regular rhythm and normal heart sounds  -- Pulmonary: Normal respiratory effort, breath sounds clear to auscultation  -- Abdominal: Soft, no tenderness. Normal bowel sounds. Normal liver edge  -- Musculoskeletal: Normal range of motion, no effusions. Joints stable   -- Neurological: No focal deficits. Showed good interaction with staff  -- Vascular: Posterior tibial, dorsalis pedis and radial pulses 2+ bilaterally    -- Lymphatics: No cervical or peripheral lymphadenopathy. No edema noted  -- Skin: Warm and dry. No evidence of rash or cellulitis  -- Psychiatric: Normal mood and affect. Bedside behavior is appropriate    Emergency Room Course      CT Chest  1. There is no acute or significant abnormality.  There is no pneumothorax or pleural effusion.  There is no displaced or depressed right rib fracture.  There is bibasilar, right greater than left, atelectasis or scar.   2. Old granulomatous disease.   3. Extensive degenerative changes present throughout the thoracic spine without obvious acute fracture or traumatic subluxation.  There is significant degenerative disc disease at the T2-3 level.  These changes could also represent sequelae from remote trauma.  There is mild superior  endplate depression of L1 which appears chronic.     Medications Given  -- IM 30 mg Toradol given today in the ER    Diagnosis  -- The encounter diagnosis was Rib contusion, left, initial encounter.    Disposition and Plan  -- Disposition: home  -- Condition: stable  -- Follow-up: Patient to follow up with Emiliano Cam MD in 1-2 days.  -- I advised the patient that we have found no life threatening condition today  -- At this time, I believe the patient is clinically stable for discharge.   -- The patient acknowledges that close follow up with a MD is required   -- Patient agrees to comply with all instruction and direction given in the ER    This note is dictated on M*Modal word recognition program.  There are word recognition mistakes that are occasionally missed on review.         Uriel Damico MD  11/18/18 [id number]

## 2018-11-29 ENCOUNTER — TELEPHONE (OUTPATIENT)
Dept: HEPATOLOGY | Facility: HOSPITAL | Age: 78
End: 2018-11-29

## 2018-11-29 ENCOUNTER — OFFICE VISIT (OUTPATIENT)
Dept: FAMILY MEDICINE | Facility: CLINIC | Age: 78
End: 2018-11-29
Payer: MEDICARE

## 2018-11-29 VITALS
BODY MASS INDEX: 40.52 KG/M2 | RESPIRATION RATE: 20 BRPM | HEART RATE: 80 BPM | DIASTOLIC BLOOD PRESSURE: 68 MMHG | SYSTOLIC BLOOD PRESSURE: 128 MMHG | WEIGHT: 258.19 LBS | HEIGHT: 67 IN

## 2018-11-29 DIAGNOSIS — J44.1 COPD EXACERBATION: ICD-10-CM

## 2018-11-29 DIAGNOSIS — G89.29 CHRONIC MIDLINE LOW BACK PAIN WITHOUT SCIATICA: ICD-10-CM

## 2018-11-29 DIAGNOSIS — K64.9 HEMORRHOIDS, UNSPECIFIED HEMORRHOID TYPE: Primary | ICD-10-CM

## 2018-11-29 DIAGNOSIS — M54.50 CHRONIC MIDLINE LOW BACK PAIN WITHOUT SCIATICA: ICD-10-CM

## 2018-11-29 DIAGNOSIS — K62.5 BRBPR (BRIGHT RED BLOOD PER RECTUM): ICD-10-CM

## 2018-11-29 DIAGNOSIS — D62 ACUTE BLOOD LOSS ANEMIA: Primary | ICD-10-CM

## 2018-11-29 LAB
BASOPHILS # BLD AUTO: 0.04 K/UL
BASOPHILS NFR BLD: 0.4 %
DIFFERENTIAL METHOD: ABNORMAL
EOSINOPHIL # BLD AUTO: 0.4 K/UL
EOSINOPHIL NFR BLD: 4.7 %
ERYTHROCYTE [DISTWIDTH] IN BLOOD BY AUTOMATED COUNT: 14.5 %
HCT VFR BLD AUTO: 38.2 %
HGB BLD-MCNC: 12.6 G/DL
LYMPHOCYTES # BLD AUTO: 1.3 K/UL
LYMPHOCYTES NFR BLD: 14.6 %
MCH RBC QN AUTO: 31.3 PG
MCHC RBC AUTO-ENTMCNC: 33 G/DL
MCV RBC AUTO: 95 FL
MONOCYTES # BLD AUTO: 1 K/UL
MONOCYTES NFR BLD: 11.5 %
NEUTROPHILS # BLD AUTO: 6.2 K/UL
NEUTROPHILS NFR BLD: 68.8 %
PLATELET # BLD AUTO: 219 K/UL
PMV BLD AUTO: 10.5 FL
RBC # BLD AUTO: 4.02 M/UL
WBC # BLD AUTO: 8.96 K/UL

## 2018-11-29 PROCEDURE — 36415 COLL VENOUS BLD VENIPUNCTURE: CPT | Mod: HCWC,S$GLB,, | Performed by: FAMILY MEDICINE

## 2018-11-29 PROCEDURE — 99999 PR PBB SHADOW E&M-EST. PATIENT-LVL IV: CPT | Mod: PBBFAC,HCWC,, | Performed by: FAMILY MEDICINE

## 2018-11-29 PROCEDURE — 3078F DIAST BP <80 MM HG: CPT | Mod: CPTII,HCWC,S$GLB, | Performed by: FAMILY MEDICINE

## 2018-11-29 PROCEDURE — 96372 THER/PROPH/DIAG INJ SC/IM: CPT | Mod: HCWC,S$GLB,, | Performed by: FAMILY MEDICINE

## 2018-11-29 PROCEDURE — 1101F PT FALLS ASSESS-DOCD LE1/YR: CPT | Mod: CPTII,HCWC,S$GLB, | Performed by: FAMILY MEDICINE

## 2018-11-29 PROCEDURE — 99214 OFFICE O/P EST MOD 30 MIN: CPT | Mod: 25,HCWC,S$GLB, | Performed by: FAMILY MEDICINE

## 2018-11-29 PROCEDURE — 85025 COMPLETE CBC W/AUTO DIFF WBC: CPT | Mod: HCWC

## 2018-11-29 PROCEDURE — 3074F SYST BP LT 130 MM HG: CPT | Mod: CPTII,HCWC,S$GLB, | Performed by: FAMILY MEDICINE

## 2018-11-29 RX ORDER — TRAMADOL HYDROCHLORIDE 50 MG/1
50 TABLET ORAL EVERY 12 HOURS PRN
Qty: 20 TABLET | Refills: 0 | Status: SHIPPED | OUTPATIENT
Start: 2018-11-29 | End: 2019-01-28 | Stop reason: SDUPTHER

## 2018-11-29 RX ORDER — HYDROCORTISONE 10 MG/G
1 CREAM TOPICAL 2 TIMES DAILY PRN
Qty: 30 G | Refills: 1 | Status: ON HOLD | OUTPATIENT
Start: 2018-11-29 | End: 2020-02-18 | Stop reason: HOSPADM

## 2018-11-29 RX ORDER — AZITHROMYCIN 250 MG/1
TABLET, FILM COATED ORAL
Qty: 6 TABLET | Refills: 0 | Status: SHIPPED | OUTPATIENT
Start: 2018-11-29 | End: 2018-12-03

## 2018-11-29 RX ORDER — METHYLPREDNISOLONE ACETATE 40 MG/ML
40 INJECTION, SUSPENSION INTRA-ARTICULAR; INTRALESIONAL; INTRAMUSCULAR; SOFT TISSUE
Status: COMPLETED | OUTPATIENT
Start: 2018-11-29 | End: 2018-11-29

## 2018-11-29 RX ADMIN — METHYLPREDNISOLONE ACETATE 40 MG: 40 INJECTION, SUSPENSION INTRA-ARTICULAR; INTRALESIONAL; INTRAMUSCULAR; SOFT TISSUE at 04:11

## 2018-11-29 NOTE — PROGRESS NOTES
Subjective:       Patient ID: Domonique Hernandez Jr. is a 78 y.o. male.    Chief Complaint: Follow-up (4 wk fu)    HPI  78 year old male comes in for f/u. He says he has been doing well with pt and his back is improving. However, he fell recently and hit his chest and he had to put his pt on hold. He says he needs to have it reordered.     He notes that he is having some blood in his stool. He feels that he must of busted a blood vessel. He denies any hard stool recently and has never had a hemorrhoid.    PMH, PSH, ALLERGIES, SH, FH reviewed in nurse's notes above  Medications reconciled in the nurse's notes    Review of Systems   Constitutional: Negative for chills and fever.   HENT: Negative for congestion, ear pain, postnasal drip, rhinorrhea, sore throat and trouble swallowing.    Eyes: Negative for redness and itching.   Respiratory: Positive for cough. Negative for shortness of breath and wheezing.    Cardiovascular: Negative for chest pain and palpitations.   Gastrointestinal: Positive for blood in stool. Negative for abdominal pain, diarrhea, nausea and vomiting.   Genitourinary: Negative for dysuria and frequency.   Musculoskeletal: Positive for back pain.   Skin: Negative for rash.   Neurological: Negative for weakness and headaches.       Objective:      Physical Exam   Constitutional: He is oriented to person, place, and time. He appears well-developed. No distress.   HENT:   Head: Normocephalic and atraumatic.   Eyes: Conjunctivae are normal. Pupils are equal, round, and reactive to light.   Neck: Normal range of motion. Neck supple. No thyromegaly present.   Cardiovascular: Normal rate, regular rhythm, normal heart sounds and intact distal pulses.   Pulmonary/Chest: Effort normal. No respiratory distress. He has wheezes.   Abdominal: Soft. Bowel sounds are normal. There is no tenderness.   Genitourinary:   Genitourinary Comments: 1:00 and 7:00 with black/bleeding hemorrhoids.    Blood proximal to these  hemorrhoids   Musculoskeletal: Normal range of motion. He exhibits no edema.   Lymphadenopathy:     He has no cervical adenopathy.   Neurological: He is alert and oriented to person, place, and time.   Skin: Skin is warm and dry. No rash noted.   Psychiatric: He has a normal mood and affect. His behavior is normal.   Nursing note and vitals reviewed.       Assessment/Plan:       Problem List Items Addressed This Visit     None      Visit Diagnoses     Hemorrhoids, unspecified hemorrhoid type    -  Primary    Relevant Medications    hydrocortisone (PROCTO-KATIUSKA) 1 % crpe    Chronic midline low back pain without sciatica        Relevant Medications    traMADol (ULTRAM) 50 mg tablet    Other Relevant Orders    Ambulatory Referral to Physical/Occupational Therapy    COPD exacerbation        Relevant Medications    azithromycin (Z-KATIUSKA) 250 MG tablet    methylPREDNISolone acetate injection 40 mg    BRBPR (bright red blood per rectum)        Relevant Orders    CBC auto differential      RTC if condition acutely worsens or any other concerns, otherwise RTC as scheduled

## 2018-11-30 ENCOUNTER — LAB VISIT (OUTPATIENT)
Dept: LAB | Facility: HOSPITAL | Age: 78
End: 2018-11-30
Attending: FAMILY MEDICINE
Payer: MEDICARE

## 2018-11-30 ENCOUNTER — TELEPHONE (OUTPATIENT)
Dept: FAMILY MEDICINE | Facility: CLINIC | Age: 78
End: 2018-11-30

## 2018-11-30 DIAGNOSIS — D62 ACUTE BLOOD LOSS ANEMIA: ICD-10-CM

## 2018-11-30 LAB
BASOPHILS # BLD AUTO: 0.04 K/UL
BASOPHILS NFR BLD: 0.4 %
DIFFERENTIAL METHOD: ABNORMAL
EOSINOPHIL # BLD AUTO: 0.3 K/UL
EOSINOPHIL NFR BLD: 2.9 %
ERYTHROCYTE [DISTWIDTH] IN BLOOD BY AUTOMATED COUNT: 14.3 %
HCT VFR BLD AUTO: 39.3 %
HGB BLD-MCNC: 12.8 G/DL
LYMPHOCYTES # BLD AUTO: 1.6 K/UL
LYMPHOCYTES NFR BLD: 16.3 %
MCH RBC QN AUTO: 30.8 PG
MCHC RBC AUTO-ENTMCNC: 32.6 G/DL
MCV RBC AUTO: 95 FL
MONOCYTES # BLD AUTO: 0.8 K/UL
MONOCYTES NFR BLD: 8.5 %
NEUTROPHILS # BLD AUTO: 7 K/UL
NEUTROPHILS NFR BLD: 71.9 %
PLATELET # BLD AUTO: 232 K/UL
PMV BLD AUTO: 10.4 FL
RBC # BLD AUTO: 4.15 M/UL
WBC # BLD AUTO: 9.79 K/UL

## 2018-11-30 PROCEDURE — 36415 COLL VENOUS BLD VENIPUNCTURE: CPT | Mod: HCWC

## 2018-11-30 PROCEDURE — 85025 COMPLETE CBC W/AUTO DIFF WBC: CPT | Mod: HCWC

## 2018-11-30 NOTE — TELEPHONE ENCOUNTER
Spoke with pt and notified him of results. Pt stated he did start bleeding again. Pt advised to report to ER to receive blood

## 2018-11-30 NOTE — TELEPHONE ENCOUNTER
----- Message from Lucia Leach MD sent at 11/29/2018  6:26 PM CST -----  Drop in blood counts as compared to last year.  Left message for patient on VM and advised that he go to ER if he sees any more blood tonight. Otherwise, will recheck blood counts in AM.  CBC placed. Please call him ASAP.  mh

## 2018-12-03 ENCOUNTER — TELEPHONE (OUTPATIENT)
Dept: FAMILY MEDICINE | Facility: CLINIC | Age: 78
End: 2018-12-03

## 2018-12-03 ENCOUNTER — OFFICE VISIT (OUTPATIENT)
Dept: FAMILY MEDICINE | Facility: CLINIC | Age: 78
End: 2018-12-03
Payer: MEDICARE

## 2018-12-03 ENCOUNTER — OFFICE VISIT (OUTPATIENT)
Dept: SURGERY | Facility: CLINIC | Age: 78
End: 2018-12-03
Payer: MEDICARE

## 2018-12-03 VITALS
SYSTOLIC BLOOD PRESSURE: 164 MMHG | RESPIRATION RATE: 20 BRPM | HEART RATE: 76 BPM | WEIGHT: 251.63 LBS | DIASTOLIC BLOOD PRESSURE: 76 MMHG | HEIGHT: 67 IN | BODY MASS INDEX: 39.49 KG/M2

## 2018-12-03 VITALS
BODY MASS INDEX: 38.09 KG/M2 | DIASTOLIC BLOOD PRESSURE: 80 MMHG | WEIGHT: 251.31 LBS | SYSTOLIC BLOOD PRESSURE: 154 MMHG | HEIGHT: 68 IN | HEART RATE: 102 BPM

## 2018-12-03 DIAGNOSIS — K64.9 HEMORRHOIDS, UNSPECIFIED HEMORRHOID TYPE: ICD-10-CM

## 2018-12-03 DIAGNOSIS — K64.8 HEMORRHOIDS, INTERNAL, WITH BLEEDING: Primary | ICD-10-CM

## 2018-12-03 DIAGNOSIS — K62.5 RECTAL BLEEDING: ICD-10-CM

## 2018-12-03 DIAGNOSIS — K57.31 DIVERTICULOSIS OF LARGE INTESTINE WITH HEMORRHAGE: ICD-10-CM

## 2018-12-03 DIAGNOSIS — K62.5 BRBPR (BRIGHT RED BLOOD PER RECTUM): Primary | ICD-10-CM

## 2018-12-03 LAB
BASOPHILS # BLD AUTO: 0.05 K/UL
BASOPHILS NFR BLD: 0.4 %
DIFFERENTIAL METHOD: ABNORMAL
EOSINOPHIL # BLD AUTO: 0.4 K/UL
EOSINOPHIL NFR BLD: 3.2 %
ERYTHROCYTE [DISTWIDTH] IN BLOOD BY AUTOMATED COUNT: 14.4 %
HCT VFR BLD AUTO: 41 %
HGB BLD-MCNC: 13.2 G/DL
LYMPHOCYTES # BLD AUTO: 1.8 K/UL
LYMPHOCYTES NFR BLD: 15.8 %
MCH RBC QN AUTO: 30.5 PG
MCHC RBC AUTO-ENTMCNC: 32.2 G/DL
MCV RBC AUTO: 95 FL
MONOCYTES # BLD AUTO: 0.8 K/UL
MONOCYTES NFR BLD: 6.9 %
NEUTROPHILS # BLD AUTO: 8.5 K/UL
NEUTROPHILS NFR BLD: 73.7 %
PLATELET # BLD AUTO: 273 K/UL
PMV BLD AUTO: 10.7 FL
RBC # BLD AUTO: 4.33 M/UL
WBC # BLD AUTO: 11.49 K/UL

## 2018-12-03 PROCEDURE — 46600 DIAGNOSTIC ANOSCOPY SPX: CPT | Mod: HCWC,S$GLB,, | Performed by: FAMILY MEDICINE

## 2018-12-03 PROCEDURE — 99213 OFFICE O/P EST LOW 20 MIN: CPT | Mod: HCWC,25,S$GLB, | Performed by: FAMILY MEDICINE

## 2018-12-03 PROCEDURE — 99999 PR PBB SHADOW E&M-EST. PATIENT-LVL III: CPT | Mod: PBBFAC,,, | Performed by: COLON & RECTAL SURGERY

## 2018-12-03 PROCEDURE — 85025 COMPLETE CBC W/AUTO DIFF WBC: CPT | Mod: HCWC

## 2018-12-03 PROCEDURE — 3077F SYST BP >= 140 MM HG: CPT | Mod: CPTII,HCWC,S$GLB, | Performed by: FAMILY MEDICINE

## 2018-12-03 PROCEDURE — 3077F SYST BP >= 140 MM HG: CPT | Mod: CPTII,S$GLB,, | Performed by: COLON & RECTAL SURGERY

## 2018-12-03 PROCEDURE — 99999 PR PBB SHADOW E&M-EST. PATIENT-LVL IV: CPT | Mod: PBBFAC,HCWC,, | Performed by: FAMILY MEDICINE

## 2018-12-03 PROCEDURE — 1101F PT FALLS ASSESS-DOCD LE1/YR: CPT | Mod: CPTII,HCWC,S$GLB, | Performed by: FAMILY MEDICINE

## 2018-12-03 PROCEDURE — 99203 OFFICE O/P NEW LOW 30 MIN: CPT | Mod: 25,S$GLB,, | Performed by: COLON & RECTAL SURGERY

## 2018-12-03 PROCEDURE — 1101F PT FALLS ASSESS-DOCD LE1/YR: CPT | Mod: CPTII,S$GLB,, | Performed by: COLON & RECTAL SURGERY

## 2018-12-03 PROCEDURE — 3079F DIAST BP 80-89 MM HG: CPT | Mod: CPTII,S$GLB,, | Performed by: COLON & RECTAL SURGERY

## 2018-12-03 PROCEDURE — 3078F DIAST BP <80 MM HG: CPT | Mod: CPTII,HCWC,S$GLB, | Performed by: FAMILY MEDICINE

## 2018-12-03 PROCEDURE — 46221 LIGATION OF HEMORRHOID(S): CPT | Mod: S$GLB,,, | Performed by: COLON & RECTAL SURGERY

## 2018-12-03 PROCEDURE — 36415 COLL VENOUS BLD VENIPUNCTURE: CPT | Mod: HCWC,S$GLB,, | Performed by: FAMILY MEDICINE

## 2018-12-03 RX ORDER — METRONIDAZOLE 500 MG/1
500 TABLET ORAL EVERY 12 HOURS
Qty: 14 TABLET | Refills: 0 | Status: SHIPPED | OUTPATIENT
Start: 2018-12-03 | End: 2019-01-28 | Stop reason: ALTCHOICE

## 2018-12-03 RX ORDER — CIPROFLOXACIN 500 MG/1
500 TABLET ORAL 2 TIMES DAILY
Qty: 14 TABLET | Refills: 0 | Status: SHIPPED | OUTPATIENT
Start: 2018-12-03 | End: 2019-01-23 | Stop reason: ALTCHOICE

## 2018-12-03 NOTE — PROGRESS NOTES
Subjective:       Patient ID: Domonique Hernandez Jr. is a 78 y.o. male.    Chief Complaint: Rectal Bleeding (not getting better) and Diarrhea    HPI  78 year old male comes in for rectal bleeding.  He notes that he is having some blood in his stool. He feels that he must of busted a blood vessel. He denies any hard stool recently and has never had a hemorrhoid.  He had colonoscopy 2010 which revealed large number of diverticuli.  Treated with hemorrhoid cream.  Still bleeding.    PMH, PSH, ALLERGIES, SH, FH reviewed in nurse's notes above  Medications reconciled in the nurse's notes    Review of Systems   Constitutional: Negative for chills and fever.   HENT: Negative for congestion, ear pain, postnasal drip, rhinorrhea, sore throat and trouble swallowing.    Eyes: Negative for redness and itching.   Respiratory: Positive for cough. Negative for shortness of breath and wheezing.    Cardiovascular: Negative for chest pain and palpitations.   Gastrointestinal: Positive for blood in stool. Negative for abdominal pain, diarrhea, nausea and vomiting.   Genitourinary: Negative for dysuria and frequency.   Musculoskeletal: Positive for back pain.   Skin: Negative for rash.   Neurological: Negative for weakness and headaches.       Objective:       Vitals:    12/03/18 1011   BP: (!) 164/76   Pulse: 76   Resp: 20       Physical Exam   Constitutional: He is oriented to person, place, and time. He appears well-developed. No distress.   HENT:   Head: Normocephalic and atraumatic.   Eyes: Conjunctivae are normal. Pupils are equal, round, and reactive to light.   Neck: Normal range of motion. Neck supple. No thyromegaly present.   Cardiovascular: Normal rate, regular rhythm, normal heart sounds and intact distal pulses.   Pulmonary/Chest: Effort normal. No respiratory distress. He has wheezes.   Abdominal: Soft. Bowel sounds are normal. There is no tenderness.   Genitourinary:   Genitourinary Comments: Anoscope shows mild hemorrhoids  but not bleeding.  Blood present further up the rectum   Musculoskeletal: Normal range of motion. He exhibits no edema.   Lymphadenopathy:     He has no cervical adenopathy.   Neurological: He is alert and oriented to person, place, and time.   Skin: Skin is warm and dry. No rash noted.   Psychiatric: He has a normal mood and affect. His behavior is normal.   Nursing note and vitals reviewed.       Assessment/Plan:       Problem List Items Addressed This Visit     None      Visit Diagnoses     BRBPR (bright red blood per rectum)    -  Primary    Relevant Orders    Endoscopy, anus    Ambulatory referral to Colorectal Surgery    CBC auto differential    Hemorrhoids, unspecified hemorrhoid type        Relevant Orders    Endoscopy, anus    Ambulatory referral to Colorectal Surgery    CBC auto differential    Diverticulosis of large intestine with hemorrhage        Relevant Medications    metroNIDAZOLE (FLAGYL) 500 MG tablet    ciprofloxacin HCl (CIPRO) 500 MG tablet          This could be diverticular bleed    Ref to Dr. Hodge

## 2018-12-03 NOTE — TELEPHONE ENCOUNTER
----- Message from Lucia Leach MD sent at 11/30/2018  5:45 PM CST -----  Please let mr. lopez know his counts are the same.  Cont to treat hemorrhoid.  If bleeding happens, he needs to be seen.  However, once bleeding stops, in a couple of weeks, will need a colonoscopy.      pt asked for the nurse to call him back, pt stated he seen  last weds (11/29/2018) and he is not doing any better, he is still bleeding from the rectum so he is curious what else he should do. Also would like to speak to someone about the blood work he had to go to the hospital for.      Spoke with pt, appt scheduled for this morning

## 2018-12-03 NOTE — TELEPHONE ENCOUNTER
----- Message from Alejandra Morales sent at 12/3/2018  8:55 AM CST -----  Contact: pt  Domonique Hernandez Jr.  MRN: [id number]  : 1940  PCP: Emiliano Cam  Home Phone      [phone]  Work Phone      Not on file.  Mobile          [phone]      MESSAGE:   Pt requests a sooner appointment than the  can schedule.  Does patient feel like they need to be seen today:  yes  What is the nature of the appointment:  Not doing better since last visit   What visit type:  EP  Did you check other providers/department schedules for availability:   yes  Comments: pt asked for the nurse to call him back, pt stated he seen  last  (2018) and he is not doing any better, he is still bleeding from the rectum so he is curious what else he should do. Also would like to speak to someone about the blood work he had to go to the hospital for.     Phone:  [phone]

## 2018-12-03 NOTE — LETTER
December 16, 2018      Emiliano Cam MD  [address] [place] [address]           [facility]  [address]  [facility] [address]  Phone: [phone]          Patient: Domonique Hernandez Jr.   MR Number: [id number]   YOB: 1940   Date of Visit: 12/3/2018       Dear Dr. Emiliano Cam:    Thank you for referring Domonique Hernandez to me for evaluation. Attached you will find relevant portions of my assessment and plan of care.    If you have questions, please do not hesitate to call me. I look forward to following Domonique Hernandez along with you.    Sincerely,    Joaquín Crawford MD    Enclosure  CC:  No Recipients    If you would like to receive this communication electronically, please contact [email or web address] or [phone] to request more information on [employer] access.    For providers and/or their staff who would like to refer a patient to [facility], please contact us through our one-stop-shop provider referral line, [facility], at [phone].    If you feel you have received this communication in error or would no longer like to receive these types of communications, please e-mail [email or web address]

## 2018-12-03 NOTE — TELEPHONE ENCOUNTER
----- Message from Lucia Leach MD sent at 11/30/2018  5:45 PM CST -----  Please let mr. lopez know his counts are the same.  Cont to treat hemorrhoid.  If bleeding happens, he needs to be seen.  However, once bleeding stops, in a couple of weeks, will need a colonoscopy.

## 2018-12-03 NOTE — PROGRESS NOTES
Subjective:       Patient ID: Domonique Hernandez Jr. is a 78 y.o. male.    Chief Complaint: Rectal Bleeding and Hemorrhoids    HPI 79 yo M with complaints of bright red blood with bowel movements for the past 4 days, though he has not had any bleeding today.  He has no pain with his bowel movements.  He denies any constipation.  He denies any abdominal pain. He does not take any blood thinners.  He had blood work done earlier today which showed a hemoglobin of 13.2.    Last colonoscopy - 2012, extensive sigmoid diverticulosis noted  + family hx of CRC - mother diagnosed in her 60's.      Review of patient's allergies indicates:   Allergen Reactions    Percocet [oxycodone-acetaminophen] Other (See Comments)     hyperactivity    Percodan [oxycodone hcl-oxycodone-asa] Other (See Comments)     hyperactivity       Past Medical History:   Diagnosis Date    Anxiety     Arthritis     Back pain     Chronic bronchitis     Chronic gout     COPD (chronic obstructive pulmonary disease)     Depression     Emphysema of lung     Generalized headaches     GERD (gastroesophageal reflux disease)     Hyperlipidemia     Hypertension     Hypothyroidism     Obesity     Peripheral vascular disease     Sleep apnea     On CPAP    Stage 3 chronic kidney disease 4/18/2018    Thyroid disease        Past Surgical History:   Procedure Laterality Date    Bilateral mastoidectomies Bilateral 1984    for ear infection    BLOCK-NERVE-MEDIAL BRANCH-LUMBAR (L3,4,5)  Bilateral 6/27/2014    [facility]    COLONOSCOPY  2010    Eardrum repair  1984    Not sure which side    HERNIA REPAIR  1984    Umbilical-screen    KNEE ARTHROSCOPY Left 1989    ROTATOR CUFF REPAIR Left 2002    Left       Current Outpatient Medications   Medication Sig Dispense Refill    albuterol 90 mcg/actuation inhaler Inhale 2 puffs into the lungs every 6 (six) hours as needed for Wheezing. Rescue 18 g 5    albuterol-ipratropium  2.5mg-0.5mg/3mL (DUO-NEB) 0.5 mg-3 mg(2.5 mg base)/3 mL nebulizer solution Take 3 mLs by nebulization every 6 (six) hours as needed for Wheezing. 120 vial 5    allopurinol (ZYLOPRIM) 300 MG tablet TAKE 1 TABLET ONE TIME DAILY 90 tablet 1    amLODIPine (NORVASC) 5 MG tablet Take 1 tablet (5 mg total) by mouth once daily. 90 tablet 1    carvedilol (COREG) 25 MG tablet TAKE 1 TABLET (25 MG TOTAL) BY MOUTH 2 (TWO) TIMES DAILY WITH MEALS. 180 tablet 1    cetirizine (ZYRTEC) 10 MG tablet Take 1 tablet (10 mg total) by mouth once daily. 90 tablet 1    ciprofloxacin HCl (CIPRO) 500 MG tablet Take 1 tablet (500 mg total) by mouth 2 (two) times daily. 14 tablet 0    dutasteride (AVODART) 0.5 mg capsule Take 1 capsule (0.5 mg total) by mouth once daily. 90 capsule 1    fluticasone (FLONASE) 50 mcg/actuation nasal spray 1 spray by Each Nare route 2 (two) times daily. 1 Bottle 11    hydrocortisone (PROCTO-KATIUSKA) 1 % crpe Place 1 applicator rectally 2 (two) times daily as needed. 30 g 1    ketoconazole (NIZORAL) 2 % shampoo USE TO WASH SCALP ONCE DAILY AS NEEDED 120 mL 5    levothyroxine (SYNTHROID) 100 MCG tablet TAKE 1 TABLET ONE TIME DAILY 90 tablet 1    metroNIDAZOLE (FLAGYL) 500 MG tablet Take 1 tablet (500 mg total) by mouth every 12 (twelve) hours. 14 tablet 0    multivitamin (ONE DAILY MULTIVITAMIN) per tablet Take 1 tablet by mouth once daily. Centrum Silver for Men      nystatin (MYCOSTATIN) powder APPLY TOPICALLY 2 (TWO) TIMES DAILY. 15 g 10    sildenafil, antihypertensive, (REVATIO) 20 mg Tab Take 3-5 tablets PO 90 tablet 3    simvastatin (ZOCOR) 40 MG tablet Take 1 tablet (40 mg total) by mouth every evening. 90 tablet 1    tamsulosin (FLOMAX) 0.4 mg Cap TAKE 1 TO 2 CAPSULES DAILY 180 capsule 1    testosterone (ANDROGEL) 20.25 mg/1.25 gram (1.62 %) GlPm Apply 2 pumps to shoulders daily 1 Bottle 5    traMADol (ULTRAM) 50 mg tablet Take 1 tablet (50 mg total) by mouth every 12 (twelve) hours as needed  for Pain. 20 tablet 0    umeclidinium-vilanterol (ANORO ELLIPTA) 62.5-25 mcg/actuation DsDv Inhale 1 Dose into the lungs once daily. 60 each 5    valsartan (DIOVAN) 80 MG tablet Take 1 tablet (80 mg total) by mouth once daily. 90 tablet 1    venlafaxine (EFFEXOR-XR) 150 MG Cp24 TAKE 1 CAPSULE EVERY DAY 90 capsule 1     No current facility-administered medications for this visit.        Family History   Problem Relation Age of Onset    Cancer Mother         Mother    Cancer Father     No Known Problems Sister     Kidney disease Brother     Stroke Son     Stroke Son     Atrial fibrillation Son     Melanoma Neg Hx        Social History     Socioeconomic History    Marital status:      Spouse name: None    Number of children: None    Years of education: None    Highest education level: None   Social Needs    Financial resource strain: None    Food insecurity - worry: None    Food insecurity - inability: None    Transportation needs - medical: None    Transportation needs - non-medical: None   Occupational History    None   Tobacco Use    Smoking status: Never Smoker    Smokeless tobacco: Never Used   Substance and Sexual Activity    Alcohol use: No    Drug use: No    Sexual activity: Yes     Partners: Female   Other Topics Concern    None   Social History Narrative    None       Review of Systems   Constitutional: Negative for chills and fever.   HENT: Negative for congestion and sinus pressure.    Eyes: Negative for visual disturbance.   Respiratory: Positive for shortness of breath. Negative for cough.    Cardiovascular: Negative for chest pain and palpitations.   Gastrointestinal: Positive for anal bleeding. Negative for abdominal distention, abdominal pain, blood in stool, constipation, diarrhea, nausea, rectal pain and vomiting.   Endocrine: Negative for cold intolerance and heat intolerance.   Genitourinary: Negative for dysuria and frequency.   Musculoskeletal: Positive for back  pain. Negative for arthralgias.   Skin: Negative for rash.   Allergic/Immunologic: Negative for immunocompromised state.   Neurological: Negative for dizziness, light-headedness and headaches.   Psychiatric/Behavioral: Negative for confusion. The patient is nervous/anxious.        Objective:      Physical Exam   Constitutional: He is oriented to person, place, and time. He appears well-developed and well-nourished.   HENT:   Head: Normocephalic and atraumatic.   Eyes: Conjunctivae are normal.   Pulmonary/Chest: Effort normal. No respiratory distress.   Abdominal: Soft. He exhibits no distension and no mass. There is no tenderness. There is no rebound and no guarding.   Genitourinary:   Genitourinary Comments: Perineum - normal perianal skin, no mass, no fissure, no external hemorrhoids  TATO - good tone, no mass  Anoscopy - Grade 2 internal hemorrhoids     Neurological: He is alert and oriented to person, place, and time.   Skin: Skin is warm and dry. No erythema.         Lab Results   Component Value Date    WBC 9.50 12/05/2018    HGB 13.0 (L) 12/05/2018    HCT 39.2 (L) 12/05/2018    MCV 94 12/05/2018     12/05/2018     BMP  Lab Results   Component Value Date     12/05/2018    K 4.2 12/05/2018     12/05/2018    CO2 26 12/05/2018    BUN 18 12/05/2018    CREATININE 1.0 12/05/2018    CALCIUM 9.7 12/05/2018    ANIONGAP 10 12/05/2018    ESTGFRAFRICA >60 12/05/2018    EGFRNONAA >60 12/05/2018     CMP  Sodium   Date Value Ref Range Status   12/05/2018 139 136 - 145 mmol/L Final     Potassium   Date Value Ref Range Status   12/05/2018 4.2 3.5 - 5.1 mmol/L Final     Chloride   Date Value Ref Range Status   12/05/2018 103 95 - 110 mmol/L Final     CO2   Date Value Ref Range Status   12/05/2018 26 23 - 29 mmol/L Final     Glucose   Date Value Ref Range Status   12/05/2018 116 (H) 70 - 110 mg/dL Final     BUN, Bld   Date Value Ref Range Status   12/05/2018 18 8 - 23 mg/dL Final     Creatinine   Date Value Ref  Range Status   12/05/2018 1.0 0.5 - 1.4 mg/dL Final     Calcium   Date Value Ref Range Status   12/05/2018 9.7 8.7 - 10.5 mg/dL Final     Total Protein   Date Value Ref Range Status   12/05/2018 7.0 6.0 - 8.4 g/dL Final     Albumin   Date Value Ref Range Status   12/05/2018 3.4 (L) 3.5 - 5.2 g/dL Final     Total Bilirubin   Date Value Ref Range Status   12/05/2018 0.6 0.1 - 1.0 mg/dL Final     Comment:     For infants and newborns, interpretation of results should be based  on gestational age, weight and in agreement with clinical  observations.  Premature Infant recommended reference ranges:  Up to 24 hours.............<8.0 mg/dL  Up to 48 hours............<12.0 mg/dL  3-5 days..................<15.0 mg/dL  6-29 days.................<15.0 mg/dL       Alkaline Phosphatase   Date Value Ref Range Status   12/05/2018 142 (H) 55 - 135 U/L Final     AST   Date Value Ref Range Status   12/05/2018 19 10 - 40 U/L Final     ALT   Date Value Ref Range Status   12/05/2018 21 10 - 44 U/L Final     Anion Gap   Date Value Ref Range Status   12/05/2018 10 8 - 16 mmol/L Final     eGFR if    Date Value Ref Range Status   12/05/2018 >60 >60 mL/min/1.73 m^2 Final     eGFR if non    Date Value Ref Range Status   12/05/2018 >60 >60 mL/min/1.73 m^2 Final     Comment:     Calculation used to obtain the estimated glomerular filtration  rate (eGFR) is the CKD-EPI equation.        No results found for: CEA        Assessment:       1. Hemorrhoids, internal, with bleeding    2. Rectal bleeding        Plan:   PROCEDURE:  Anoscopy - Diagnostic - Internal Hemorrhoids   rdGrdrrdarddrderd:rd rd3rd With informed consent 2 rubber bands were applied at right posterior and anterior midline positions.  The procedure was tolerated well.  The patient was given a handout which discussed their disease process, precautions, and instructions for follow-up and therapy.    Increase fiber/fluid intake  Daily fiber supplement  Colace 100 mg  bid  MiraLax as needed for constipation.    Will schedule for colonoscopy as well.    Joaquín Crawford MD, FACS, FASCRS  Senior Staff Surgeon  Department of Colon & Rectal Surgery

## 2018-12-05 ENCOUNTER — HOSPITAL ENCOUNTER (EMERGENCY)
Facility: HOSPITAL | Age: 78
Discharge: HOME | End: 2018-12-05
Attending: SURGERY
Payer: MEDICARE

## 2018-12-05 ENCOUNTER — TELEPHONE (OUTPATIENT)
Dept: SURGERY | Facility: CLINIC | Age: 78
End: 2018-12-05

## 2018-12-05 VITALS
OXYGEN SATURATION: 98 % | SYSTOLIC BLOOD PRESSURE: 139 MMHG | DIASTOLIC BLOOD PRESSURE: 80 MMHG | TEMPERATURE: 96 F | HEART RATE: 82 BPM | RESPIRATION RATE: 18 BRPM | BODY MASS INDEX: 37.54 KG/M2 | WEIGHT: 246.94 LBS

## 2018-12-05 DIAGNOSIS — I10 HYPERTENSION, UNSPECIFIED TYPE: Primary | ICD-10-CM

## 2018-12-05 LAB
ALBUMIN SERPL BCP-MCNC: 3.4 G/DL
ALP SERPL-CCNC: 142 U/L
ALT SERPL W/O P-5'-P-CCNC: 21 U/L
ANION GAP SERPL CALC-SCNC: 10 MMOL/L
AST SERPL-CCNC: 19 U/L
BASOPHILS # BLD AUTO: 0.05 K/UL
BASOPHILS NFR BLD: 0.5 %
BILIRUB SERPL-MCNC: 0.6 MG/DL
BUN SERPL-MCNC: 18 MG/DL
CALCIUM SERPL-MCNC: 9.7 MG/DL
CHLORIDE SERPL-SCNC: 103 MMOL/L
CO2 SERPL-SCNC: 26 MMOL/L
CREAT SERPL-MCNC: 1 MG/DL
DIFFERENTIAL METHOD: ABNORMAL
EOSINOPHIL # BLD AUTO: 0.3 K/UL
EOSINOPHIL NFR BLD: 2.8 %
ERYTHROCYTE [DISTWIDTH] IN BLOOD BY AUTOMATED COUNT: 14.3 %
EST. GFR  (AFRICAN AMERICAN): >60 ML/MIN/1.73 M^2
EST. GFR  (NON AFRICAN AMERICAN): >60 ML/MIN/1.73 M^2
GLUCOSE SERPL-MCNC: 116 MG/DL
HCT VFR BLD AUTO: 39.2 %
HGB BLD-MCNC: 13 G/DL
LYMPHOCYTES # BLD AUTO: 1.4 K/UL
LYMPHOCYTES NFR BLD: 14.5 %
MCH RBC QN AUTO: 31.3 PG
MCHC RBC AUTO-ENTMCNC: 33.2 G/DL
MCV RBC AUTO: 94 FL
MONOCYTES # BLD AUTO: 0.6 K/UL
MONOCYTES NFR BLD: 6.4 %
NEUTROPHILS # BLD AUTO: 7.2 K/UL
NEUTROPHILS NFR BLD: 75.8 %
PLATELET # BLD AUTO: 230 K/UL
PMV BLD AUTO: 9.9 FL
POTASSIUM SERPL-SCNC: 4.2 MMOL/L
PROT SERPL-MCNC: 7 G/DL
RBC # BLD AUTO: 4.16 M/UL
SODIUM SERPL-SCNC: 139 MMOL/L
TROPONIN I SERPL DL<=0.01 NG/ML-MCNC: <0.006 NG/ML
WBC # BLD AUTO: 9.5 K/UL

## 2018-12-05 PROCEDURE — 80053 COMPREHEN METABOLIC PANEL: CPT | Mod: HCWC

## 2018-12-05 PROCEDURE — 36415 COLL VENOUS BLD VENIPUNCTURE: CPT | Mod: HCWC

## 2018-12-05 PROCEDURE — 25000003 PHARM REV CODE 250: Mod: HCWC | Performed by: SURGERY

## 2018-12-05 PROCEDURE — 99283 EMERGENCY DEPT VISIT LOW MDM: CPT | Mod: HCWC

## 2018-12-05 PROCEDURE — 84484 ASSAY OF TROPONIN QUANT: CPT | Mod: HCWC

## 2018-12-05 PROCEDURE — 85025 COMPLETE CBC W/AUTO DIFF WBC: CPT | Mod: HCWC

## 2018-12-05 RX ORDER — CLONIDINE HYDROCHLORIDE 0.1 MG/1
0.2 TABLET ORAL
Status: COMPLETED | OUTPATIENT
Start: 2018-12-05 | End: 2018-12-05

## 2018-12-05 RX ADMIN — CLONIDINE HYDROCHLORIDE 0.2 MG: 0.1 TABLET ORAL at 08:12

## 2018-12-05 NOTE — TELEPHONE ENCOUNTER
----- Message from Bre Henson MA sent at 12/5/2018  1:40 PM CST -----  Contact: [phone]  Needs Advice    Reason for call: pt had a rubber band procedure for  Hemorrhoids on 12/3 and still passing blood and wanted to know how long was that to be expected?         Communication Preference: [phone]    Additional Information: please call

## 2018-12-05 NOTE — ED PROVIDER NOTES
Encounter Date: 12/5/2018       History     Chief Complaint   Patient presents with    Fatigue    Cough     Patient is a 78-year-old white male who woke up this morning and felt weak and dizzy.  He apparently has not taken his blood pressure medication in 3-4 days, presented for blood pressure check.  He states symptoms have pretty much resolved at this point, his blood pressure was 183/91 on triage.  He denies headache or dizziness.          Review of patient's allergies indicates:   Allergen Reactions    Percocet [oxycodone-acetaminophen] Other (See Comments)     hyperactivity    Percodan [oxycodone hcl-oxycodone-asa] Other (See Comments)     hyperactivity     Past Medical History:   Diagnosis Date    Anxiety     Arthritis     Back pain     Chronic bronchitis     Chronic gout     COPD (chronic obstructive pulmonary disease)     Depression     Emphysema of lung     Generalized headaches     GERD (gastroesophageal reflux disease)     Hyperlipidemia     Hypertension     Hypothyroidism     Obesity     Peripheral vascular disease     Sleep apnea     On CPAP    Stage 3 chronic kidney disease 4/18/2018    Thyroid disease      Past Surgical History:   Procedure Laterality Date    Bilateral mastoidectomies Bilateral 1984    for ear infection    BLOCK-NERVE-MEDIAL BRANCH-LUMBAR (L3,4,5)  Bilateral 6/27/2014    Performed by Idalia Vela MD at [facility]    COLONOSCOPY  2010    Eardrum repair  1984    Not sure which side    HERNIA REPAIR  1984    Umbilical-screen    KNEE ARTHROSCOPY Left 1989    ROTATOR CUFF REPAIR Left 2002    Left     Family History   Problem Relation Age of Onset    Cancer Mother         Mother    Cancer Father     No Known Problems Sister     Kidney disease Brother     Stroke Son     Stroke Son     Atrial fibrillation Son     Melanoma Neg Hx      Social History     Tobacco Use    Smoking status: Never Smoker    Smokeless tobacco: Never Used   Substance Use Topics     Alcohol use: No    Drug use: No     Review of Systems   Constitutional: Negative for fever.   HENT: Negative for sore throat.    Respiratory: Negative for shortness of breath.    Cardiovascular: Negative for chest pain.   Gastrointestinal: Negative for nausea.   Genitourinary: Negative for dysuria.   Musculoskeletal: Negative for back pain.   Skin: Negative for rash.   Neurological: Positive for weakness.   Hematological: Does not bruise/bleed easily.       Physical Exam     Initial Vitals [12/05/18 0739]   BP Pulse Resp Temp SpO2   (!) 183/91 86 18 96.2 °F (35.7 °C) 98 %      MAP       --         Physical Exam    Nursing note and vitals reviewed.  Constitutional: He appears well-developed and well-nourished.   HENT:   Head: Normocephalic and atraumatic.   Eyes: EOM are normal. Pupils are equal, round, and reactive to light.   Neck: Normal range of motion. Neck supple.   Cardiovascular: Normal rate.   Pulmonary/Chest: Breath sounds normal. No respiratory distress. He has no wheezes. He has no rhonchi. He has no rales.   Abdominal: Soft. He exhibits no distension.   Musculoskeletal: Normal range of motion.   Neurological: He is alert and oriented to person, place, and time.   Skin: Skin is warm and dry.   Psychiatric: He has a normal mood and affect. Thought content normal.         ED Course   Procedures  Labs Reviewed - No data to display       Imaging Results    None        /80                       Clinical Impression:   The encounter diagnosis was Hypertension, unspecified type.      Disposition:   Disposition: Discharged  Condition: Stable                        Jacob Nino Jr., MD  12/05/18 0914

## 2018-12-05 NOTE — TELEPHONE ENCOUNTER
Spoke with patient. States had episode of blood on tissue today after BM. Post RBL-12/3. Informed OK to see blood at this time. Denies pain, difficulty urinating or bleeding more that one cup. Instructed to call back if develops any other symptoms or concerns.

## 2018-12-05 NOTE — ED TRIAGE NOTES
Pt reports woke up this am feeling weak and wants BP checked. Reports feeling a lot better right now, also c/o cough for a week

## 2018-12-06 ENCOUNTER — OUTPATIENT CASE MANAGEMENT (OUTPATIENT)
Dept: ADMINISTRATIVE | Facility: OTHER | Age: 78
End: 2018-12-06

## 2018-12-06 NOTE — PROGRESS NOTES
The following patient has been assigned to Divine Myers, RN with Outpatient Complex Care Management for high risk screening.    Reason: High Risk Recently Discharged    Please contact [facility] at [phone] with any questions.    Thank you,    Danay Padgett    Outpatient Case Management

## 2018-12-11 ENCOUNTER — OUTPATIENT CASE MANAGEMENT (OUTPATIENT)
Dept: ADMINISTRATIVE | Facility: OTHER | Age: 78
End: 2018-12-11

## 2018-12-11 NOTE — PROGRESS NOTES
Summary  Chart reviewed in epic and call to pt.  He states he is doing well and does not need any assistance at this time.

## 2019-01-16 ENCOUNTER — OFFICE VISIT (OUTPATIENT)
Dept: INTERNAL MEDICINE | Facility: CLINIC | Age: 79
End: 2019-01-16
Payer: MEDICARE

## 2019-01-16 VITALS
BODY MASS INDEX: 38.99 KG/M2 | HEART RATE: 72 BPM | WEIGHT: 257.25 LBS | OXYGEN SATURATION: 97 % | TEMPERATURE: 98 F | SYSTOLIC BLOOD PRESSURE: 120 MMHG | HEIGHT: 68 IN | DIASTOLIC BLOOD PRESSURE: 76 MMHG

## 2019-01-16 DIAGNOSIS — J32.9 SUPPURATIVE SINUSITIS: Primary | ICD-10-CM

## 2019-01-16 PROCEDURE — 1101F PR PT FALLS ASSESS DOC 0-1 FALLS W/OUT INJ PAST YR: ICD-10-PCS | Mod: CPTII,S$GLB,, | Performed by: NURSE PRACTITIONER

## 2019-01-16 PROCEDURE — 96372 THER/PROPH/DIAG INJ SC/IM: CPT | Mod: 59,S$GLB,, | Performed by: NURSE PRACTITIONER

## 2019-01-16 PROCEDURE — 99999 PR PBB SHADOW E&M-EST. PATIENT-LVL V: CPT | Mod: PBBFAC,,, | Performed by: NURSE PRACTITIONER

## 2019-01-16 PROCEDURE — 3074F PR MOST RECENT SYSTOLIC BLOOD PRESSURE < 130 MM HG: ICD-10-PCS | Mod: CPTII,S$GLB,, | Performed by: NURSE PRACTITIONER

## 2019-01-16 PROCEDURE — 99214 PR OFFICE/OUTPT VISIT, EST, LEVL IV, 30-39 MIN: ICD-10-PCS | Mod: 25,S$GLB,, | Performed by: NURSE PRACTITIONER

## 2019-01-16 PROCEDURE — 1101F PT FALLS ASSESS-DOCD LE1/YR: CPT | Mod: CPTII,S$GLB,, | Performed by: NURSE PRACTITIONER

## 2019-01-16 PROCEDURE — 99999 PR PBB SHADOW E&M-EST. PATIENT-LVL V: ICD-10-PCS | Mod: PBBFAC,,, | Performed by: NURSE PRACTITIONER

## 2019-01-16 PROCEDURE — 99214 OFFICE O/P EST MOD 30 MIN: CPT | Mod: 25,S$GLB,, | Performed by: NURSE PRACTITIONER

## 2019-01-16 PROCEDURE — 3078F DIAST BP <80 MM HG: CPT | Mod: CPTII,S$GLB,, | Performed by: NURSE PRACTITIONER

## 2019-01-16 PROCEDURE — 3074F SYST BP LT 130 MM HG: CPT | Mod: CPTII,S$GLB,, | Performed by: NURSE PRACTITIONER

## 2019-01-16 PROCEDURE — 3078F PR MOST RECENT DIASTOLIC BLOOD PRESSURE < 80 MM HG: ICD-10-PCS | Mod: CPTII,S$GLB,, | Performed by: NURSE PRACTITIONER

## 2019-01-16 PROCEDURE — 96372 PR INJECTION,THERAP/PROPH/DIAG2ST, IM OR SUBCUT: ICD-10-PCS | Mod: 59,S$GLB,, | Performed by: NURSE PRACTITIONER

## 2019-01-16 RX ORDER — METHYLPREDNISOLONE ACETATE 40 MG/ML
40 INJECTION, SUSPENSION INTRA-ARTICULAR; INTRALESIONAL; INTRAMUSCULAR; SOFT TISSUE
Status: COMPLETED | OUTPATIENT
Start: 2019-01-16 | End: 2019-01-16

## 2019-01-16 RX ORDER — FLUTICASONE PROPIONATE 50 MCG
2 SPRAY, SUSPENSION (ML) NASAL 2 TIMES DAILY
Qty: 1 BOTTLE | Refills: 11 | Status: ON HOLD | OUTPATIENT
Start: 2019-01-16 | End: 2021-05-06 | Stop reason: HOSPADM

## 2019-01-16 RX ORDER — IPRATROPIUM BROMIDE 42 UG/1
2 SPRAY, METERED NASAL 4 TIMES DAILY
Qty: 15 ML | Refills: 2 | Status: ON HOLD | OUTPATIENT
Start: 2019-01-16 | End: 2020-02-18 | Stop reason: HOSPADM

## 2019-01-16 RX ADMIN — METHYLPREDNISOLONE ACETATE 40 MG: 40 INJECTION, SUSPENSION INTRA-ARTICULAR; INTRALESIONAL; INTRAMUSCULAR; SOFT TISSUE at 04:01

## 2019-01-16 NOTE — PROGRESS NOTES
Subjective:       Patient ID: Domonique Hernandez Jr. is a 78 y.o. male.    Chief Complaint: Sinusitis    Pt known to me. Presents with sinus congestion and cough.       Sinusitis   This is a new problem. The current episode started 1 to 4 weeks ago. The problem is unchanged. There has been no fever. His pain is at a severity of 0/10. He is experiencing no pain. Associated symptoms include congestion, coughing, a hoarse voice, sinus pressure and sneezing. Pertinent negatives include no chills, diaphoresis, ear pain, headaches, neck pain, shortness of breath, sore throat or swollen glands. Treatments tried: mucinex. The treatment provided no relief.     Review of Systems   Constitutional: Negative for activity change, appetite change, chills, diaphoresis, fatigue, fever and unexpected weight change.   HENT: Positive for congestion, hoarse voice, postnasal drip, rhinorrhea, sinus pressure and sneezing. Negative for ear pain, sinus pain, sore throat, tinnitus, trouble swallowing and voice change.    Eyes: Negative for pain and visual disturbance.   Respiratory: Positive for cough. Negative for chest tightness and shortness of breath.    Cardiovascular: Negative for chest pain, palpitations and leg swelling.   Gastrointestinal: Negative for abdominal pain, constipation, diarrhea, nausea and vomiting.   Endocrine: Negative.    Genitourinary: Negative for difficulty urinating.   Musculoskeletal: Negative for arthralgias, gait problem, myalgias and neck pain.   Skin: Negative for rash.   Allergic/Immunologic: Negative.    Neurological: Negative for speech difficulty and headaches.   Hematological: Negative.    Psychiatric/Behavioral: Negative.    All other systems reviewed and are negative.      Objective:      Physical Exam   Constitutional: He is oriented to person, place, and time. Vital signs are normal. He appears well-developed and well-nourished. He is cooperative. No distress.   HENT:   Head: Normocephalic and  atraumatic.   Right Ear: External ear and ear canal normal. A middle ear effusion is present.   Left Ear: Tympanic membrane, external ear and ear canal normal.   Nose: Mucosal edema present. No rhinorrhea.   Mouth/Throat: Oropharynx is clear and moist and mucous membranes are normal.   Eyes: Conjunctivae, EOM and lids are normal. Pupils are equal, round, and reactive to light.   Dennie lines and allergic shiners bilateral     Neck: Trachea normal, normal range of motion and phonation normal. Neck supple.   Cardiovascular: Normal rate, regular rhythm, normal heart sounds and intact distal pulses.   Pulmonary/Chest: Effort normal and breath sounds normal. No respiratory distress. He has no wheezes. He has no rales.   Abdominal: Soft. Normal appearance and bowel sounds are normal. There is no tenderness.   Neurological: He is alert and oriented to person, place, and time. No cranial nerve deficit.   Skin: Skin is warm, dry and intact. No rash noted.   Psychiatric: He has a normal mood and affect. His speech is normal and behavior is normal. Judgment and thought content normal. Cognition and memory are normal.   Nursing note and vitals reviewed.      Assessment:       1. Suppurative sinusitis        Plan:         1. Suppurative sinusitis  Symptomatic therapy suggested: rest, increase fluids, gargle prn for sore throat, apply heat to sinuses prn, use mist of vaporizer prn, OTC acetaminophen, cough suppressant of choice, avoid alcohol, aspirin and NSAID's, smoking and decongestants and call prn if symptoms persist or worsen. Call or return to clinic prn if these symptoms worsen or fail to improve as anticipated.  - methylPREDNISolone acetate injection 40 mg  - ipratropium (ATROVENT) 42 mcg (0.06 %) nasal spray; 2 sprays by Nasal route 4 (four) times daily.  Dispense: 15 mL; Refill: 2  - fluticasone (FLONASE) 50 mcg/actuation nasal spray; 2 sprays (100 mcg total) by Each Nare route 2 (two) times daily.  Dispense: 1 Bottle;  Refill: 11    Follow-up if symptoms worsen or fail to improve.  ALEJANDRA Anthony, FNP-C  Family/Internal Medicine  [facility]

## 2019-01-16 NOTE — PATIENT INSTRUCTIONS

## 2019-01-22 RX ORDER — BISACODYL 5 MG
20 TABLET, DELAYED RELEASE (ENTERIC COATED) ORAL ONCE
Qty: 4 TABLET | Refills: 0 | Status: ON HOLD | OUTPATIENT
Start: 2019-01-30 | End: 2019-01-31 | Stop reason: HOSPADM

## 2019-01-23 ENCOUNTER — HOSPITAL ENCOUNTER (OUTPATIENT)
Dept: PREADMISSION TESTING | Facility: HOSPITAL | Age: 79
Discharge: HOME | End: 2019-01-23
Attending: COLON & RECTAL SURGERY
Payer: MEDICARE

## 2019-01-23 ENCOUNTER — TELEPHONE (OUTPATIENT)
Dept: PREADMISSION TESTING | Facility: HOSPITAL | Age: 79
End: 2019-01-23

## 2019-01-23 ENCOUNTER — ANESTHESIA EVENT (OUTPATIENT)
Dept: ENDOSCOPY | Facility: HOSPITAL | Age: 79
End: 2019-01-23
Payer: MEDICARE

## 2019-01-23 VITALS — HEIGHT: 68 IN | BODY MASS INDEX: 38.49 KG/M2 | WEIGHT: 254 LBS

## 2019-01-23 DIAGNOSIS — K62.5 RECTAL BLEEDING: Primary | ICD-10-CM

## 2019-01-23 NOTE — DISCHARGE INSTRUCTIONS
Follow instructions as written on Dr. Crawford's Colonoscopy Prep Sheet      Colonoscopy Outpatient procedure instructions    Prep Review  Nothing to eat or drink after midnight unless your doctor tells you differently. Dr. Crawford patients have nothing to eat or drink after morning prep is complete      Take medications as instructed by your doctor.    Wear something comfortable that is easy for you to take off and put on.   Do not wear any makeup, jewelry, or body piercings. Leave valuables at home or let your family member keep them for you. Do not bring them to the Surgery area.     Date/Day of Procedure: Thursday 1-31-19    Arrival time: Someone will call you the workday day before the procedure           between 1pm and 5pm to give you an arrival time   If asked to report to the hospital before 7:00 am report to the Emergency Room.  If asked to report to the hospital at 7:00 a.m. or later report to Patient Registration  It is not necessary to report earlier than the time you are told.   Ignore any automated/computer generated calls telling to what time to report to the hospital.   Plan to be at the hospital for about 4 hours, however, it could be longer.

## 2019-01-23 NOTE — ANESTHESIA PREPROCEDURE EVALUATION
01/23/2019  Domonique Hernandez Jr. is a 78 y.o., male.    Anesthesia Evaluation    I have reviewed the Patient Summary Reports.    I have reviewed the Nursing Notes.   I have reviewed the Medications.     Review of Systems  Anesthesia Hx:  No problems with previous Anesthesia    Social:  Non-Smoker, No Alcohol Use    Hematology/Oncology:  Hematology Normal   Oncology Normal     EENT/Dental:EENT/Dental Normal   Cardiovascular:   Exercise tolerance: good Hypertension    Pulmonary:   COPD, mild Sleep Apnea, CPAP    Renal/:   Chronic Renal Disease    Hepatic/GI:   GERD, well controlled    Musculoskeletal:   Arthritis     Neurological:   Headaches    Endocrine:   Hypothyroidism    Psych:   Psychiatric History          Physical Exam  General:  Obesity    Airway/Jaw/Neck:  Airway Findings: Mouth Opening: Normal Tongue: Normal  General Airway Assessment: Adult  Mallampati: II  TM Distance: Normal, at least 6 cm  Jaw/Neck Findings:  Neck ROM: Normal ROM  Neck Findings:      Dental:  Dental Findings: In tact        Mental Status:  Mental Status Findings:  Cooperative         Anesthesia Plan  Type of Anesthesia, risks & benefits discussed:  Anesthesia Type:  general, MAC  Patient's Preference:   Intra-op Monitoring Plan: standard ASA monitors  Intra-op Monitoring Plan Comments:   Post Op Pain Control Plan: multimodal analgesia  Post Op Pain Control Plan Comments:   Induction:   IV  Beta Blocker:         Informed Consent: Patient understands risks and agrees with Anesthesia plan.  Questions answered. Anesthesia consent signed with patient.  ASA Score: 3     Day of Surgery Review of History & Physical:            Ready For Surgery From Anesthesia Perspective.

## 2019-01-23 NOTE — TELEPHONE ENCOUNTER
Attempted to call pt, LM to return call to clinic     needs appt for colonoscopy - surgery clearance before 1/31/19

## 2019-01-28 ENCOUNTER — OFFICE VISIT (OUTPATIENT)
Dept: FAMILY MEDICINE | Facility: CLINIC | Age: 79
End: 2019-01-28
Payer: MEDICARE

## 2019-01-28 VITALS
WEIGHT: 256 LBS | DIASTOLIC BLOOD PRESSURE: 70 MMHG | HEART RATE: 68 BPM | HEIGHT: 68 IN | SYSTOLIC BLOOD PRESSURE: 152 MMHG | RESPIRATION RATE: 18 BRPM | BODY MASS INDEX: 38.8 KG/M2

## 2019-01-28 DIAGNOSIS — G89.29 CHRONIC MIDLINE LOW BACK PAIN WITHOUT SCIATICA: ICD-10-CM

## 2019-01-28 DIAGNOSIS — Z01.818 PREOPERATIVE EVALUATION TO RULE OUT SURGICAL CONTRAINDICATION: Primary | ICD-10-CM

## 2019-01-28 DIAGNOSIS — E78.5 HYPERLIPIDEMIA, UNSPECIFIED HYPERLIPIDEMIA TYPE: ICD-10-CM

## 2019-01-28 DIAGNOSIS — I10 ESSENTIAL HYPERTENSION: ICD-10-CM

## 2019-01-28 DIAGNOSIS — M54.50 CHRONIC MIDLINE LOW BACK PAIN WITHOUT SCIATICA: ICD-10-CM

## 2019-01-28 DIAGNOSIS — K64.9 HEMORRHOIDS, UNSPECIFIED HEMORRHOID TYPE: ICD-10-CM

## 2019-01-28 PROCEDURE — 99999 PR PBB SHADOW E&M-EST. PATIENT-LVL III: ICD-10-PCS | Mod: PBBFAC,,, | Performed by: FAMILY MEDICINE

## 2019-01-28 PROCEDURE — 3077F SYST BP >= 140 MM HG: CPT | Mod: CPTII,S$GLB,, | Performed by: FAMILY MEDICINE

## 2019-01-28 PROCEDURE — 3078F DIAST BP <80 MM HG: CPT | Mod: CPTII,S$GLB,, | Performed by: FAMILY MEDICINE

## 2019-01-28 PROCEDURE — 99214 PR OFFICE/OUTPT VISIT, EST, LEVL IV, 30-39 MIN: ICD-10-PCS | Mod: S$GLB,,, | Performed by: FAMILY MEDICINE

## 2019-01-28 PROCEDURE — 1101F PT FALLS ASSESS-DOCD LE1/YR: CPT | Mod: CPTII,S$GLB,, | Performed by: FAMILY MEDICINE

## 2019-01-28 PROCEDURE — 3077F PR MOST RECENT SYSTOLIC BLOOD PRESSURE >= 140 MM HG: ICD-10-PCS | Mod: CPTII,S$GLB,, | Performed by: FAMILY MEDICINE

## 2019-01-28 PROCEDURE — 1101F PR PT FALLS ASSESS DOC 0-1 FALLS W/OUT INJ PAST YR: ICD-10-PCS | Mod: CPTII,S$GLB,, | Performed by: FAMILY MEDICINE

## 2019-01-28 PROCEDURE — 3078F PR MOST RECENT DIASTOLIC BLOOD PRESSURE < 80 MM HG: ICD-10-PCS | Mod: CPTII,S$GLB,, | Performed by: FAMILY MEDICINE

## 2019-01-28 PROCEDURE — 99214 OFFICE O/P EST MOD 30 MIN: CPT | Mod: S$GLB,,, | Performed by: FAMILY MEDICINE

## 2019-01-28 PROCEDURE — 99999 PR PBB SHADOW E&M-EST. PATIENT-LVL III: CPT | Mod: PBBFAC,,, | Performed by: FAMILY MEDICINE

## 2019-01-28 RX ORDER — TRAMADOL HYDROCHLORIDE 50 MG/1
50 TABLET ORAL EVERY 12 HOURS PRN
Qty: 20 TABLET | Refills: 0 | Status: SHIPPED | OUTPATIENT
Start: 2019-01-28 | End: 2019-03-04 | Stop reason: SDUPTHER

## 2019-01-28 NOTE — PROGRESS NOTES
Subjective:       Patient ID: Domonique Hernandez Jr. is a 78 y.o. male.    Chief Complaint: Surgery Clearance (colonoscopy scheduled 1/31/19)    Needs surgical clearance for colonoscopy by Dr. Crawford.  He denies dyspnea, chest pain, edema.  No fever or chills.      Review of Systems   Constitutional: Negative for activity change, chills, fatigue, fever and unexpected weight change.   HENT: Negative for sore throat and trouble swallowing.    Respiratory: Negative for cough, chest tightness and shortness of breath.    Cardiovascular: Negative for chest pain and leg swelling.   Gastrointestinal: Negative for abdominal pain.   Endocrine: Negative for cold intolerance and heat intolerance.   Genitourinary: Negative for difficulty urinating.   Musculoskeletal: Negative for back pain and joint swelling.   Skin: Negative for rash.   Neurological: Negative for numbness.   Hematological: Negative for adenopathy.   Psychiatric/Behavioral: Negative for decreased concentration.       Objective:      Vitals:    01/28/19 1053   BP: (!) 152/70   Pulse: 68   Resp: 18     Physical Exam   Constitutional: He is oriented to person, place, and time. He appears well-developed and well-nourished.   HENT:   Head: Normocephalic and atraumatic.   Eyes: EOM are normal. Pupils are equal, round, and reactive to light.   Neck: Normal range of motion. Neck supple. No JVD present.   Cardiovascular: Normal rate, normal heart sounds and intact distal pulses. Exam reveals no gallop and no friction rub.   No murmur heard.  Pulmonary/Chest: Effort normal and breath sounds normal.   Abdominal: Soft. There is no tenderness.   Musculoskeletal: He exhibits no edema or tenderness.   Neurological: He is alert and oriented to person, place, and time. No cranial nerve deficit.   Skin: No rash noted.   Psychiatric: He has a normal mood and affect. His behavior is normal. Judgment and thought content normal.       Assessment:       1. Preoperative evaluation to rule out  surgical contraindication    2. Chronic midline low back pain without sciatica    3. Hemorrhoids, unspecified hemorrhoid type    4. Essential hypertension    5. Hyperlipidemia, unspecified hyperlipidemia type        Plan:   Domonique was seen today for surgery clearance.    Diagnoses and all orders for this visit:    Preoperative evaluation to rule out surgical contraindication  Medically cleared for colonoscopy  Hold ASA 3 days before colonoscopy    Chronic midline low back pain without sciatica  -     traMADol (ULTRAM) 50 mg tablet; Take 1 tablet (50 mg total) by mouth every 12 (twelve) hours as needed for Pain.    Hemorrhoids, unspecified hemorrhoid type  Medically cleared for colonoscopy    Essential hypertension  Continue current meds    RTC as needed

## 2019-01-31 ENCOUNTER — ANESTHESIA (OUTPATIENT)
Dept: ENDOSCOPY | Facility: HOSPITAL | Age: 79
End: 2019-01-31
Payer: MEDICARE

## 2019-01-31 ENCOUNTER — HOSPITAL ENCOUNTER (OUTPATIENT)
Facility: HOSPITAL | Age: 79
Discharge: HOME | End: 2019-01-31
Attending: COLON & RECTAL SURGERY | Admitting: COLON & RECTAL SURGERY
Payer: MEDICARE

## 2019-01-31 VITALS
TEMPERATURE: 97 F | OXYGEN SATURATION: 97 % | RESPIRATION RATE: 20 BRPM | HEART RATE: 65 BPM | DIASTOLIC BLOOD PRESSURE: 102 MMHG | SYSTOLIC BLOOD PRESSURE: 162 MMHG

## 2019-01-31 DIAGNOSIS — Z12.11 COLON CANCER SCREENING: Primary | ICD-10-CM

## 2019-01-31 DIAGNOSIS — Z12.11 COLON CANCER SCREENING: ICD-10-CM

## 2019-01-31 PROCEDURE — 37000009 HC ANESTHESIA EA ADD 15 MINS: Performed by: COLON & RECTAL SURGERY

## 2019-01-31 PROCEDURE — 45378 PR COLONOSCOPY,DIAGNOSTIC: ICD-10-PCS | Mod: 53,,, | Performed by: COLON & RECTAL SURGERY

## 2019-01-31 PROCEDURE — 25000003 PHARM REV CODE 250: Performed by: COLON & RECTAL SURGERY

## 2019-01-31 PROCEDURE — 45378 DIAGNOSTIC COLONOSCOPY: CPT | Mod: 53,,, | Performed by: COLON & RECTAL SURGERY

## 2019-01-31 PROCEDURE — 45378 DIAGNOSTIC COLONOSCOPY: CPT | Performed by: COLON & RECTAL SURGERY

## 2019-01-31 PROCEDURE — 37000008 HC ANESTHESIA 1ST 15 MINUTES: Performed by: COLON & RECTAL SURGERY

## 2019-01-31 PROCEDURE — 63600175 PHARM REV CODE 636 W HCPCS: Performed by: NURSE ANESTHETIST, CERTIFIED REGISTERED

## 2019-01-31 PROCEDURE — 00811 ANES LWR INTST NDSC NOS: CPT | Mod: QZ | Performed by: NURSE ANESTHETIST, CERTIFIED REGISTERED

## 2019-01-31 RX ORDER — LIDOCAINE HCL/PF 100 MG/5ML
SYRINGE (ML) INTRAVENOUS
Status: DISCONTINUED | OUTPATIENT
Start: 2019-01-31 | End: 2019-01-31

## 2019-01-31 RX ORDER — SODIUM CHLORIDE, SODIUM LACTATE, POTASSIUM CHLORIDE, CALCIUM CHLORIDE 600; 310; 30; 20 MG/100ML; MG/100ML; MG/100ML; MG/100ML
INJECTION, SOLUTION INTRAVENOUS CONTINUOUS
Status: DISCONTINUED | OUTPATIENT
Start: 2019-01-31 | End: 2019-01-31 | Stop reason: HOSPADM

## 2019-01-31 RX ORDER — PROPOFOL 10 MG/ML
INJECTION, EMULSION INTRAVENOUS
Status: DISCONTINUED | OUTPATIENT
Start: 2019-01-31 | End: 2019-01-31

## 2019-01-31 RX ADMIN — SODIUM CHLORIDE, SODIUM LACTATE, POTASSIUM CHLORIDE, AND CALCIUM CHLORIDE: .6; .31; .03; .02 INJECTION, SOLUTION INTRAVENOUS at 08:01

## 2019-01-31 RX ADMIN — LIDOCAINE HYDROCHLORIDE 50 MG: 20 INJECTION, SOLUTION INTRAVENOUS at 08:01

## 2019-01-31 RX ADMIN — PROPOFOL 50 MG: 10 INJECTION, EMULSION INTRAVENOUS at 09:01

## 2019-01-31 RX ADMIN — PROPOFOL 100 MG: 10 INJECTION, EMULSION INTRAVENOUS at 08:01

## 2019-01-31 NOTE — H&P
Procedure : Colonoscopy    Indication(s):  rectal bleeding and family history of colon cancer (mother)    Review of patient's allergies indicates:   Allergen Reactions    Percocet [oxycodone-acetaminophen] Other (See Comments)     hyperactivity    Percodan [oxycodone hcl-oxycodone-asa] Other (See Comments)     hyperactivity       Past Medical History:   Diagnosis Date    Anxiety     Arthritis     Back pain     Chronic bronchitis     Chronic gout     COPD (chronic obstructive pulmonary disease)     Depression     Emphysema of lung     Generalized headaches     GERD (gastroesophageal reflux disease)     Hyperlipidemia     Hypertension     Hypothyroidism     Obesity     Peripheral vascular disease     Sleep apnea     On CPAP    Stage 3 chronic kidney disease 4/18/2018    Thyroid disease        Prior to Admission medications    Medication Sig Start Date End Date Taking? Authorizing Provider   albuterol 90 mcg/actuation inhaler Inhale 2 puffs into the lungs every 6 (six) hours as needed for Wheezing. Rescue 5/11/17  Yes Lucia Leach MD   allopurinol (ZYLOPRIM) 300 MG tablet TAKE 1 TABLET ONE TIME DAILY 9/10/18  Yes Emiliano Cam MD   amLODIPine (NORVASC) 5 MG tablet Take 1 tablet (5 mg total) by mouth once daily. 9/10/18  Yes Emiliano Cam MD   carvedilol (COREG) 25 MG tablet TAKE 1 TABLET (25 MG TOTAL) BY MOUTH 2 (TWO) TIMES DAILY WITH MEALS. 9/7/18  Yes Emiliano Cam MD   cetirizine (ZYRTEC) 10 MG tablet Take 1 tablet (10 mg total) by mouth once daily. 9/10/18  Yes Emiliano Cam MD   dutasteride (AVODART) 0.5 mg capsule Take 1 capsule (0.5 mg total) by mouth once daily. 9/10/18 9/10/19 Yes Emiliano Cam MD   ipratropium (ATROVENT) 42 mcg (0.06 %) nasal spray 2 sprays by Nasal route 4 (four) times daily. 1/16/19  Yes Gretta Rodas NP   levothyroxine (SYNTHROID) 100 MCG tablet TAKE 1 TABLET ONE TIME DAILY 9/10/18  Yes Emiliano Cam MD    multivitamin (ONE DAILY MULTIVITAMIN) per tablet Take 1 tablet by mouth once daily. Centrum Silver for Men   Yes Historical Provider, MD   nystatin (MYCOSTATIN) powder APPLY TOPICALLY 2 (TWO) TIMES DAILY. 9/14/18  Yes Deb Greene MD   sildenafil, antihypertensive, (REVATIO) 20 mg Tab Take 3-5 tablets PO 1/24/18  Yes Emiliano Cam MD   simvastatin (ZOCOR) 40 MG tablet Take 1 tablet (40 mg total) by mouth every evening. 9/11/18  Yes Emiliano Cam MD   tamsulosin (FLOMAX) 0.4 mg Cap TAKE 1 TO 2 CAPSULES DAILY 9/7/18  Yes Emiliano Cam MD   testosterone (ANDROGEL) 20.25 mg/1.25 gram (1.62 %) GlPm Apply 2 pumps to shoulders daily 9/6/17  Yes Trey Garcia MD   traMADol (ULTRAM) 50 mg tablet Take 1 tablet (50 mg total) by mouth every 12 (twelve) hours as needed for Pain. 1/28/19  Yes Emiliano Cam MD   valsartan (DIOVAN) 80 MG tablet Take 1 tablet (80 mg total) by mouth once daily. 9/10/18 9/10/19 Yes Emiliano Cam MD   venlafaxine (EFFEXOR-XR) 150 MG Cp24 TAKE 1 CAPSULE EVERY DAY 9/7/18  Yes Emiliano Cam MD   albuterol-ipratropium 2.5mg-0.5mg/3mL (DUO-NEB) 0.5 mg-3 mg(2.5 mg base)/3 mL nebulizer solution Take 3 mLs by nebulization every 6 (six) hours as needed for Wheezing. 8/14/15 1/28/19  Emiliano Cam MD   bisacodyl (DULCOLAX) 5 mg EC tablet TAKE 4 TABLETS BY MOUTH ONCE PER DR. TINEO'S INSTRUCTION SHEET FOR 1 DOSE 1/30/19 1/30/19  Joaquín Tineo MD   fluticasone (FLONASE) 50 mcg/actuation nasal spray 2 sprays (100 mcg total) by Each Nare route 2 (two) times daily. 1/16/19   Gretta Rodas NP   hydrocortisone (PROCTO-KATIUSKA) 1 % crpe Place 1 applicator rectally 2 (two) times daily as needed. 11/29/18   Lucia Leach MD   ketoconazole (NIZORAL) 2 % shampoo USE TO WASH SCALP ONCE DAILY AS NEEDED 6/6/17 1/28/19  Sylvain Luna MD   polyethylene glycol (COLYTE) 240-22.72-6.72 -5.84 gram SolR TAKE 4,000 MILLILITERS BY MOUTH ONCE PER DR. TINEO'S INSTRUCTION  SHEET FOR 1 DOSE 1/30/19 1/30/19  Joaquín Crawford MD   umeclidinium-vilanterol (ANORO ELLIPTA) 62.5-25 mcg/actuation DsDv Inhale 1 Dose into the lungs once daily. 4/6/16   Emiliano Cam MD       Sedation Problems: NO    Family History   Problem Relation Age of Onset    Cancer Mother         Mother    Cancer Father     No Known Problems Sister     Kidney disease Brother     Stroke Son     Stroke Son     Atrial fibrillation Son     Melanoma Neg Hx        Fam Hx of Sedation Problems: NO    Social History     Socioeconomic History    Marital status:      Spouse name: Not on file    Number of children: Not on file    Years of education: Not on file    Highest education level: Not on file   Social Needs    Financial resource strain: Not on file    Food insecurity - worry: Not on file    Food insecurity - inability: Not on file    Transportation needs - medical: Not on file    Transportation needs - non-medical: Not on file   Occupational History    Not on file   Tobacco Use    Smoking status: Never Smoker    Smokeless tobacco: Never Used   Substance and Sexual Activity    Alcohol use: No    Drug use: No    Sexual activity: Yes     Partners: Female   Other Topics Concern    Not on file   Social History Narrative    Not on file       Review of Systems -     Respiratory ROS: no cough, shortness of breath, or wheezing  Cardiovascular ROS: no chest pain or dyspnea on exertion  Gastrointestinal ROS: positive for - blood in stools  Musculoskeletal ROS: negative  Neurological ROS: no TIA or stroke symptoms        Physical Exam:  General: no distress  Head: normocephalic  Airway:  normal oropharynx, airway normal  Neck: supple, symmetrical, trachea midline  Lungs:  normal respiratory effort  Heart: regular rate and rhythm  Abdomen: soft, non-tender non-distented; bowel sounds normal; no masses,  no organomegaly  Extremities: no cyanosis or edema, or clubbing       Deep Sedation: Mallampati  Score per anesthesia     SedationPlan :Moderate     ASA : III    Patient is medically cleared for anesthesia.    Anesthesia/Surgery risks, benefits and alternative options discussed and understood by patient/family.

## 2019-01-31 NOTE — OP NOTE
Patient Name: Domonique Hernandez Jr.   Procedure Date: 2019  MRN: [id number]  : 1940  Age: 78 y.o.  Gender: male   Referring Physician :  Emiliano Cam MD  Plan for Procedure: Monitored anaesthesia care  Indication: rectal bleeding and family history of colon cancer (mother)  Procedure:   Colonoscopy    Surgeon(s) and Role:     * Joaquín Crawford MD - Primary    Complications: None     Medicines: monitored anesthesia care    Procedure:  Prior to the procedure, a History and Physical was performed, and patient medications, allergies and sensitivities were reviewed.  The patient's tolerance of previous anesthesia was reviewed. The risks and benefits of the procedure and the sedation options and risks were discussed with the patient.  All questions were answered and informed consent was obtained.    After I obtained informed consent, the scope was passed under direct vision.  Throughout the procedure, the patient's blood pressure, pulse, and oxygen saturations were monitored continuously.  The Olympus scope CF - SK825X was introduced through the anus and advanced to the transverse colon.  The colonoscopy was performed without difficulty.  The patient tolerated the procedure well.  The quality of the bowel preparation was poor    Findings:  diverticulosis,  Moderate in degree, involving the sigmoid  hemorrhoids internal, Moderate in size  Poor prep, semiformed stool throughout colon, precluded completion of procedure and adequate mucosal inspection    EBL: none    Impression:  Moderate colonic diverticulosis involving  the sigmoid  Moderate internal hemorrhoids  INADEQUATE PREP, INCOMPLETE COLONOSCOPY    Recommendation:  Repeat exam with extended prep

## 2019-01-31 NOTE — DISCHARGE SUMMARY
Discharge Note  Short Stay      SUMMARY     Admit Date: 1/31/2019    Attending Physician: Joaquín Crawford MD     Discharge Physician: Joaquín Crawford MD    Discharge Date: 1/31/2019 9:17 AM    Admission Diagnosis: rectal bleeding and family history of colon cancer (mother)    Final Diagnosis:  Diverticulosis, internal hemorrhoids, INADEQUATE PREP - INCOMPLETE COLONOSCOPY    Procedures Performed:    Colonoscopy    Disposition: Home or Self Care    Condition at Discharge:  Stable    Patient Instructions:   Current Discharge Medication List      CONTINUE these medications which have NOT CHANGED    Details   albuterol 90 mcg/actuation inhaler Inhale 2 puffs into the lungs every 6 (six) hours as needed for Wheezing. Rescue  Qty: 18 g, Refills: 5      allopurinol (ZYLOPRIM) 300 MG tablet TAKE 1 TABLET ONE TIME DAILY  Qty: 90 tablet, Refills: 1    Associated Diagnoses: Chronic gout without tophus, unspecified cause, unspecified site      amLODIPine (NORVASC) 5 MG tablet Take 1 tablet (5 mg total) by mouth once daily.  Qty: 90 tablet, Refills: 1    Associated Diagnoses: Essential hypertension      carvedilol (COREG) 25 MG tablet TAKE 1 TABLET (25 MG TOTAL) BY MOUTH 2 (TWO) TIMES DAILY WITH MEALS.  Qty: 180 tablet, Refills: 1    Associated Diagnoses: Essential hypertension      cetirizine (ZYRTEC) 10 MG tablet Take 1 tablet (10 mg total) by mouth once daily.  Qty: 90 tablet, Refills: 1    Associated Diagnoses: Seasonal allergic rhinitis due to pollen      dutasteride (AVODART) 0.5 mg capsule Take 1 capsule (0.5 mg total) by mouth once daily.  Qty: 90 capsule, Refills: 1    Associated Diagnoses: Benign prostatic hyperplasia with nocturia      ipratropium (ATROVENT) 42 mcg (0.06 %) nasal spray 2 sprays by Nasal route 4 (four) times daily.  Qty: 15 mL, Refills: 2    Associated Diagnoses: Suppurative sinusitis      levothyroxine (SYNTHROID) 100 MCG tablet TAKE 1 TABLET ONE TIME DAILY  Qty: 90 tablet, Refills: 1    Comments: This  is an increased dose  Associated Diagnoses: Hypothyroidism due to acquired atrophy of thyroid      multivitamin (ONE DAILY MULTIVITAMIN) per tablet Take 1 tablet by mouth once daily. Centrum Silver for Men      nystatin (MYCOSTATIN) powder APPLY TOPICALLY 2 (TWO) TIMES DAILY.  Qty: 15 g, Refills: 10    Associated Diagnoses: Intertrigo      sildenafil, antihypertensive, (REVATIO) 20 mg Tab Take 3-5 tablets PO  Qty: 90 tablet, Refills: 3    Associated Diagnoses: Erectile dysfunction due to arterial insufficiency      simvastatin (ZOCOR) 40 MG tablet Take 1 tablet (40 mg total) by mouth every evening.  Qty: 90 tablet, Refills: 1    Associated Diagnoses: Hyperlipidemia, unspecified hyperlipidemia type      tamsulosin (FLOMAX) 0.4 mg Cap TAKE 1 TO 2 CAPSULES DAILY  Qty: 180 capsule, Refills: 1    Associated Diagnoses: BPH with urinary obstruction      testosterone (ANDROGEL) 20.25 mg/1.25 gram (1.62 %) GlPm Apply 2 pumps to shoulders daily  Qty: 1 Bottle, Refills: 5      traMADol (ULTRAM) 50 mg tablet Take 1 tablet (50 mg total) by mouth every 12 (twelve) hours as needed for Pain.  Qty: 20 tablet, Refills: 0    Associated Diagnoses: Chronic midline low back pain without sciatica      valsartan (DIOVAN) 80 MG tablet Take 1 tablet (80 mg total) by mouth once daily.  Qty: 90 tablet, Refills: 1    Associated Diagnoses: Essential hypertension      venlafaxine (EFFEXOR-XR) 150 MG Cp24 TAKE 1 CAPSULE EVERY DAY  Qty: 90 capsule, Refills: 1    Associated Diagnoses: Depression, unspecified depression type      albuterol-ipratropium 2.5mg-0.5mg/3mL (DUO-NEB) 0.5 mg-3 mg(2.5 mg base)/3 mL nebulizer solution Take 3 mLs by nebulization every 6 (six) hours as needed for Wheezing.  Qty: 120 vial, Refills: 5    Associated Diagnoses: COPD (chronic obstructive pulmonary disease)      fluticasone (FLONASE) 50 mcg/actuation nasal spray 2 sprays (100 mcg total) by Each Nare route 2 (two) times daily.  Qty: 1 Bottle, Refills: 11    Associated  Diagnoses: Suppurative sinusitis      hydrocortisone (PROCTO-KATIUSKA) 1 % crpe Place 1 applicator rectally 2 (two) times daily as needed.  Qty: 30 g, Refills: 1    Associated Diagnoses: Hemorrhoids, unspecified hemorrhoid type      ketoconazole (NIZORAL) 2 % shampoo USE TO WASH SCALP ONCE DAILY AS NEEDED  Qty: 120 mL, Refills: 5      umeclidinium-vilanterol (ANORO ELLIPTA) 62.5-25 mcg/actuation DsDv Inhale 1 Dose into the lungs once daily.  Qty: 60 each, Refills: 5    Associated Diagnoses: Chronic obstructive pulmonary disease, unspecified COPD type; Acute exacerbation of chronic obstructive pulmonary disease (COPD)         STOP taking these medications       bisacodyl (DULCOLAX) 5 mg EC tablet Comments:   Reason for Stopping:         polyethylene glycol (COLYTE) 240-22.72-6.72 -5.84 gram SolR Comments:   Reason for Stopping:               Discharge Procedure Orders (must include Diet, Follow-up, Activity)   Discharge Procedure Orders (must include Diet, Follow-up, Activity)   Activity as tolerated        Repeat colonoscopy with extended prep.

## 2019-01-31 NOTE — ANESTHESIA POSTPROCEDURE EVALUATION
Anesthesia Post Evaluation    Patient: Domonique Hernandez Jr.    Procedure(s) Performed: Procedure(s) (LRB):  COLONOSCOPY (N/A)    Final Anesthesia Type: general  Patient location during evaluation: PACU  Patient participation: Yes- Able to Participate  Level of consciousness: awake and alert, oriented and awake  Post-procedure vital signs: reviewed and stable  Pain management: adequate  Airway patency: patent  PONV status at discharge: No PONV  Anesthetic complications: no      Cardiovascular status: blood pressure returned to baseline, hemodynamically stable and stable  Respiratory status: unassisted, spontaneous ventilation and room air  Hydration status: euvolemic  Follow-up not needed.        Visit Vitals  BP (!) 162/102 (BP Location: Left arm, Patient Position: Lying)   Pulse 65   Temp 36.1 °C (96.9 °F)   Resp 20   SpO2 97%       Pain/Arben Score: Arben Score: 8 (1/31/2019  9:15 AM)

## 2019-01-31 NOTE — TRANSFER OF CARE
Anesthesia Transfer of Care Note    Patient: Domonique Hernandez Jr.    Procedure(s) Performed: Procedure(s) (LRB):  COLONOSCOPY (N/A)    Patient location: PACU    Anesthesia Type: general    Transport from OR: Transported from OR on 6-10 L/min O2 by face mask with adequate spontaneous ventilation    Post pain: adequate analgesia    Post assessment: no apparent anesthetic complications and tolerated procedure well    Post vital signs: stable    Level of consciousness: sedated    Nausea/Vomiting: no nausea/vomiting    Complications: none    Transfer of care protocol was followed      Last vitals:   Visit Vitals  BP (!) 162/102 (BP Location: Left arm, Patient Position: Lying)   Pulse 65   Temp 36.1 °C (96.9 °F)   Resp 20   SpO2 97%

## 2019-02-04 ENCOUNTER — TELEPHONE (OUTPATIENT)
Dept: FAMILY MEDICINE | Facility: CLINIC | Age: 79
End: 2019-02-04

## 2019-02-04 DIAGNOSIS — G47.33 OSA ON CPAP: ICD-10-CM

## 2019-02-04 DIAGNOSIS — J44.1 COPD EXACERBATION: Primary | ICD-10-CM

## 2019-02-06 ENCOUNTER — OFFICE VISIT (OUTPATIENT)
Dept: FAMILY MEDICINE | Facility: CLINIC | Age: 79
End: 2019-02-06
Payer: MEDICARE

## 2019-02-06 VITALS
SYSTOLIC BLOOD PRESSURE: 160 MMHG | HEIGHT: 68 IN | HEART RATE: 80 BPM | BODY MASS INDEX: 38.65 KG/M2 | RESPIRATION RATE: 16 BRPM | WEIGHT: 255 LBS | DIASTOLIC BLOOD PRESSURE: 82 MMHG

## 2019-02-06 DIAGNOSIS — R30.0 DYSURIA: Primary | ICD-10-CM

## 2019-02-06 DIAGNOSIS — N41.0 ACUTE PROSTATITIS: ICD-10-CM

## 2019-02-06 LAB
BACTERIA SPEC CULT: ABNORMAL
BILIRUB SERPL-MCNC: NORMAL MG/DL
BLOOD URINE, POC: NORMAL
CASTS: ABNORMAL
COLOR, POC UA: NORMAL
CRYSTALS: ABNORMAL
GLUCOSE UR QL STRIP: NORMAL
KETONES UR QL STRIP: NORMAL
LEUKOCYTE ESTERASE URINE, POC: NORMAL
NITRITE, POC UA: NORMAL
PH, POC UA: 6
PROTEIN, POC: NORMAL
RBC CELLS COUNTED: ABNORMAL
SPECIFIC GRAVITY, POC UA: 1.02
UROBILINOGEN, POC UA: NORMAL
WHITE BLOOD CELLS: 10

## 2019-02-06 PROCEDURE — 3079F DIAST BP 80-89 MM HG: CPT | Mod: CPTII,S$GLB,, | Performed by: FAMILY MEDICINE

## 2019-02-06 PROCEDURE — 81000 POCT URINE SEDIMENT EXAM: ICD-10-PCS | Mod: S$GLB,,, | Performed by: FAMILY MEDICINE

## 2019-02-06 PROCEDURE — 1101F PT FALLS ASSESS-DOCD LE1/YR: CPT | Mod: CPTII,S$GLB,, | Performed by: FAMILY MEDICINE

## 2019-02-06 PROCEDURE — 81000 URINALYSIS NONAUTO W/SCOPE: CPT | Mod: S$GLB,,, | Performed by: FAMILY MEDICINE

## 2019-02-06 PROCEDURE — 3079F PR MOST RECENT DIASTOLIC BLOOD PRESSURE 80-89 MM HG: ICD-10-PCS | Mod: CPTII,S$GLB,, | Performed by: FAMILY MEDICINE

## 2019-02-06 PROCEDURE — 99213 PR OFFICE/OUTPT VISIT, EST, LEVL III, 20-29 MIN: ICD-10-PCS | Mod: 25,S$GLB,, | Performed by: FAMILY MEDICINE

## 2019-02-06 PROCEDURE — 3077F SYST BP >= 140 MM HG: CPT | Mod: CPTII,S$GLB,, | Performed by: FAMILY MEDICINE

## 2019-02-06 PROCEDURE — 99999 PR PBB SHADOW E&M-EST. PATIENT-LVL III: CPT | Mod: PBBFAC,,, | Performed by: FAMILY MEDICINE

## 2019-02-06 PROCEDURE — 87086 URINE CULTURE/COLONY COUNT: CPT

## 2019-02-06 PROCEDURE — 99999 PR PBB SHADOW E&M-EST. PATIENT-LVL III: ICD-10-PCS | Mod: PBBFAC,,, | Performed by: FAMILY MEDICINE

## 2019-02-06 PROCEDURE — 99213 OFFICE O/P EST LOW 20 MIN: CPT | Mod: 25,S$GLB,, | Performed by: FAMILY MEDICINE

## 2019-02-06 PROCEDURE — 1101F PR PT FALLS ASSESS DOC 0-1 FALLS W/OUT INJ PAST YR: ICD-10-PCS | Mod: CPTII,S$GLB,, | Performed by: FAMILY MEDICINE

## 2019-02-06 PROCEDURE — 3077F PR MOST RECENT SYSTOLIC BLOOD PRESSURE >= 140 MM HG: ICD-10-PCS | Mod: CPTII,S$GLB,, | Performed by: FAMILY MEDICINE

## 2019-02-06 RX ORDER — CIPROFLOXACIN 500 MG/1
500 TABLET ORAL 2 TIMES DAILY
Qty: 20 TABLET | Refills: 0 | Status: SHIPPED | OUTPATIENT
Start: 2019-02-06 | End: 2019-02-16

## 2019-02-06 NOTE — PROGRESS NOTES
Chief complaint: Sinus congestion    History of present illness: 78 y.o.male complains of urinary frequency and burning.  No fever, chills, flank pain.      Review of systems:  Constitutional-no weight loss, weight gain  HEENT-allergy symptoms such as itchy watery eyes, post nasal drip, itchy palate, come and go.  Cardiovascular-no chest pain  Respiratory-no wheezing, see history of present illness  GI-no abdominal pain, melena, hematochezia  -no hematuria, no vaginal discharge, no vaginal bleeding  Neurological-no weakness or numbness    Past medical history, family history, social history-same as note dated[date]    Medications-all reviewed and verified in nurses notes.    Physical exam: Vital signs-reviewed and verified in nurses notes  Gen.-alert, oriented, no apparent distress.  Coughing.  Heart: Regular rate and rhythm, no murmurs, rubs or gallops  Lungs:Lungs were clear to auscultation and percussion, and with normal diaphragmatic excursion. No wheezes or rales were noted.   :  Mild flank tenderness.  Probably musculoskeletal.    Urinalysis-  10 white blood cells per high-power field, 3 red blood cells per high-power field    Assessment/plan:  Uti (lower urinary tract infection)  - POCT urinalysis, dipstick or tablet reag  - POCT URINE SEDIMENT EXAM  - ciprofloxacin (CIPRO) 500 MG tablet; Take 1 tablet (500 mg total) by mouth 2 (two) times daily.  Dispense: 10 tablet; Refill: 0  Encouraged patient to drink plenty of fluids.  Avoid bubble baths.  Urine should look clear like water in not yellow.  Return to clinic if symptoms do not improve.

## 2019-02-09 LAB — BACTERIA UR CULT: NORMAL

## 2019-03-04 ENCOUNTER — OFFICE VISIT (OUTPATIENT)
Dept: FAMILY MEDICINE | Facility: CLINIC | Age: 79
End: 2019-03-04
Payer: MEDICARE

## 2019-03-04 VITALS
BODY MASS INDEX: 38.16 KG/M2 | HEART RATE: 84 BPM | WEIGHT: 251 LBS | RESPIRATION RATE: 16 BRPM | DIASTOLIC BLOOD PRESSURE: 76 MMHG | SYSTOLIC BLOOD PRESSURE: 118 MMHG

## 2019-03-04 DIAGNOSIS — M1A.9XX0 CHRONIC GOUT WITHOUT TOPHUS, UNSPECIFIED CAUSE, UNSPECIFIED SITE: ICD-10-CM

## 2019-03-04 DIAGNOSIS — N40.1 BENIGN PROSTATIC HYPERPLASIA WITH NOCTURIA: ICD-10-CM

## 2019-03-04 DIAGNOSIS — R35.1 BENIGN PROSTATIC HYPERPLASIA WITH NOCTURIA: ICD-10-CM

## 2019-03-04 DIAGNOSIS — E78.5 HYPERLIPIDEMIA, UNSPECIFIED HYPERLIPIDEMIA TYPE: ICD-10-CM

## 2019-03-04 DIAGNOSIS — Z23 IMMUNIZATION DUE: ICD-10-CM

## 2019-03-04 DIAGNOSIS — E03.4 HYPOTHYROIDISM DUE TO ACQUIRED ATROPHY OF THYROID: ICD-10-CM

## 2019-03-04 DIAGNOSIS — J30.1 SEASONAL ALLERGIC RHINITIS DUE TO POLLEN: ICD-10-CM

## 2019-03-04 DIAGNOSIS — N40.1 BPH WITH URINARY OBSTRUCTION: ICD-10-CM

## 2019-03-04 DIAGNOSIS — N13.8 BPH WITH URINARY OBSTRUCTION: ICD-10-CM

## 2019-03-04 DIAGNOSIS — I10 ESSENTIAL HYPERTENSION: ICD-10-CM

## 2019-03-04 DIAGNOSIS — G89.29 CHRONIC MIDLINE LOW BACK PAIN WITHOUT SCIATICA: ICD-10-CM

## 2019-03-04 DIAGNOSIS — K62.5 RECTAL BLEEDING: Primary | ICD-10-CM

## 2019-03-04 DIAGNOSIS — F32.A DEPRESSION, UNSPECIFIED DEPRESSION TYPE: ICD-10-CM

## 2019-03-04 DIAGNOSIS — N52.01 ERECTILE DYSFUNCTION DUE TO ARTERIAL INSUFFICIENCY: ICD-10-CM

## 2019-03-04 DIAGNOSIS — M54.50 CHRONIC MIDLINE LOW BACK PAIN WITHOUT SCIATICA: ICD-10-CM

## 2019-03-04 PROCEDURE — G0009 PNEUMOCOCCAL CONJUGATE VACCINE 13-VALENT LESS THAN 5YO & GREATER THAN: ICD-10-PCS | Mod: S$GLB,,, | Performed by: FAMILY MEDICINE

## 2019-03-04 PROCEDURE — 90670 PNEUMOCOCCAL CONJUGATE VACCINE 13-VALENT LESS THAN 5YO & GREATER THAN: ICD-10-PCS | Mod: S$GLB,,, | Performed by: FAMILY MEDICINE

## 2019-03-04 PROCEDURE — 1101F PR PT FALLS ASSESS DOC 0-1 FALLS W/OUT INJ PAST YR: ICD-10-PCS | Mod: CPTII,S$GLB,, | Performed by: FAMILY MEDICINE

## 2019-03-04 PROCEDURE — 3074F SYST BP LT 130 MM HG: CPT | Mod: CPTII,S$GLB,, | Performed by: FAMILY MEDICINE

## 2019-03-04 PROCEDURE — G0009 ADMIN PNEUMOCOCCAL VACCINE: HCPCS | Mod: S$GLB,,, | Performed by: FAMILY MEDICINE

## 2019-03-04 PROCEDURE — 99499 RISK ADDL DX/OHS AUDIT: ICD-10-PCS | Mod: S$GLB,,, | Performed by: FAMILY MEDICINE

## 2019-03-04 PROCEDURE — 99999 PR PBB SHADOW E&M-EST. PATIENT-LVL III: CPT | Mod: PBBFAC,,, | Performed by: FAMILY MEDICINE

## 2019-03-04 PROCEDURE — 90670 PCV13 VACCINE IM: CPT | Mod: S$GLB,,, | Performed by: FAMILY MEDICINE

## 2019-03-04 PROCEDURE — 3078F DIAST BP <80 MM HG: CPT | Mod: CPTII,S$GLB,, | Performed by: FAMILY MEDICINE

## 2019-03-04 PROCEDURE — 3078F PR MOST RECENT DIASTOLIC BLOOD PRESSURE < 80 MM HG: ICD-10-PCS | Mod: CPTII,S$GLB,, | Performed by: FAMILY MEDICINE

## 2019-03-04 PROCEDURE — 99499 UNLISTED E&M SERVICE: CPT | Mod: S$GLB,,, | Performed by: FAMILY MEDICINE

## 2019-03-04 PROCEDURE — 99214 PR OFFICE/OUTPT VISIT, EST, LEVL IV, 30-39 MIN: ICD-10-PCS | Mod: 25,S$GLB,, | Performed by: FAMILY MEDICINE

## 2019-03-04 PROCEDURE — 99214 OFFICE O/P EST MOD 30 MIN: CPT | Mod: 25,S$GLB,, | Performed by: FAMILY MEDICINE

## 2019-03-04 PROCEDURE — 99999 PR PBB SHADOW E&M-EST. PATIENT-LVL III: ICD-10-PCS | Mod: PBBFAC,,, | Performed by: FAMILY MEDICINE

## 2019-03-04 PROCEDURE — 3074F PR MOST RECENT SYSTOLIC BLOOD PRESSURE < 130 MM HG: ICD-10-PCS | Mod: CPTII,S$GLB,, | Performed by: FAMILY MEDICINE

## 2019-03-04 PROCEDURE — 1101F PT FALLS ASSESS-DOCD LE1/YR: CPT | Mod: CPTII,S$GLB,, | Performed by: FAMILY MEDICINE

## 2019-03-04 RX ORDER — CARVEDILOL 25 MG/1
TABLET ORAL
Qty: 180 TABLET | Refills: 1 | Status: SHIPPED | OUTPATIENT
Start: 2019-03-04 | End: 2019-08-27 | Stop reason: SDUPTHER

## 2019-03-04 RX ORDER — AMLODIPINE BESYLATE 5 MG/1
5 TABLET ORAL DAILY
Qty: 90 TABLET | Refills: 1 | Status: SHIPPED | OUTPATIENT
Start: 2019-03-04 | End: 2019-08-19 | Stop reason: SDUPTHER

## 2019-03-04 RX ORDER — DUTASTERIDE 0.5 MG/1
0.5 CAPSULE, LIQUID FILLED ORAL DAILY
Qty: 90 CAPSULE | Refills: 1 | Status: SHIPPED | OUTPATIENT
Start: 2019-03-04 | End: 2019-08-27 | Stop reason: SDUPTHER

## 2019-03-04 RX ORDER — VALSARTAN 80 MG/1
80 TABLET ORAL DAILY
Qty: 90 TABLET | Refills: 1 | Status: SHIPPED | OUTPATIENT
Start: 2019-03-04 | End: 2019-08-19 | Stop reason: SDUPTHER

## 2019-03-04 RX ORDER — CETIRIZINE HYDROCHLORIDE 10 MG/1
10 TABLET ORAL DAILY
Qty: 90 TABLET | Refills: 1 | Status: SHIPPED | OUTPATIENT
Start: 2019-03-04 | End: 2021-03-19

## 2019-03-04 RX ORDER — LEVOTHYROXINE SODIUM 100 UG/1
TABLET ORAL
Qty: 90 TABLET | Refills: 1 | Status: SHIPPED | OUTPATIENT
Start: 2019-03-04 | End: 2019-08-19 | Stop reason: SDUPTHER

## 2019-03-04 RX ORDER — TRAMADOL HYDROCHLORIDE 50 MG/1
50 TABLET ORAL EVERY 12 HOURS PRN
Qty: 20 TABLET | Refills: 0 | Status: SHIPPED | OUTPATIENT
Start: 2019-03-04 | End: 2019-04-12 | Stop reason: SDUPTHER

## 2019-03-04 RX ORDER — TAMSULOSIN HYDROCHLORIDE 0.4 MG/1
CAPSULE ORAL
Qty: 180 CAPSULE | Refills: 1 | Status: SHIPPED | OUTPATIENT
Start: 2019-03-04 | End: 2019-08-19 | Stop reason: SDUPTHER

## 2019-03-04 RX ORDER — ALLOPURINOL 300 MG/1
TABLET ORAL
Qty: 90 TABLET | Refills: 1 | Status: SHIPPED | OUTPATIENT
Start: 2019-03-04 | End: 2019-08-19 | Stop reason: SDUPTHER

## 2019-03-04 RX ORDER — SILDENAFIL CITRATE 20 MG/1
TABLET ORAL
Qty: 90 TABLET | Refills: 3 | Status: SHIPPED | OUTPATIENT
Start: 2019-03-04 | End: 2019-03-04

## 2019-03-04 RX ORDER — SIMVASTATIN 40 MG/1
40 TABLET, FILM COATED ORAL NIGHTLY
Qty: 90 TABLET | Refills: 1 | Status: SHIPPED | OUTPATIENT
Start: 2019-03-04 | End: 2019-08-19 | Stop reason: SDUPTHER

## 2019-03-04 RX ORDER — TESTOSTERONE 20.25 MG/1.25G
GEL TOPICAL
Qty: 1 BOTTLE | Refills: 5 | Status: SHIPPED | OUTPATIENT
Start: 2019-03-04 | End: 2019-03-04

## 2019-03-04 RX ORDER — VENLAFAXINE HYDROCHLORIDE 150 MG/1
150 CAPSULE, EXTENDED RELEASE ORAL DAILY
Qty: 90 CAPSULE | Refills: 1 | Status: SHIPPED | OUTPATIENT
Start: 2019-03-04 | End: 2019-08-19 | Stop reason: SDUPTHER

## 2019-03-04 NOTE — PROGRESS NOTES
Subjective:       Patient ID: Domonique Hernandez Jr. is a 78 y.o. male.    Chief Complaint: 6 month check up    Patient here for check up and follow up for HYPERTENSION.   Went to ER for elevated BP one month ago.  I saw him the next day and increased his Coreg to 25 mg twice daily, increase amlodipine to 5 mg once daily.  Diovan HCT 80 mg was added.  Now his blood pressure is almost too low.  He is feeling fine on current medications however.  Patient is doing well on Effexor  mg daily.    Patient states his CPAP machine is working very good.  He requires 7 cm water.   He is urinating better now that he is on Flomax and Avodart.  He is still having delayed ejaculation.  Stopping Effexor did not make much of a difference.  Patient uses Viagra for EGD.  He takes Synthroid for his hypothyroidism.  He tolerates this well.  He denies having symptoms such as heat or cold intolerance.  His weight is stable.  He denies any swelling in her thyroid.  Patient is taking His statin every day for hyperlipidemia.  She is feeling well on the medication.  He denies side effects such as myalgias.      Hypertension   Pertinent negatives include no chest pain, headaches, palpitations or shortness of breath.   Otalgia    Pertinent negatives include no abdominal pain, coughing, headaches, rash or sore throat.   Cough   This is a new problem. The current episode started in the past 7 days. The problem has been gradually worsening. The cough is productive of purulent sputum. Pertinent negatives include no chest pain, chills, ear pain, fever, headaches, postnasal drip, rash, sore throat or shortness of breath.     Review of Systems   Constitutional: Negative for activity change, chills, diaphoresis, fatigue, fever and unexpected weight change.   HENT: Negative for congestion, ear pain, nosebleeds, postnasal drip, sinus pressure, sore throat, trouble swallowing and voice change.    Eyes: Negative for photophobia and visual disturbance.    Respiratory: Negative for apnea, cough, choking, chest tightness and shortness of breath.    Cardiovascular: Negative for chest pain, palpitations and leg swelling.   Gastrointestinal: Negative for abdominal pain and blood in stool.   Endocrine: Negative for cold intolerance, heat intolerance, polydipsia and polyphagia.   Genitourinary: Negative for decreased urine volume, difficulty urinating and dysuria.   Musculoskeletal: Positive for arthralgias and back pain. Negative for joint swelling.   Skin: Negative for color change, pallor and rash.   Neurological: Negative for dizziness, weakness, numbness and headaches.   Hematological: Negative for adenopathy. Does not bruise/bleed easily.   Psychiatric/Behavioral: Negative for behavioral problems, decreased concentration, dysphoric mood and sleep disturbance. The patient is not nervous/anxious.        Objective:      Vitals:    03/04/19 1404   BP: 118/76   Pulse: 84   Resp: 16     Physical Exam   Constitutional: He is oriented to person, place, and time. He appears well-developed and well-nourished.   Eyes: EOM are normal. Pupils are equal, round, and reactive to light.   Neck: No JVD present.   No carotid bruits   Cardiovascular: Normal rate, regular rhythm, normal heart sounds and intact distal pulses. Exam reveals no gallop and no friction rub.   No murmur heard.  Pulmonary/Chest: Effort normal and breath sounds normal.   Musculoskeletal: He exhibits no edema or tenderness.   Lymphadenopathy:     He has no cervical adenopathy.   Neurological: He is alert and oriented to person, place, and time. He has normal reflexes. He displays normal reflexes. No cranial nerve deficit. He exhibits normal muscle tone. Coordination normal.   Psychiatric: He has a normal mood and affect. His behavior is normal. Judgment and thought content normal.       Lab Results   Component Value Date    TSH 2.741 09/10/2018    TSH 2.741 09/10/2018     BMP  Lab Results   Component Value Date      12/05/2018    K 4.2 12/05/2018     12/05/2018    CO2 26 12/05/2018    BUN 18 12/05/2018    CREATININE 1.0 12/05/2018    CALCIUM 9.7 12/05/2018    ANIONGAP 10 12/05/2018    ESTGFRAFRICA >60 12/05/2018    EGFRNONAA >60 12/05/2018       Lab Results   Component Value Date    LDLCALC 142.6 09/10/2018     Lab Results   Component Value Date    URICACID 8.4 (H) 09/10/2018       Assessment:       1. Rectal bleeding    2. Chronic gout without tophus, unspecified cause, unspecified site    3. Essential hypertension    4. Seasonal allergic rhinitis due to pollen    5. Benign prostatic hyperplasia with nocturia    6. 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 Erectile dysfunction due to arterial insufficiency    8. Hyperlipidemia, unspecified hyperlipidemia type    9. BPH with urinary obstruction    10. Depression, unspecified depression type    11. Chronic midline low back pain without sciatica        Plan:     Essential hypertension  Two gram sodium diet.    Weight loss discussed.    Try to walk 2 miles per day.    The following medications will be restarted today  -     carvedilol (COREG) 25 MG tablet; Take 1 tablet (25 mg total) by mouth 2 (two) times daily with meals.  -     amlodipine (NORVASC) 5 MG tablet; Take 1 tablet by mouth once daily.  -     Diovan 80 mg po daily  Check BMP    KARL on CPAP  Continue CPAP at 7 cm water pressure    Depression, unspecified depression type  - Continue venlafaxine (EFFEXOR-XR) 150 MG 24 hr capsule; Take 1 capsule by mouth once daily.  Dispense: 30 capsule; Refill: 5    Hyperlipidemia, unspecified hyperlipidemia type  Continue Zocor 40 mg by mouth daily    Chronic gout without tophus, unspecified cause, unspecified site  Continue allopurinol 300 mg daily    Hypothyroidism due to acquired atrophy of thyroid  -  Synthroid to 100 µg by mouth daily    Benign non-nodular prostatic hyperplasia with lower urinary tract symptoms  Continue Avodart and Flomax.    Rectal  bleeding  -     CBC auto differential; Future; Expected date: 08/04/2019    Chronic gout without tophus, unspecified cause, unspecified site  -     allopurinol (ZYLOPRIM) 300 MG tablet; TAKE 1 TABLET ONE TIME DAILY  Dispense: 90 tablet; Refill: 1  -     Uric acid; Future; Expected date: 08/04/2019    Essential hypertension  -     amLODIPine (NORVASC) 5 MG tablet; Take 1 tablet (5 mg total) by mouth once daily.  Dispense: 90 tablet; Refill: 1  -     carvedilol (COREG) 25 MG tablet; Take 1 tablet by mouth 2 times daily with meals.  Dispense: 180 tablet; Refill: 1  -     valsartan (DIOVAN) 80 MG tablet; Take 1 tablet (80 mg total) by mouth once daily.  Dispense: 90 tablet; Refill: 1  -     Comprehensive metabolic panel; Future; Expected date: 08/04/2019    Seasonal allergic rhinitis due to pollen  -     cetirizine (ZYRTEC) 10 MG tablet; Take 1 tablet (10 mg total) by mouth once daily.  Dispense: 90 tablet; Refill: 1    Benign prostatic hyperplasia with nocturia  -     dutasteride (AVODART) 0.5 mg capsule; Take 1 capsule (0.5 mg total) by mouth once daily.  Dispense: 90 capsule; Refill: 1    Hypothyroidism due to acquired atrophy of thyroid  -     levothyroxine (SYNTHROID) 100 MCG tablet; TAKE 1 TABLET ONE TIME DAILY  Dispense: 90 tablet; Refill: 1  -     TSH; Future; Expected date: 08/04/2019    Erectile dysfunction due to arterial insufficiency  -     Discontinue: sildenafil (REVATIO) 20 mg Tab; Take 3-5 tablets PO  Dispense: 90 tablet; Refill: 3    Hyperlipidemia, unspecified hyperlipidemia type  -     simvastatin (ZOCOR) 40 MG tablet; Take 1 tablet (40 mg total) by mouth every evening.  Dispense: 90 tablet; Refill: 1  -     Lipid panel; Future; Expected date: 08/04/2019    BPH with urinary obstruction  -     tamsulosin (FLOMAX) 0.4 mg Cap; TAKE 1 TO 2 CAPSULES DAILY  Dispense: 180 capsule; Refill: 1    Depression, unspecified depression type  -     venlafaxine (EFFEXOR-XR) 150 MG Cp24; Take 1 capsule (150 mg total)  by mouth once daily.  Dispense: 90 capsule; Refill: 1    Chronic midline low back pain without sciatica  -     traMADol (ULTRAM) 50 mg tablet; Take 1 tablet (50 mg total) by mouth every 12 (twelve) hours as needed for Pain.  Dispense: 20 tablet; Refill: 0    Other orders  -     Discontinue: testosterone (ANDROGEL) 20.25 mg/1.25 gram (1.62 %) GlPm; Apply 2 pumps to shoulders daily  Dispense: 1 Bottle; Refill: 5      RTC 6 months

## 2019-03-06 NOTE — PROGRESS NOTES
Patient, Domonique Hernandez Jr. (MRN #[id number]), presented with a recorded BMI of 38.16 kg/m^2 and a documented comorbidity(s):  - Hypertension  - Hyperlipidemia  to which the severe obesity is a contributing factor. This is consistent with the definition of severe obesity (BMI 35.0-39.9) with comorbidity (ICD-10 E66.01, Z68.35). The patient's severe obesity was monitored, evaluated, addressed and/or treated. This addendum to the medical record is made on 03/06/2019.

## 2019-03-21 ENCOUNTER — OFFICE VISIT (OUTPATIENT)
Dept: FAMILY MEDICINE | Facility: CLINIC | Age: 79
End: 2019-03-21
Payer: MEDICARE

## 2019-03-21 VITALS
RESPIRATION RATE: 18 BRPM | WEIGHT: 256.38 LBS | HEART RATE: 80 BPM | DIASTOLIC BLOOD PRESSURE: 74 MMHG | SYSTOLIC BLOOD PRESSURE: 128 MMHG | BODY MASS INDEX: 38.86 KG/M2 | HEIGHT: 68 IN

## 2019-03-21 DIAGNOSIS — R35.0 FREQUENT URINATION: Primary | ICD-10-CM

## 2019-03-21 DIAGNOSIS — Z12.5 SCREENING FOR MALIGNANT NEOPLASM OF PROSTATE: ICD-10-CM

## 2019-03-21 DIAGNOSIS — M25.562 LEFT KNEE PAIN, UNSPECIFIED CHRONICITY: ICD-10-CM

## 2019-03-21 DIAGNOSIS — N41.9 PROSTATITIS, UNSPECIFIED PROSTATITIS TYPE: ICD-10-CM

## 2019-03-21 DIAGNOSIS — R53.83 FATIGUE, UNSPECIFIED TYPE: ICD-10-CM

## 2019-03-21 DIAGNOSIS — R82.90 ABNORMAL URINALYSIS: ICD-10-CM

## 2019-03-21 LAB
ALBUMIN SERPL BCP-MCNC: 3.5 G/DL
ALP SERPL-CCNC: 99 U/L
ALT SERPL W/O P-5'-P-CCNC: 21 U/L
ANION GAP SERPL CALC-SCNC: 10 MMOL/L
AST SERPL-CCNC: 25 U/L
BACTERIA SPEC CULT: ABNORMAL
BASOPHILS # BLD AUTO: 0.03 K/UL
BASOPHILS NFR BLD: 0.4 %
BILIRUB SERPL-MCNC: 0.5 MG/DL
BILIRUB SERPL-MCNC: NORMAL MG/DL
BLOOD URINE, POC: NORMAL
BUN SERPL-MCNC: 20 MG/DL
CALCIUM SERPL-MCNC: 10.1 MG/DL
CASTS: ABNORMAL
CHLORIDE SERPL-SCNC: 106 MMOL/L
CO2 SERPL-SCNC: 24 MMOL/L
COLOR, POC UA: NORMAL
COMPLEXED PSA SERPL-MCNC: 0.35 NG/ML
CREAT SERPL-MCNC: 1.2 MG/DL
CRYSTALS: ABNORMAL
DIFFERENTIAL METHOD: ABNORMAL
EOSINOPHIL # BLD AUTO: 0.2 K/UL
EOSINOPHIL NFR BLD: 3 %
ERYTHROCYTE [DISTWIDTH] IN BLOOD BY AUTOMATED COUNT: 14.7 %
EST. GFR  (AFRICAN AMERICAN): >60 ML/MIN/1.73 M^2
EST. GFR  (NON AFRICAN AMERICAN): 58 ML/MIN/1.73 M^2
GLUCOSE SERPL-MCNC: 113 MG/DL
GLUCOSE UR QL STRIP: NORMAL
HCT VFR BLD AUTO: 39.3 %
HGB BLD-MCNC: 12.7 G/DL
KETONES UR QL STRIP: NORMAL
LEUKOCYTE ESTERASE URINE, POC: NORMAL
LYMPHOCYTES # BLD AUTO: 1.3 K/UL
LYMPHOCYTES NFR BLD: 17.2 %
MCH RBC QN AUTO: 30.1 PG
MCHC RBC AUTO-ENTMCNC: 32.3 G/DL
MCV RBC AUTO: 93 FL
MONOCYTES # BLD AUTO: 0.6 K/UL
MONOCYTES NFR BLD: 7.6 %
NEUTROPHILS # BLD AUTO: 5.6 K/UL
NEUTROPHILS NFR BLD: 71.8 %
NITRITE, POC UA: NORMAL
PH, POC UA: 6
PLATELET # BLD AUTO: 222 K/UL
PMV BLD AUTO: 11.2 FL
POTASSIUM SERPL-SCNC: 4.1 MMOL/L
PROT SERPL-MCNC: 6.7 G/DL
PROTEIN, POC: NORMAL
RBC # BLD AUTO: 4.22 M/UL
RBC CELLS COUNTED: ABNORMAL
SODIUM SERPL-SCNC: 140 MMOL/L
SPECIFIC GRAVITY, POC UA: 1.01
TSH SERPL DL<=0.005 MIU/L-ACNC: 1.63 UIU/ML
URATE SERPL-MCNC: 6.4 MG/DL
UROBILINOGEN, POC UA: NORMAL
WBC # BLD AUTO: 7.75 K/UL
WHITE BLOOD CELLS: ABNORMAL

## 2019-03-21 PROCEDURE — 81000 URINALYSIS NONAUTO W/SCOPE: CPT | Mod: S$GLB,,, | Performed by: NURSE PRACTITIONER

## 2019-03-21 PROCEDURE — 85025 COMPLETE CBC W/AUTO DIFF WBC: CPT

## 2019-03-21 PROCEDURE — 84153 ASSAY OF PSA TOTAL: CPT

## 2019-03-21 PROCEDURE — 81000 POCT URINE SEDIMENT EXAM: ICD-10-PCS | Mod: S$GLB,,, | Performed by: NURSE PRACTITIONER

## 2019-03-21 PROCEDURE — 1101F PR PT FALLS ASSESS DOC 0-1 FALLS W/OUT INJ PAST YR: ICD-10-PCS | Mod: CPTII,S$GLB,, | Performed by: NURSE PRACTITIONER

## 2019-03-21 PROCEDURE — 36415 COLL VENOUS BLD VENIPUNCTURE: CPT | Mod: S$GLB,,, | Performed by: FAMILY MEDICINE

## 2019-03-21 PROCEDURE — 99999 PR PBB SHADOW E&M-EST. PATIENT-LVL V: CPT | Mod: PBBFAC,,, | Performed by: NURSE PRACTITIONER

## 2019-03-21 PROCEDURE — 3078F DIAST BP <80 MM HG: CPT | Mod: CPTII,S$GLB,, | Performed by: NURSE PRACTITIONER

## 2019-03-21 PROCEDURE — 84550 ASSAY OF BLOOD/URIC ACID: CPT

## 2019-03-21 PROCEDURE — 99214 OFFICE O/P EST MOD 30 MIN: CPT | Mod: 25,S$GLB,, | Performed by: NURSE PRACTITIONER

## 2019-03-21 PROCEDURE — 87086 URINE CULTURE/COLONY COUNT: CPT

## 2019-03-21 PROCEDURE — 80053 COMPREHEN METABOLIC PANEL: CPT

## 2019-03-21 PROCEDURE — 84443 ASSAY THYROID STIM HORMONE: CPT

## 2019-03-21 PROCEDURE — 36415 PR COLLECTION VENOUS BLOOD,VENIPUNCTURE: ICD-10-PCS | Mod: S$GLB,,, | Performed by: FAMILY MEDICINE

## 2019-03-21 PROCEDURE — 3074F PR MOST RECENT SYSTOLIC BLOOD PRESSURE < 130 MM HG: ICD-10-PCS | Mod: CPTII,S$GLB,, | Performed by: NURSE PRACTITIONER

## 2019-03-21 PROCEDURE — 96372 PR INJECTION,THERAP/PROPH/DIAG2ST, IM OR SUBCUT: ICD-10-PCS | Mod: S$GLB,,, | Performed by: NURSE PRACTITIONER

## 2019-03-21 PROCEDURE — 99999 PR PBB SHADOW E&M-EST. PATIENT-LVL V: ICD-10-PCS | Mod: PBBFAC,,, | Performed by: NURSE PRACTITIONER

## 2019-03-21 PROCEDURE — 99214 PR OFFICE/OUTPT VISIT, EST, LEVL IV, 30-39 MIN: ICD-10-PCS | Mod: 25,S$GLB,, | Performed by: NURSE PRACTITIONER

## 2019-03-21 PROCEDURE — 3078F PR MOST RECENT DIASTOLIC BLOOD PRESSURE < 80 MM HG: ICD-10-PCS | Mod: CPTII,S$GLB,, | Performed by: NURSE PRACTITIONER

## 2019-03-21 PROCEDURE — 3074F SYST BP LT 130 MM HG: CPT | Mod: CPTII,S$GLB,, | Performed by: NURSE PRACTITIONER

## 2019-03-21 PROCEDURE — 96372 THER/PROPH/DIAG INJ SC/IM: CPT | Mod: S$GLB,,, | Performed by: NURSE PRACTITIONER

## 2019-03-21 PROCEDURE — 1101F PT FALLS ASSESS-DOCD LE1/YR: CPT | Mod: CPTII,S$GLB,, | Performed by: NURSE PRACTITIONER

## 2019-03-21 RX ORDER — DOXYCYCLINE 100 MG/1
100 CAPSULE ORAL 2 TIMES DAILY
Qty: 20 CAPSULE | Refills: 0 | Status: SHIPPED | OUTPATIENT
Start: 2019-03-21 | End: 2019-03-31

## 2019-03-21 RX ORDER — KETOROLAC TROMETHAMINE 30 MG/ML
30 INJECTION, SOLUTION INTRAMUSCULAR; INTRAVENOUS
Status: COMPLETED | OUTPATIENT
Start: 2019-03-21 | End: 2019-03-21

## 2019-03-21 RX ADMIN — KETOROLAC TROMETHAMINE 30 MG: 30 INJECTION, SOLUTION INTRAMUSCULAR; INTRAVENOUS at 03:03

## 2019-03-21 NOTE — PROGRESS NOTES
Subjective:       Patient ID: Domonique Hernandez Jr. is a 78 y.o. male.    Chief Complaint: Leg Pain    Left knee pain - has had hyalgan injections with Dr. Sandoval. Frequent urination and frequent bowel movements.      Review of Systems   Constitutional: Positive for fatigue (for about 5 months ago). Negative for appetite change and fever.   HENT: Negative.  Negative for congestion, ear pain and sore throat.    Eyes: Negative.  Negative for visual disturbance.   Respiratory: Negative.  Negative for cough, shortness of breath and wheezing.    Cardiovascular: Negative.  Negative for chest pain and palpitations.   Gastrointestinal: Negative.  Negative for abdominal pain, diarrhea, nausea and vomiting.        Frequent BM's since colonoscopy in January.   Genitourinary: Positive for frequency (but small amounts). Negative for difficulty urinating.   Musculoskeletal: Positive for arthralgias (left knee pain) and back pain (lower back pain).   Skin: Negative.  Negative for rash.   Neurological: Negative.  Negative for headaches.   Psychiatric/Behavioral: Negative.  Negative for sleep disturbance. The patient is not nervous/anxious.    All other systems reviewed and are negative.      Objective:      Physical Exam   Constitutional: He is oriented to person, place, and time. He appears well-developed and well-nourished. No distress.   HENT:   Head: Normocephalic and atraumatic.   Eyes: Pupils are equal, round, and reactive to light.   Neck: Normal range of motion. Neck supple.   Cardiovascular: Normal rate, regular rhythm and normal heart sounds.   No murmur heard.  Pulmonary/Chest: Effort normal and breath sounds normal. No respiratory distress.   Musculoskeletal:   Favoring left knee   Neurological: He is alert and oriented to person, place, and time.   Skin: Skin is warm and dry.   Psychiatric: He has a normal mood and affect.   Nursing note and vitals reviewed.      Assessment:       1. Frequent urination    2. Fatigue,  unspecified type    3. Screening for malignant neoplasm of prostate    4. Left knee pain, unspecified chronicity    5. Abnormal urinalysis    6. Prostatitis, unspecified prostatitis type        Plan:     Domonique was seen today for leg pain.    Diagnoses and all orders for this visit:    Frequent urination  -     POCT urinalysis, dipstick or tablet reag  -     POCT URINE SEDIMENT EXAM    Fatigue, unspecified type  -     TSH  -     CBC auto differential  -     Comprehensive metabolic panel    Screening for malignant neoplasm of prostate  -     PSA, Screening    Left knee pain, unspecified chronicity  -     Uric acid  -     ketorolac injection 30 mg    Abnormal urinalysis  -     Urine culture    Prostatitis, unspecified prostatitis type  -     doxycycline (MONODOX) 100 MG capsule; Take 1 capsule (100 mg total) by mouth 2 (two) times daily. for 10 days    RTC next week to see Dr. Cummings

## 2019-03-23 LAB — BACTERIA UR CULT: NO GROWTH

## 2019-03-29 ENCOUNTER — OFFICE VISIT (OUTPATIENT)
Dept: FAMILY MEDICINE | Facility: CLINIC | Age: 79
End: 2019-03-29
Payer: MEDICARE

## 2019-03-29 VITALS
BODY MASS INDEX: 39.55 KG/M2 | WEIGHT: 252 LBS | SYSTOLIC BLOOD PRESSURE: 110 MMHG | HEIGHT: 67 IN | DIASTOLIC BLOOD PRESSURE: 70 MMHG | HEART RATE: 88 BPM | RESPIRATION RATE: 20 BRPM

## 2019-03-29 DIAGNOSIS — N41.9 PROSTATITIS, UNSPECIFIED PROSTATITIS TYPE: ICD-10-CM

## 2019-03-29 DIAGNOSIS — M25.562 LEFT KNEE PAIN, UNSPECIFIED CHRONICITY: Primary | ICD-10-CM

## 2019-03-29 PROCEDURE — 3074F SYST BP LT 130 MM HG: CPT | Mod: CPTII,S$GLB,, | Performed by: FAMILY MEDICINE

## 2019-03-29 PROCEDURE — 99213 OFFICE O/P EST LOW 20 MIN: CPT | Mod: S$GLB,,, | Performed by: FAMILY MEDICINE

## 2019-03-29 PROCEDURE — 1101F PT FALLS ASSESS-DOCD LE1/YR: CPT | Mod: CPTII,S$GLB,, | Performed by: FAMILY MEDICINE

## 2019-03-29 PROCEDURE — 1101F PR PT FALLS ASSESS DOC 0-1 FALLS W/OUT INJ PAST YR: ICD-10-PCS | Mod: CPTII,S$GLB,, | Performed by: FAMILY MEDICINE

## 2019-03-29 PROCEDURE — 3078F PR MOST RECENT DIASTOLIC BLOOD PRESSURE < 80 MM HG: ICD-10-PCS | Mod: CPTII,S$GLB,, | Performed by: FAMILY MEDICINE

## 2019-03-29 PROCEDURE — 3078F DIAST BP <80 MM HG: CPT | Mod: CPTII,S$GLB,, | Performed by: FAMILY MEDICINE

## 2019-03-29 PROCEDURE — 3074F PR MOST RECENT SYSTOLIC BLOOD PRESSURE < 130 MM HG: ICD-10-PCS | Mod: CPTII,S$GLB,, | Performed by: FAMILY MEDICINE

## 2019-03-29 PROCEDURE — 99999 PR PBB SHADOW E&M-EST. PATIENT-LVL III: ICD-10-PCS | Mod: PBBFAC,,, | Performed by: FAMILY MEDICINE

## 2019-03-29 PROCEDURE — 99213 PR OFFICE/OUTPT VISIT, EST, LEVL III, 20-29 MIN: ICD-10-PCS | Mod: S$GLB,,, | Performed by: FAMILY MEDICINE

## 2019-03-29 PROCEDURE — 99999 PR PBB SHADOW E&M-EST. PATIENT-LVL III: CPT | Mod: PBBFAC,,, | Performed by: FAMILY MEDICINE

## 2019-03-29 NOTE — PROGRESS NOTES
Subjective:       Patient ID: Domonique Hernandez Jr. is a 78 y.o. male.    Chief Complaint: Follow-up (1 week)    Patient here for follow up for urinary frequency, knee pain.  He has HYPERTENSION.   Went to ER for elevated BP one month ago.  I saw him the next day and increased his Coreg to 25 mg twice daily, increase amlodipine to 5 mg once daily.  Diovan HCT 80 mg was added.  Now his blood pressure is almost too low.  He is feeling fine on current medications however.  Patient is doing well on Effexor  mg daily.    Patient states his CPAP machine is working very good.  He requires 7 cm water.   He is urinating better now that he is on Flomax and Avodart.  He is still having delayed ejaculation.  Stopping Effexor did not make much of a difference.  Patient uses Viagra for EGD.  He takes Synthroid for his hypothyroidism.  He tolerates this well.  He denies having symptoms such as heat or cold intolerance.  His weight is stable.  He denies any swelling in her thyroid.  Patient is taking His statin every day for hyperlipidemia.  She is feeling well on the medication.  He denies side effects such as myalgias.    Hypertension   Pertinent negatives include no chest pain, headaches, palpitations or shortness of breath.   Otalgia    Pertinent negatives include no abdominal pain, coughing, headaches, rash or sore throat.   Cough   This is a new problem. The current episode started in the past 7 days. The problem has been gradually worsening. The cough is productive of purulent sputum. Pertinent negatives include no chest pain, chills, ear pain, fever, headaches, postnasal drip, rash, sore throat or shortness of breath.     Review of Systems   Constitutional: Negative for activity change, chills, diaphoresis, fatigue, fever and unexpected weight change.   HENT: Negative for congestion, ear pain, nosebleeds, postnasal drip, sinus pressure, sore throat, trouble swallowing and voice change.    Eyes: Negative for photophobia  and visual disturbance.   Respiratory: Negative for apnea, cough, choking, chest tightness and shortness of breath.    Cardiovascular: Negative for chest pain, palpitations and leg swelling.   Gastrointestinal: Negative for abdominal pain and blood in stool.   Endocrine: Negative for cold intolerance, heat intolerance, polydipsia and polyphagia.   Genitourinary: Positive for difficulty urinating. Negative for decreased urine volume and dysuria.        Improved urination   Musculoskeletal: Positive for arthralgias and back pain. Negative for joint swelling.   Skin: Negative for color change, pallor and rash.   Neurological: Negative for dizziness, weakness, numbness and headaches.   Hematological: Negative for adenopathy. Does not bruise/bleed easily.   Psychiatric/Behavioral: Negative for behavioral problems, decreased concentration, dysphoric mood and sleep disturbance. The patient is not nervous/anxious.        Objective:      Vitals:    03/29/19 1442   BP: 110/70   Pulse: 88   Resp: 20     Physical Exam   Constitutional: He is oriented to person, place, and time. He appears well-developed and well-nourished.   Eyes: Pupils are equal, round, and reactive to light. EOM are normal.   Neck: No JVD present.   No carotid bruits   Cardiovascular: Normal rate, regular rhythm, normal heart sounds and intact distal pulses. Exam reveals no gallop and no friction rub.   No murmur heard.  Pulmonary/Chest: Effort normal and breath sounds normal.   Musculoskeletal: He exhibits tenderness. He exhibits no edema.   Lymphadenopathy:     He has no cervical adenopathy.   Neurological: He is alert and oriented to person, place, and time. He has normal reflexes. He displays normal reflexes. No cranial nerve deficit. He exhibits normal muscle tone. Coordination normal.   Psychiatric: He has a normal mood and affect. His behavior is normal. Judgment and thought content normal.       Lab Results   Component Value Date    TSH 1.630  03/21/2019     BMP  Lab Results   Component Value Date     03/21/2019    K 4.1 03/21/2019     03/21/2019    CO2 24 03/21/2019    BUN 20 03/21/2019    CREATININE 1.2 03/21/2019    CALCIUM 10.1 03/21/2019    ANIONGAP 10 03/21/2019    ESTGFRAFRICA >60 03/21/2019    EGFRNONAA 58 (A) 03/21/2019       Lab Results   Component Value Date    LDLCALC 142.6 09/10/2018     Lab Results   Component Value Date    URICACID 6.4 03/21/2019       Assessment:       1. Left knee pain, unspecified chronicity    2. Prostatitis, unspecified prostatitis type        Plan:     Essential hypertension  Two gram sodium diet.    Weight loss discussed.    Try to walk 2 miles per day.    The following medications will be restarted today  -     carvedilol (COREG) 25 MG tablet; Take 1 tablet (25 mg total) by mouth 2 (two) times daily with meals.  -     amlodipine (NORVASC) 5 MG tablet; Take 1 tablet by mouth once daily.  -     Diovan 80 mg po daily  Check BMP    KARL on CPAP  Continue CPAP at 7 cm water pressure    Depression, unspecified depression type  - Continue venlafaxine (EFFEXOR-XR) 150 MG 24 hr capsule; Take 1 capsule by mouth once daily.  Dispense: 30 capsule; Refill: 5    Hyperlipidemia, unspecified hyperlipidemia type  Continue Zocor 40 mg by mouth daily    Chronic gout without tophus, unspecified cause, unspecified site  Continue allopurinol 300 mg daily    Hypothyroidism due to acquired atrophy of thyroid  -  Synthroid to 100 µg by mouth daily    Benign non-nodular prostatic hyperplasia with lower urinary tract symptoms  Continue Avodart and Flomax.  Finish Doxy    Left knee pain, unspecified chronicity    Prostatitis, unspecified prostatitis type      Doing well.    RTC as needed

## 2019-04-08 ENCOUNTER — TELEPHONE (OUTPATIENT)
Dept: FAMILY MEDICINE | Facility: CLINIC | Age: 79
End: 2019-04-08

## 2019-04-08 NOTE — TELEPHONE ENCOUNTER
----- Message from Madison Miller sent at 2019  5:25 PM CDT -----  Contact: Self  Domonique Hernandez Jr.  MRN: [id number]  : 1940  PCP: Emiliano Cam  Home Phone      [phone]  Work Phone      Not on file.  Mobile          [phone]      MESSAGE:   Pt requests a sooner appointment than the  can schedule.  Does patient feel like they need to be seen today:  tomorrow  What is the nature of the appointment:  UTI  What visit type:  est  Did you check other providers/department schedules for availability:   yes  Comments:     Phone:  [phone]

## 2019-04-09 ENCOUNTER — APPOINTMENT (OUTPATIENT)
Dept: RADIOLOGY | Facility: CLINIC | Age: 79
End: 2019-04-09
Attending: FAMILY MEDICINE
Payer: MEDICARE

## 2019-04-09 ENCOUNTER — OFFICE VISIT (OUTPATIENT)
Dept: FAMILY MEDICINE | Facility: CLINIC | Age: 79
End: 2019-04-09
Payer: MEDICARE

## 2019-04-09 VITALS
HEIGHT: 67 IN | HEART RATE: 80 BPM | SYSTOLIC BLOOD PRESSURE: 110 MMHG | BODY MASS INDEX: 39.34 KG/M2 | DIASTOLIC BLOOD PRESSURE: 76 MMHG | RESPIRATION RATE: 16 BRPM | WEIGHT: 250.63 LBS

## 2019-04-09 DIAGNOSIS — R35.0 FREQUENCY OF URINATION: Primary | ICD-10-CM

## 2019-04-09 DIAGNOSIS — J32.9 SINUSITIS, UNSPECIFIED CHRONICITY, UNSPECIFIED LOCATION: ICD-10-CM

## 2019-04-09 DIAGNOSIS — M25.562 ACUTE PAIN OF LEFT KNEE: ICD-10-CM

## 2019-04-09 PROCEDURE — 87086 URINE CULTURE/COLONY COUNT: CPT

## 2019-04-09 PROCEDURE — 81000 POCT URINE SEDIMENT EXAM: ICD-10-PCS | Mod: S$GLB,,, | Performed by: FAMILY MEDICINE

## 2019-04-09 PROCEDURE — 3288F FALL RISK ASSESSMENT DOCD: CPT | Mod: CPTII,S$GLB,, | Performed by: FAMILY MEDICINE

## 2019-04-09 PROCEDURE — 3288F PR FALLS RISK ASSESSMENT DOCUMENTED: ICD-10-PCS | Mod: CPTII,S$GLB,, | Performed by: FAMILY MEDICINE

## 2019-04-09 PROCEDURE — 1100F PTFALLS ASSESS-DOCD GE2>/YR: CPT | Mod: CPTII,S$GLB,, | Performed by: FAMILY MEDICINE

## 2019-04-09 PROCEDURE — 81000 URINALYSIS NONAUTO W/SCOPE: CPT | Mod: S$GLB,,, | Performed by: FAMILY MEDICINE

## 2019-04-09 PROCEDURE — 99999 PR PBB SHADOW E&M-EST. PATIENT-LVL IV: ICD-10-PCS | Mod: PBBFAC,,, | Performed by: FAMILY MEDICINE

## 2019-04-09 PROCEDURE — 73560 X-RAY EXAM OF KNEE 1 OR 2: CPT | Mod: 26,LT,, | Performed by: RADIOLOGY

## 2019-04-09 PROCEDURE — 3078F PR MOST RECENT DIASTOLIC BLOOD PRESSURE < 80 MM HG: ICD-10-PCS | Mod: CPTII,S$GLB,, | Performed by: FAMILY MEDICINE

## 2019-04-09 PROCEDURE — 99999 PR PBB SHADOW E&M-EST. PATIENT-LVL IV: CPT | Mod: PBBFAC,,, | Performed by: FAMILY MEDICINE

## 2019-04-09 PROCEDURE — 3074F SYST BP LT 130 MM HG: CPT | Mod: CPTII,S$GLB,, | Performed by: FAMILY MEDICINE

## 2019-04-09 PROCEDURE — 99214 PR OFFICE/OUTPT VISIT, EST, LEVL IV, 30-39 MIN: ICD-10-PCS | Mod: 25,S$GLB,, | Performed by: FAMILY MEDICINE

## 2019-04-09 PROCEDURE — 73560 X-RAY EXAM OF KNEE 1 OR 2: CPT | Mod: TC,PO,LT

## 2019-04-09 PROCEDURE — 3078F DIAST BP <80 MM HG: CPT | Mod: CPTII,S$GLB,, | Performed by: FAMILY MEDICINE

## 2019-04-09 PROCEDURE — 3074F PR MOST RECENT SYSTOLIC BLOOD PRESSURE < 130 MM HG: ICD-10-PCS | Mod: CPTII,S$GLB,, | Performed by: FAMILY MEDICINE

## 2019-04-09 PROCEDURE — 73560 XR KNEE 1 OR 2 VIEW LEFT: ICD-10-PCS | Mod: 26,LT,, | Performed by: RADIOLOGY

## 2019-04-09 PROCEDURE — 99214 OFFICE O/P EST MOD 30 MIN: CPT | Mod: 25,S$GLB,, | Performed by: FAMILY MEDICINE

## 2019-04-09 PROCEDURE — 1100F PR PT FALLS ASSESS DOC 2+ FALLS/FALL W/INJURY/YR: ICD-10-PCS | Mod: CPTII,S$GLB,, | Performed by: FAMILY MEDICINE

## 2019-04-09 RX ORDER — CIPROFLOXACIN 500 MG/1
500 TABLET ORAL 2 TIMES DAILY
Qty: 14 TABLET | Refills: 0 | Status: SHIPPED | OUTPATIENT
Start: 2019-04-09 | End: 2019-04-11 | Stop reason: SDUPTHER

## 2019-04-09 NOTE — PROGRESS NOTES
Subjective:       Patient ID: Domonique Hernandez Jr. is a 78 y.o. male.    Chief Complaint: Urinary Frequency (x 2 weeks); Urinary Retention (x 2 weeks); Sinus Problem (x 1 week); and Knee Pain    HPI  78 year old male comes in with c/o left knee pain. He says he has pressure in the knee and 'would like an xr.' He had 3 injections recently and this did improve his pain somewhat. His pain today is more in the lateral proximal tibial plateau.     HE has pressure in his right eye and congestion. No fevers.    He also notes that he is having issues with increased urinary frequency, increased nocturia, no dysuria. No pain in the groin. No fevers.    PMH, PSH, ALLERGIES, SH, FH reviewed in nurse's notes above  Medications reconciled in the nurse's notes       Review of Systems   Constitutional: Negative for chills and fever.   HENT: Positive for congestion. Negative for ear pain, postnasal drip, rhinorrhea, sore throat and trouble swallowing.    Eyes: Negative for redness and itching.   Respiratory: Negative for cough, shortness of breath and wheezing.    Cardiovascular: Negative for chest pain and palpitations.   Gastrointestinal: Negative for abdominal pain, diarrhea, nausea and vomiting.   Genitourinary: Positive for difficulty urinating and frequency. Negative for dysuria.   Skin: Negative for rash.   Neurological: Negative for weakness and headaches.       Objective:      Physical Exam   Constitutional: He is oriented to person, place, and time. He appears well-developed. No distress.   HENT:   Head: Normocephalic and atraumatic.   Tenderness to right maxillary sinus   Eyes: Pupils are equal, round, and reactive to light. Conjunctivae are normal.   Neck: Normal range of motion. Neck supple. No thyromegaly present.   Cardiovascular: Normal rate, regular rhythm, normal heart sounds and intact distal pulses.   Pulmonary/Chest: Effort normal and breath sounds normal. No respiratory distress. He has no wheezes.   Abdominal:  Soft. Bowel sounds are normal. There is no tenderness.   No suprapubic tenderness   Musculoskeletal: Normal range of motion. He exhibits tenderness. He exhibits no edema.   Tenderness to medial side of left tibia   Lymphadenopathy:     He has no cervical adenopathy.   Neurological: He is alert and oriented to person, place, and time.   Skin: Skin is warm and dry. No rash noted.   Psychiatric: He has a normal mood and affect. His behavior is normal.   Nursing note and vitals reviewed.       Assessment/Plan:       Problem List Items Addressed This Visit     None      Visit Diagnoses     Frequency of urination    -  Primary    Relevant Medications    ciprofloxacin HCl (CIPRO) 500 MG tablet    Other Relevant Orders    POCT URINE DIPSTICK WITH MICROSCOPE, AUTOMATED    PSA, total and free    POCT URINE SEDIMENT EXAM    Urine culture    Acute pain of left knee        Relevant Orders    X-Ray Knee 1 or 2 View Left    Sinusitis, unspecified chronicity, unspecified location          RTC if condition acutely worsens or any other concerns, otherwise RTC as scheduled

## 2019-04-10 LAB
BACTERIA SPEC CULT: NORMAL
BACTERIA UR CULT: NORMAL
BILIRUB SERPL-MCNC: NORMAL MG/DL
BLOOD URINE, POC: NORMAL
CASTS: NORMAL
COLOR, POC UA: NORMAL
CRYSTALS: NORMAL
GLUCOSE UR QL STRIP: NORMAL
KETONES UR QL STRIP: NORMAL
LEUKOCYTE ESTERASE URINE, POC: NORMAL
NITRITE, POC UA: NORMAL
PH, POC UA: 5
PROTEIN, POC: NORMAL
RBC CELLS COUNTED: NORMAL
SPECIFIC GRAVITY, POC UA: 1.02
UROBILINOGEN, POC UA: NORMAL
WHITE BLOOD CELLS: NORMAL

## 2019-04-11 ENCOUNTER — TELEPHONE (OUTPATIENT)
Dept: FAMILY MEDICINE | Facility: CLINIC | Age: 79
End: 2019-04-11

## 2019-04-11 RX ORDER — CIPROFLOXACIN 500 MG/1
500 TABLET ORAL 2 TIMES DAILY
Qty: 28 TABLET | Refills: 0 | Status: SHIPPED | OUTPATIENT
Start: 2019-04-11 | End: 2019-04-30

## 2019-04-12 DIAGNOSIS — G89.29 CHRONIC MIDLINE LOW BACK PAIN WITHOUT SCIATICA: ICD-10-CM

## 2019-04-12 DIAGNOSIS — M54.50 CHRONIC MIDLINE LOW BACK PAIN WITHOUT SCIATICA: ICD-10-CM

## 2019-04-12 NOTE — TELEPHONE ENCOUNTER
RX name and strength: Tramadol 50 MG   Last office visit: 04/09/19  Is this a 30-day or 90-day RX:  30  Pharmacy name and location:  [facility]  Comments:  Please call to let him know. He says he and dr. Leach discuss at last appt.    rx last printed on 3/4/19

## 2019-04-12 NOTE — TELEPHONE ENCOUNTER
Please check on his symptoms.  His urine culture is negative.  If he is improving, we need to keep him on the same medication for the complete 4 weeks.  2 more weeks called in.  charlee

## 2019-04-12 NOTE — TELEPHONE ENCOUNTER
----- Message from Alejandra Morales sent at 2019  1:58 PM CDT -----  Contact: Self  Domonique Hernandez Jr.  MRN: [id number]  : 1940  PCP: Emiliano Cam  Home Phone      [phone]  Work Phone      Not on file.  Mobile          [phone]      MESSAGE:   Pt requesting refill or new Rx.   Is this a refill or new RX:  new  RX name and strength: Tramadol 50 MG   Last office visit:   Is this a 30-day or 90-day RX:  30  Pharmacy name and location:  [facility]  Comments:  Please call to let him know. He says he and dr. Leach discuss at last appt.     Phone:  [phone]

## 2019-04-12 NOTE — TELEPHONE ENCOUNTER
Notified patient of results and patient verbalized understanding.   Pt stated his symptoms are improving so I advised pt that dr guerra would like him to continue Cipro for another 2 weeks and Rx was already called in. Pt verbally understood.

## 2019-04-15 RX ORDER — TRAMADOL HYDROCHLORIDE 50 MG/1
50 TABLET ORAL EVERY 12 HOURS PRN
Qty: 20 TABLET | Refills: 0 | Status: SHIPPED | OUTPATIENT
Start: 2019-04-15 | End: 2019-06-07 | Stop reason: SDUPTHER

## 2019-05-17 ENCOUNTER — OFFICE VISIT (OUTPATIENT)
Dept: FAMILY MEDICINE | Facility: CLINIC | Age: 79
End: 2019-05-17
Payer: MEDICARE

## 2019-05-17 VITALS
HEART RATE: 68 BPM | WEIGHT: 252.19 LBS | HEIGHT: 67 IN | SYSTOLIC BLOOD PRESSURE: 110 MMHG | BODY MASS INDEX: 39.58 KG/M2 | RESPIRATION RATE: 16 BRPM | DIASTOLIC BLOOD PRESSURE: 64 MMHG

## 2019-05-17 DIAGNOSIS — G47.33 OSA ON CPAP: Primary | ICD-10-CM

## 2019-05-17 PROCEDURE — 3078F PR MOST RECENT DIASTOLIC BLOOD PRESSURE < 80 MM HG: ICD-10-PCS | Mod: CPTII,S$GLB,, | Performed by: FAMILY MEDICINE

## 2019-05-17 PROCEDURE — 1100F PR PT FALLS ASSESS DOC 2+ FALLS/FALL W/INJURY/YR: ICD-10-PCS | Mod: CPTII,S$GLB,, | Performed by: FAMILY MEDICINE

## 2019-05-17 PROCEDURE — 1100F PTFALLS ASSESS-DOCD GE2>/YR: CPT | Mod: CPTII,S$GLB,, | Performed by: FAMILY MEDICINE

## 2019-05-17 PROCEDURE — 3074F SYST BP LT 130 MM HG: CPT | Mod: CPTII,S$GLB,, | Performed by: FAMILY MEDICINE

## 2019-05-17 PROCEDURE — 3288F PR FALLS RISK ASSESSMENT DOCUMENTED: ICD-10-PCS | Mod: CPTII,S$GLB,, | Performed by: FAMILY MEDICINE

## 2019-05-17 PROCEDURE — 3074F PR MOST RECENT SYSTOLIC BLOOD PRESSURE < 130 MM HG: ICD-10-PCS | Mod: CPTII,S$GLB,, | Performed by: FAMILY MEDICINE

## 2019-05-17 PROCEDURE — 3288F FALL RISK ASSESSMENT DOCD: CPT | Mod: CPTII,S$GLB,, | Performed by: FAMILY MEDICINE

## 2019-05-17 PROCEDURE — 3078F DIAST BP <80 MM HG: CPT | Mod: CPTII,S$GLB,, | Performed by: FAMILY MEDICINE

## 2019-05-17 PROCEDURE — 99999 PR PBB SHADOW E&M-EST. PATIENT-LVL III: CPT | Mod: PBBFAC,,, | Performed by: FAMILY MEDICINE

## 2019-05-17 PROCEDURE — 99999 PR PBB SHADOW E&M-EST. PATIENT-LVL III: ICD-10-PCS | Mod: PBBFAC,,, | Performed by: FAMILY MEDICINE

## 2019-05-17 PROCEDURE — 99214 PR OFFICE/OUTPT VISIT, EST, LEVL IV, 30-39 MIN: ICD-10-PCS | Mod: S$GLB,,, | Performed by: FAMILY MEDICINE

## 2019-05-17 PROCEDURE — 99214 OFFICE O/P EST MOD 30 MIN: CPT | Mod: S$GLB,,, | Performed by: FAMILY MEDICINE

## 2019-05-17 NOTE — PATIENT INSTRUCTIONS
Insomnia  Insomnia is repeated difficulty going to sleep or staying asleep, or both. Whether you have insomnia is not defined by a specific amount of sleep. Different people need different amounts of sleep, and you may need more or less sleep at different times of your life.  There are 3 major types of insomnia:  short-term, chronic, and other.  Short-term, or acute insomnia lasts less than 3 months.  The symptoms are temporary and can be linked directly to a stressor, such as the death of a loved one, financial problems, or a new physical problem.  Short-term insomnia stops when the stressor resolves or the person adapts to its presence.  Chronic insomnia occurs at least 3 times a week and lasts longer than 3 months.  Chronic insomnia can occur when either the cause of the sleeping problem is not clear, or the insomnia does not get better when the stressor is resolved. A number of other criteria are also used to make the diagnosis of chronic insomnia.   Other insomnia is the third type of insomnia-related sleep disorders.  This description applies to people who have problems getting to sleep or staying asleep, but do not meet all of the factors that describe either short-term or chronic insomnia.    Many things cause insomnia. Different people may have different causes. It can be from an underlying medical or psychological condition, or lifestyle. It can also be primary insomnia, which means no cause can be found.  Causes of insomnia include:  · Chronic medical problems- heart disease, gastrointestinal problems, hormonal changes, breathing problems  · Anxiety  · Stress  · Depression  · Pain  · Work schedule  · Sleep apnea  · Illegal drugs  · Certain medicines  Many different medidcines can affect your sleep, such as stimulants, caffeine, alcohol, some decongestants, and diet pills. Other medicines may include some types of blood pressure pills, steroids, asthma medicines, antihistamines, antidepressants,  seizure medicines and statins. Not all of these will affect your sleep, and they shouldnt be stopped without talking to your doctor.  Symptoms of insomnia can include:  · Lying awake for long periods at night before falling asleep  · Waking up several times during the night  · Waking up early in the morning and not being able to get back to sleep  · Feeling tired and not refreshed by sleep  · Not being able to function properly during the day and finding it hard to concentrate  · Irritability  · Tiredness and fatigue during the day  Home care  1. Review your medicines with your doctor or pharmacist to find out if they can cause insomnia. Not all medicines will affect your sleep, but they shouldn't be stopped without reviewing them with your doctor. There may be serious side effects and consequences from suddenly stopping your medicines. Not taking them may cause strokes, heart attacks, and many other problems.  2. Caffeine, smoking and alcohol also affect sleep. Limit your daily use and do not use these before bedtime. Alcohol may make you sleepy at first, but as its effects wear off, you may awaken a few hours later and have trouble returning to sleep.  3. Do not exercise, eat or drink large amounts of liquid within 2 hours of your bedtime.  4. Improve your sleep habits. Have a fixed bed and wake-up time. Try to keep noise, light and heat in your bedroom at a comfortable level. Try using earplugs or eyeshades if needed.   5. Avoid watching TV in bed.  6. If you do not fall asleep within 30 minutes, try to relax by reading or listening to soft music.  7. Limit daytime napping to one 30 minute period, early in the day.  8. Get regular exercise. Find other ways to lessen your stress level.  9. If a medicine was prescribed to help reset your sleep patterns, take it as directed. Sleeping pills are intended for short-term use, only. If taken for too long, the effect wears off while the risk of physical addiction and  psychological dependence increases.  Sleep diary  If the cause isnt obvious and it is not improving, try keeping a sleep diary for a couple of weeks. Include in it:  · The time you go to bed  · How long it takes to fall asleep  · How many times you wake up  · What time you wake up  · Your meal times and what you eat  · What time you drink alcohol  · Your exercise habits and times  Follow-up care  Follow up with your healthcare provider, or as advised. If X-rays or CT scans were done, you will be notified if there is a change in the reading, especially if it affects treatment.  Call 911  Call 911 if any of these occur:  · Trouble breathing  · Confusion or trouble waking  · Fainting or loss of consciousness  · Rapid heart rate  · New chest, arm, shoulder, neck or upper back pain  · Trouble with speech or vision, weakness of an arm or leg  · Trouble walking or talking, loss of balance, numbness or weakness in one side of your body, facial droop  When to seek medical advice  Call your healthcare provider right away if any of these occur:  · Extreme restlessness or irritability  · Confusion or hallucinations (seeing or hearing things that are not there)  · Anxiety, depression  · Several days without sleeping  Date Last Reviewed: 11/19/2015  © 7275-8690 [employer]. [address] [place] [address], [place] [address]. All rights reserved. This information is not intended as a substitute for professional medical care. Always follow your healthcare professional's instructions.

## 2019-05-17 NOTE — PROGRESS NOTES
Subjective:       Patient ID: Domonique Hernandez Jr. is a 78 y.o. male.    Chief Complaint: Sleep Apnea (still unable to sleep with nightly CPAP use)    Patient has a history of sleep apnea.  He has CPAP at a pressure of 7.  He tolerates this well.  He will wake up once or twice at night in order to urinate.  He denies waking up gasping for air.  During the day he will take several naps.  He feels like he is more fatigued during the day than normal.    Review of Systems   Constitutional: Negative for activity change, chills, fatigue, fever and unexpected weight change.   HENT: Negative for sore throat and trouble swallowing.    Respiratory: Negative for cough, chest tightness and shortness of breath.    Cardiovascular: Negative for chest pain and leg swelling.   Gastrointestinal: Negative for abdominal pain.   Endocrine: Negative for cold intolerance and heat intolerance.   Genitourinary: Positive for difficulty urinating.   Musculoskeletal: Positive for arthralgias. Negative for back pain and joint swelling.   Skin: Negative for rash.   Neurological: Negative for numbness.   Hematological: Negative for adenopathy.   Psychiatric/Behavioral: Negative for agitation, behavioral problems, decreased concentration and dysphoric mood.       Objective:      Vitals:    05/17/19 0846   BP: 110/64   Pulse: 68   Resp: 16     Physical Exam   Constitutional: He is oriented to person, place, and time. He appears well-developed and well-nourished.   Overweight   HENT:   Head: Normocephalic and atraumatic.   Right Ear: Tympanic membrane, external ear and ear canal normal.   Left Ear: Tympanic membrane, external ear and ear canal normal.   Nose: Nose normal.   Mouth/Throat: Oropharynx is clear and moist.   Eyes: Pupils are equal, round, and reactive to light. Conjunctivae and EOM are normal.   Neck: Normal range of motion. Neck supple. No tracheal deviation present. No thyromegaly present.   Cardiovascular: Normal rate, regular rhythm,  normal heart sounds and intact distal pulses. Exam reveals no gallop.   No murmur heard.  Pulmonary/Chest: Effort normal and breath sounds normal.   Abdominal: Soft. Bowel sounds are normal. He exhibits no distension and no mass. There is no tenderness. There is no rebound and no guarding. Hernia confirmed negative in the right inguinal area and confirmed negative in the left inguinal area.   Musculoskeletal: Normal range of motion.   Neurological: He is alert and oriented to person, place, and time. He has normal reflexes.   Skin: Skin is warm and dry.   Psychiatric: He has a normal mood and affect. His behavior is normal. Judgment and thought content normal.         Lab Results   Component Value Date    PSA 0.35 03/21/2019    PSA 1.3 12/31/2015     BMP  Lab Results   Component Value Date     03/21/2019    K 4.1 03/21/2019     03/21/2019    CO2 24 03/21/2019    BUN 20 03/21/2019    CREATININE 1.2 03/21/2019    CALCIUM 10.1 03/21/2019    ANIONGAP 10 03/21/2019    ESTGFRAFRICA >60 03/21/2019    EGFRNONAA 58 (A) 03/21/2019     Lab Results   Component Value Date    LDLCALC 142.6 09/10/2018     Lab Results   Component Value Date    WBC 7.75 03/21/2019    HGB 12.7 (L) 03/21/2019    HCT 39.3 (L) 03/21/2019    MCV 93 03/21/2019     03/21/2019     Lab Results   Component Value Date    TSH 1.630 03/21/2019       Assessment:       1. KARL on CPAP        Plan:   Domonique was seen today for sleep apnea.    Diagnoses and all orders for this visit:    KARL on CPAP  -     CPAP/BIPAP SUPPLIES    sleep hygiene  He will have his equipment evaluated to be sure it is working correctly  If daytime fatigue continues, consider repeat polysomnogram.

## 2019-06-06 ENCOUNTER — TELEPHONE (OUTPATIENT)
Dept: FAMILY MEDICINE | Facility: CLINIC | Age: 79
End: 2019-06-06

## 2019-06-06 DIAGNOSIS — G89.29 CHRONIC MIDLINE LOW BACK PAIN WITHOUT SCIATICA: ICD-10-CM

## 2019-06-06 DIAGNOSIS — M54.50 CHRONIC MIDLINE LOW BACK PAIN WITHOUT SCIATICA: ICD-10-CM

## 2019-06-06 NOTE — TELEPHONE ENCOUNTER
----- Message from Beverley Conklin sent at 2019 10:10 AM CDT -----  Contact: self  Domonique Hernandez Jr.  MRN: [id number]  : 1940  PCP: Emiliano Cam  Home Phone      [phone]  Work Phone      Not on file.  Mobile          [phone]      MESSAGE:   Pt requesting refill or new Rx.   Is this a refill or new RX:  refill  RX name and strength: traMADol (ULTRAM) 50 mg tablet  Last office visit:   Is this a 30-day or 90-day RX:  30  Pharmacy name and location:  [facility] in [place]  Comments:      Phone:  [phone]

## 2019-06-07 RX ORDER — TRAMADOL HYDROCHLORIDE 50 MG/1
50 TABLET ORAL EVERY 12 HOURS PRN
Qty: 20 TABLET | Refills: 0 | Status: SHIPPED | OUTPATIENT
Start: 2019-06-07 | End: 2019-07-17 | Stop reason: SDUPTHER

## 2019-06-07 NOTE — TELEPHONE ENCOUNTER
Chronic midline low back pain without sciatica  -     traMADol (ULTRAM) 50 mg tablet; Take 1 tablet (50 mg total) by mouth every 12 (twelve) hours as needed for pain.  Dispense: 20 tablet; Refill: 0

## 2019-07-03 ENCOUNTER — TELEPHONE (OUTPATIENT)
Dept: FAMILY MEDICINE | Facility: CLINIC | Age: 79
End: 2019-07-03

## 2019-07-03 ENCOUNTER — OFFICE VISIT (OUTPATIENT)
Dept: FAMILY MEDICINE | Facility: CLINIC | Age: 79
End: 2019-07-03
Payer: MEDICARE

## 2019-07-03 VITALS
SYSTOLIC BLOOD PRESSURE: 130 MMHG | HEART RATE: 76 BPM | BODY MASS INDEX: 38.92 KG/M2 | WEIGHT: 248 LBS | RESPIRATION RATE: 16 BRPM | HEIGHT: 67 IN | DIASTOLIC BLOOD PRESSURE: 80 MMHG

## 2019-07-03 DIAGNOSIS — L82.0 SEBORRHEIC KERATOSIS, INFLAMED: ICD-10-CM

## 2019-07-03 DIAGNOSIS — K64.8 INTERNAL HEMORRHOID: Primary | ICD-10-CM

## 2019-07-03 PROCEDURE — 99999 PR PBB SHADOW E&M-EST. PATIENT-LVL III: CPT | Mod: PBBFAC,,, | Performed by: FAMILY MEDICINE

## 2019-07-03 PROCEDURE — 1101F PR PT FALLS ASSESS DOC 0-1 FALLS W/OUT INJ PAST YR: ICD-10-PCS | Mod: CPTII,S$GLB,, | Performed by: FAMILY MEDICINE

## 2019-07-03 PROCEDURE — 99999 PR PBB SHADOW E&M-EST. PATIENT-LVL III: ICD-10-PCS | Mod: PBBFAC,,, | Performed by: FAMILY MEDICINE

## 2019-07-03 PROCEDURE — 99213 PR OFFICE/OUTPT VISIT, EST, LEVL III, 20-29 MIN: ICD-10-PCS | Mod: 25,S$GLB,, | Performed by: FAMILY MEDICINE

## 2019-07-03 PROCEDURE — 1101F PT FALLS ASSESS-DOCD LE1/YR: CPT | Mod: CPTII,S$GLB,, | Performed by: FAMILY MEDICINE

## 2019-07-03 PROCEDURE — 3075F SYST BP GE 130 - 139MM HG: CPT | Mod: CPTII,S$GLB,, | Performed by: FAMILY MEDICINE

## 2019-07-03 PROCEDURE — 3075F PR MOST RECENT SYSTOLIC BLOOD PRESS GE 130-139MM HG: ICD-10-PCS | Mod: CPTII,S$GLB,, | Performed by: FAMILY MEDICINE

## 2019-07-03 PROCEDURE — 17000 PR DESTRUCTION(LASER SURGERY,CRYOSURGERY,CHEMOSURGERY),PREMALIGNANT LESIONS,FIRST LESION: ICD-10-PCS | Mod: S$GLB,,, | Performed by: FAMILY MEDICINE

## 2019-07-03 PROCEDURE — 3079F PR MOST RECENT DIASTOLIC BLOOD PRESSURE 80-89 MM HG: ICD-10-PCS | Mod: CPTII,S$GLB,, | Performed by: FAMILY MEDICINE

## 2019-07-03 PROCEDURE — 3079F DIAST BP 80-89 MM HG: CPT | Mod: CPTII,S$GLB,, | Performed by: FAMILY MEDICINE

## 2019-07-03 PROCEDURE — 17000 DESTRUCT PREMALG LESION: CPT | Mod: S$GLB,,, | Performed by: FAMILY MEDICINE

## 2019-07-03 PROCEDURE — 99213 OFFICE O/P EST LOW 20 MIN: CPT | Mod: 25,S$GLB,, | Performed by: FAMILY MEDICINE

## 2019-07-03 RX ORDER — HYDROCORTISONE 25 MG/G
CREAM TOPICAL 2 TIMES DAILY
Qty: 30 G | Refills: 3 | Status: SHIPPED | OUTPATIENT
Start: 2019-07-03 | End: 2020-12-18 | Stop reason: ALTCHOICE

## 2019-07-03 NOTE — TELEPHONE ENCOUNTER
Pt called stating he started with dark red blood in his stool this AM. He reports he has been told in the past he has hemorrhoids but is not sure. States there was a large amount initially but seems to be subsiding.  Denies rectal or abdominal pain, no SOB or CP. No vomiting.  Appointment made for 12:30 this afternoon with Dr. Cummings. Advised patient that if symptoms worsened between now and then, such as increased bleeding, dizziness, SOB, etc to report to the ER. Verbalized understanding.

## 2019-07-03 NOTE — PROGRESS NOTES
Subjective:       Patient ID: Domonique Hernandez Jr. is a 79 y.o. male.    Chief Complaint: Rectal Bleeding (had dark red blood with BM this AM) and Colonoscopy (wants to schedule colonoscopy)    2 days of rectal bleeding.  Colonoscopy showed internal hemorrhoids and diverticulosis.  Colonoscopy was done by Dr. Crawford.  The prep was inadequate.  He will need another 1.  Has itchy mole on back    Review of Systems   Constitutional: Negative for activity change, chills, fatigue, fever and unexpected weight change.   HENT: Negative for sore throat and trouble swallowing.    Respiratory: Negative for cough, chest tightness and shortness of breath.    Cardiovascular: Negative for chest pain and leg swelling.   Gastrointestinal: Positive for anal bleeding. Negative for abdominal pain.   Endocrine: Negative for cold intolerance and heat intolerance.   Genitourinary: Negative for difficulty urinating.   Musculoskeletal: Negative for back pain and joint swelling.   Skin: Negative for rash.   Neurological: Negative for numbness.   Hematological: Negative for adenopathy.   Psychiatric/Behavioral: Negative for decreased concentration.       Objective:      Vitals:    07/03/19 1245   BP: 130/80   Pulse: 76   Resp: 16     Physical Exam   Constitutional: He appears well-developed and well-nourished.   Cardiovascular: Normal rate and regular rhythm.   Pulmonary/Chest: Effort normal and breath sounds normal.   Skin:   2x1 cm inflamed SK on back.         Assessment:       1. Internal hemorrhoid    2. Seborrheic keratosis, inflamed        Plan:   Domonique was seen today for rectal bleeding and colonoscopy.    Diagnoses and all orders for this visit:    Internal hemorrhoid  -     Ambulatory referral to Colorectal Surgery  -     hydrocortisone (ANUSOL-HC) 2.5 % rectal cream; Place rectally 2 (two) times daily.  Patient would probably benefit from banding of the hemorrhoids.    Seborrheic keratosis, inflamed  -     DESTROY BENIGN/PREMAL 1ST  LX  Destroyed using liquid nitrogen

## 2019-07-05 ENCOUNTER — TELEPHONE (OUTPATIENT)
Dept: SURGERY | Facility: CLINIC | Age: 79
End: 2019-07-05

## 2019-07-05 NOTE — TELEPHONE ENCOUNTER
----- Message from Kacey Gray sent at 7/4/2019  8:34 AM CDT -----  Contact: Pt   Pt was calling to get a sooner appt.    Pt would like a call back at [phone].    Thank You.

## 2019-07-11 ENCOUNTER — OFFICE VISIT (OUTPATIENT)
Dept: SURGERY | Facility: CLINIC | Age: 79
End: 2019-07-11
Payer: MEDICARE

## 2019-07-11 VITALS
HEIGHT: 67 IN | HEART RATE: 74 BPM | WEIGHT: 253.06 LBS | RESPIRATION RATE: 16 BRPM | SYSTOLIC BLOOD PRESSURE: 140 MMHG | DIASTOLIC BLOOD PRESSURE: 80 MMHG | BODY MASS INDEX: 39.72 KG/M2

## 2019-07-11 DIAGNOSIS — K64.8 HEMORRHOIDS, INTERNAL, WITH BLEEDING: Primary | ICD-10-CM

## 2019-07-11 DIAGNOSIS — K57.30 DIVERTICULOSIS OF LARGE INTESTINE WITHOUT HEMORRHAGE: ICD-10-CM

## 2019-07-11 DIAGNOSIS — Z12.11 COLON CANCER SCREENING: ICD-10-CM

## 2019-07-11 PROCEDURE — 99999 PR PBB SHADOW E&M-EST. PATIENT-LVL III: CPT | Mod: PBBFAC,,, | Performed by: COLON & RECTAL SURGERY

## 2019-07-11 PROCEDURE — 3077F PR MOST RECENT SYSTOLIC BLOOD PRESSURE >= 140 MM HG: ICD-10-PCS | Mod: CPTII,S$GLB,, | Performed by: COLON & RECTAL SURGERY

## 2019-07-11 PROCEDURE — 1101F PR PT FALLS ASSESS DOC 0-1 FALLS W/OUT INJ PAST YR: ICD-10-PCS | Mod: CPTII,S$GLB,, | Performed by: COLON & RECTAL SURGERY

## 2019-07-11 PROCEDURE — 99999 PR PBB SHADOW E&M-EST. PATIENT-LVL III: ICD-10-PCS | Mod: PBBFAC,,, | Performed by: COLON & RECTAL SURGERY

## 2019-07-11 PROCEDURE — 99213 PR OFFICE/OUTPT VISIT, EST, LEVL III, 20-29 MIN: ICD-10-PCS | Mod: S$GLB,,, | Performed by: COLON & RECTAL SURGERY

## 2019-07-11 PROCEDURE — 1101F PT FALLS ASSESS-DOCD LE1/YR: CPT | Mod: CPTII,S$GLB,, | Performed by: COLON & RECTAL SURGERY

## 2019-07-11 PROCEDURE — 3079F PR MOST RECENT DIASTOLIC BLOOD PRESSURE 80-89 MM HG: ICD-10-PCS | Mod: CPTII,S$GLB,, | Performed by: COLON & RECTAL SURGERY

## 2019-07-11 PROCEDURE — 99213 OFFICE O/P EST LOW 20 MIN: CPT | Mod: S$GLB,,, | Performed by: COLON & RECTAL SURGERY

## 2019-07-11 PROCEDURE — 3077F SYST BP >= 140 MM HG: CPT | Mod: CPTII,S$GLB,, | Performed by: COLON & RECTAL SURGERY

## 2019-07-11 PROCEDURE — 3079F DIAST BP 80-89 MM HG: CPT | Mod: CPTII,S$GLB,, | Performed by: COLON & RECTAL SURGERY

## 2019-07-11 NOTE — PROGRESS NOTES
Subjective:       Patient ID: Domonique Hernandez Jr. is a 79 y.o. male.    Chief Complaint: Follow-up and Rectal Bleeding (x2 days/stopped about 5/6 days ago)    HPI 78 yo M - seen by me December 2018 for rectal bleeding and was found to have internal hemorrhoids that were treated with elastic band ligation.  He underwent an attempted colonoscopy by me in January 2019 - he had an inadequate bowel prep and I was not able to advance the scope beyond the sigmoid colon.  He was found to have moderate sigmoid diverticulosis as well as some residual internal hemorrhoids.  He was supposed to schedule a repeat colonoscopy with an extended prep but failed to do so.    He comes in today having had another episode of painless bright red blood per rectum after bowel movements which lasted on and off for about a week.  Since he made the appointment to be seen for this, he has not had any further bleeding for the past week or so.  He denies any abdominal pain or anorectal pain.  Denies any constipation or change in bowel habits.  He is amenable to rescheduling his colonoscopy.      Review of patient's allergies indicates:   Allergen Reactions    Percocet [oxycodone-acetaminophen] Other (See Comments)     hyperactivity    Percodan [oxycodone hcl-oxycodone-asa] Other (See Comments)     hyperactivity       Past Medical History:   Diagnosis Date    Anxiety     Arthritis     Back pain     Chronic bronchitis     Chronic gout     COPD (chronic obstructive pulmonary disease)     Depression     Emphysema of lung     Generalized headaches     GERD (gastroesophageal reflux disease)     Hyperlipidemia     Hypertension     Hypothyroidism     Obesity     Peripheral vascular disease     Sleep apnea     On CPAP    Stage 3 chronic kidney disease 4/18/2018    Thyroid disease        Past Surgical History:   Procedure Laterality Date    Bilateral mastoidectomies Bilateral 1984    for ear infection    BLOCK-NERVE-MEDIAL BRANCH-LUMBAR  (L3,4,5)  Bilateral 6/27/2014    Per[facility]    COLONOSCOPY  2010    COLONOSCOPY N/A 1/31/2019    Performed by Joaquín Crawford MD at [facility] ENDO    Eardrum repair  1984    Not sure which side    HERNIA REPAIR  1984    Umbilical-screen    KNEE ARTHROSCOPY Left 1989    ROTATOR CUFF REPAIR Left 2002    Left       Current Outpatient Medications   Medication Sig Dispense Refill    albuterol 90 mcg/actuation inhaler Inhale 2 puffs into the lungs every 6 (six) hours as needed for Wheezing. Rescue 18 g 5    allopurinol (ZYLOPRIM) 300 MG tablet TAKE 1 TABLET ONE TIME DAILY 90 tablet 1    amLODIPine (NORVASC) 5 MG tablet Take 1 tablet (5 mg total) by mouth once daily. 90 tablet 1    carvedilol (COREG) 25 MG tablet Take 1 tablet by mouth 2 times daily with meals. 180 tablet 1    cetirizine (ZYRTEC) 10 MG tablet Take 1 tablet (10 mg total) by mouth once daily. 90 tablet 1    dutasteride (AVODART) 0.5 mg capsule Take 1 capsule (0.5 mg total) by mouth once daily. 90 capsule 1    fluticasone (FLONASE) 50 mcg/actuation nasal spray 2 sprays (100 mcg total) by Each Nare route 2 (two) times daily. 1 Bottle 11    hydrocortisone (ANUSOL-HC) 2.5 % rectal cream Place rectally 2 (two) times daily. 30 g 3    hydrocortisone (PROCTO-KATIUSKA) 1 % crpe Place 1 applicator rectally 2 (two) times daily as needed. 30 g 1    ipratropium (ATROVENT) 42 mcg (0.06 %) nasal spray 2 sprays by Nasal route 4 (four) times daily. 15 mL 2    levothyroxine (SYNTHROID) 100 MCG tablet TAKE 1 TABLET ONE TIME DAILY 90 tablet 1    multivitamin (ONE DAILY MULTIVITAMIN) per tablet Take 1 tablet by mouth once daily. Centrum Silver for Men      simvastatin (ZOCOR) 40 MG tablet Take 1 tablet (40 mg total) by mouth every evening. 90 tablet 1    tamsulosin (FLOMAX) 0.4 mg Cap TAKE 1 TO 2 CAPSULES DAILY 180 capsule 1    traMADol (ULTRAM) 50 mg tablet Take 1 tablet (50 mg total) by mouth every 12 (twelve) hours as needed for pain.  20 tablet 0    umeclidinium-vilanterol (ANORO ELLIPTA) 62.5-25 mcg/actuation DsDv Inhale 1 Dose into the lungs once daily. 60 each 5    valsartan (DIOVAN) 80 MG tablet Take 1 tablet (80 mg total) by mouth once daily. 90 tablet 1    venlafaxine (EFFEXOR-XR) 150 MG Cp24 Take 1 capsule (150 mg total) by mouth once daily. 90 capsule 1    albuterol-ipratropium 2.5mg-0.5mg/3mL (DUO-NEB) 0.5 mg-3 mg(2.5 mg base)/3 mL nebulizer solution Take 3 mLs by nebulization every 6 (six) hours as needed for Wheezing. 120 vial 5     No current facility-administered medications for this visit.        Family History   Problem Relation Age of Onset    Cancer Mother         Mother    Cancer Father     No Known Problems Sister     Kidney disease Brother     Stroke Son     Stroke Son     Atrial fibrillation Son     Melanoma Neg Hx        Social History     Socioeconomic History    Marital status:      Spouse name: Not on file    Number of children: Not on file    Years of education: Not on file    Highest education level: Not on file   Occupational History    Not on file   Social Needs    Financial resource strain: Not on file    Food insecurity:     Worry: Not on file     Inability: Not on file    Transportation needs:     Medical: Not on file     Non-medical: Not on file   Tobacco Use    Smoking status: Never Smoker    Smokeless tobacco: Never Used   Substance and Sexual Activity    Alcohol use: No    Drug use: No    Sexual activity: Yes     Partners: Female   Lifestyle    Physical activity:     Days per week: Not on file     Minutes per session: Not on file    Stress: Not on file   Relationships    Social connections:     Talks on phone: Not on file     Gets together: Not on file     Attends Restoration service: Not on file     Active member of club or organization: Not on file     Attends meetings of clubs or organizations: Not on file     Relationship status: Not on file   Other Topics Concern    Not on  file   Social History Narrative    Not on file       Review of Systems   Constitutional: Negative for chills and fever.   HENT: Negative for congestion and sinus pressure.    Eyes: Negative for visual disturbance.   Respiratory: Negative for cough and shortness of breath.    Cardiovascular: Negative for chest pain and palpitations.   Gastrointestinal: Positive for anal bleeding ( Now resolved). Negative for abdominal distention, abdominal pain, blood in stool, constipation, diarrhea, nausea, rectal pain and vomiting.        GERD   Endocrine: Negative for cold intolerance and heat intolerance.   Genitourinary: Negative for dysuria and frequency.   Musculoskeletal: Positive for arthralgias and back pain.   Skin: Negative for rash.   Allergic/Immunologic: Negative for immunocompromised state.   Neurological: Positive for headaches. Negative for dizziness and light-headedness.   Psychiatric/Behavioral: Negative for confusion. The patient is nervous/anxious.        Objective:      Physical Exam   Constitutional: He is oriented to person, place, and time. He appears well-developed and well-nourished.   HENT:   Head: Normocephalic and atraumatic.   Eyes: Conjunctivae are normal.   Pulmonary/Chest: Effort normal. No respiratory distress.   Abdominal: Soft. He exhibits no distension and no mass. There is no tenderness. There is no rebound and no guarding.   Genitourinary:   Genitourinary Comments: Patient deferred anorectal examination until time of colonoscopy     Neurological: He is alert and oriented to person, place, and time.   Skin: Skin is warm and dry. No erythema.         Lab Results   Component Value Date    WBC 7.75 03/21/2019    HGB 12.7 (L) 03/21/2019    HCT 39.3 (L) 03/21/2019    MCV 93 03/21/2019     03/21/2019     BMP  Lab Results   Component Value Date     03/21/2019    K 4.1 03/21/2019     03/21/2019    CO2 24 03/21/2019    BUN 20 03/21/2019    CREATININE 1.2 03/21/2019    CALCIUM 10.1  03/21/2019    ANIONGAP 10 03/21/2019    ESTGFRAFRICA >60 03/21/2019    EGFRNONAA 58 (A) 03/21/2019     CMP  Sodium   Date Value Ref Range Status   03/21/2019 140 136 - 145 mmol/L Final     Potassium   Date Value Ref Range Status   03/21/2019 4.1 3.5 - 5.1 mmol/L Final     Chloride   Date Value Ref Range Status   03/21/2019 106 95 - 110 mmol/L Final     CO2   Date Value Ref Range Status   03/21/2019 24 23 - 29 mmol/L Final     Glucose   Date Value Ref Range Status   03/21/2019 113 (H) 70 - 110 mg/dL Final     BUN, Bld   Date Value Ref Range Status   03/21/2019 20 8 - 23 mg/dL Final     Creatinine   Date Value Ref Range Status   03/21/2019 1.2 0.5 - 1.4 mg/dL Final     Calcium   Date Value Ref Range Status   03/21/2019 10.1 8.7 - 10.5 mg/dL Final     Total Protein   Date Value Ref Range Status   03/21/2019 6.7 6.0 - 8.4 g/dL Final     Albumin   Date Value Ref Range Status   03/21/2019 3.5 3.5 - 5.2 g/dL Final     Total Bilirubin   Date Value Ref Range Status   03/21/2019 0.5 0.1 - 1.0 mg/dL Final     Comment:     For infants and newborns, interpretation of results should be based  on gestational age, weight and in agreement with clinical  observations.  Premature Infant recommended reference ranges:  Up to 24 hours.............<8.0 mg/dL  Up to 48 hours............<12.0 mg/dL  3-5 days..................<15.0 mg/dL  6-29 days.................<15.0 mg/dL       Alkaline Phosphatase   Date Value Ref Range Status   03/21/2019 99 55 - 135 U/L Final     AST   Date Value Ref Range Status   03/21/2019 25 10 - 40 U/L Final     ALT   Date Value Ref Range Status   03/21/2019 21 10 - 44 U/L Final     Anion Gap   Date Value Ref Range Status   03/21/2019 10 8 - 16 mmol/L Final     eGFR if    Date Value Ref Range Status   03/21/2019 >60 >60 mL/min/1.73 m^2 Final     eGFR if non    Date Value Ref Range Status   03/21/2019 58 (A) >60 mL/min/1.73 m^2 Final     Comment:     Calculation used to obtain the  estimated glomerular filtration  rate (eGFR) is the CKD-EPI equation.        No results found for: CEA        Assessment:       1. Hemorrhoids, internal, with bleeding    2. Diverticulosis of large intestine without hemorrhage    3. Colon cancer screening    Hemorrhoidal bleeding has now resolved.    Plan:   He will be rescheduled for another attempt at colonoscopy at [facility] with an extended 2 day prep.  He states now that he was not able to finish the prep with his last colonoscopy as he was given the large volume GoLYTELY.  We will try to get him a smaller volume split dose prep for this procedure. I also instructed him to do a light diet 2 days prior to the procedure followed by clear liquid diet 1 day prior to the procedure.    Joaquín Crawford MD, FACS, FASCRS  Senior Staff Surgeon  Department of Colon & Rectal Surgery     This note was created using voice recognition software, and may contain some unrecognized transcriptional errors.

## 2019-07-11 NOTE — LETTER
July 11, 2019      Emiliano Cam MD  [address]  [facility] [address]           [facility] - Colon/Rectal Surg  [address] [facility] [address]  Phone: [phone]          Patient: Domonique Hernandez Jr.   MR Number: [id number]   YOB: 1940   Date of Visit: 7/11/2019       Dear Dr. Cam:    Thank you for referring Domonique Hernandez to me for evaluation. Attached you will find relevant portions of my assessment and plaeidenreichn of care.    If you have questions, please do not hesitate to call me. I look forward to following Domonique Hernandez along with you.    Sincerely,    Joaquín Crawford MD    Enclosure  CC:  No Recipients    If you would like to receive this communication electronically, please contact [email or web address] or [phone] to request more information on [employer] Link access.    For providers and/or their staff who would like to refer a patient to [facility], please contact us through our one-stop-shop provider referral line, [facility], at [phone].    If you feel you have received this communication in error or would no longer like to receive these types of communications, please e-mail [email or web address]

## 2019-07-16 ENCOUNTER — TELEPHONE (OUTPATIENT)
Dept: FAMILY MEDICINE | Facility: CLINIC | Age: 79
End: 2019-07-16

## 2019-07-16 NOTE — TELEPHONE ENCOUNTER
----- Message from Beverley Conklin sent at 2019 11:11 AM CDT -----  Contact: self  Domonique Hernandez Jr.  MRN: [id number]  : 1940  PCP: Emiliano Cam  Home Phone      [phone]  Work Phone      Not on file.  Mobile          [phone]      MESSAGE:   Patient would like to come in for a shot. He has a bad cold.    [phone]

## 2019-07-17 ENCOUNTER — OFFICE VISIT (OUTPATIENT)
Dept: FAMILY MEDICINE | Facility: CLINIC | Age: 79
End: 2019-07-17
Payer: MEDICARE

## 2019-07-17 VITALS
HEIGHT: 67 IN | OXYGEN SATURATION: 98 % | BODY MASS INDEX: 39.24 KG/M2 | SYSTOLIC BLOOD PRESSURE: 130 MMHG | WEIGHT: 250 LBS | TEMPERATURE: 98 F | HEART RATE: 68 BPM | RESPIRATION RATE: 16 BRPM | DIASTOLIC BLOOD PRESSURE: 60 MMHG

## 2019-07-17 DIAGNOSIS — M54.50 CHRONIC MIDLINE LOW BACK PAIN WITHOUT SCIATICA: ICD-10-CM

## 2019-07-17 DIAGNOSIS — J20.9 ACUTE BRONCHITIS, UNSPECIFIED ORGANISM: Primary | ICD-10-CM

## 2019-07-17 DIAGNOSIS — G89.29 CHRONIC MIDLINE LOW BACK PAIN WITHOUT SCIATICA: ICD-10-CM

## 2019-07-17 DIAGNOSIS — J41.1 MUCOPURULENT CHRONIC BRONCHITIS: ICD-10-CM

## 2019-07-17 PROCEDURE — 99999 PR PBB SHADOW E&M-EST. PATIENT-LVL III: ICD-10-PCS | Mod: PBBFAC,,, | Performed by: FAMILY MEDICINE

## 2019-07-17 PROCEDURE — 1101F PT FALLS ASSESS-DOCD LE1/YR: CPT | Mod: CPTII,S$GLB,, | Performed by: FAMILY MEDICINE

## 2019-07-17 PROCEDURE — 3075F PR MOST RECENT SYSTOLIC BLOOD PRESS GE 130-139MM HG: ICD-10-PCS | Mod: CPTII,S$GLB,, | Performed by: FAMILY MEDICINE

## 2019-07-17 PROCEDURE — 3078F PR MOST RECENT DIASTOLIC BLOOD PRESSURE < 80 MM HG: ICD-10-PCS | Mod: CPTII,S$GLB,, | Performed by: FAMILY MEDICINE

## 2019-07-17 PROCEDURE — 99999 PR PBB SHADOW E&M-EST. PATIENT-LVL III: CPT | Mod: PBBFAC,,, | Performed by: FAMILY MEDICINE

## 2019-07-17 PROCEDURE — 99213 PR OFFICE/OUTPT VISIT, EST, LEVL III, 20-29 MIN: ICD-10-PCS | Mod: 25,S$GLB,, | Performed by: FAMILY MEDICINE

## 2019-07-17 PROCEDURE — 96372 PR INJECTION,THERAP/PROPH/DIAG2ST, IM OR SUBCUT: ICD-10-PCS | Mod: S$GLB,,, | Performed by: FAMILY MEDICINE

## 2019-07-17 PROCEDURE — 99499 RISK ADDL DX/OHS AUDIT: ICD-10-PCS | Mod: S$GLB,,, | Performed by: FAMILY MEDICINE

## 2019-07-17 PROCEDURE — 3075F SYST BP GE 130 - 139MM HG: CPT | Mod: CPTII,S$GLB,, | Performed by: FAMILY MEDICINE

## 2019-07-17 PROCEDURE — 3078F DIAST BP <80 MM HG: CPT | Mod: CPTII,S$GLB,, | Performed by: FAMILY MEDICINE

## 2019-07-17 PROCEDURE — 99213 OFFICE O/P EST LOW 20 MIN: CPT | Mod: 25,S$GLB,, | Performed by: FAMILY MEDICINE

## 2019-07-17 PROCEDURE — 99499 UNLISTED E&M SERVICE: CPT | Mod: S$GLB,,, | Performed by: FAMILY MEDICINE

## 2019-07-17 PROCEDURE — 1101F PR PT FALLS ASSESS DOC 0-1 FALLS W/OUT INJ PAST YR: ICD-10-PCS | Mod: CPTII,S$GLB,, | Performed by: FAMILY MEDICINE

## 2019-07-17 PROCEDURE — 96372 THER/PROPH/DIAG INJ SC/IM: CPT | Mod: S$GLB,,, | Performed by: FAMILY MEDICINE

## 2019-07-17 RX ORDER — AZITHROMYCIN 250 MG/1
TABLET, FILM COATED ORAL
Qty: 6 TABLET | Refills: 0 | Status: SHIPPED | OUTPATIENT
Start: 2019-07-17 | End: 2019-09-16

## 2019-07-17 RX ORDER — TRAMADOL HYDROCHLORIDE 50 MG/1
50 TABLET ORAL EVERY 12 HOURS PRN
Qty: 20 TABLET | Refills: 0 | Status: SHIPPED | OUTPATIENT
Start: 2019-07-17 | End: 2019-09-16 | Stop reason: SDUPTHER

## 2019-07-17 RX ORDER — IPRATROPIUM BROMIDE AND ALBUTEROL SULFATE 2.5; .5 MG/3ML; MG/3ML
3 SOLUTION RESPIRATORY (INHALATION) EVERY 6 HOURS PRN
Qty: 360 ML | Refills: 5 | Status: SHIPPED | OUTPATIENT
Start: 2019-07-17 | End: 2021-03-19

## 2019-07-17 RX ORDER — METHYLPREDNISOLONE ACETATE 40 MG/ML
60 INJECTION, SUSPENSION INTRA-ARTICULAR; INTRALESIONAL; INTRAMUSCULAR; SOFT TISSUE
Status: COMPLETED | OUTPATIENT
Start: 2019-07-17 | End: 2019-07-17

## 2019-07-17 RX ADMIN — METHYLPREDNISOLONE ACETATE 60 MG: 40 INJECTION, SUSPENSION INTRA-ARTICULAR; INTRALESIONAL; INTRAMUSCULAR; SOFT TISSUE at 11:07

## 2019-07-17 NOTE — PROGRESS NOTES
Chief complaint: Sinus congestion    History of present illness: Pt is 79 y.o. male complaints of sinus congestion, facial pressure, sore throat, ear ache.  Patient states glands are swollen and neck.  Patient has a cough.  This started 5 days ago.  Patient denies chest pain, shortness of breath, fever.  Patient has itchy watery eyes, itchy scratchy throat.  The patient complains of decreased hearing.  Cough is nonproductive    Review of systems:  Constitutional-no weight loss, weight gain  HEENT-allergy symptoms such as itchy watery eyes, post nasal drip, itchy palate, come and go.  Respiratory-no wheezing, see history of present illness  Neurological-no weakness or numbness    Past medical history, family history, social history-same as note dated today    Medications-all reviewed and verified in nurses notes.    Physical exam: Vital signs-reviewed and verified in nurses notes  Gen.-alert, oriented, no apparent distress.  Coughing.  Head-positive facial tenderness over the frontal and maxillary sinuses  Eyes: Pupils equal round reactive to light and accommodation, extraocular muscles intact, conjunctiva clear  Ears: Tympanic membranes are clear and mobile, no fluid present.  Nose: Injected mucous membranes, erythematous, mucopurulent discharge  Throat:  Injected red streaky mucosa,  tonsils normal  Neck: Shotty, tender anterior lymphadenopathy  Heart: Regular rate and rhythm, no murmurs, rubs or gallops  Lungs: expiratory ronchi     Assessment/Plan:   Acute bronchitis, unspecified organism  -     methylPREDNISolone acetate injection 60 mg  -     azithromycin (Z-KATIUSKA) 250 MG tablet; 2 po day 1, then 1 po q day  Dispense: 6 tablet; Refill: 0    COPD (chronic obstructive pulmonary disease)  -     albuterol-ipratropium (DUO-NEB) 2.5 mg-0.5 mg/3 mL nebulizer solution; Take 3 mLs by nebulization every 6 (six) hours as needed for Wheezing.  Dispense: 120 vial; Refill: 5          Simply saline nasal lavage q.2 to 3 hours  p.r.n.  Avoid dust, allergens, other sinus irritants.  Handwashing technique discussed to prevent spreading germs.

## 2019-07-24 DIAGNOSIS — Z12.11 SPECIAL SCREENING FOR MALIGNANT NEOPLASMS, COLON: Primary | ICD-10-CM

## 2019-07-24 RX ORDER — SODIUM, POTASSIUM,MAG SULFATES 17.5-3.13G
SOLUTION, RECONSTITUTED, ORAL ORAL
Qty: 354 ML | Refills: 0 | Status: ON HOLD | OUTPATIENT
Start: 2019-07-24 | End: 2019-08-13 | Stop reason: HOSPADM

## 2019-08-13 ENCOUNTER — ANESTHESIA EVENT (OUTPATIENT)
Dept: ENDOSCOPY | Facility: HOSPITAL | Age: 79
End: 2019-08-13
Payer: MEDICARE

## 2019-08-13 ENCOUNTER — HOSPITAL ENCOUNTER (OUTPATIENT)
Facility: HOSPITAL | Age: 79
Discharge: HOME | End: 2019-08-13
Attending: COLON & RECTAL SURGERY | Admitting: COLON & RECTAL SURGERY
Payer: MEDICARE

## 2019-08-13 ENCOUNTER — ANESTHESIA (OUTPATIENT)
Dept: ENDOSCOPY | Facility: HOSPITAL | Age: 79
End: 2019-08-13
Payer: MEDICARE

## 2019-08-13 VITALS
BODY MASS INDEX: 39.08 KG/M2 | HEIGHT: 67 IN | DIASTOLIC BLOOD PRESSURE: 78 MMHG | TEMPERATURE: 98 F | RESPIRATION RATE: 19 BRPM | HEART RATE: 78 BPM | SYSTOLIC BLOOD PRESSURE: 150 MMHG | WEIGHT: 249 LBS | OXYGEN SATURATION: 99 %

## 2019-08-13 DIAGNOSIS — K62.5 RECTAL BLEEDING: ICD-10-CM

## 2019-08-13 DIAGNOSIS — Z12.11 COLON CANCER SCREENING: Primary | ICD-10-CM

## 2019-08-13 PROCEDURE — 37000009 HC ANESTHESIA EA ADD 15 MINS: Performed by: COLON & RECTAL SURGERY

## 2019-08-13 PROCEDURE — 63600175 PHARM REV CODE 636 W HCPCS: Performed by: NURSE ANESTHETIST, CERTIFIED REGISTERED

## 2019-08-13 PROCEDURE — E9220 PRA ENDO ANESTHESIA: ICD-10-PCS | Mod: ,,, | Performed by: NURSE ANESTHETIST, CERTIFIED REGISTERED

## 2019-08-13 PROCEDURE — 63600175 PHARM REV CODE 636 W HCPCS: Performed by: COLON & RECTAL SURGERY

## 2019-08-13 PROCEDURE — 37000008 HC ANESTHESIA 1ST 15 MINUTES: Performed by: COLON & RECTAL SURGERY

## 2019-08-13 PROCEDURE — G0105 COLORECTAL SCRN; HI RISK IND: HCPCS | Performed by: COLON & RECTAL SURGERY

## 2019-08-13 PROCEDURE — E9220 PRA ENDO ANESTHESIA: HCPCS | Mod: ,,, | Performed by: NURSE ANESTHETIST, CERTIFIED REGISTERED

## 2019-08-13 PROCEDURE — G0105 COLORECTAL SCRN; HI RISK IND: ICD-10-PCS | Mod: ,,, | Performed by: COLON & RECTAL SURGERY

## 2019-08-13 PROCEDURE — G0105 COLORECTAL SCRN; HI RISK IND: HCPCS | Mod: ,,, | Performed by: COLON & RECTAL SURGERY

## 2019-08-13 RX ORDER — SODIUM CHLORIDE 9 MG/ML
INJECTION, SOLUTION INTRAVENOUS CONTINUOUS
Status: DISCONTINUED | OUTPATIENT
Start: 2019-08-13 | End: 2019-08-13 | Stop reason: HOSPADM

## 2019-08-13 RX ORDER — ASPIRIN 81 MG/1
81 TABLET ORAL DAILY
COMMUNITY
End: 2020-05-26 | Stop reason: SDUPTHER

## 2019-08-13 RX ORDER — PROPOFOL 10 MG/ML
VIAL (ML) INTRAVENOUS CONTINUOUS PRN
Status: DISCONTINUED | OUTPATIENT
Start: 2019-08-13 | End: 2019-08-13

## 2019-08-13 RX ORDER — PROPOFOL 10 MG/ML
VIAL (ML) INTRAVENOUS
Status: DISCONTINUED | OUTPATIENT
Start: 2019-08-13 | End: 2019-08-13

## 2019-08-13 RX ORDER — SODIUM CHLORIDE 0.9 % (FLUSH) 0.9 %
10 SYRINGE (ML) INJECTION
Status: DISCONTINUED | OUTPATIENT
Start: 2019-08-13 | End: 2019-08-13 | Stop reason: HOSPADM

## 2019-08-13 RX ORDER — LIDOCAINE HCL/PF 100 MG/5ML
SYRINGE (ML) INTRAVENOUS
Status: DISCONTINUED | OUTPATIENT
Start: 2019-08-13 | End: 2019-08-13

## 2019-08-13 RX ADMIN — PROPOFOL 70 MG: 10 INJECTION, EMULSION INTRAVENOUS at 02:08

## 2019-08-13 RX ADMIN — SODIUM CHLORIDE: 0.9 INJECTION, SOLUTION INTRAVENOUS at 02:08

## 2019-08-13 RX ADMIN — LIDOCAINE HYDROCHLORIDE 60 MG: 20 INJECTION, SOLUTION INTRAVENOUS at 02:08

## 2019-08-13 RX ADMIN — PROPOFOL 125 MCG/KG/MIN: 10 INJECTION, EMULSION INTRAVENOUS at 02:08

## 2019-08-13 NOTE — TRANSFER OF CARE
"Anesthesia Transfer of Care Note    Patient: Domonique Hernandez Jr.    Procedure(s) Performed: Procedure(s) (LRB):  COLONOSCOPY (N/A)    Patient location: PACU    Anesthesia Type: general    Transport from OR: Transported from OR on 6-10 L/min O2 by face mask with adequate spontaneous ventilation    Post pain: adequate analgesia    Post assessment: no apparent anesthetic complications and tolerated procedure well    Level of consciousness: sedated    Nausea/Vomiting: no nausea/vomiting    Transfer of care protocol was followed      Last vitals:   Visit Vitals  BP (!) 174/90 (BP Location: Left arm, Patient Position: Lying)   Pulse 84   Temp 36.4 °C (97.5 °F) (Temporal)   Resp 16   Ht 5' 7" (1.702 m)   Wt 112.9 kg (249 lb)   SpO2 96%   BMI 39.00 kg/m²     "

## 2019-08-13 NOTE — [employer] PATIENT INSTRUCTIONS
Discharge Summary/Instructions after an Endoscopic Procedure  Patient Name: Domonique Hernandez  Patient MRN: [id number]  Patient YOB: 1940 Tuesday, August 13, 2019  Joaquín Crawford MD  RESTRICTIONS:  During your procedure today, you received medications for sedation.  These   medications may affect your judgment, balance and coordination.  Therefore,   for 24 hours, you have the following restrictions:   - DO NOT drive a car, operate machinery, make legal/financial decisions,   sign important papers or drink alcohol.    ACTIVITY:  Today: no heavy lifting, straining or running due to procedural   sedation/anesthesia.  The following day: return to full activity including work.  DIET:  Eat and drink normally unless instructed otherwise.     TREATMENT FOR COMMON SIDE EFFECTS:  - Mild abdominal pain, nausea, belching, bloating or excessive gas:  rest,   eat lightly and use a heating pad.  - Sore Throat: treat with throat lozenges and/or gargle with warm salt   water.  - Because air was used during the procedure, expelling large amounts of air   from your rectum or belching is normal.  - If a bowel prep was taken, you may not have a bowel movement for 1-3 days.    This is normal.  SYMPTOMS TO WATCH FOR AND REPORT TO YOUR PHYSICIAN:  1. Abdominal pain or bloating, other than gas cramps.  2. Chest pain.  3. Back pain.  4. Signs of infection such as: chills or fever occurring within 24 hours   after the procedure.  5. Rectal bleeding, which would show as bright red, maroon, or black stools.   (A tablespoon of blood from the rectum is not serious, especially if   hemorrhoids are present.)  6. Vomiting.  7. Weakness or dizziness.  GO DIRECTLY TO THE NEAREST EMERGENCY ROOM IF YOU HAVE ANY OF THE FOLLOWING:      Difficulty breathing              Chills and/or fever over 101 F   Persistent vomiting and/or vomiting blood   Severe abdominal pain   Severe chest pain   Black, tarry stools   Bleeding- more than one tablespoon   Any  other symptom or condition that you feel may need urgent attention  Your doctor recommends these additional instructions:  If any biopsies were taken, your doctors clinic will contact you in 1 to 2   weeks with any results.  - Discharge patient to home.   - High fiber diet.   - Continue present medications.   - Patient has a contact number available for emergencies.  The signs and   symptoms of potential delayed complications were discussed with the   patient.  Return to normal activities tomorrow.  Written discharge   instructions were provided to the patient.   - Repeat colonoscopy is not recommended not indicated due to age.  For questions, problems or results please call your physician - Joaquín Crawford MD at Work:  [phone].  [facility], EMERGENCY ROOM PHONE NUMBER: [phone]  IF A COMPLICATION OR EMERGENCY SITUATION ARISES AND YOU ARE UNABLE TO REACH   YOUR PHYSICIAN - GO DIRECTLY TO THE EMERGENCY ROOM.  Joaquín Crawford MD  8/13/2019 3:25:33 PM  This report has been verified and signed electronically.  [employer]

## 2019-08-13 NOTE — H&P
Procedure : Colonoscopy    Indication(s):  asymptomatic screening exam  Family hx of rectal cancer (Mother)    Review of patient's allergies indicates:   Allergen Reactions    Percocet [oxycodone-acetaminophen] Other (See Comments)     hyperactivity    Percodan [oxycodone hcl-oxycodone-asa] Other (See Comments)     hyperactivity       Past Medical History:   Diagnosis Date    Anxiety     Arthritis     Back pain     Chronic bronchitis     Chronic gout     COPD (chronic obstructive pulmonary disease)     Depression     Emphysema of lung     Generalized headaches     GERD (gastroesophageal reflux disease)     Hyperlipidemia     Hypertension     Hypothyroidism     Obesity     Peripheral vascular disease     Sleep apnea     On CPAP    Stage 3 chronic kidney disease 4/18/2018    Thyroid disease        Prior to Admission medications    Medication Sig Start Date End Date Taking? Authorizing Provider   albuterol 90 mcg/actuation inhaler Inhale 2 puffs into the lungs every 6 (six) hours as needed for Wheezing. Rescue 5/11/17  Yes Lucia Leach MD   allopurinol (ZYLOPRIM) 300 MG tablet TAKE 1 TABLET ONE TIME DAILY 3/4/19  Yes Emiliano Cam MD   amLODIPine (NORVASC) 5 MG tablet Take 1 tablet (5 mg total) by mouth once daily. 3/4/19  Yes Emiliano Cam MD   aspirin (ECOTRIN) 81 MG EC tablet Take 81 mg by mouth once daily.   Yes Historical Provider, MD   cetirizine (ZYRTEC) 10 MG tablet Take 1 tablet (10 mg total) by mouth once daily. 3/4/19  Yes Emiliano Cam MD   dutasteride (AVODART) 0.5 mg capsule Take 1 capsule (0.5 mg total) by mouth once daily. 3/4/19 3/3/20 Yes Emiliano Cam MD   fluticasone (FLONASE) 50 mcg/actuation nasal spray 2 sprays (100 mcg total) by Each Nare route 2 (two) times daily. 1/16/19  Yes Gretta Rodas NP   levothyroxine (SYNTHROID) 100 MCG tablet TAKE 1 TABLET ONE TIME DAILY 3/4/19  Yes Emiliano Cam MD   multivitamin (ONE DAILY  MULTIVITAMIN) per tablet Take 1 tablet by mouth once daily. Centrum Silver for Men   Yes Zulay Esparza MD   simvastatin (ZOCOR) 40 MG tablet Take 1 tablet (40 mg total) by mouth every evening. 3/4/19  Yes Emiliano Cam MD   sodium,potassium,mag sulfates (SUPREP BOWEL PREP KIT) 17.5-3.13-1.6 gram SolR Use as directed.  Dispense 1 kit. 7/24/19  Yes Joaquín Crawford MD   tamsulosin (FLOMAX) 0.4 mg Cap TAKE 1 TO 2 CAPSULES DAILY 3/4/19  Yes Emiliano Cam MD   traMADol (ULTRAM) 50 mg tablet Take 1 tablet (50 mg total) by mouth every 12 (twelve) hours as needed for pain. 7/17/19  Yes Emiliano Cam MD   valsartan (DIOVAN) 80 MG tablet Take 1 tablet (80 mg total) by mouth once daily. 3/4/19 3/3/20 Yes Emiliano Cam MD   venlafaxine (EFFEXOR-XR) 150 MG Cp24 Take 1 capsule (150 mg total) by mouth once daily. 3/4/19  Yes Emiliano Cam MD   albuterol-ipratropium (DUO-NEB) 2.5 mg-0.5 mg/3 mL nebulizer solution Inhale contents of 1 vial via nebulizer every 6 hours as needed for wheezing. 7/17/19 6/5/23  Emiliano Cam MD   azithromycin (Z-KATIUSKA) 250 MG tablet Take 2 tablets by mouth on day 1 then 1 tablet by mouth daily on days 2 through 5. 7/17/19   Emiliano Cam MD   carvedilol (COREG) 25 MG tablet Take 1 tablet by mouth 2 times daily with meals. 3/4/19   Emiliano Cam MD   hydrocortisone (ANUSOL-HC) 2.5 % rectal cream Place rectally 2 (two) times daily. 7/3/19   Emiliano Cam MD   hydrocortisone (PROCTO-KATIUSKA) 1 % crpe Place 1 applicator rectally 2 (two) times daily as needed. 11/29/18   Lucia Leach MD   ipratropium (ATROVENT) 42 mcg (0.06 %) nasal spray 2 sprays by Nasal route 4 (four) times daily. 1/16/19   Gretta Rodas NP   umeclidinium-vilanterol (ANORO ELLIPTA) 62.5-25 mcg/actuation DsDv Inhale 1 Dose into the lungs once daily. 4/6/16   Emiliano Cam MD       Sedation Problems: NO    Family History   Problem Relation Age of Onset     Cancer Mother 65        rectal    Cancer Father     No Known Problems Sister     Kidney disease Brother     Stroke Son     Stroke Son     Atrial fibrillation Son     Melanoma Neg Hx        Fam Hx of Sedation Problems: NO    Social History     Socioeconomic History    Marital status:      Spouse name: Not on file    Number of children: Not on file    Years of education: Not on file    Highest education level: Not on file   Occupational History    Not on file   Social Needs    Financial resource strain: Not on file    Food insecurity:     Worry: Not on file     Inability: Not on file    Transportation needs:     Medical: Not on file     Non-medical: Not on file   Tobacco Use    Smoking status: Never Smoker    Smokeless tobacco: Never Used   Substance and Sexual Activity    Alcohol use: No    Drug use: No    Sexual activity: Yes     Partners: Female   Lifestyle    Physical activity:     Days per week: Not on file     Minutes per session: Not on file    Stress: Not on file   Relationships    Social connections:     Talks on phone: Not on file     Gets together: Not on file     Attends Anabaptist service: Not on file     Active member of club or organization: Not on file     Attends meetings of clubs or organizations: Not on file     Relationship status: Not on file   Other Topics Concern    Not on file   Social History Narrative    Not on file       Review of Systems -     Respiratory ROS: no cough, shortness of breath, or wheezing  Cardiovascular ROS: no chest pain or dyspnea on exertion  Gastrointestinal ROS: no abdominal pain, change in bowel habits, or black or bloody stools  Musculoskeletal ROS: negative  Neurological ROS: no TIA or stroke symptoms        Physical Exam:  General: no distress  Head: normocephalic  Airway:  normal oropharynx, airway normal  Neck: supple, symmetrical, trachea midline  Lungs:  normal respiratory effort  Heart: regular rate and rhythm  Abdomen: soft,  non-tender non-distented; bowel sounds normal; no masses,  no organomegaly  Extremities: no cyanosis or edema, or clubbing       Deep Sedation: Mallampati Score per anesthesia     SedationPlan :Moderate     ASA : III    Patient is medically cleared for anesthesia.    Anesthesia/Surgery risks, benefits and alternative options discussed and understood by patient/family.

## 2019-08-13 NOTE — DISCHARGE INSTRUCTIONS

## 2019-08-13 NOTE — ANESTHESIA PREPROCEDURE EVALUATION
08/13/2019  Domonique Hernandez Jr. is a 79 y.o., male here for colonoscopy.    Pre-operative evaluation for Procedure(s) (LRB):  COLONOSCOPY (N/A)        Patient Active Problem List   Diagnosis    Hyperlipidemia    Hypertension    Anxiety    GERD (gastroesophageal reflux disease)    Hypothyroidism    Erectile dysfunction    KARL on CPAP    Lumbar facet arthropathy    COPD (chronic obstructive pulmonary disease)    Major depressive disorder, single episode, mild    Post-traumatic osteoarthritis of left knee    Class 2 obesity with alveolar hypoventilation, serious comorbidity, and body mass index (BMI) of 39.0 to 39.9 in adult    Thoracic aortic atherosclerosis    Delayed ejaculation    BPH with urinary obstruction    Male hypogonadism    Encounter for long-term current use of medication    Chronic gout without tophus    Osteopenia determined by x-ray    Aortic calcification    Carotid artery calcification, bilateral    Sensorineural hearing loss (SNHL) of both ears    Stage 3 chronic kidney disease    History of vertebral compression fracture    Mild cognitive impairment    Colon cancer screening       Review of patient's allergies indicates:   Allergen Reactions    Percocet [oxycodone-acetaminophen] Other (See Comments)     hyperactivity    Percodan [oxycodone hcl-oxycodone-asa] Other (See Comments)     hyperactivity       No current facility-administered medications on file prior to encounter.      Current Outpatient Medications on File Prior to Encounter   Medication Sig Dispense Refill    albuterol 90 mcg/actuation inhaler Inhale 2 puffs into the lungs every 6 (six) hours as needed for Wheezing. Rescue 18 g 5    allopurinol (ZYLOPRIM) 300 MG tablet TAKE 1 TABLET ONE TIME DAILY 90 tablet 1    amLODIPine (NORVASC) 5 MG tablet Take 1 tablet (5 mg total) by mouth once daily. 90 tablet  1    aspirin (ECOTRIN) 81 MG EC tablet Take 81 mg by mouth once daily.      cetirizine (ZYRTEC) 10 MG tablet Take 1 tablet (10 mg total) by mouth once daily. 90 tablet 1    dutasteride (AVODART) 0.5 mg capsule Take 1 capsule (0.5 mg total) by mouth once daily. 90 capsule 1    fluticasone (FLONASE) 50 mcg/actuation nasal spray 2 sprays (100 mcg total) by Each Nare route 2 (two) times daily. 1 Bottle 11    levothyroxine (SYNTHROID) 100 MCG tablet TAKE 1 TABLET ONE TIME DAILY 90 tablet 1    multivitamin (ONE DAILY MULTIVITAMIN) per tablet Take 1 tablet by mouth once daily. Centrum Silver for Men      simvastatin (ZOCOR) 40 MG tablet Take 1 tablet (40 mg total) by mouth every evening. 90 tablet 1    tamsulosin (FLOMAX) 0.4 mg Cap TAKE 1 TO 2 CAPSULES DAILY 180 capsule 1    valsartan (DIOVAN) 80 MG tablet Take 1 tablet (80 mg total) by mouth once daily. 90 tablet 1    venlafaxine (EFFEXOR-XR) 150 MG Cp24 Take 1 capsule (150 mg total) by mouth once daily. 90 capsule 1    carvedilol (COREG) 25 MG tablet Take 1 tablet by mouth 2 times daily with meals. 180 tablet 1    hydrocortisone (ANUSOL-HC) 2.5 % rectal cream Place rectally 2 (two) times daily. 30 g 3    hydrocortisone (PROCTO-KATIUSKA) 1 % crpe Place 1 applicator rectally 2 (two) times daily as needed. 30 g 1    ipratropium (ATROVENT) 42 mcg (0.06 %) nasal spray 2 sprays by Nasal route 4 (four) times daily. 15 mL 2    umeclidinium-vilanterol (ANORO ELLIPTA) 62.5-25 mcg/actuation DsDv Inhale 1 Dose into the lungs once daily. 60 each 5       Past Surgical History:   Procedure Laterality Date    Bilateral mastoidectomies Bilateral 1984    for ear infection    BLOCK-NERVE-MEDIAL BRANCH-LUMBAR (L3,4,5)  Bilateral 6/27/2014    Performed by [facility]ela [facility] at [facility] OR    COLONOSCOPY  2010    COLONOSCOPY N/A 1/31/2019    Performed by Joaquín Crawford MD at [facility] ENDO    Eardrum repair  1984    Not sure which side    HERNIA REPAIR  1984    Umbilical-screen     KNEE ARTHROSCOPY Left 1989    ROTATOR CUFF REPAIR Left 2002    Left       Social History     Socioeconomic History    Marital status:      Spouse name: Not on file    Number of children: Not on file    Years of education: Not on file    Highest education level: Not on file   Occupational History    Not on file   Social Needs    Financial resource strain: Not on file    Food insecurity:     Worry: Not on file     Inability: Not on file    Transportation needs:     Medical: Not on file     Non-medical: Not on file   Tobacco Use    Smoking status: Never Smoker    Smokeless tobacco: Never Used   Substance and Sexual Activity    Alcohol use: No    Drug use: No    Sexual activity: Yes     Partners: Female   Lifestyle    Physical activity:     Days per week: Not on file     Minutes per session: Not on file    Stress: Not on file   Relationships    Social connections:     Talks on phone: Not on file     Gets together: Not on file     Attends [employer]ist service: Not on file     Active member of club or organization: Not on file     Attends meetings of clubs or organizations: Not on file     Relationship status: Not on file   Other Topics Concern    Not on file   Social History Narrative    Not on file         CBC: No results for input(s): WBC, RBC, HGB, HCT, PLT, MCV, MCH, MCHC in the last 72 hours.    CMP: No results for input(s): NA, K, CL, CO2, BUN, CREATININE, GLU, MG, PHOS, CALCIUM, ALBUMIN, PROT, ALKPHOS, ALT, AST, BILITOT in the last 72 hours.    INR  No results for input(s): PT, INR, PROTIME, APTT in the last 72 hours.              2D Echo:  No results found for this or any previous visit.      Anesthesia Evaluation    I have reviewed the Patient Summary Reports.    I have reviewed the Nursing Notes.   I have reviewed the Medications.     Review of Systems  Anesthesia Hx:  No problems with previous Anesthesia    Hematology/Oncology:  Hematology Normal   Oncology Normal      EENT/Dental:EENT/Dental Normal   Cardiovascular:   Hypertension    Pulmonary:   COPD Sleep Apnea, CPAP    Renal/:   Chronic Renal Disease, CRI    Hepatic/GI:   GERD, well controlled    Musculoskeletal:   Arthritis     Neurological:   Headaches    Endocrine:   Hypothyroidism    Dermatological:  Skin Normal    Psych:   Psychiatric History          Physical Exam  General:  Well nourished, Obesity    Airway/Jaw/Neck:  Airway Findings: Mouth Opening: Normal Tongue: Normal  General Airway Assessment: Adult  Mallampati: II  Jaw/Neck Findings:  Neck ROM: Normal ROM     Eyes/Ears/Nose:  Eyes/Ears/Nose Findings:    Dental:  Dental Findings: In tact   Chest/Lungs:  Chest/Lungs Findings: Clear to auscultation, Normal Respiratory Rate     Heart/Vascular:  Heart Findings: Rate: Normal  Rhythm: Regular Rhythm  Sounds: Normal  Heart Murmur  Vascular Findings:        Mental Status:  Mental Status Findings:  Cooperative, Alert and Oriented         Anesthesia Plan  Type of Anesthesia, risks & benefits discussed:  Anesthesia Type:  general, MAC  Patient's Preference: General/MAC  Intra-op Monitoring Plan: standard ASA monitors  Intra-op Monitoring Plan Comments:   Post Op Pain Control Plan:   Post Op Pain Control Plan Comments: IV meds as needed  Induction:   IV  Beta Blocker:  Patient is on a Beta-Blocker and has received one dose within the past 24 hours (No further documentation required).       Informed Consent: Patient understands risks and agrees with Anesthesia plan.  Questions answered. Anesthesia consent signed with patient.  ASA Score: 3     Day of Surgery Review of History & Physical:    H&P update referred to the provider.     Anesthesia Plan Notes: Discussed anesthetic options, pt understands and agrees with plan        Ready For Surgery From Anesthesia Perspective.

## 2019-08-19 ENCOUNTER — CLINICAL SUPPORT (OUTPATIENT)
Dept: FAMILY MEDICINE | Facility: CLINIC | Age: 79
End: 2019-08-19
Payer: MEDICARE

## 2019-08-19 DIAGNOSIS — I10 ESSENTIAL HYPERTENSION: ICD-10-CM

## 2019-08-19 DIAGNOSIS — M54.50 CHRONIC MIDLINE LOW BACK PAIN WITHOUT SCIATICA: ICD-10-CM

## 2019-08-19 DIAGNOSIS — F32.A DEPRESSION, UNSPECIFIED DEPRESSION TYPE: ICD-10-CM

## 2019-08-19 DIAGNOSIS — E03.4 HYPOTHYROIDISM DUE TO ACQUIRED ATROPHY OF THYROID: ICD-10-CM

## 2019-08-19 DIAGNOSIS — E78.5 HYPERLIPIDEMIA, UNSPECIFIED HYPERLIPIDEMIA TYPE: ICD-10-CM

## 2019-08-19 DIAGNOSIS — N40.1 BPH WITH URINARY OBSTRUCTION: ICD-10-CM

## 2019-08-19 DIAGNOSIS — G89.29 CHRONIC MIDLINE LOW BACK PAIN WITHOUT SCIATICA: ICD-10-CM

## 2019-08-19 DIAGNOSIS — N13.8 BPH WITH URINARY OBSTRUCTION: ICD-10-CM

## 2019-08-19 DIAGNOSIS — M1A.9XX0 CHRONIC GOUT WITHOUT TOPHUS, UNSPECIFIED CAUSE, UNSPECIFIED SITE: ICD-10-CM

## 2019-08-19 DIAGNOSIS — K62.5 RECTAL BLEEDING: ICD-10-CM

## 2019-08-19 LAB
ALBUMIN SERPL BCP-MCNC: 3.5 G/DL (ref 3.5–5.2)
ALP SERPL-CCNC: 95 U/L (ref 55–135)
ALT SERPL W/O P-5'-P-CCNC: 21 U/L (ref 10–44)
ANION GAP SERPL CALC-SCNC: 8 MMOL/L (ref 8–16)
AST SERPL-CCNC: 19 U/L (ref 10–40)
BASOPHILS # BLD AUTO: 0.08 K/UL (ref 0–0.2)
BASOPHILS NFR BLD: 0.9 % (ref 0–1.9)
BILIRUB SERPL-MCNC: 0.5 MG/DL (ref 0.1–1)
BUN SERPL-MCNC: 17 MG/DL (ref 8–23)
CALCIUM SERPL-MCNC: 9.9 MG/DL (ref 8.7–10.5)
CHLORIDE SERPL-SCNC: 105 MMOL/L (ref 95–110)
CHOLEST SERPL-MCNC: 167 MG/DL (ref 120–199)
CHOLEST/HDLC SERPL: 5.4 {RATIO} (ref 2–5)
CO2 SERPL-SCNC: 28 MMOL/L (ref 23–29)
CREAT SERPL-MCNC: 1 MG/DL (ref 0.5–1.4)
DIFFERENTIAL METHOD: ABNORMAL
EOSINOPHIL # BLD AUTO: 0.4 K/UL (ref 0–0.5)
EOSINOPHIL NFR BLD: 4.7 % (ref 0–8)
ERYTHROCYTE [DISTWIDTH] IN BLOOD BY AUTOMATED COUNT: 14.1 % (ref 11.5–14.5)
EST. GFR  (AFRICAN AMERICAN): >60 ML/MIN/1.73 M^2
EST. GFR  (NON AFRICAN AMERICAN): >60 ML/MIN/1.73 M^2
GLUCOSE SERPL-MCNC: 108 MG/DL (ref 70–110)
HCT VFR BLD AUTO: 42.2 % (ref 40–54)
HDLC SERPL-MCNC: 31 MG/DL (ref 40–75)
HDLC SERPL: 18.6 % (ref 20–50)
HGB BLD-MCNC: 13.3 G/DL (ref 14–18)
IMM GRANULOCYTES # BLD AUTO: 0.03 K/UL (ref 0–0.04)
IMM GRANULOCYTES NFR BLD AUTO: 0.4 % (ref 0–0.5)
LDLC SERPL CALC-MCNC: 103.6 MG/DL (ref 63–159)
LYMPHOCYTES # BLD AUTO: 1.9 K/UL (ref 1–4.8)
LYMPHOCYTES NFR BLD: 22.8 % (ref 18–48)
MCH RBC QN AUTO: 30.5 PG (ref 27–31)
MCHC RBC AUTO-ENTMCNC: 31.5 G/DL (ref 32–36)
MCV RBC AUTO: 97 FL (ref 82–98)
MONOCYTES # BLD AUTO: 0.5 K/UL (ref 0.3–1)
MONOCYTES NFR BLD: 6.3 % (ref 4–15)
NEUTROPHILS # BLD AUTO: 5.5 K/UL (ref 1.8–7.7)
NEUTROPHILS NFR BLD: 64.9 % (ref 38–73)
NONHDLC SERPL-MCNC: 136 MG/DL
NRBC BLD-RTO: 0 /100 WBC
PLATELET # BLD AUTO: 227 K/UL (ref 150–350)
PMV BLD AUTO: 11.4 FL (ref 9.2–12.9)
POTASSIUM SERPL-SCNC: 4.2 MMOL/L (ref 3.5–5.1)
PROT SERPL-MCNC: 6.7 G/DL (ref 6–8.4)
RBC # BLD AUTO: 4.36 M/UL (ref 4.6–6.2)
SODIUM SERPL-SCNC: 141 MMOL/L (ref 136–145)
TRIGL SERPL-MCNC: 162 MG/DL (ref 30–150)
TSH SERPL DL<=0.005 MIU/L-ACNC: 3.47 UIU/ML (ref 0.4–4)
URATE SERPL-MCNC: 6.6 MG/DL (ref 3.4–7)
WBC # BLD AUTO: 8.45 K/UL (ref 3.9–12.7)

## 2019-08-19 PROCEDURE — 85025 COMPLETE CBC W/AUTO DIFF WBC: CPT

## 2019-08-19 PROCEDURE — 80053 COMPREHEN METABOLIC PANEL: CPT

## 2019-08-19 PROCEDURE — 80061 LIPID PANEL: CPT

## 2019-08-19 PROCEDURE — 84550 ASSAY OF BLOOD/URIC ACID: CPT

## 2019-08-19 PROCEDURE — 84443 ASSAY THYROID STIM HORMONE: CPT

## 2019-08-19 PROCEDURE — 36415 PR COLLECTION VENOUS BLOOD,VENIPUNCTURE: ICD-10-PCS | Mod: S$GLB,,, | Performed by: FAMILY MEDICINE

## 2019-08-19 PROCEDURE — 36415 COLL VENOUS BLD VENIPUNCTURE: CPT | Mod: S$GLB,,, | Performed by: FAMILY MEDICINE

## 2019-08-19 RX ORDER — TAMSULOSIN HYDROCHLORIDE 0.4 MG/1
CAPSULE ORAL
Qty: 180 CAPSULE | Refills: 1 | Status: SHIPPED | OUTPATIENT
Start: 2019-08-19 | End: 2020-02-07 | Stop reason: SDUPTHER

## 2019-08-19 RX ORDER — TRAMADOL HYDROCHLORIDE 50 MG/1
50 TABLET ORAL EVERY 12 HOURS PRN
Qty: 20 TABLET | Refills: 0 | Status: CANCELLED | OUTPATIENT
Start: 2019-08-19

## 2019-08-19 RX ORDER — VALSARTAN 80 MG/1
80 TABLET ORAL DAILY
Qty: 90 TABLET | Refills: 1 | Status: SHIPPED | OUTPATIENT
Start: 2019-08-19 | End: 2019-10-16 | Stop reason: SDUPTHER

## 2019-08-19 RX ORDER — AMLODIPINE BESYLATE 5 MG/1
5 TABLET ORAL DAILY
Qty: 90 TABLET | Refills: 1 | Status: SHIPPED | OUTPATIENT
Start: 2019-08-19 | End: 2020-05-26 | Stop reason: SDUPTHER

## 2019-08-19 RX ORDER — ALLOPURINOL 300 MG/1
TABLET ORAL
Qty: 90 TABLET | Refills: 1 | Status: SHIPPED | OUTPATIENT
Start: 2019-08-19 | End: 2020-05-26 | Stop reason: SDUPTHER

## 2019-08-19 RX ORDER — SIMVASTATIN 40 MG/1
40 TABLET, FILM COATED ORAL NIGHTLY
Qty: 90 TABLET | Refills: 1 | Status: SHIPPED | OUTPATIENT
Start: 2019-08-19 | End: 2020-05-26 | Stop reason: SDUPTHER

## 2019-08-19 RX ORDER — VENLAFAXINE HYDROCHLORIDE 150 MG/1
150 CAPSULE, EXTENDED RELEASE ORAL DAILY
Qty: 90 CAPSULE | Refills: 1 | Status: SHIPPED | OUTPATIENT
Start: 2019-08-19 | End: 2020-01-31

## 2019-08-19 RX ORDER — LEVOTHYROXINE SODIUM 100 UG/1
TABLET ORAL
Qty: 90 TABLET | Refills: 1 | Status: SHIPPED | OUTPATIENT
Start: 2019-08-19 | End: 2020-05-26 | Stop reason: SDUPTHER

## 2019-08-19 NOTE — TELEPHONE ENCOUNTER
Pt did labs today and requested refills , and to be sent to [facility]/[place]      Pt is ibprofen 800mg- not in med list- request

## 2019-08-20 ENCOUNTER — TELEPHONE (OUTPATIENT)
Dept: ENDOSCOPY | Facility: HOSPITAL | Age: 79
End: 2019-08-20

## 2019-08-27 ENCOUNTER — OFFICE VISIT (OUTPATIENT)
Dept: FAMILY MEDICINE | Facility: CLINIC | Age: 79
End: 2019-08-27
Payer: MEDICARE

## 2019-08-27 VITALS
DIASTOLIC BLOOD PRESSURE: 74 MMHG | BODY MASS INDEX: 39.55 KG/M2 | SYSTOLIC BLOOD PRESSURE: 122 MMHG | RESPIRATION RATE: 16 BRPM | HEIGHT: 67 IN | HEART RATE: 76 BPM | WEIGHT: 252 LBS

## 2019-08-27 DIAGNOSIS — E03.4 HYPOTHYROIDISM DUE TO ACQUIRED ATROPHY OF THYROID: ICD-10-CM

## 2019-08-27 DIAGNOSIS — J41.1 MUCOPURULENT CHRONIC BRONCHITIS: ICD-10-CM

## 2019-08-27 DIAGNOSIS — F32.A DEPRESSION, UNSPECIFIED DEPRESSION TYPE: ICD-10-CM

## 2019-08-27 DIAGNOSIS — E78.5 HYPERLIPIDEMIA, UNSPECIFIED HYPERLIPIDEMIA TYPE: ICD-10-CM

## 2019-08-27 DIAGNOSIS — M17.12 PRIMARY OSTEOARTHRITIS OF LEFT KNEE: Primary | ICD-10-CM

## 2019-08-27 DIAGNOSIS — N40.1 BENIGN PROSTATIC HYPERPLASIA WITH NOCTURIA: ICD-10-CM

## 2019-08-27 DIAGNOSIS — I10 ESSENTIAL HYPERTENSION: ICD-10-CM

## 2019-08-27 DIAGNOSIS — M1A.9XX0 CHRONIC GOUT WITHOUT TOPHUS, UNSPECIFIED CAUSE, UNSPECIFIED SITE: ICD-10-CM

## 2019-08-27 DIAGNOSIS — R35.0 FREQUENCY OF URINATION: ICD-10-CM

## 2019-08-27 DIAGNOSIS — E78.2 MIXED HYPERLIPIDEMIA: ICD-10-CM

## 2019-08-27 DIAGNOSIS — R35.1 BENIGN PROSTATIC HYPERPLASIA WITH NOCTURIA: ICD-10-CM

## 2019-08-27 DIAGNOSIS — G47.33 OSA ON CPAP: ICD-10-CM

## 2019-08-27 PROBLEM — N18.30 STAGE 3 CHRONIC KIDNEY DISEASE: Status: RESOLVED | Noted: 2018-04-18 | Resolved: 2019-08-27

## 2019-08-27 PROCEDURE — 99499 RISK ADDL DX/OHS AUDIT: ICD-10-PCS | Mod: S$GLB,,, | Performed by: FAMILY MEDICINE

## 2019-08-27 PROCEDURE — 1101F PR PT FALLS ASSESS DOC 0-1 FALLS W/OUT INJ PAST YR: ICD-10-PCS | Mod: CPTII,S$GLB,, | Performed by: FAMILY MEDICINE

## 2019-08-27 PROCEDURE — 20610 PR DRAIN/INJECT LARGE JOINT/BURSA: ICD-10-PCS | Mod: LT,S$GLB,, | Performed by: FAMILY MEDICINE

## 2019-08-27 PROCEDURE — 3078F DIAST BP <80 MM HG: CPT | Mod: CPTII,S$GLB,, | Performed by: FAMILY MEDICINE

## 2019-08-27 PROCEDURE — 99214 PR OFFICE/OUTPT VISIT, EST, LEVL IV, 30-39 MIN: ICD-10-PCS | Mod: 25,S$GLB,, | Performed by: FAMILY MEDICINE

## 2019-08-27 PROCEDURE — 99214 OFFICE O/P EST MOD 30 MIN: CPT | Mod: 25,S$GLB,, | Performed by: FAMILY MEDICINE

## 2019-08-27 PROCEDURE — 20610 DRAIN/INJ JOINT/BURSA W/O US: CPT | Mod: LT,S$GLB,, | Performed by: FAMILY MEDICINE

## 2019-08-27 PROCEDURE — 1101F PT FALLS ASSESS-DOCD LE1/YR: CPT | Mod: CPTII,S$GLB,, | Performed by: FAMILY MEDICINE

## 2019-08-27 PROCEDURE — 99999 PR PBB SHADOW E&M-EST. PATIENT-LVL III: ICD-10-PCS | Mod: PBBFAC,,, | Performed by: FAMILY MEDICINE

## 2019-08-27 PROCEDURE — 99499 UNLISTED E&M SERVICE: CPT | Mod: S$GLB,,, | Performed by: FAMILY MEDICINE

## 2019-08-27 PROCEDURE — 3074F PR MOST RECENT SYSTOLIC BLOOD PRESSURE < 130 MM HG: ICD-10-PCS | Mod: CPTII,S$GLB,, | Performed by: FAMILY MEDICINE

## 2019-08-27 PROCEDURE — 3078F PR MOST RECENT DIASTOLIC BLOOD PRESSURE < 80 MM HG: ICD-10-PCS | Mod: CPTII,S$GLB,, | Performed by: FAMILY MEDICINE

## 2019-08-27 PROCEDURE — 3074F SYST BP LT 130 MM HG: CPT | Mod: CPTII,S$GLB,, | Performed by: FAMILY MEDICINE

## 2019-08-27 PROCEDURE — 99999 PR PBB SHADOW E&M-EST. PATIENT-LVL III: CPT | Mod: PBBFAC,,, | Performed by: FAMILY MEDICINE

## 2019-08-27 RX ORDER — IBUPROFEN 800 MG/1
800 TABLET ORAL 3 TIMES DAILY
Qty: 15 TABLET | Refills: 5 | Status: SHIPPED | OUTPATIENT
Start: 2019-08-27 | End: 2019-10-25 | Stop reason: SDUPTHER

## 2019-08-27 RX ORDER — DUTASTERIDE 0.5 MG/1
0.5 CAPSULE, LIQUID FILLED ORAL DAILY
Qty: 90 CAPSULE | Refills: 1 | Status: SHIPPED | OUTPATIENT
Start: 2019-08-27 | End: 2020-05-26 | Stop reason: SDUPTHER

## 2019-08-27 RX ORDER — CARVEDILOL 25 MG/1
TABLET ORAL
Qty: 180 TABLET | Refills: 1 | Status: SHIPPED | OUTPATIENT
Start: 2019-08-27 | End: 2020-05-26 | Stop reason: SDUPTHER

## 2019-08-27 NOTE — PROGRESS NOTES
Subjective:       Patient ID: Domonique Hernandez Jr. is a 79 y.o. male.    Chief Complaint: Follow-up (6 month check up)    Patient here for follow up for urinary frequency, knee pain.  He has HYPERTENSION.   He takes Coreg to 25 mg twice daily, amlodipine to 5 mg once daily.  Diovan HCT 80 mg was added.  Now his blood pressure is almost too low.  He is feeling fine on current medications however.  Patient has depression.  Patient is doing well on Effexor  mg daily.    Patient states his CPAP machine is working very good.  He requires 7 cm water.   He is urinating better now that he is on Flomax and Avodart.  He is still having delayed ejaculation.  Stopping Effexor did not make much of a difference.  Patient uses Viagra for EGD.  He takes Synthroid for his hypothyroidism.  He tolerates this well.  He denies having symptoms such as heat or cold intolerance.  His weight is stable.  He denies any swelling in her thyroid.  Patient is taking His statin every day for hyperlipidemia.  She is feeling well on the medication.  He denies side effects such as myalgias.  Left knee painful.  Wants injection.    Hypertension   Pertinent negatives include no chest pain, headaches, palpitations or shortness of breath.   Otalgia    Pertinent negatives include no abdominal pain, coughing, headaches, rash or sore throat.   Cough   This is a new problem. The current episode started in the past 7 days. The problem has been gradually worsening. The cough is productive of purulent sputum. Pertinent negatives include no chest pain, chills, ear pain, fever, headaches, postnasal drip, rash, sore throat or shortness of breath.     Review of Systems   Constitutional: Negative for activity change, chills, diaphoresis, fatigue, fever and unexpected weight change.   HENT: Negative for congestion, ear pain, nosebleeds, postnasal drip, sinus pressure, sore throat, trouble swallowing and voice change.    Eyes: Negative for photophobia and visual  disturbance.   Respiratory: Negative for apnea, cough, choking, chest tightness and shortness of breath.    Cardiovascular: Negative for chest pain, palpitations and leg swelling.   Gastrointestinal: Negative for abdominal pain and blood in stool.   Endocrine: Negative for cold intolerance, heat intolerance, polydipsia and polyphagia.   Genitourinary: Positive for difficulty urinating. Negative for decreased urine volume and dysuria.        Improved urination   Musculoskeletal: Positive for arthralgias and back pain. Negative for joint swelling.   Skin: Negative for color change, pallor and rash.   Neurological: Negative for dizziness, weakness, numbness and headaches.   Hematological: Negative for adenopathy. Does not bruise/bleed easily.   Psychiatric/Behavioral: Negative for behavioral problems, decreased concentration, dysphoric mood and sleep disturbance. The patient is not nervous/anxious.        Objective:      Vitals:    08/27/19 1726   BP: 122/74   Pulse: 76   Resp: 16     Physical Exam   Constitutional: He is oriented to person, place, and time. He appears well-developed and well-nourished.   Eyes: Pupils are equal, round, and reactive to light. EOM are normal.   Neck: No JVD present.   No carotid bruits   Cardiovascular: Normal rate, regular rhythm, normal heart sounds and intact distal pulses. Exam reveals no gallop and no friction rub.   No murmur heard.  Pulmonary/Chest: Effort normal and breath sounds normal.   Musculoskeletal: He exhibits tenderness. He exhibits no edema.   Lymphadenopathy:     He has no cervical adenopathy.   Neurological: He is alert and oriented to person, place, and time. He has normal reflexes. He displays normal reflexes. No cranial nerve deficit. He exhibits normal muscle tone. Coordination normal.   Psychiatric: He has a normal mood and affect. His behavior is normal. Judgment and thought content normal.       Lab Results   Component Value Date    TSH 3.467 08/19/2019      BMP  Lab Results   Component Value Date     08/19/2019    K 4.2 08/19/2019     08/19/2019    CO2 28 08/19/2019    BUN 17 08/19/2019    CREATININE 1.0 08/19/2019    CALCIUM 9.9 08/19/2019    ANIONGAP 8 08/19/2019    ESTGFRAFRICA >60 08/19/2019    EGFRNONAA >60 08/19/2019       Lab Results   Component Value Date    LDLCALC 103.6 08/19/2019     Lab Results   Component Value Date    URICACID 6.6 08/19/2019       Assessment:       1. Primary osteoarthritis of left knee    2. Benign prostatic hyperplasia with nocturia    3. Essential hypertension    4. Mucopurulent chronic bronchitis    5. Mixed hyperlipidemia    6. 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 KARL on CPAP    8. Hyperlipidemia, unspecified hyperlipidemia type    9. Chronic gout without tophus, unspecified cause, unspecified site    10. Frequency of urination    11. Depression, unspecified depression type        Plan:     Essential hypertension  Two gram sodium diet.    Weight loss discussed.    Try to walk 2 miles per day.    The following medications will be restarted today  -     carvedilol (COREG) 25 MG tablet; Take 1 tablet (25 mg total) by mouth 2 (two) times daily with meals.  -     amlodipine (NORVASC) 5 MG tablet; Take 1 tablet by mouth once daily.  -     Diovan 80 mg po daily    KARL on CPAP  Continue CPAP at 7 cm water pressure    Depression, unspecified depression type  - Continue venlafaxine (EFFEXOR-XR) 150 MG 24 hr capsule; Take 1 capsule by mouth once daily.  Dispense: 30 capsule; Refill: 5    Hyperlipidemia, unspecified hyperlipidemia type  Continue Zocor 40 mg by mouth daily    Chronic gout without tophus, unspecified cause, unspecified site  Continue allopurinol 300 mg daily    Hypothyroidism due to acquired atrophy of thyroid  -  Synthroid to 100 µg by mouth daily    Benign non-nodular prostatic hyperplasia with lower urinary tract symptoms  Continue Avodart and Flomax.    Primary osteoarthritis of left knee  -      Arthrocentesis.  After obtaining verbal consent, and per orders of Dr. Cam, injection of left knee given by Emiliano Cam. Patient felt much better. Patient remained in clinic for 20 minutes afterwards, and did not have any adverse reactions.  Used 2 cc of marcaine and  40 mg Kenalog    -     ibuprofen (ADVIL,MOTRIN) 800 MG tablet; Take 1 tablet (800 mg total) by mouth 3 (three) times daily.  Dispense: 15 tablet; Refill: 5    Benign prostatic hyperplasia with nocturia  -     dutasteride (AVODART) 0.5 mg capsule; Take 1 capsule (0.5 mg total) by mouth once daily.  Dispense: 90 capsule; Refill: 1    Essential hypertension  -     carvedilol (COREG) 25 MG tablet; Take 1 tablet by mouth 2 times daily with meals.  Dispense: 180 tablet; Refill: 1    Mucopurulent chronic bronchitis    Mixed hyperlipidemia  -     Comprehensive metabolic panel; Future; Expected date: 01/27/2020    Hypothyroidism due to acquired atrophy of thyroid  -     TSH; Future; Expected date: 01/27/2020    KARL on CPAP    Hyperlipidemia, unspecified hyperlipidemia type  -     Lipid panel; Future; Expected date: 01/27/2020    Chronic gout without tophus, unspecified cause, unspecified site  -     Uric acid; Future; Expected date: 01/27/2020    Frequency of urination    Depression, unspecified depression type  Doing well.    RTC 6 months

## 2019-09-12 ENCOUNTER — TELEPHONE (OUTPATIENT)
Dept: FAMILY MEDICINE | Facility: CLINIC | Age: 79
End: 2019-09-12

## 2019-09-12 DIAGNOSIS — M25.569 KNEE PAIN, UNSPECIFIED CHRONICITY, UNSPECIFIED LATERALITY: Primary | ICD-10-CM

## 2019-09-12 NOTE — TELEPHONE ENCOUNTER
----- Message from Alejandra Morales sent at 2019  5:14 PM CDT -----  Contact: Self  Domonique Hernandez Jr.  MRN: [id number]  : 1940  PCP: Emiliano Cam  Home Phone      [phone]  Work Phone      Not on file.  Mobile          [phone]      MESSAGE:   Patient would like to get a referral.  Referral to what specialty:  Ortho?  Reason (be specific):  Knee pain. He thinks he will need it operated on.   Does the patient want the referral with a specific physician:  No, he says he thinks Dr. Cummings recommended someone before.  [facility]  [facility]  Does the patient already have the specialty clinic appointment scheduled:    If yes, what date is the appointment scheduled:     Comments:      Phone: [phone]    ##Advise the patient that once the physician approves this either a nurse or the  will return their call##

## 2019-09-13 NOTE — TELEPHONE ENCOUNTER
Knee pain, unspecified chronicity, unspecified laterality  -     Ambulatory referral/consult to Orthopedics; Future; Expected date: 09/20/2019

## 2019-09-16 ENCOUNTER — OFFICE VISIT (OUTPATIENT)
Dept: FAMILY MEDICINE | Facility: CLINIC | Age: 79
End: 2019-09-16
Payer: MEDICARE

## 2019-09-16 VITALS
HEIGHT: 67 IN | SYSTOLIC BLOOD PRESSURE: 120 MMHG | DIASTOLIC BLOOD PRESSURE: 70 MMHG | RESPIRATION RATE: 18 BRPM | BODY MASS INDEX: 39.18 KG/M2 | WEIGHT: 249.63 LBS | HEART RATE: 88 BPM

## 2019-09-16 DIAGNOSIS — M25.562 CHRONIC PAIN OF LEFT KNEE: Primary | ICD-10-CM

## 2019-09-16 DIAGNOSIS — G89.29 CHRONIC MIDLINE LOW BACK PAIN WITHOUT SCIATICA: ICD-10-CM

## 2019-09-16 DIAGNOSIS — G89.29 CHRONIC PAIN OF LEFT KNEE: Primary | ICD-10-CM

## 2019-09-16 DIAGNOSIS — M54.50 CHRONIC MIDLINE LOW BACK PAIN WITHOUT SCIATICA: ICD-10-CM

## 2019-09-16 DIAGNOSIS — M17.12 ARTHRITIS OF LEFT KNEE: ICD-10-CM

## 2019-09-16 PROCEDURE — 3078F DIAST BP <80 MM HG: CPT | Mod: CPTII,S$GLB,, | Performed by: FAMILY MEDICINE

## 2019-09-16 PROCEDURE — 3074F SYST BP LT 130 MM HG: CPT | Mod: CPTII,S$GLB,, | Performed by: FAMILY MEDICINE

## 2019-09-16 PROCEDURE — 99999 PR PBB SHADOW E&M-EST. PATIENT-LVL III: CPT | Mod: PBBFAC,,, | Performed by: FAMILY MEDICINE

## 2019-09-16 PROCEDURE — 99213 OFFICE O/P EST LOW 20 MIN: CPT | Mod: S$GLB,,, | Performed by: FAMILY MEDICINE

## 2019-09-16 PROCEDURE — 1101F PT FALLS ASSESS-DOCD LE1/YR: CPT | Mod: CPTII,S$GLB,, | Performed by: FAMILY MEDICINE

## 2019-09-16 PROCEDURE — 99213 PR OFFICE/OUTPT VISIT, EST, LEVL III, 20-29 MIN: ICD-10-PCS | Mod: S$GLB,,, | Performed by: FAMILY MEDICINE

## 2019-09-16 PROCEDURE — 99999 PR PBB SHADOW E&M-EST. PATIENT-LVL III: ICD-10-PCS | Mod: PBBFAC,,, | Performed by: FAMILY MEDICINE

## 2019-09-16 PROCEDURE — 3074F PR MOST RECENT SYSTOLIC BLOOD PRESSURE < 130 MM HG: ICD-10-PCS | Mod: CPTII,S$GLB,, | Performed by: FAMILY MEDICINE

## 2019-09-16 PROCEDURE — 3078F PR MOST RECENT DIASTOLIC BLOOD PRESSURE < 80 MM HG: ICD-10-PCS | Mod: CPTII,S$GLB,, | Performed by: FAMILY MEDICINE

## 2019-09-16 PROCEDURE — 1101F PR PT FALLS ASSESS DOC 0-1 FALLS W/OUT INJ PAST YR: ICD-10-PCS | Mod: CPTII,S$GLB,, | Performed by: FAMILY MEDICINE

## 2019-09-16 RX ORDER — TRAMADOL HYDROCHLORIDE 50 MG/1
50 TABLET ORAL EVERY 12 HOURS PRN
Qty: 30 TABLET | Refills: 0 | Status: SHIPPED | OUTPATIENT
Start: 2019-09-16 | End: 2019-10-25 | Stop reason: SDUPTHER

## 2019-09-16 NOTE — PROGRESS NOTES
Subjective:       Patient ID: Domonique Hernandez Jr. is a 79 y.o. male.    Chief Complaint: Knee Pain    Pt is a 79 y.o. male who presents for evaluation and management of   Encounter Diagnoses   Name Primary?    Chronic pain of left knee Yes    Arthritis of left knee     Chronic midline low back pain without sciatica    .  Doing well on current meds. Denies any side effects. Prevention is up to date.    Review of Systems   Musculoskeletal: Positive for arthralgias, gait problem and joint swelling.       Objective:      Physical Exam   Constitutional: He is oriented to person, place, and time. He appears well-developed and well-nourished.   HENT:   Head: Normocephalic and atraumatic.   Right Ear: External ear normal.   Left Ear: External ear normal.   Nose: Nose normal.   Mouth/Throat: Oropharynx is clear and moist.   Eyes: Pupils are equal, round, and reactive to light. Conjunctivae and EOM are normal. Right eye exhibits no discharge. Left eye exhibits no discharge. No scleral icterus.   Neck: Normal range of motion. Neck supple. No JVD present. No tracheal deviation present. No thyromegaly present.   Cardiovascular: Normal rate, regular rhythm, normal heart sounds and intact distal pulses.   No murmur heard.  Pulmonary/Chest: Effort normal and breath sounds normal. No respiratory distress. He has no wheezes. He has no rales. He exhibits no tenderness.   Abdominal: Soft. Bowel sounds are normal. He exhibits no distension and no mass. There is no tenderness. There is no rebound and no guarding.   Musculoskeletal: Normal range of motion.   Lymphadenopathy:     He has no cervical adenopathy.   Neurological: He is alert and oriented to person, place, and time. He has normal reflexes. He displays normal reflexes. No cranial nerve deficit. He exhibits normal muscle tone. Coordination normal.   Skin: Skin is warm and dry.   Psychiatric: He has a normal mood and affect. His behavior is normal. Judgment and thought content  normal.       Assessment:       1. Chronic pain of left knee    2. Arthritis of left knee    3. Chronic midline low back pain without sciatica        Plan:   Domonique was seen today for knee pain.    Diagnoses and all orders for this visit:    Chronic pain of left knee  -     Ambulatory referral/consult to Orthopedics; Future    Arthritis of left knee  -     Ambulatory referral/consult to Orthopedics; Future    Chronic midline low back pain without sciatica  -     traMADol (ULTRAM) 50 mg tablet; Take 1 tablet (50 mg total) by mouth every 12 (twelve) hours as needed for pain.      Problem List Items Addressed This Visit     None      Visit Diagnoses     Chronic pain of left knee    -  Primary    Relevant Orders    Ambulatory referral/consult to Orthopedics    Arthritis of left knee        Relevant Orders    Ambulatory referral/consult to Orthopedics    Chronic midline low back pain without sciatica        Relevant Medications    traMADol (ULTRAM) 50 mg tablet      has failed steroid and synvisc injections now   Referring to ortho for consideration of knee arthroplasty   No follow-ups on file.

## 2019-09-25 ENCOUNTER — PATIENT OUTREACH (OUTPATIENT)
Dept: ADMINISTRATIVE | Facility: OTHER | Age: 79
End: 2019-09-25

## 2019-10-02 DIAGNOSIS — E78.5 HYPERLIPIDEMIA, UNSPECIFIED HYPERLIPIDEMIA TYPE: ICD-10-CM

## 2019-10-02 RX ORDER — SIMVASTATIN 40 MG/1
TABLET, FILM COATED ORAL
Qty: 90 TABLET | Refills: 1 | Status: SHIPPED | OUTPATIENT
Start: 2019-10-02 | End: 2019-10-15 | Stop reason: SDUPTHER

## 2019-10-10 ENCOUNTER — OFFICE VISIT (OUTPATIENT)
Dept: INTERNAL MEDICINE | Facility: CLINIC | Age: 79
End: 2019-10-10
Payer: MEDICARE

## 2019-10-10 VITALS
BODY MASS INDEX: 39.35 KG/M2 | HEART RATE: 83 BPM | DIASTOLIC BLOOD PRESSURE: 68 MMHG | SYSTOLIC BLOOD PRESSURE: 120 MMHG | RESPIRATION RATE: 18 BRPM | OXYGEN SATURATION: 94 % | WEIGHT: 250.69 LBS | HEIGHT: 67 IN

## 2019-10-10 DIAGNOSIS — N30.00 ACUTE CYSTITIS WITHOUT HEMATURIA: Primary | ICD-10-CM

## 2019-10-10 DIAGNOSIS — R22.0 LIP SWELLING: ICD-10-CM

## 2019-10-10 DIAGNOSIS — I70.0 AORTIC CALCIFICATION: ICD-10-CM

## 2019-10-10 DIAGNOSIS — I70.0 THORACIC AORTIC ATHEROSCLEROSIS: ICD-10-CM

## 2019-10-10 DIAGNOSIS — R35.0 URINARY FREQUENCY: ICD-10-CM

## 2019-10-10 DIAGNOSIS — N18.30 STAGE 3 CHRONIC KIDNEY DISEASE: ICD-10-CM

## 2019-10-10 DIAGNOSIS — I10 ESSENTIAL HYPERTENSION: ICD-10-CM

## 2019-10-10 LAB
BILIRUB SERPL-MCNC: NEGATIVE MG/DL
BLOOD URINE, POC: NORMAL
COLOR, POC UA: NORMAL
GLUCOSE UR QL STRIP: NEGATIVE
KETONES UR QL STRIP: NEGATIVE
LEUKOCYTE ESTERASE URINE, POC: 2
NITRITE, POC UA: POSITIVE
PH, POC UA: 6
PROTEIN, POC: NORMAL
SPECIFIC GRAVITY, POC UA: 1.01
UROBILINOGEN, POC UA: NEGATIVE

## 2019-10-10 PROCEDURE — 3078F PR MOST RECENT DIASTOLIC BLOOD PRESSURE < 80 MM HG: ICD-10-PCS | Mod: CPTII,S$GLB,, | Performed by: NURSE PRACTITIONER

## 2019-10-10 PROCEDURE — 99499 RISK ADDL DX/OHS AUDIT: ICD-10-PCS | Mod: S$GLB,,, | Performed by: NURSE PRACTITIONER

## 2019-10-10 PROCEDURE — 87088 URINE BACTERIA CULTURE: CPT

## 2019-10-10 PROCEDURE — 3078F DIAST BP <80 MM HG: CPT | Mod: CPTII,S$GLB,, | Performed by: NURSE PRACTITIONER

## 2019-10-10 PROCEDURE — 1101F PT FALLS ASSESS-DOCD LE1/YR: CPT | Mod: CPTII,S$GLB,, | Performed by: NURSE PRACTITIONER

## 2019-10-10 PROCEDURE — 99214 OFFICE O/P EST MOD 30 MIN: CPT | Mod: 25,S$GLB,, | Performed by: NURSE PRACTITIONER

## 2019-10-10 PROCEDURE — 99499 UNLISTED E&M SERVICE: CPT | Mod: S$GLB,,, | Performed by: NURSE PRACTITIONER

## 2019-10-10 PROCEDURE — 99999 PR PBB SHADOW E&M-EST. PATIENT-LVL V: CPT | Mod: PBBFAC,,, | Performed by: NURSE PRACTITIONER

## 2019-10-10 PROCEDURE — 1101F PR PT FALLS ASSESS DOC 0-1 FALLS W/OUT INJ PAST YR: ICD-10-PCS | Mod: CPTII,S$GLB,, | Performed by: NURSE PRACTITIONER

## 2019-10-10 PROCEDURE — 87086 URINE CULTURE/COLONY COUNT: CPT

## 2019-10-10 PROCEDURE — 99214 PR OFFICE/OUTPT VISIT, EST, LEVL IV, 30-39 MIN: ICD-10-PCS | Mod: 25,S$GLB,, | Performed by: NURSE PRACTITIONER

## 2019-10-10 PROCEDURE — 3074F PR MOST RECENT SYSTOLIC BLOOD PRESSURE < 130 MM HG: ICD-10-PCS | Mod: CPTII,S$GLB,, | Performed by: NURSE PRACTITIONER

## 2019-10-10 PROCEDURE — 96372 PR INJECTION,THERAP/PROPH/DIAG2ST, IM OR SUBCUT: ICD-10-PCS | Mod: S$GLB,,, | Performed by: NURSE PRACTITIONER

## 2019-10-10 PROCEDURE — 81002 URINALYSIS NONAUTO W/O SCOPE: CPT | Mod: S$GLB,,, | Performed by: NURSE PRACTITIONER

## 2019-10-10 PROCEDURE — 99999 PR PBB SHADOW E&M-EST. PATIENT-LVL V: ICD-10-PCS | Mod: PBBFAC,,, | Performed by: NURSE PRACTITIONER

## 2019-10-10 PROCEDURE — 96372 THER/PROPH/DIAG INJ SC/IM: CPT | Mod: S$GLB,,, | Performed by: NURSE PRACTITIONER

## 2019-10-10 PROCEDURE — 3074F SYST BP LT 130 MM HG: CPT | Mod: CPTII,S$GLB,, | Performed by: NURSE PRACTITIONER

## 2019-10-10 PROCEDURE — 81002 POCT URINE DIPSTICK WITHOUT MICROSCOPE: ICD-10-PCS | Mod: S$GLB,,, | Performed by: NURSE PRACTITIONER

## 2019-10-10 RX ORDER — METHYLPREDNISOLONE ACETATE 40 MG/ML
40 INJECTION, SUSPENSION INTRA-ARTICULAR; INTRALESIONAL; INTRAMUSCULAR; SOFT TISSUE
Status: COMPLETED | OUTPATIENT
Start: 2019-10-10 | End: 2019-10-10

## 2019-10-10 RX ORDER — CIPROFLOXACIN 500 MG/1
500 TABLET ORAL 2 TIMES DAILY
Qty: 20 TABLET | Refills: 0 | Status: ON HOLD | OUTPATIENT
Start: 2019-10-10 | End: 2020-02-18 | Stop reason: HOSPADM

## 2019-10-10 RX ORDER — CEFTRIAXONE 1 G/1
1 INJECTION, POWDER, FOR SOLUTION INTRAMUSCULAR; INTRAVENOUS
Status: COMPLETED | OUTPATIENT
Start: 2019-10-10 | End: 2019-10-10

## 2019-10-10 RX ORDER — LIDOCAINE HYDROCHLORIDE 10 MG/ML
2.1 INJECTION INFILTRATION; PERINEURAL
Status: COMPLETED | OUTPATIENT
Start: 2019-10-10 | End: 2019-10-10

## 2019-10-10 RX ADMIN — CEFTRIAXONE 1 G: 1 INJECTION, POWDER, FOR SOLUTION INTRAMUSCULAR; INTRAVENOUS at 02:10

## 2019-10-10 RX ADMIN — LIDOCAINE HYDROCHLORIDE 2.1 ML: 10 INJECTION INFILTRATION; PERINEURAL at 02:10

## 2019-10-10 RX ADMIN — METHYLPREDNISOLONE ACETATE 40 MG: 40 INJECTION, SUSPENSION INTRA-ARTICULAR; INTRALESIONAL; INTRAMUSCULAR; SOFT TISSUE at 02:10

## 2019-10-10 NOTE — PROGRESS NOTES
Subjective:           Patient ID: Domonique Hernandez Jr. is a 79 y.o. male.    Chief Complaint: Extremity Weakness (bilateral legs x2 months, swellen lower lip, states swelling may be from CPAP, states CPAP feels lose to fit, denies any other complaints )    Domonique Hernandez Jr. is a 79 y.o. Male, new to me; known to fellow providers; with significant PMHX HTN, HLD, TAA, hypothyroidism,   Hx of vertebral compression fracture,lumbarr arthropathy , CKD III,  KARL- CPAP here today with c/o weakness and lip swelling.  Notes reviewed; last month seen for knee pain; referred to ortho; having knee injection with Lori.     Yesterday had an episode of weakness before going out for supper; didn't finish his meal. This am feeling much better.   Denies unilateral weakness, just kallie LE weakness, now better. No chest pain, SOB or palpitations; no back pain   last labs in August reviewed; creat stable  Denies N/V/D; no blood in urine or stools; has noted urinary frequency. No fever  Ao and thoracic atherosclerosis on asa, statin;       Also notes swelling to lower lip; tender to touch; thinks may be due to CPAP             Review of Systems   Constitutional: Negative.    HENT: Positive for facial swelling ( lower lip swelling). Negative for congestion, ear pain, sinus pressure, sneezing and sore throat.    Eyes: Negative.    Respiratory: Negative for cough, chest tightness, shortness of breath and wheezing.    Cardiovascular: Negative.  Negative for chest pain.   Gastrointestinal: Negative for abdominal pain, blood in stool, constipation, diarrhea, nausea and vomiting.   Genitourinary: Positive for frequency. Negative for difficulty urinating, dysuria and flank pain.   Musculoskeletal: Positive for arthralgias. Negative for back pain, gait problem and joint swelling.   Skin: Negative.  Negative for rash and wound.   Neurological: Positive for weakness. Negative for dizziness and headaches. Tremors: lower legs yesterday- better today.    Hematological: Negative.    Psychiatric/Behavioral: Negative.  Negative for behavioral problems and confusion.       Objective:      Physical Exam   Constitutional: He is oriented to person, place, and time. He appears well-developed and well-nourished.   HENT:   Lower lips with minimal swelling- no tenderness    Eyes: Pupils are equal, round, and reactive to light. Conjunctivae and EOM are normal.   Neck: No JVD present.   No carotid bruits   Cardiovascular: Normal rate, regular rhythm, normal heart sounds and intact distal pulses. Exam reveals no gallop and no friction rub.   No murmur heard.  RRR   Pulmonary/Chest: Effort normal and breath sounds normal. No stridor. No respiratory distress. He has no wheezes. He has no rales.   Lungs clear   Abdominal: Soft. Bowel sounds are normal. He exhibits no distension. There is no tenderness. There is no rebound and no guarding. No hernia.   Musculoskeletal: Normal range of motion. He exhibits tenderness (left knee ). He exhibits no edema.   Lymphadenopathy:     He has no cervical adenopathy.   Neurological: He is alert and oriented to person, place, and time. He has normal reflexes. He displays normal reflexes. No cranial nerve deficit. He exhibits normal muscle tone. Coordination normal.   Skin: Skin is warm and dry.   Multiple skin tags   Psychiatric: He has a normal mood and affect. His behavior is normal. Judgment and thought content normal.   Nursing note and vitals reviewed.      Assessment:       1. Acute cystitis without hematuria    2. Urinary frequency    3. Essential hypertension    4. Stage 3 chronic kidney disease    5. Lip swelling    6. Aortic calcification    7. Thoracic aortic atherosclerosis        Plan:   Domonique was seen today for extremity weakness.    Diagnoses and all orders for this visit:    Acute cystitis without hematuria  -     ciprofloxacin HCl (CIPRO) 500 MG tablet; Take 1 tablet (500 mg total) by mouth 2 (two) times daily.  -     Urine  culture  Rocephin 1GM   Essential hypertension    Stage 3 chronic kidney disease    Urinary frequency  -     POCT urine dipstick without microscope    Lip swelling  -     methylPREDNISolone acetate injection 40 mg  Monitor - discussed call for any new sores or lip changes; watch for cold sore; can send anti-viral rx     Problem List Items Addressed This Visit        Cardiac/Vascular    Hypertension    Thoracic aortic atherosclerosis    Aortic calcification      Other Visit Diagnoses     Acute cystitis without hematuria    -  Primary    Relevant Medications    ciprofloxacin HCl (CIPRO) 500 MG tablet    cefTRIAXone injection 1 g (Start on 10/10/2019  2:45 PM)    lidocaine HCL 10 mg/ml (1%) injection 2.1 mL (Start on 10/10/2019  2:45 PM)    Other Relevant Orders    Urine culture    Urinary frequency        Relevant Orders    POCT urine dipstick without microscope (Completed)    Stage 3 chronic kidney disease        Lip swelling        Relevant Medications    methylPREDNISolone acetate injection 40 mg      increased concern due to weakness yesterday; give Rocephin 1GM and start Cipro  BP and HR stable- no fever; no sepsis signs  F/U in 10-14 days with PCP  F/u culture; rule out prostatitis

## 2019-10-10 NOTE — PATIENT INSTRUCTIONS
Bladder Infection, Male (Adult)    You have a bladder infection.  Urine is normally free of bacteria. But bacteria can get into the urinary tract from the skin around the rectum or it may travel in the blood from elsewhere in the body.  This is called a urinary tract infection (UTI). An infection can occur anywhere in the urinary tract. It could be in a kidney (pyelonephritis)or in the bladder (cystitis) and urethra (urethritis). The urethra is the tube that drains the urine from the bladder through the tip of the penis.  The most common place for a UTI is in the bladder. This is called a bladder infection. Most bladder infections are easily treated. They are not serious unless the infection spreads up to the kidney.  The terms bladder infection, UTI, and cystitis are often used to describe the same thing, but they arent always the same. Cystitis is an inflammation of the bladder. The most common cause of cystitis is an infection.   Keep in mind:  · Infections in the urine are called UTIs.  · Cystitis is usually caused by a UTI.  · Not all UTIs and cases of cystitis are bladder infections.  · Bladder infections are the most common type of cystitis.  Symptoms of a bladder infection  The infection causes inflammation in the urethra and bladder. This inflammation causes many of the symptoms. The most common symptoms of a bladder infection are:  · Pain or burning when urinating  · Having to go more often than usual  · Feeling like you need to go right away  · Only a small amount comes out  · Blood in urine  · Discomfort in your belly (abdomen), usually in the lower abdomen, above the pubic bone  · Cloudy, strong, or bad smelling urine  · Unable to urinate (retention)  · Urinary incontinence  · Fever  · Loss of appetite  Older adults may also feel confused.  Causes of a bladder infection  Bladder infections are not contagious. You can't get one from someone else, from a toilet seat, or from sharing a bath.  The most  common cause of bladder infections is bacteria from the bowels. The bacteria get onto the skin around the opening of the urethra. From there they can get into the urine and travel up to the bladder. This causes inflammation and an infection. This usually happens because of:  · An enlarged prostate  · Poor cleaning of the genitals  · Procedures that put a tube in your bladder, like a Jeter catheter  · Bowel incontinence  · Older age  · Not emptying your bladder (The urine stays there, giving the bacteria a chance to grow.)  · Dehydration (This allows urine to stay in the bladder longer.)  · Constipation (This can cause the bowels to push on the bladder or urethra and keep the bladder from emptying.)  Treatment  Bladder infections are treated with antibiotics. They usually clear up quickly without complications. Treatment helps prevent a more serious kidney infection.  Medicines  Medicines can help in the treatment of a bladder infection:  · You may have been given phenazopyridine to ease burning when you urinate. It will cause your urine to be bright orange. It can stain clothing.  · You may have been prescribed antibiotics. Take this medicine until you have finished it, even if you feel better. Taking all of the medicine will make sure the infection has cleared.  You can use acetaminophen or ibuprofen for pain, fever, or discomfort, unless another medicine was prescribed. You can also alternate them, or use both together. They work differently and are a different class of medicines, so taking them together is not an overdose. If you have chronic liver or kidney disease, talk with your healthcare provider before using these medicines. Also talk with your provider if youve had a stomach ulcer or GI bleeding or are taking blood thinner medicines.  Home care  Here are some guidelines to help you care for yourself at home:  · Drink plenty of fluids, unless your healthcare provider told you not to. Fluids will prevent  dehydration and flush out your bladder.  · Use good personal hygiene. Wipe from front to back after using the toilet, and clean your penis regularly. If you arent circumcised, retract the foreskin when cleaning.  · Urinate more frequently, and dont try to hold it in for long periods of time, if possible.  · Wear loose-fitting clothes and cotton underwear. Avoid tight-fitting pants. This helps keep you clean and dry.  · Change your diet to prevent constipation. This means eating more fresh foods and more fiber, and less junk and fatty foods.  · Avoid sex until your symptoms are gone.  · Avoid caffeine, alcohol, and spicy foods. These can irritate the bladder.  Follow-up care  Follow up with your healthcare provider, or as advised if all symptoms have not cleared up within 5 days. It is important to keep your follow-up appointment. You can talk with your provider to see if you need more tests of the urinary tract. This is especially important if you have infections that keep coming back.  If a culture was done, you will be told if your treatment needs to be changed. If directed, you can call to find out the results.  If X-rays were taken, you will be told of any findings that may affect your care.  Call 911  Call 911 if any of these occur:  · Trouble breathing  · Difficulty waking up  · Feeling confused  · Fainting or loss of consciousness  · Rapid heart rate  When to seek medical advice  Call your healthcare provider right away if any of these occur:  · Fever of 100.4ºF (38ºC) or higher, or as directed by your healthcare provider  · Your symptoms dont improve after 2 days of treatment  · Back or abdominal pain that gets worse  · Repeated vomiting, or you arent able to keep medicine down  · Weakness or dizziness  Date Last Reviewed: 10/1/2016  © 2523-5684 [employer]. [address] [place] [address], [place] [address]. All rights reserved. This information is not intended as a substitute for professional  medical care. Always follow your healthcare professional's instructions.

## 2019-10-10 NOTE — PROGRESS NOTES
Depo Medrol injection given RUOQ, Ceftriaxone injection given LUOQ per order. No reaction noted. Patient instructed to wait 20 minutes after injection administration. Patient verbalized understanding with no questions or concerns.

## 2019-10-13 LAB — BACTERIA UR CULT: ABNORMAL

## 2019-10-15 ENCOUNTER — OFFICE VISIT (OUTPATIENT)
Dept: NEUROLOGY | Facility: CLINIC | Age: 79
End: 2019-10-15
Payer: MEDICARE

## 2019-10-15 VITALS
DIASTOLIC BLOOD PRESSURE: 70 MMHG | WEIGHT: 249.13 LBS | SYSTOLIC BLOOD PRESSURE: 120 MMHG | HEART RATE: 69 BPM | RESPIRATION RATE: 16 BRPM | BODY MASS INDEX: 39.1 KG/M2 | HEIGHT: 67 IN

## 2019-10-15 DIAGNOSIS — F39 MOOD DISORDER: ICD-10-CM

## 2019-10-15 DIAGNOSIS — G31.84 MILD COGNITIVE IMPAIRMENT: Primary | ICD-10-CM

## 2019-10-15 PROCEDURE — 3074F SYST BP LT 130 MM HG: CPT | Mod: CPTII,S$GLB,, | Performed by: PSYCHIATRY & NEUROLOGY

## 2019-10-15 PROCEDURE — 3288F PR FALLS RISK ASSESSMENT DOCUMENTED: ICD-10-PCS | Mod: CPTII,S$GLB,, | Performed by: PSYCHIATRY & NEUROLOGY

## 2019-10-15 PROCEDURE — 99999 PR PBB SHADOW E&M-EST. PATIENT-LVL III: CPT | Mod: PBBFAC,,, | Performed by: PSYCHIATRY & NEUROLOGY

## 2019-10-15 PROCEDURE — 1100F PTFALLS ASSESS-DOCD GE2>/YR: CPT | Mod: CPTII,S$GLB,, | Performed by: PSYCHIATRY & NEUROLOGY

## 2019-10-15 PROCEDURE — 3074F PR MOST RECENT SYSTOLIC BLOOD PRESSURE < 130 MM HG: ICD-10-PCS | Mod: CPTII,S$GLB,, | Performed by: PSYCHIATRY & NEUROLOGY

## 2019-10-15 PROCEDURE — 3288F FALL RISK ASSESSMENT DOCD: CPT | Mod: CPTII,S$GLB,, | Performed by: PSYCHIATRY & NEUROLOGY

## 2019-10-15 PROCEDURE — 99213 OFFICE O/P EST LOW 20 MIN: CPT | Mod: S$GLB,,, | Performed by: PSYCHIATRY & NEUROLOGY

## 2019-10-15 PROCEDURE — 1100F PR PT FALLS ASSESS DOC 2+ FALLS/FALL W/INJURY/YR: ICD-10-PCS | Mod: CPTII,S$GLB,, | Performed by: PSYCHIATRY & NEUROLOGY

## 2019-10-15 PROCEDURE — 99499 RISK ADDL DX/OHS AUDIT: ICD-10-PCS | Mod: S$GLB,,, | Performed by: PSYCHIATRY & NEUROLOGY

## 2019-10-15 PROCEDURE — 99213 PR OFFICE/OUTPT VISIT, EST, LEVL III, 20-29 MIN: ICD-10-PCS | Mod: S$GLB,,, | Performed by: PSYCHIATRY & NEUROLOGY

## 2019-10-15 PROCEDURE — 3078F DIAST BP <80 MM HG: CPT | Mod: CPTII,S$GLB,, | Performed by: PSYCHIATRY & NEUROLOGY

## 2019-10-15 PROCEDURE — 99499 UNLISTED E&M SERVICE: CPT | Mod: S$GLB,,, | Performed by: PSYCHIATRY & NEUROLOGY

## 2019-10-15 PROCEDURE — 99999 PR PBB SHADOW E&M-EST. PATIENT-LVL III: ICD-10-PCS | Mod: PBBFAC,,, | Performed by: PSYCHIATRY & NEUROLOGY

## 2019-10-15 PROCEDURE — 3078F PR MOST RECENT DIASTOLIC BLOOD PRESSURE < 80 MM HG: ICD-10-PCS | Mod: CPTII,S$GLB,, | Performed by: PSYCHIATRY & NEUROLOGY

## 2019-10-15 NOTE — PROGRESS NOTES
"HPI:Domonique Hernandez Jr. is a 79 y.o. male complaining of minor short term memory loss. Suspect mild cognitive impairment.     Patient is here for his yearly follow up  Since the last visit with me, the patient has been seeing NP, Lindsey Polanco, in Neurology in this clinic    This past year memory "has been pretty good." He is not noticing much difficulty.     Seeing Dr Sandoval about left knee pain    Mood is good. He remains on effexor.    He maintains his household well.    He is staying active and dieting    He wife passed away about 5 years.     Review of Systems   Constitutional: Negative for fever.   Eyes: Negative for double vision.   Respiratory: Negative for hemoptysis.    Cardiovascular: Negative for chest pain.   Gastrointestinal: Negative for blood in stool.   Genitourinary: Negative for hematuria.   Musculoskeletal: Negative for falls.   Skin: Negative for rash.   Neurological: Negative for seizures and headaches.   Psychiatric/Behavioral: The patient does not have insomnia.        I have reviewed all of this patient's past medical and surgical histories as well as family and social histories and active allergies and medications as documented in the electronic medical record.    Exam:  Gen Appearance, well developed/nourished in no apparent distress  CV: 2+ distal pulses with no edema or swelling  Neuro:  MS: Awake, alert, oriented to place, person, time, situation. Sustains attention. Recent recall is intact/remote memory intact, Language is full to spontaneous speech/comprehension. Fund of Knowledge is full.  Mood is euthymic  CN: Optic discs are flat with normal vasculature, PERRL, Extraoccular movements and visual fields are full. Normal facial sensation and strength, Hearing symmetric, Tongue and Palate are midline and strong. Shoulder Shrug symmetric and strong.  Motor: Normal bulk, tone, no abnormal movements. 5/5 strength bilateral upper/lower extremities with 2+ reflexes and clonus  Sensory: " symmetric to temp, and vibration.  Romberg negative  Cerebellar: Finger-nose,Heal-shin, Rapid alternating movements intact  Gait: Normal stance, no ataxia    Labs:3/2017 TSH normal    Imagin2016 MRI brain: Age-appropriate generalized volume loss with mild/moderate degree of T2/FLAIR signal abnormality within the supratentorial white matter  while nonspecific suggest chronic microvascular ischemic change.    No evidence for acute infarction or enhancing lesion.    Assessment/Plan: Domonique Hernandez Jr. is a 79 y.o. male complaining of minor short term memory loss. Suspect mild cognitive impairment.   I recommend:     I recommend:   1. Memory improved. Mood is stable. MCI suspected at last visit.   - He is staying physically active and will continue improved diet. Routine memory exercises suggested as well  2. He continues on Effexor for his mood per PCP- grief, now improved, likely contributed to memory challenges prior   3. No restrictions/ patient is functioning well.   4. Consider his hypothyroidism, hypogonadism, KARL, and CKD diagnosis, for  any worsening of his memory.      RTC 1 year

## 2019-10-16 DIAGNOSIS — I10 ESSENTIAL HYPERTENSION: ICD-10-CM

## 2019-10-16 RX ORDER — VALSARTAN 80 MG/1
80 TABLET ORAL DAILY
Qty: 90 TABLET | Refills: 1 | Status: SHIPPED | OUTPATIENT
Start: 2019-10-16 | End: 2020-05-26 | Stop reason: SDUPTHER

## 2019-10-25 DIAGNOSIS — M54.50 CHRONIC MIDLINE LOW BACK PAIN WITHOUT SCIATICA: ICD-10-CM

## 2019-10-25 DIAGNOSIS — M17.12 PRIMARY OSTEOARTHRITIS OF LEFT KNEE: ICD-10-CM

## 2019-10-25 DIAGNOSIS — G89.29 CHRONIC MIDLINE LOW BACK PAIN WITHOUT SCIATICA: ICD-10-CM

## 2019-10-25 NOTE — TELEPHONE ENCOUNTER
----- Message from Alejandra Morales sent at 10/25/2019  4:43 PM CDT -----  Contact: Self  Domonique Hernandez Jr.  MRN: [id number]  : 1940  PCP: Emiliano Cam  Home Phone      [phone]  Work Phone      Not on file.  Mobile          [phone]      MESSAGE:   Pt requesting refill or new Rx.   Is this a refill or new RX:  refill  RX name and strength:   ibuprofen   Tramadol 50 mg  Last office visit:   Is this a 30-day or 90-day RX:  30  Pharmacy name and location:  [facility]  Comments:      Phone:  [phone]

## 2019-10-28 RX ORDER — IBUPROFEN 800 MG/1
800 TABLET ORAL 3 TIMES DAILY
Qty: 15 TABLET | Refills: 5 | Status: SHIPPED | OUTPATIENT
Start: 2019-10-28 | End: 2020-02-11 | Stop reason: SDUPTHER

## 2019-10-28 RX ORDER — TRAMADOL HYDROCHLORIDE 50 MG/1
50 TABLET ORAL EVERY 12 HOURS PRN
Qty: 30 TABLET | Refills: 0 | Status: SHIPPED | OUTPATIENT
Start: 2019-10-28 | End: 2020-01-31

## 2019-11-04 ENCOUNTER — OFFICE VISIT (OUTPATIENT)
Dept: RHEUMATOLOGY | Facility: CLINIC | Age: 79
End: 2019-11-04
Payer: MEDICARE

## 2019-11-04 VITALS
HEART RATE: 83 BPM | SYSTOLIC BLOOD PRESSURE: 112 MMHG | DIASTOLIC BLOOD PRESSURE: 70 MMHG | WEIGHT: 253.06 LBS | HEIGHT: 68 IN | BODY MASS INDEX: 38.35 KG/M2

## 2019-11-04 PROCEDURE — 99999 PR PBB SHADOW E&M-EST. PATIENT-LVL IV: ICD-10-PCS | Mod: PBBFAC,,, | Performed by: INTERNAL MEDICINE

## 2019-11-04 PROCEDURE — 99203 PR OFFICE/OUTPT VISIT, NEW, LEVL III, 30-44 MIN: ICD-10-PCS | Mod: S$GLB,,, | Performed by: INTERNAL MEDICINE

## 2019-11-04 PROCEDURE — 1100F PR PT FALLS ASSESS DOC 2+ FALLS/FALL W/INJURY/YR: ICD-10-PCS | Mod: CPTII,S$GLB,, | Performed by: INTERNAL MEDICINE

## 2019-11-04 PROCEDURE — 3074F PR MOST RECENT SYSTOLIC BLOOD PRESSURE < 130 MM HG: ICD-10-PCS | Mod: CPTII,S$GLB,, | Performed by: INTERNAL MEDICINE

## 2019-11-04 PROCEDURE — 3074F SYST BP LT 130 MM HG: CPT | Mod: CPTII,S$GLB,, | Performed by: INTERNAL MEDICINE

## 2019-11-04 PROCEDURE — 3288F FALL RISK ASSESSMENT DOCD: CPT | Mod: CPTII,S$GLB,, | Performed by: INTERNAL MEDICINE

## 2019-11-04 PROCEDURE — 3078F PR MOST RECENT DIASTOLIC BLOOD PRESSURE < 80 MM HG: ICD-10-PCS | Mod: CPTII,S$GLB,, | Performed by: INTERNAL MEDICINE

## 2019-11-04 PROCEDURE — 3078F DIAST BP <80 MM HG: CPT | Mod: CPTII,S$GLB,, | Performed by: INTERNAL MEDICINE

## 2019-11-04 PROCEDURE — 3288F PR FALLS RISK ASSESSMENT DOCUMENTED: ICD-10-PCS | Mod: CPTII,S$GLB,, | Performed by: INTERNAL MEDICINE

## 2019-11-04 PROCEDURE — 1100F PTFALLS ASSESS-DOCD GE2>/YR: CPT | Mod: CPTII,S$GLB,, | Performed by: INTERNAL MEDICINE

## 2019-11-04 PROCEDURE — 99203 OFFICE O/P NEW LOW 30 MIN: CPT | Mod: S$GLB,,, | Performed by: INTERNAL MEDICINE

## 2019-11-04 PROCEDURE — 99999 PR PBB SHADOW E&M-EST. PATIENT-LVL IV: CPT | Mod: PBBFAC,,, | Performed by: INTERNAL MEDICINE

## 2019-11-04 ASSESSMENT — ROUTINE ASSESSMENT OF PATIENT INDEX DATA (RAPID3)
TOTAL RAPID3 SCORE: 2.56
PAIN SCORE: 0
MDHAQ FUNCTION SCORE: .5
PATIENT GLOBAL ASSESSMENT SCORE: 6
FATIGUE SCORE: 3
PSYCHOLOGICAL DISTRESS SCORE: 3.3
AM STIFFNESS SCORE: 0, NO

## 2019-11-04 NOTE — PROGRESS NOTES
"Subjective:       Patient ID: Domonique Hernandez Jr. is a 79 y.o. male.    Chief Complaint: Disease Management    HPI 79 year old male with PMH of COPD,PVD, KARL on CPAP,stage 3 CKD, hypothyroidism,gout, HTN here for evaluation. He has pain in left knee. Pain level is 6/10 aching and non radiating.  Reports he has been having pain for at least 6 months.  Denies any joint swelling. He takes aleve 2 a day. He reports he has some improvement with it. On occasion, he will take ibuprofen  800mg as needed.  He reports dancing will make the pain worse.  Prolonged walking makes pain worse. Resting improves the pain. He is on allopurinol but denies ever having gout and not sure why he takes allopurinol. He is seeing outside orthopedics who gives him ? synvisc injections . Patient thinks its synvisc.  Denies any joint stiffness. Denies oral ulcers, rashes, fevers, raynauds, pleurisy,photosensitivity or sob.    Past Medical History:   Diagnosis Date    Anxiety     Arthritis     Back pain     Chronic bronchitis     Chronic gout     COPD (chronic obstructive pulmonary disease)     Depression     Emphysema of lung     Generalized headaches     GERD (gastroesophageal reflux disease)     Hyperlipidemia     Hypertension     Hypothyroidism     Obesity     Peripheral vascular disease     Sleep apnea     On CPAP    Stage 3 chronic kidney disease 4/18/2018    Thyroid disease        Review of Systems      Objective:   /70   Pulse 83   Ht 5' 8" (1.727 m)   Wt 114.8 kg (253 lb 1.4 oz)   BMI 38.48 kg/m²      Physical Exam   Constitutional: He is oriented to person, place, and time and well-developed, well-nourished, and in no distress. No distress.   HENT:   Head: Normocephalic and atraumatic.   Right Ear: External ear normal.   Left Ear: External ear normal.   Mouth/Throat: No oropharyngeal exudate.   Eyes: Conjunctivae and EOM are normal. Pupils are equal, round, and reactive to light.   Neck: Normal range of motion. " Neck supple. No JVD present. No tracheal deviation present. No thyromegaly present.   Cardiovascular: Normal rate, regular rhythm and normal heart sounds.  Exam reveals no gallop and no friction rub.    No murmur heard.  Pulmonary/Chest: Effort normal and breath sounds normal. No stridor. No respiratory distress. He has no wheezes. He has no rales. He exhibits no tenderness.   Abdominal: Soft. Bowel sounds are normal. He exhibits no distension and no mass. There is no tenderness. There is no rebound and no guarding.   Lymphadenopathy:     He has no cervical adenopathy.   Neurological: He is alert and oriented to person, place, and time.   Skin: Skin is warm and dry. No rash noted. He is not diaphoretic. No erythema. No pallor.     Musculoskeletal: He exhibits no edema, tenderness or deformity.        Labs: reviewed  Left knee xray (2019): I personally reviewed; Mild tricompartment DJD change.  No data to display     Assessment:     79 year old male with PMH of COPD,PVD, KARL on CPAP,stage 3 CKD, hypothyroidism,gout, HTN here for evaluation of left knee pain. He reports known DJD of left knee and denies joint pain elsewhere.  Told him I do not think he has RA and to follow up with orthopedics for consideration of MRI.    No diagnosis found.        Plan:     encouraged weight loss*

## 2019-11-05 ENCOUNTER — OFFICE VISIT (OUTPATIENT)
Dept: FAMILY MEDICINE | Facility: CLINIC | Age: 79
End: 2019-11-05
Payer: MEDICARE

## 2019-11-05 VITALS
RESPIRATION RATE: 16 BRPM | HEIGHT: 68 IN | DIASTOLIC BLOOD PRESSURE: 74 MMHG | WEIGHT: 247.81 LBS | HEART RATE: 68 BPM | BODY MASS INDEX: 37.56 KG/M2 | SYSTOLIC BLOOD PRESSURE: 128 MMHG

## 2019-11-05 DIAGNOSIS — G47.33 OSA (OBSTRUCTIVE SLEEP APNEA): ICD-10-CM

## 2019-11-05 DIAGNOSIS — K06.8 PAIN IN GUMS: Primary | ICD-10-CM

## 2019-11-05 PROCEDURE — 1101F PT FALLS ASSESS-DOCD LE1/YR: CPT | Mod: CPTII,S$GLB,, | Performed by: FAMILY MEDICINE

## 2019-11-05 PROCEDURE — 99213 PR OFFICE/OUTPT VISIT, EST, LEVL III, 20-29 MIN: ICD-10-PCS | Mod: S$GLB,,, | Performed by: FAMILY MEDICINE

## 2019-11-05 PROCEDURE — 3078F PR MOST RECENT DIASTOLIC BLOOD PRESSURE < 80 MM HG: ICD-10-PCS | Mod: CPTII,S$GLB,, | Performed by: FAMILY MEDICINE

## 2019-11-05 PROCEDURE — 3074F PR MOST RECENT SYSTOLIC BLOOD PRESSURE < 130 MM HG: ICD-10-PCS | Mod: CPTII,S$GLB,, | Performed by: FAMILY MEDICINE

## 2019-11-05 PROCEDURE — 3078F DIAST BP <80 MM HG: CPT | Mod: CPTII,S$GLB,, | Performed by: FAMILY MEDICINE

## 2019-11-05 PROCEDURE — 3074F SYST BP LT 130 MM HG: CPT | Mod: CPTII,S$GLB,, | Performed by: FAMILY MEDICINE

## 2019-11-05 PROCEDURE — 99999 PR PBB SHADOW E&M-EST. PATIENT-LVL IV: ICD-10-PCS | Mod: PBBFAC,,, | Performed by: FAMILY MEDICINE

## 2019-11-05 PROCEDURE — 99999 PR PBB SHADOW E&M-EST. PATIENT-LVL IV: CPT | Mod: PBBFAC,,, | Performed by: FAMILY MEDICINE

## 2019-11-05 PROCEDURE — 99213 OFFICE O/P EST LOW 20 MIN: CPT | Mod: S$GLB,,, | Performed by: FAMILY MEDICINE

## 2019-11-05 PROCEDURE — 1101F PR PT FALLS ASSESS DOC 0-1 FALLS W/OUT INJ PAST YR: ICD-10-PCS | Mod: CPTII,S$GLB,, | Performed by: FAMILY MEDICINE

## 2019-11-05 RX ORDER — CHLORHEXIDINE GLUCONATE ORAL RINSE 1.2 MG/ML
15 SOLUTION DENTAL 2 TIMES DAILY
Qty: 473 ML | Refills: 0 | Status: SHIPPED | OUTPATIENT
Start: 2019-11-05 | End: 2019-11-21

## 2019-11-05 NOTE — PROGRESS NOTES
Subjective:       Patient ID: Domonique Hernandez Jr. is a 79 y.o. male.    Chief Complaint: Jaw Pain    HPI  79 year old male comes in with c/o pain in his gums. He says that he feels like this is because of the addition he bought for his cpap. He uses a light for it to be cleaned. He says that he is worried that this may be causing irritation.     PMH, PSH, ALLERGIES, SH, FH reviewed in nurse's notes above  Medications reconciled in the nurse's notes    Review of Systems   Constitutional: Negative for fever.   HENT: Negative for mouth sores and trouble swallowing.    Respiratory: Negative for cough and shortness of breath.    Cardiovascular: Negative for chest pain.       Objective:      Physical Exam   Constitutional: He is oriented to person, place, and time. He appears well-developed. No distress.   HENT:   Head: Normocephalic and atraumatic.   No blisters or ulcers noted.   Eyes: Pupils are equal, round, and reactive to light. Conjunctivae are normal.   Neck: Normal range of motion. Neck supple. No thyromegaly present.   Cardiovascular: Normal rate, regular rhythm, normal heart sounds and intact distal pulses.   Pulmonary/Chest: Effort normal and breath sounds normal. No respiratory distress. He has no wheezes.   Abdominal: Soft. Bowel sounds are normal. There is no tenderness.   Musculoskeletal: Normal range of motion. He exhibits no edema.   Lymphadenopathy:     He has no cervical adenopathy.   Neurological: He is alert and oriented to person, place, and time.   Skin: Skin is warm and dry. No rash noted.   Psychiatric: He has a normal mood and affect. His behavior is normal.   Nursing note and vitals reviewed.       Assessment/Plan:       Problem List Items Addressed This Visit     None      Visit Diagnoses     Pain in gums    -  Primary    Relevant Medications    chlorhexidine (PERIDEX) 0.12 % solution      will send to sleep store to adjust cpap mask.    RTC if condition acutely worsens or any other concerns,  otherwise RTC as scheduled

## 2019-12-02 ENCOUNTER — OFFICE VISIT (OUTPATIENT)
Dept: FAMILY MEDICINE | Facility: CLINIC | Age: 79
End: 2019-12-02
Payer: MEDICARE

## 2019-12-02 VITALS
DIASTOLIC BLOOD PRESSURE: 66 MMHG | HEIGHT: 68 IN | WEIGHT: 248.38 LBS | HEART RATE: 68 BPM | SYSTOLIC BLOOD PRESSURE: 122 MMHG | RESPIRATION RATE: 16 BRPM | BODY MASS INDEX: 37.64 KG/M2

## 2019-12-02 DIAGNOSIS — R51.9 FREQUENT HEADACHES: ICD-10-CM

## 2019-12-02 DIAGNOSIS — K59.00 CONSTIPATION, UNSPECIFIED CONSTIPATION TYPE: Primary | ICD-10-CM

## 2019-12-02 PROCEDURE — 1159F PR MEDICATION LIST DOCUMENTED IN MEDICAL RECORD: ICD-10-PCS | Mod: S$GLB,,, | Performed by: NURSE PRACTITIONER

## 2019-12-02 PROCEDURE — 3074F PR MOST RECENT SYSTOLIC BLOOD PRESSURE < 130 MM HG: ICD-10-PCS | Mod: CPTII,S$GLB,, | Performed by: NURSE PRACTITIONER

## 2019-12-02 PROCEDURE — 99213 PR OFFICE/OUTPT VISIT, EST, LEVL III, 20-29 MIN: ICD-10-PCS | Mod: S$GLB,,, | Performed by: NURSE PRACTITIONER

## 2019-12-02 PROCEDURE — 99999 PR PBB SHADOW E&M-EST. PATIENT-LVL V: CPT | Mod: PBBFAC,,, | Performed by: NURSE PRACTITIONER

## 2019-12-02 PROCEDURE — 1159F MED LIST DOCD IN RCRD: CPT | Mod: S$GLB,,, | Performed by: NURSE PRACTITIONER

## 2019-12-02 PROCEDURE — 1126F AMNT PAIN NOTED NONE PRSNT: CPT | Mod: S$GLB,,, | Performed by: NURSE PRACTITIONER

## 2019-12-02 PROCEDURE — 1126F PR PAIN SEVERITY QUANTIFIED, NO PAIN PRESENT: ICD-10-PCS | Mod: S$GLB,,, | Performed by: NURSE PRACTITIONER

## 2019-12-02 PROCEDURE — 3078F DIAST BP <80 MM HG: CPT | Mod: CPTII,S$GLB,, | Performed by: NURSE PRACTITIONER

## 2019-12-02 PROCEDURE — 99999 PR PBB SHADOW E&M-EST. PATIENT-LVL V: ICD-10-PCS | Mod: PBBFAC,,, | Performed by: NURSE PRACTITIONER

## 2019-12-02 PROCEDURE — 1101F PT FALLS ASSESS-DOCD LE1/YR: CPT | Mod: CPTII,S$GLB,, | Performed by: NURSE PRACTITIONER

## 2019-12-02 PROCEDURE — 3078F PR MOST RECENT DIASTOLIC BLOOD PRESSURE < 80 MM HG: ICD-10-PCS | Mod: CPTII,S$GLB,, | Performed by: NURSE PRACTITIONER

## 2019-12-02 PROCEDURE — 1101F PR PT FALLS ASSESS DOC 0-1 FALLS W/OUT INJ PAST YR: ICD-10-PCS | Mod: CPTII,S$GLB,, | Performed by: NURSE PRACTITIONER

## 2019-12-02 PROCEDURE — 99213 OFFICE O/P EST LOW 20 MIN: CPT | Mod: S$GLB,,, | Performed by: NURSE PRACTITIONER

## 2019-12-02 PROCEDURE — 3074F SYST BP LT 130 MM HG: CPT | Mod: CPTII,S$GLB,, | Performed by: NURSE PRACTITIONER

## 2019-12-02 RX ORDER — IBUPROFEN 800 MG/1
800 TABLET ORAL EVERY 6 HOURS PRN
Qty: 90 TABLET | Refills: 5 | Status: SHIPPED | OUTPATIENT
Start: 2019-12-02 | End: 2020-12-11

## 2019-12-02 NOTE — PROGRESS NOTES
Subjective:       Patient ID: Domonique Hernandez Jr. is a 79 y.o. male.    Chief Complaint: Constipation (has gotten better)    Had some constipation when he made the appointment. Has taken Miralax and now going regular - had BM this morning.    Review of Systems   Constitutional: Negative.  Negative for appetite change, fatigue and fever.   HENT: Negative.  Negative for congestion, ear pain and sore throat.    Eyes: Negative.  Negative for visual disturbance.   Respiratory: Negative.  Negative for cough, shortness of breath and wheezing.    Cardiovascular: Negative.  Negative for chest pain and palpitations.   Gastrointestinal: Positive for constipation (off and on ). Negative for abdominal pain, diarrhea, nausea and vomiting.   Genitourinary: Negative.  Negative for difficulty urinating.   Musculoskeletal: Negative.    Skin: Negative.  Negative for rash.   Neurological: Negative.  Negative for headaches.   Psychiatric/Behavioral: Negative.  Negative for sleep disturbance (wakes every 2 hours to urinate). The patient is not nervous/anxious.    All other systems reviewed and are negative.      Objective:      Physical Exam   Constitutional: He is oriented to person, place, and time. He appears well-developed and well-nourished. No distress.   HENT:   Head: Normocephalic and atraumatic.   Eyes: Pupils are equal, round, and reactive to light.   Neck: Normal range of motion. Neck supple.   Cardiovascular: Normal rate, regular rhythm and normal heart sounds.   No murmur heard.  Pulmonary/Chest: Effort normal and breath sounds normal. No respiratory distress.   Abdominal: Soft. Bowel sounds are normal. There is no tenderness.   Musculoskeletal: Normal range of motion.   Neurological: He is alert and oriented to person, place, and time.   Skin: Skin is warm and dry.   Psychiatric: He has a normal mood and affect.   Nursing note and vitals reviewed.      Assessment:       1. Constipation, unspecified constipation type    2.  Frequent headaches        Plan:   Domonique was seen today for constipation.    Diagnoses and all orders for this visit:    Constipation, unspecified constipation type - resolved at this time - uses Miralax.    Frequent headaches  -     ibuprofen (ADVIL,MOTRIN) 800 MG tablet; Take 1 tablet (800 mg total) by mouth every 6 (six) hours as needed for Pain (headaches).    RTC PRN

## 2020-01-17 ENCOUNTER — HOSPITAL ENCOUNTER (EMERGENCY)
Facility: HOSPITAL | Age: 80
Discharge: HOME | End: 2020-01-17
Attending: SURGERY
Payer: MEDICARE

## 2020-01-17 VITALS
TEMPERATURE: 98 F | BODY MASS INDEX: 36.89 KG/M2 | OXYGEN SATURATION: 98 % | HEART RATE: 95 BPM | RESPIRATION RATE: 18 BRPM | DIASTOLIC BLOOD PRESSURE: 81 MMHG | WEIGHT: 242.63 LBS | SYSTOLIC BLOOD PRESSURE: 174 MMHG

## 2020-01-17 DIAGNOSIS — J20.9 ACUTE BRONCHITIS, UNSPECIFIED ORGANISM: Primary | ICD-10-CM

## 2020-01-17 LAB
INFLUENZA A, MOLECULAR: NEGATIVE
INFLUENZA B, MOLECULAR: NEGATIVE
SPECIMEN SOURCE: NORMAL

## 2020-01-17 PROCEDURE — 99284 EMERGENCY DEPT VISIT MOD MDM: CPT | Mod: 25

## 2020-01-17 PROCEDURE — 87502 INFLUENZA DNA AMP PROBE: CPT

## 2020-01-17 PROCEDURE — 96372 THER/PROPH/DIAG INJ SC/IM: CPT

## 2020-01-17 PROCEDURE — 63600175 PHARM REV CODE 636 W HCPCS: Performed by: NURSE PRACTITIONER

## 2020-01-17 RX ORDER — PREDNISONE 20 MG/1
20 TABLET ORAL 2 TIMES DAILY
Qty: 10 TABLET | Refills: 0 | Status: SHIPPED | OUTPATIENT
Start: 2020-01-17 | End: 2020-01-22

## 2020-01-17 RX ORDER — AZITHROMYCIN 250 MG/1
250 TABLET, FILM COATED ORAL DAILY
Qty: 6 TABLET | Refills: 0 | Status: ON HOLD | OUTPATIENT
Start: 2020-01-17 | End: 2020-02-18 | Stop reason: HOSPADM

## 2020-01-17 RX ORDER — METHYLPREDNISOLONE SOD SUCC 125 MG
125 VIAL (EA) INJECTION
Status: COMPLETED | OUTPATIENT
Start: 2020-01-17 | End: 2020-01-17

## 2020-01-17 RX ORDER — BENZONATATE 100 MG/1
100 CAPSULE ORAL 3 TIMES DAILY PRN
Qty: 20 CAPSULE | Refills: 0 | Status: SHIPPED | OUTPATIENT
Start: 2020-01-17 | End: 2020-01-27

## 2020-01-17 RX ADMIN — METHYLPREDNISOLONE SODIUM SUCCINATE 125 MG: 125 INJECTION, POWDER, FOR SOLUTION INTRAMUSCULAR; INTRAVENOUS at 11:01

## 2020-01-17 NOTE — ED PROVIDER NOTES
Encounter Date: 1/17/2020       History     Chief Complaint   Patient presents with    Cough    Nasal Congestion     Domonique Hernandez Jr. is a 79 y.o. Male with PMH of anxiety, back pain, chronic bronchitis, gout, emphysema, GERD, hypertension, hyperlipidemia, hypothyroidism, CKD stage 3 who presents to the ED with reports of cough and congestion for 1 week.   Clear nasal congestion; denies sinus pressure or pain. Denies headaches or otalgia.   Non-productive, loose cough. Cough occurs throughout the day and night.   He denies associated sob or chest pain. He denies dizziness, light-headedness or feelings of syncope.   He does not endorse fever, chills, or body aches.     The history is provided by the patient.     Review of patient's allergies indicates:   Allergen Reactions    Percocet [oxycodone-acetaminophen] Other (See Comments)     hyperactivity    Percodan [oxycodone hcl-oxycodone-asa] Other (See Comments)     hyperactivity     Past Medical History:   Diagnosis Date    Anxiety     Arthritis     Back pain     Chronic bronchitis     Chronic gout     COPD (chronic obstructive pulmonary disease)     Depression     Emphysema of lung     Generalized headaches     GERD (gastroesophageal reflux disease)     Hyperlipidemia     Hypertension     Hypothyroidism     Obesity     Peripheral vascular disease     Sleep apnea     On CPAP    Stage 3 chronic kidney disease 4/18/2018    Thyroid disease      Past Surgical History:   Procedure Laterality Date    Bilateral mastoidectomies Bilateral 1984    for ear infection    COLONOSCOPY  2010    COLONOSCOPY N/A 1/31/2019    Procedure: COLONOSCOPY;  Surgeon: Joaquín Crawford MD;  Location: [facility];  Service: Colon and Rectal;  Laterality: N/A;    COLONOSCOPY N/A 8/13/2019    Procedure: COLONOSCOPY;  Surgeon: Joaquín Crawford MD;  Location: [facility] ([address] [place] [address]);  Service: Colon and Rectal;  Laterality: N/A;  Patient had inadequate prep with his last colonoscopy,  was unable to finish the large volume GoLYTELY prep.  Please give him a smaller volume split dose prep, such as Movi-Prep or SuPrep.  He should do a light diet of easily digested food 2 days prior to the procedure     Eardrum repair  1984    Not sure which side    HERNIA REPAIR  1984    Umbilical-screen    KNEE ARTHROSCOPY Left 1989    ROTATOR CUFF REPAIR Left 2002    Left     Family History   Problem Relation Age of Onset    Cancer Mother 65        rectal    Cancer Father     No Known Problems Sister     Kidney disease Brother     Stroke Son     Stroke Son     Atrial fibrillation Son     Melanoma Neg Hx      Social History     Tobacco Use    Smoking status: Never Smoker    Smokeless tobacco: Never Used   Substance Use Topics    Alcohol use: No    Drug use: No     Review of Systems   Constitutional: Negative.  Negative for appetite change, chills, fatigue and fever.   HENT: Positive for congestion, postnasal drip and rhinorrhea. Negative for ear discharge, ear pain, facial swelling, sinus pressure, sinus pain and sore throat.    Eyes: Negative.    Respiratory: Positive for cough. Negative for shortness of breath and wheezing.    Cardiovascular: Negative.  Negative for chest pain.   Gastrointestinal: Negative.  Negative for abdominal pain, nausea and vomiting.   Endocrine: Negative.    Genitourinary: Negative.  Negative for dysuria, frequency and urgency.   Musculoskeletal: Negative.  Negative for back pain and myalgias.   Skin: Negative.  Negative for rash.   Allergic/Immunologic: Negative.    Neurological: Negative.  Negative for dizziness, syncope, weakness and light-headedness.   Hematological: Negative.  Does not bruise/bleed easily.   Psychiatric/Behavioral: Negative.        Physical Exam     Initial Vitals [01/17/20 1015]   BP Pulse Resp Temp SpO2   (!) 159/95 90 20 97.3 °F (36.3 °C) 98 %      MAP       --         Physical Exam    Nursing note and vitals reviewed.  Constitutional: He appears  well-developed and well-nourished.   HENT:   Head: Normocephalic and atraumatic.   Right Ear: Tympanic membrane, external ear and ear canal normal.   Left Ear: Tympanic membrane, external ear and ear canal normal.   Nose: Rhinorrhea present.   Mouth/Throat: Uvula is midline, oropharynx is clear and moist and mucous membranes are normal.   Eyes: Conjunctivae and EOM are normal. Pupils are equal, round, and reactive to light.   Neck: Neck supple.   Cardiovascular: Normal rate, regular rhythm, normal heart sounds and intact distal pulses.   Pulmonary/Chest: Effort normal and breath sounds normal. No tachypnea. He has no decreased breath sounds. He has no wheezes. He has no rhonchi. He has no rales.   BBS CTA; RR even and non-labored. Oxygen saturation 98% on RA.    Abdominal: Soft. Bowel sounds are normal.   Musculoskeletal: Normal range of motion.   Neurological: He is alert and oriented to person, place, and time. He has normal strength.   Skin: Skin is warm and dry.   Psychiatric: He has a normal mood and affect. His behavior is normal. Judgment and thought content normal.         ED Course   Procedures  Labs Reviewed   INFLUENZA A & B BY MOLECULAR          Imaging Results          X-Ray Chest PA And Lateral (Final result)  Result time 01/17/20 10:39:45    Final result by Sanam Perez MD (01/17/20 10:39:45)                 Impression:      AS ABOVE.      Electronically signed by: Sanam Perez MD  Date:    01/17/2020  Time:    10:39             Narrative:    EXAMINATION:  XR CHEST PA AND LATERAL    CLINICAL HISTORY:  cough;    TECHNIQUE:  PA and lateral views of the chest were performed.    COMPARISON:  11/23/2017    FINDINGS:  THE HEART IS AT THE UPPER LIMITS OF NORMAL IN SIZE.  CALCIFIED ATHEROMATOUS DISEASE AFFECTS THE AORTA.  THE LUNGS ARE WELL EXPANDED AND CLEAR.  NO CONSOLIDATION OR PLEURAL EFFUSIONS ARE SEEN.  POSTOPERATIVE CHANGES OF THE LEFT SHOULDER IDENTIFIED.  AGE-APPROPRIATE DEGENERATIVE CHANGES  AFFECT THE SKELETON.  Wedge compression deformity of upper thoracic vertebra better characterized on prior CT scan.                                     Medications   methylPREDNISolone sodium succinate injection 125 mg (125 mg Intramuscular Given 1/17/20 1100)                                  Clinical Impression:       ICD-10-CM ICD-9-CM   1. Acute bronchitis, unspecified organism J20.9 466.0         Disposition:   Disposition: Discharged  Condition: Stable    New Prescriptions    AZITHROMYCIN (Z-KATIUSKA) 250 MG TABLET    Take first 2 tablets together on day 1, then 1 every day until finished.    BENZONATATE (TESSALON) 100 MG CAPSULE    Take 1 capsule (100 mg total) by mouth 3 (three) times daily as needed.    PREDNISONE (DELTASONE) 20 MG TABLET    Take 1 tablet (20 mg total) by mouth 2 (two) times daily for 5 days       The patient acknowledges that close follow up with medical provider is required. Instructed to follow up with PCP within 2 days. Patient was given specific return precautions. The patient agrees to comply with all instruction and directions given in the ER.              Edilia Swann NP  01/17/20 1101

## 2020-01-31 DIAGNOSIS — M54.50 CHRONIC MIDLINE LOW BACK PAIN WITHOUT SCIATICA: ICD-10-CM

## 2020-01-31 DIAGNOSIS — G89.29 CHRONIC MIDLINE LOW BACK PAIN WITHOUT SCIATICA: ICD-10-CM

## 2020-01-31 DIAGNOSIS — F32.A DEPRESSION, UNSPECIFIED DEPRESSION TYPE: ICD-10-CM

## 2020-02-02 RX ORDER — TRAMADOL HYDROCHLORIDE 50 MG/1
50 TABLET ORAL EVERY 12 HOURS PRN
Qty: 30 TABLET | Refills: 0 | Status: ON HOLD | OUTPATIENT
Start: 2020-02-02 | End: 2020-02-17

## 2020-02-02 RX ORDER — VENLAFAXINE HYDROCHLORIDE 150 MG/1
150 CAPSULE, EXTENDED RELEASE ORAL DAILY
Qty: 90 CAPSULE | Refills: 1 | Status: SHIPPED | OUTPATIENT
Start: 2020-02-02 | End: 2020-05-26 | Stop reason: SDUPTHER

## 2020-02-07 DIAGNOSIS — N40.1 BPH WITH URINARY OBSTRUCTION: ICD-10-CM

## 2020-02-07 DIAGNOSIS — N13.8 BPH WITH URINARY OBSTRUCTION: ICD-10-CM

## 2020-02-07 RX ORDER — TAMSULOSIN HYDROCHLORIDE 0.4 MG/1
CAPSULE ORAL
Qty: 180 CAPSULE | Refills: 1 | Status: SHIPPED | OUTPATIENT
Start: 2020-02-07 | End: 2020-02-11 | Stop reason: SDUPTHER

## 2020-02-07 NOTE — TELEPHONE ENCOUNTER
----- Message from Gladys Ceja sent at 2020  1:55 PM CST -----  Contact: self  Domonique Hernandez Jr.  MRN: [id number]  : 1940  PCP: Emiliano Cam  Home Phone      [phone]  Work Phone      Not on file.  Mobile          [phone]      MESSAGE:   Pt requesting refill or new Rx.   Is this a refill or new RX:  refill  RX name and strength: tamsulosin (FLOMAX) 0.4 mg Cap  Last office visit: 19  Is this a 30-day or 90-day RX:  90  Pharmacy name and location: [facility]  Comments:      Phone: [phone]

## 2020-02-11 DIAGNOSIS — N13.8 BPH WITH URINARY OBSTRUCTION: ICD-10-CM

## 2020-02-11 DIAGNOSIS — N40.1 BPH WITH URINARY OBSTRUCTION: ICD-10-CM

## 2020-02-11 DIAGNOSIS — M17.12 PRIMARY OSTEOARTHRITIS OF LEFT KNEE: ICD-10-CM

## 2020-02-11 RX ORDER — TAMSULOSIN HYDROCHLORIDE 0.4 MG/1
CAPSULE ORAL
Qty: 180 CAPSULE | Refills: 1 | Status: ON HOLD | OUTPATIENT
Start: 2020-02-11 | End: 2020-02-17

## 2020-02-11 RX ORDER — IBUPROFEN 800 MG/1
800 TABLET ORAL 3 TIMES DAILY
Qty: 15 TABLET | Refills: 5 | Status: ON HOLD | OUTPATIENT
Start: 2020-02-11 | End: 2020-02-18 | Stop reason: HOSPADM

## 2020-02-15 ENCOUNTER — HOSPITAL ENCOUNTER (EMERGENCY)
Facility: HOSPITAL | Age: 80
Discharge: HOME | End: 2020-02-15
Attending: EMERGENCY MEDICINE
Payer: MEDICARE

## 2020-02-15 VITALS
HEART RATE: 99 BPM | RESPIRATION RATE: 20 BRPM | SYSTOLIC BLOOD PRESSURE: 144 MMHG | BODY MASS INDEX: 37.44 KG/M2 | OXYGEN SATURATION: 96 % | DIASTOLIC BLOOD PRESSURE: 71 MMHG | WEIGHT: 246.25 LBS | TEMPERATURE: 97 F

## 2020-02-15 DIAGNOSIS — J40 BRONCHITIS: ICD-10-CM

## 2020-02-15 DIAGNOSIS — R05.9 COUGH: Primary | ICD-10-CM

## 2020-02-15 LAB
INFLUENZA A, MOLECULAR: NEGATIVE
INFLUENZA B, MOLECULAR: NEGATIVE
SPECIMEN SOURCE: NORMAL

## 2020-02-15 PROCEDURE — 87502 INFLUENZA DNA AMP PROBE: CPT

## 2020-02-15 PROCEDURE — 99283 EMERGENCY DEPT VISIT LOW MDM: CPT

## 2020-02-15 RX ORDER — BENZONATATE 100 MG/1
100 CAPSULE ORAL 3 TIMES DAILY PRN
Qty: 10 CAPSULE | Refills: 0 | Status: SHIPPED | OUTPATIENT
Start: 2020-02-15 | End: 2020-02-15 | Stop reason: SDUPTHER

## 2020-02-15 RX ORDER — BENZONATATE 100 MG/1
100 CAPSULE ORAL 3 TIMES DAILY PRN
Qty: 10 CAPSULE | Refills: 0 | Status: ON HOLD | OUTPATIENT
Start: 2020-02-15 | End: 2020-02-18 | Stop reason: HOSPADM

## 2020-02-15 NOTE — ED PROVIDER NOTES
Encounter Date: 2/15/2020       History     Chief Complaint   Patient presents with    General Illness     cough, and runny nose X 3 days     Patient has been sick with a cough for about one week.  No ear pain. rhinorrhea .  No dysuria or headache .  He thought he had just finished his antibiotics.        Review of patient's allergies indicates:   Allergen Reactions    Percocet [oxycodone-acetaminophen] Other (See Comments)     hyperactivity    Percodan [oxycodone hcl-oxycodone-asa] Other (See Comments)     hyperactivity     Past Medical History:   Diagnosis Date    Anxiety     Arthritis     Back pain     Chronic bronchitis     Chronic gout     COPD (chronic obstructive pulmonary disease)     Depression     Emphysema of lung     Generalized headaches     GERD (gastroesophageal reflux disease)     Hyperlipidemia     Hypertension     Hypothyroidism     Obesity     Peripheral vascular disease     Sleep apnea     On CPAP    Stage 3 chronic kidney disease 4/18/2018    Thyroid disease      Past Surgical History:   Procedure Laterality Date    Bilateral mastoidectomies Bilateral 1984    for ear infection    COLONOSCOPY  2010    COLONOSCOPY N/A 1/31/2019    Procedure: COLONOSCOPY;  Surgeon: Joaquín Crawford MD;  Location: [facility];  Service: Colon and Rectal;  Laterality: N/A;    COLONOSCOPY N/A 8/13/2019    Procedure: COLONOSCOPY;  Surgeon: Joaquín Crawford MD;  [facility]  Service: Colon and Rectal;  Laterality: N/A;  Patient had inadequate prep with his last colonoscopy, was unable to finish the large volume GoLYTELY prep.  Please give him a smaller volume split dose prep, such as Movi-Prep or SuPrep.  He should do a light diet of easily digested food 2 days prior to the procedure     Eardrum repair  1984    Not sure which side    HERNIA REPAIR  1984    Umbilical-screen    KNEE ARTHROSCOPY Left 1989    ROTATOR CUFF REPAIR Left 2002    Left     Family History   Problem  Relation Age of Onset    Cancer Mother 65        rectal    Cancer Father     No Known Problems Sister     Kidney disease Brother     Stroke Son     Stroke Son     Atrial fibrillation Son     Melanoma Neg Hx      Social History     Tobacco Use    Smoking status: Never Smoker    Smokeless tobacco: Never Used   Substance Use Topics    Alcohol use: No    Drug use: No     Review of Systems   Constitutional: Negative for chills and fever.   HENT: Positive for rhinorrhea. Negative for ear pain and sore throat.    Eyes: Negative.    Respiratory: Positive for cough.    Gastrointestinal: Negative for diarrhea, nausea and vomiting.   Musculoskeletal: Negative for back pain and neck pain.   Skin: Negative for rash.   Neurological: Negative for headaches.       Physical Exam     Initial Vitals [02/15/20 1055]   BP Pulse Resp Temp SpO2   (!) 141/74 (!) 118 20 97.3 °F (36.3 °C) 98 %      MAP       --         Physical Exam    Nursing note and vitals reviewed.  Constitutional: He appears well-developed and well-nourished.   HENT:   Right Ear: External ear normal.   Left Ear: External ear normal.   Mouth/Throat: Oropharynx is clear and moist. No oropharyngeal exudate.   rhinorrhea   Eyes: EOM are normal. Right eye exhibits no discharge. Left eye exhibits no discharge.   Neck: Normal range of motion.   Cardiovascular: Normal rate, regular rhythm and normal heart sounds.   No murmur heard.  Pulmonary/Chest: Breath sounds normal. No respiratory distress. He has no wheezes.   Abdominal: Soft. Bowel sounds are normal. He exhibits no distension. There is no tenderness.   Musculoskeletal: Normal range of motion. He exhibits no tenderness.   Neurological: He is alert and oriented to person, place, and time. GCS score is 15. GCS eye subscore is 4. GCS verbal subscore is 5. GCS motor subscore is 6.   Skin: Skin is warm and dry. No rash noted.         ED Course   Procedures  Labs Reviewed   INFLUENZA A & B BY MOLECULAR           Imaging Results    None          Medical Decision Making:   History:   Old Records Summarized: records from clinic visits.       <> Summary of Records: History of brochitis  Clinical Tests:   Lab Tests: Ordered and Reviewed       <> Summary of Lab: influenza screen negative                                 Clinical Impression:       ICD-10-CM ICD-9-CM   1. Cough R05 786.2   2. Bronchitis J40 490                             Renea Smith MD  02/15/20 1514       Renea Smith MD  02/15/20 1518

## 2020-02-15 NOTE — ED NOTES
Discharge instructions given to patient. Discussed medications prescribed and when next time due. Discussed follow up with family doctor. Instructed patient to return as needed.

## 2020-02-15 NOTE — ED TRIAGE NOTES
79 y.o. male presents to ER ED 01/ED 01A   Chief Complaint   Patient presents with    General Illness     cough, and runny nose X 3 days   . No acute distress noted.

## 2020-02-17 ENCOUNTER — HOSPITAL ENCOUNTER (OUTPATIENT)
Facility: HOSPITAL | Age: 80
Discharge: HOME | End: 2020-02-18
Attending: SURGERY | Admitting: FAMILY MEDICINE
Payer: MEDICARE

## 2020-02-17 DIAGNOSIS — R55 PRE-SYNCOPE: ICD-10-CM

## 2020-02-17 DIAGNOSIS — R55 POSTURAL DIZZINESS WITH PRESYNCOPE: ICD-10-CM

## 2020-02-17 DIAGNOSIS — R55 SYNCOPE, UNSPECIFIED SYNCOPE TYPE: Primary | ICD-10-CM

## 2020-02-17 DIAGNOSIS — I25.10 CARDIOVASCULAR DISEASE: ICD-10-CM

## 2020-02-17 DIAGNOSIS — R42 POSTURAL DIZZINESS WITH PRESYNCOPE: ICD-10-CM

## 2020-02-17 LAB
ALBUMIN SERPL BCP-MCNC: 3.3 G/DL (ref 3.5–5.2)
ALP SERPL-CCNC: 88 U/L (ref 55–135)
ALT SERPL W/O P-5'-P-CCNC: 29 U/L (ref 10–44)
AMORPH CRY URNS QL MICRO: ABNORMAL
ANION GAP SERPL CALC-SCNC: 10 MMOL/L (ref 8–16)
AST SERPL-CCNC: 28 U/L (ref 10–40)
BACTERIA #/AREA URNS HPF: ABNORMAL /HPF
BASOPHILS # BLD AUTO: 0.04 K/UL (ref 0–0.2)
BASOPHILS NFR BLD: 0.7 % (ref 0–1.9)
BILIRUB SERPL-MCNC: 0.3 MG/DL (ref 0.1–1)
BILIRUB UR QL STRIP: NEGATIVE
BNP SERPL-MCNC: 32 PG/ML (ref 0–99)
BUN SERPL-MCNC: 19 MG/DL (ref 8–23)
CALCIUM SERPL-MCNC: 9.3 MG/DL (ref 8.7–10.5)
CHLORIDE SERPL-SCNC: 106 MMOL/L (ref 95–110)
CK MB SERPL-MCNC: 1.9 NG/ML (ref 0.1–6.5)
CK MB SERPL-MCNC: 2.2 NG/ML (ref 0.1–6.5)
CK MB SERPL-RTO: 3.3 % (ref 0–5)
CK MB SERPL-RTO: 3.4 % (ref 0–5)
CK SERPL-CCNC: 58 U/L (ref 20–200)
CK SERPL-CCNC: 58 U/L (ref 20–200)
CK SERPL-CCNC: 64 U/L (ref 20–200)
CK SERPL-CCNC: 64 U/L (ref 20–200)
CLARITY UR: CLEAR
CO2 SERPL-SCNC: 24 MMOL/L (ref 23–29)
COLOR UR: YELLOW
CREAT SERPL-MCNC: 1.3 MG/DL (ref 0.5–1.4)
DIFFERENTIAL METHOD: ABNORMAL
EOSINOPHIL # BLD AUTO: 0.4 K/UL (ref 0–0.5)
EOSINOPHIL NFR BLD: 6.9 % (ref 0–8)
ERYTHROCYTE [DISTWIDTH] IN BLOOD BY AUTOMATED COUNT: 15 % (ref 11.5–14.5)
EST. GFR  (AFRICAN AMERICAN): >60 ML/MIN/1.73 M^2
EST. GFR  (NON AFRICAN AMERICAN): 53 ML/MIN/1.73 M^2
GLUCOSE SERPL-MCNC: 93 MG/DL (ref 70–110)
GLUCOSE UR QL STRIP: NEGATIVE
HCT VFR BLD AUTO: 37.3 % (ref 40–54)
HGB BLD-MCNC: 12.2 G/DL (ref 14–18)
HGB UR QL STRIP: NEGATIVE
HYALINE CASTS #/AREA URNS LPF: 1 /LPF
IMM GRANULOCYTES # BLD AUTO: 0.03 K/UL (ref 0–0.04)
IMM GRANULOCYTES NFR BLD AUTO: 0.6 % (ref 0–0.5)
KETONES UR QL STRIP: NEGATIVE
LEUKOCYTE ESTERASE UR QL STRIP: ABNORMAL
LYMPHOCYTES # BLD AUTO: 0.8 K/UL (ref 1–4.8)
LYMPHOCYTES NFR BLD: 15.3 % (ref 18–48)
MAGNESIUM SERPL-MCNC: 1.8 MG/DL (ref 1.6–2.6)
MCH RBC QN AUTO: 31 PG (ref 27–31)
MCHC RBC AUTO-ENTMCNC: 32.7 G/DL (ref 32–36)
MCV RBC AUTO: 95 FL (ref 82–98)
MICROSCOPIC COMMENT: ABNORMAL
MONOCYTES # BLD AUTO: 0.7 K/UL (ref 0.3–1)
MONOCYTES NFR BLD: 12.1 % (ref 4–15)
NEUTROPHILS # BLD AUTO: 3.5 K/UL (ref 1.8–7.7)
NEUTROPHILS NFR BLD: 64.4 % (ref 38–73)
NITRITE UR QL STRIP: NEGATIVE
NRBC BLD-RTO: 0 /100 WBC
PH UR STRIP: 7 [PH] (ref 5–8)
PHOSPHATE SERPL-MCNC: 3.7 MG/DL (ref 2.7–4.5)
PLATELET # BLD AUTO: 185 K/UL (ref 150–350)
PMV BLD AUTO: 10.2 FL (ref 9.2–12.9)
POTASSIUM SERPL-SCNC: 4.8 MMOL/L (ref 3.5–5.1)
PROT SERPL-MCNC: 6.1 G/DL (ref 6–8.4)
PROT UR QL STRIP: ABNORMAL
RBC # BLD AUTO: 3.93 M/UL (ref 4.6–6.2)
RBC #/AREA URNS HPF: 2 /HPF (ref 0–4)
SODIUM SERPL-SCNC: 140 MMOL/L (ref 136–145)
SP GR UR STRIP: 1.02 (ref 1–1.03)
SQUAMOUS #/AREA URNS HPF: 2 /HPF
T4 FREE SERPL-MCNC: 0.84 NG/DL (ref 0.71–1.51)
TROPONIN I SERPL DL<=0.01 NG/ML-MCNC: 0.02 NG/ML (ref 0–0.03)
TROPONIN I SERPL DL<=0.01 NG/ML-MCNC: 0.03 NG/ML (ref 0–0.03)
TROPONIN I SERPL DL<=0.01 NG/ML-MCNC: 0.03 NG/ML (ref 0–0.03)
TSH SERPL DL<=0.005 MIU/L-ACNC: 6.9 UIU/ML (ref 0.4–4)
URN SPEC COLLECT METH UR: ABNORMAL
UROBILINOGEN UR STRIP-ACNC: NEGATIVE EU/DL
WBC # BLD AUTO: 5.36 K/UL (ref 3.9–12.7)
WBC #/AREA URNS HPF: 25 /HPF (ref 0–5)

## 2020-02-17 PROCEDURE — 93010 EKG 12-LEAD: ICD-10-PCS | Mod: ,,, | Performed by: INTERNAL MEDICINE

## 2020-02-17 PROCEDURE — 84484 ASSAY OF TROPONIN QUANT: CPT | Mod: 91

## 2020-02-17 PROCEDURE — 84100 ASSAY OF PHOSPHORUS: CPT

## 2020-02-17 PROCEDURE — 81000 URINALYSIS NONAUTO W/SCOPE: CPT

## 2020-02-17 PROCEDURE — 99900035 HC TECH TIME PER 15 MIN (STAT)

## 2020-02-17 PROCEDURE — 99285 EMERGENCY DEPT VISIT HI MDM: CPT | Mod: 25

## 2020-02-17 PROCEDURE — 82550 ASSAY OF CK (CPK): CPT

## 2020-02-17 PROCEDURE — 84443 ASSAY THYROID STIM HORMONE: CPT

## 2020-02-17 PROCEDURE — 63600175 PHARM REV CODE 636 W HCPCS: Performed by: SURGERY

## 2020-02-17 PROCEDURE — 63600175 PHARM REV CODE 636 W HCPCS: Performed by: FAMILY MEDICINE

## 2020-02-17 PROCEDURE — 93005 ELECTROCARDIOGRAM TRACING: CPT

## 2020-02-17 PROCEDURE — 83880 ASSAY OF NATRIURETIC PEPTIDE: CPT

## 2020-02-17 PROCEDURE — 87088 URINE BACTERIA CULTURE: CPT

## 2020-02-17 PROCEDURE — 25000003 PHARM REV CODE 250: Performed by: SURGERY

## 2020-02-17 PROCEDURE — 99220 PR INITIAL OBSERVATION CARE,LEVL III: CPT | Mod: ,,, | Performed by: FAMILY MEDICINE

## 2020-02-17 PROCEDURE — 94761 N-INVAS EAR/PLS OXIMETRY MLT: CPT

## 2020-02-17 PROCEDURE — 99220 PR INITIAL OBSERVATION CARE,LEVL III: ICD-10-PCS | Mod: ,,, | Performed by: FAMILY MEDICINE

## 2020-02-17 PROCEDURE — 83735 ASSAY OF MAGNESIUM: CPT

## 2020-02-17 PROCEDURE — 82553 CREATINE MB FRACTION: CPT

## 2020-02-17 PROCEDURE — 80053 COMPREHEN METABOLIC PANEL: CPT

## 2020-02-17 PROCEDURE — 96360 HYDRATION IV INFUSION INIT: CPT

## 2020-02-17 PROCEDURE — 94660 CPAP INITIATION&MGMT: CPT

## 2020-02-17 PROCEDURE — G0378 HOSPITAL OBSERVATION PER HR: HCPCS

## 2020-02-17 PROCEDURE — 36415 COLL VENOUS BLD VENIPUNCTURE: CPT

## 2020-02-17 PROCEDURE — 96361 HYDRATE IV INFUSION ADD-ON: CPT

## 2020-02-17 PROCEDURE — 27000190 HC CPAP FULL FACE MASK W/VALVE

## 2020-02-17 PROCEDURE — 93010 ELECTROCARDIOGRAM REPORT: CPT | Mod: ,,, | Performed by: INTERNAL MEDICINE

## 2020-02-17 PROCEDURE — 85025 COMPLETE CBC W/AUTO DIFF WBC: CPT

## 2020-02-17 PROCEDURE — 84439 ASSAY OF FREE THYROXINE: CPT

## 2020-02-17 PROCEDURE — 25000003 PHARM REV CODE 250: Performed by: FAMILY MEDICINE

## 2020-02-17 PROCEDURE — 87086 URINE CULTURE/COLONY COUNT: CPT

## 2020-02-17 RX ORDER — PANTOPRAZOLE SODIUM 40 MG/1
40 TABLET, DELAYED RELEASE ORAL DAILY
Status: DISCONTINUED | OUTPATIENT
Start: 2020-02-17 | End: 2020-02-18 | Stop reason: HOSPADM

## 2020-02-17 RX ORDER — SODIUM CHLORIDE 0.9 % (FLUSH) 0.9 %
10 SYRINGE (ML) INJECTION
Status: DISCONTINUED | OUTPATIENT
Start: 2020-02-17 | End: 2020-02-18 | Stop reason: HOSPADM

## 2020-02-17 RX ORDER — ONDANSETRON 2 MG/ML
4 INJECTION INTRAMUSCULAR; INTRAVENOUS EVERY 8 HOURS PRN
Status: DISCONTINUED | OUTPATIENT
Start: 2020-02-17 | End: 2020-02-18 | Stop reason: HOSPADM

## 2020-02-17 RX ORDER — SODIUM CHLORIDE 9 MG/ML
INJECTION, SOLUTION INTRAVENOUS CONTINUOUS
Status: DISCONTINUED | OUTPATIENT
Start: 2020-02-17 | End: 2020-02-17

## 2020-02-17 RX ORDER — ALLOPURINOL 100 MG/1
300 TABLET ORAL DAILY
Status: DISCONTINUED | OUTPATIENT
Start: 2020-02-18 | End: 2020-02-18 | Stop reason: HOSPADM

## 2020-02-17 RX ORDER — DEXTROSE MONOHYDRATE 50 MG/ML
INJECTION, SOLUTION INTRAVENOUS CONTINUOUS
Status: DISCONTINUED | OUTPATIENT
Start: 2020-02-17 | End: 2020-02-18 | Stop reason: HOSPADM

## 2020-02-17 RX ORDER — LEVOTHYROXINE SODIUM 100 UG/1
100 TABLET ORAL
Status: DISCONTINUED | OUTPATIENT
Start: 2020-02-18 | End: 2020-02-18 | Stop reason: HOSPADM

## 2020-02-17 RX ORDER — SODIUM CHLORIDE 9 MG/ML
1000 INJECTION, SOLUTION INTRAVENOUS
Status: COMPLETED | OUTPATIENT
Start: 2020-02-17 | End: 2020-02-17

## 2020-02-17 RX ORDER — AMLODIPINE BESYLATE 5 MG/1
5 TABLET ORAL DAILY
Status: DISCONTINUED | OUTPATIENT
Start: 2020-02-18 | End: 2020-02-18 | Stop reason: HOSPADM

## 2020-02-17 RX ORDER — CARVEDILOL 25 MG/1
25 TABLET ORAL 2 TIMES DAILY WITH MEALS
Status: DISCONTINUED | OUTPATIENT
Start: 2020-02-17 | End: 2020-02-18 | Stop reason: HOSPADM

## 2020-02-17 RX ORDER — BENZONATATE 100 MG/1
200 CAPSULE ORAL 3 TIMES DAILY
Status: DISCONTINUED | OUTPATIENT
Start: 2020-02-17 | End: 2020-02-18 | Stop reason: HOSPADM

## 2020-02-17 RX ADMIN — SODIUM CHLORIDE 1000 ML: 0.9 INJECTION, SOLUTION INTRAVENOUS at 11:02

## 2020-02-17 RX ADMIN — SODIUM CHLORIDE 1000 ML: 0.9 INJECTION, SOLUTION INTRAVENOUS at 12:02

## 2020-02-17 RX ADMIN — DEXTROSE: 5 SOLUTION INTRAVENOUS at 06:02

## 2020-02-17 RX ADMIN — BENZONATATE 200 MG: 100 CAPSULE ORAL at 09:02

## 2020-02-17 RX ADMIN — SODIUM CHLORIDE: 0.9 INJECTION, SOLUTION INTRAVENOUS at 02:02

## 2020-02-17 RX ADMIN — CARVEDILOL 25 MG: 25 TABLET, FILM COATED ORAL at 09:02

## 2020-02-17 RX ADMIN — PANTOPRAZOLE SODIUM 40 MG: 40 TABLET, DELAYED RELEASE ORAL at 02:02

## 2020-02-17 NOTE — PROGRESS NOTES
Nursing Notes  ER admit to room 306 via WC. Denies needs. Bedside handoff completed with ALEXX Anderson. Care assumed.       Huddle Comments

## 2020-02-17 NOTE — PLAN OF CARE
02/17/20 1354   ED Admissions Case Approval   ED Admissions Case Approval CM Approved             LOC:Acute Adult-Syncope by Radha Swann RN         Observation Met: Reviewed on 2/17/2020 by Radha Swann RN         Created Using Review Status Review Entered   InterTASCET® Completed 2/17/2020 13:54       Criteria Set Name - Subset   LOC:Acute Adult-Syncope       Criteria Review   REVIEW SUMMARY     Patient: DEEPTHI GARBER JR.  Review Number: [id number]  Review Status: Completed     Condition Specific: Yes     Condition Level Of Care Code: OBS  Condition Level Of Care Description: Observation        OUTCOMES  Outcome Type: Primary  Outcome: Approved  Date/Time: 02- 01:54 PM  Reviewer: Radha swann           REVIEW DETAILS     Product: LOC:Acute Adult  Subset: Syncope      (Symptom or finding within 24h)         (Excludes PO medications unless noted)          [X] Select Day, One:              [X] Initial review, One:                  [ ] (From arrival in ED through decision to admit)                      [X] OBSERVATION, One:                          [X] Syncope and high risk and, >= One:                              [X] Known obstructive coronary artery disease (CAD)

## 2020-02-17 NOTE — ED NOTES
Pt resting comfortably on ED stretcher. NADN. AAOx3. Respirations even and unlabored. Bed locked in lowest position. Call bell within reach. Friend at bedside. Fluids infusing without difficulty. Awaiting MD orders.

## 2020-02-17 NOTE — CONSULTS
"[facility]  Cardiology  Consult Note    Patient Name: Domonique Hernandez Jr.  MRN: [id number]  Admission Date: 2/17/2020  Hospital Length of Stay: 0 days  Code Status: Full Code   Attending Provider: Refugio Davenport MD   Consulting Provider: Radha Owen NP  Primary Care Physician: Emiliano Cam MD  Principal Problem:<principal problem not specified>    Patient information was obtained from patient, past medical records and ER records.     Consults  Subjective:     Chief Complaint:  Presyncope    HPI: 78 yo wm multiple RF for CAD but no real thorough evaluation, occasional postural dizziness (does take "prostate medicine") has been in his usual state of health when he stood up this morning and everything became dark and he was dizzy.  He states he did not lose consciousness.  Episode lasted a few minutes, self resolved but felt somewhat unwell in the ER.   His workup showed initially low BP but it quickly recovered and he is now hypertensive. Not orthostatic. Troponin not suggestive of ACS.  EKG is wnl.   He states he is able to live alone, care for self at a reasonable level of function without any anginal equivalents. He denies ever having palpitations.  He denies any prior syncopal events. He denies any edema or SOB.    Plan:  Cont gentle hydration  Echo   Carotid u/s  Continue to follow on telemetry  Resume home meds   Orthostatic precautions  Recommend outpt cardiovascular baseline ischemic evaluation    Past Medical History:   Diagnosis Date    Anxiety     Arthritis     Back pain     Chronic bronchitis     Chronic gout     COPD (chronic obstructive pulmonary disease)     Depression     Emphysema of lung     Generalized headaches     GERD (gastroesophageal reflux disease)     Hyperlipidemia     Hypertension     Hypothyroidism     Obesity     Peripheral vascular disease     Sleep apnea     On CPAP    Stage 3 chronic kidney disease 4/18/2018    Thyroid disease  "       Past Surgical History:   Procedure Laterality Date    Bilateral mastoidectomies Bilateral 1984    for ear infection    COLONOSCOPY  2010    COLONOSCOPY N/A 1/31/2019    Procedure: COLONOSCOPY;  Surgeon: Joaquín Crawford MD;  [facility];  Service: Colon and Rectal;  Laterality: N/A;    COLONOSCOPY N/A 8/13/2019    Procedure: COLONOSCOPY;  Surgeon: Joaquín Crawford MD;  Location: [facility] ENDO (4TH FLR);  Service: Colon and Rectal;  Laterality: N/A;  Patient had inadequate prep with his last colonoscopy, was unable to finish the large volume GoLYTELY prep.  Please give him a smaller volume split dose prep, such as Movi-Prep or SuPrep.  He should do a light diet of easily digested food 2 days prior to the procedure     Eardrum repair  1984    Not sure which side    HERNIA REPAIR  1984    Umbilical-screen    KNEE ARTHROSCOPY Left 1989    ROTATOR CUFF REPAIR Left 2002    Left       Review of patient's allergies indicates:   Allergen Reactions    Percocet [oxycodone-acetaminophen] Other (See Comments)     hyperactivity    Percodan [oxycodone hcl-oxycodone-asa] Other (See Comments)     hyperactivity       No current facility-administered medications on file prior to encounter.      Current Outpatient Medications on File Prior to Encounter   Medication Sig    allopurinol (ZYLOPRIM) 300 MG tablet TAKE 1 TABLET ONE TIME DAILY    amLODIPine (NORVASC) 5 MG tablet Take 1 tablet (5 mg total) by mouth once daily.    aspirin (ECOTRIN) 81 MG EC tablet Take 81 mg by mouth once daily.    benzonatate (TESSALON) 100 MG capsule Take 1 capsule (100 mg total) by mouth 3 (three) times daily as needed for Cough.    carvedilol (COREG) 25 MG tablet Take 1 tablet by mouth 2 times daily with meals.    cetirizine (ZYRTEC) 10 MG tablet Take 1 tablet (10 mg total) by mouth once daily.    dutasteride (AVODART) 0.5 mg capsule Take 1 capsule (0.5 mg total) by mouth once daily.    fluticasone (FLONASE) 50 mcg/actuation nasal  spray 2 sprays (100 mcg total) by Each Nare route 2 (two) times daily.    ibuprofen (ADVIL,MOTRIN) 800 MG tablet Take 1 tablet (800 mg total) by mouth every 6 (six) hours as needed for Pain (headaches).    ipratropium (ATROVENT) 42 mcg (0.06 %) nasal spray 2 sprays by Nasal route 4 (four) times daily.    levothyroxine (SYNTHROID) 100 MCG tablet TAKE 1 TABLET BY MOUTH ONCE DAILY    multivitamin (ONE DAILY MULTIVITAMIN) per tablet Take 1 tablet by mouth once daily. Centrum Silver for Men    simvastatin (ZOCOR) 40 MG tablet Take 1 tablet (40 mg total) by mouth every evening.    tamsulosin (FLOMAX) 0.4 mg Cap TAKE 1 TO 2 CAPSULES BY MOUTH ONCE DAILY    traMADol (ULTRAM) 50 mg tablet Take 1 tablet (50 mg total) by mouth every 12 (twelve) hours as needed for pain.    valsartan (DIOVAN) 80 MG tablet Take 1 tablet (80 mg total) by mouth once daily.    venlafaxine (EFFEXOR-XR) 150 MG Cp24 Take 1 capsule (150 mg total) by mouth once daily.    albuterol 90 mcg/actuation inhaler Inhale 2 puffs into the lungs every 6 (six) hours as needed for Wheezing. Rescue    albuterol-ipratropium (DUO-NEB) 2.5 mg-0.5 mg/3 mL nebulizer solution Inhale contents of 1 vial via nebulizer every 6 hours as needed for wheezing.    azithromycin (Z-KATIUSKA) 250 MG tablet Take first 2 tablets together on day 1, then 1 every day until finished.    ciprofloxacin HCl (CIPRO) 500 MG tablet Take 1 tablet (500 mg total) by mouth 2 (two) times daily.    hydrocortisone (ANUSOL-HC) 2.5 % rectal cream Place rectally 2 (two) times daily.    hydrocortisone (PROCTO-KATIUSKA) 1 % crpe Place 1 applicator rectally 2 (two) times daily as needed.    ibuprofen (ADVIL,MOTRIN) 800 MG tablet Take 1 tablet (800 mg total) by mouth 3 (three) times daily.    umeclidinium-vilanterol (ANORO ELLIPTA) 62.5-25 mcg/actuation DsDv Inhale 1 Dose into the lungs once daily.     Family History     Problem Relation (Age of Onset)    Atrial fibrillation Son    Cancer Mother (65),  Father    Kidney disease Brother    No Known Problems Sister    Stroke Son, Son        Tobacco Use    Smoking status: Never Smoker    Smokeless tobacco: Never Used   Substance and Sexual Activity    Alcohol use: No    Drug use: No    Sexual activity: Yes     Partners: Female     Review of Systems   Constitution: Negative.   HENT: Negative.    Eyes: Negative.    Cardiovascular: Positive for near-syncope. Negative for chest pain, dyspnea on exertion, irregular heartbeat and leg swelling.   Respiratory: Negative.    Endocrine: Negative.    Hematologic/Lymphatic: Negative.    Skin: Negative.    Musculoskeletal: Negative.    Gastrointestinal: Negative.    Genitourinary: Negative.    Neurological: Negative.    Psychiatric/Behavioral: Negative.      Objective:     Vital Signs (Most Recent):  Temp: 97.1 °F (36.2 °C) (02/17/20 1618)  Pulse: 73 (02/17/20 1618)  Resp: 20 (02/17/20 1402)  BP: (!) 188/81 (02/17/20 1618)  SpO2: 97 % (02/17/20 1618) Vital Signs (24h Range):  Temp:  [96.3 °F (35.7 °C)-97.3 °F (36.3 °C)] 97.1 °F (36.2 °C)  Pulse:  [60-74] 73  Resp:  [16-20] 20  SpO2:  [96 %-98 %] 97 %  BP: (103-188)/(59-82) 188/81     Weight: 114.2 kg (251 lb 12.3 oz)  Body mass index is 39.43 kg/m².    SpO2: 97 %  O2 Device (Oxygen Therapy): room air    No intake or output data in the 24 hours ending 02/17/20 1645    Lines/Drains/Airways     Peripheral Intravenous Line                 Peripheral IV - Single Lumen 02/17/20 1105 20 G Right Antecubital less than 1 day                Physical Exam   Constitutional: He appears well-developed and well-nourished.   obese   Eyes: Pupils are equal, round, and reactive to light.   Neck: Neck supple.   Cardiovascular: Normal rate and regular rhythm.   No murmur heard.  Pulmonary/Chest: Effort normal and breath sounds normal.   Abdominal: Soft. Bowel sounds are normal.   Musculoskeletal: Normal range of motion.   Neurological: He is alert.   Skin: Skin is warm and dry.   Psychiatric: He  has a normal mood and affect.   Nursing note and vitals reviewed.      Significant Labs:   BMP:   Recent Labs   Lab 02/17/20  0935   GLU 93      K 4.8      CO2 24   BUN 19   CREATININE 1.3   CALCIUM 9.3   MG 1.8   , CMP   Recent Labs   Lab 02/17/20  0935      K 4.8      CO2 24   GLU 93   BUN 19   CREATININE 1.3   CALCIUM 9.3   PROT 6.1   ALBUMIN 3.3*   BILITOT 0.3   ALKPHOS 88   AST 28   ALT 29   ANIONGAP 10   ESTGFRAFRICA >60   EGFRNONAA 53*   , CBC   Recent Labs   Lab 02/17/20  0934   WBC 5.36   HGB 12.2*   HCT 37.3*       and Troponin   Recent Labs   Lab 02/17/20  0935 02/17/20  1241   TROPONINI 0.029* 0.033*       Significant Imaging: EKG: NSR   Assessment and Plan:     Presyncope  Low BP--resolved, now HTN  Mild troponin elevation  Obesity  Dyslipidemia  Hypothyroidism  Anxiety  Gout  GERD    Plan  Hydrate well  TEDS compression stockings  F/u neuro  echo  Active Diagnoses:    Diagnosis Date Noted POA    Syncope [R55] 02/17/2020 Yes      Problems Resolved During this Admission:       VTE Risk Mitigation (From admission, onward)         Ordered     IP VTE HIGH RISK PATIENT  Once      02/17/20 1403     Place sequential compression device  Until discontinued      02/17/20 1404                Thank you for your consult. I will follow-up with patient. Please contact us if you have any additional questions.    Radha Owen NP  Cardiology   [facility]  Discussed with me

## 2020-02-17 NOTE — ED PROVIDER NOTES
[facility]                                                 Chief Complaint  79 y.o. male with Fall     History of Present Illness  Domonique Hernandez Jr. presents to the emergency room with syncopal episode today  Patient felt lightheaded and syncopal at home, no actual loss of consciousness  Patient felt weak and dizzy walking to his shed, normal neuro exam in the ER  Patient has not been feeling well last couple days, denies any chest pain today  Patient has no ST changes on EKG, mildly elevated troponin in the ER today    The history is provided by the patient   device was not used during this ER visit    Past Medical History   -- Anxiety     -- Arthritis     -- Chronic gout     -- COPD (chronic obstructive pulmonary disease)     -- Depression     -- Emphysema of lung     -- Generalized headaches     -- GERD (gastroesophageal reflux disease)     -- Hyperlipidemia     -- Hypertension     -- Hypothyroidism     -- Obesity     -- Sleep apnea     -- Thyroid disease        Past Surgical History   -- Bilateral mastoidectomies       -- COLONOSCOPY       -- Eardrum repair       -- HERNIA REPAIR       -- KNEE ARTHROSCOPY       -- ROTATOR CUFF REPAIR          ALLERGIES: Percocet and Percodan    I have reviewed all of this patient's past medical, surgical, family, and social   histories as well as active allergies and medications documented in the  electronic medical record    Review of Systems and Physical Exam      Review of Systems  -- Constitution - no fever, denies fatigue, no weakness, no chills  -- Eyes - no tearing or redness, no visual disturbance  -- Ear, Nose - no tinnitus or earache, no nasal congestion or discharge  -- Mouth,Throat - no sore throat, no toothache, normal voice, normal swallowing  -- Respiratory - denies cough and congestion, no shortness of breath, no COOL  -- Cardiovascular - denies chest pain, no palpitations, denies claudication  -- Gastrointestinal - denies  abdominal pain, nausea, vomiting, or diarrhea  -- Genitourinary - no dysuria, denies flank pain, no hematuria, no STD risk  -- Musculoskeletal - denies back pain, negative for trauma or injury  -- Neurological - no headache, denies weakness or seizure; no LOC  -- Skin - denies pallor, rash, or changes in skin. no hives or welts noted    Vital Signs  His temperature is 97.3 °F (36.3 °C).   His blood pressure is 103/59 and his pulse is 74.   His respiration is 18 and oxygen saturation is 96%.     Physical Exam  -- Nursing note and vitals reviewed  -- Constitutional: Appears well-developed and well-nourished  -- Head: Atraumatic. Normocephalic. No obvious abnormality  -- Eyes: Pupils are equal and reactive to light. Normal conjunctiva and lids  -- Nose: Nose normal in appearance, nares grossly normal. No discharge  -- Throat: Mucous membranes moist, pharynx normal, normal tonsils. No lesions   -- Ears: External ears and TM normal bilaterally. Normal hearing and no drainage  -- Neck: Normal range of motion. Neck supple. No masses, trachea midline  -- Cardiac: Normal rate, regular rhythm and normal heart sounds  -- Pulmonary: Normal respiratory effort, breath sounds clear to auscultation  -- Abdominal: Soft, no tenderness. Normal bowel sounds. Normal liver edge  -- Musculoskeletal: Normal range of motion, no effusions. Joints stable   -- Neurological: No focal deficits. Showed good interaction with staff  -- Vascular: Posterior tibial, dorsalis pedis and radial pulses 2+ bilaterally    -- Lymphatics: No cervical or peripheral lymphadenopathy. No edema noted  -- Skin: Warm and dry. No evidence of rash or cellulitis  -- Psychiatric: Normal mood and affect. Bedside behavior is appropriate    Emergency Room Course      Lab Results     K 4.8      CO2 24   BUN 19   CREATININE 1.3   GLU 93   ALKPHOS 88   AST 28   ALT 29   BILITOT 0.3   ALBUMIN 3.3 (L)   PROT 6.1   WBC 5.36   HGB 12.2 (L)   HCT 37.3 (L)       CPK 58   CPK 58   CPKMB 1.9   TROPONINI 0.033 (H)   BNP 32   MG 1.8   TSH 6.900 (H)     EKG  -- The EKG findings today were without concerning findings from baseline     Medications Given  0.9%  NaCl infusion (0 mLs Intravenous Stopped 2/17/20 1218)   0.9%  NaCl infusion (1,000 mLs Intravenous New Bag 2/17/20 1218)     ED Physician Management  -- Diagnosis management comments: 79 y.o. male with syncope  -- patient felt weak and syncopal with mildly elevated cardiac enzymes  -- patient still does not feel well, mildly hypotensive, not orthostatic here  -- patient be placed in observation on IV fluids overnight  -- CIS consult for cardiology evaluation of syncope    Diagnosis  -- The encounter diagnosis was Syncope, unspecified syncope type.    Disposition and Plan  -- Disposition: observation  -- Condition: stable    This note is dictated on M*Modal word recognition program.  There are word recognition mistakes that are occasionally missed on review.         Uriel Damico MD  02/17/20 2298

## 2020-02-18 VITALS
BODY MASS INDEX: 39.51 KG/M2 | SYSTOLIC BLOOD PRESSURE: 136 MMHG | HEART RATE: 84 BPM | WEIGHT: 251.75 LBS | DIASTOLIC BLOOD PRESSURE: 76 MMHG | HEIGHT: 67 IN | RESPIRATION RATE: 18 BRPM | OXYGEN SATURATION: 95 % | TEMPERATURE: 98 F

## 2020-02-18 LAB
ALBUMIN SERPL BCP-MCNC: 3.1 G/DL (ref 3.5–5.2)
ALP SERPL-CCNC: 95 U/L (ref 55–135)
ALT SERPL W/O P-5'-P-CCNC: 29 U/L (ref 10–44)
ANION GAP SERPL CALC-SCNC: 8 MMOL/L (ref 8–16)
AORTIC ROOT ANNULUS: 3.25 CM
AST SERPL-CCNC: 26 U/L (ref 10–40)
AV INDEX (PROSTH): 0.89
AV MEAN GRADIENT: 4 MMHG
AV PEAK GRADIENT: 6 MMHG
AV VALVE AREA: 3.77 CM2
AV VELOCITY RATIO: 0.81
BASOPHILS # BLD AUTO: 0.02 K/UL (ref 0–0.2)
BASOPHILS NFR BLD: 0.4 % (ref 0–1.9)
BILIRUB SERPL-MCNC: 0.3 MG/DL (ref 0.1–1)
BSA FOR ECHO PROCEDURE: 2.32 M2
BUN SERPL-MCNC: 14 MG/DL (ref 8–23)
CALCIUM SERPL-MCNC: 8.7 MG/DL (ref 8.7–10.5)
CHLORIDE SERPL-SCNC: 104 MMOL/L (ref 95–110)
CO2 SERPL-SCNC: 24 MMOL/L (ref 23–29)
CREAT SERPL-MCNC: 0.9 MG/DL (ref 0.5–1.4)
CV ECHO LV RWT: 0.4 CM
DIFFERENTIAL METHOD: ABNORMAL
DOP CALC AO PEAK VEL: 1.22 M/S
DOP CALC AO VTI: 23.3 CM
DOP CALC LVOT AREA: 4.2 CM2
DOP CALC LVOT DIAMETER: 2.32 CM
DOP CALC LVOT PEAK VEL: 0.99 M/S
DOP CALC LVOT STROKE VOLUME: 87.84 CM3
DOP CALCLVOT PEAK VEL VTI: 20.79 CM
E WAVE DECELERATION TIME: 300.72 MSEC
E/A RATIO: 0.48
ECHO LV POSTERIOR WALL: 1.16 CM (ref 0.6–1.1)
EOSINOPHIL # BLD AUTO: 0.4 K/UL (ref 0–0.5)
EOSINOPHIL NFR BLD: 8.1 % (ref 0–8)
ERYTHROCYTE [DISTWIDTH] IN BLOOD BY AUTOMATED COUNT: 15.1 % (ref 11.5–14.5)
EST. GFR  (AFRICAN AMERICAN): >60 ML/MIN/1.73 M^2
EST. GFR  (NON AFRICAN AMERICAN): >60 ML/MIN/1.73 M^2
FRACTIONAL SHORTENING: 16 % (ref 28–44)
GLUCOSE SERPL-MCNC: 101 MG/DL (ref 70–110)
HCT VFR BLD AUTO: 36.8 % (ref 40–54)
HGB BLD-MCNC: 12 G/DL (ref 14–18)
IMM GRANULOCYTES # BLD AUTO: 0.02 K/UL (ref 0–0.04)
IMM GRANULOCYTES NFR BLD AUTO: 0.4 % (ref 0–0.5)
INTERVENTRICULAR SEPTUM: 1.45 CM (ref 0.6–1.1)
IVRT: 0.13 MSEC
LA MAJOR: 4.71 CM
LA MINOR: 5.07 CM
LA WIDTH: 3.01 CM
LEFT ATRIUM SIZE: 4.05 CM
LEFT ATRIUM VOLUME INDEX: 22.7 ML/M2
LEFT ATRIUM VOLUME: 50.6 CM3
LEFT INTERNAL DIMENSION IN SYSTOLE: 4.91 CM (ref 2.1–4)
LEFT VENTRICLE DIASTOLIC VOLUME INDEX: 76.07 ML/M2
LEFT VENTRICLE DIASTOLIC VOLUME: 169.63 ML
LEFT VENTRICLE MASS INDEX: 151 G/M2
LEFT VENTRICLE SYSTOLIC VOLUME INDEX: 50.9 ML/M2
LEFT VENTRICLE SYSTOLIC VOLUME: 113.57 ML
LEFT VENTRICULAR INTERNAL DIMENSION IN DIASTOLE: 5.85 CM (ref 3.5–6)
LEFT VENTRICULAR MASS: 337.8 G
LYMPHOCYTES # BLD AUTO: 0.8 K/UL (ref 1–4.8)
LYMPHOCYTES NFR BLD: 16.9 % (ref 18–48)
MCH RBC QN AUTO: 30.9 PG (ref 27–31)
MCHC RBC AUTO-ENTMCNC: 32.6 G/DL (ref 32–36)
MCV RBC AUTO: 95 FL (ref 82–98)
MONOCYTES # BLD AUTO: 0.7 K/UL (ref 0.3–1)
MONOCYTES NFR BLD: 14.8 % (ref 4–15)
MV PEAK A VEL: 1.22 M/S
MV PEAK E VEL: 0.59 M/S
NEUTROPHILS # BLD AUTO: 2.8 K/UL (ref 1.8–7.7)
NEUTROPHILS NFR BLD: 59.4 % (ref 38–73)
NRBC BLD-RTO: 0 /100 WBC
PLATELET # BLD AUTO: 153 K/UL (ref 150–350)
PMV BLD AUTO: 10.6 FL (ref 9.2–12.9)
POTASSIUM SERPL-SCNC: 3.8 MMOL/L (ref 3.5–5.1)
PROT SERPL-MCNC: 5.8 G/DL (ref 6–8.4)
PV PEAK VELOCITY: 0.97 CM/S
RA MAJOR: 4.54 CM
RBC # BLD AUTO: 3.88 M/UL (ref 4.6–6.2)
RIGHT VENTRICULAR END-DIASTOLIC DIMENSION: 3.12 CM
SODIUM SERPL-SCNC: 136 MMOL/L (ref 136–145)
STJ: 3.41 CM
TROPONIN I SERPL DL<=0.01 NG/ML-MCNC: 0.02 NG/ML (ref 0–0.03)
WBC # BLD AUTO: 4.67 K/UL (ref 3.9–12.7)

## 2020-02-18 PROCEDURE — 25000003 PHARM REV CODE 250: Performed by: FAMILY MEDICINE

## 2020-02-18 PROCEDURE — 80053 COMPREHEN METABOLIC PANEL: CPT

## 2020-02-18 PROCEDURE — G0378 HOSPITAL OBSERVATION PER HR: HCPCS

## 2020-02-18 PROCEDURE — 99217 PR OBSERVATION CARE DISCHARGE: ICD-10-PCS | Mod: ,,, | Performed by: FAMILY MEDICINE

## 2020-02-18 PROCEDURE — 85025 COMPLETE CBC W/AUTO DIFF WBC: CPT

## 2020-02-18 PROCEDURE — 36415 COLL VENOUS BLD VENIPUNCTURE: CPT

## 2020-02-18 PROCEDURE — 93005 ELECTROCARDIOGRAM TRACING: CPT

## 2020-02-18 PROCEDURE — 94761 N-INVAS EAR/PLS OXIMETRY MLT: CPT

## 2020-02-18 PROCEDURE — 99217 PR OBSERVATION CARE DISCHARGE: CPT | Mod: ,,, | Performed by: FAMILY MEDICINE

## 2020-02-18 PROCEDURE — 94660 CPAP INITIATION&MGMT: CPT

## 2020-02-18 PROCEDURE — 84484 ASSAY OF TROPONIN QUANT: CPT

## 2020-02-18 PROCEDURE — 84484 ASSAY OF TROPONIN QUANT: CPT | Mod: 91

## 2020-02-18 RX ADMIN — PANTOPRAZOLE SODIUM 40 MG: 40 TABLET, DELAYED RELEASE ORAL at 08:02

## 2020-02-18 RX ADMIN — LEVOTHYROXINE SODIUM 100 MCG: 100 TABLET ORAL at 05:02

## 2020-02-18 RX ADMIN — CARVEDILOL 25 MG: 25 TABLET, FILM COATED ORAL at 08:02

## 2020-02-18 RX ADMIN — BENZONATATE 200 MG: 100 CAPSULE ORAL at 08:02

## 2020-02-18 RX ADMIN — ALLOPURINOL 300 MG: 100 TABLET ORAL at 08:02

## 2020-02-18 RX ADMIN — AMLODIPINE BESYLATE 5 MG: 5 TABLET ORAL at 08:02

## 2020-02-18 NOTE — PLAN OF CARE
02/18/20 1232   MACIAS Message   Medicare Outpatient and Observation Notification regarding financial responsibility Given to patient/caregiver;Explained to patient/caregiver;Signed/date by patient/caregiver   Date MACIAS was signed 02/18/20   Time MACIAS was signed 1023

## 2020-02-18 NOTE — HOSPITAL COURSE
"80 yo wm multiple RF for CAD but no real thorough evaluation, occasional postural dizziness (does take "prostate medicine") has been in his usual state of health when he stood up this morning and everything became dark and he was dizzy.  He states he did not lose consciousness.  Episode lasted a few minutes, self resolved but felt somewhat unwell in the ER.   His workup showed initially low BP but it quickly recovered and he is now hypertensive. Not orthostatic. Troponin not suggestive of ACS.  EKG is wnl.   He states he is able to live alone, care for self at a reasonable level of function without any anginal equivalents. He denies ever having palpitations.  He denies any prior syncopal events. He denies any edema or SOB.    He has had stephen diureses over the evening with D5.     He is feeling back to normal today. Echo pending. EKG NSR. Cardiac markers negative flu negative. TSH slightly elevated on synthroid 100mcg daily.   "

## 2020-02-18 NOTE — ASSESSMENT & PLAN NOTE
Neg EKG- cardiac enz/ and CIS consult and telemetry    Orthostatics are negative   Carotid u/s and echo pending.   Troponin negative x 3  cardiology recs:  F/u with neuro  Recommend outpt cardiovascular baseline ischemic evaluation has appt 2/24    Plan to d/c patient later today once carotid u/s and echo completed

## 2020-02-18 NOTE — HPI
80y/o W M who become faint and had some syncope from unknown cause. Also there was concern about some CP and concern as a cardiac issue.

## 2020-02-18 NOTE — NURSING
Clarification of IVF's with Dr. Davenport. MD aware of elevated B/P will continue to monitor. Will resume new order for D% as soon as IV access is obtained.

## 2020-02-18 NOTE — ASSESSMENT & PLAN NOTE
Neg EKG- cardiac enz/ and CIS consult and telemetry    Orthostatics are negative   Carotid u/s and echo pending.   Troponin negative x 3  cardiology recs:  F/u with neuro  Recommend outpt cardiovascular baseline ischemic evaluation    Plan to d/c patient later today once carotid u/s and echo completed

## 2020-02-18 NOTE — DISCHARGE SUMMARY
"[facility] Hospital Medicine  Discharge Summary      Patient Name: Domonique Hernandez Jr.  MRN: [id number]  Admission Date: 2/17/2020  Hospital Length of Stay: 0 days  Discharge Date and Time:  02/18/2020 12:27 PM  Attending Physician: Refugio Monet MD   Discharging Provider: Ivonne Eugene NP  Primary Care Provider: Emiliano Cam MD      HPI:   78y/o W M who become faint and had some syncope from unknown cause. Also there was concern about some CP and concern as a cardiac issue.    * No surgery found *      Hospital Course:   80 yo wm multiple RF for CAD but no real thorough evaluation, occasional postural dizziness (does take "prostate medicine") has been in his usual state of health when he stood up this morning and everything became dark and he was dizzy.  He states he did not lose consciousness.  Episode lasted a few minutes, self resolved but felt somewhat unwell in the ER.   His workup showed initially low BP but it quickly recovered and he is now hypertensive. Not orthostatic. Troponin not suggestive of ACS.  EKG is wnl.   He states he is able to live alone, care for self at a reasonable level of function without any anginal equivalents. He denies ever having palpitations.  He denies any prior syncopal events. He denies any edema or SOB.    He has had stephen diureses over the evening with D5.     He is feeling back to normal today. Echo pending. EKG NSR. Cardiac markers negative flu negative. TSH slightly elevated on synthroid 100mcg daily.      Consults:   Consults (From admission, onward)        Status Ordering Provider     Inpatient consult to Cardiology[facility]  Once     Provider:  Anshul Mack MD    Acknowledged REFUGIO MONET          * Pre-syncope  Neg EKG- cardiac enz/ and [facility] consult and telemetry    Orthostatics are negative   Carotid u/s and echo pending.   Troponin negative x 3  cardiology recs:  F/u with neuro  Recommend outpt cardiovascular baseline ischemic " evaluation has appt 2/24    Plan to d/c patient later today once carotid u/s and echo completed    Hypothyroidism  tsh was elevated   Takes synthroid and cont       Hypertension  Hypotensive in ER  Now hypertensive 140//81  Coreg and amlodipine ordered  Resume arb upon d/c    Hyperlipidemia  Resume asa and statin        Final Active Diagnoses:    Diagnosis Date Noted POA    PRINCIPAL PROBLEM:  Pre-syncope [R55] 02/17/2020 Yes    Hypothyroidism [E03.9] 10/30/2012 Yes    Hypertension [I10]  Yes    Hyperlipidemia [E78.5]  Yes      Problems Resolved During this Admission:       Discharged Condition: good    Disposition: Home or Self Care    Follow Up:  Follow-up Information     Anshul Mack MD On 2/24/2020.    Specialty:  Cardiology  Why:  Appointment is on 2/24/20 at 1:50pm  Contact information:  [address] [place] [id number] [phone]             Emiliano Cam MD In 1 week.    Specialty:  Family Medicine  Contact information:  [address] [place] [address]  [phone]                 Patient Instructions:   No discharge procedures on file.    Significant Diagnostic Studies:     Significant Labs:         Lab Results   Component Value Date     WBC 4.67 02/18/2020     HGB 12.0 (L) 02/18/2020     HCT 36.8 (L) 02/18/2020     MCV 95 02/18/2020      02/18/2020         BMP        Lab Results   Component Value Date      02/18/2020     K 3.8 02/18/2020      02/18/2020     CO2 24 02/18/2020     BUN 14 02/18/2020     CREATININE 0.9 02/18/2020     CALCIUM 8.7 02/18/2020     ANIONGAP 8 02/18/2020     ESTGFRAFRICA >60 02/18/2020     EGFRNONAA >60 02/18/2020            Lab Results   Component Value Date     ALT 29 02/18/2020     AST 26 02/18/2020     ALKPHOS 95 02/18/2020     BILITOT 0.3 02/18/2020      BNP      Recent Labs   Lab 02/17/20  0934   BNP 32            Lab Results   Component Value Date     TSH 6.900 (H) 02/17/2020      Urinalysis      Recent Labs   Lab  02/17/20  1350   COLORU Yellow   SPECGRAV 1.020   PHUR 7.0   PROTEINUA 1+*   BACTERIA Moderate*   NITRITE Negative   LEUKOCYTESUR 1+*   UROBILINOGEN Negative   HYALINECASTS 1      Urine culture in process     Flu negative   Troponin:         Recent Labs   Lab 02/17/20  1910 02/18/20  0053 02/18/20  0651   TROPONINI 0.022 0.019 0.016         Significant Imaging:      US carotid     ECHO         Pending Diagnostic Studies:     Procedure Component Value Units Date/Time    Echo Color Flow Doppler? Yes [[id number]] Resulted:  02/18/20 0951    Order Status:  Sent Lab Status:  In process Updated:  02/18/20 0951     BSA 2.32 m2     Troponin I [[id number]]     Order Status:  Sent Lab Status:  No result     Specimen:  Blood          Medications:  Reconciled Home Medications:      Medication List      CHANGE how you take these medications    ibuprofen 800 MG tablet  Commonly known as:  ADVIL,MOTRIN  Take 1 tablet (800 mg total) by mouth every 6 (six) hours as needed for Pain (headaches).  What changed:  Another medication with the same name was removed. Continue taking this medication, and follow the directions you see here.     Proctozone-HC 2.5 % rectal cream  Generic drug:  hydrocortisone  Place rectally 2 (two) times daily.  What changed:  Another medication with the same name was removed. Continue taking this medication, and follow the directions you see here.        CONTINUE taking these medications    albuterol 90 mcg/actuation inhaler  Commonly known as:  PROVENTIL/VENTOLIN HFA  Inhale 2 puffs into the lungs every 6 (six) hours as needed for Wheezing. Rescue     albuterol-ipratropium 2.5 mg-0.5 mg/3 mL nebulizer solution  Commonly known as:  DUO-NEB  Inhale contents of 1 vial via nebulizer every 6 hours as needed for wheezing.     allopurinoL 300 MG tablet  Commonly known as:  ZYLOPRIM  TAKE 1 TABLET ONE TIME DAILY     amLODIPine 5 MG tablet  Commonly known as:  NORVASC  Take 1 tablet (5 mg total) by mouth once daily.      aspirin 81 MG EC tablet  Commonly known as:  ECOTRIN  Take 81 mg by mouth once daily.     carvediloL 25 MG tablet  Commonly known as:  COREG  Take 1 tablet by mouth 2 times daily with meals.     cetirizine 10 MG tablet  Commonly known as:  ZYRTEC  Take 1 tablet (10 mg total) by mouth once daily.     dutasteride 0.5 mg capsule  Commonly known as:  AVODART  Take 1 capsule (0.5 mg total) by mouth once daily.     fluticasone propionate 50 mcg/actuation nasal spray  Commonly known as:  Flonase  2 sprays (100 mcg total) by Each Nare route 2 (two) times daily.     levothyroxine 100 MCG tablet  Commonly known as:  SYNTHROID  TAKE 1 TABLET BY MOUTH ONCE DAILY     One Daily Multivitamin per tablet  Generic drug:  multivitamin  Take 1 tablet by mouth once daily. Centrum Silver for Men     simvastatin 40 MG tablet  Commonly known as:  ZOCOR  Take 1 tablet (40 mg total) by mouth every evening.     umeclidinium-vilanterol 62.5-25 mcg/actuation Dsdv  Commonly known as:  Anoro Ellipta  Inhale 1 Dose into the lungs once daily.     valsartan 80 MG tablet  Commonly known as:  DIOVAN  Take 1 tablet (80 mg total) by mouth once daily.     venlafaxine 150 MG Cp24  Commonly known as:  EFFEXOR-XR  Take 1 capsule (150 mg total) by mouth once daily.        STOP taking these medications    azithromycin 250 MG tablet  Commonly known as:  Z-KATIUSKA     benzonatate 100 MG capsule  Commonly known as:  TESSALON     ciprofloxacin HCl 500 MG tablet  Commonly known as:  CIPRO     ipratropium 42 mcg (0.06 %) nasal spray  Commonly known as:  ATROVENT     tamsulosin 0.4 mg Cap  Commonly known as:  FLOMAX     traMADol 50 mg tablet  Commonly known as:  ULTRAM            Indwelling Lines/Drains at time of discharge:   Lines/Drains/Airways     None                 Time spent on the discharge of patient: 30 minutes  Patient was seen and examined on the date of discharge and determined to be suitable for discharge.         Ivonne Eugene NP  Department of  Hospital Medicine [facility]

## 2020-02-18 NOTE — PROGRESS NOTES
"[facility] Medicine  Progress Note    Patient Name: Domonique Hernandez Jr.  MRN: [id number]  Patient Class: OP- Observation   Admission Date: 2/17/2020  Length of Stay: 0 days  Attending Physician: Refugio Davenport MD  Primary Care Provider: Emiliano Cam MD        Subjective:     Principal Problem:Pre-syncope        HPI:  78y/o W M who become faint and had some syncope from unknown cause. Also there was concern about some CP and concern as a cardiac issue.    Overview/Hospital Course:  78 yo wm multiple RF for CAD but no real thorough evaluation, occasional postural dizziness (does take "prostate medicine") has been in his usual state of health when he stood up this morning and everything became dark and he was dizzy.  He states he did not lose consciousness.  Episode lasted a few minutes, self resolved but felt somewhat unwell in the ER.   His workup showed initially low BP but it quickly recovered and he is now hypertensive. Not orthostatic. Troponin not suggestive of ACS.  EKG is wnl.   He states he is able to live alone, care for self at a reasonable level of function without any anginal equivalents. He denies ever having palpitations.  He denies any prior syncopal events. He denies any edema or SOB.    He has had stephen diureses over the evening with D5.     He is feeling back to normal today. Echo pending. EKG NSR. Cardiac markers negative flu negative. TSH slightly elevated on synthroid 100mcg daily.     Past Medical History:   Diagnosis Date    Anxiety     Arthritis     Back pain     Chronic bronchitis     Chronic gout     COPD (chronic obstructive pulmonary disease)     Depression     Emphysema of lung     Generalized headaches     GERD (gastroesophageal reflux disease)     Hyperlipidemia     Hypertension     Hypothyroidism     Obesity     Peripheral vascular disease     Sleep apnea     On CPAP    Stage 3 chronic kidney disease 4/18/2018    Thyroid disease  "       Past Surgical History:   Procedure Laterality Date    Bilateral mastoidectomies Bilateral 1984    for ear infection    COLONOSCOPY  2010    COLONOSCOPY N/A 1/31/2019    Procedure: COLONOSCOPY;  Surgeon: Joaquín Crawford MD;  Location: [facility];  Service: Colon and Rectal;  Laterality: N/A;    COLONOSCOPY N/A 8/13/2019    Procedure: COLONOSCOPY;  Surgeon: Joaquín Crawford MD;  Location: [facility] ENDO (4TH FLR);  Service: Colon and Rectal;  Laterality: N/A;  Patient had inadequate prep with his last colonoscopy, was unable to finish the large volume GoLYTELY prep.  Please give him a smaller volume split dose prep, such as Movi-Prep or SuPrep.  He should do a light diet of easily digested food 2 days prior to the procedure     Eardrum repair  1984    Not sure which side    HERNIA REPAIR  1984    Umbilical-screen    KNEE ARTHROSCOPY Left 1989    ROTATOR CUFF REPAIR Left 2002    Left       Review of patient's allergies indicates:   Allergen Reactions    Percocet [oxycodone-acetaminophen] Other (See Comments)     hyperactivity    Percodan [oxycodone hcl-oxycodone-asa] Other (See Comments)     hyperactivity       No current facility-administered medications on file prior to encounter.      Current Outpatient Medications on File Prior to Encounter   Medication Sig    allopurinol (ZYLOPRIM) 300 MG tablet TAKE 1 TABLET ONE TIME DAILY    amLODIPine (NORVASC) 5 MG tablet Take 1 tablet (5 mg total) by mouth once daily.    aspirin (ECOTRIN) 81 MG EC tablet Take 81 mg by mouth once daily.    benzonatate (TESSALON) 100 MG capsule Take 1 capsule (100 mg total) by mouth 3 (three) times daily as needed for Cough.    carvedilol (COREG) 25 MG tablet Take 1 tablet by mouth 2 times daily with meals.    cetirizine (ZYRTEC) 10 MG tablet Take 1 tablet (10 mg total) by mouth once daily.    dutasteride (AVODART) 0.5 mg capsule Take 1 capsule (0.5 mg total) by mouth once daily.    fluticasone (FLONASE) 50 mcg/actuation nasal  spray 2 sprays (100 mcg total) by Each Nare route 2 (two) times daily.    ibuprofen (ADVIL,MOTRIN) 800 MG tablet Take 1 tablet (800 mg total) by mouth every 6 (six) hours as needed for Pain (headaches).    ipratropium (ATROVENT) 42 mcg (0.06 %) nasal spray 2 sprays by Nasal route 4 (four) times daily.    levothyroxine (SYNTHROID) 100 MCG tablet TAKE 1 TABLET BY MOUTH ONCE DAILY    multivitamin (ONE DAILY MULTIVITAMIN) per tablet Take 1 tablet by mouth once daily. Centrum Silver for Men    simvastatin (ZOCOR) 40 MG tablet Take 1 tablet (40 mg total) by mouth every evening.    valsartan (DIOVAN) 80 MG tablet Take 1 tablet (80 mg total) by mouth once daily.    venlafaxine (EFFEXOR-XR) 150 MG Cp24 Take 1 capsule (150 mg total) by mouth once daily.    albuterol 90 mcg/actuation inhaler Inhale 2 puffs into the lungs every 6 (six) hours as needed for Wheezing. Rescue    albuterol-ipratropium (DUO-NEB) 2.5 mg-0.5 mg/3 mL nebulizer solution Inhale contents of 1 vial via nebulizer every 6 hours as needed for wheezing.    azithromycin (Z-KATIUSKA) 250 MG tablet Take first 2 tablets together on day 1, then 1 every day until finished.    ciprofloxacin HCl (CIPRO) 500 MG tablet Take 1 tablet (500 mg total) by mouth 2 (two) times daily.    hydrocortisone (ANUSOL-HC) 2.5 % rectal cream Place rectally 2 (two) times daily.    hydrocortisone (PROCTO-KATIUSKA) 1 % crpe Place 1 applicator rectally 2 (two) times daily as needed.    ibuprofen (ADVIL,MOTRIN) 800 MG tablet Take 1 tablet (800 mg total) by mouth 3 (three) times daily.    umeclidinium-vilanterol (ANORO ELLIPTA) 62.5-25 mcg/actuation DsDv Inhale 1 Dose into the lungs once daily.     Family History     Problem Relation (Age of Onset)    Atrial fibrillation Son    Cancer Mother (65), Father    Kidney disease Brother    No Known Problems Sister    Stroke Son, Son        Tobacco Use    Smoking status: Never Smoker    Smokeless tobacco: Never Used   Substance and Sexual  Activity    Alcohol use: No    Drug use: No    Sexual activity: Yes     Partners: Female     Review of Systems   Constitutional: Positive for activity change. Negative for appetite change.   HENT: Negative for congestion, ear pain, sneezing and sore throat.    Eyes: Negative for redness and visual disturbance.   Respiratory: Negative for cough, chest tightness and stridor.    Cardiovascular: Negative for chest pain.   Gastrointestinal: Negative for abdominal pain, blood in stool, diarrhea, nausea and vomiting.   Genitourinary: Negative for difficulty urinating, dysuria and hematuria.   Musculoskeletal: Negative for arthralgias, back pain, joint swelling, myalgias and neck pain.   Skin: Negative for rash.   Neurological: Positive for dizziness. Negative for syncope.        Presyncope    Psychiatric/Behavioral: Negative for agitation. The patient is not nervous/anxious.      Objective:     Vital Signs (Most Recent):  Temp: 96.7 °F (35.9 °C) (02/18/20 0323)  Pulse: 84 (02/18/20 0800)  Resp: 18 (02/18/20 0727)  BP: (!) 140/86 (02/18/20 0727)  SpO2: 97 % (02/18/20 0820) Vital Signs (24h Range):  Temp:  [96.3 °F (35.7 °C)-97.1 °F (36.2 °C)] 96.7 °F (35.9 °C)  Pulse:  [60-99] 84  Resp:  [16-23] 18  SpO2:  [96 %-98 %] 97 %  BP: (122-188)/(63-87) 140/86     Weight: 114.2 kg (251 lb 12.3 oz)  Body mass index is 39.43 kg/m².    Physical Exam   Constitutional: He is oriented to person, place, and time. He appears well-developed and well-nourished.   HENT:   Head: Normocephalic.   Eyes: Pupils are equal, round, and reactive to light.   Neck: Normal range of motion. Neck supple. No thyromegaly present.   Cardiovascular: Normal rate and regular rhythm. Exam reveals no friction rub.   No murmur heard.  Pulmonary/Chest: Effort normal. No respiratory distress. He has no wheezes.   Abdominal: There is no tenderness. There is no rebound and no guarding.   Musculoskeletal: Normal range of motion. He exhibits no edema or tenderness.    Lymphadenopathy:     He has no cervical adenopathy.   Neurological: He is alert and oriented to person, place, and time. He has normal reflexes. No cranial nerve deficit.   Skin: Skin is warm and dry.   Psychiatric: He has a normal mood and affect. Judgment and thought content normal.         CRANIAL NERVES     CN III, IV, VI   Pupils are equal, round, and reactive to light.       Significant Labs:   Lab Results   Component Value Date    WBC 4.67 02/18/2020    HGB 12.0 (L) 02/18/2020    HCT 36.8 (L) 02/18/2020    MCV 95 02/18/2020     02/18/2020       BMP  Lab Results   Component Value Date     02/18/2020    K 3.8 02/18/2020     02/18/2020    CO2 24 02/18/2020    BUN 14 02/18/2020    CREATININE 0.9 02/18/2020    CALCIUM 8.7 02/18/2020    ANIONGAP 8 02/18/2020    ESTGFRAFRICA >60 02/18/2020    EGFRNONAA >60 02/18/2020     Lab Results   Component Value Date    ALT 29 02/18/2020    AST 26 02/18/2020    ALKPHOS 95 02/18/2020    BILITOT 0.3 02/18/2020     BNP  Recent Labs   Lab 02/17/20  0934   BNP 32     Lab Results   Component Value Date    TSH 6.900 (H) 02/17/2020     Urinalysis  Recent Labs   Lab 02/17/20  1350   COLORU Yellow   SPECGRAV 1.020   PHUR 7.0   PROTEINUA 1+*   BACTERIA Moderate*   NITRITE Negative   LEUKOCYTESUR 1+*   UROBILINOGEN Negative   HYALINECASTS 1     Urine culture in process    Flu negative   Troponin:   Recent Labs   Lab 02/17/20  1910 02/18/20  0053 02/18/20  0651   TROPONINI 0.022 0.019 0.016       Significant Imaging:     US carotid    ECHO      Assessment/Plan:      * Pre-syncope  Neg EKG- cardiac enz/ and [facility] consult and telemetry    Orthostatics are negative   Carotid u/s and echo pending.   Troponin negative x 3  cardiology recs:  F/u with neuro  Recommend outpt cardiovascular baseline ischemic evaluation    Plan to d/c patient later today once carotid u/s and echo completed    Hypothyroidism  tsh was elevated   Takes synthroid and cont        Hypertension  Hypotensive in ER  Now hypertensive 140//81  Coreg and amlodipine ordered  Resume arb upon d/c    Hyperlipidemia  Resume as and statin        VTE Risk Mitigation (From admission, onward)         Ordered     IP VTE HIGH RISK PATIENT  Once      02/17/20 1403     Place sequential compression device  Until discontinued      02/17/20 1404                      Andrew Major MD  Department of Hospital Medicine   [facility]

## 2020-02-18 NOTE — PROGRESS NOTES
"[facility]  Cardiology  Progress Note    Patient Name: Domonique Hernandez Jr.  MRN: [id number]  Admission Date: 2/17/2020  Hospital Length of Stay: 0 days  Code Status: Full Code   Attending Physician: Refugio Davenport MD   Primary Care Physician: Emiliano Cam MD  Expected Discharge Date:   Principal Problem:Syncope    Subjective:     Chief Complaint:  Presyncope                          HPI: 80 yo wm multiple RF for CAD but no real thorough evaluation, occasional postural dizziness (does take "prostate medicine") has been in his usual state of health when he stood up this morning and everything became dark and he was dizzy.  He states he did not lose consciousness.  Episode lasted a few minutes, self resolved but felt somewhat unwell in the ER.   His workup showed initially low BP but it quickly recovered and he is now hypertensive. Not orthostatic. Troponin not suggestive of ACS.  EKG is wnl.   He states he is able to live alone, care for self at a reasonable level of function without any anginal equivalents. He denies ever having palpitations.  He denies any prior syncopal events. He denies any edema or SOB.    ROS   Constitution: Negative.   HENT: Negative.    Eyes: Negative.    Cardiovascular: Positive for near-syncope. Negative for chest pain, dyspnea on exertion, irregular heartbeat and leg swelling.   Respiratory: Negative.    Endocrine: Negative.    Hematologic/Lymphatic: Negative.    Skin: Negative.    Musculoskeletal: Negative.    Gastrointestinal: Negative.    Genitourinary: Negative.    Neurological: Negative.    Psychiatric/Behavioral: Negative.         Objective:     Vital Signs (Most Recent):  Temp: 96.7 °F (35.9 °C) (02/18/20 0323)  Pulse: 77 (02/18/20 0727)  Resp: 18 (02/18/20 0727)  BP: (!) 140/86 (02/18/20 0727)  SpO2: 97 % (02/18/20 0820) Vital Signs (24h Range):  Temp:  [96.3 °F (35.7 °C)-97.3 °F (36.3 °C)] 96.7 °F (35.9 °C)  Pulse:  [60-99] 77  Resp:  [16-23] 18  SpO2:  " [96 %-98 %] 97 %  BP: (103-188)/(59-87) 140/86     Weight: 114.2 kg (251 lb 12.3 oz)  Body mass index is 39.43 kg/m².    SpO2: 97 %  O2 Device (Oxygen Therapy): room air      Intake/Output Summary (Last 24 hours) at 2/18/2020 0830  Last data filed at 2/17/2020 2100  Gross per 24 hour   Intake 360 ml   Output 750 ml   Net -390 ml       Lines/Drains/Airways     Peripheral Intravenous Line                 Peripheral IV - Single Lumen 02/17/20 1852 20 G Anterior;Distal;Right Upper Arm less than 1 day                Physical Exam   Constitutional: He appears well-developed and well-nourished.   obese   Eyes: Pupils are equal, round, and reactive to light.   Neck: Neck supple.   Cardiovascular: Normal rate and regular rhythm.   No murmur heard.  Pulmonary/Chest: Effort normal and breath sounds normal.   Abdominal: Soft. Bowel sounds are normal.   Musculoskeletal: Normal range of motion.   Neurological: He is alert.   Skin: Skin is warm and dry.   Psychiatric: He has a normal mood and affect.   Nursing note and vitals reviewed.       Significant Labs:   ABG: No results for input(s): PH, PCO2, HCO3, POCSATURATED, BE in the last 48 hours., Blood Culture: No results for input(s): LABBLOO in the last 48 hours., BMP:   Recent Labs   Lab 02/17/20  0935 02/18/20  0651   GLU 93 101    136   K 4.8 3.8    104   CO2 24 24   BUN 19 14   CREATININE 1.3 0.9   CALCIUM 9.3 8.7   MG 1.8  --    , CMP   Recent Labs   Lab 02/17/20  0935 02/18/20  0651    136   K 4.8 3.8    104   CO2 24 24   GLU 93 101   BUN 19 14   CREATININE 1.3 0.9   CALCIUM 9.3 8.7   PROT 6.1 5.8*   ALBUMIN 3.3* 3.1*   BILITOT 0.3 0.3   ALKPHOS 88 95   AST 28 26   ALT 29 29   ANIONGAP 10 8   ESTGFRAFRICA >60 >60   EGFRNONAA 53* >60   , CBC   Recent Labs   Lab 02/17/20  0934 02/18/20  0651   WBC 5.36 4.67   HGB 12.2* 12.0*   HCT 37.3* 36.8*    153   , INR No results for input(s): INR, PROTIME in the last 48 hours., Lipid Panel No results for  input(s): CHOL, HDL, LDLCALC, TRIG, CHOLHDL in the last 48 hours.,   Pathology Results  (Last 10 years)    None      , Troponin   Recent Labs   Lab 02/17/20  1910 02/18/20  0053 02/18/20  0651   TROPONINI 0.022 0.019 0.016   , All pertinent lab results from the last 24 hours have been reviewed. and   Recent Lab Results       02/18/20  0651   02/18/20  0053   02/17/20  1910   02/17/20  1350   02/17/20  1241        Albumin 3.1             Alkaline Phosphatase 95             ALT 29             Amorphous, UA       Few       Anion Gap 8             Appearance, UA       Clear       AST 26             Bacteria, UA       Moderate       Baso # 0.02             Basophil% 0.4             Bilirubin (UA)       Negative       BILIRUBIN TOTAL 0.3  Comment:  For infants and newborns, interpretation of results should be based  on gestational age, weight and in agreement with clinical  observations.  Premature Infant recommended reference ranges:  Up to 24 hours.............<8.0 mg/dL  Up to 48 hours............<12.0 mg/dL  3-5 days..................<15.0 mg/dL  6-29 days.................<15.0 mg/dL               BNP               BUN, Bld 14             Calcium 8.7             Chloride 104             CO2 24             Color, UA       Yellow       CPK         58              58     CPK MB         1.9     Creatinine 0.9             Differential Method Automated             eGFR if  >60             eGFR if non  >60  Comment:  Calculation used to obtain the estimated glomerular filtration  rate (eGFR) is the CKD-EPI equation.                Eos # 0.4             Eosinophil% 8.1             Free T4               Glucose 101             Glucose, UA       Negative       Gran # (ANC) 2.8             Gran% 59.4             Hematocrit 36.8             Hemoglobin 12.0             Hyaline Casts, UA       1       Immature Grans (Abs) 0.02  Comment:  Mild elevation in immature granulocytes is non specific and    can be seen in a variety of conditions including stress response,   acute inflammation, trauma and pregnancy. Correlation with other   laboratory and clinical findings is essential.               Immature Granulocytes 0.4             Ketones, UA       Negative       Leukocytes, UA       1+       Lymph # 0.8             Lymph% 16.9             Magnesium               MB%         3.3  Comment:  To be positive, the MB% must be greater than 5% AND the CK-MB  greater than 6.5 ng/mL. Values not in the reference interval,   but not qualifying as positive, should be considered   &quot;trace&quot;.       MCH 30.9             MCHC 32.6             MCV 95             Microscopic Comment       SEE COMMENT  Comment:  Other formed elements not mentioned in the report are not   present in the microscopic examination.          Mono # 0.7             Mono% 14.8             MPV 10.6             NITRITE UA       Negative       nRBC 0             Occult Blood UA       Negative       pH, UA       7.0       Phosphorus               Platelets 153             Potassium 3.8             PROTEIN TOTAL 5.8             Protein, UA       1+  Comment:  Recommend a 24 hour urine protein or a urine   protein/creatinine ratio if globulin induced proteinuria is  clinically suspected.         RBC 3.88             RBC, UA       2       RDW 15.1             Sodium 136             Specific Gravity, UA       1.020       Specimen UA       Urine, Clean Catch       Squam Epithel, UA       2       Troponin I 0.016  Comment:  The reference interval for Troponin I represents the 99th percentile   cutoff   for our facility and is consistent with 3rd generation assay   performance.   0.019  Comment:  The reference interval for Troponin I represents the 99th percentile   cutoff   for our facility and is consistent with 3rd generation assay   performance.   0.022  Comment:  The reference interval for Troponin I represents the 99th percentile   cutoff   for our  facility and is consistent with 3rd generation assay   performance.     0.033  Comment:  The reference interval for Troponin I represents the 99th percentile   cutoff   for our facility and is consistent with 3rd generation assay   performance.       TSH               UROBILINOGEN UA       Negative       WBC, UA       25       WBC 4.67                              02/17/20  0935   02/17/20  0934        Albumin 3.3       Alkaline Phosphatase 88       ALT 29       Amorphous, UA         Anion Gap 10       Appearance, UA         AST 28       Bacteria, UA         Baso #   0.04     Basophil%   0.7     Bilirubin (UA)         BILIRUBIN TOTAL 0.3  Comment:  For infants and newborns, interpretation of results should be based  on gestational age, weight and in agreement with clinical  observations.  Premature Infant recommended reference ranges:  Up to 24 hours.............<8.0 mg/dL  Up to 48 hours............<12.0 mg/dL  3-5 days..................<15.0 mg/dL  6-29 days.................<15.0 mg/dL         BNP   32  Comment:  Values of less than 100 pg/ml are consistent with non-CHF populations.     BUN, Bld 19       Calcium 9.3       Chloride 106       CO2 24       Color, UA         CPK 64        64       CPK MB 2.2       Creatinine 1.3       Differential Method   Automated     eGFR if  >60       eGFR if non  53  Comment:  Calculation used to obtain the estimated glomerular filtration  rate (eGFR) is the CKD-EPI equation.          Eos #   0.4     Eosinophil%   6.9     Free T4 0.84       Glucose 93       Glucose, UA         Gran # (ANC)   3.5     Gran%   64.4     Hematocrit   37.3     Hemoglobin   12.2     Hyaline Casts, UA         Immature Grans (Abs)   0.03  Comment:  Mild elevation in immature granulocytes is non specific and   can be seen in a variety of conditions including stress response,   acute inflammation, trauma and pregnancy. Correlation with other   laboratory and clinical findings  is essential.       Immature Granulocytes   0.6     Ketones, UA         Leukocytes, UA         Lymph #   0.8     Lymph%   15.3     Magnesium 1.8       MB% 3.4  Comment:  To be positive, the MB% must be greater than 5% AND the CK-MB  greater than 6.5 ng/mL. Values not in the reference interval,   but not qualifying as positive, should be considered   &quot;trace&quot;.         MCH   31.0     MCHC   32.7     MCV   95     Microscopic Comment         Mono #   0.7     Mono%   12.1     MPV   10.2     NITRITE UA         nRBC   0     Occult Blood UA         pH, UA         Phosphorus 3.7       Platelets   185     Potassium 4.8       PROTEIN TOTAL 6.1       Protein, UA         RBC   3.93     RBC, UA         RDW   15.0     Sodium 140       Specific Gravity, UA         Specimen UA         Squam Epithel, UA         Troponin I 0.029  Comment:  The reference interval for Troponin I represents the 99th percentile   cutoff   for our facility and is consistent with 3rd generation assay   performance.         TSH 6.900       UROBILINOGEN UA         WBC, UA         WBC   5.36           Significant Imaging: CT scan: CT ABDOMEN PELVIS WITH CONTRAST: No results found for this visit on 02/17/20. and CT ABDOMEN PELVIS WITHOUT CONTRAST: No results found for this visit on 02/17/20., Echocardiogram: 2D echo with color flow doppler: No results found for this or any previous visit. and Transthoracic echo (TTE) complete (Cupid Only): No results found for this or any previous visit. and X-Ray: CXR: X-Ray Chest 1 View (CXR): No results found for this visit on 02/17/20. and X-Ray Chest PA and Lateral (CXR): No results found for this visit on 02/17/20. and KUB: X-Ray Abdomen AP 1 View (KUB): No results found for this visit on 02/17/20.  Assessment and Plan:       Active Diagnoses:    Diagnosis Date Noted POA    PRINCIPAL PROBLEM:  Syncope [R55] 02/17/2020 Yes    Hypertension [I10]  Yes      Problems Resolved During this Admission:       VTE Risk  Mitigation (From admission, onward)         Ordered     IP VTE HIGH RISK PATIENT  Once      02/17/20 1403     Place sequential compression device  Until discontinued      02/17/20 1404              Current Facility-Administered Medications   Medication    allopurinoL tablet 300 mg    amLODIPine tablet 5 mg    benzonatate capsule 200 mg    carvediloL tablet 25 mg    dextrose 5 % infusion    levothyroxine tablet 100 mcg    ondansetron injection 4 mg    pantoprazole EC tablet 40 mg    promethazine (PHENERGAN) 12.5 mg in dextrose 5 % 50 mL IVPB    sodium chloride 0.9% flush 10 mL       Presyncope/ vague weak spell  Low BP--resolved, now HTN  Mild troponin elevation  Obesity  Dyslipidemia  Hypothyroidism  Anxiety  Gout  GERD    Plan:  Cont gentle hydration  Echo   Carotid u/s  Continue to follow on telemetry  Resume home meds   Orthostatic precautions/ compression stockings  F/u with neuro  Recommend outpt cardiovascular baseline ischemic evaluation         Transcribed by  Avelino Mccord NP for Dr JOHN Mack  Cardiology  [facility]    I attest that I have personally seen and examined this patient. I have reviewed and discussed the management in detail as outlined above.

## 2020-02-18 NOTE — PLAN OF CARE
02/18/20 1232   Final Note   Assessment Type Final Discharge Note   Anticipated Discharge Disposition Home   What phone number can be called within the next 1-3 days to see how you are doing after discharge? [phone]   Hospital Follow Up  Appt(s) scheduled? Yes   Discharge plans and expectations educations in teach back method with documentation complete? Yes   Post-Acute Status   Post-Acute Authorization Other   Discharge Delays None known at this time       No post-acute care needs identified during this hospital stay.     Pily Bradford LMSW

## 2020-02-18 NOTE — SUBJECTIVE & OBJECTIVE
Past Medical History:   Diagnosis Date    Anxiety     Arthritis     Back pain     Chronic bronchitis     Chronic gout     COPD (chronic obstructive pulmonary disease)     Depression     Emphysema of lung     Generalized headaches     GERD (gastroesophageal reflux disease)     Hyperlipidemia     Hypertension     Hypothyroidism     Obesity     Peripheral vascular disease     Sleep apnea     On CPAP    Stage 3 chronic kidney disease 4/18/2018    Thyroid disease        Past Surgical History:   Procedure Laterality Date    Bilateral mastoidectomies Bilateral 1984    for ear infection    COLONOSCOPY  2010    COLONOSCOPY N/A 1/31/2019    Procedure: COLONOSCOPY;  Surgeon: Joaquín Crawford MD;  Location: [facility] ENDO;  Service: Colon and Rectal;  Laterality: N/A;    COLONOSCOPY N/A 8/13/2019    Procedure: COLONOSCOPY;  Surgeon: Joaquín Crawford MD;  Location: [facility] ENDO (4TH FLR);  Service: Colon and Rectal;  Laterality: N/A;  Patient had inadequate prep with his last colonoscopy, was unable to finish the large volume GoLYTELY prep.  Please give him a smaller volume split dose prep, such as Movi-Prep or SuPrep.  He should do a light diet of easily digested food 2 days prior to the procedure     Eardrum repair  1984    Not sure which side    HERNIA REPAIR  1984    Umbilical-screen    KNEE ARTHROSCOPY Left 1989    ROTATOR CUFF REPAIR Left 2002    Left       Review of patient's allergies indicates:   Allergen Reactions    Percocet [oxycodone-acetaminophen] Other (See Comments)     hyperactivity    Percodan [oxycodone hcl-oxycodone-asa] Other (See Comments)     hyperactivity       No current facility-administered medications on file prior to encounter.      Current Outpatient Medications on File Prior to Encounter   Medication Sig    allopurinol (ZYLOPRIM) 300 MG tablet TAKE 1 TABLET ONE TIME DAILY    amLODIPine (NORVASC) 5 MG tablet Take 1 tablet (5 mg total) by mouth once daily.    aspirin (ECOTRIN) 81  MG EC tablet Take 81 mg by mouth once daily.    benzonatate (TESSALON) 100 MG capsule Take 1 capsule (100 mg total) by mouth 3 (three) times daily as needed for Cough.    carvedilol (COREG) 25 MG tablet Take 1 tablet by mouth 2 times daily with meals.    cetirizine (ZYRTEC) 10 MG tablet Take 1 tablet (10 mg total) by mouth once daily.    dutasteride (AVODART) 0.5 mg capsule Take 1 capsule (0.5 mg total) by mouth once daily.    fluticasone (FLONASE) 50 mcg/actuation nasal spray 2 sprays (100 mcg total) by Each Nare route 2 (two) times daily.    ibuprofen (ADVIL,MOTRIN) 800 MG tablet Take 1 tablet (800 mg total) by mouth every 6 (six) hours as needed for Pain (headaches).    ipratropium (ATROVENT) 42 mcg (0.06 %) nasal spray 2 sprays by Nasal route 4 (four) times daily.    levothyroxine (SYNTHROID) 100 MCG tablet TAKE 1 TABLET BY MOUTH ONCE DAILY    multivitamin (ONE DAILY MULTIVITAMIN) per tablet Take 1 tablet by mouth once daily. Centrum Silver for Men    simvastatin (ZOCOR) 40 MG tablet Take 1 tablet (40 mg total) by mouth every evening.    valsartan (DIOVAN) 80 MG tablet Take 1 tablet (80 mg total) by mouth once daily.    venlafaxine (EFFEXOR-XR) 150 MG Cp24 Take 1 capsule (150 mg total) by mouth once daily.    albuterol 90 mcg/actuation inhaler Inhale 2 puffs into the lungs every 6 (six) hours as needed for Wheezing. Rescue    albuterol-ipratropium (DUO-NEB) 2.5 mg-0.5 mg/3 mL nebulizer solution Inhale contents of 1 vial via nebulizer every 6 hours as needed for wheezing.    azithromycin (Z-KATIUSKA) 250 MG tablet Take first 2 tablets together on day 1, then 1 every day until finished.    ciprofloxacin HCl (CIPRO) 500 MG tablet Take 1 tablet (500 mg total) by mouth 2 (two) times daily.    hydrocortisone (ANUSOL-HC) 2.5 % rectal cream Place rectally 2 (two) times daily.    hydrocortisone (PROCTO-KATIUSKA) 1 % crpe Place 1 applicator rectally 2 (two) times daily as needed.    ibuprofen (ADVIL,MOTRIN) 800  MG tablet Take 1 tablet (800 mg total) by mouth 3 (three) times daily.    umeclidinium-vilanterol (ANORO ELLIPTA) 62.5-25 mcg/actuation DsDv Inhale 1 Dose into the lungs once daily.     Family History     Problem Relation (Age of Onset)    Atrial fibrillation Son    Cancer Mother (65), Father    Kidney disease Brother    No Known Problems Sister    Stroke Son, Son        Tobacco Use    Smoking status: Never Smoker    Smokeless tobacco: Never Used   Substance and Sexual Activity    Alcohol use: No    Drug use: No    Sexual activity: Yes     Partners: Female     Review of Systems   Constitutional: Positive for activity change. Negative for appetite change.   HENT: Negative for congestion, ear pain, sneezing and sore throat.    Eyes: Negative for redness and visual disturbance.   Respiratory: Negative for cough, chest tightness and stridor.    Cardiovascular: Negative for chest pain.   Gastrointestinal: Negative for abdominal pain, blood in stool, diarrhea, nausea and vomiting.   Genitourinary: Negative for difficulty urinating, dysuria and hematuria.   Musculoskeletal: Negative for arthralgias, back pain, joint swelling, myalgias and neck pain.   Skin: Negative for rash.   Neurological: Positive for dizziness. Negative for syncope.        Presyncope    Psychiatric/Behavioral: Negative for agitation. The patient is not nervous/anxious.      Objective:     Vital Signs (Most Recent):  Temp: 96.7 °F (35.9 °C) (02/18/20 0323)  Pulse: 84 (02/18/20 0800)  Resp: 18 (02/18/20 0727)  BP: (!) 140/86 (02/18/20 0727)  SpO2: 97 % (02/18/20 0820) Vital Signs (24h Range):  Temp:  [96.3 °F (35.7 °C)-97.1 °F (36.2 °C)] 96.7 °F (35.9 °C)  Pulse:  [60-99] 84  Resp:  [16-23] 18  SpO2:  [96 %-98 %] 97 %  BP: (122-188)/(63-87) 140/86     Weight: 114.2 kg (251 lb 12.3 oz)  Body mass index is 39.43 kg/m².    Physical Exam   Constitutional: He is oriented to person, place, and time. He appears well-developed and well-nourished.   HENT:    Head: Normocephalic.   Eyes: Pupils are equal, round, and reactive to light.   Neck: Normal range of motion. Neck supple. No thyromegaly present.   Cardiovascular: Normal rate and regular rhythm. Exam reveals no friction rub.   No murmur heard.  Pulmonary/Chest: Effort normal. No respiratory distress. He has no wheezes.   Abdominal: There is no tenderness. There is no rebound and no guarding.   Musculoskeletal: Normal range of motion. He exhibits no edema or tenderness.   Lymphadenopathy:     He has no cervical adenopathy.   Neurological: He is alert and oriented to person, place, and time. He has normal reflexes. No cranial nerve deficit.   Skin: Skin is warm and dry.   Psychiatric: He has a normal mood and affect. Judgment and thought content normal.         CRANIAL NERVES     CN III, IV, VI   Pupils are equal, round, and reactive to light.       Significant Labs:   Lab Results   Component Value Date    WBC 4.67 02/18/2020    HGB 12.0 (L) 02/18/2020    HCT 36.8 (L) 02/18/2020    MCV 95 02/18/2020     02/18/2020       BMP  Lab Results   Component Value Date     02/18/2020    K 3.8 02/18/2020     02/18/2020    CO2 24 02/18/2020    BUN 14 02/18/2020    CREATININE 0.9 02/18/2020    CALCIUM 8.7 02/18/2020    ANIONGAP 8 02/18/2020    ESTGFRAFRICA >60 02/18/2020    EGFRNONAA >60 02/18/2020     Lab Results   Component Value Date    ALT 29 02/18/2020    AST 26 02/18/2020    ALKPHOS 95 02/18/2020    BILITOT 0.3 02/18/2020     BNP  Recent Labs   Lab 02/17/20  0934   BNP 32     Lab Results   Component Value Date    TSH 6.900 (H) 02/17/2020     Urinalysis  Recent Labs   Lab 02/17/20  1350   COLORU Yellow   SPECGRAV 1.020   PHUR 7.0   PROTEINUA 1+*   BACTERIA Moderate*   NITRITE Negative   LEUKOCYTESUR 1+*   UROBILINOGEN Negative   HYALINECASTS 1     Urine culture in process    Flu negative   Troponin:   Recent Labs   Lab 02/17/20  1910 02/18/20  0053 02/18/20  0651   TROPONINI 0.022 0.019 0.016        Significant Imaging:     US carotid    ECHO

## 2020-02-18 NOTE — PLAN OF CARE
02/18/20 1229   Discharge Assessment   Assessment Type Discharge Planning Assessment   Confirmed/corrected address and phone number on facesheet? Yes   Assessment information obtained from? Patient   Prior to hospitilization cognitive status: Alert/Oriented   Prior to hospitalization functional status: Independent   Current cognitive status: Alert/Oriented   Current Functional Status: Independent   Facility Arrived From: Home   Lives With alone   Able to Return to Prior Arrangements yes   Is patient able to care for self after discharge? Yes   Who are your caregiver(s) and their phone number(s)? Arik Hernandez (Son) [phone]   Patient's perception of discharge disposition home or selfcare   Readmission Within the Last 30 Days no previous admission in last 30 days   Patient currently being followed by outpatient case management? No   Patient currently receives any other outside agency services? No   Equipment Currently Used at Home CPAP;nebulizer   Do you have any problems affording any of your prescribed medications? No   Does the patient have transportation home? Yes   Transportation Anticipated family or friend will provide   Discharge Plan A Home   Discharge Plan B Home with family   DME Needed Upon Discharge  none   Patient/Family in Agreement with Plan yes       Patient states that he is needing assistance with house cleaning. ULISSES provided contact information for the [place] [facility]. No other post-acute care needs identified at this time. SW to continue to monitor needs throughout hospital stay.     Pily Bradford LMSW

## 2020-02-18 NOTE — NURSING
Pt given discharge paperwork. Pt has belongings in hand. Pt with no concerns or requests. Pt being escorted down.no deviations from am assessment. Pt is in no distress.

## 2020-02-18 NOTE — H&P
[facility] Medicine  History & Physical    Patient Name: Domonique Hernandez Jr.  MRN: [id number]  Admission Date: 2/17/2020  Attending Physician: Refugio Davenport MD   Primary Care Provider: Emiliano Cam MD         Patient information was obtained from patient and ER records.     Subjective:     Principal Problem:Syncope    Chief Complaint:   Chief Complaint   Patient presents with    Fall     Pt reports syncopal episodes x 2 this am. Pt reports becam dizzy, lightheaded before event. Pt denies injury        HPI: 78y/o W M who become faint and had some syncope from unknown cause. Also there was concern about some CP and concern as a cardiac issue.    Past Medical History:   Diagnosis Date    Anxiety     Arthritis     Back pain     Chronic bronchitis     Chronic gout     COPD (chronic obstructive pulmonary disease)     Depression     Emphysema of lung     Generalized headaches     GERD (gastroesophageal reflux disease)     Hyperlipidemia     Hypertension     Hypothyroidism     Obesity     Peripheral vascular disease     Sleep apnea     On CPAP    Stage 3 chronic kidney disease 4/18/2018    Thyroid disease        Past Surgical History:   Procedure Laterality Date    Bilateral mastoidectomies Bilateral 1984    for ear infection    COLONOSCOPY  2010    COLONOSCOPY N/A 1/31/2019    Procedure: COLONOSCOPY;  Surgeon: Joaquín Crawford MD;  Location: [facility];  Service: Colon and Rectal;  Laterality: N/A;    COLONOSCOPY N/A 8/13/2019    Procedure: COLONOSCOPY;  Surgeon: Joaquín Crawford MD;  Location: [facility] ([address] [place] [address]);  Service: Colon and Rectal;  Laterality: N/A;  Patient had inadequate prep with his last colonoscopy, was unable to finish the large volume GoLYTELY prep.  Please give him a smaller volume split dose prep, such as Movi-Prep or SuPrep.  He should do a light diet of easily digested food 2 days prior to the procedure     Eardrum repair  1984    Not sure which  side    HERNIA REPAIR  1984    Umbilical-screen    KNEE ARTHROSCOPY Left 1989    ROTATOR CUFF REPAIR Left 2002    Left       Review of patient's allergies indicates:   Allergen Reactions    Percocet [oxycodone-acetaminophen] Other (See Comments)     hyperactivity    Percodan [oxycodone hcl-oxycodone-asa] Other (See Comments)     hyperactivity       No current facility-administered medications on file prior to encounter.      Current Outpatient Medications on File Prior to Encounter   Medication Sig    allopurinol (ZYLOPRIM) 300 MG tablet TAKE 1 TABLET ONE TIME DAILY    amLODIPine (NORVASC) 5 MG tablet Take 1 tablet (5 mg total) by mouth once daily.    aspirin (ECOTRIN) 81 MG EC tablet Take 81 mg by mouth once daily.    benzonatate (TESSALON) 100 MG capsule Take 1 capsule (100 mg total) by mouth 3 (three) times daily as needed for Cough.    carvedilol (COREG) 25 MG tablet Take 1 tablet by mouth 2 times daily with meals.    cetirizine (ZYRTEC) 10 MG tablet Take 1 tablet (10 mg total) by mouth once daily.    dutasteride (AVODART) 0.5 mg capsule Take 1 capsule (0.5 mg total) by mouth once daily.    fluticasone (FLONASE) 50 mcg/actuation nasal spray 2 sprays (100 mcg total) by Each Nare route 2 (two) times daily.    ibuprofen (ADVIL,MOTRIN) 800 MG tablet Take 1 tablet (800 mg total) by mouth every 6 (six) hours as needed for Pain (headaches).    ipratropium (ATROVENT) 42 mcg (0.06 %) nasal spray 2 sprays by Nasal route 4 (four) times daily.    levothyroxine (SYNTHROID) 100 MCG tablet TAKE 1 TABLET BY MOUTH ONCE DAILY    multivitamin (ONE DAILY MULTIVITAMIN) per tablet Take 1 tablet by mouth once daily. Centrum Silver for Men    simvastatin (ZOCOR) 40 MG tablet Take 1 tablet (40 mg total) by mouth every evening.    tamsulosin (FLOMAX) 0.4 mg Cap TAKE 1 TO 2 CAPSULES BY MOUTH ONCE DAILY    traMADol (ULTRAM) 50 mg tablet Take 1 tablet (50 mg total) by mouth every 12 (twelve) hours as needed for pain.     valsartan (DIOVAN) 80 MG tablet Take 1 tablet (80 mg total) by mouth once daily.    venlafaxine (EFFEXOR-XR) 150 MG Cp24 Take 1 capsule (150 mg total) by mouth once daily.    albuterol 90 mcg/actuation inhaler Inhale 2 puffs into the lungs every 6 (six) hours as needed for Wheezing. Rescue    albuterol-ipratropium (DUO-NEB) 2.5 mg-0.5 mg/3 mL nebulizer solution Inhale contents of 1 vial via nebulizer every 6 hours as needed for wheezing.    azithromycin (Z-KATIUSKA) 250 MG tablet Take first 2 tablets together on day 1, then 1 every day until finished.    ciprofloxacin HCl (CIPRO) 500 MG tablet Take 1 tablet (500 mg total) by mouth 2 (two) times daily.    hydrocortisone (ANUSOL-HC) 2.5 % rectal cream Place rectally 2 (two) times daily.    hydrocortisone (PROCTO-KATIUSKA) 1 % crpe Place 1 applicator rectally 2 (two) times daily as needed.    ibuprofen (ADVIL,MOTRIN) 800 MG tablet Take 1 tablet (800 mg total) by mouth 3 (three) times daily.    umeclidinium-vilanterol (ANORO ELLIPTA) 62.5-25 mcg/actuation DsDv Inhale 1 Dose into the lungs once daily.     Family History     Problem Relation (Age of Onset)    Atrial fibrillation Son    Cancer Mother (65), Father    Kidney disease Brother    No Known Problems Sister    Stroke Son, Son        Tobacco Use    Smoking status: Never Smoker    Smokeless tobacco: Never Used   Substance and Sexual Activity    Alcohol use: No    Drug use: No    Sexual activity: Yes     Partners: Female     Review of Systems   Constitutional: Positive for activity change. Negative for appetite change.   HENT: Negative for congestion, ear pain, sneezing and sore throat.    Eyes: Negative for redness and visual disturbance.   Respiratory: Negative for cough, chest tightness and stridor.    Cardiovascular: Negative for chest pain.   Gastrointestinal: Negative for abdominal pain, blood in stool, diarrhea, nausea and vomiting.   Genitourinary: Negative for difficulty urinating, dysuria and hematuria.    Musculoskeletal: Negative for arthralgias, back pain, joint swelling, myalgias and neck pain.   Skin: Negative for rash.   Neurological: Positive for dizziness and syncope.   Psychiatric/Behavioral: Negative for agitation. The patient is not nervous/anxious.      Objective:     Vital Signs (Most Recent):  Temp: 97.1 °F (36.2 °C) (02/17/20 1618)  Pulse: 73 (02/17/20 1618)  Resp: 20 (02/17/20 1402)  BP: (!) 188/81 (02/17/20 1618)  SpO2: 97 % (02/17/20 1618) Vital Signs (24h Range):  Temp:  [96.3 °F (35.7 °C)-97.3 °F (36.3 °C)] 97.1 °F (36.2 °C)  Pulse:  [60-74] 73  Resp:  [16-20] 20  SpO2:  [96 %-98 %] 97 %  BP: (103-188)/(59-82) 188/81     Weight: 114.2 kg (251 lb 12.3 oz)  Body mass index is 39.43 kg/m².    Physical Exam   Constitutional: He is oriented to person, place, and time. He appears well-developed and well-nourished.   HENT:   Head: Normocephalic.   Eyes: Pupils are equal, round, and reactive to light.   Neck: Normal range of motion. Neck supple. No thyromegaly present.   Cardiovascular: Normal rate and regular rhythm. Exam reveals no friction rub.   No murmur heard.  Pulmonary/Chest: Effort normal. No respiratory distress. He has no wheezes.   Abdominal: There is no tenderness. There is no rebound and no guarding.   Musculoskeletal: Normal range of motion. He exhibits no edema or tenderness.   Lymphadenopathy:     He has no cervical adenopathy.   Neurological: He is alert and oriented to person, place, and time. He has normal reflexes. No cranial nerve deficit.   Skin: Skin is warm and dry.   Psychiatric: He has a normal mood and affect. Judgment and thought content normal.         CRANIAL NERVES     CN III, IV, VI   Pupils are equal, round, and reactive to light.       Significant Labs:   Troponin:   Recent Labs   Lab 02/17/20  0935 02/17/20  1241   TROPONINI 0.029* 0.033*       Significant Imaging: CXR: I have reviewed all pertinent results/findings within the past 24 hours and my personal findings  are:  ..    Assessment/Plan:     * Syncope  Neg EKG- cardiac enz/ and CIS consult and telemetry    Hypertension  Monitor and treat as needed      VTE Risk Mitigation (From admission, onward)         Ordered     IP VTE HIGH RISK PATIENT  Once      02/17/20 1403     Place sequential compression device  Until discontinued      02/17/20 1404                   Refugio Davenport MD  Department of Hospital Medicine   [facility]

## 2020-02-18 NOTE — PLAN OF CARE
Problem: Adult Inpatient Plan of Care  Goal: Plan of Care Review  Outcome: Ongoing, Progressing  Goal: Patient-Specific Goal (Individualization)  Outcome: Ongoing, Progressing  Goal: Absence of Hospital-Acquired Illness or Injury  Outcome: Ongoing, Progressing  Goal: Optimal Comfort and Wellbeing  Outcome: Ongoing, Progressing  Goal: Readiness for Transition of Care  Outcome: Ongoing, Progressing  Goal: Rounds/Family Conference  Outcome: Ongoing, Progressing     Problem: COPD Comorbidity  Goal: Maintenance of COPD Symptom Control  Outcome: Ongoing, Progressing     Problem: Heart Failure Comorbidity  Goal: Maintenance of Heart Failure Symptom Control  Outcome: Ongoing, Progressing     Problem: Hypertension Comorbidity  Goal: Blood Pressure in Desired Range  Outcome: Ongoing, Progressing     Problem: Obstructive Sleep Apnea Risk or Actual (Comorbidity Management)  Goal: Unobstructed Breathing During Sleep  Outcome: Ongoing, Progressing     Problem: Fall Injury Risk  Goal: Absence of Fall and Fall-Related Injury  Outcome: Ongoing, Progressing

## 2020-02-18 NOTE — SUBJECTIVE & OBJECTIVE
Past Medical History:   Diagnosis Date    Anxiety     Arthritis     Back pain     Chronic bronchitis     Chronic gout     COPD (chronic obstructive pulmonary disease)     Depression     Emphysema of lung     Generalized headaches     GERD (gastroesophageal reflux disease)     Hyperlipidemia     Hypertension     Hypothyroidism     Obesity     Peripheral vascular disease     Sleep apnea     On CPAP    Stage 3 chronic kidney disease 4/18/2018    Thyroid disease        Past Surgical History:   Procedure Laterality Date    Bilateral mastoidectomies Bilateral 1984    for ear infection    COLONOSCOPY  2010    COLONOSCOPY N/A 1/31/2019    Procedure: COLONOSCOPY;  Surgeon: Joaquín Crawford MD;  Location[facility];  Service: Colon and Rectal;  Laterality: N/A;    COLONOSCOPY N/A 8/13/2019    Procedure: COLONOSCOPY;  Surgeon: Joaquín Crawford MD;  Location: [facility] (4TH FLR);  Service: Colon and Rectal;  Laterality: N/A;  Patient had inadequate prep with his last colonoscopy, was unable to finish the large volume GoLYTELY prep.  Please give him a smaller volume split dose prep, such as Movi-Prep or SuPrep.  He should do a light diet of easily digested food 2 days prior to the procedure     Eardrum repair  1984    Not sure which side    HERNIA REPAIR  1984    Umbilical-screen    KNEE ARTHROSCOPY Left 1989    ROTATOR CUFF REPAIR Left 2002    Left       Review of patient's allergies indicates:   Allergen Reactions    Percocet [oxycodone-acetaminophen] Other (See Comments)     hyperactivity    Percodan [oxycodone hcl-oxycodone-asa] Other (See Comments)     hyperactivity       No current facility-administered medications on file prior to encounter.      Current Outpatient Medications on File Prior to Encounter   Medication Sig    allopurinol (ZYLOPRIM) 300 MG tablet TAKE 1 TABLET ONE TIME DAILY    amLODIPine (NORVASC) 5 MG tablet Take 1 tablet (5 mg total) by mouth once daily.    aspirin (ECOTRIN) 81  MG EC tablet Take 81 mg by mouth once daily.    benzonatate (TESSALON) 100 MG capsule Take 1 capsule (100 mg total) by mouth 3 (three) times daily as needed for Cough.    carvedilol (COREG) 25 MG tablet Take 1 tablet by mouth 2 times daily with meals.    cetirizine (ZYRTEC) 10 MG tablet Take 1 tablet (10 mg total) by mouth once daily.    dutasteride (AVODART) 0.5 mg capsule Take 1 capsule (0.5 mg total) by mouth once daily.    fluticasone (FLONASE) 50 mcg/actuation nasal spray 2 sprays (100 mcg total) by Each Nare route 2 (two) times daily.    ibuprofen (ADVIL,MOTRIN) 800 MG tablet Take 1 tablet (800 mg total) by mouth every 6 (six) hours as needed for Pain (headaches).    ipratropium (ATROVENT) 42 mcg (0.06 %) nasal spray 2 sprays by Nasal route 4 (four) times daily.    levothyroxine (SYNTHROID) 100 MCG tablet TAKE 1 TABLET BY MOUTH ONCE DAILY    multivitamin (ONE DAILY MULTIVITAMIN) per tablet Take 1 tablet by mouth once daily. Centrum Silver for Men    simvastatin (ZOCOR) 40 MG tablet Take 1 tablet (40 mg total) by mouth every evening.    tamsulosin (FLOMAX) 0.4 mg Cap TAKE 1 TO 2 CAPSULES BY MOUTH ONCE DAILY    traMADol (ULTRAM) 50 mg tablet Take 1 tablet (50 mg total) by mouth every 12 (twelve) hours as needed for pain.    valsartan (DIOVAN) 80 MG tablet Take 1 tablet (80 mg total) by mouth once daily.    venlafaxine (EFFEXOR-XR) 150 MG Cp24 Take 1 capsule (150 mg total) by mouth once daily.    albuterol 90 mcg/actuation inhaler Inhale 2 puffs into the lungs every 6 (six) hours as needed for Wheezing. Rescue    albuterol-ipratropium (DUO-NEB) 2.5 mg-0.5 mg/3 mL nebulizer solution Inhale contents of 1 vial via nebulizer every 6 hours as needed for wheezing.    azithromycin (Z-KATIUSKA) 250 MG tablet Take first 2 tablets together on day 1, then 1 every day until finished.    ciprofloxacin HCl (CIPRO) 500 MG tablet Take 1 tablet (500 mg total) by mouth 2 (two) times daily.    hydrocortisone  (ANUSOL-HC) 2.5 % rectal cream Place rectally 2 (two) times daily.    hydrocortisone (PROCTO-KATIUSKA) 1 % crpe Place 1 applicator rectally 2 (two) times daily as needed.    ibuprofen (ADVIL,MOTRIN) 800 MG tablet Take 1 tablet (800 mg total) by mouth 3 (three) times daily.    umeclidinium-vilanterol (ANORO ELLIPTA) 62.5-25 mcg/actuation DsDv Inhale 1 Dose into the lungs once daily.     Family History     Problem Relation (Age of Onset)    Atrial fibrillation Son    Cancer Mother (65), Father    Kidney disease Brother    No Known Problems Sister    Stroke Son, Son        Tobacco Use    Smoking status: Never Smoker    Smokeless tobacco: Never Used   Substance and Sexual Activity    Alcohol use: No    Drug use: No    Sexual activity: Yes     Partners: Female     Review of Systems   Constitutional: Positive for activity change. Negative for appetite change.   HENT: Negative for congestion, ear pain, sneezing and sore throat.    Eyes: Negative for redness and visual disturbance.   Respiratory: Negative for cough, chest tightness and stridor.    Cardiovascular: Negative for chest pain.   Gastrointestinal: Negative for abdominal pain, blood in stool, diarrhea, nausea and vomiting.   Genitourinary: Negative for difficulty urinating, dysuria and hematuria.   Musculoskeletal: Negative for arthralgias, back pain, joint swelling, myalgias and neck pain.   Skin: Negative for rash.   Neurological: Positive for dizziness and syncope.   Psychiatric/Behavioral: Negative for agitation. The patient is not nervous/anxious.      Objective:     Vital Signs (Most Recent):  Temp: 97.1 °F (36.2 °C) (02/17/20 1618)  Pulse: 73 (02/17/20 1618)  Resp: 20 (02/17/20 1402)  BP: (!) 188/81 (02/17/20 1618)  SpO2: 97 % (02/17/20 1618) Vital Signs (24h Range):  Temp:  [96.3 °F (35.7 °C)-97.3 °F (36.3 °C)] 97.1 °F (36.2 °C)  Pulse:  [60-74] 73  Resp:  [16-20] 20  SpO2:  [96 %-98 %] 97 %  BP: (103-188)/(59-82) 188/81     Weight: 114.2 kg (251 lb 12.3  oz)  Body mass index is 39.43 kg/m².    Physical Exam   Constitutional: He is oriented to person, place, and time. He appears well-developed and well-nourished.   HENT:   Head: Normocephalic.   Eyes: Pupils are equal, round, and reactive to light.   Neck: Normal range of motion. Neck supple. No thyromegaly present.   Cardiovascular: Normal rate and regular rhythm. Exam reveals no friction rub.   No murmur heard.  Pulmonary/Chest: Effort normal. No respiratory distress. He has no wheezes.   Abdominal: There is no tenderness. There is no rebound and no guarding.   Musculoskeletal: Normal range of motion. He exhibits no edema or tenderness.   Lymphadenopathy:     He has no cervical adenopathy.   Neurological: He is alert and oriented to person, place, and time. He has normal reflexes. No cranial nerve deficit.   Skin: Skin is warm and dry.   Psychiatric: He has a normal mood and affect. Judgment and thought content normal.         CRANIAL NERVES     CN III, IV, VI   Pupils are equal, round, and reactive to light.       Significant Labs:   Troponin:   Recent Labs   Lab 02/17/20  0935 02/17/20  1241   TROPONINI 0.029* 0.033*       Significant Imaging: CXR: I have reviewed all pertinent results/findings within the past 24 hours and my personal findings are:  ..

## 2020-02-19 ENCOUNTER — CLINICAL SUPPORT (OUTPATIENT)
Dept: FAMILY MEDICINE | Facility: CLINIC | Age: 80
End: 2020-02-19
Payer: MEDICARE

## 2020-02-19 ENCOUNTER — TELEPHONE (OUTPATIENT)
Dept: FAMILY MEDICINE | Facility: CLINIC | Age: 80
End: 2020-02-19

## 2020-02-19 DIAGNOSIS — E03.4 HYPOTHYROIDISM DUE TO ACQUIRED ATROPHY OF THYROID: ICD-10-CM

## 2020-02-19 DIAGNOSIS — E78.2 MIXED HYPERLIPIDEMIA: ICD-10-CM

## 2020-02-19 DIAGNOSIS — M1A.9XX0 CHRONIC GOUT WITHOUT TOPHUS, UNSPECIFIED CAUSE, UNSPECIFIED SITE: ICD-10-CM

## 2020-02-19 DIAGNOSIS — E78.5 HYPERLIPIDEMIA, UNSPECIFIED HYPERLIPIDEMIA TYPE: ICD-10-CM

## 2020-02-19 LAB
ALBUMIN SERPL BCP-MCNC: 3.6 G/DL (ref 3.5–5.2)
ALP SERPL-CCNC: 110 U/L (ref 55–135)
ALT SERPL W/O P-5'-P-CCNC: 44 U/L (ref 10–44)
ANION GAP SERPL CALC-SCNC: 12 MMOL/L (ref 8–16)
AST SERPL-CCNC: 41 U/L (ref 10–40)
BACTERIA UR CULT: ABNORMAL
BILIRUB SERPL-MCNC: 0.4 MG/DL (ref 0.1–1)
BUN SERPL-MCNC: 17 MG/DL (ref 8–23)
CALCIUM SERPL-MCNC: 9.6 MG/DL (ref 8.7–10.5)
CHLORIDE SERPL-SCNC: 104 MMOL/L (ref 95–110)
CHOLEST SERPL-MCNC: 185 MG/DL (ref 120–199)
CHOLEST/HDLC SERPL: 7.4 {RATIO} (ref 2–5)
CO2 SERPL-SCNC: 23 MMOL/L (ref 23–29)
CREAT SERPL-MCNC: 1.2 MG/DL (ref 0.5–1.4)
EST. GFR  (AFRICAN AMERICAN): >60 ML/MIN/1.73 M^2
EST. GFR  (NON AFRICAN AMERICAN): 58 ML/MIN/1.73 M^2
GLUCOSE SERPL-MCNC: 116 MG/DL (ref 70–110)
HDLC SERPL-MCNC: 25 MG/DL (ref 40–75)
HDLC SERPL: 13.5 % (ref 20–50)
LDLC SERPL CALC-MCNC: 109.2 MG/DL (ref 63–159)
NONHDLC SERPL-MCNC: 160 MG/DL
POTASSIUM SERPL-SCNC: 4 MMOL/L (ref 3.5–5.1)
PROT SERPL-MCNC: 6.9 G/DL (ref 6–8.4)
SODIUM SERPL-SCNC: 139 MMOL/L (ref 136–145)
T4 FREE SERPL-MCNC: 0.88 NG/DL (ref 0.71–1.51)
TRIGL SERPL-MCNC: 254 MG/DL (ref 30–150)
TSH SERPL DL<=0.005 MIU/L-ACNC: 4.19 UIU/ML (ref 0.4–4)
URATE SERPL-MCNC: 7.2 MG/DL (ref 3.4–7)

## 2020-02-19 PROCEDURE — 84443 ASSAY THYROID STIM HORMONE: CPT

## 2020-02-19 PROCEDURE — 36415 COLL VENOUS BLD VENIPUNCTURE: CPT | Mod: S$GLB,,, | Performed by: FAMILY MEDICINE

## 2020-02-19 PROCEDURE — 36415 PR COLLECTION VENOUS BLOOD,VENIPUNCTURE: ICD-10-PCS | Mod: S$GLB,,, | Performed by: FAMILY MEDICINE

## 2020-02-19 PROCEDURE — 84439 ASSAY OF FREE THYROXINE: CPT

## 2020-02-19 PROCEDURE — 80061 LIPID PANEL: CPT

## 2020-02-19 PROCEDURE — 84550 ASSAY OF BLOOD/URIC ACID: CPT

## 2020-02-19 PROCEDURE — 80053 COMPREHEN METABOLIC PANEL: CPT

## 2020-02-19 RX ORDER — PROMETHAZINE HYDROCHLORIDE AND DEXTROMETHORPHAN HYDROBROMIDE 6.25; 15 MG/5ML; MG/5ML
5 SYRUP ORAL 4 TIMES DAILY PRN
Qty: 120 ML | Refills: 3 | Status: SHIPPED | OUTPATIENT
Start: 2020-02-19 | End: 2020-03-06 | Stop reason: SDUPTHER

## 2020-02-19 NOTE — TELEPHONE ENCOUNTER
----- Message from Beverley Conklin sent at 2020  9:05 AM CST -----  Contact: self  Domonique Hernandez Jr.  MRN: [id number]  : 1940  PCP: Emiliano Cam  Home Phone      [phone]  Work Phone      Not on file.  Mobile          [phone]      MESSAGE:   Pt requests a sooner appointment than the  can schedule.  Does patient feel like they need to be seen today:  yes  What is the nature of the appointment:  cold  What visit type:  est  Did you check other providers/department schedules for availability:   Only lens emiliano  Comments:     Phone:  [phone]

## 2020-02-19 NOTE — TELEPHONE ENCOUNTER
Spoke with patient. He is asking something for cough and congestion. No temp.   Patient went to ER on 2/15/2020- received no medication for his cough, bronchitis ( he was diagnosed)

## 2020-02-24 ENCOUNTER — TELEPHONE (OUTPATIENT)
Dept: FAMILY MEDICINE | Facility: CLINIC | Age: 80
End: 2020-02-24

## 2020-02-24 NOTE — TELEPHONE ENCOUNTER
----- Message from Rhonda Kiran sent at 2020  1:04 PM CST -----  Contact: self  Domonique Hernandez Jr.  MRN: [id number]  : 1940  PCP: Emiliano Cam  Home Phone      [phone]  Work Phone      Not on file.  Mobile          [phone]      MESSAGE:   Pt states he still has a cough and is requesting to be seen before his 2020 appt. please return call @ [phone]. Thanks.

## 2020-02-27 ENCOUNTER — OFFICE VISIT (OUTPATIENT)
Dept: FAMILY MEDICINE | Facility: CLINIC | Age: 80
End: 2020-02-27
Payer: MEDICARE

## 2020-02-27 VITALS
DIASTOLIC BLOOD PRESSURE: 100 MMHG | HEIGHT: 67 IN | RESPIRATION RATE: 18 BRPM | BODY MASS INDEX: 38.24 KG/M2 | SYSTOLIC BLOOD PRESSURE: 164 MMHG | OXYGEN SATURATION: 98 % | TEMPERATURE: 100 F | WEIGHT: 243.63 LBS | HEART RATE: 90 BPM

## 2020-02-27 DIAGNOSIS — R50.9 FEVER, UNSPECIFIED FEVER CAUSE: Primary | ICD-10-CM

## 2020-02-27 DIAGNOSIS — J40 TRACHEOBRONCHITIS: ICD-10-CM

## 2020-02-27 LAB
CTP QC/QA: YES
POC MOLECULAR INFLUENZA A AGN: NEGATIVE
POC MOLECULAR INFLUENZA B AGN: NEGATIVE

## 2020-02-27 PROCEDURE — 87502 POCT INFLUENZA A/B MOLECULAR: ICD-10-PCS | Mod: QW,S$GLB,, | Performed by: NURSE PRACTITIONER

## 2020-02-27 PROCEDURE — 87502 INFLUENZA DNA AMP PROBE: CPT | Mod: QW,S$GLB,, | Performed by: NURSE PRACTITIONER

## 2020-02-27 PROCEDURE — 99999 PR PBB SHADOW E&M-EST. PATIENT-LVL V: ICD-10-PCS | Mod: PBBFAC,,, | Performed by: NURSE PRACTITIONER

## 2020-02-27 PROCEDURE — 1126F PR PAIN SEVERITY QUANTIFIED, NO PAIN PRESENT: ICD-10-PCS | Mod: S$GLB,,, | Performed by: NURSE PRACTITIONER

## 2020-02-27 PROCEDURE — 3080F DIAST BP >= 90 MM HG: CPT | Mod: CPTII,S$GLB,, | Performed by: NURSE PRACTITIONER

## 2020-02-27 PROCEDURE — 3077F PR MOST RECENT SYSTOLIC BLOOD PRESSURE >= 140 MM HG: ICD-10-PCS | Mod: CPTII,S$GLB,, | Performed by: NURSE PRACTITIONER

## 2020-02-27 PROCEDURE — 96372 THER/PROPH/DIAG INJ SC/IM: CPT | Mod: S$GLB,,, | Performed by: NURSE PRACTITIONER

## 2020-02-27 PROCEDURE — 3077F SYST BP >= 140 MM HG: CPT | Mod: CPTII,S$GLB,, | Performed by: NURSE PRACTITIONER

## 2020-02-27 PROCEDURE — 99213 OFFICE O/P EST LOW 20 MIN: CPT | Mod: 25,S$GLB,, | Performed by: NURSE PRACTITIONER

## 2020-02-27 PROCEDURE — 3080F PR MOST RECENT DIASTOLIC BLOOD PRESSURE >= 90 MM HG: ICD-10-PCS | Mod: CPTII,S$GLB,, | Performed by: NURSE PRACTITIONER

## 2020-02-27 PROCEDURE — 1101F PT FALLS ASSESS-DOCD LE1/YR: CPT | Mod: CPTII,S$GLB,, | Performed by: NURSE PRACTITIONER

## 2020-02-27 PROCEDURE — 1159F PR MEDICATION LIST DOCUMENTED IN MEDICAL RECORD: ICD-10-PCS | Mod: S$GLB,,, | Performed by: NURSE PRACTITIONER

## 2020-02-27 PROCEDURE — 96372 PR INJECTION,THERAP/PROPH/DIAG2ST, IM OR SUBCUT: ICD-10-PCS | Mod: S$GLB,,, | Performed by: NURSE PRACTITIONER

## 2020-02-27 PROCEDURE — 1101F PR PT FALLS ASSESS DOC 0-1 FALLS W/OUT INJ PAST YR: ICD-10-PCS | Mod: CPTII,S$GLB,, | Performed by: NURSE PRACTITIONER

## 2020-02-27 PROCEDURE — 1126F AMNT PAIN NOTED NONE PRSNT: CPT | Mod: S$GLB,,, | Performed by: NURSE PRACTITIONER

## 2020-02-27 PROCEDURE — 99999 PR PBB SHADOW E&M-EST. PATIENT-LVL V: CPT | Mod: PBBFAC,,, | Performed by: NURSE PRACTITIONER

## 2020-02-27 PROCEDURE — 99213 PR OFFICE/OUTPT VISIT, EST, LEVL III, 20-29 MIN: ICD-10-PCS | Mod: 25,S$GLB,, | Performed by: NURSE PRACTITIONER

## 2020-02-27 PROCEDURE — 1159F MED LIST DOCD IN RCRD: CPT | Mod: S$GLB,,, | Performed by: NURSE PRACTITIONER

## 2020-02-27 RX ORDER — METHYLPREDNISOLONE ACETATE 40 MG/ML
40 INJECTION, SUSPENSION INTRA-ARTICULAR; INTRALESIONAL; INTRAMUSCULAR; SOFT TISSUE
Status: COMPLETED | OUTPATIENT
Start: 2020-02-27 | End: 2020-02-27

## 2020-02-27 RX ORDER — LEVOFLOXACIN 500 MG/1
500 TABLET, FILM COATED ORAL DAILY
Qty: 7 TABLET | Refills: 0 | Status: SHIPPED | OUTPATIENT
Start: 2020-02-27 | End: 2020-03-05

## 2020-02-27 RX ADMIN — METHYLPREDNISOLONE ACETATE 40 MG: 40 INJECTION, SUSPENSION INTRA-ARTICULAR; INTRALESIONAL; INTRAMUSCULAR; SOFT TISSUE at 10:02

## 2020-02-27 NOTE — TELEPHONE ENCOUNTER
Pt showed up in clinic today insisting on being seen. Added him on with JF today for cough. Advised pt that he needs to keep appt tomorrow with [facility] for hospital f/u.

## 2020-02-27 NOTE — PROGRESS NOTES
Subjective:       Patient ID: Domonique Hernandez Jr. is a 79 y.o. male.    Chief Complaint: Cough; Nasal Congestion; and Headache    Cough   The current episode started in the past 7 days (3-4 days ago). The problem has been unchanged. The cough is non-productive. Associated symptoms include a fever, headaches and shortness of breath. Pertinent negatives include no chest pain, chills, ear pain, myalgias, nasal congestion, postnasal drip, rash, rhinorrhea, sore throat, sweats or wheezing. The symptoms are aggravated by lying down. He has tried prescription cough suppressant for the symptoms.     Review of Systems   Constitutional: Positive for fatigue and fever. Negative for appetite change and chills.   HENT: Negative.  Negative for congestion, ear pain, postnasal drip, rhinorrhea and sore throat.    Eyes: Negative.  Negative for visual disturbance.   Respiratory: Positive for cough and shortness of breath. Negative for wheezing.    Cardiovascular: Negative.  Negative for chest pain and palpitations.   Gastrointestinal: Negative.  Negative for abdominal pain, diarrhea, nausea and vomiting.   Genitourinary: Negative.  Negative for difficulty urinating.   Musculoskeletal: Negative.  Negative for myalgias.   Skin: Negative.  Negative for rash.   Neurological: Positive for headaches.   Psychiatric/Behavioral: Negative.  Negative for sleep disturbance. The patient is not nervous/anxious.    All other systems reviewed and are negative.      Objective:      Physical Exam   Constitutional: He is oriented to person, place, and time. He appears well-developed and well-nourished.   HENT:   Head: Normocephalic and atraumatic.   Right Ear: External ear normal.   Left Ear: External ear normal.   Nose: Nose normal.   Mouth/Throat: Uvula is midline and mucous membranes are normal. Posterior oropharyngeal erythema present.   Eyes: Pupils are equal, round, and reactive to light. Conjunctivae are normal.   Neck: Normal range of motion.  Neck supple.   Cardiovascular: Normal rate, regular rhythm and normal heart sounds.   No murmur heard.  Pulmonary/Chest: Effort normal and breath sounds normal. No respiratory distress.   Abdominal: Soft.   Musculoskeletal: Normal range of motion.   Neurological: He is alert and oriented to person, place, and time.   Skin: Skin is warm and dry.   Psychiatric: He has a normal mood and affect.   Nursing note and vitals reviewed.      Assessment:       1. Fever, unspecified fever cause    2. Tracheobronchitis        Plan:   Domonique was seen today for cough, nasal congestion and headache.    Diagnoses and all orders for this visit:    Fever, unspecified fever cause  -     POCT Influenza A/B Molecular - negative    Tracheobronchitis  -     methylPREDNISolone acetate injection 40 mg  -     levoFLOXacin (LEVAQUIN) 500 MG tablet; Take 1 tablet (500 mg total) by mouth once daily. for 7 days    RTC PRN

## 2020-02-28 ENCOUNTER — OFFICE VISIT (OUTPATIENT)
Dept: FAMILY MEDICINE | Facility: CLINIC | Age: 80
End: 2020-02-28
Payer: MEDICARE

## 2020-02-28 ENCOUNTER — HOSPITAL ENCOUNTER (EMERGENCY)
Facility: HOSPITAL | Age: 80
Discharge: HOME | End: 2020-02-28
Attending: SURGERY
Payer: MEDICARE

## 2020-02-28 VITALS
DIASTOLIC BLOOD PRESSURE: 85 MMHG | BODY MASS INDEX: 38.24 KG/M2 | TEMPERATURE: 97 F | HEIGHT: 67 IN | OXYGEN SATURATION: 98 % | SYSTOLIC BLOOD PRESSURE: 164 MMHG | RESPIRATION RATE: 19 BRPM | WEIGHT: 243.63 LBS | HEART RATE: 107 BPM

## 2020-02-28 VITALS
RESPIRATION RATE: 20 BRPM | HEIGHT: 67 IN | WEIGHT: 244.94 LBS | DIASTOLIC BLOOD PRESSURE: 100 MMHG | TEMPERATURE: 100 F | BODY MASS INDEX: 38.44 KG/M2 | OXYGEN SATURATION: 98 % | HEART RATE: 70 BPM | SYSTOLIC BLOOD PRESSURE: 146 MMHG

## 2020-02-28 DIAGNOSIS — E78.5 HYPERLIPIDEMIA, UNSPECIFIED HYPERLIPIDEMIA TYPE: ICD-10-CM

## 2020-02-28 DIAGNOSIS — R55 VASOVAGAL SYNCOPE: Primary | ICD-10-CM

## 2020-02-28 DIAGNOSIS — E03.4 HYPOTHYROIDISM DUE TO ACQUIRED ATROPHY OF THYROID: ICD-10-CM

## 2020-02-28 DIAGNOSIS — J40 BRONCHITIS: Primary | ICD-10-CM

## 2020-02-28 DIAGNOSIS — E78.2 MIXED HYPERLIPIDEMIA: ICD-10-CM

## 2020-02-28 DIAGNOSIS — J44.1 ACUTE EXACERBATION OF CHRONIC OBSTRUCTIVE PULMONARY DISEASE (COPD): ICD-10-CM

## 2020-02-28 LAB
ALBUMIN SERPL BCP-MCNC: 3.6 G/DL (ref 3.5–5.2)
ALP SERPL-CCNC: 97 U/L (ref 55–135)
ALT SERPL W/O P-5'-P-CCNC: 20 U/L (ref 10–44)
ANION GAP SERPL CALC-SCNC: 13 MMOL/L (ref 8–16)
AST SERPL-CCNC: 24 U/L (ref 10–40)
BASOPHILS # BLD AUTO: 0.05 K/UL (ref 0–0.2)
BASOPHILS NFR BLD: 0.4 % (ref 0–1.9)
BILIRUB SERPL-MCNC: 0.4 MG/DL (ref 0.1–1)
BUN SERPL-MCNC: 24 MG/DL (ref 8–23)
CALCIUM SERPL-MCNC: 9.4 MG/DL (ref 8.7–10.5)
CHLORIDE SERPL-SCNC: 106 MMOL/L (ref 95–110)
CO2 SERPL-SCNC: 22 MMOL/L (ref 23–29)
CREAT SERPL-MCNC: 1.3 MG/DL (ref 0.5–1.4)
DIFFERENTIAL METHOD: ABNORMAL
EOSINOPHIL # BLD AUTO: 0.2 K/UL (ref 0–0.5)
EOSINOPHIL NFR BLD: 1.6 % (ref 0–8)
ERYTHROCYTE [DISTWIDTH] IN BLOOD BY AUTOMATED COUNT: 15 % (ref 11.5–14.5)
EST. GFR  (AFRICAN AMERICAN): >60 ML/MIN/1.73 M^2
EST. GFR  (NON AFRICAN AMERICAN): 53 ML/MIN/1.73 M^2
GLUCOSE SERPL-MCNC: 111 MG/DL (ref 70–110)
HCT VFR BLD AUTO: 39.4 % (ref 40–54)
HGB BLD-MCNC: 13 G/DL (ref 14–18)
IMM GRANULOCYTES # BLD AUTO: 0.05 K/UL (ref 0–0.04)
IMM GRANULOCYTES NFR BLD AUTO: 0.4 % (ref 0–0.5)
INFLUENZA A, MOLECULAR: NEGATIVE
INFLUENZA B, MOLECULAR: NEGATIVE
LYMPHOCYTES # BLD AUTO: 1.3 K/UL (ref 1–4.8)
LYMPHOCYTES NFR BLD: 11.4 % (ref 18–48)
MCH RBC QN AUTO: 30.9 PG (ref 27–31)
MCHC RBC AUTO-ENTMCNC: 33 G/DL (ref 32–36)
MCV RBC AUTO: 94 FL (ref 82–98)
MONOCYTES # BLD AUTO: 1 K/UL (ref 0.3–1)
MONOCYTES NFR BLD: 8.5 % (ref 4–15)
NEUTROPHILS # BLD AUTO: 8.9 K/UL (ref 1.8–7.7)
NEUTROPHILS NFR BLD: 77.7 % (ref 38–73)
NRBC BLD-RTO: 0 /100 WBC
PLATELET # BLD AUTO: 210 K/UL (ref 150–350)
PMV BLD AUTO: 10.4 FL (ref 9.2–12.9)
POTASSIUM SERPL-SCNC: 3.8 MMOL/L (ref 3.5–5.1)
PROT SERPL-MCNC: 6.7 G/DL (ref 6–8.4)
RBC # BLD AUTO: 4.21 M/UL (ref 4.6–6.2)
SODIUM SERPL-SCNC: 141 MMOL/L (ref 136–145)
SPECIMEN SOURCE: NORMAL
WBC # BLD AUTO: 11.53 K/UL (ref 3.9–12.7)

## 2020-02-28 PROCEDURE — 99499 RISK ADDL DX/OHS AUDIT: ICD-10-PCS | Mod: S$GLB,,, | Performed by: FAMILY MEDICINE

## 2020-02-28 PROCEDURE — 99495 TRANSJ CARE MGMT MOD F2F 14D: CPT | Mod: S$GLB,,, | Performed by: FAMILY MEDICINE

## 2020-02-28 PROCEDURE — 63600175 PHARM REV CODE 636 W HCPCS: Performed by: SURGERY

## 2020-02-28 PROCEDURE — 80053 COMPREHEN METABOLIC PANEL: CPT

## 2020-02-28 PROCEDURE — 36415 COLL VENOUS BLD VENIPUNCTURE: CPT

## 2020-02-28 PROCEDURE — 99499 UNLISTED E&M SERVICE: CPT | Mod: S$GLB,,, | Performed by: FAMILY MEDICINE

## 2020-02-28 PROCEDURE — 99999 PR PBB SHADOW E&M-EST. PATIENT-LVL III: ICD-10-PCS | Mod: PBBFAC,,, | Performed by: FAMILY MEDICINE

## 2020-02-28 PROCEDURE — 99999 PR PBB SHADOW E&M-EST. PATIENT-LVL III: CPT | Mod: PBBFAC,,, | Performed by: FAMILY MEDICINE

## 2020-02-28 PROCEDURE — 87502 INFLUENZA DNA AMP PROBE: CPT

## 2020-02-28 PROCEDURE — 99495 TCM SERVICES (MODERATE COMPLEXITY): ICD-10-PCS | Mod: S$GLB,,, | Performed by: FAMILY MEDICINE

## 2020-02-28 PROCEDURE — 99284 EMERGENCY DEPT VISIT MOD MDM: CPT | Mod: 25

## 2020-02-28 PROCEDURE — 96372 THER/PROPH/DIAG INJ SC/IM: CPT

## 2020-02-28 PROCEDURE — 85025 COMPLETE CBC W/AUTO DIFF WBC: CPT

## 2020-02-28 RX ORDER — METHYLPREDNISOLONE 4 MG/1
TABLET ORAL
Qty: 1 PACKAGE | Refills: 0 | Status: SHIPPED | OUTPATIENT
Start: 2020-02-28 | End: 2020-08-04 | Stop reason: ALTCHOICE

## 2020-02-28 RX ORDER — METHYLPREDNISOLONE SOD SUCC 125 MG
125 VIAL (EA) INJECTION
Status: COMPLETED | OUTPATIENT
Start: 2020-02-28 | End: 2020-02-28

## 2020-02-28 RX ORDER — CEFTRIAXONE 1 G/1
1 INJECTION, POWDER, FOR SOLUTION INTRAMUSCULAR; INTRAVENOUS
Status: COMPLETED | OUTPATIENT
Start: 2020-02-28 | End: 2020-02-28

## 2020-02-28 RX ORDER — BENZONATATE 100 MG/1
200 CAPSULE ORAL 3 TIMES DAILY PRN
Qty: 20 CAPSULE | Refills: 0 | Status: SHIPPED | OUTPATIENT
Start: 2020-02-28 | End: 2020-03-09

## 2020-02-28 RX ADMIN — METHYLPREDNISOLONE SODIUM SUCCINATE 125 MG: 125 INJECTION, POWDER, FOR SOLUTION INTRAMUSCULAR; INTRAVENOUS at 07:02

## 2020-02-28 RX ADMIN — CEFTRIAXONE SODIUM 1 G: 1 INJECTION, POWDER, FOR SOLUTION INTRAMUSCULAR; INTRAVENOUS at 07:02

## 2020-02-28 NOTE — PROGRESS NOTES
"Subjective:       Patient ID: Domonique Hernandez Jr. is a 79 y.o. male.    Chief Complaint: Transitional Care (dpaulina Merritt 2/18); Loss of Consciousness; and Tracheobronchitis (saw Ms. Pearl yesterday, given Levaquin)      Transitional Care Note    Family and/or Caretaker present at visit?  No.  Diagnostic tests reviewed/disposition: No diagnosic tests pending after this hospitalization.  Disease/illness education: Syncope  Home health/community services discussion/referrals: Patient does not have home health established from hospital visit.  They do not need home health.  If needed, we will set up home health for the patient.   Establishment or re-establishment of referral orders for community resources: No other necessary community resources.   Discussion with other health care providers: No discussion with other health care providers necessary.         80y/o W M who was admitted for one day because he become faint and had some syncope from unknown cause. Also there was concern about some CP and concern as a cardiac issue.     * No surgery found *       Hospital Course:   78 yo wm multiple RF for CAD but no real thorough evaluation, occasional postural dizziness (does take "prostate medicine") has been in his usual state of health when he stood up this morning and everything became dark and he was dizzy.  He states he did not lose consciousness.  Episode lasted a few minutes, self resolved but felt somewhat unwell in the ER.   His workup showed initially low BP but it quickly recovered and he is now hypertensive. Not orthostatic. Troponin not suggestive of ACS.  EKG is wnl.   He states he is able to live alone, care for self at a reasonable level of function without any anginal equivalents. He denies ever having palpitations.  He denies any prior syncopal events. He denies any edema or SOB.     He has had stephen diureses over the evening with D5.      Cardiac markers normal.  Echo normal.    Was treated for bronchitis " yesterday.  Still coughing.    Review of Systems   Constitutional: Negative for fatigue and fever.   HENT: Positive for congestion, postnasal drip, rhinorrhea, sinus pressure and sneezing.    Eyes: Positive for itching.   Respiratory: Positive for cough and wheezing.    Cardiovascular: Negative for chest pain and palpitations.   Gastrointestinal: Negative for diarrhea, nausea and vomiting.   Genitourinary: Negative.    Musculoskeletal: Negative.    Skin: Negative.    Neurological: Negative.  Negative for headaches.   Hematological: Negative for adenopathy.   Psychiatric/Behavioral: Negative.        Objective:      Vitals:    02/28/20 1032   BP: (!) 146/100   Pulse: 70   Resp: 20   Temp: 100 °F (37.8 °C)     Physical Exam   Constitutional: He is oriented to person, place, and time. He appears well-developed and well-nourished.   HENT:   Head: Normocephalic and atraumatic.   Right Ear: Tympanic membrane and external ear normal.   Left Ear: Tympanic membrane and external ear normal.   Nose: Mucosal edema and rhinorrhea present.   Mouth/Throat: Uvula is midline and oropharynx is clear and moist.   Eyes: Pupils are equal, round, and reactive to light. Conjunctivae and EOM are normal.   Neck: Trachea normal and normal range of motion. Neck supple. No thyromegaly present.   Cardiovascular: Normal rate, regular rhythm, S1 normal, S2 normal, normal heart sounds and intact distal pulses. Exam reveals no gallop and no friction rub.   No murmur heard.  Pulmonary/Chest: Effort normal. No respiratory distress. He has wheezes. He has rales.   Abdominal: Soft. Normal appearance and bowel sounds are normal. There is no tenderness.   Musculoskeletal: Normal range of motion. He exhibits no edema.   Lymphadenopathy:     He has no cervical adenopathy.   Neurological: He is alert and oriented to person, place, and time. No cranial nerve deficit.   Skin: Skin is warm, dry and intact.   Psychiatric: He has a normal mood and affect. His  speech is normal.   Vitals reviewed.      Assessment:       1. Vasovagal syncope    2. Hyperlipidemia, unspecified hyperlipidemia type    3. Mixed hyperlipidemia    4. Hypothyroidism due to acquired atrophy of thyroid    5. Acute exacerbation of chronic obstructive pulmonary disease (COPD)        Plan:   Domonique was seen today for transitional care, loss of consciousness and tracheobronchitis.    Diagnoses and all orders for this visit:    Vasovagal syncope  Probably had hypotension episode which caused his syncope  Doing well now except for coughing and wheezing    Hyperlipidemia, unspecified hyperlipidemia type  Continue current meds    Hypothyroidism due to acquired atrophy of thyroid  Continue current Synthroid dose    Acute exacerbation of chronic obstructive pulmonary disease (COPD)  Restart Trelegy  Continue current antibiotics  Continue DuoNeb treatments 4 times daily.    RTC 1 week

## 2020-02-29 NOTE — ED TRIAGE NOTES
Pt c/o cough for past 5 days, denies chills, states he ran a little fever earlier during the day 100.3. Pt states he was seen by Dr. Cummings today and was started on abx

## 2020-02-29 NOTE — ED PROVIDER NOTES
[facility]                                                 Chief Complaint  79 y.o. male with Cough    History of Present Illness  Domonique Hernandez Jr. presents to the emergency room with continued cough today  Patient has had a continued cough since 2020 started for 2 months per history  Patient was seen twice this week by the [facility], on Levaquin  Patient was prescribed cough syrup by doctor Emiliano Cam this week  Patient states tonight he had a coughing spell, 98% oxygenation in the ER now  No wheezing or shortness of breath and denies any sputum production tonight    The history is provided by the patient   device was not used during this ER visit     Past Medical History   -- Anxiety     -- Arthritis     -- Chronic gout     -- COPD (chronic obstructive pulmonary disease)     -- Depression     -- Emphysema of lung     -- Generalized headaches     -- GERD (gastroesophageal reflux disease)     -- Hyperlipidemia     -- Hypertension     -- Hypothyroidism     -- Obesity     -- Sleep apnea     -- Thyroid disease        Past Surgical History   -- Bilateral mastoidectomies       -- COLONOSCOPY       -- Eardrum repair       -- HERNIA REPAIR       -- KNEE ARTHROSCOPY       -- ROTATOR CUFF REPAIR          ALLERGIES: Percocet and Percodan    I have reviewed all of this patient's past medical, surgical, family, and social   histories as well as active allergies and medications documented in the  electronic medical record    Review of Systems and Physical Exam      Review of Systems  -- Constitution - no fever, denies fatigue, no weakness, no chills  -- Eyes - no tearing or redness, no visual disturbance  -- Ear, Nose - no tinnitus or earache, no nasal congestion or discharge  -- Mouth,Throat - no sore throat, no toothache, normal voice, normal swallowing  -- Respiratory - cough and congestion, no shortness of breath, no COOL  -- Cardiovascular - denies chest pain, no  palpitations, denies claudication  -- Gastrointestinal - denies abdominal pain, nausea, vomiting, or diarrhea  -- Genitourinary - no dysuria, no hematuria, no flank pain, no bladder pain  -- Musculoskeletal - denies back pain, negative for trauma or injury  -- Neurological - no headache, denies weakness or seizure; no LOC  -- Skin - denies pallor, rash, or changes in skin. no hives or welts noted    Vital Signs  His oral temperature is 96.7 °F (35.9 °C).   His blood pressure is 164/85 and his pulse is 107.   His respiration is 19 and oxygen saturation is 98%.     Physical Exam  -- Nursing note and vitals reviewed  -- Constitutional: Appears well-developed and well-nourished  -- Head: Atraumatic. Normocephalic. No obvious abnormality  -- Eyes: Pupils are equal and reactive to light. Normal conjunctiva and lids  -- Nose: Nose normal in appearance, nares grossly normal. No discharge  -- Throat: Mucous membranes moist, pharynx normal, normal tonsils. No lesions   -- Ears: External ears and TM normal bilaterally. Normal hearing and no drainage  -- Neck: Normal range of motion. Neck supple. No masses, trachea midline  -- Cardiac: Normal rate, regular rhythm and normal heart sounds  -- Pulmonary: faint rhonchi at the bilateral bases with no active wheezing   -- Abdominal: Soft, no tenderness. Normal bowel sounds. Normal liver edge  -- Musculoskeletal: Normal range of motion, no effusions. Joints stable   -- Neurological: No focal deficits. Showed good interaction with staff  -- Skin: Warm and dry. No evidence of rash or cellulitis    Emergency Room Course      Treatment and Evaluation  -- Chest x-ray showed no infiltrate and showed no acute pathology  -- the patient tested negative for influenza   --  mg Solumedrol given today in the ER  -- IM 1 g Rocephin given today in the ER    ED Management  -- patient already on Levaquin and cough syrup at home  -- patient states he wants additional cough medication in the  ER  -- patient will be prescribe Tessalon Perles on ER discharge today  -- I will also prescribe the patient a Medrol Dosepak at his request  -- return to the ER with any concerning signs or symptoms after DC    Diagnosis  -- The encounter diagnosis was Bronchitis.    Disposition and Plan  -- Disposition: home  -- Condition: stable  -- Follow-up: Patient to follow up with Emiliano Cam MD in 1-2 days.  -- I advised the patient that we have found no life threatening condition today  -- At this time, I believe the patient is clinically stable for discharge.   -- The patient acknowledges that close follow up with a MD is required   -- Patient agrees to comply with all instruction and direction given in the ER    This note is dictated on M*Modal word recognition program.  There are word recognition mistakes that are occasionally missed on review.                 Uriel Damico MD  02/28/20 2002

## 2020-03-03 NOTE — PROGRESS NOTES
Patient, Domonique Hernandez Jr. (MRN #[id number]), presented with a recorded BMI of 38.36 kg/m^2 and a documented comorbidity(s):  - Hyperlipidemia  to which the severe obesity is a contributing factor. This is consistent with the definition of severe obesity (BMI 35.0-39.9) with comorbidity (ICD-10 E66.01, Z68.35). The patient's severe obesity was monitored, evaluated, addressed and/or treated. This addendum to the medical record is made on 03/02/2020.

## 2020-03-06 ENCOUNTER — OFFICE VISIT (OUTPATIENT)
Dept: FAMILY MEDICINE | Facility: CLINIC | Age: 80
End: 2020-03-06
Payer: MEDICARE

## 2020-03-06 VITALS
HEART RATE: 88 BPM | HEIGHT: 67 IN | SYSTOLIC BLOOD PRESSURE: 130 MMHG | OXYGEN SATURATION: 97 % | BODY MASS INDEX: 37.3 KG/M2 | WEIGHT: 237.63 LBS | DIASTOLIC BLOOD PRESSURE: 84 MMHG | RESPIRATION RATE: 16 BRPM

## 2020-03-06 DIAGNOSIS — G47.33 OSA ON CPAP: ICD-10-CM

## 2020-03-06 DIAGNOSIS — J40 TRACHEOBRONCHITIS: ICD-10-CM

## 2020-03-06 DIAGNOSIS — R55 VASOVAGAL SYNCOPE: ICD-10-CM

## 2020-03-06 DIAGNOSIS — J44.1 ACUTE EXACERBATION OF CHRONIC OBSTRUCTIVE PULMONARY DISEASE (COPD): Primary | ICD-10-CM

## 2020-03-06 PROCEDURE — 3079F DIAST BP 80-89 MM HG: CPT | Mod: CPTII,S$GLB,, | Performed by: FAMILY MEDICINE

## 2020-03-06 PROCEDURE — 3075F SYST BP GE 130 - 139MM HG: CPT | Mod: CPTII,S$GLB,, | Performed by: FAMILY MEDICINE

## 2020-03-06 PROCEDURE — 1126F AMNT PAIN NOTED NONE PRSNT: CPT | Mod: S$GLB,,, | Performed by: FAMILY MEDICINE

## 2020-03-06 PROCEDURE — 1126F PR PAIN SEVERITY QUANTIFIED, NO PAIN PRESENT: ICD-10-PCS | Mod: S$GLB,,, | Performed by: FAMILY MEDICINE

## 2020-03-06 PROCEDURE — 1159F MED LIST DOCD IN RCRD: CPT | Mod: S$GLB,,, | Performed by: FAMILY MEDICINE

## 2020-03-06 PROCEDURE — 1101F PT FALLS ASSESS-DOCD LE1/YR: CPT | Mod: CPTII,S$GLB,, | Performed by: FAMILY MEDICINE

## 2020-03-06 PROCEDURE — 99214 PR OFFICE/OUTPT VISIT, EST, LEVL IV, 30-39 MIN: ICD-10-PCS | Mod: S$GLB,,, | Performed by: FAMILY MEDICINE

## 2020-03-06 PROCEDURE — 99999 PR PBB SHADOW E&M-EST. PATIENT-LVL III: CPT | Mod: PBBFAC,,, | Performed by: FAMILY MEDICINE

## 2020-03-06 PROCEDURE — 3079F PR MOST RECENT DIASTOLIC BLOOD PRESSURE 80-89 MM HG: ICD-10-PCS | Mod: CPTII,S$GLB,, | Performed by: FAMILY MEDICINE

## 2020-03-06 PROCEDURE — 99214 OFFICE O/P EST MOD 30 MIN: CPT | Mod: S$GLB,,, | Performed by: FAMILY MEDICINE

## 2020-03-06 PROCEDURE — 1159F PR MEDICATION LIST DOCUMENTED IN MEDICAL RECORD: ICD-10-PCS | Mod: S$GLB,,, | Performed by: FAMILY MEDICINE

## 2020-03-06 PROCEDURE — 3075F PR MOST RECENT SYSTOLIC BLOOD PRESS GE 130-139MM HG: ICD-10-PCS | Mod: CPTII,S$GLB,, | Performed by: FAMILY MEDICINE

## 2020-03-06 PROCEDURE — 1101F PR PT FALLS ASSESS DOC 0-1 FALLS W/OUT INJ PAST YR: ICD-10-PCS | Mod: CPTII,S$GLB,, | Performed by: FAMILY MEDICINE

## 2020-03-06 PROCEDURE — 99999 PR PBB SHADOW E&M-EST. PATIENT-LVL III: ICD-10-PCS | Mod: PBBFAC,,, | Performed by: FAMILY MEDICINE

## 2020-03-06 RX ORDER — PROMETHAZINE HYDROCHLORIDE AND DEXTROMETHORPHAN HYDROBROMIDE 6.25; 15 MG/5ML; MG/5ML
5 SYRUP ORAL 4 TIMES DAILY PRN
Qty: 120 ML | Refills: 3 | Status: ON HOLD | OUTPATIENT
Start: 2020-03-06 | End: 2021-01-20 | Stop reason: HOSPADM

## 2020-03-06 NOTE — PROGRESS NOTES
Subjective:       Patient ID: Domonique Hernandez Jr. is a 79 y.o. male.    Chief Complaint: Bronchitis (1 week follow up; seen in ER since)      Transitional Care Note    Family and/or Caretaker present at visit?  No.  Diagnostic tests reviewed/disposition: No diagnosic tests pending after this hospitalization.  Disease/illness education: Syncope COPD exacerbation  Home health/community services discussion/referrals: Patient does not have home health established from hospital visit.  They do not need home health.  If needed, we will set up home health for the patient.   Establishment or re-establishment of referral orders for community resources: No other necessary community resources.   Discussion with other health care providers: No discussion with other health care providers necessary.         80y/o W M who was admitted for one day because he become faint and had some syncope from unknown cause. Also there was concern about some CP and concern as a cardiac issue.  I saw him after this hospitalization.  He has been doing well.  At the time I saw him he was having COPD issues was placed on breathing treatments, Trelegy, antibiotics.  That night he went back to the emergency room for coughing and congestion.  He received antibiotic injection and steroid injection.  He seems to be doing much better now.    Was treated for bronchitis yesterday.  Still coughing, but much better.    Review of Systems   Constitutional: Negative for fatigue and fever.   HENT: Positive for congestion, postnasal drip, rhinorrhea, sinus pressure and sneezing.    Eyes: Positive for itching.   Respiratory: Positive for cough and wheezing.    Cardiovascular: Negative for chest pain and palpitations.   Gastrointestinal: Negative for diarrhea, nausea and vomiting.   Genitourinary: Negative.    Musculoskeletal: Negative.    Skin: Negative.    Neurological: Negative.  Negative for headaches.   Hematological: Negative for adenopathy.    Psychiatric/Behavioral: Negative.        Objective:      Vitals:    03/06/20 1429   BP: 130/84   Pulse: 88   Resp: 16     Physical Exam   Constitutional: He is oriented to person, place, and time. He appears well-developed and well-nourished.   HENT:   Head: Normocephalic and atraumatic.   Right Ear: Tympanic membrane and external ear normal.   Left Ear: Tympanic membrane and external ear normal.   Nose: Mucosal edema and rhinorrhea present.   Mouth/Throat: Uvula is midline and oropharynx is clear and moist.   Eyes: Pupils are equal, round, and reactive to light. Conjunctivae and EOM are normal.   Neck: Trachea normal and normal range of motion. Neck supple. No thyromegaly present.   Cardiovascular: Normal rate, regular rhythm, S1 normal, S2 normal, normal heart sounds and intact distal pulses. Exam reveals no gallop and no friction rub.   No murmur heard.  Pulmonary/Chest: Effort normal. No respiratory distress. He has wheezes. He has rales.   Abdominal: Soft. Normal appearance and bowel sounds are normal. There is no tenderness.   Musculoskeletal: Normal range of motion. He exhibits no edema.   Lymphadenopathy:     He has no cervical adenopathy.   Neurological: He is alert and oriented to person, place, and time. No cranial nerve deficit.   Skin: Skin is warm, dry and intact.   Psychiatric: He has a normal mood and affect. His speech is normal.   Vitals reviewed.      Assessment:       1. Acute exacerbation of chronic obstructive pulmonary disease (COPD)    2. Tracheobronchitis    3. KARL on CPAP    4. Vasovagal syncope        Plan:   Domonique was seen today for transitional care, loss of consciousness and tracheobronchitis.    Diagnoses and all orders for this visit:    Vasovagal syncope  Probably had hypotension episode which caused his syncope  Doing well now except for coughing and wheezing    Hyperlipidemia, unspecified hyperlipidemia type  Continue current meds    Hypothyroidism due to acquired atrophy of  thyroid  Continue current Synthroid dose    Acute exacerbation of chronic obstructive pulmonary disease (COPD)  Restart Trelegy  Continue current antibiotics  Continue DuoNeb treatments 4 times daily.    RTC 3 months

## 2020-04-22 ENCOUNTER — OFFICE VISIT (OUTPATIENT)
Dept: URGENT CARE | Facility: CLINIC | Age: 80
End: 2020-04-22
Payer: MEDICARE

## 2020-04-22 VITALS
HEIGHT: 67 IN | OXYGEN SATURATION: 98 % | HEART RATE: 77 BPM | TEMPERATURE: 98 F | WEIGHT: 237 LBS | BODY MASS INDEX: 37.2 KG/M2

## 2020-04-22 DIAGNOSIS — R06.02 SOB (SHORTNESS OF BREATH): Primary | ICD-10-CM

## 2020-04-22 PROCEDURE — 99215 PR OFFICE/OUTPT VISIT, EST, LEVL V, 40-54 MIN: ICD-10-PCS | Mod: S$GLB,,, | Performed by: NURSE PRACTITIONER

## 2020-04-22 PROCEDURE — 99215 OFFICE O/P EST HI 40 MIN: CPT | Mod: S$GLB,,, | Performed by: NURSE PRACTITIONER

## 2020-04-22 RX ORDER — ASPIRIN 325 MG
325 TABLET ORAL
Status: COMPLETED | OUTPATIENT
Start: 2020-04-22 | End: 2020-04-22

## 2020-04-22 RX ADMIN — Medication 325 MG: at 09:04

## 2020-04-22 NOTE — PROGRESS NOTES
"Subjective:       Patient ID: Domonique Hernandez Jr. is a 79 y.o. male.    Vitals:  height is 5' 7" (1.702 m) and weight is 107.5 kg (237 lb). His oral temperature is 98.1 °F (36.7 °C). His pulse is 77. His oxygen saturation is 98%.     Chief Complaint: No chief complaint on file.    Sudden onset shortness of breath this am per pt with nausea, denies cp. Has copd, htn, sleep apnea, hypercholesterolemia, gerd, anixety and other pmh as noted, no known per pt hx chf. Pt states diabetic but nothing in quick review of chart for diabetes. He is having a hard time getting words out, answering in one word answers but 02 at 98%. No meds taken today per pt.    Shortness of Breath   This is a new problem. The current episode started today. The problem occurs constantly. The problem has been rapidly worsening. Associated symptoms include headaches and orthopnea. Pertinent negatives include no abdominal pain, chest pain, claudication, coryza, ear pain, fever, hemoptysis, leg pain, leg swelling, neck pain, PND, rash, rhinorrhea, sore throat, sputum production, swollen glands, syncope, vomiting or wheezing. Nothing aggravates the symptoms. The patient has no known risk factors for DVT/PE. He has tried nothing for the symptoms. His past medical history is significant for chronic lung disease and COPD. There is no history of a recent surgery.       Constitution: Positive for activity change (decreased tolerance adls today). Negative for appetite change, chills, sweating, fatigue, fever, unexpected weight change, generalized weakness and international travel in last 60 days.   HENT: Negative for ear pain, congestion and sore throat.    Neck: Negative for neck pain, neck stiffness and painful lymph nodes.   Cardiovascular: Negative for chest pain, leg swelling and passing out.   Respiratory: Positive for shortness of breath. Negative for sleep apnea, chest tightness, cough, sputum production, bloody sputum, COPD, stridor, wheezing and " asthma.    Gastrointestinal: Positive for nausea. Negative for abdominal pain, vomiting and diarrhea.   Genitourinary: Negative for dysuria and frequency.   Skin: Negative for rash and erythema.   Allergic/Immunologic: Negative for asthma.   Neurological: Positive for dizziness and headaches.   Hematologic/Lymphatic: Negative for swollen lymph nodes.       Objective:      Physical Exam   Constitutional: He is oriented to person, place, and time. He appears well-developed and well-nourished. He is cooperative.  Non-toxic appearance. He does not have a sickly appearance. He appears ill. No distress.   HENT:   Head: Normocephalic and atraumatic.   Right Ear: Hearing and external ear normal.   Left Ear: Hearing and external ear normal.   Nose: Nose normal. No nasal deformity.   Mouth/Throat: Oropharynx is clear and moist and mucous membranes are normal. No posterior oropharyngeal erythema.   Eyes: Conjunctivae and lids are normal. Right eye exhibits no discharge. Left eye exhibits no discharge. No scleral icterus.   Neck: Trachea normal, normal range of motion, full passive range of motion without pain and phonation normal. Neck supple. No neck rigidity. No tracheal deviation, no edema and no erythema present.   Cardiovascular: Normal rate, regular rhythm, normal heart sounds, intact distal pulses and normal pulses.   No murmur heard.  No pitting edema.   Pulmonary/Chest: Breath sounds normal. No respiratory distress. He has no decreased breath sounds. He has no wheezes. He has no rhonchi. He has no rales. He exhibits no tenderness.   Lungs cta but pt severely dyspneic getting one word answers out. RA sats normal. No wheezing.   Abdominal: Soft. Normal appearance. There is no tenderness.   Musculoskeletal: Normal range of motion. He exhibits no edema or deformity.   Neurological: He is alert and oriented to person, place, and time. He exhibits normal muscle tone. Coordination normal.   Skin: Skin is warm, intact,  "diaphoretic and not pale. Capillary refill takes less than 2 seconds.   daiphoretic erythema  Psychiatric: He has a normal mood and affect. His speech is normal and behavior is normal. Judgment and thought content normal. Cognition and memory are normal.   Nursing note and vitals reviewed.        Assessment:       1. SOB (shortness of breath)        Plan:     Pt with sudden sob, diaphoresis. EMS initiated immediately upon pt evaluation and arrived at 0934am while this staff attempting to place ekg electrodes, unable to adhere due to diaphoresis, ems took over at this point. Given asa 325mg at 0934am this provider and ems aware. Limited evaluation in urgent care due to initiation of EMS and quick ems arrival. EKG in clinic per EMS with  Sinus rhythm, nonspecific T wave abnormality.  Pt en route to [facility] per EMS, s.o. Present in parking lot "[place]" who pt states is his girlfriend notified.    SOB (shortness of breath)  -     IN OFFICE EKG 12-LEAD (to Muse)           "

## 2020-04-24 ENCOUNTER — TELEPHONE (OUTPATIENT)
Dept: FAMILY MEDICINE | Facility: CLINIC | Age: 80
End: 2020-04-24

## 2020-04-24 NOTE — TELEPHONE ENCOUNTER
Contacted pt states he was seen in the ER on 04/22/2020. Pt states he was told to inform his PCP. Apt scheduled for 05/04/2020 at 8:45 with Dr. Cummings.

## 2020-04-24 NOTE — TELEPHONE ENCOUNTER
----- Message from Finn Jhaveri sent at 2020  9:17 AM CDT -----  Contact: Patient  Domonique Hernandez Jr.  MRN: [id number]  : 1940  PCP: Emiliano Cam  Home Phone      [phone]  Work Phone      Not on file.  Mobile          [phone]      MESSAGE: seen in ER @ [facility] - requesting to speak with nurse    Call [phone]    PCP: Dorina

## 2020-04-26 ENCOUNTER — HOSPITAL ENCOUNTER (EMERGENCY)
Facility: HOSPITAL | Age: 80
Discharge: LEFT BEFORE DISCHARGE | End: 2020-04-26
Attending: SURGERY
Payer: MEDICARE

## 2020-04-26 VITALS
SYSTOLIC BLOOD PRESSURE: 104 MMHG | HEART RATE: 68 BPM | TEMPERATURE: 98 F | BODY MASS INDEX: 39.16 KG/M2 | RESPIRATION RATE: 16 BRPM | DIASTOLIC BLOOD PRESSURE: 70 MMHG | WEIGHT: 250 LBS | OXYGEN SATURATION: 97 %

## 2020-04-26 DIAGNOSIS — R55 SYNCOPE: ICD-10-CM

## 2020-04-26 DIAGNOSIS — M25.562 LEFT KNEE PAIN: ICD-10-CM

## 2020-04-26 DIAGNOSIS — Z53.29 LEFT AGAINST MEDICAL ADVICE: Primary | ICD-10-CM

## 2020-04-26 LAB
ALBUMIN SERPL BCP-MCNC: 3.3 G/DL (ref 3.5–5.2)
ALP SERPL-CCNC: 77 U/L (ref 55–135)
ALT SERPL W/O P-5'-P-CCNC: 15 U/L (ref 10–44)
ANION GAP SERPL CALC-SCNC: 11 MMOL/L (ref 8–16)
AST SERPL-CCNC: 12 U/L (ref 10–40)
BASOPHILS # BLD AUTO: 0.02 K/UL (ref 0–0.2)
BASOPHILS NFR BLD: 0.2 % (ref 0–1.9)
BILIRUB SERPL-MCNC: 0.4 MG/DL (ref 0.1–1)
BNP SERPL-MCNC: 176 PG/ML (ref 0–99)
BUN SERPL-MCNC: 21 MG/DL (ref 8–23)
CALCIUM SERPL-MCNC: 9.2 MG/DL (ref 8.7–10.5)
CHLORIDE SERPL-SCNC: 103 MMOL/L (ref 95–110)
CK MB SERPL-MCNC: 2.7 NG/ML (ref 0.1–6.5)
CK MB SERPL-MCNC: 3.1 NG/ML (ref 0.1–6.5)
CK MB SERPL-RTO: 5.9 % (ref 0–5)
CK MB SERPL-RTO: 6.2 % (ref 0–5)
CK SERPL-CCNC: 46 U/L (ref 20–200)
CK SERPL-CCNC: 46 U/L (ref 20–200)
CK SERPL-CCNC: 50 U/L (ref 20–200)
CK SERPL-CCNC: 50 U/L (ref 20–200)
CO2 SERPL-SCNC: 22 MMOL/L (ref 23–29)
CREAT SERPL-MCNC: 1.1 MG/DL (ref 0.5–1.4)
DIFFERENTIAL METHOD: ABNORMAL
EOSINOPHIL # BLD AUTO: 0 K/UL (ref 0–0.5)
EOSINOPHIL NFR BLD: 0.4 % (ref 0–8)
ERYTHROCYTE [DISTWIDTH] IN BLOOD BY AUTOMATED COUNT: 13.6 % (ref 11.5–14.5)
EST. GFR  (AFRICAN AMERICAN): >60 ML/MIN/1.73 M^2
EST. GFR  (NON AFRICAN AMERICAN): >60 ML/MIN/1.73 M^2
GLUCOSE SERPL-MCNC: 123 MG/DL (ref 70–110)
GROUP A STREP, MOLECULAR: NEGATIVE
HCT VFR BLD AUTO: 40.8 % (ref 40–54)
HGB BLD-MCNC: 13.5 G/DL (ref 14–18)
IMM GRANULOCYTES # BLD AUTO: 0.04 K/UL (ref 0–0.04)
IMM GRANULOCYTES NFR BLD AUTO: 0.4 % (ref 0–0.5)
INFLUENZA A, MOLECULAR: NEGATIVE
INFLUENZA B, MOLECULAR: NEGATIVE
LYMPHOCYTES # BLD AUTO: 0.9 K/UL (ref 1–4.8)
LYMPHOCYTES NFR BLD: 10 % (ref 18–48)
MCH RBC QN AUTO: 30.8 PG (ref 27–31)
MCHC RBC AUTO-ENTMCNC: 33.1 G/DL (ref 32–36)
MCV RBC AUTO: 93 FL (ref 82–98)
MONOCYTES # BLD AUTO: 0.4 K/UL (ref 0.3–1)
MONOCYTES NFR BLD: 4.8 % (ref 4–15)
NEUTROPHILS # BLD AUTO: 7.7 K/UL (ref 1.8–7.7)
NEUTROPHILS NFR BLD: 84.2 % (ref 38–73)
NRBC BLD-RTO: 0 /100 WBC
PLATELET # BLD AUTO: 193 K/UL (ref 150–350)
PMV BLD AUTO: 11 FL (ref 9.2–12.9)
POTASSIUM SERPL-SCNC: 3.8 MMOL/L (ref 3.5–5.1)
PROT SERPL-MCNC: 5.8 G/DL (ref 6–8.4)
RBC # BLD AUTO: 4.39 M/UL (ref 4.6–6.2)
SARS-COV-2 RDRP RESP QL NAA+PROBE: NEGATIVE
SODIUM SERPL-SCNC: 136 MMOL/L (ref 136–145)
SPECIMEN SOURCE: NORMAL
TROPONIN I SERPL DL<=0.01 NG/ML-MCNC: 0.26 NG/ML (ref 0–0.03)
TROPONIN I SERPL DL<=0.01 NG/ML-MCNC: 0.29 NG/ML (ref 0–0.03)
WBC # BLD AUTO: 9.14 K/UL (ref 3.9–12.7)

## 2020-04-26 PROCEDURE — 87651 STREP A DNA AMP PROBE: CPT

## 2020-04-26 PROCEDURE — 93005 ELECTROCARDIOGRAM TRACING: CPT

## 2020-04-26 PROCEDURE — 84484 ASSAY OF TROPONIN QUANT: CPT

## 2020-04-26 PROCEDURE — 85025 COMPLETE CBC W/AUTO DIFF WBC: CPT

## 2020-04-26 PROCEDURE — 80053 COMPREHEN METABOLIC PANEL: CPT

## 2020-04-26 PROCEDURE — U0002 COVID-19 LAB TEST NON-CDC: HCPCS

## 2020-04-26 PROCEDURE — 36415 COLL VENOUS BLD VENIPUNCTURE: CPT

## 2020-04-26 PROCEDURE — 87502 INFLUENZA DNA AMP PROBE: CPT

## 2020-04-26 PROCEDURE — 82550 ASSAY OF CK (CPK): CPT

## 2020-04-26 PROCEDURE — 83880 ASSAY OF NATRIURETIC PEPTIDE: CPT

## 2020-04-26 PROCEDURE — 82553 CREATINE MB FRACTION: CPT

## 2020-04-26 PROCEDURE — 25000003 PHARM REV CODE 250: Performed by: SURGERY

## 2020-04-26 PROCEDURE — 99285 EMERGENCY DEPT VISIT HI MDM: CPT | Mod: 25

## 2020-04-26 PROCEDURE — 93010 EKG 12-LEAD: ICD-10-PCS | Mod: ,,, | Performed by: INTERNAL MEDICINE

## 2020-04-26 PROCEDURE — 93010 ELECTROCARDIOGRAM REPORT: CPT | Mod: ,,, | Performed by: INTERNAL MEDICINE

## 2020-04-26 RX ORDER — ASPIRIN 325 MG
325 TABLET ORAL
Status: COMPLETED | OUTPATIENT
Start: 2020-04-26 | End: 2020-04-26

## 2020-04-26 RX ADMIN — ASPIRIN 325 MG ORAL TABLET 325 MG: 325 PILL ORAL at 01:04

## 2020-04-26 NOTE — ED TRIAGE NOTES
Stated he became light headed and fell striking the left head area and his left knee.  Minor abrasion to both areas.  No neuro deficits.

## 2020-04-26 NOTE — ED PROVIDER NOTES
[facility]                                                 Chief Complaint  79 y.o. male with Fall (fell today. struck his head and left knee abrasion)    History of Present Illness  Domonique Hernandez JrTiffanie presents to the emergency room with syncopal episode today  The patient has had ongoing syncopal episodes for last couple of months now  Patient states that he he passes out frequently, again this afternoon at home   Patient presents with no actual complaint other than minor headache after fall  Pt states that he was seen and evaluated with a negative workup in February  ROS is negative for chest pain or shortness of breath, normal neuro exam    The history is provided by the patient   device was not used during this ER visit    Past Medical History   -- Anxiety     -- Arthritis     -- Chronic gout     -- COPD (chronic obstructive pulmonary disease)     -- Depression     -- Emphysema of lung     -- Generalized headaches     -- GERD (gastroesophageal reflux disease)     -- Hyperlipidemia     -- Hypertension     -- Hypothyroidism     -- Obesity     -- Sleep apnea     -- Thyroid disease        Past Surgical History   -- Bilateral mastoidectomies       -- COLONOSCOPY       -- Eardrum repair       -- HERNIA REPAIR       -- KNEE ARTHROSCOPY       -- ROTATOR CUFF REPAIR          ALLERGIES: Percocet and Percodan    I have reviewed all of this patient's past medical, surgical, family, and social   histories as well as active allergies and medications documented in the  electronic medical record    Review of Systems and Physical Exam      Review of Systems  -- Constitution - weakness and fatigue after syncopal episode  -- Eyes - no tearing or redness, no visual disturbance  -- Ear, Nose - no tinnitus or earache, no nasal congestion or discharge  -- Mouth,Throat - no sore throat, no toothache, normal voice, normal swallowing  -- Respiratory - denies cough and congestion, no shortness of  breath, no COOL  -- Cardiovascular - denies chest pain, no palpitations, denies claudication  -- Gastrointestinal - denies abdominal pain, nausea, vomiting, or diarrhea  -- Genitourinary - no dysuria, denies flank pain, no hematuria, no STD risk  -- Musculoskeletal - denies back pain, negative for trauma or injury  -- Neurological - headache, denies weakness or seizure; no LOC  -- Skin - denies pallor, rash, or changes in skin. no hives or welts noted    Vital Signs  His oral temperature is 98 °F (36.7 °C).   His blood pressure is 104/70 and his pulse is 68.   His respiration is 16 and oxygen saturation is 97%.     Physical Exam  -- Nursing note and vitals reviewed  -- Constitutional: Appears well-developed and well-nourished  -- Head: Atraumatic. Normocephalic. No obvious abnormality  -- Eyes: Pupils are equal and reactive to light. Normal conjunctiva and lids  -- Cardiac: Normal rate, regular rhythm and normal heart sounds  -- Pulmonary: Normal respiratory effort, breath sounds clear to auscultation  -- Abdominal: Soft, no tenderness. Normal bowel sounds. Normal liver edge  -- Musculoskeletal: Normal range of motion, no effusions. Joints stable   -- Neurological: No focal deficits. Showed good interaction with staff  -- Vascular: Posterior tibial, dorsalis pedis and radial pulses 2+ bilaterally    -- Lymphatics: No cervical or peripheral lymphadenopathy. No edema noted    Emergency Room Course      Lab Results     K 3.8      CO2 22 (L)   BUN 21   CREATININE 1.1    (H)   ALKPHOS 77   AST 12   ALT 15   BILITOT 0.4   ALBUMIN 3.3 (L)   PROT 5.8 (L)   WBC 9.14   HGB 13.5 (L)   HCT 40.8      CPK 50   CPK 50   CPKMB 3.1   TROPONINI 0.255 (H)    (H)   MG 1.8   TSH 4.187 (H)     EKG  -- The EKG findings today were without concerning findings from baseline  -- 1st troponin 0.29, 2nd troponin 0.25    Radiology  -- The CT of the head performed in the ER today was negative for acute  pathology  -- Chest x-ray showed no infiltrate and showed no acute pathology  -- left knee x-ray showed no acute findings    Additional Work up and Treatment  -- rapid Coronavirus PCR in the emergency room was negative  -- 325 milligram aspirin given in the ER    ED Physician Management  -- Diagnosis management comments: 79 y.o. male with syncope today  -- patient has had several syncopal episode, was admitted in February 2020  -- negative workup including carotid ultrasound and echocardiogram at that time  -- patient has syncopal episode again today, states he feels fine now in the ER  -- had a negative workup with the exception of mildly elevated troponin x2  -- I think it is reasonable to watch this patient and trend out the troponin elevation  -- patient states he is very concerned about COVID and does not wish to stay  -- patient signed out AMA, will return with any concerning symptoms after leaving  -- patient reiterates he is asymptomatic now and will follow-up with Cardiology    Diagnosis  -- The primary encounter diagnosis was Left against medical advice.   -- Diagnoses of Syncope and Left knee pain were also pertinent to this visit.    Disposition and Plan  -- Disposition: home  -- Condition: stable  -- Patient is of sound mind and capable of making rational decisions  -- Patient is fully aware of the diagnosis and the consequences of leaving AMA  -- Pt was told inpatient treatment needed but wants to leave against advice  -- Patient signed the AMA papers; all questions answered. Voiced understanding     This note is dictated on M*Modal word recognition program.  There are word recognition mistakes that are occasionally missed on review.         Uriel Damico MD  04/26/20 8150

## 2020-05-11 ENCOUNTER — HOSPITAL ENCOUNTER (OUTPATIENT)
Dept: RADIOLOGY | Facility: HOSPITAL | Age: 80
Discharge: HOME | End: 2020-05-11
Attending: INTERNAL MEDICINE
Payer: MEDICARE

## 2020-05-11 DIAGNOSIS — R91.8 ABNORMAL FINDINGS ON DIAGNOSTIC IMAGING OF LUNG: ICD-10-CM

## 2020-05-11 DIAGNOSIS — J44.89 OBSTRUCTIVE CHRONIC BRONCHITIS WITHOUT EXACERBATION: Primary | ICD-10-CM

## 2020-05-11 PROCEDURE — 71250 CT THORAX DX C-: CPT | Mod: TC

## 2020-05-11 PROCEDURE — 71250 CT CHEST WITHOUT CONTRAST: ICD-10-PCS | Mod: 26,,, | Performed by: RADIOLOGY

## 2020-05-11 PROCEDURE — 71250 CT THORAX DX C-: CPT | Mod: 26,,, | Performed by: RADIOLOGY

## 2020-05-15 ENCOUNTER — OFFICE VISIT (OUTPATIENT)
Dept: FAMILY MEDICINE | Facility: CLINIC | Age: 80
End: 2020-05-15
Payer: MEDICARE

## 2020-05-15 VITALS
RESPIRATION RATE: 18 BRPM | BODY MASS INDEX: 36.5 KG/M2 | SYSTOLIC BLOOD PRESSURE: 132 MMHG | HEIGHT: 67 IN | HEART RATE: 60 BPM | DIASTOLIC BLOOD PRESSURE: 80 MMHG | WEIGHT: 232.56 LBS

## 2020-05-15 DIAGNOSIS — M25.562 CHRONIC PAIN OF LEFT KNEE: ICD-10-CM

## 2020-05-15 DIAGNOSIS — E03.9 HYPOTHYROIDISM, UNSPECIFIED TYPE: Primary | ICD-10-CM

## 2020-05-15 DIAGNOSIS — R55 SYNCOPE, UNSPECIFIED SYNCOPE TYPE: ICD-10-CM

## 2020-05-15 DIAGNOSIS — G89.29 CHRONIC PAIN OF LEFT KNEE: ICD-10-CM

## 2020-05-15 DIAGNOSIS — R73.01 IMPAIRED FASTING BLOOD SUGAR: ICD-10-CM

## 2020-05-15 LAB
ALBUMIN SERPL BCP-MCNC: 3.3 G/DL (ref 3.5–5.2)
ALP SERPL-CCNC: 97 U/L (ref 55–135)
ALT SERPL W/O P-5'-P-CCNC: 11 U/L (ref 10–44)
ANION GAP SERPL CALC-SCNC: 10 MMOL/L (ref 8–16)
AST SERPL-CCNC: 13 U/L (ref 10–40)
BILIRUB SERPL-MCNC: 0.4 MG/DL (ref 0.1–1)
BNP SERPL-MCNC: 96 PG/ML (ref 0–99)
BUN SERPL-MCNC: 18 MG/DL (ref 8–23)
CALCIUM SERPL-MCNC: 9.4 MG/DL (ref 8.7–10.5)
CHLORIDE SERPL-SCNC: 107 MMOL/L (ref 95–110)
CO2 SERPL-SCNC: 24 MMOL/L (ref 23–29)
CREAT SERPL-MCNC: 1.1 MG/DL (ref 0.5–1.4)
EST. GFR  (AFRICAN AMERICAN): >60 ML/MIN/1.73 M^2
EST. GFR  (NON AFRICAN AMERICAN): >60 ML/MIN/1.73 M^2
ESTIMATED AVG GLUCOSE: 105 MG/DL (ref 68–131)
GLUCOSE SERPL-MCNC: 88 MG/DL (ref 70–110)
HBA1C MFR BLD HPLC: 5.3 % (ref 4–5.6)
POTASSIUM SERPL-SCNC: 4.2 MMOL/L (ref 3.5–5.1)
PROT SERPL-MCNC: 6.2 G/DL (ref 6–8.4)
SODIUM SERPL-SCNC: 141 MMOL/L (ref 136–145)
TSH SERPL DL<=0.005 MIU/L-ACNC: 1.16 UIU/ML (ref 0.4–4)

## 2020-05-15 PROCEDURE — 1101F PR PT FALLS ASSESS DOC 0-1 FALLS W/OUT INJ PAST YR: ICD-10-PCS | Mod: CPTII,S$GLB,, | Performed by: FAMILY MEDICINE

## 2020-05-15 PROCEDURE — 20610 PR DRAIN/INJECT LARGE JOINT/BURSA: ICD-10-PCS | Mod: LT,S$GLB,, | Performed by: FAMILY MEDICINE

## 2020-05-15 PROCEDURE — 3079F DIAST BP 80-89 MM HG: CPT | Mod: CPTII,S$GLB,, | Performed by: FAMILY MEDICINE

## 2020-05-15 PROCEDURE — 84443 ASSAY THYROID STIM HORMONE: CPT

## 2020-05-15 PROCEDURE — 3075F SYST BP GE 130 - 139MM HG: CPT | Mod: CPTII,S$GLB,, | Performed by: FAMILY MEDICINE

## 2020-05-15 PROCEDURE — 83880 ASSAY OF NATRIURETIC PEPTIDE: CPT

## 2020-05-15 PROCEDURE — 1126F PR PAIN SEVERITY QUANTIFIED, NO PAIN PRESENT: ICD-10-PCS | Mod: S$GLB,,, | Performed by: FAMILY MEDICINE

## 2020-05-15 PROCEDURE — 99999 PR PBB SHADOW E&M-EST. PATIENT-LVL IV: ICD-10-PCS | Mod: PBBFAC,,, | Performed by: FAMILY MEDICINE

## 2020-05-15 PROCEDURE — 99214 PR OFFICE/OUTPT VISIT, EST, LEVL IV, 30-39 MIN: ICD-10-PCS | Mod: 25,S$GLB,, | Performed by: FAMILY MEDICINE

## 2020-05-15 PROCEDURE — 99214 OFFICE O/P EST MOD 30 MIN: CPT | Mod: 25,S$GLB,, | Performed by: FAMILY MEDICINE

## 2020-05-15 PROCEDURE — 83036 HEMOGLOBIN GLYCOSYLATED A1C: CPT

## 2020-05-15 PROCEDURE — 99999 PR PBB SHADOW E&M-EST. PATIENT-LVL IV: CPT | Mod: PBBFAC,,, | Performed by: FAMILY MEDICINE

## 2020-05-15 PROCEDURE — 20610 DRAIN/INJ JOINT/BURSA W/O US: CPT | Mod: LT,S$GLB,, | Performed by: FAMILY MEDICINE

## 2020-05-15 PROCEDURE — 1159F PR MEDICATION LIST DOCUMENTED IN MEDICAL RECORD: ICD-10-PCS | Mod: S$GLB,,, | Performed by: FAMILY MEDICINE

## 2020-05-15 PROCEDURE — 3075F PR MOST RECENT SYSTOLIC BLOOD PRESS GE 130-139MM HG: ICD-10-PCS | Mod: CPTII,S$GLB,, | Performed by: FAMILY MEDICINE

## 2020-05-15 PROCEDURE — 1126F AMNT PAIN NOTED NONE PRSNT: CPT | Mod: S$GLB,,, | Performed by: FAMILY MEDICINE

## 2020-05-15 PROCEDURE — 1101F PT FALLS ASSESS-DOCD LE1/YR: CPT | Mod: CPTII,S$GLB,, | Performed by: FAMILY MEDICINE

## 2020-05-15 PROCEDURE — 1159F MED LIST DOCD IN RCRD: CPT | Mod: S$GLB,,, | Performed by: FAMILY MEDICINE

## 2020-05-15 PROCEDURE — 3079F PR MOST RECENT DIASTOLIC BLOOD PRESSURE 80-89 MM HG: ICD-10-PCS | Mod: CPTII,S$GLB,, | Performed by: FAMILY MEDICINE

## 2020-05-15 PROCEDURE — 80053 COMPREHEN METABOLIC PANEL: CPT

## 2020-05-15 RX ORDER — TRIAMCINOLONE ACETONIDE 40 MG/ML
40 INJECTION, SUSPENSION INTRA-ARTICULAR; INTRAMUSCULAR
Status: COMPLETED | OUTPATIENT
Start: 2020-05-15 | End: 2020-05-15

## 2020-05-15 RX ORDER — BUPIVACAINE HYDROCHLORIDE 5 MG/ML
10 INJECTION, SOLUTION EPIDURAL; INTRACAUDAL
Status: COMPLETED | OUTPATIENT
Start: 2020-05-15 | End: 2020-05-15

## 2020-05-15 RX ADMIN — TRIAMCINOLONE ACETONIDE 40 MG: 40 INJECTION, SUSPENSION INTRA-ARTICULAR; INTRAMUSCULAR at 09:05

## 2020-05-15 RX ADMIN — BUPIVACAINE HYDROCHLORIDE 50 MG: 5 INJECTION, SOLUTION EPIDURAL; INTRACAUDAL at 09:05

## 2020-05-15 NOTE — PROGRESS NOTES
Subjective:       Patient ID: Domonique Hernandez Jr. is a 79 y.o. male.    Chief Complaint: No chief complaint on file.    HPI  79 year old male comes in for knee pain. He says that he is ready to get another shot as it is starting to act up again. He has had several corticosteroid injections and synvisc but is still having issues. He was referred to ortho for evaluation but he was given another steroid instead.     He also notes that in April he fell. He notes he completely passed out. I can see this note in the chart, he left ama at that time. He was seen by dr. Shah afterwards for a further work up. He says every thing was 'okay.'    PMH, PSH, ALLERGIES, SH, FH reviewed in nurse's notes above  Medications reconciled in the nurse's notes      Review of Systems   Constitutional: Negative for chills and fever.   HENT: Negative for congestion, ear pain, postnasal drip, rhinorrhea, sore throat and trouble swallowing.    Eyes: Negative for redness and itching.   Respiratory: Negative for cough, shortness of breath and wheezing.    Cardiovascular: Negative for chest pain and palpitations.   Gastrointestinal: Negative for abdominal pain, diarrhea, nausea and vomiting.   Genitourinary: Negative for dysuria and frequency.   Musculoskeletal: Positive for arthralgias and gait problem.   Skin: Negative for rash.   Neurological: Negative for weakness and headaches.       Objective:      Physical Exam   Constitutional: He is oriented to person, place, and time. He appears well-developed. No distress.   HENT:   Head: Normocephalic and atraumatic.   Eyes: Pupils are equal, round, and reactive to light. Conjunctivae are normal.   Neck: Normal range of motion. Neck supple. No thyromegaly present.   Cardiovascular: Normal rate, regular rhythm, normal heart sounds and intact distal pulses.   Pulmonary/Chest: Effort normal and breath sounds normal. No respiratory distress. He has no wheezes.   Abdominal: Soft. Bowel sounds are normal.  There is no tenderness.   Musculoskeletal: He exhibits deformity. He exhibits no edema.   Varus def.   crepitance   Lymphadenopathy:     He has no cervical adenopathy.   Neurological: He is alert and oriented to person, place, and time.   Seems slowed this morning. Falling asleep during exam.   Skin: Skin is warm and dry. No rash noted.   Psychiatric: He has a normal mood and affect. His behavior is normal.   Nursing note and vitals reviewed.       Assessment/Plan:       Problem List Items Addressed This Visit        Endocrine    Hypothyroidism - Primary    Relevant Orders    TSH      Other Visit Diagnoses     Impaired fasting blood sugar        Relevant Orders    Hemoglobin A1C    Syncope, unspecified syncope type        Relevant Orders    Comprehensive metabolic panel    Brain Natriuretic Peptide      will get records from [facility] and will talk to family about mental status. No gross neurologic deficits. Sat 98%. Did not wear his cpap last night.    RTC if condition acutely worsens or any other concerns, otherwise RTC as scheduled     Area prepped and draped in sterile fashion. Using anterior/medial approach, left knee(s) injected with kenalog 40mg and 2ml of 2% Lidocaine. Pt tolerated well with good relief

## 2020-05-19 ENCOUNTER — TELEPHONE (OUTPATIENT)
Dept: FAMILY MEDICINE | Facility: CLINIC | Age: 80
End: 2020-05-19

## 2020-05-19 DIAGNOSIS — R55 SYNCOPE, UNSPECIFIED SYNCOPE TYPE: Primary | ICD-10-CM

## 2020-05-26 ENCOUNTER — OFFICE VISIT (OUTPATIENT)
Dept: FAMILY MEDICINE | Facility: CLINIC | Age: 80
End: 2020-05-26
Payer: MEDICARE

## 2020-05-26 VITALS
HEART RATE: 68 BPM | WEIGHT: 232.81 LBS | HEIGHT: 67 IN | BODY MASS INDEX: 36.54 KG/M2 | TEMPERATURE: 97 F | SYSTOLIC BLOOD PRESSURE: 130 MMHG | DIASTOLIC BLOOD PRESSURE: 68 MMHG | RESPIRATION RATE: 18 BRPM

## 2020-05-26 DIAGNOSIS — N40.1 BENIGN PROSTATIC HYPERPLASIA WITH NOCTURIA: ICD-10-CM

## 2020-05-26 DIAGNOSIS — E03.4 HYPOTHYROIDISM DUE TO ACQUIRED ATROPHY OF THYROID: ICD-10-CM

## 2020-05-26 DIAGNOSIS — M25.562 LEFT KNEE PAIN, UNSPECIFIED CHRONICITY: Primary | ICD-10-CM

## 2020-05-26 DIAGNOSIS — R35.1 BENIGN PROSTATIC HYPERPLASIA WITH NOCTURIA: ICD-10-CM

## 2020-05-26 DIAGNOSIS — I10 ESSENTIAL HYPERTENSION: ICD-10-CM

## 2020-05-26 DIAGNOSIS — F32.A DEPRESSION, UNSPECIFIED DEPRESSION TYPE: ICD-10-CM

## 2020-05-26 DIAGNOSIS — E78.5 HYPERLIPIDEMIA, UNSPECIFIED HYPERLIPIDEMIA TYPE: ICD-10-CM

## 2020-05-26 DIAGNOSIS — M17.12 PRIMARY OSTEOARTHRITIS OF LEFT KNEE: ICD-10-CM

## 2020-05-26 DIAGNOSIS — M1A.9XX0 CHRONIC GOUT WITHOUT TOPHUS, UNSPECIFIED CAUSE, UNSPECIFIED SITE: ICD-10-CM

## 2020-05-26 DIAGNOSIS — R41.3 MEMORY LOSS: Primary | ICD-10-CM

## 2020-05-26 PROCEDURE — 1126F PR PAIN SEVERITY QUANTIFIED, NO PAIN PRESENT: ICD-10-PCS | Mod: S$GLB,,, | Performed by: FAMILY MEDICINE

## 2020-05-26 PROCEDURE — 99214 OFFICE O/P EST MOD 30 MIN: CPT | Mod: S$GLB,,, | Performed by: FAMILY MEDICINE

## 2020-05-26 PROCEDURE — 1159F PR MEDICATION LIST DOCUMENTED IN MEDICAL RECORD: ICD-10-PCS | Mod: S$GLB,,, | Performed by: FAMILY MEDICINE

## 2020-05-26 PROCEDURE — 1126F AMNT PAIN NOTED NONE PRSNT: CPT | Mod: S$GLB,,, | Performed by: FAMILY MEDICINE

## 2020-05-26 PROCEDURE — 1100F PTFALLS ASSESS-DOCD GE2>/YR: CPT | Mod: CPTII,S$GLB,, | Performed by: FAMILY MEDICINE

## 2020-05-26 PROCEDURE — 3078F PR MOST RECENT DIASTOLIC BLOOD PRESSURE < 80 MM HG: ICD-10-PCS | Mod: CPTII,S$GLB,, | Performed by: FAMILY MEDICINE

## 2020-05-26 PROCEDURE — 3288F FALL RISK ASSESSMENT DOCD: CPT | Mod: CPTII,S$GLB,, | Performed by: FAMILY MEDICINE

## 2020-05-26 PROCEDURE — 1159F MED LIST DOCD IN RCRD: CPT | Mod: S$GLB,,, | Performed by: FAMILY MEDICINE

## 2020-05-26 PROCEDURE — 3075F PR MOST RECENT SYSTOLIC BLOOD PRESS GE 130-139MM HG: ICD-10-PCS | Mod: CPTII,S$GLB,, | Performed by: FAMILY MEDICINE

## 2020-05-26 PROCEDURE — 3078F DIAST BP <80 MM HG: CPT | Mod: CPTII,S$GLB,, | Performed by: FAMILY MEDICINE

## 2020-05-26 PROCEDURE — 3288F PR FALLS RISK ASSESSMENT DOCUMENTED: ICD-10-PCS | Mod: CPTII,S$GLB,, | Performed by: FAMILY MEDICINE

## 2020-05-26 PROCEDURE — 1100F PR PT FALLS ASSESS DOC 2+ FALLS/FALL W/INJURY/YR: ICD-10-PCS | Mod: CPTII,S$GLB,, | Performed by: FAMILY MEDICINE

## 2020-05-26 PROCEDURE — 99999 PR PBB SHADOW E&M-EST. PATIENT-LVL III: ICD-10-PCS | Mod: PBBFAC,,, | Performed by: FAMILY MEDICINE

## 2020-05-26 PROCEDURE — 3075F SYST BP GE 130 - 139MM HG: CPT | Mod: CPTII,S$GLB,, | Performed by: FAMILY MEDICINE

## 2020-05-26 PROCEDURE — 99999 PR PBB SHADOW E&M-EST. PATIENT-LVL III: CPT | Mod: PBBFAC,,, | Performed by: FAMILY MEDICINE

## 2020-05-26 PROCEDURE — 99214 PR OFFICE/OUTPT VISIT, EST, LEVL IV, 30-39 MIN: ICD-10-PCS | Mod: S$GLB,,, | Performed by: FAMILY MEDICINE

## 2020-05-26 RX ORDER — ALLOPURINOL 300 MG/1
TABLET ORAL
Qty: 90 TABLET | Refills: 1 | Status: SHIPPED | OUTPATIENT
Start: 2020-05-26 | End: 2020-12-22 | Stop reason: SDUPTHER

## 2020-05-26 RX ORDER — VALSARTAN 80 MG/1
80 TABLET ORAL DAILY
Qty: 90 TABLET | Refills: 1 | Status: SHIPPED | OUTPATIENT
Start: 2020-05-26 | End: 2021-07-12

## 2020-05-26 RX ORDER — AMLODIPINE BESYLATE 5 MG/1
5 TABLET ORAL DAILY
Qty: 90 TABLET | Refills: 1 | Status: SHIPPED | OUTPATIENT
Start: 2020-05-26 | End: 2020-09-11

## 2020-05-26 RX ORDER — DUTASTERIDE 0.5 MG/1
0.5 CAPSULE, LIQUID FILLED ORAL DAILY
Qty: 90 CAPSULE | Refills: 1 | Status: SHIPPED | OUTPATIENT
Start: 2020-05-26 | End: 2020-12-11 | Stop reason: SDUPTHER

## 2020-05-26 RX ORDER — SIMVASTATIN 40 MG/1
40 TABLET, FILM COATED ORAL NIGHTLY
Qty: 90 TABLET | Refills: 1 | Status: SHIPPED | OUTPATIENT
Start: 2020-05-26 | End: 2020-12-11 | Stop reason: SDUPTHER

## 2020-05-26 RX ORDER — VENLAFAXINE HYDROCHLORIDE 150 MG/1
150 CAPSULE, EXTENDED RELEASE ORAL DAILY
Qty: 90 CAPSULE | Refills: 1 | Status: SHIPPED | OUTPATIENT
Start: 2020-05-26 | End: 2020-12-11 | Stop reason: SDUPTHER

## 2020-05-26 RX ORDER — LEVOTHYROXINE SODIUM 100 UG/1
TABLET ORAL
Qty: 90 TABLET | Refills: 1 | Status: SHIPPED | OUTPATIENT
Start: 2020-05-26 | End: 2020-12-11 | Stop reason: SDUPTHER

## 2020-05-26 RX ORDER — CARVEDILOL 25 MG/1
TABLET ORAL
Qty: 180 TABLET | Refills: 1 | Status: SHIPPED | OUTPATIENT
Start: 2020-05-26 | End: 2020-10-09

## 2020-05-26 RX ORDER — MEMANTINE HYDROCHLORIDE 5 MG/1
5 TABLET ORAL 2 TIMES DAILY
Qty: 60 TABLET | Refills: 5 | Status: SHIPPED | OUTPATIENT
Start: 2020-05-26 | End: 2020-08-04 | Stop reason: SDUPTHER

## 2020-05-26 NOTE — PROGRESS NOTES
Subjective:       Patient ID: Domonique Hernandez Jr. is a 80 y.o. male.    Chief Complaint: Follow-up (saw Dr. Leach on 5/15); Knee Pain; and Discuss Memory (wife here with patient to discuss his memory and cognition)    Patient here for follow up for urinary frequency, knee pain.  He has HYPERTENSION.   He takes Coreg to 25 mg twice daily, amlodipine to 5 mg once daily.  Diovan HCT 80 mg was added.  Now his blood pressure is almost too low.  He is feeling fine on current medications however.  Patient has depression.  Patient is doing well on Effexor  mg daily.    Patient states his CPAP machine is working very good.  He requires 7 cm water.   He is urinating better now that he is on Flomax and Avodart.  He is still having delayed ejaculation.  Stopping Effexor did not make much of a difference.  Patient uses Viagra for EGD.  He takes Synthroid for his hypothyroidism.  He tolerates this well.  He denies having symptoms such as heat or cold intolerance.  His weight is stable.  He denies any swelling in her thyroid.  Patient is taking His statin every day for hyperlipidemia.  She is feeling well on the medication.  He denies side effects such as myalgias.  Left knee painful.  Wants injection.    Review of Systems   Constitutional: Negative for activity change and unexpected weight change.   HENT: Negative for ear pain, nosebleeds, postnasal drip, sinus pressure, trouble swallowing and voice change.    Eyes: Negative for photophobia and visual disturbance.   Respiratory: Negative for apnea, choking, chest tightness and shortness of breath.    Cardiovascular: Negative for palpitations and leg swelling.   Gastrointestinal: Negative for blood in stool.   Endocrine: Negative for cold intolerance, heat intolerance, polydipsia and polyphagia.   Genitourinary: Positive for difficulty urinating. Negative for decreased urine volume and dysuria.        Improved urination   Musculoskeletal: Positive for back pain.   Skin:  Negative for color change and pallor.   Neurological: Negative for dizziness.        Memory loss   Hematological: Negative for adenopathy. Does not bruise/bleed easily.   Psychiatric/Behavioral: Positive for decreased concentration. Negative for behavioral problems, dysphoric mood and sleep disturbance. The patient is not nervous/anxious.        Objective:      Vitals:    05/26/20 0910   BP: 130/68   Pulse: 68   Resp: 18   Temp: 97 °F (36.1 °C)     Physical Exam   Constitutional: He is oriented to person, place, and time. He appears well-developed and well-nourished.   HENT:   Head: Normocephalic and atraumatic.   Eyes: Pupils are equal, round, and reactive to light. EOM are normal.   Neck: No JVD present.   No carotid bruits   Cardiovascular: Normal rate, regular rhythm, normal heart sounds and intact distal pulses. Exam reveals no gallop and no friction rub.   No murmur heard.  Pulmonary/Chest: Effort normal and breath sounds normal.   Musculoskeletal: He exhibits tenderness. He exhibits no edema.        Left knee: Tenderness found.   Lymphadenopathy:     He has no cervical adenopathy.   Neurological: He is alert and oriented to person, place, and time. He has normal reflexes. He displays normal reflexes. No cranial nerve deficit. He exhibits normal muscle tone. Coordination normal.   Psychiatric: He has a normal mood and affect. His behavior is normal. Judgment and thought content normal.       Lab Results   Component Value Date    TSH 1.155 05/15/2020     BMP  Lab Results   Component Value Date     05/15/2020    K 4.2 05/15/2020     05/15/2020    CO2 24 05/15/2020    BUN 18 05/15/2020    CREATININE 1.1 05/15/2020    CALCIUM 9.4 05/15/2020    ANIONGAP 10 05/15/2020    ESTGFRAFRICA >60 05/15/2020    EGFRNONAA >60 05/15/2020       Lab Results   Component Value Date    LDLCALC 109.2 02/19/2020     Lab Results   Component Value Date    URICACID 7.2 (H) 02/19/2020       Assessment:       1. Memory loss     2. Benign prostatic hyperplasia with nocturia    3. Chronic gout without tophus, unspecified cause, unspecified site    4. Depression, unspecified depression type    5. Essential hypertension    6. Hyperlipidemia, unspecified hyperlipidemia type    7. Hypothyroidism due to acquired atrophy of thyroid    8. Primary osteoarthritis of left knee        Plan:     Essential hypertension  Two gram sodium diet.    Weight loss discussed.    Try to walk 2 miles per day.    The following medications will be restarted today  -     carvedilol (COREG) 25 MG tablet; Take 1 tablet (25 mg total) by mouth 2 (two) times daily with meals.  -     amlodipine (NORVASC) 5 MG tablet; Take 1 tablet by mouth once daily.  -     Diovan 80 mg po daily    KARL on CPAP  Continue CPAP at 7 cm water pressure    Depression, unspecified depression type  - Continue venlafaxine (EFFEXOR-XR) 150 MG 24 hr capsule; Take 1 capsule by mouth once daily.  Dispense: 30 capsule; Refill: 5    Hyperlipidemia, unspecified hyperlipidemia type  Continue Zocor 40 mg by mouth daily    Chronic gout without tophus, unspecified cause, unspecified site  Continue allopurinol 300 mg daily    Hypothyroidism due to acquired atrophy of thyroid  -  Synthroid to 100 µg by mouth daily    Benign non-nodular prostatic hyperplasia with lower urinary tract symptoms  Continue Avodart and Flomax.    Primary osteoarthritis of left knee  -     Arthrocentesis.  After obtaining verbal consent, and per orders of Dr. Cam, injection of left knee given by Emiliano Cam. Patient felt much better. Patient remained in clinic for 20 minutes afterwards, and did not have any adverse reactions.  Used 2 cc of marcaine and  40 mg Kenalog    -     ibuprofen (ADVIL,MOTRIN) 800 MG tablet; Take 1 tablet (800 mg total) by mouth 3 (three) times daily.  Dispense: 15 tablet; Refill: 5    Benign prostatic hyperplasia with nocturia  -     dutasteride (AVODART) 0.5 mg capsule; Take 1 capsule (0.5 mg  total) by mouth once daily.  Dispense: 90 capsule; Refill: 1    Essential hypertension  -     carvedilol (COREG) 25 MG tablet; Take 1 tablet by mouth 2 times daily with meals.  Dispense: 180 tablet; Refill: 1    Mucopurulent chronic bronchitis    Mixed hyperlipidemia  -     Comprehensive metabolic panel; Future; Expected date: 01/27/2020    Hypothyroidism due to acquired atrophy of thyroid  -     TSH; Future; Expected date: 01/27/2020    KARL on CPAP  Encouraged to use CPAP    Memory loss  -     memantine (NAMENDA) 5 MG Tab; Take 1 tablet (5 mg total) by mouth 2 (two) times daily.  Dispense: 60 tablet; Refill: 5    Benign prostatic hyperplasia with nocturia  -     dutasteride (AVODART) 0.5 mg capsule; Take 1 capsule (0.5 mg total) by mouth once daily.  Dispense: 90 capsule; Refill: 1    Chronic gout without tophus, unspecified cause, unspecified site  -     allopurinoL (ZYLOPRIM) 300 MG tablet; TAKE 1 TABLET ONE TIME DAILY  Dispense: 90 tablet; Refill: 1    Depression, unspecified depression type  -     venlafaxine (EFFEXOR-XR) 150 MG Cp24; Take 1 capsule (150 mg total) by mouth once daily.  Dispense: 90 capsule; Refill: 1    Essential hypertension  -     valsartan (DIOVAN) 80 MG tablet; Take 1 tablet (80 mg total) by mouth once daily.  Dispense: 90 tablet; Refill: 1  -     carvediloL (COREG) 25 MG tablet; Take 1 tablet by mouth 2 times daily with meals.  Dispense: 180 tablet; Refill: 1  -     amLODIPine (NORVASC) 5 MG tablet; Take 1 tablet (5 mg total) by mouth once daily.  Dispense: 90 tablet; Refill: 1    Hyperlipidemia, unspecified hyperlipidemia type  -     simvastatin (ZOCOR) 40 MG tablet; Take 1 tablet (40 mg total) by mouth every evening.  Dispense: 90 tablet; Refill: 1    Hypothyroidism due to acquired atrophy of thyroid  -     levothyroxine (SYNTHROID) 100 MCG tablet; TAKE 1 TABLET BY MOUTH ONCE DAILY  Dispense: 90 tablet; Refill: 1    Primary osteoarthritis of left knee  -     Ambulatory referral/consult  to Orthopedics; Future; Expected date: 06/02/2020      Hyperlipidemia, unspecified hyperlipidemia type  -     Lipid panel; Future; Expected date: 01/27/2020    Chronic gout without tophus, unspecified cause, unspecified site  -     Uric acid; Future; Expected date: 01/27/2020    Frequency of urination    Depression, unspecified depression type  Doing well.    RTC 1 month to see how Lora is doing

## 2020-05-27 ENCOUNTER — OFFICE VISIT (OUTPATIENT)
Dept: ORTHOPEDICS | Facility: CLINIC | Age: 80
End: 2020-05-27
Payer: MEDICARE

## 2020-05-27 ENCOUNTER — HOSPITAL ENCOUNTER (OUTPATIENT)
Dept: RADIOLOGY | Facility: HOSPITAL | Age: 80
Discharge: HOME | End: 2020-05-27
Attending: PHYSICIAN ASSISTANT
Payer: MEDICARE

## 2020-05-27 VITALS
RESPIRATION RATE: 18 BRPM | SYSTOLIC BLOOD PRESSURE: 140 MMHG | TEMPERATURE: 97 F | BODY MASS INDEX: 36.38 KG/M2 | WEIGHT: 231.81 LBS | HEIGHT: 67 IN | HEART RATE: 64 BPM | DIASTOLIC BLOOD PRESSURE: 88 MMHG

## 2020-05-27 DIAGNOSIS — M17.12 PRIMARY OSTEOARTHRITIS OF LEFT KNEE: Primary | ICD-10-CM

## 2020-05-27 DIAGNOSIS — G89.29 CHRONIC PAIN OF LEFT KNEE: ICD-10-CM

## 2020-05-27 DIAGNOSIS — M25.562 CHRONIC PAIN OF LEFT KNEE: ICD-10-CM

## 2020-05-27 DIAGNOSIS — M25.562 LEFT KNEE PAIN, UNSPECIFIED CHRONICITY: ICD-10-CM

## 2020-05-27 PROCEDURE — 1159F PR MEDICATION LIST DOCUMENTED IN MEDICAL RECORD: ICD-10-PCS | Mod: S$GLB,,, | Performed by: PHYSICIAN ASSISTANT

## 2020-05-27 PROCEDURE — 3079F PR MOST RECENT DIASTOLIC BLOOD PRESSURE 80-89 MM HG: ICD-10-PCS | Mod: CPTII,S$GLB,, | Performed by: PHYSICIAN ASSISTANT

## 2020-05-27 PROCEDURE — 73564 XR KNEE ORTHO LEFT WITH FLEXION: ICD-10-PCS | Mod: 26,LT,, | Performed by: RADIOLOGY

## 2020-05-27 PROCEDURE — 1159F MED LIST DOCD IN RCRD: CPT | Mod: S$GLB,,, | Performed by: PHYSICIAN ASSISTANT

## 2020-05-27 PROCEDURE — 99203 PR OFFICE/OUTPT VISIT, NEW, LEVL III, 30-44 MIN: ICD-10-PCS | Mod: S$GLB,,, | Performed by: PHYSICIAN ASSISTANT

## 2020-05-27 PROCEDURE — 3077F SYST BP >= 140 MM HG: CPT | Mod: CPTII,S$GLB,, | Performed by: PHYSICIAN ASSISTANT

## 2020-05-27 PROCEDURE — 73562 X-RAY EXAM OF KNEE 3: CPT | Mod: 26,RT,, | Performed by: RADIOLOGY

## 2020-05-27 PROCEDURE — 1101F PT FALLS ASSESS-DOCD LE1/YR: CPT | Mod: CPTII,S$GLB,, | Performed by: PHYSICIAN ASSISTANT

## 2020-05-27 PROCEDURE — 99203 OFFICE O/P NEW LOW 30 MIN: CPT | Mod: S$GLB,,, | Performed by: PHYSICIAN ASSISTANT

## 2020-05-27 PROCEDURE — 73564 X-RAY EXAM KNEE 4 OR MORE: CPT | Mod: 26,LT,, | Performed by: RADIOLOGY

## 2020-05-27 PROCEDURE — 1101F PR PT FALLS ASSESS DOC 0-1 FALLS W/OUT INJ PAST YR: ICD-10-PCS | Mod: CPTII,S$GLB,, | Performed by: PHYSICIAN ASSISTANT

## 2020-05-27 PROCEDURE — 1125F AMNT PAIN NOTED PAIN PRSNT: CPT | Mod: S$GLB,,, | Performed by: PHYSICIAN ASSISTANT

## 2020-05-27 PROCEDURE — 99999 PR PBB SHADOW E&M-EST. PATIENT-LVL V: ICD-10-PCS | Mod: PBBFAC,,, | Performed by: PHYSICIAN ASSISTANT

## 2020-05-27 PROCEDURE — 99999 PR PBB SHADOW E&M-EST. PATIENT-LVL V: CPT | Mod: PBBFAC,,, | Performed by: PHYSICIAN ASSISTANT

## 2020-05-27 PROCEDURE — 3077F PR MOST RECENT SYSTOLIC BLOOD PRESSURE >= 140 MM HG: ICD-10-PCS | Mod: CPTII,S$GLB,, | Performed by: PHYSICIAN ASSISTANT

## 2020-05-27 PROCEDURE — 1125F PR PAIN SEVERITY QUANTIFIED, PAIN PRESENT: ICD-10-PCS | Mod: S$GLB,,, | Performed by: PHYSICIAN ASSISTANT

## 2020-05-27 PROCEDURE — 73562 XR KNEE ORTHO LEFT WITH FLEXION: ICD-10-PCS | Mod: 26,RT,, | Performed by: RADIOLOGY

## 2020-05-27 PROCEDURE — 73562 X-RAY EXAM OF KNEE 3: CPT | Mod: TC,RT,59

## 2020-05-27 PROCEDURE — 3079F DIAST BP 80-89 MM HG: CPT | Mod: CPTII,S$GLB,, | Performed by: PHYSICIAN ASSISTANT

## 2020-05-27 NOTE — PROGRESS NOTES
Subjective:      Patient ID: Domonique Hernandez Jr. is a 80 y.o. male.    Chief Complaint: Pain of the Left Knee    Review of patient's allergies indicates:   Allergen Reactions    Percocet [oxycodone-acetaminophen] Other (See Comments)     hyperactivity    Percodan [oxycodone hcl-oxycodone-asa] Other (See Comments)     hyperactivity      79 yo M presents to clinic with c/o left knee pain x many years.  States that he injured it a long time ago but does not recall how.  Pain is located to medial knee and described as sharp and achy, does not radiate.  No swelling.  Worse with walking and after prolonged sitting or standing.  He has previously had several steroid injections and synvisc, states that steroid provides good relief for a few months and synvisc did not provide any relief.  Last steroid injection on 5/15/20 by PCP. He has had good relief since.        Review of Systems   Constitution: Negative for chills, diaphoresis and fever.   HENT: Negative for congestion, ear discharge and ear pain.    Eyes: Negative for blurred vision, discharge, double vision and pain.   Cardiovascular: Negative for chest pain, claudication and cyanosis.   Respiratory: Negative for cough, hemoptysis and shortness of breath.    Endocrine: Negative for cold intolerance and heat intolerance.   Skin: Negative for color change, dry skin, itching and rash.   Musculoskeletal: Positive for joint pain. Negative for arthritis, back pain, falls, gout, joint swelling, muscle weakness and neck pain.   Gastrointestinal: Negative for abdominal pain and change in bowel habit.   Neurological: Negative for brief paralysis, disturbances in coordination and dizziness.   Psychiatric/Behavioral: Negative for altered mental status and depression.         Objective:          General    Constitutional: He is oriented to person, place, and time. He appears well-developed and well-nourished. No distress.   HENT:   Head: Atraumatic.   Eyes: EOM are normal. Right  eye exhibits no discharge. Left eye exhibits no discharge.   Cardiovascular: Normal rate.    Pulmonary/Chest: Effort normal. No respiratory distress.   Abdominal: Soft.   Neurological: He is alert and oriented to person, place, and time.   Psychiatric: He has a normal mood and affect. His behavior is normal.           Right Knee Exam     Inspection   Erythema: absent  Scars: absent  Swelling: absent  Effusion: absent  Deformity: absent  Bruising: absent    Crepitus   The patient has crepitus of the patella.    Range of Motion   Extension: 0   Flexion: 140     Tests   Ligament Examination   MCL - Valgus: normal (0 to 2mm)  LCL - Varus: normal    Other   Sensation: normal    Left Knee Exam     Inspection   Erythema: absent  Scars: absent  Swelling: absent  Effusion: absent  Deformity: absent  Bruising: absent    Tenderness   The patient tender to palpation of the medial joint line and lateral joint line.    Crepitus   The patient has crepitus of the patella.    Range of Motion   Extension: 0   Flexion: 130     Tests   Stability   MCL - Valgus: normal (0 to 2mm)  LCL - Varus: normal (0 to 2mm)    Other   Sensation: normal    Muscle Strength   Right Lower Extremity   Hip Abduction: 5/5   Quadriceps:  5/5   Hamstrin/5   Left Lower Extremity   Hip Abduction: 5/5   Quadriceps:  5/5   Hamstrin/5     Vascular Exam     Right Pulses  Dorsalis Pedis:      2+          Left Pulses  Dorsalis Pedis:      2+          Edema  Right Lower Leg: absent  Left Lower Leg: absent              Assessment:         Xray Left Knee 20:  Advanced degenerative changes of the medial patellofemoral compartments with joint space narrowing, subchondral sclerosis and subchondral cystic changes, more probably affecting the medial compartment.  There is dorsal spurring from the patella.  Hypertrophic changes of the tibial spines are seen.  Small joint effusion is present.  No fracture or dislocation.    Encounter Diagnoses   Name Primary?     Primary osteoarthritis of left knee Yes    Chronic pain of left knee     Primary osteoarthritis of left knee    Chronic pain of left knee               Plan:         I made the decision to obtain old records of the patient including previous notes and imaging. New imaging was ordered today of the extremity or extremities evaluated. I independently reviewed and interpreted the radiographs today.    The total face-to-face encounter time with this patient was 30 minutes and greater than 50% of of the encounter time was spent counseling the patient, coordinating care, and education regarding the pathology and natural history of his diagnosis. We have discussed a variety of treatment options including medications, injections, physical therapy and other alternative treatments. Pt is requesting home exercise program at this time. He is not interested in any surgery at this time. He may be interested in medial  brace but would like to try brace that he has at home first.     1. HEP 33901 - I instructed and demonstrated a patellofemoral HEP. The patient then demonstrated understanding of exercises and proper technique. This program was performed for 10 minutes.   2. Ambulatory referral to physical therapy for patellofemoral strengthening and conditioning.  3. Ice compress to the affected area 2-3x a day for 15-20 minutes as needed for pain management.  4. RTC in  3 months for follow-up, sooner if needed.      Patient voices understanding of and agreement with treatment plan. All of the patient's questions were answered and the patient will contact us if he has any questions or concerns in the interim.

## 2020-05-27 NOTE — LETTER
May 27, 2020      Emiliano Cam MD  [address]  [facility] [address]           [facility]  [address] [facility] [address]  Phone: [phone]          Patient: Domonique Hernandez Jr.   MR Number: [id number]   YOB: 1940   Date of Visit: 5/27/2020       Dear Dr. Emiliano Cam:    Thank you for referring Domonique Hernandez to me for evaluation. Attached you will find relevant portions of my assessment and plan of care.    If you have questions, please do not hesitate to call me. I look forward to following Domonique Hernandez along with you.    Sincerely,    Caron Lassiter PA-C    Enclosure  CC:  No Recipients    If you would like to receive this communication electronically, please contact [email or web address] or [phone] to request more information on [employer] access.    For providers and/or their staff who would like to refer a patient to [facility], please contact us through our one-stop-shop provider referral line, [facility] , at [phone].    If you feel you have received this communication in error or would no longer like to receive these types of communications, please e-mail [email or web address]

## 2020-06-01 ENCOUNTER — TELEPHONE (OUTPATIENT)
Dept: ORTHOPEDICS | Facility: CLINIC | Age: 80
End: 2020-06-01

## 2020-06-01 NOTE — TELEPHONE ENCOUNTER
----- Message from Erika Figueroa sent at 2020  9:17 AM CDT -----  Contact: JULYPRINCESS Hernandez Jr.  MRN: [id number]  : 1940  PCP: Emiliano Cam  Home Phone      [phone]  Work Phone      Not on file.  Mobile          [phone]      MESSAGE: July would like to speak with someone regarding the knee braces.        Phone: [phone]

## 2020-06-02 DIAGNOSIS — G89.29 CHRONIC PAIN OF LEFT KNEE: ICD-10-CM

## 2020-06-02 DIAGNOSIS — M25.562 CHRONIC PAIN OF LEFT KNEE: ICD-10-CM

## 2020-06-02 DIAGNOSIS — M17.12 PRIMARY OSTEOARTHRITIS OF LEFT KNEE: Primary | ICD-10-CM

## 2020-06-18 ENCOUNTER — PATIENT OUTREACH (OUTPATIENT)
Dept: ADMINISTRATIVE | Facility: OTHER | Age: 80
End: 2020-06-18

## 2020-06-26 DIAGNOSIS — Z11.59 SPECIAL SCREENING EXAMINATION FOR VIRAL DISEASE: ICD-10-CM

## 2020-07-03 ENCOUNTER — LAB VISIT (OUTPATIENT)
Dept: INTERNAL MEDICINE | Facility: CLINIC | Age: 80
End: 2020-07-03
Payer: MEDICARE

## 2020-07-03 DIAGNOSIS — Z11.59 SPECIAL SCREENING EXAMINATION FOR VIRAL DISEASE: ICD-10-CM

## 2020-07-03 PROCEDURE — U0003 INFECTIOUS AGENT DETECTION BY NUCLEIC ACID (DNA OR RNA); SEVERE ACUTE RESPIRATORY SYNDROME CORONAVIRUS 2 (SARS-COV-2) (CORONAVIRUS DISEASE [COVID-19]), AMPLIFIED PROBE TECHNIQUE, MAKING USE OF HIGH THROUGHPUT TECHNOLOGIES AS DESCRIBED BY CMS-2020-01-R: HCPCS

## 2020-07-06 ENCOUNTER — HOSPITAL ENCOUNTER (OUTPATIENT)
Dept: PULMONOLOGY | Facility: HOSPITAL | Age: 80
Discharge: HOME | End: 2020-07-06
Attending: INTERNAL MEDICINE
Payer: MEDICARE

## 2020-07-06 DIAGNOSIS — J44.89 OBSTRUCTIVE CHRONIC BRONCHITIS WITHOUT EXACERBATION: ICD-10-CM

## 2020-07-06 DIAGNOSIS — Z03.818 ENCOUNTER FOR OBSERVATION FOR SUSPECTED EXPOSURE TO OTHER BIOLOGICAL AGENTS RULED OUT: ICD-10-CM

## 2020-07-06 PROCEDURE — 94060 EVALUATION OF WHEEZING: CPT

## 2020-07-06 PROCEDURE — 94729 DIFFUSING CAPACITY: CPT

## 2020-07-06 PROCEDURE — 99900031 HC PATIENT EDUCATION (STAT)

## 2020-07-06 PROCEDURE — 94727 GAS DIL/WSHOT DETER LNG VOL: CPT

## 2020-07-07 LAB — SARS-COV-2 RNA RESP QL NAA+PROBE: NOT DETECTED

## 2020-08-04 ENCOUNTER — PATIENT OUTREACH (OUTPATIENT)
Dept: ADMINISTRATIVE | Facility: OTHER | Age: 80
End: 2020-08-04

## 2020-08-04 ENCOUNTER — OFFICE VISIT (OUTPATIENT)
Dept: FAMILY MEDICINE | Facility: CLINIC | Age: 80
End: 2020-08-04
Payer: MEDICARE

## 2020-08-04 VITALS
BODY MASS INDEX: 37.75 KG/M2 | WEIGHT: 240.5 LBS | HEART RATE: 68 BPM | SYSTOLIC BLOOD PRESSURE: 154 MMHG | RESPIRATION RATE: 18 BRPM | HEIGHT: 67 IN | TEMPERATURE: 98 F | DIASTOLIC BLOOD PRESSURE: 100 MMHG

## 2020-08-04 DIAGNOSIS — R41.3 MEMORY LOSS: ICD-10-CM

## 2020-08-04 PROCEDURE — 1159F PR MEDICATION LIST DOCUMENTED IN MEDICAL RECORD: ICD-10-PCS | Mod: S$GLB,,, | Performed by: FAMILY MEDICINE

## 2020-08-04 PROCEDURE — 3080F DIAST BP >= 90 MM HG: CPT | Mod: CPTII,S$GLB,, | Performed by: FAMILY MEDICINE

## 2020-08-04 PROCEDURE — 1159F MED LIST DOCD IN RCRD: CPT | Mod: S$GLB,,, | Performed by: FAMILY MEDICINE

## 2020-08-04 PROCEDURE — 1101F PT FALLS ASSESS-DOCD LE1/YR: CPT | Mod: CPTII,S$GLB,, | Performed by: FAMILY MEDICINE

## 2020-08-04 PROCEDURE — 1126F AMNT PAIN NOTED NONE PRSNT: CPT | Mod: S$GLB,,, | Performed by: FAMILY MEDICINE

## 2020-08-04 PROCEDURE — 1101F PR PT FALLS ASSESS DOC 0-1 FALLS W/OUT INJ PAST YR: ICD-10-PCS | Mod: CPTII,S$GLB,, | Performed by: FAMILY MEDICINE

## 2020-08-04 PROCEDURE — 99999 PR PBB SHADOW E&M-EST. PATIENT-LVL V: CPT | Mod: PBBFAC,,, | Performed by: FAMILY MEDICINE

## 2020-08-04 PROCEDURE — 1126F PR PAIN SEVERITY QUANTIFIED, NO PAIN PRESENT: ICD-10-PCS | Mod: S$GLB,,, | Performed by: FAMILY MEDICINE

## 2020-08-04 PROCEDURE — 99213 PR OFFICE/OUTPT VISIT, EST, LEVL III, 20-29 MIN: ICD-10-PCS | Mod: S$GLB,,, | Performed by: FAMILY MEDICINE

## 2020-08-04 PROCEDURE — 3077F PR MOST RECENT SYSTOLIC BLOOD PRESSURE >= 140 MM HG: ICD-10-PCS | Mod: CPTII,S$GLB,, | Performed by: FAMILY MEDICINE

## 2020-08-04 PROCEDURE — 99999 PR PBB SHADOW E&M-EST. PATIENT-LVL V: ICD-10-PCS | Mod: PBBFAC,,, | Performed by: FAMILY MEDICINE

## 2020-08-04 PROCEDURE — 99213 OFFICE O/P EST LOW 20 MIN: CPT | Mod: S$GLB,,, | Performed by: FAMILY MEDICINE

## 2020-08-04 PROCEDURE — 3080F PR MOST RECENT DIASTOLIC BLOOD PRESSURE >= 90 MM HG: ICD-10-PCS | Mod: CPTII,S$GLB,, | Performed by: FAMILY MEDICINE

## 2020-08-04 PROCEDURE — 3077F SYST BP >= 140 MM HG: CPT | Mod: CPTII,S$GLB,, | Performed by: FAMILY MEDICINE

## 2020-08-04 RX ORDER — MEMANTINE HYDROCHLORIDE 10 MG/1
10 TABLET ORAL 2 TIMES DAILY
Qty: 60 TABLET | Refills: 5 | Status: SHIPPED | OUTPATIENT
Start: 2020-08-04 | End: 2021-06-11 | Stop reason: SDUPTHER

## 2020-08-04 NOTE — PROGRESS NOTES
Subjective:       Patient ID: Domonique Hernandez Jr. is a 80 y.o. male.    Chief Complaint: Follow-up (1 month follow up)    Patient here for follow up for memory issues.  Start patient on Namenda 5 mg twice daily last month.  He is tolerating it well.  He thinks it has decreased his headaches.  He is not sure if it has helped his memory, but he did remember the name of the medication.  He is not having any side effects.  He has HYPERTENSION.   He takes Coreg to 25 mg twice daily, amlodipine to 5 mg once daily, Diovan 80 mg daily.  Blood pressure is usually well controlled.  It is a little elevated today.  Patient has depression.  Patient is doing well on Effexor  mg daily.    Patient states his CPAP machine is working very good.  He requires 7 cm water.   He is urinating better now that he is on Flomax and Avodart.  He is still having delayed ejaculation.  Stopping Effexor did not make much of a difference.  Patient uses Viagra for ED.  He takes Synthroid for his hypothyroidism.  He tolerates this well.  He denies having symptoms such as heat or cold intolerance.  His weight is stable.  He denies any swelling in her thyroid.  Patient is taking His statin every day for hyperlipidemia.  She is feeling well on the medication.  He denies side effects such as myalgias.  Left knee painful.  Currently wearing a brace.  I injected last month.  He said that helped.    Follow-up  Associated symptoms include arthralgias.     Review of Systems   Constitutional: Negative for activity change and unexpected weight change.   HENT: Negative for ear pain, nosebleeds, postnasal drip, sinus pressure, trouble swallowing and voice change.    Eyes: Negative for photophobia and visual disturbance.   Respiratory: Negative for apnea, choking, chest tightness and shortness of breath.    Cardiovascular: Negative for palpitations and leg swelling.   Gastrointestinal: Negative for blood in stool.   Endocrine: Negative for cold intolerance,  heat intolerance, polydipsia and polyphagia.   Genitourinary: Positive for difficulty urinating. Negative for decreased urine volume and dysuria.        Improved urination   Musculoskeletal: Positive for arthralgias and back pain.   Skin: Negative for color change and pallor.   Neurological: Negative for dizziness.        Memory loss   Hematological: Negative for adenopathy. Does not bruise/bleed easily.   Psychiatric/Behavioral: Positive for decreased concentration. Negative for behavioral problems, dysphoric mood and sleep disturbance. The patient is not nervous/anxious.        Objective:      Vitals:    08/04/20 1306   BP: (!) 154/100   Pulse: 68   Resp: 18   Temp: 97.5 °F (36.4 °C)     Physical Exam  Constitutional:       Appearance: He is well-developed. He is obese.   HENT:      Head: Normocephalic and atraumatic.   Eyes:      Pupils: Pupils are equal, round, and reactive to light.   Neck:      Vascular: No JVD.      Comments: No carotid bruits  Cardiovascular:      Rate and Rhythm: Normal rate and regular rhythm.      Pulses: Normal pulses.      Heart sounds: Normal heart sounds. No murmur. No friction rub. No gallop.    Pulmonary:      Effort: Pulmonary effort is normal.      Breath sounds: Normal breath sounds.   Musculoskeletal:         General: Tenderness present.      Left knee: Tenderness found.      Right lower leg: No edema.      Left lower leg: No edema.   Lymphadenopathy:      Cervical: No cervical adenopathy.   Neurological:      Mental Status: He is alert and oriented to person, place, and time.      Cranial Nerves: No cranial nerve deficit.      Motor: No abnormal muscle tone.      Coordination: Coordination normal.      Deep Tendon Reflexes: Reflexes are normal and symmetric. Reflexes normal.   Psychiatric:         Behavior: Behavior normal.         Thought Content: Thought content normal.         Judgment: Judgment normal.         Lab Results   Component Value Date    TSH 1.155 05/15/2020      BMP  Lab Results   Component Value Date     05/15/2020    K 4.2 05/15/2020     05/15/2020    CO2 24 05/15/2020    BUN 18 05/15/2020    CREATININE 1.1 05/15/2020    CALCIUM 9.4 05/15/2020    ANIONGAP 10 05/15/2020    ESTGFRAFRICA >60 05/15/2020    EGFRNONAA >60 05/15/2020       Lab Results   Component Value Date    LDLCALC 109.2 02/19/2020     Lab Results   Component Value Date    URICACID 7.2 (H) 02/19/2020       Assessment:       1. Memory loss        Plan:     Impaired cognitive function  Increase Namenda 10 mg p.o. b.i.d.    Essential hypertension  Two gram sodium diet.    Weight loss discussed.    Try to walk 2 miles per day.    The following medications will be restarted today  -     carvedilol (COREG) 25 MG tablet; Take 1 tablet (25 mg total) by mouth 2 (two) times daily with meals.  -     amlodipine (NORVASC) 5 MG tablet; Take 1 tablet by mouth once daily.  -     Diovan 80 mg po daily    KARL on CPAP  Continue CPAP at 7 cm water pressure    Depression, unspecified depression type  - Continue venlafaxine (EFFEXOR-XR) 150 MG 24 hr capsule; Take 1 capsule by mouth once daily.  Dispense: 30 capsule; Refill: 5    Hyperlipidemia, unspecified hyperlipidemia type  Continue Zocor 40 mg by mouth daily    Chronic gout without tophus, unspecified cause, unspecified site  Continue allopurinol 300 mg daily    Hypothyroidism due to acquired atrophy of thyroid  -  Synthroid to 100 µg by mouth daily    Benign non-nodular prostatic hyperplasia with lower urinary tract symptoms  Continue Avodart and Flomax.    Primary osteoarthritis of left knee  -     Arthrocentesis.  After obtaining verbal consent, and per orders of Dr. Cam, injection of left knee given by Emiliano Cam. Patient felt much better. Patient remained in clinic for 20 minutes afterwards, and did not have any adverse reactions.  Used 2 cc of marcaine and  40 mg Kenalog    -     ibuprofen (ADVIL,MOTRIN) 800 MG tablet; Take 1 tablet (800  mg total) by mouth 3 (three) times daily.  Dispense: 15 tablet; Refill: 5    Benign prostatic hyperplasia with nocturia  -     dutasteride (AVODART) 0.5 mg capsule; Take 1 capsule (0.5 mg total) by mouth once daily.  Dispense: 90 capsule; Refill: 1    Essential hypertension  -     carvedilol (COREG) 25 MG tablet; Take 1 tablet by mouth 2 times daily with meals.  Dispense: 180 tablet; Refill: 1    Mucopurulent chronic bronchitis    Mixed hyperlipidemia  -     Comprehensive metabolic panel; Future; Expected date: 01/27/2020    Hypothyroidism due to acquired atrophy of thyroid  -     TSH; Future; Expected date: 01/27/2020    KARL on CPAP  Encouraged to use CPAP    Memory loss  -     memantine (NAMENDA) 10 MG Tab; Take 1 tablet (10 mg total) by mouth 2 (two) times daily.  Dispense: 60 tablet; Refill: 5      Return to clinic in 3 months

## 2020-08-04 NOTE — PROGRESS NOTES
Requested updates within Care Everywhere.  Patient's chart was reviewed for overdue [facility] topics.  Immunizations reconciled.    Orders placed:  Tasked appts:  Labs Linked:

## 2020-08-17 ENCOUNTER — OFFICE VISIT (OUTPATIENT)
Dept: INTERNAL MEDICINE | Facility: CLINIC | Age: 80
End: 2020-08-17
Payer: MEDICARE

## 2020-08-17 ENCOUNTER — PATIENT OUTREACH (OUTPATIENT)
Dept: ADMINISTRATIVE | Facility: OTHER | Age: 80
End: 2020-08-17

## 2020-08-17 VITALS
HEIGHT: 67 IN | RESPIRATION RATE: 18 BRPM | BODY MASS INDEX: 37.27 KG/M2 | HEART RATE: 107 BPM | OXYGEN SATURATION: 96 % | TEMPERATURE: 98 F | WEIGHT: 237.44 LBS | SYSTOLIC BLOOD PRESSURE: 138 MMHG | DIASTOLIC BLOOD PRESSURE: 86 MMHG

## 2020-08-17 DIAGNOSIS — N39.0 COMPLICATED UTI (URINARY TRACT INFECTION): Primary | ICD-10-CM

## 2020-08-17 LAB
BILIRUB SERPL-MCNC: ABNORMAL MG/DL
BLOOD URINE, POC: ABNORMAL
CLARITY, POC UA: ABNORMAL
COLOR, POC UA: ABNORMAL
GLUCOSE UR QL STRIP: ABNORMAL
KETONES UR QL STRIP: ABNORMAL
LEUKOCYTE ESTERASE URINE, POC: ABNORMAL
NITRITE, POC UA: ABNORMAL
PH, POC UA: 6
PROTEIN, POC: ABNORMAL
SPECIFIC GRAVITY, POC UA: 1.01
UROBILINOGEN, POC UA: ABNORMAL

## 2020-08-17 PROCEDURE — 1126F AMNT PAIN NOTED NONE PRSNT: CPT | Mod: S$GLB,,, | Performed by: NURSE PRACTITIONER

## 2020-08-17 PROCEDURE — 1159F PR MEDICATION LIST DOCUMENTED IN MEDICAL RECORD: ICD-10-PCS | Mod: S$GLB,,, | Performed by: NURSE PRACTITIONER

## 2020-08-17 PROCEDURE — 3079F DIAST BP 80-89 MM HG: CPT | Mod: CPTII,S$GLB,, | Performed by: NURSE PRACTITIONER

## 2020-08-17 PROCEDURE — 1126F PR PAIN SEVERITY QUANTIFIED, NO PAIN PRESENT: ICD-10-PCS | Mod: S$GLB,,, | Performed by: NURSE PRACTITIONER

## 2020-08-17 PROCEDURE — 99999 PR PBB SHADOW E&M-EST. PATIENT-LVL IV: ICD-10-PCS | Mod: PBBFAC,,, | Performed by: NURSE PRACTITIONER

## 2020-08-17 PROCEDURE — 1101F PT FALLS ASSESS-DOCD LE1/YR: CPT | Mod: CPTII,S$GLB,, | Performed by: NURSE PRACTITIONER

## 2020-08-17 PROCEDURE — 3075F SYST BP GE 130 - 139MM HG: CPT | Mod: CPTII,S$GLB,, | Performed by: NURSE PRACTITIONER

## 2020-08-17 PROCEDURE — 1159F MED LIST DOCD IN RCRD: CPT | Mod: S$GLB,,, | Performed by: NURSE PRACTITIONER

## 2020-08-17 PROCEDURE — 81002 URINALYSIS NONAUTO W/O SCOPE: CPT | Mod: S$GLB,,, | Performed by: NURSE PRACTITIONER

## 2020-08-17 PROCEDURE — 1101F PR PT FALLS ASSESS DOC 0-1 FALLS W/OUT INJ PAST YR: ICD-10-PCS | Mod: CPTII,S$GLB,, | Performed by: NURSE PRACTITIONER

## 2020-08-17 PROCEDURE — 99213 PR OFFICE/OUTPT VISIT, EST, LEVL III, 20-29 MIN: ICD-10-PCS | Mod: S$GLB,25,, | Performed by: NURSE PRACTITIONER

## 2020-08-17 PROCEDURE — 3079F PR MOST RECENT DIASTOLIC BLOOD PRESSURE 80-89 MM HG: ICD-10-PCS | Mod: CPTII,S$GLB,, | Performed by: NURSE PRACTITIONER

## 2020-08-17 PROCEDURE — 99999 PR PBB SHADOW E&M-EST. PATIENT-LVL IV: CPT | Mod: PBBFAC,,, | Performed by: NURSE PRACTITIONER

## 2020-08-17 PROCEDURE — 99213 OFFICE O/P EST LOW 20 MIN: CPT | Mod: S$GLB,25,, | Performed by: NURSE PRACTITIONER

## 2020-08-17 PROCEDURE — 3075F PR MOST RECENT SYSTOLIC BLOOD PRESS GE 130-139MM HG: ICD-10-PCS | Mod: CPTII,S$GLB,, | Performed by: NURSE PRACTITIONER

## 2020-08-17 PROCEDURE — 81002 POCT URINE DIPSTICK WITHOUT MICROSCOPE: ICD-10-PCS | Mod: S$GLB,,, | Performed by: NURSE PRACTITIONER

## 2020-08-17 RX ORDER — DOXYCYCLINE 100 MG/1
100 CAPSULE ORAL EVERY 12 HOURS
Qty: 28 CAPSULE | Refills: 0 | Status: SHIPPED | OUTPATIENT
Start: 2020-08-17 | End: 2020-08-31

## 2020-08-17 NOTE — PROGRESS NOTES
Health Maintenance Due   Topic Date Due    Hepatitis C Screening  1940    TETANUS VACCINE  05/26/1960    Shingles Vaccine (2 of 3) 03/13/2017     Updates were requested from care everywhere.  Chart was reviewed for overdue Proactive [facility] Encounters ([facility]) topics (CRS, Breast Cancer Screening, Eye exam)  Health Maintenance has been updated.  LINKS immunization registry triggered.  Immunizations were reconciled.

## 2020-08-19 ENCOUNTER — OFFICE VISIT (OUTPATIENT)
Dept: NEUROLOGY | Facility: CLINIC | Age: 80
End: 2020-08-19
Payer: MEDICARE

## 2020-08-19 VITALS
HEIGHT: 67 IN | TEMPERATURE: 97 F | WEIGHT: 242.5 LBS | RESPIRATION RATE: 18 BRPM | BODY MASS INDEX: 38.06 KG/M2 | DIASTOLIC BLOOD PRESSURE: 96 MMHG | SYSTOLIC BLOOD PRESSURE: 158 MMHG | HEART RATE: 74 BPM

## 2020-08-19 DIAGNOSIS — G31.84 MILD COGNITIVE IMPAIRMENT: Primary | ICD-10-CM

## 2020-08-19 DIAGNOSIS — R41.82 ALTERED MENTAL STATUS, UNSPECIFIED ALTERED MENTAL STATUS TYPE: ICD-10-CM

## 2020-08-19 DIAGNOSIS — I10 ESSENTIAL HYPERTENSION: ICD-10-CM

## 2020-08-19 DIAGNOSIS — E03.4 HYPOTHYROIDISM DUE TO ACQUIRED ATROPHY OF THYROID: ICD-10-CM

## 2020-08-19 DIAGNOSIS — G47.33 OSA ON CPAP: ICD-10-CM

## 2020-08-19 DIAGNOSIS — H90.3 SENSORINEURAL HEARING LOSS (SNHL) OF BOTH EARS: ICD-10-CM

## 2020-08-19 DIAGNOSIS — E78.2 MIXED HYPERLIPIDEMIA: ICD-10-CM

## 2020-08-19 PROBLEM — Z79.899 ENCOUNTER FOR LONG-TERM CURRENT USE OF MEDICATION: Status: RESOLVED | Noted: 2017-05-31 | Resolved: 2020-08-19

## 2020-08-19 PROBLEM — Z12.11 COLON CANCER SCREENING: Status: RESOLVED | Noted: 2019-01-31 | Resolved: 2020-08-19

## 2020-08-19 PROCEDURE — 1101F PR PT FALLS ASSESS DOC 0-1 FALLS W/OUT INJ PAST YR: ICD-10-PCS | Mod: CPTII,S$GLB,, | Performed by: NURSE PRACTITIONER

## 2020-08-19 PROCEDURE — 99214 OFFICE O/P EST MOD 30 MIN: CPT | Mod: S$GLB,,, | Performed by: NURSE PRACTITIONER

## 2020-08-19 PROCEDURE — 1101F PT FALLS ASSESS-DOCD LE1/YR: CPT | Mod: CPTII,S$GLB,, | Performed by: NURSE PRACTITIONER

## 2020-08-19 PROCEDURE — 1159F MED LIST DOCD IN RCRD: CPT | Mod: S$GLB,,, | Performed by: NURSE PRACTITIONER

## 2020-08-19 PROCEDURE — 3077F PR MOST RECENT SYSTOLIC BLOOD PRESSURE >= 140 MM HG: ICD-10-PCS | Mod: CPTII,S$GLB,, | Performed by: NURSE PRACTITIONER

## 2020-08-19 PROCEDURE — 1159F PR MEDICATION LIST DOCUMENTED IN MEDICAL RECORD: ICD-10-PCS | Mod: S$GLB,,, | Performed by: NURSE PRACTITIONER

## 2020-08-19 PROCEDURE — 3077F SYST BP >= 140 MM HG: CPT | Mod: CPTII,S$GLB,, | Performed by: NURSE PRACTITIONER

## 2020-08-19 PROCEDURE — 99499 UNLISTED E&M SERVICE: CPT | Mod: S$GLB,,, | Performed by: NURSE PRACTITIONER

## 2020-08-19 PROCEDURE — 3080F PR MOST RECENT DIASTOLIC BLOOD PRESSURE >= 90 MM HG: ICD-10-PCS | Mod: CPTII,S$GLB,, | Performed by: NURSE PRACTITIONER

## 2020-08-19 PROCEDURE — 99214 PR OFFICE/OUTPT VISIT, EST, LEVL IV, 30-39 MIN: ICD-10-PCS | Mod: S$GLB,,, | Performed by: NURSE PRACTITIONER

## 2020-08-19 PROCEDURE — 99499 RISK ADDL DX/OHS AUDIT: ICD-10-PCS | Mod: S$GLB,,, | Performed by: NURSE PRACTITIONER

## 2020-08-19 PROCEDURE — 99999 PR PBB SHADOW E&M-EST. PATIENT-LVL III: CPT | Mod: PBBFAC,,, | Performed by: NURSE PRACTITIONER

## 2020-08-19 PROCEDURE — 3080F DIAST BP >= 90 MM HG: CPT | Mod: CPTII,S$GLB,, | Performed by: NURSE PRACTITIONER

## 2020-08-19 PROCEDURE — 99999 PR PBB SHADOW E&M-EST. PATIENT-LVL III: ICD-10-PCS | Mod: PBBFAC,,, | Performed by: NURSE PRACTITIONER

## 2020-08-19 PROCEDURE — 1125F AMNT PAIN NOTED PAIN PRSNT: CPT | Mod: S$GLB,,, | Performed by: NURSE PRACTITIONER

## 2020-08-19 PROCEDURE — 1125F PR PAIN SEVERITY QUANTIFIED, PAIN PRESENT: ICD-10-PCS | Mod: S$GLB,,, | Performed by: NURSE PRACTITIONER

## 2020-08-19 NOTE — PROGRESS NOTES
HPI: Domonique Hernandez Jr. is a 80 y.o. male complaining of minor short term memory loss. Suspect mild cognitive impairment. He has HTN, HLD, hypothyroidism, and KARL.     He presents today with worsening memory. His memory is worse since his last visit. He is repeating questions more. His PCP increased his Namenda last month, and he has not noticed a difference.     He does have hearing loss. He does not use hearing aids, as they are uncomfortable.     He forgets how to work his walker, but he recently started using this. He forgets what he went into the kitchen for.      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     No falls. Appetite is good.     No anxiety or depression.     He is being treated for UTI per PCP.     His son was recently diagnosed with dementia.     Seeing Dr. Sandoval about left knee pain.    He is staying active and dieting.    Review of Systems   Constitutional: Negative for fever.   Eyes: Negative for double vision.   Respiratory: Negative for hemoptysis.    Cardiovascular: Negative for chest pain.   Gastrointestinal: Negative for blood in stool.   Genitourinary: Negative for hematuria.   Musculoskeletal: Negative for falls.   Skin: Negative for rash.   Neurological: Negative for seizures and headaches.   Psychiatric/Behavioral: Positive for memory loss. The patient does not have insomnia.        I have reviewed all of this patient's past medical and surgical histories as well as family and social histories and active allergies and medications as documented in the electronic medical record.    Exam:  Gen Appearance, well developed/nourished in no apparent distress  CV: 2+ distal pulses with no edema or swelling  Neuro:  MS: Awake, alert, oriented to place, person, time, situation. Sustains attention. Recent recall is intact/remote memory intact, Language is full to spontaneous speech/comprehension. Fund of Knowledge is full. Able to  name current President easily.   Mood is euthymic  CN: Optic discs are flat with normal vasculature, PERRL, Extraoccular movements and visual fields are full. Normal facial sensation and strength, Hearing symmetric, Tongue and Palate are midline and strong. Shoulder Shrug symmetric and strong.  Motor: Normal bulk, tone, no abnormal movements. 5/5 strength bilateral upper/lower extremities with 2+ reflexes and clonus  Sensory: symmetric to temp, and vibration.  Romberg negative  Cerebellar: Finger-nose,Heal-shin, Rapid alternating movements intact  Gait: Normal stance, no ataxia    Labs:  3/2017 TSH normal    Imagin/2016 MRI brain: Age-appropriate generalized volume loss with mild/moderate degree of T2/FLAIR signal abnormality within the supratentorial white matter  while nonspecific suggest chronic microvascular ischemic change.    No evidence for acute infarction or enhancing lesion.    Assessment/Plan: Domonique Hernandez Jr. is a 80 y.o. male complaining of minor short term memory loss. Suspect mild cognitive impairment.   I recommend:     I recommend:   1. Cognitive impairment/worsening memory can occur after cardiac surgeries, particularly bypass, as discussed with patient.   2. Check B12, TSH, CBC, and CMP.   3. CT Head to evaluate for CVA/other structural causes of his memory changes. No MRI, due to his loop recorder.   4. Continue compliance CPAP use for his KARL.   5. Advised to FU with PCP to ensure resolution of UTI, which can contribute to memory changes.   6. He continues on Effexor for his mood per PCP- grief, now improved, likely contributed to memory challenges prior   7. Not driving since CABG.   8. Consider his hypothyroidism, hypogonadism, KARL, and CKD diagnosis, with memory changes.     RTC 3 months

## 2020-08-19 NOTE — PROGRESS NOTES
Subjective:       Patient ID: Domonique Hernandez Jr. is a 80 y.o. male.    Chief Complaint: Urinary Tract Infection (difficulty urinating and burning)    New to me but seen previously in clinic by a fellow provider. Pt presents with recurrent UTI symptoms that began a few days ago while on road trip.     Urinary Tract Infection   This is a recurrent problem. The current episode started in the past 7 days. The problem occurs every urination. The problem has been gradually worsening. The quality of the pain is described as burning, aching and stabbing. The pain is moderate. There has been no fever. Associated symptoms include frequency, hematuria, hesitancy, urgency and withholding. Pertinent negatives include no behavior changes, chills, discharge, flank pain, nausea, sweats, vomiting, weight loss, bubble bath use, constipation or rash. Treatments tried: AZO. The treatment provided no relief. His past medical history is significant for recurrent UTIs.     Review of Systems   Constitutional: Negative for activity change, appetite change, chills, fever, unexpected weight change and weight loss.   HENT: Negative for nasal congestion, ear pain, postnasal drip, rhinorrhea, sneezing, sore throat and voice change.    Eyes: Negative for pain and visual disturbance.   Respiratory: Negative for cough, chest tightness and shortness of breath.    Cardiovascular: Negative for chest pain, palpitations and leg swelling.   Gastrointestinal: Negative for abdominal pain, constipation, diarrhea, nausea and vomiting.   Endocrine: Negative.    Genitourinary: Positive for decreased urine volume, difficulty urinating, dysuria, frequency, hematuria, hesitancy and urgency. Negative for bladder incontinence, discharge, flank pain, genital sores, penile pain, penile swelling, scrotal swelling and testicular pain.   Musculoskeletal: Negative for arthralgias, gait problem and myalgias.   Integumentary:  Negative for rash.   Allergic/Immunologic:  Negative.    Neurological: Negative for speech difficulty and headaches.   Hematological: Negative.    Psychiatric/Behavioral: Negative.    All other systems reviewed and are negative.        Objective:      Physical Exam  Vitals signs and nursing note reviewed.   Constitutional:       General: He is not in acute distress.     Appearance: Normal appearance. He is well-developed.   HENT:      Head: Normocephalic and atraumatic.      Right Ear: External ear normal.      Left Ear: External ear normal.      Nose: Nose normal.   Eyes:      General: Lids are normal. No scleral icterus.        Right eye: No discharge.         Left eye: No discharge.      Conjunctiva/sclera: Conjunctivae normal.   Neck:      Musculoskeletal: Neck supple.      Trachea: Phonation normal.   Pulmonary:      Effort: Pulmonary effort is normal. No accessory muscle usage or respiratory distress.   Abdominal:      General: Abdomen is protuberant.      Palpations: Abdomen is soft.      Tenderness: There is abdominal tenderness in the suprapubic area. There is no right CVA tenderness, left CVA tenderness, guarding or rebound.   Skin:     General: Skin is warm and dry.      Findings: No rash.   Neurological:      Mental Status: He is alert and oriented to person, place, and time.      Motor: No abnormal muscle tone.      Gait: Gait normal.   Psychiatric:         Speech: Speech normal.         Behavior: Behavior normal.         Thought Content: Thought content normal.         Judgment: Judgment normal.         Results for orders placed or performed in visit on 08/17/20   POCT URINE DIPSTICK WITHOUT MICROSCOPE   Result Value Ref Range    Color, UA Orange     Spec Grav UA 1.010     pH, UA 6     WBC, UA ++     Nitrite, UA +     Protein +     Glucose, UA -     Ketones, UA -     Urobilinogen, UA -     Bilirubin -     Blood, UA ++     Clarity, UA Cloudy        Assessment:       1. Complicated UTI (urinary tract infection)        Plan:       1. Complicated  UTI (urinary tract infection)  PLAN: Treatment per orders - also push fluids, may use Pyridium OTC prn. Call or return to clinic prn if these symptoms worsen or fail to improve as anticipated.  - POCT URINE DIPSTICK WITHOUT MICROSCOPE  - doxycycline (MONODOX) 100 MG capsule; Take 1 capsule (100 mg total) by mouth every 12 (twelve) hours. for 14 days  Dispense: 28 capsule; Refill: 0  - Ambulatory referral/consult to Urology; Future           ALEJANDRA Anthony, FNP-C  Family/Internal Medicine  [facility]

## 2020-08-20 ENCOUNTER — OFFICE VISIT (OUTPATIENT)
Dept: ORTHOPEDICS | Facility: CLINIC | Age: 80
End: 2020-08-20
Payer: MEDICARE

## 2020-08-20 VITALS
RESPIRATION RATE: 18 BRPM | HEART RATE: 78 BPM | TEMPERATURE: 97 F | DIASTOLIC BLOOD PRESSURE: 94 MMHG | WEIGHT: 239.5 LBS | SYSTOLIC BLOOD PRESSURE: 142 MMHG | BODY MASS INDEX: 37.59 KG/M2 | HEIGHT: 67 IN

## 2020-08-20 DIAGNOSIS — M17.12 PRIMARY OSTEOARTHRITIS OF LEFT KNEE: Primary | ICD-10-CM

## 2020-08-20 PROCEDURE — 1125F PR PAIN SEVERITY QUANTIFIED, PAIN PRESENT: ICD-10-PCS | Mod: S$GLB,,, | Performed by: PHYSICIAN ASSISTANT

## 2020-08-20 PROCEDURE — 1159F MED LIST DOCD IN RCRD: CPT | Mod: S$GLB,,, | Performed by: PHYSICIAN ASSISTANT

## 2020-08-20 PROCEDURE — 1125F AMNT PAIN NOTED PAIN PRSNT: CPT | Mod: S$GLB,,, | Performed by: PHYSICIAN ASSISTANT

## 2020-08-20 PROCEDURE — 1159F PR MEDICATION LIST DOCUMENTED IN MEDICAL RECORD: ICD-10-PCS | Mod: S$GLB,,, | Performed by: PHYSICIAN ASSISTANT

## 2020-08-20 PROCEDURE — 3077F PR MOST RECENT SYSTOLIC BLOOD PRESSURE >= 140 MM HG: ICD-10-PCS | Mod: CPTII,S$GLB,, | Performed by: PHYSICIAN ASSISTANT

## 2020-08-20 PROCEDURE — 99213 PR OFFICE/OUTPT VISIT, EST, LEVL III, 20-29 MIN: ICD-10-PCS | Mod: S$GLB,,, | Performed by: PHYSICIAN ASSISTANT

## 2020-08-20 PROCEDURE — 99213 OFFICE O/P EST LOW 20 MIN: CPT | Mod: S$GLB,,, | Performed by: PHYSICIAN ASSISTANT

## 2020-08-20 PROCEDURE — 3080F DIAST BP >= 90 MM HG: CPT | Mod: CPTII,S$GLB,, | Performed by: PHYSICIAN ASSISTANT

## 2020-08-20 PROCEDURE — 99999 PR PBB SHADOW E&M-EST. PATIENT-LVL III: ICD-10-PCS | Mod: PBBFAC,,, | Performed by: PHYSICIAN ASSISTANT

## 2020-08-20 PROCEDURE — 3077F SYST BP >= 140 MM HG: CPT | Mod: CPTII,S$GLB,, | Performed by: PHYSICIAN ASSISTANT

## 2020-08-20 PROCEDURE — 99999 PR PBB SHADOW E&M-EST. PATIENT-LVL III: CPT | Mod: PBBFAC,,, | Performed by: PHYSICIAN ASSISTANT

## 2020-08-20 PROCEDURE — 1101F PR PT FALLS ASSESS DOC 0-1 FALLS W/OUT INJ PAST YR: ICD-10-PCS | Mod: CPTII,S$GLB,, | Performed by: PHYSICIAN ASSISTANT

## 2020-08-20 PROCEDURE — 1101F PT FALLS ASSESS-DOCD LE1/YR: CPT | Mod: CPTII,S$GLB,, | Performed by: PHYSICIAN ASSISTANT

## 2020-08-20 PROCEDURE — 3080F PR MOST RECENT DIASTOLIC BLOOD PRESSURE >= 90 MM HG: ICD-10-PCS | Mod: CPTII,S$GLB,, | Performed by: PHYSICIAN ASSISTANT

## 2020-08-20 NOTE — PATIENT INSTRUCTIONS
WHAT IS EUFLEXXA®?  EUFLEXXA® is a gel-like, elastic, sterile product containing natural, highly purified hyaluronan ([id number]), (also known as sodium hyaluronate). Hyaluronanis a natural substance found in the body and is present in particularly high amounts in joint tissues and in the fluid that fills the joints. The bodys own hyaluronan acts like a lubricant and a shock absorber in the joint, and is needed for the joint to function properly. Osteoarthritis is a condition that involves the wearing down of cartilage (the protective covering on the ends of your bones). In osteoarthritis, there may not be enough hyaluronan, and there may be a decrease in the quality of the hyaluronan in joint fluid and tissues.  EUFLEXXA® comes in pre-filled syringes containing 2 ml (about half a teaspoon) of product. EUFLEXXA® is given by injection directly into the knee joint.    WHAT IS EUFLEXXA® USED FOR?  EUFLEXXA® is used to relieve knee pain due to osteoarthritis. It is used for patients who do not get enough relief from simple pain medications such as acetaminophen or from exercise and physical therapy.    WHAT ARE THE BENEFITS OF EUFLEXXA®?  A clinical study involving 321 patients between the ages of 50 and 80years of age with knee pain due to osteoarthritis was performed in [facility]. The study investigated the safety and effectiveness of EUFLEXXA®. The patients were placed in one of two groups. One group was given an injection of EUFLEXXA® into one or both knee joints once a week for 3 weeks. The second group was given an injection of another commercially available hyaluronan once a week for 3 weeks. Pain, stiffness and function of the knee joint and patients and doctors judgment of the success of treatment were measured for 12 weeks. Patients with osteoarthritic knee joint pain, who had not obtained painrelief with other medications, experienced pain relief from the EUFLEXXA® injections into the knee joint,  similar to those patients who received injections of the other hyaluronan.

## 2020-08-20 NOTE — PROGRESS NOTES
Subjective:      Patient ID: Domonique Hernandez Jr. is a 80 y.o. male.    Chief Complaint: Pain of the Left Knee    Review of patient's allergies indicates:   Allergen Reactions    Percocet [oxycodone-acetaminophen] Other (See Comments)     hyperactivity    Percodan [oxycodone hcl-oxycodone-asa] Other (See Comments)     hyperactivity      Interval:  Pt presents to clinic for follow up of left knee pain.  He received medial  brace which seems to be helping some with pain.  Previous steroid injection provided good relief for about 2 months.  He states that he is interested in gel injections.    HPI 5/27/20:  81 yo M presents to clinic with c/o left knee pain x many years.  States that he injured it a long time ago but does not recall how.  Pain is located to medial knee and described as sharp and achy, does not radiate.  No swelling.  Worse with walking and after prolonged sitting or standing.  He has previously had several steroid injections and synvisc, states that steroid provides good relief for a few months and synvisc did not provide any relief.  Last steroid injection on 5/15/20 by PCP. He has had good relief since.      Pain  Pertinent negatives include no abdominal pain, change in bowel habit, chest pain, chills, congestion, coughing, diaphoresis, fever, joint swelling, neck pain or rash.       Review of Systems   Constitution: Negative for chills, diaphoresis and fever.   HENT: Negative for congestion, ear discharge and ear pain.    Eyes: Negative for blurred vision, discharge, double vision and pain.   Cardiovascular: Negative for chest pain, claudication and cyanosis.   Respiratory: Negative for cough, hemoptysis and shortness of breath.    Endocrine: Negative for cold intolerance and heat intolerance.   Skin: Negative for color change, dry skin, itching and rash.   Musculoskeletal: Positive for joint pain. Negative for arthritis, back pain, falls, gout, joint swelling, muscle weakness and neck pain.    Gastrointestinal: Negative for abdominal pain and change in bowel habit.   Neurological: Negative for brief paralysis, disturbances in coordination and dizziness.   Psychiatric/Behavioral: Negative for altered mental status and depression.         Objective:          General    Constitutional: He is oriented to person, place, and time. He appears well-developed and well-nourished. No distress.   HENT:   Head: Atraumatic.   Eyes: EOM are normal. Right eye exhibits no discharge. Left eye exhibits no discharge.   Cardiovascular: Normal rate.    Pulmonary/Chest: Effort normal. No respiratory distress.   Abdominal: Soft.   Neurological: He is alert and oriented to person, place, and time.   Psychiatric: He has a normal mood and affect. His behavior is normal.           Right Knee Exam     Inspection   Erythema: absent  Scars: absent  Swelling: absent  Effusion: absent  Deformity: absent  Bruising: absent    Crepitus   The patient has crepitus of the patella.    Range of Motion   Extension: 0   Flexion: 140     Tests   Ligament Examination   MCL - Valgus: normal (0 to 2mm)  LCL - Varus: normal    Other   Sensation: normal    Left Knee Exam     Inspection   Erythema: absent  Scars: absent  Swelling: absent  Effusion: absent  Deformity: absent  Bruising: absent    Tenderness   The patient tender to palpation of the medial joint line and lateral joint line.    Crepitus   The patient has crepitus of the patella.    Range of Motion   Extension: 0   Flexion: 130     Tests   Stability   MCL - Valgus: normal (0 to 2mm)  LCL - Varus: normal (0 to 2mm)    Other   Sensation: normal    Muscle Strength   Right Lower Extremity   Hip Abduction: 5/5   Quadriceps:  5/5   Hamstrin/5   Left Lower Extremity   Hip Abduction: 5/5   Quadriceps:  5/5   Hamstrin/5     Vascular Exam     Right Pulses  Dorsalis Pedis:      2+          Left Pulses  Dorsalis Pedis:      2+          Edema  Right Lower Leg: absent  Left Lower Leg:  absent              Assessment:         Xray Left Knee 5/27/20:  Advanced degenerative changes of the medial patellofemoral compartments with joint space narrowing, subchondral sclerosis and subchondral cystic changes, more probably affecting the medial compartment.  There is dorsal spurring from the patella.  Hypertrophic changes of the tibial spines are seen.  Small joint effusion is present.  No fracture or dislocation.    Encounter Diagnosis   Name Primary?    Primary osteoarthritis of left knee Yes    Primary osteoarthritis of left knee  -     sodium hyaluronate (EUFLEXXA) 10 mg/mL(mw 2.4 -3.6 million) injection 20 mg  -     Prior authorization Order               Plan:         I made the decision to obtain old records of the patient including previous notes and imaging. New imaging was ordered today of the extremity or extremities evaluated. I independently reviewed and interpreted the radiographs today.    The total face-to-face encounter time with this patient was 30 minutes and greater than 50% of of the encounter time was spent counseling the patient, coordinating care, and education regarding the pathology and natural history of his diagnosis. We have discussed a variety of treatment options including medications, injections, physical therapy and other alternative treatments. Pt is requesting viscosupplementation injections at this time.    1. Prior authorization for Euflexxa placed.  2. Continue to wear medial  brace as needed.  3. Ice compress to the affected area 2-3x a day for 15-20 minutes as needed for pain management.  4. Continue home exercise program as demonstrated at previous visit.  5. RTC in  2 weeks for left Euflexxa #1, sooner if needed.      Patient voices understanding of and agreement with treatment plan. All of the patient's questions were answered and the patient will contact us if he has any questions or concerns in the interim.

## 2020-08-21 DIAGNOSIS — Z11.59 NEED FOR HEPATITIS C SCREENING TEST: ICD-10-CM

## 2020-08-24 ENCOUNTER — HOSPITAL ENCOUNTER (OUTPATIENT)
Dept: RADIOLOGY | Facility: HOSPITAL | Age: 80
Discharge: HOME | End: 2020-08-24
Attending: NURSE PRACTITIONER
Payer: MEDICARE

## 2020-08-24 DIAGNOSIS — R41.82 ALTERED MENTAL STATUS, UNSPECIFIED ALTERED MENTAL STATUS TYPE: ICD-10-CM

## 2020-08-24 PROCEDURE — 70450 CT HEAD/BRAIN W/O DYE: CPT | Mod: 26,,, | Performed by: RADIOLOGY

## 2020-08-24 PROCEDURE — 70450 CT HEAD WITHOUT CONTRAST: ICD-10-PCS | Mod: 26,,, | Performed by: RADIOLOGY

## 2020-08-24 PROCEDURE — 70450 CT HEAD/BRAIN W/O DYE: CPT | Mod: TC

## 2020-09-10 ENCOUNTER — OFFICE VISIT (OUTPATIENT)
Dept: ORTHOPEDICS | Facility: CLINIC | Age: 80
End: 2020-09-10
Payer: MEDICARE

## 2020-09-10 VITALS
DIASTOLIC BLOOD PRESSURE: 92 MMHG | SYSTOLIC BLOOD PRESSURE: 142 MMHG | WEIGHT: 242.38 LBS | RESPIRATION RATE: 18 BRPM | HEIGHT: 67 IN | TEMPERATURE: 97 F | BODY MASS INDEX: 38.04 KG/M2 | HEART RATE: 82 BPM

## 2020-09-10 DIAGNOSIS — M17.12 PRIMARY OSTEOARTHRITIS OF LEFT KNEE: Primary | ICD-10-CM

## 2020-09-10 PROCEDURE — 99499 NO LOS: ICD-10-PCS | Mod: S$GLB,,, | Performed by: PHYSICIAN ASSISTANT

## 2020-09-10 PROCEDURE — 99999 PR PBB SHADOW E&M-EST. PATIENT-LVL III: ICD-10-PCS | Mod: PBBFAC,,, | Performed by: PHYSICIAN ASSISTANT

## 2020-09-10 PROCEDURE — 20610 DRAIN/INJ JOINT/BURSA W/O US: CPT | Mod: LT,S$GLB,, | Performed by: PHYSICIAN ASSISTANT

## 2020-09-10 PROCEDURE — 99999 PR PBB SHADOW E&M-EST. PATIENT-LVL III: CPT | Mod: PBBFAC,,, | Performed by: PHYSICIAN ASSISTANT

## 2020-09-10 PROCEDURE — 99499 UNLISTED E&M SERVICE: CPT | Mod: S$GLB,,, | Performed by: PHYSICIAN ASSISTANT

## 2020-09-10 PROCEDURE — 20610 PR DRAIN/INJECT LARGE JOINT/BURSA: ICD-10-PCS | Mod: LT,S$GLB,, | Performed by: PHYSICIAN ASSISTANT

## 2020-09-10 NOTE — PROGRESS NOTES
Domonique Hernandez Jr. is here for follow up of left knee arthritis. Pt is requesting Euflexxa series injections #1 of 3.  [facility] reviewed per encounter record. Has failed other conservative modalities including NSAIDS, activity modification, weight loss.    The prior injection was tolerated well.    PHYSICAL EXAMINATION:     General: The patient is alert and oriented x 3. Mood is pleasant.   Observation of ears, eyes and nose reveals no gross abnormalities. No   labored breathing observed.     No signs of infection or adverse reaction to knee.    Injection Procedure  A time out was performed, including verification of patient ID, procedure, site and side, availability of information and equipment, review of safety issues, and agreement with consent, the procedure site was marked.    After time out was performed, the patient was prepped aseptically with chloraprep. A diagnostic and therapeutic injection of 2cc Euflexxa was given under sterile technique using a 22g x 1.5 needle from the Anterolateral  aspect of the left Knee Joint in the seated position.      Domonique Hernandez Jr. had no adverse reactions to the medication. Pain decreased. He was instructed to apply ice to the joint for 20 minutes and avoid strenuous activities for 24-36 hours following the injection. He was warned of possible blood sugar and/or blood pressure changes during that time. Following that time, he can resume regular activities.    He was reminded to call the clinic immediately for any adverse side effects as explained in clinic today.    RTC in 1 week for Euflexxa series injections #2 of 3, sooner if needed.    Patient voices understanding of and agreement with treatment plan. All of the patient's questions were answered and the patient will contact us if they have any questions or concerns in the interim.

## 2020-09-15 ENCOUNTER — OFFICE VISIT (OUTPATIENT)
Dept: FAMILY MEDICINE | Facility: CLINIC | Age: 80
End: 2020-09-15
Payer: MEDICARE

## 2020-09-15 VITALS
SYSTOLIC BLOOD PRESSURE: 142 MMHG | TEMPERATURE: 97 F | BODY MASS INDEX: 37.79 KG/M2 | WEIGHT: 240.75 LBS | HEART RATE: 82 BPM | RESPIRATION RATE: 18 BRPM | DIASTOLIC BLOOD PRESSURE: 92 MMHG | HEIGHT: 67 IN

## 2020-09-15 DIAGNOSIS — N40.1 BENIGN PROSTATIC HYPERPLASIA WITH URINARY FREQUENCY: ICD-10-CM

## 2020-09-15 DIAGNOSIS — R35.1 NOCTURIA: ICD-10-CM

## 2020-09-15 DIAGNOSIS — R35.0 URINARY FREQUENCY: Primary | ICD-10-CM

## 2020-09-15 DIAGNOSIS — R35.0 BENIGN PROSTATIC HYPERPLASIA WITH URINARY FREQUENCY: ICD-10-CM

## 2020-09-15 LAB
BACTERIA SPEC CULT: NORMAL
BILIRUB SERPL-MCNC: NORMAL MG/DL
BLOOD URINE, POC: NORMAL
CASTS: NORMAL
COLOR, POC UA: YELLOW
CRYSTALS: NORMAL
GLUCOSE UR QL STRIP: NORMAL
KETONES UR QL STRIP: NORMAL
LEUKOCYTE ESTERASE URINE, POC: NORMAL
NITRITE, POC UA: NORMAL
PH, POC UA: 7
PROTEIN, POC: NORMAL
RBC CELLS COUNTED: NORMAL
SPECIFIC GRAVITY, POC UA: 1.01
UROBILINOGEN, POC UA: NORMAL
WHITE BLOOD CELLS: NORMAL

## 2020-09-15 PROCEDURE — 81002 URINALYSIS NONAUTO W/O SCOPE: CPT | Mod: S$GLB,,, | Performed by: FAMILY MEDICINE

## 2020-09-15 PROCEDURE — 99999 PR PBB SHADOW E&M-EST. PATIENT-LVL III: ICD-10-PCS | Mod: PBBFAC,,, | Performed by: FAMILY MEDICINE

## 2020-09-15 PROCEDURE — 99213 PR OFFICE/OUTPT VISIT, EST, LEVL III, 20-29 MIN: ICD-10-PCS | Mod: 25,S$GLB,, | Performed by: FAMILY MEDICINE

## 2020-09-15 PROCEDURE — 1159F MED LIST DOCD IN RCRD: CPT | Mod: S$GLB,,, | Performed by: FAMILY MEDICINE

## 2020-09-15 PROCEDURE — 3077F PR MOST RECENT SYSTOLIC BLOOD PRESSURE >= 140 MM HG: ICD-10-PCS | Mod: CPTII,S$GLB,, | Performed by: FAMILY MEDICINE

## 2020-09-15 PROCEDURE — 1126F AMNT PAIN NOTED NONE PRSNT: CPT | Mod: S$GLB,,, | Performed by: FAMILY MEDICINE

## 2020-09-15 PROCEDURE — 1101F PR PT FALLS ASSESS DOC 0-1 FALLS W/OUT INJ PAST YR: ICD-10-PCS | Mod: CPTII,S$GLB,, | Performed by: FAMILY MEDICINE

## 2020-09-15 PROCEDURE — 1126F PR PAIN SEVERITY QUANTIFIED, NO PAIN PRESENT: ICD-10-PCS | Mod: S$GLB,,, | Performed by: FAMILY MEDICINE

## 2020-09-15 PROCEDURE — 99213 OFFICE O/P EST LOW 20 MIN: CPT | Mod: 25,S$GLB,, | Performed by: FAMILY MEDICINE

## 2020-09-15 PROCEDURE — 99999 PR PBB SHADOW E&M-EST. PATIENT-LVL III: CPT | Mod: PBBFAC,,, | Performed by: FAMILY MEDICINE

## 2020-09-15 PROCEDURE — 1159F PR MEDICATION LIST DOCUMENTED IN MEDICAL RECORD: ICD-10-PCS | Mod: S$GLB,,, | Performed by: FAMILY MEDICINE

## 2020-09-15 PROCEDURE — 1101F PT FALLS ASSESS-DOCD LE1/YR: CPT | Mod: CPTII,S$GLB,, | Performed by: FAMILY MEDICINE

## 2020-09-15 PROCEDURE — 3077F SYST BP >= 140 MM HG: CPT | Mod: CPTII,S$GLB,, | Performed by: FAMILY MEDICINE

## 2020-09-15 PROCEDURE — 3080F DIAST BP >= 90 MM HG: CPT | Mod: CPTII,S$GLB,, | Performed by: FAMILY MEDICINE

## 2020-09-15 PROCEDURE — 81002 POCT URINALYSIS, DIPSTICK OR TABLET REAGENT, AUTOMATED, WITH MICROSCOP: ICD-10-PCS | Mod: S$GLB,,, | Performed by: FAMILY MEDICINE

## 2020-09-15 PROCEDURE — 3080F PR MOST RECENT DIASTOLIC BLOOD PRESSURE >= 90 MM HG: ICD-10-PCS | Mod: CPTII,S$GLB,, | Performed by: FAMILY MEDICINE

## 2020-09-15 RX ORDER — TAMSULOSIN HYDROCHLORIDE 0.4 MG/1
0.4 CAPSULE ORAL DAILY
Qty: 30 CAPSULE | Refills: 11 | Status: SHIPPED | OUTPATIENT
Start: 2020-09-15 | End: 2021-08-17 | Stop reason: ALTCHOICE

## 2020-09-15 NOTE — PROGRESS NOTES
Subjective:       Patient ID: Domonique Hernandez Jr. is a 80 y.o. male.    Chief Complaint: Urinary Tract Infection    Pt is a 80 y.o. male who presents for evaluation and management of   Encounter Diagnoses   Name Primary?    Urinary frequency Yes    Nocturia      Doing well on current meds. Denies any side effects. Prevention is up to date.    Review of Systems   Genitourinary: Positive for frequency. Negative for difficulty urinating.        Nocturia 20x a night          Objective:      Physical Exam  Constitutional:       Appearance: He is well-developed.   HENT:      Head: Normocephalic and atraumatic.      Right Ear: External ear normal.      Left Ear: External ear normal.      Nose: Nose normal.   Eyes:      General: No scleral icterus.        Right eye: No discharge.         Left eye: No discharge.      Conjunctiva/sclera: Conjunctivae normal.      Pupils: Pupils are equal, round, and reactive to light.   Neck:      Musculoskeletal: Normal range of motion and neck supple.      Thyroid: No thyromegaly.      Vascular: No JVD.      Trachea: No tracheal deviation.   Cardiovascular:      Rate and Rhythm: Normal rate and regular rhythm.      Heart sounds: Normal heart sounds. No murmur.   Pulmonary:      Effort: Pulmonary effort is normal. No respiratory distress.      Breath sounds: Normal breath sounds. No wheezing or rales.   Chest:      Chest wall: No tenderness.   Abdominal:      General: Bowel sounds are normal. There is no distension.      Palpations: Abdomen is soft. There is no mass.      Tenderness: There is no abdominal tenderness. There is no guarding or rebound.   Genitourinary:     Comments: 20g prostate  NT and no nodules     Musculoskeletal: Normal range of motion.   Lymphadenopathy:      Cervical: No cervical adenopathy.   Skin:     General: Skin is warm and dry.   Neurological:      Mental Status: He is alert and oriented to person, place, and time.      Cranial Nerves: No cranial nerve deficit.       Motor: No abnormal muscle tone.      Coordination: Coordination normal.      Deep Tendon Reflexes: Reflexes are normal and symmetric. Reflexes normal.   Psychiatric:         Behavior: Behavior normal.         Thought Content: Thought content normal.         Judgment: Judgment normal.         Assessment:       1. Urinary frequency    2. Nocturia        Plan:   Domonique was seen today for urinary tract infection.    Diagnoses and all orders for this visit:    Urinary frequency  -     POCT URINE DIPSTICK WITH MICROSCOPE, AUTOMATED  -     POCT Urine Sediment Exam    Nocturia    Benign prostatic hyperplasia with urinary frequency  -     tamsulosin (FLOMAX) 0.4 mg Cap; Take 1 capsule (0.4 mg total) by mouth once daily.      Problem List Items Addressed This Visit     None      Visit Diagnoses     Urinary frequency    -  Primary    Relevant Orders    POCT URINE DIPSTICK WITH MICROSCOPE, AUTOMATED    POCT Urine Sediment Exam    Nocturia          urine clean  Adding above   RTC 2 weeks with Patricia or me if Patricia not available     No follow-ups on file.

## 2020-09-17 ENCOUNTER — OFFICE VISIT (OUTPATIENT)
Dept: ORTHOPEDICS | Facility: CLINIC | Age: 80
End: 2020-09-17
Payer: MEDICARE

## 2020-09-17 VITALS
HEIGHT: 67 IN | SYSTOLIC BLOOD PRESSURE: 150 MMHG | BODY MASS INDEX: 38.04 KG/M2 | HEART RATE: 60 BPM | DIASTOLIC BLOOD PRESSURE: 102 MMHG | WEIGHT: 242.38 LBS | RESPIRATION RATE: 18 BRPM | TEMPERATURE: 98 F

## 2020-09-17 DIAGNOSIS — M17.12 PRIMARY OSTEOARTHRITIS OF LEFT KNEE: Primary | ICD-10-CM

## 2020-09-17 PROCEDURE — 99999 PR PBB SHADOW E&M-EST. PATIENT-LVL III: ICD-10-PCS | Mod: PBBFAC,,, | Performed by: PHYSICIAN ASSISTANT

## 2020-09-17 PROCEDURE — 20610 PR DRAIN/INJECT LARGE JOINT/BURSA: ICD-10-PCS | Mod: LT,S$GLB,, | Performed by: PHYSICIAN ASSISTANT

## 2020-09-17 PROCEDURE — 99499 UNLISTED E&M SERVICE: CPT | Mod: S$GLB,,, | Performed by: PHYSICIAN ASSISTANT

## 2020-09-17 PROCEDURE — 99999 PR PBB SHADOW E&M-EST. PATIENT-LVL III: CPT | Mod: PBBFAC,,, | Performed by: PHYSICIAN ASSISTANT

## 2020-09-17 PROCEDURE — 20610 DRAIN/INJ JOINT/BURSA W/O US: CPT | Mod: LT,S$GLB,, | Performed by: PHYSICIAN ASSISTANT

## 2020-09-17 PROCEDURE — 99499 NO LOS: ICD-10-PCS | Mod: S$GLB,,, | Performed by: PHYSICIAN ASSISTANT

## 2020-09-17 NOTE — PROGRESS NOTES
Domonique Hernandez Jr. is here for follow up of left knee arthritis. Pt is requesting Euflexxa series injections #2 of 3.  [facility] reviewed per encounter record. Has failed other conservative modalities including NSAIDS, activity modification, weight loss.    The prior injection was tolerated well.    PHYSICAL EXAMINATION:     General: The patient is alert and oriented x 3. Mood is pleasant.   Observation of ears, eyes and nose reveals no gross abnormalities. No   labored breathing observed.     No signs of infection or adverse reaction to knee.    Injection Procedure  A time out was performed, including verification of patient ID, procedure, site and side, availability of information and equipment, review of safety issues, and agreement with consent, the procedure site was marked.    After time out was performed, the patient was prepped aseptically with chloraprep. A diagnostic and therapeutic injection of 2cc Euflexxa was given under sterile technique using a 22g x 1.5 needle from the Anterolateral  aspect of the left Knee Joint in the seated position.      Domonique Hernandez Jr. had no adverse reactions to the medication. Pain decreased. He was instructed to apply ice to the joint for 20 minutes and avoid strenuous activities for 24-36 hours following the injection. He was warned of possible blood sugar and/or blood pressure changes during that time. Following that time, he can resume regular activities.    He was reminded to call the clinic immediately for any adverse side effects as explained in clinic today.    RTC in 1 week for Euflexxa series injections #2 of 3, sooner if needed.    Patient voices understanding of and agreement with treatment plan. All of the patient's questions were answered and the patient will contact us if they have any questions or concerns in the interim.

## 2020-09-22 ENCOUNTER — PATIENT OUTREACH (OUTPATIENT)
Dept: ADMINISTRATIVE | Facility: OTHER | Age: 80
End: 2020-09-22

## 2020-09-22 NOTE — PROGRESS NOTES
Health Maintenance Due   Topic Date Due    Hepatitis C Screening  1940    TETANUS VACCINE  05/26/1960    Shingles Vaccine (2 of 3) 03/13/2017    Influenza Vaccine (1) 08/01/2020     Updates were requested from care everywhere.  Chart was reviewed for overdue Proactive [facility] Encounters ([facility]) topics (CRS, Breast Cancer Screening, Eye exam)  Health Maintenance has been updated.  [facility]KS immunization registry triggered.  Immunizations were reconciled.

## 2020-09-24 ENCOUNTER — OFFICE VISIT (OUTPATIENT)
Dept: ORTHOPEDICS | Facility: CLINIC | Age: 80
End: 2020-09-24
Payer: MEDICARE

## 2020-09-24 VITALS
WEIGHT: 245.81 LBS | SYSTOLIC BLOOD PRESSURE: 140 MMHG | DIASTOLIC BLOOD PRESSURE: 92 MMHG | TEMPERATURE: 97 F | HEIGHT: 67 IN | HEART RATE: 80 BPM | RESPIRATION RATE: 18 BRPM | BODY MASS INDEX: 38.58 KG/M2

## 2020-09-24 DIAGNOSIS — G89.29 CHRONIC PAIN OF LEFT KNEE: ICD-10-CM

## 2020-09-24 DIAGNOSIS — M17.12 PRIMARY OSTEOARTHRITIS OF LEFT KNEE: Primary | ICD-10-CM

## 2020-09-24 DIAGNOSIS — M25.562 CHRONIC PAIN OF LEFT KNEE: ICD-10-CM

## 2020-09-24 PROCEDURE — 99499 NO LOS: ICD-10-PCS | Mod: S$GLB,,, | Performed by: PHYSICIAN ASSISTANT

## 2020-09-24 PROCEDURE — 20610 PR DRAIN/INJECT LARGE JOINT/BURSA: ICD-10-PCS | Mod: LT,S$GLB,, | Performed by: PHYSICIAN ASSISTANT

## 2020-09-24 PROCEDURE — 99999 PR PBB SHADOW E&M-EST. PATIENT-LVL V: ICD-10-PCS | Mod: PBBFAC,,, | Performed by: PHYSICIAN ASSISTANT

## 2020-09-24 PROCEDURE — 99499 UNLISTED E&M SERVICE: CPT | Mod: S$GLB,,, | Performed by: PHYSICIAN ASSISTANT

## 2020-09-24 PROCEDURE — 99999 PR PBB SHADOW E&M-EST. PATIENT-LVL V: CPT | Mod: PBBFAC,,, | Performed by: PHYSICIAN ASSISTANT

## 2020-09-24 PROCEDURE — 20610 DRAIN/INJ JOINT/BURSA W/O US: CPT | Mod: LT,S$GLB,, | Performed by: PHYSICIAN ASSISTANT

## 2020-09-24 RX ORDER — LIDOCAINE HYDROCHLORIDE 20 MG/ML
SOLUTION ORAL; TOPICAL
COMMUNITY
Start: 2020-09-21 | End: 2020-12-18 | Stop reason: ALTCHOICE

## 2020-09-24 RX ORDER — HYDROCODONE BITARTRATE AND ACETAMINOPHEN 7.5; 325 MG/1; MG/1
1 TABLET ORAL EVERY 6 HOURS PRN
Status: ON HOLD | COMMUNITY
Start: 2020-09-16 | End: 2020-12-22 | Stop reason: SDUPTHER

## 2020-09-24 NOTE — PROGRESS NOTES
Domonique Hernandez Jr. is here for follow up of left knee arthritis. Pt is requesting Euflexxa series injections #3 of 3.  [facility] Has failed other conservative modalities including NSAIDS, activity modification, weight loss.    The prior injection was tolerated well.    PHYSICAL EXAMINATION:     General: The patient is alert and oriented x 3. Mood is pleasant.   Observation of ears, eyes and nose reveals no gross abnormalities. No   labored breathing observed.     No signs of infection or adverse reaction to knee.    Injection Procedure  A time out was performed, including verification of patient ID, procedure, site and side, availability of information and equipment, review of safety issues, and agreement with consent, the procedure site was marked.    After time out was performed, the patient was prepped aseptically with chloraprep. A diagnostic and therapeutic injection of 2cc Euflexxa was given under sterile technique using a 22g x 1.5 needle from the Anterolateral  aspect of the left Knee Joint in the seated position.      Domonique Hernandez Jr. had no adverse reactions to the medication. Pain decreased. He was instructed to apply ice to the joint for 20 minutes and avoid strenuous activities for 24-36 hours following the injection. He was warned of possible blood sugar and/or blood pressure changes during that time. Following that time, he can resume regular activities.    He was reminded to call the clinic immediately for any adverse side effects as explained in clinic today.    RTC in 6 weeks for follow up of left knee pain, sooner if needed.    Patient voices understanding of and agreement with treatment plan. All of the patient's questions were answered and the patient will contact us if they have any questions or concerns in the interim.

## 2020-09-29 ENCOUNTER — OFFICE VISIT (OUTPATIENT)
Dept: FAMILY MEDICINE | Facility: CLINIC | Age: 80
End: 2020-09-29
Payer: MEDICARE

## 2020-09-29 VITALS
BODY MASS INDEX: 39.06 KG/M2 | RESPIRATION RATE: 18 BRPM | DIASTOLIC BLOOD PRESSURE: 100 MMHG | HEIGHT: 67 IN | HEART RATE: 72 BPM | SYSTOLIC BLOOD PRESSURE: 168 MMHG | WEIGHT: 248.88 LBS | TEMPERATURE: 97 F

## 2020-09-29 DIAGNOSIS — R35.0 BENIGN PROSTATIC HYPERPLASIA WITH URINARY FREQUENCY: ICD-10-CM

## 2020-09-29 DIAGNOSIS — N40.1 BENIGN PROSTATIC HYPERPLASIA WITH URINARY FREQUENCY: ICD-10-CM

## 2020-09-29 DIAGNOSIS — I10 ESSENTIAL HYPERTENSION: Primary | ICD-10-CM

## 2020-09-29 PROCEDURE — 1101F PR PT FALLS ASSESS DOC 0-1 FALLS W/OUT INJ PAST YR: ICD-10-PCS | Mod: CPTII,S$GLB,, | Performed by: FAMILY MEDICINE

## 2020-09-29 PROCEDURE — 99999 PR PBB SHADOW E&M-EST. PATIENT-LVL V: ICD-10-PCS | Mod: PBBFAC,,, | Performed by: FAMILY MEDICINE

## 2020-09-29 PROCEDURE — 3077F SYST BP >= 140 MM HG: CPT | Mod: CPTII,S$GLB,, | Performed by: FAMILY MEDICINE

## 2020-09-29 PROCEDURE — 3080F PR MOST RECENT DIASTOLIC BLOOD PRESSURE >= 90 MM HG: ICD-10-PCS | Mod: CPTII,S$GLB,, | Performed by: FAMILY MEDICINE

## 2020-09-29 PROCEDURE — 99214 OFFICE O/P EST MOD 30 MIN: CPT | Mod: S$GLB,,, | Performed by: FAMILY MEDICINE

## 2020-09-29 PROCEDURE — 1101F PT FALLS ASSESS-DOCD LE1/YR: CPT | Mod: CPTII,S$GLB,, | Performed by: FAMILY MEDICINE

## 2020-09-29 PROCEDURE — 1159F MED LIST DOCD IN RCRD: CPT | Mod: S$GLB,,, | Performed by: FAMILY MEDICINE

## 2020-09-29 PROCEDURE — 99214 PR OFFICE/OUTPT VISIT, EST, LEVL IV, 30-39 MIN: ICD-10-PCS | Mod: S$GLB,,, | Performed by: FAMILY MEDICINE

## 2020-09-29 PROCEDURE — 3077F PR MOST RECENT SYSTOLIC BLOOD PRESSURE >= 140 MM HG: ICD-10-PCS | Mod: CPTII,S$GLB,, | Performed by: FAMILY MEDICINE

## 2020-09-29 PROCEDURE — 1159F PR MEDICATION LIST DOCUMENTED IN MEDICAL RECORD: ICD-10-PCS | Mod: S$GLB,,, | Performed by: FAMILY MEDICINE

## 2020-09-29 PROCEDURE — 1126F AMNT PAIN NOTED NONE PRSNT: CPT | Mod: S$GLB,,, | Performed by: FAMILY MEDICINE

## 2020-09-29 PROCEDURE — 3080F DIAST BP >= 90 MM HG: CPT | Mod: CPTII,S$GLB,, | Performed by: FAMILY MEDICINE

## 2020-09-29 PROCEDURE — 1126F PR PAIN SEVERITY QUANTIFIED, NO PAIN PRESENT: ICD-10-PCS | Mod: S$GLB,,, | Performed by: FAMILY MEDICINE

## 2020-09-29 PROCEDURE — 99999 PR PBB SHADOW E&M-EST. PATIENT-LVL V: CPT | Mod: PBBFAC,,, | Performed by: FAMILY MEDICINE

## 2020-09-29 RX ORDER — OLMESARTAN MEDOXOMIL AND HYDROCHLOROTHIAZIDE 20/12.5 20; 12.5 MG/1; MG/1
1 TABLET ORAL DAILY
Qty: 30 TABLET | Refills: 0 | Status: SHIPPED | OUTPATIENT
Start: 2020-09-29 | End: 2020-10-22 | Stop reason: SDUPTHER

## 2020-09-29 NOTE — PROGRESS NOTES
Subjective:       Patient ID: Domonique Hernandez Jr. is a 80 y.o. male.    Chief Complaint: Benign Prostatic Hypertrophy (2 week follow up)    Pt is a 80 y.o. male who presents for evaluation and management of   Encounter Diagnoses   Name Primary?    Essential hypertension Yes    Benign prostatic hyperplasia with urinary frequency      Doing well on current meds. Denies any side effects. Prevention is up to date.    Review of Systems   Respiratory: Negative for shortness of breath.    Cardiovascular: Negative for chest pain.   Genitourinary: Positive for frequency. Negative for difficulty urinating.        Nocturia 20x a night      Neurological: Negative for headaches.       Objective:      Physical Exam  Constitutional:       Appearance: He is well-developed.   HENT:      Head: Normocephalic and atraumatic.      Right Ear: External ear normal.      Left Ear: External ear normal.      Nose: Nose normal.   Eyes:      General: No scleral icterus.        Right eye: No discharge.         Left eye: No discharge.      Conjunctiva/sclera: Conjunctivae normal.      Pupils: Pupils are equal, round, and reactive to light.   Neck:      Musculoskeletal: Normal range of motion and neck supple.      Thyroid: No thyromegaly.      Vascular: No JVD.      Trachea: No tracheal deviation.   Cardiovascular:      Rate and Rhythm: Normal rate and regular rhythm.      Heart sounds: Normal heart sounds. No murmur.   Pulmonary:      Effort: Pulmonary effort is normal. No respiratory distress.      Breath sounds: Normal breath sounds. No wheezing or rales.   Chest:      Chest wall: No tenderness.   Abdominal:      General: Bowel sounds are normal. There is no distension.      Palpations: Abdomen is soft. There is no mass.      Tenderness: There is no abdominal tenderness. There is no guarding or rebound.   Genitourinary:     Comments: 20g prostate  NT and no nodules     Musculoskeletal: Normal range of motion.   Lymphadenopathy:      Cervical:  No cervical adenopathy.   Skin:     General: Skin is warm and dry.   Neurological:      Mental Status: He is alert and oriented to person, place, and time.      Cranial Nerves: No cranial nerve deficit.      Motor: No abnormal muscle tone.      Coordination: Coordination normal.      Deep Tendon Reflexes: Reflexes are normal and symmetric. Reflexes normal.   Psychiatric:         Behavior: Behavior normal.         Thought Content: Thought content normal.         Judgment: Judgment normal.         Assessment:       1. Essential hypertension    2. Benign prostatic hyperplasia with urinary frequency        Plan:   Domonique was seen today for benign prostatic hypertrophy.    Diagnoses and all orders for this visit:    Essential hypertension  -     olmesartan-hydrochlorothiazide (BENICAR HCT) 20-12.5 mg per tablet; Take 1 tablet by mouth once daily.  -     Basic metabolic panel; Future    Benign prostatic hyperplasia with urinary frequency  -     Ambulatory referral/consult to Urology; Future      Problem List Items Addressed This Visit     Hypertension - Primary    Relevant Medications    olmesartan-hydrochlorothiazide (BENICAR HCT) 20-12.5 mg per tablet    Other Relevant Orders    Basic metabolic panel      Other Visit Diagnoses     Benign prostatic hyperplasia with urinary frequency        Relevant Orders    Ambulatory referral/consult to Urology      urine clean last visit  Has not responded with Flomax. Still with hesitancy, dribbling, and frequency. Will refer to urology. I wonder if he would benefit from a TURP? He also has some stress incontinence sx's too tho....   Adding benicar for uncontrolled htn   RTC 2 weeks with Patricia or me if Patricia not available     No follow-ups on file.

## 2020-10-09 ENCOUNTER — OFFICE VISIT (OUTPATIENT)
Dept: FAMILY MEDICINE | Facility: CLINIC | Age: 80
End: 2020-10-09
Payer: MEDICARE

## 2020-10-09 ENCOUNTER — HOSPITAL ENCOUNTER (OUTPATIENT)
Dept: RADIOLOGY | Facility: HOSPITAL | Age: 80
Discharge: HOME | End: 2020-10-09
Attending: FAMILY MEDICINE
Payer: MEDICARE

## 2020-10-09 VITALS
WEIGHT: 247.56 LBS | HEIGHT: 67 IN | OXYGEN SATURATION: 97 % | DIASTOLIC BLOOD PRESSURE: 84 MMHG | SYSTOLIC BLOOD PRESSURE: 142 MMHG | HEART RATE: 92 BPM | BODY MASS INDEX: 38.85 KG/M2 | RESPIRATION RATE: 12 BRPM

## 2020-10-09 DIAGNOSIS — Z79.01 ANTICOAGULATED: ICD-10-CM

## 2020-10-09 DIAGNOSIS — R06.09 DYSPNEA ON EXERTION: ICD-10-CM

## 2020-10-09 DIAGNOSIS — G47.33 OSA ON CPAP: ICD-10-CM

## 2020-10-09 DIAGNOSIS — E78.5 HYPERLIPIDEMIA, UNSPECIFIED HYPERLIPIDEMIA TYPE: ICD-10-CM

## 2020-10-09 DIAGNOSIS — R06.09 DYSPNEA ON EXERTION: Primary | ICD-10-CM

## 2020-10-09 DIAGNOSIS — M1A.9XX0 CHRONIC GOUT WITHOUT TOPHUS, UNSPECIFIED CAUSE, UNSPECIFIED SITE: ICD-10-CM

## 2020-10-09 DIAGNOSIS — I25.10 ASCVD (ARTERIOSCLEROTIC CARDIOVASCULAR DISEASE): ICD-10-CM

## 2020-10-09 DIAGNOSIS — E03.4 HYPOTHYROIDISM DUE TO ACQUIRED ATROPHY OF THYROID: ICD-10-CM

## 2020-10-09 DIAGNOSIS — Z95.1 HX OF CABG: ICD-10-CM

## 2020-10-09 DIAGNOSIS — I10 ESSENTIAL HYPERTENSION: ICD-10-CM

## 2020-10-09 PROCEDURE — 99999 PR PBB SHADOW E&M-EST. PATIENT-LVL V: ICD-10-PCS | Mod: PBBFAC,,, | Performed by: FAMILY MEDICINE

## 2020-10-09 PROCEDURE — 1101F PR PT FALLS ASSESS DOC 0-1 FALLS W/OUT INJ PAST YR: ICD-10-PCS | Mod: CPTII,S$GLB,, | Performed by: FAMILY MEDICINE

## 2020-10-09 PROCEDURE — 1126F PR PAIN SEVERITY QUANTIFIED, NO PAIN PRESENT: ICD-10-PCS | Mod: S$GLB,,, | Performed by: FAMILY MEDICINE

## 2020-10-09 PROCEDURE — 3079F DIAST BP 80-89 MM HG: CPT | Mod: CPTII,S$GLB,, | Performed by: FAMILY MEDICINE

## 2020-10-09 PROCEDURE — 1159F MED LIST DOCD IN RCRD: CPT | Mod: S$GLB,,, | Performed by: FAMILY MEDICINE

## 2020-10-09 PROCEDURE — 99999 PR PBB SHADOW E&M-EST. PATIENT-LVL V: CPT | Mod: PBBFAC,,, | Performed by: FAMILY MEDICINE

## 2020-10-09 PROCEDURE — 1126F AMNT PAIN NOTED NONE PRSNT: CPT | Mod: S$GLB,,, | Performed by: FAMILY MEDICINE

## 2020-10-09 PROCEDURE — 3077F SYST BP >= 140 MM HG: CPT | Mod: CPTII,S$GLB,, | Performed by: FAMILY MEDICINE

## 2020-10-09 PROCEDURE — 99499 RISK ADDL DX/OHS AUDIT: ICD-10-PCS | Mod: S$GLB,,, | Performed by: FAMILY MEDICINE

## 2020-10-09 PROCEDURE — 99499 UNLISTED E&M SERVICE: CPT | Mod: S$GLB,,, | Performed by: FAMILY MEDICINE

## 2020-10-09 PROCEDURE — 99214 OFFICE O/P EST MOD 30 MIN: CPT | Mod: S$GLB,,, | Performed by: FAMILY MEDICINE

## 2020-10-09 PROCEDURE — 71046 XR CHEST PA AND LATERAL: ICD-10-PCS | Mod: 26,,, | Performed by: RADIOLOGY

## 2020-10-09 PROCEDURE — 3079F PR MOST RECENT DIASTOLIC BLOOD PRESSURE 80-89 MM HG: ICD-10-PCS | Mod: CPTII,S$GLB,, | Performed by: FAMILY MEDICINE

## 2020-10-09 PROCEDURE — 1159F PR MEDICATION LIST DOCUMENTED IN MEDICAL RECORD: ICD-10-PCS | Mod: S$GLB,,, | Performed by: FAMILY MEDICINE

## 2020-10-09 PROCEDURE — 1101F PT FALLS ASSESS-DOCD LE1/YR: CPT | Mod: CPTII,S$GLB,, | Performed by: FAMILY MEDICINE

## 2020-10-09 PROCEDURE — 99214 PR OFFICE/OUTPT VISIT, EST, LEVL IV, 30-39 MIN: ICD-10-PCS | Mod: S$GLB,,, | Performed by: FAMILY MEDICINE

## 2020-10-09 PROCEDURE — 3077F PR MOST RECENT SYSTOLIC BLOOD PRESSURE >= 140 MM HG: ICD-10-PCS | Mod: CPTII,S$GLB,, | Performed by: FAMILY MEDICINE

## 2020-10-09 PROCEDURE — 71046 X-RAY EXAM CHEST 2 VIEWS: CPT | Mod: TC

## 2020-10-09 PROCEDURE — 71046 X-RAY EXAM CHEST 2 VIEWS: CPT | Mod: 26,,, | Performed by: RADIOLOGY

## 2020-10-09 NOTE — PROGRESS NOTES
Subjective:       Patient ID: Domonique Hernandez Jr. is a 80 y.o. male.    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  Over the last 4 weeks getting worsening COOL walking to the bathroom.  He denies fever, chills, weight loss, chest pain.  He has dementia which seems to be worsening.  He states he has not seen a cardiologist since his surgery.  My nurse called the cardiology office and he has up appointment next week.    Review of Systems   Constitutional: Negative for activity change, chills, fatigue, fever and unexpected weight change.   HENT: Negative for sore throat and trouble swallowing.    Respiratory: Positive for shortness of breath. Negative for cough and chest tightness.    Cardiovascular: Negative for chest pain and leg swelling.   Gastrointestinal: Negative for abdominal pain.   Endocrine: Negative for cold intolerance and heat intolerance.   Genitourinary: Negative for difficulty urinating.   Musculoskeletal: Negative for back pain and joint swelling.   Skin: Negative for rash.   Neurological: Positive for weakness. Negative for numbness.   Hematological: Negative for adenopathy.   Psychiatric/Behavioral: Positive for decreased concentration.       Objective:      Vitals:    10/09/20 1323   BP: (!) 142/84   Pulse: 92   Resp: 12     Physical Exam  Constitutional:       Appearance: He is well-developed.   HENT:      Head: Normocephalic and atraumatic.      Right Ear: Tympanic membrane, ear canal and external ear normal.      Left Ear: Tympanic membrane, ear canal and external ear normal.      Nose: Nose normal.   Eyes:      Conjunctiva/sclera: Conjunctivae normal.      Pupils: Pupils are equal, round, and reactive to light.   Neck:      Musculoskeletal: Normal range of motion and neck supple.      Thyroid: No thyromegaly.      Trachea: No tracheal deviation.   Cardiovascular:      Rate and Rhythm: Normal rate and regular rhythm.      Heart sounds: Normal heart sounds. No murmur. No gallop.     Pulmonary:      Effort: Pulmonary effort is normal.      Breath sounds: Normal breath sounds.   Abdominal:      General: Bowel sounds are normal. There is no distension.      Palpations: Abdomen is soft. There is no mass.      Tenderness: There is no abdominal tenderness. There is no guarding or rebound.      Hernia: There is no hernia in the left inguinal area.   Musculoskeletal: Normal range of motion.      Right lower leg: No edema.      Left lower leg: No edema.      Comments: Antalgic gait   Skin:     General: Skin is warm and dry.   Neurological:      General: No focal deficit present.      Mental Status: He is alert and oriented to person, place, and time.      Cranial Nerves: No cranial nerve deficit.      Deep Tendon Reflexes: Reflexes are normal and symmetric.   Psychiatric:         Behavior: Behavior normal.         Thought Content: Thought content normal.         Judgment: Judgment normal.         Assessment:       1. Dyspnea on exertion    2. ASCVD (arteriosclerotic cardiovascular disease)    3. Essential hypertension    4. Hypothyroidism due to acquired atrophy of thyroid    5. Hyperlipidemia, unspecified hyperlipidemia type    6. Chronic gout without tophus, unspecified cause, unspecified site    7. Anticoagulated    8. KARL on CPAP    9. Hx of CABG        Plan:   Domonique was seen today for fatigue.    Diagnoses and all orders for this visit:    Dyspnea on exertion  -     Ambulatory referral/consult to Cardiology; Future  -     X-Ray Chest PA And Lateral; Future  Try using albuterol before exerting  Patient will need an echocardiogram to see if his cardiac function is the source of his dyspnea    ASCVD (arteriosclerotic cardiovascular disease)  Keep appointment with cardiology  Continue metoprolol  Continue Eliquis    Essential hypertension  Continue metoprolol 50 mg daily  Continue Benicar HCT 20-12.5 mg p.o. daily    Hypothyroidism due to acquired atrophy of thyroid  -     TSH; Future  Continue  Synthroid 100 mcg p.o. daily    Hyperlipidemia, unspecified hyperlipidemia type  -     Comprehensive Metabolic Panel; Future  -     Lipid Panel; Future  Zocor 40 mg p.o. daily    Chronic gout without tophus, unspecified cause, unspecified site  -     Uric Acid; Future  Continue allopurinol    Anticoagulated  -     CBC auto differential; Future    KARL on CPAP  Continue home CPAP at current pressure    Hx of CABG  -     Ambulatory referral/consult to Cardiology; Future      RTC in 2 weeks

## 2020-10-12 ENCOUNTER — TELEPHONE (OUTPATIENT)
Dept: FAMILY MEDICINE | Facility: CLINIC | Age: 80
End: 2020-10-12

## 2020-10-12 NOTE — TELEPHONE ENCOUNTER
----- Message from Johanny Mederos sent at 10/12/2020 10:31 AM CDT -----  Contact: patient  Name Of Caller:  DEEPTHI     Provider Name: Andrew Major,    Does patient feel the need to be seen today? NO      Relationship to the Pt?:  Patient     Contact Preference?: [phone]    What is the nature of the call?: Patient called and said he took blood work on Monday already and that if he has to take another one. To please call him back regarding blood work

## 2020-10-12 NOTE — TELEPHONE ENCOUNTER
Patient was scheduled for a 2 week BMP recheck on 10/13/2020, patient ended up having labs on 10/09/2020.    Does he still need to repeat BMP?    Also looks like he has rescheduled his follow up for 10/22/2020.

## 2020-10-22 ENCOUNTER — OFFICE VISIT (OUTPATIENT)
Dept: FAMILY MEDICINE | Facility: CLINIC | Age: 80
End: 2020-10-22
Payer: MEDICARE

## 2020-10-22 VITALS
WEIGHT: 247 LBS | BODY MASS INDEX: 38.77 KG/M2 | HEART RATE: 86 BPM | OXYGEN SATURATION: 97 % | HEIGHT: 67 IN | DIASTOLIC BLOOD PRESSURE: 72 MMHG | SYSTOLIC BLOOD PRESSURE: 132 MMHG | RESPIRATION RATE: 12 BRPM

## 2020-10-22 DIAGNOSIS — I10 ESSENTIAL HYPERTENSION: ICD-10-CM

## 2020-10-22 PROCEDURE — 99999 PR PBB SHADOW E&M-EST. PATIENT-LVL V: ICD-10-PCS | Mod: PBBFAC,,, | Performed by: FAMILY MEDICINE

## 2020-10-22 PROCEDURE — 3078F DIAST BP <80 MM HG: CPT | Mod: CPTII,S$GLB,, | Performed by: FAMILY MEDICINE

## 2020-10-22 PROCEDURE — 1125F PR PAIN SEVERITY QUANTIFIED, PAIN PRESENT: ICD-10-PCS | Mod: S$GLB,,, | Performed by: FAMILY MEDICINE

## 2020-10-22 PROCEDURE — 1101F PT FALLS ASSESS-DOCD LE1/YR: CPT | Mod: CPTII,S$GLB,, | Performed by: FAMILY MEDICINE

## 2020-10-22 PROCEDURE — 3075F PR MOST RECENT SYSTOLIC BLOOD PRESS GE 130-139MM HG: ICD-10-PCS | Mod: CPTII,S$GLB,, | Performed by: FAMILY MEDICINE

## 2020-10-22 PROCEDURE — 99213 PR OFFICE/OUTPT VISIT, EST, LEVL III, 20-29 MIN: ICD-10-PCS | Mod: S$GLB,,, | Performed by: FAMILY MEDICINE

## 2020-10-22 PROCEDURE — 99213 OFFICE O/P EST LOW 20 MIN: CPT | Mod: S$GLB,,, | Performed by: FAMILY MEDICINE

## 2020-10-22 PROCEDURE — 1159F PR MEDICATION LIST DOCUMENTED IN MEDICAL RECORD: ICD-10-PCS | Mod: S$GLB,,, | Performed by: FAMILY MEDICINE

## 2020-10-22 PROCEDURE — 1125F AMNT PAIN NOTED PAIN PRSNT: CPT | Mod: S$GLB,,, | Performed by: FAMILY MEDICINE

## 2020-10-22 PROCEDURE — 3075F SYST BP GE 130 - 139MM HG: CPT | Mod: CPTII,S$GLB,, | Performed by: FAMILY MEDICINE

## 2020-10-22 PROCEDURE — 1101F PR PT FALLS ASSESS DOC 0-1 FALLS W/OUT INJ PAST YR: ICD-10-PCS | Mod: CPTII,S$GLB,, | Performed by: FAMILY MEDICINE

## 2020-10-22 PROCEDURE — 3078F PR MOST RECENT DIASTOLIC BLOOD PRESSURE < 80 MM HG: ICD-10-PCS | Mod: CPTII,S$GLB,, | Performed by: FAMILY MEDICINE

## 2020-10-22 PROCEDURE — 99999 PR PBB SHADOW E&M-EST. PATIENT-LVL V: CPT | Mod: PBBFAC,,, | Performed by: FAMILY MEDICINE

## 2020-10-22 PROCEDURE — 1159F MED LIST DOCD IN RCRD: CPT | Mod: S$GLB,,, | Performed by: FAMILY MEDICINE

## 2020-10-22 RX ORDER — OLMESARTAN MEDOXOMIL AND HYDROCHLOROTHIAZIDE 20/12.5 20; 12.5 MG/1; MG/1
1 TABLET ORAL DAILY
Qty: 90 TABLET | Refills: 1 | Status: ON HOLD | OUTPATIENT
Start: 2020-10-22 | End: 2021-01-20 | Stop reason: HOSPADM

## 2020-10-22 NOTE — PROGRESS NOTES
Subjective:       Patient ID: Domonique Hernandez Jr. is a 80 y.o. male.    Chief Complaint: Follow-up    Pt is a 80 y.o. male who presents for evaluation and management of   Encounter Diagnosis   Name Primary?    Essential hypertension    .  Doing well on current meds. Denies any side effects. Prevention is up to date.  Review of Systems   Respiratory: Negative for shortness of breath.    Cardiovascular: Negative for chest pain.   Neurological: Negative for dizziness.       Objective:      Physical Exam  Constitutional:       Appearance: He is well-developed.   HENT:      Head: Normocephalic and atraumatic.      Right Ear: External ear normal.      Left Ear: External ear normal.      Nose: Nose normal.   Eyes:      General: No scleral icterus.        Right eye: No discharge.         Left eye: No discharge.      Conjunctiva/sclera: Conjunctivae normal.      Pupils: Pupils are equal, round, and reactive to light.   Neck:      Musculoskeletal: Normal range of motion and neck supple.      Thyroid: No thyromegaly.      Vascular: No JVD.      Trachea: No tracheal deviation.   Cardiovascular:      Rate and Rhythm: Normal rate and regular rhythm.      Heart sounds: Normal heart sounds. No murmur.   Pulmonary:      Effort: Pulmonary effort is normal. No respiratory distress.      Breath sounds: Normal breath sounds. No wheezing or rales.   Chest:      Chest wall: No tenderness.   Abdominal:      General: Bowel sounds are normal. There is no distension.      Palpations: Abdomen is soft. There is no mass.      Tenderness: There is no abdominal tenderness. There is no guarding or rebound.   Musculoskeletal: Normal range of motion.   Lymphadenopathy:      Cervical: No cervical adenopathy.   Skin:     General: Skin is warm and dry.   Neurological:      Mental Status: He is alert and oriented to person, place, and time.      Cranial Nerves: No cranial nerve deficit.      Motor: No abnormal muscle tone.      Coordination: Coordination  normal.      Deep Tendon Reflexes: Reflexes are normal and symmetric. Reflexes normal.   Psychiatric:         Behavior: Behavior normal.         Thought Content: Thought content normal.         Judgment: Judgment normal.         Assessment:       1. Essential hypertension        Plan:   Domonique was seen today for follow-up.    Diagnoses and all orders for this visit:    Essential hypertension  -     olmesartan-hydrochlorothiazide (BENICAR HCT) 20-12.5 mg per tablet; Take 1 tablet by mouth once daily.      Problem List Items Addressed This Visit     Hypertension    Relevant Medications    olmesartan-hydrochlorothiazide (BENICAR HCT) 20-12.5 mg per tablet        No follow-ups on file.

## 2020-10-26 ENCOUNTER — TELEPHONE (OUTPATIENT)
Dept: ORTHOPEDICS | Facility: CLINIC | Age: 80
End: 2020-10-26

## 2020-10-26 NOTE — TELEPHONE ENCOUNTER
----- Message from Erika Figueroa sent at 10/26/2020 10:53 AM CDT -----  Contact: [employer] - JULY  Domonique Hernandez Jr.  MRN: [id number]  : 1940  PCP: Emiliano Cam  Home Phone      [phone]  Work Phone      Not on file.  Mobile          [phone]      MESSAGE: July states that the patient is still having problems with his knee and the cane is not working.  She would like to know if Caron would have any other recommendations for the patient.          Phone: [phone]

## 2020-10-27 NOTE — TELEPHONE ENCOUNTER
Patient is not interested in surgery. Is asking for orders for a scooter so he can get around easier. Please advise.

## 2020-11-02 ENCOUNTER — OFFICE VISIT (OUTPATIENT)
Dept: FAMILY MEDICINE | Facility: CLINIC | Age: 80
End: 2020-11-02
Payer: MEDICARE

## 2020-11-02 VITALS
RESPIRATION RATE: 17 BRPM | SYSTOLIC BLOOD PRESSURE: 130 MMHG | HEART RATE: 82 BPM | HEIGHT: 67 IN | DIASTOLIC BLOOD PRESSURE: 88 MMHG | TEMPERATURE: 97 F | WEIGHT: 244.25 LBS | BODY MASS INDEX: 38.34 KG/M2

## 2020-11-02 DIAGNOSIS — R41.3 MEMORY LOSS: ICD-10-CM

## 2020-11-02 DIAGNOSIS — M1A.9XX0 CHRONIC GOUT WITHOUT TOPHUS, UNSPECIFIED CAUSE, UNSPECIFIED SITE: ICD-10-CM

## 2020-11-02 DIAGNOSIS — Z79.01 ANTICOAGULATED: ICD-10-CM

## 2020-11-02 DIAGNOSIS — E03.4 HYPOTHYROIDISM DUE TO ACQUIRED ATROPHY OF THYROID: ICD-10-CM

## 2020-11-02 DIAGNOSIS — I10 ESSENTIAL HYPERTENSION: ICD-10-CM

## 2020-11-02 DIAGNOSIS — E78.5 HYPERLIPIDEMIA, UNSPECIFIED HYPERLIPIDEMIA TYPE: ICD-10-CM

## 2020-11-02 DIAGNOSIS — M17.32 POST-TRAUMATIC OSTEOARTHRITIS OF LEFT KNEE: Primary | ICD-10-CM

## 2020-11-02 DIAGNOSIS — G47.33 OSA ON CPAP: ICD-10-CM

## 2020-11-02 PROCEDURE — 1126F AMNT PAIN NOTED NONE PRSNT: CPT | Mod: S$GLB,,, | Performed by: FAMILY MEDICINE

## 2020-11-02 PROCEDURE — 99999 PR PBB SHADOW E&M-EST. PATIENT-LVL V: CPT | Mod: PBBFAC,,, | Performed by: FAMILY MEDICINE

## 2020-11-02 PROCEDURE — 1126F PR PAIN SEVERITY QUANTIFIED, NO PAIN PRESENT: ICD-10-PCS | Mod: S$GLB,,, | Performed by: FAMILY MEDICINE

## 2020-11-02 PROCEDURE — 3079F DIAST BP 80-89 MM HG: CPT | Mod: CPTII,S$GLB,, | Performed by: FAMILY MEDICINE

## 2020-11-02 PROCEDURE — 99999 PR PBB SHADOW E&M-EST. PATIENT-LVL V: ICD-10-PCS | Mod: PBBFAC,,, | Performed by: FAMILY MEDICINE

## 2020-11-02 PROCEDURE — 1100F PTFALLS ASSESS-DOCD GE2>/YR: CPT | Mod: CPTII,S$GLB,, | Performed by: FAMILY MEDICINE

## 2020-11-02 PROCEDURE — 3079F PR MOST RECENT DIASTOLIC BLOOD PRESSURE 80-89 MM HG: ICD-10-PCS | Mod: CPTII,S$GLB,, | Performed by: FAMILY MEDICINE

## 2020-11-02 PROCEDURE — 99214 OFFICE O/P EST MOD 30 MIN: CPT | Mod: S$GLB,,, | Performed by: FAMILY MEDICINE

## 2020-11-02 PROCEDURE — 99214 PR OFFICE/OUTPT VISIT, EST, LEVL IV, 30-39 MIN: ICD-10-PCS | Mod: S$GLB,,, | Performed by: FAMILY MEDICINE

## 2020-11-02 PROCEDURE — 1100F PR PT FALLS ASSESS DOC 2+ FALLS/FALL W/INJURY/YR: ICD-10-PCS | Mod: CPTII,S$GLB,, | Performed by: FAMILY MEDICINE

## 2020-11-02 PROCEDURE — 1159F MED LIST DOCD IN RCRD: CPT | Mod: S$GLB,,, | Performed by: FAMILY MEDICINE

## 2020-11-02 PROCEDURE — 3288F PR FALLS RISK ASSESSMENT DOCUMENTED: ICD-10-PCS | Mod: CPTII,S$GLB,, | Performed by: FAMILY MEDICINE

## 2020-11-02 PROCEDURE — 3075F PR MOST RECENT SYSTOLIC BLOOD PRESS GE 130-139MM HG: ICD-10-PCS | Mod: CPTII,S$GLB,, | Performed by: FAMILY MEDICINE

## 2020-11-02 PROCEDURE — 3075F SYST BP GE 130 - 139MM HG: CPT | Mod: CPTII,S$GLB,, | Performed by: FAMILY MEDICINE

## 2020-11-02 PROCEDURE — 3288F FALL RISK ASSESSMENT DOCD: CPT | Mod: CPTII,S$GLB,, | Performed by: FAMILY MEDICINE

## 2020-11-02 PROCEDURE — 1159F PR MEDICATION LIST DOCUMENTED IN MEDICAL RECORD: ICD-10-PCS | Mod: S$GLB,,, | Performed by: FAMILY MEDICINE

## 2020-11-02 RX ORDER — TRAMADOL HYDROCHLORIDE 50 MG/1
TABLET ORAL
Qty: 35 TABLET | Refills: 0 | Status: ON HOLD | OUTPATIENT
Start: 2020-11-02 | End: 2020-12-22 | Stop reason: HOSPADM

## 2020-11-02 NOTE — PROGRESS NOTES
Subjective:       Patient ID: Domonique Hernandez Jr. is a 80 y.o. male.    Chief Complaint: Follow-up (3 month)    Having trouble walking.  Wants a scooter.  Explained to patient that it is very hard to qualify for 1.  This main issue is his left knee arthritis.  Patient here for follow up for memory issues.  He takes Namenda 5 mg twice daily last month.  He is tolerating it well.  He thinks it has decreased his headaches.  He is not sure if it has helped his memory, but he did remember the name of the medication.  He is not having any side effects.  He has HYPERTENSION.   He takes Coreg to 25 mg twice daily, amlodipine to 5 mg once daily, Diovan 80 mg daily.  Blood pressure is usually well controlled.  It is a little elevated today.  Patient has depression.  Patient is doing well on Effexor  mg daily.    Patient states his CPAP machine is working very good.  He requires 7 cm water.   He is urinating better now that he is on Flomax and Avodart.  He is still having delayed ejaculation.  Stopping Effexor did not make much of a difference.  Patient uses Viagra for ED.  He takes Synthroid for his hypothyroidism.  He tolerates this well.  He denies having symptoms such as heat or cold intolerance.  His weight is stable.  He denies any swelling in her thyroid.  Patient is taking His statin every day for hyperlipidemia.  She is feeling well on the medication.  He denies side effects such as myalgias.  Left knee painful.  Currently wearing a brace.  I injected last month.  He said that helped.    Follow-up  Associated symptoms include arthralgias.     Review of Systems   Constitutional: Negative for activity change and unexpected weight change.   HENT: Negative for ear pain, nosebleeds, postnasal drip, sinus pressure, trouble swallowing and voice change.    Eyes: Negative for photophobia and visual disturbance.   Respiratory: Negative for apnea, choking, chest tightness and shortness of breath.    Cardiovascular: Negative  for palpitations and leg swelling.   Gastrointestinal: Negative for blood in stool.   Endocrine: Negative for cold intolerance, heat intolerance, polydipsia and polyphagia.   Genitourinary: Positive for difficulty urinating. Negative for decreased urine volume and dysuria.        Improved urination   Musculoskeletal: Positive for arthralgias, back pain and gait problem.   Skin: Negative for color change and pallor.   Neurological: Negative for dizziness.        Memory loss   Hematological: Negative for adenopathy. Does not bruise/bleed easily.   Psychiatric/Behavioral: Positive for decreased concentration. Negative for behavioral problems, dysphoric mood and sleep disturbance. The patient is not nervous/anxious.        Objective:      Vitals:    11/02/20 1237   BP: 130/88   Pulse: 82   Resp: 17   Temp: 96.9 °F (36.1 °C)     Physical Exam  Constitutional:       Appearance: He is well-developed. He is obese.   HENT:      Head: Normocephalic and atraumatic.   Eyes:      Pupils: Pupils are equal, round, and reactive to light.   Neck:      Vascular: No JVD.      Comments: No carotid bruits  Cardiovascular:      Rate and Rhythm: Normal rate and regular rhythm.      Pulses: Normal pulses.      Heart sounds: Normal heart sounds. No murmur. No friction rub. No gallop.    Pulmonary:      Effort: Pulmonary effort is normal.      Breath sounds: Normal breath sounds.   Musculoskeletal:         General: Tenderness present.      Left knee: Tenderness found. Medial joint line tenderness noted.      Right lower leg: No edema.      Left lower leg: No edema.   Lymphadenopathy:      Cervical: No cervical adenopathy.   Neurological:      Mental Status: He is alert and oriented to person, place, and time.      Cranial Nerves: No cranial nerve deficit.      Motor: No abnormal muscle tone.      Coordination: Coordination normal.      Deep Tendon Reflexes: Reflexes are normal and symmetric. Reflexes normal.   Psychiatric:         Behavior:  Behavior normal.         Thought Content: Thought content normal.         Judgment: Judgment normal.         Lab Results   Component Value Date    TSH 3.585 10/09/2020     BMP  Lab Results   Component Value Date     10/09/2020    K 3.7 10/09/2020     10/09/2020    CO2 23 10/09/2020    BUN 25 (H) 10/09/2020    CREATININE 1.1 10/09/2020    CALCIUM 9.7 10/09/2020    ANIONGAP 10 10/09/2020    ESTGFRAFRICA >60 10/09/2020    EGFRNONAA >60 10/09/2020       Lab Results   Component Value Date    LDLCALC 59.4 (L) 10/09/2020     Lab Results   Component Value Date    URICACID 6.3 10/09/2020       Assessment:       1. Post-traumatic osteoarthritis of left knee    2. Essential hypertension    3. Hypothyroidism due to acquired atrophy of thyroid    4. Hyperlipidemia, unspecified hyperlipidemia type    5. Chronic gout without tophus, unspecified cause, unspecified site    6. Anticoagulated    7. KARL on CPAP    8. Memory loss        Plan:     Impaired cognitive function  Increase Namenda 10 mg p.o. b.i.d.  Using Focus Factor    Essential hypertension  Two gram sodium diet.    Weight loss discussed.    Try to walk 2 miles per day.    The following medications will be restarted today  Toprol XL 50 mg po daily  Benicar HCT 20-12.5 mg po daily    KARL on CPAP  Continue CPAP at 7 cm water pressure    Depression, unspecified depression type  - Continue venlafaxine (EFFEXOR-XR) 150 MG 24 hr capsule; Take 1 capsule by mouth once daily.  Dispense: 30 capsule; Refill: 5    Hyperlipidemia, unspecified hyperlipidemia type  Continue Zocor 40 mg by mouth daily    Chronic gout without tophus, unspecified cause, unspecified site  Continue allopurinol 300 mg daily    Hypothyroidism due to acquired atrophy of thyroid  -  Synthroid to 100 µg by mouth daily    Benign non-nodular prostatic hyperplasia with lower urinary tract symptoms  Continue Avodart and Flomax.  Sees urology - Kathy    Primary osteoarthritis of left knee  Follow up  with ortho  -     ibuprofen (ADVIL,MOTRIN) 800 MG tablet; Take 1 tablet (800 mg total) by mouth 3 (three) times daily.  Dispense: 15 tablet; Refill: 5  Wheel chair  Refer to physical therapy    Benign prostatic hyperplasia with nocturia  -     dutasteride (AVODART) 0.5 mg capsule; Take 1 capsule (0.5 mg total) by mouth once daily.  Dispense: 90 capsule; Refill: 1    Essential hypertension  Toprol XL 50 mg po daily  Benicar HCT 20-12.5 mg po daily    Mixed hyperlipidemia  -     Comprehensive metabolic panel; Future; Expected date: 01/27/2020    Hypothyroidism due to acquired atrophy of thyroid  -     TSH; Future; Expected date: 01/27/2020    KARL on CPAP  Encouraged to use CPAP    Post-traumatic osteoarthritis of left knee  -     traMADoL (ULTRAM) 50 mg tablet; Take one tablet by mouth every 6 hours  Dispense: 35 tablet; Refill: 0  -     WHEELCHAIR FOR HOME USE  -     Ambulatory referral/consult to Physical/Occupational Therapy; Future; Expected date: 11/09/2020    Return to clinic in 3 months

## 2020-11-06 ENCOUNTER — TELEPHONE (OUTPATIENT)
Dept: FAMILY MEDICINE | Facility: CLINIC | Age: 80
End: 2020-11-06

## 2020-11-06 NOTE — TELEPHONE ENCOUNTER
----- Message from Gladys Ceja sent at 2020  9:33 AM CST -----  Contact: kimmy/s/o  Alissaadair LUIS Mary Moran.  MRN: [id number]  : 1940  PCP: Emiliano Cam  Home Phone      [phone]  Work Phone      Not on file.  Mobile          [phone]      MESSAGE:  Pt woke up with sore throat and is requesting medication.   Phone: [facility]  Phone: [phone]

## 2020-11-12 ENCOUNTER — TELEPHONE (OUTPATIENT)
Dept: NEUROLOGY | Facility: CLINIC | Age: 80
End: 2020-11-12

## 2020-11-12 ENCOUNTER — OFFICE VISIT (OUTPATIENT)
Dept: ORTHOPEDICS | Facility: CLINIC | Age: 80
End: 2020-11-12
Payer: MEDICARE

## 2020-11-12 ENCOUNTER — PATIENT OUTREACH (OUTPATIENT)
Dept: ADMINISTRATIVE | Facility: OTHER | Age: 80
End: 2020-11-12

## 2020-11-12 VITALS
WEIGHT: 249.56 LBS | BODY MASS INDEX: 39.17 KG/M2 | HEART RATE: 80 BPM | TEMPERATURE: 97 F | DIASTOLIC BLOOD PRESSURE: 86 MMHG | RESPIRATION RATE: 18 BRPM | HEIGHT: 67 IN | SYSTOLIC BLOOD PRESSURE: 124 MMHG

## 2020-11-12 DIAGNOSIS — M17.12 PRIMARY OSTEOARTHRITIS OF LEFT KNEE: Primary | ICD-10-CM

## 2020-11-12 DIAGNOSIS — G89.29 CHRONIC PAIN OF LEFT KNEE: ICD-10-CM

## 2020-11-12 DIAGNOSIS — M25.562 CHRONIC PAIN OF LEFT KNEE: ICD-10-CM

## 2020-11-12 PROCEDURE — 99213 PR OFFICE/OUTPT VISIT, EST, LEVL III, 20-29 MIN: ICD-10-PCS | Mod: S$GLB,,, | Performed by: PHYSICIAN ASSISTANT

## 2020-11-12 PROCEDURE — 3288F PR FALLS RISK ASSESSMENT DOCUMENTED: ICD-10-PCS | Mod: CPTII,S$GLB,, | Performed by: PHYSICIAN ASSISTANT

## 2020-11-12 PROCEDURE — 1125F AMNT PAIN NOTED PAIN PRSNT: CPT | Mod: S$GLB,,, | Performed by: PHYSICIAN ASSISTANT

## 2020-11-12 PROCEDURE — 99213 OFFICE O/P EST LOW 20 MIN: CPT | Mod: S$GLB,,, | Performed by: PHYSICIAN ASSISTANT

## 2020-11-12 PROCEDURE — 3079F DIAST BP 80-89 MM HG: CPT | Mod: CPTII,S$GLB,, | Performed by: PHYSICIAN ASSISTANT

## 2020-11-12 PROCEDURE — 99999 PR PBB SHADOW E&M-EST. PATIENT-LVL V: ICD-10-PCS | Mod: PBBFAC,,, | Performed by: PHYSICIAN ASSISTANT

## 2020-11-12 PROCEDURE — 99999 PR PBB SHADOW E&M-EST. PATIENT-LVL V: CPT | Mod: PBBFAC,,, | Performed by: PHYSICIAN ASSISTANT

## 2020-11-12 PROCEDURE — 1159F MED LIST DOCD IN RCRD: CPT | Mod: S$GLB,,, | Performed by: PHYSICIAN ASSISTANT

## 2020-11-12 PROCEDURE — 1159F PR MEDICATION LIST DOCUMENTED IN MEDICAL RECORD: ICD-10-PCS | Mod: S$GLB,,, | Performed by: PHYSICIAN ASSISTANT

## 2020-11-12 PROCEDURE — 3079F PR MOST RECENT DIASTOLIC BLOOD PRESSURE 80-89 MM HG: ICD-10-PCS | Mod: CPTII,S$GLB,, | Performed by: PHYSICIAN ASSISTANT

## 2020-11-12 PROCEDURE — 1101F PR PT FALLS ASSESS DOC 0-1 FALLS W/OUT INJ PAST YR: ICD-10-PCS | Mod: CPTII,S$GLB,, | Performed by: PHYSICIAN ASSISTANT

## 2020-11-12 PROCEDURE — 1101F PT FALLS ASSESS-DOCD LE1/YR: CPT | Mod: CPTII,S$GLB,, | Performed by: PHYSICIAN ASSISTANT

## 2020-11-12 PROCEDURE — 1125F PR PAIN SEVERITY QUANTIFIED, PAIN PRESENT: ICD-10-PCS | Mod: S$GLB,,, | Performed by: PHYSICIAN ASSISTANT

## 2020-11-12 PROCEDURE — 3288F FALL RISK ASSESSMENT DOCD: CPT | Mod: CPTII,S$GLB,, | Performed by: PHYSICIAN ASSISTANT

## 2020-11-12 PROCEDURE — 3074F PR MOST RECENT SYSTOLIC BLOOD PRESSURE < 130 MM HG: ICD-10-PCS | Mod: CPTII,S$GLB,, | Performed by: PHYSICIAN ASSISTANT

## 2020-11-12 PROCEDURE — 3074F SYST BP LT 130 MM HG: CPT | Mod: CPTII,S$GLB,, | Performed by: PHYSICIAN ASSISTANT

## 2020-11-12 NOTE — PROGRESS NOTES
Updates were requested from care everywhere.  Chart was reviewed for overdue Proactive [facility] Encounters (CHERY) topics (CRS, Breast Cancer Screening, Eye exam)  Health Maintenance has been updated.  LINKS not responding.

## 2020-11-12 NOTE — TELEPHONE ENCOUNTER
----- Message from Erika Figueroa sent at 2020  8:11 AM CST -----  Contact: PATIENT  Domonique Hernandez Jr.  MRN: [id number]  : 1940  PCP: Emiliano Cam  Home Phone      [phone]  Work Phone      Not on file.  Mobile          [phone]      MESSAGE: Patient states that he is still having severe pain to his (L) knee.  He states that the injections that he received did not work.  He would like to know what other options he might have.        Phone: [phone]

## 2020-11-12 NOTE — PROGRESS NOTES
Subjective:      Patient ID: Domonique Hernandez Jr. is a 80 y.o. male.    Chief Complaint: Pain of the Left Knee    Review of patient's allergies indicates:   Allergen Reactions    Percocet [oxycodone-acetaminophen] Other (See Comments)     hyperactivity    Percodan [oxycodone hcl-oxycodone-asa] Other (See Comments)     hyperactivity      Interval:  Patient returns to clinic for follow up of left knee pain.  States that Euflexxa injections did not provide much relief.  Says that he may be interested in knee replacement surgery.    HPI 8/20/20:  Pt presents to clinic for follow up of left knee pain.  He received medial  brace which seems to be helping some with pain.  Previous steroid injection provided good relief for about 2 months.  He states that he is interested in gel injections.    HPI 5/27/20:  81 yo M presents to clinic with c/o left knee pain x many years.  States that he injured it a long time ago but does not recall how.  Pain is located to medial knee and described as sharp and achy, does not radiate.  No swelling.  Worse with walking and after prolonged sitting or standing.  He has previously had several steroid injections and synvisc, states that steroid provides good relief for a few months and synvisc did not provide any relief.  Last steroid injection on 5/15/20 by PCP. He has had good relief since.      Pain  Pertinent negatives include no abdominal pain, change in bowel habit, chest pain, chills, congestion, coughing, diaphoresis, fever, joint swelling, neck pain or rash.       Review of Systems   Constitution: Negative for chills, diaphoresis and fever.   HENT: Negative for congestion, ear discharge and ear pain.    Eyes: Negative for blurred vision, discharge, double vision and pain.   Cardiovascular: Negative for chest pain, claudication and cyanosis.   Respiratory: Negative for cough, hemoptysis and shortness of breath.    Endocrine: Negative for cold intolerance and heat intolerance.    Skin: Negative for color change, dry skin, itching and rash.   Musculoskeletal: Positive for joint pain. Negative for arthritis, back pain, falls, gout, joint swelling, muscle weakness and neck pain.   Gastrointestinal: Negative for abdominal pain and change in bowel habit.   Neurological: Negative for brief paralysis, disturbances in coordination and dizziness.   Psychiatric/Behavioral: Negative for altered mental status and depression.         Objective:          General    Constitutional: He is oriented to person, place, and time. He appears well-developed and well-nourished. No distress.   HENT:   Head: Atraumatic.   Eyes: EOM are normal. Right eye exhibits no discharge. Left eye exhibits no discharge.   Cardiovascular: Normal rate.    Pulmonary/Chest: Effort normal. No respiratory distress.   Abdominal: Soft.   Neurological: He is alert and oriented to person, place, and time.   Psychiatric: He has a normal mood and affect. His behavior is normal.           Right Knee Exam     Inspection   Erythema: absent  Scars: absent  Swelling: absent  Effusion: absent  Deformity: absent  Bruising: absent    Crepitus   The patient has crepitus of the patella.    Range of Motion   Extension: 0   Flexion: 140     Tests   Ligament Examination   MCL - Valgus: normal (0 to 2mm)  LCL - Varus: normal    Other   Sensation: normal    Left Knee Exam     Inspection   Erythema: absent  Scars: absent  Swelling: absent  Effusion: absent  Deformity: absent  Bruising: absent    Tenderness   The patient tender to palpation of the medial joint line and lateral joint line.    Crepitus   The patient has crepitus of the patella.    Range of Motion   Extension: 0   Flexion: 130     Tests   Stability   MCL - Valgus: normal (0 to 2mm)  LCL - Varus: normal (0 to 2mm)    Other   Sensation: normal    Muscle Strength   Right Lower Extremity   Hip Abduction: 5/5   Quadriceps:  5/5   Hamstrin/5   Left Lower Extremity   Hip Abduction: 5/5    Quadriceps:  5/5   Hamstrin/5     Vascular Exam     Right Pulses  Dorsalis Pedis:      2+          Left Pulses  Dorsalis Pedis:      2+          Edema  Right Lower Leg: absent  Left Lower Leg: absent              Assessment:         Xray Left Knee 20:  Advanced degenerative changes of the medial patellofemoral compartments with joint space narrowing, subchondral sclerosis and subchondral cystic changes, more probably affecting the medial compartment.  There is dorsal spurring from the patella.  Hypertrophic changes of the tibial spines are seen.  Small joint effusion is present.  No fracture or dislocation.    Encounter Diagnoses   Name Primary?    Primary osteoarthritis of left knee Yes    Chronic pain of left knee     Primary osteoarthritis of left knee    Chronic pain of left knee               Plan:         I made the decision to obtain old records of the patient including previous notes and imaging. New imaging was ordered today of the extremity or extremities evaluated. I independently reviewed and interpreted the radiographs today.    The total face-to-face encounter time with this patient was 30 minutes and greater than 50% of of the encounter time was spent counseling the patient, coordinating care, and education regarding the pathology and natural history of his diagnosis. We have discussed a variety of treatment options including medications, injections, physical therapy and other alternative treatments. Pt is requesting referral to surgeon to discuss possible joint replacement.    1. Referral to Dr. Chang, appointment scheduled for  in [place].  2. Continue to wear medial  brace as needed.  3. Ice compress to the affected area 2-3x a day for 15-20 minutes as needed for pain management.  4. Continue home exercise program as demonstrated at previous visit.  5. RTC as needed.      Patient voices understanding of and agreement with treatment plan. All of the patient's questions  were answered and the patient will contact us if he has any questions or concerns in the interim.

## 2020-11-17 ENCOUNTER — TELEPHONE (OUTPATIENT)
Dept: ORTHOPEDICS | Facility: CLINIC | Age: 80
End: 2020-11-17

## 2020-11-17 ENCOUNTER — OFFICE VISIT (OUTPATIENT)
Dept: ORTHOPEDICS | Facility: CLINIC | Age: 80
End: 2020-11-17
Payer: MEDICARE

## 2020-11-17 VITALS — WEIGHT: 249.56 LBS | BODY MASS INDEX: 39.17 KG/M2 | HEIGHT: 67 IN

## 2020-11-17 DIAGNOSIS — Z41.9 SURGERY, ELECTIVE: Primary | ICD-10-CM

## 2020-11-17 DIAGNOSIS — Z96.652 S/P TOTAL KNEE ARTHROPLASTY, LEFT: ICD-10-CM

## 2020-11-17 DIAGNOSIS — M17.12 PRIMARY OSTEOARTHRITIS OF LEFT KNEE: Primary | ICD-10-CM

## 2020-11-17 PROCEDURE — 1125F AMNT PAIN NOTED PAIN PRSNT: CPT | Mod: S$GLB,,, | Performed by: ORTHOPAEDIC SURGERY

## 2020-11-17 PROCEDURE — 1159F PR MEDICATION LIST DOCUMENTED IN MEDICAL RECORD: ICD-10-PCS | Mod: S$GLB,,, | Performed by: ORTHOPAEDIC SURGERY

## 2020-11-17 PROCEDURE — 3288F PR FALLS RISK ASSESSMENT DOCUMENTED: ICD-10-PCS | Mod: CPTII,S$GLB,, | Performed by: ORTHOPAEDIC SURGERY

## 2020-11-17 PROCEDURE — 1100F PR PT FALLS ASSESS DOC 2+ FALLS/FALL W/INJURY/YR: ICD-10-PCS | Mod: CPTII,S$GLB,, | Performed by: ORTHOPAEDIC SURGERY

## 2020-11-17 PROCEDURE — 99215 OFFICE O/P EST HI 40 MIN: CPT | Mod: S$GLB,,, | Performed by: ORTHOPAEDIC SURGERY

## 2020-11-17 PROCEDURE — 3288F FALL RISK ASSESSMENT DOCD: CPT | Mod: CPTII,S$GLB,, | Performed by: ORTHOPAEDIC SURGERY

## 2020-11-17 PROCEDURE — 1100F PTFALLS ASSESS-DOCD GE2>/YR: CPT | Mod: CPTII,S$GLB,, | Performed by: ORTHOPAEDIC SURGERY

## 2020-11-17 PROCEDURE — 1125F PR PAIN SEVERITY QUANTIFIED, PAIN PRESENT: ICD-10-PCS | Mod: S$GLB,,, | Performed by: ORTHOPAEDIC SURGERY

## 2020-11-17 PROCEDURE — 99999 PR PBB SHADOW E&M-EST. PATIENT-LVL IV: ICD-10-PCS | Mod: PBBFAC,,, | Performed by: ORTHOPAEDIC SURGERY

## 2020-11-17 PROCEDURE — 1159F MED LIST DOCD IN RCRD: CPT | Mod: S$GLB,,, | Performed by: ORTHOPAEDIC SURGERY

## 2020-11-17 PROCEDURE — 99999 PR PBB SHADOW E&M-EST. PATIENT-LVL IV: CPT | Mod: PBBFAC,,, | Performed by: ORTHOPAEDIC SURGERY

## 2020-11-17 PROCEDURE — 99215 PR OFFICE/OUTPT VISIT, EST, LEVL V, 40-54 MIN: ICD-10-PCS | Mod: S$GLB,,, | Performed by: ORTHOPAEDIC SURGERY

## 2020-11-17 NOTE — LETTER
November 17, 2020      Caron Lassiter PA-C  [address]  [facility] [address]           [facility]  [address] , [facility] [address]  [facility] [address]  Phone: [phone]  Fax: [phone]          Patient: Domonique Hernandez Jr.   MR Number: [id number]   YOB: 1940   Date of Visit: 11/17/2020       Dear Caron Lassiter:    Thank you for referring Domonique Hernandez to me for evaluation. Attached you will find relevant portions of my assessment and plan of care.    If you have questions, please do not hesitate to call me. I look forward to following Domonique Hernandez along with you.    Sincerely,    Shin Chang MD    Enclosure  CC:  No Recipients    If you would like to receive this communication electronically, please contact [email or web address] or [phone] to request more information on [employer] access.    For providers and/or their staff who would like to refer a patient to [facility], please contact us through our one-stop-shop provider referral line, [facility], at [phone].    If you feel you have received this communication in error or would no longer like to receive these types of communications, please e-mail [email or web address]

## 2020-11-17 NOTE — PROGRESS NOTES
Subjective:      Patient ID: Domonique Hernandez Jr. is a 80 y.o. male.    Chief Complaint: Consult and Knee Pain (left )    HPI     They have experienced problems with their left knee over the past Many years. The patient denies relevant history of injury/aggravation. Pain is located medially Associated symptoms include giving way, pseudolocking and gelling.  Symptoms are aggravated by any prolonged walking. They have been treated with bracing, injections, and a walker..   Symptoms have recently worsened. Ambulation reportedly has been impaired. Self care ADLs are painful.     Review of Systems   Constitution: Negative for fever and weight loss.   HENT: Negative for congestion.    Eyes: Negative for visual disturbance.   Cardiovascular: Negative for chest pain.   Respiratory: Negative for shortness of breath.    Hematologic/Lymphatic: Negative for bleeding problem. Does not bruise/bleed easily.   Skin: Negative for poor wound healing.   Musculoskeletal: Positive for joint pain and joint swelling.   Gastrointestinal: Negative for abdominal pain.   Genitourinary: Negative for dysuria.   Neurological: Negative for seizures.   Psychiatric/Behavioral: Negative for altered mental status.   Allergic/Immunologic: Negative for persistent infections.         Objective:      Ortho/SPM Exam        Left knee    [unfilled]    The patient is not in acute distress.   Sclerae normal  Body habitus is normal.  Respiratory distress:  none   The patient walks with a limp.  Hip irritability  negative.   The skin over the knee is intact.  Knee effusion 1+  Tendernes is located medially  Range of motion- Flexion 110 deg, Extension 5 deg,   Ligament laxity exam:   MCL 2+   Lachman 0   Post sag  0    LCL 0  Patellar apprehension negative.  Popliteal cyst negative  Patellar crepitation present.  Meniscal irritability not applicable  Pulses DP present, PT present.  Motor normal 5/5 strength in all tested muscle groups.   Sensory normal.    I reviewed  the relevant imaging for the patient's condition:  Left knee films show complete loss of medial joint space with osteophytes and patellofemoral DJD.  The right is noted to be similar, but some milder    Assessment:       Encounter Diagnosis   Name Primary?    Primary osteoarthritis of left knee Yes          The condition is very advanced radiographically with significant pain and functional impairment.  Symptoms are not controlled with extensive nonsurgical measures.  Plan:       Domonique was seen today for consult and knee pain.    Diagnoses and all orders for this visit:    Primary osteoarthritis of left knee        I explained my diagnostic impression and the reasoning behind it in detail, using layman's terms.  Models and/or pictures were used to help in the explanation.    Treatment options were discussed. The surgical process of left knee replacement was discussed in detail with the patient including a detailed discussion of the procedure itself (including visual model, x-ray review, and literature review). The typical perioperative and post-operative course was discussed and perioperative risks were discussed to the patient's satisfaction.  Risks and complications discussed included but were not limited to the risks of anesthetic complications, infection, bleeding, wound healing complications, stiffness, aseptic loosening, instability, limb length inequality, neurologic dysfunction including numbness and weakness, additional surgery,  DVT, pulmonary embolism, perioperative medical risks (cardiac, pulmonary, renal, neurologic), and death and the patient elects to proceed.      The patient has a history of bypass surgery and takes Eliquis.  He will need to have cardiology evaluation preoperatively and also hold is Eliquis for 48-72 hours preoperatively.

## 2020-11-18 DIAGNOSIS — M17.12 PRIMARY OSTEOARTHRITIS OF LEFT KNEE: Primary | ICD-10-CM

## 2020-11-18 RX ORDER — SODIUM CHLORIDE 0.9 % (FLUSH) 0.9 %
10 SYRINGE (ML) INJECTION
Status: CANCELLED | OUTPATIENT
Start: 2020-11-18

## 2020-11-18 RX ORDER — MUPIROCIN 20 MG/G
OINTMENT TOPICAL
Status: CANCELLED | OUTPATIENT
Start: 2020-11-18

## 2020-11-18 RX ORDER — ACETAMINOPHEN 325 MG/1
1000 TABLET ORAL
Status: CANCELLED | OUTPATIENT
Start: 2020-11-18 | End: 2020-11-18

## 2020-11-18 RX ORDER — PREGABALIN 50 MG/1
150 CAPSULE ORAL
Status: CANCELLED | OUTPATIENT
Start: 2020-11-18 | End: 2020-11-18

## 2020-11-18 RX ORDER — DEXAMETHASONE SODIUM PHOSPHATE 100 MG/10ML
10 INJECTION INTRAMUSCULAR; INTRAVENOUS ONCE
Status: CANCELLED | OUTPATIENT
Start: 2020-11-18 | End: 2020-11-18

## 2020-11-18 RX ORDER — NAPROXEN 250 MG/1
500 TABLET ORAL
Status: CANCELLED | OUTPATIENT
Start: 2020-11-18 | End: 2020-11-18

## 2020-11-19 ENCOUNTER — OFFICE VISIT (OUTPATIENT)
Dept: NEUROLOGY | Facility: CLINIC | Age: 80
End: 2020-11-19
Payer: MEDICARE

## 2020-11-19 VITALS
HEART RATE: 80 BPM | HEIGHT: 68 IN | WEIGHT: 249.13 LBS | DIASTOLIC BLOOD PRESSURE: 80 MMHG | SYSTOLIC BLOOD PRESSURE: 142 MMHG | RESPIRATION RATE: 18 BRPM | TEMPERATURE: 98 F | BODY MASS INDEX: 37.76 KG/M2

## 2020-11-19 DIAGNOSIS — M17.32 POST-TRAUMATIC OSTEOARTHRITIS OF LEFT KNEE: ICD-10-CM

## 2020-11-19 DIAGNOSIS — G31.84 MILD COGNITIVE IMPAIRMENT: Primary | ICD-10-CM

## 2020-11-19 DIAGNOSIS — H90.3 SENSORINEURAL HEARING LOSS (SNHL) OF BOTH EARS: ICD-10-CM

## 2020-11-19 DIAGNOSIS — G47.33 OSA ON CPAP: ICD-10-CM

## 2020-11-19 PROCEDURE — 3077F PR MOST RECENT SYSTOLIC BLOOD PRESSURE >= 140 MM HG: ICD-10-PCS | Mod: CPTII,S$GLB,, | Performed by: NURSE PRACTITIONER

## 2020-11-19 PROCEDURE — 99214 PR OFFICE/OUTPT VISIT, EST, LEVL IV, 30-39 MIN: ICD-10-PCS | Mod: S$GLB,,, | Performed by: NURSE PRACTITIONER

## 2020-11-19 PROCEDURE — 1126F AMNT PAIN NOTED NONE PRSNT: CPT | Mod: S$GLB,,, | Performed by: NURSE PRACTITIONER

## 2020-11-19 PROCEDURE — 1100F PTFALLS ASSESS-DOCD GE2>/YR: CPT | Mod: CPTII,S$GLB,, | Performed by: NURSE PRACTITIONER

## 2020-11-19 PROCEDURE — 1100F PR PT FALLS ASSESS DOC 2+ FALLS/FALL W/INJURY/YR: ICD-10-PCS | Mod: CPTII,S$GLB,, | Performed by: NURSE PRACTITIONER

## 2020-11-19 PROCEDURE — 99999 PR PBB SHADOW E&M-EST. PATIENT-LVL V: ICD-10-PCS | Mod: PBBFAC,,, | Performed by: NURSE PRACTITIONER

## 2020-11-19 PROCEDURE — 99214 OFFICE O/P EST MOD 30 MIN: CPT | Mod: S$GLB,,, | Performed by: NURSE PRACTITIONER

## 2020-11-19 PROCEDURE — 3079F PR MOST RECENT DIASTOLIC BLOOD PRESSURE 80-89 MM HG: ICD-10-PCS | Mod: CPTII,S$GLB,, | Performed by: NURSE PRACTITIONER

## 2020-11-19 PROCEDURE — 3288F FALL RISK ASSESSMENT DOCD: CPT | Mod: CPTII,S$GLB,, | Performed by: NURSE PRACTITIONER

## 2020-11-19 PROCEDURE — 1159F MED LIST DOCD IN RCRD: CPT | Mod: S$GLB,,, | Performed by: NURSE PRACTITIONER

## 2020-11-19 PROCEDURE — 3079F DIAST BP 80-89 MM HG: CPT | Mod: CPTII,S$GLB,, | Performed by: NURSE PRACTITIONER

## 2020-11-19 PROCEDURE — 3077F SYST BP >= 140 MM HG: CPT | Mod: CPTII,S$GLB,, | Performed by: NURSE PRACTITIONER

## 2020-11-19 PROCEDURE — 1126F PR PAIN SEVERITY QUANTIFIED, NO PAIN PRESENT: ICD-10-PCS | Mod: S$GLB,,, | Performed by: NURSE PRACTITIONER

## 2020-11-19 PROCEDURE — 1159F PR MEDICATION LIST DOCUMENTED IN MEDICAL RECORD: ICD-10-PCS | Mod: S$GLB,,, | Performed by: NURSE PRACTITIONER

## 2020-11-19 PROCEDURE — 3288F PR FALLS RISK ASSESSMENT DOCUMENTED: ICD-10-PCS | Mod: CPTII,S$GLB,, | Performed by: NURSE PRACTITIONER

## 2020-11-19 PROCEDURE — 99999 PR PBB SHADOW E&M-EST. PATIENT-LVL V: CPT | Mod: PBBFAC,,, | Performed by: NURSE PRACTITIONER

## 2020-11-19 NOTE — PROGRESS NOTES
"HPI: Domonique Hernandez Jr. is a 80 y.o. 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 Suspect mild cognitive impairment. He has HTN, HLD, hypothyroidism, and KARL.     He presents today for a follow up visit. He completed a CT Head and labs, which were overall unrevealing for cause of his memory loss.     Memory is improved per wife, which she attributes to giving him Prevagen supplements OTC. He also takes Namenda.     He does have hearing loss. He does not use hearing aids, as they are uncomfortable.     He is more independent with ADL's. He continues to rely on a walker for stability, and is pending left knee replacement in 12/2020.     No falls. Appetite is good.     No anxiety or depression.     His son was diagnosed with "dementia".    He is staying active and dieting.    Review of Systems   Constitutional: Negative for fever.   Eyes: Negative for double vision.   Respiratory: Negative for hemoptysis.    Cardiovascular: Negative for chest pain.   Gastrointestinal: Negative for blood in stool.   Genitourinary: Negative for hematuria.   Musculoskeletal: Positive for joint pain. Negative for falls.   Skin: Negative for rash.   Neurological: Negative for seizures and headaches.   Psychiatric/Behavioral: Positive for memory loss. The patient does not have insomnia.        I have reviewed all of this patient's past medical and surgical histories as well as family and social histories and active allergies and medications as documented in the electronic medical record.    Exam:  Gen Appearance, well developed/nourished in no apparent distress  CV: 2+ distal pulses with no edema or swelling  Neuro:  MS: Awake, alert, oriented to place, person, time, situation. Sustains attention. Recent recall is intact/remote memory intact, Language is full to spontaneous speech/comprehension. Fund of Knowledge is full. Able to name current President easily.   Mood is " euthymic  CN: Optic discs are flat with normal vasculature, PERRL, Extraoccular movements and visual fields are full. Normal facial sensation and strength, Hearing symmetric, Tongue and Palate are midline and strong. Shoulder Shrug symmetric and strong.  Motor: Normal bulk, tone, no abnormal movements. 5/5 strength bilateral upper/lower extremities with 2+ reflexes and clonus  Sensory: symmetric to temp, and vibration.  Romberg negative  Cerebellar: Finger-nose,Heal-shin, Rapid alternating movements intact  Gait: arthralgic gait; ambulating with walker for stability.     Labs:  3/2017 TSH normal  8/2020 B12, TSH, CBC, and CMP reviewed-unremarkable, aside from mild CKD.     Imaging:   CT Head 8/2020:   No acute intracranial findings.     Age-appropriate cerebral volume loss with mild moderate patchy decreased attenuation supratentorial white matter while nonspecific suggestive for chronic ischemic change.     No evidence for acute intracranial hemorrhage.  Clinical correlation and further evaluation as warranted.     9/2016 MRI brain: Age-appropriate generalized volume loss with mild/moderate degree of T2/FLAIR signal abnormality within the supratentorial white matter  while nonspecific suggest chronic microvascular ischemic change.    No evidence for acute infarction or enhancing lesion.    Assessment/Plan: Domonique Hernandez Jr. is a 80 y.o. male with short term memory loss. Suspect mild cognitive impairment.     I recommend:   1. Cognitive impairment/worsening memory can occur after cardiac surgeries, particularly bypass, as discussed with patient.   -Additionally his hypothyroidism, hypogonadism, KARL, and CKD diagnosis increase risk of memory complaints.   -he takes Namenda per PCP, as well as Prevagen.   2. CT Head and labs unrevealing for cause of his memory complaints. No MRI, due to his loop recorder.   3. Continue compliance CPAP use for his KARL.   4. He continues on Effexor for his mood per PCP.   5. Not driving  since CABG.     RTC 1 year or sooner with worsening

## 2020-12-03 NOTE — PROGRESS NOTES
Subjective:       Patient ID: Domonique Hernandez Jr. is a 77 y.o. male.    Chief Complaint: Fatigue and Heartburn (pain in chest area)    Patient here for increase belching, mild chest pain, heart burn.  He feels a ringing in his hears.  He is not sleeping as well and wakes up every 2 hours.  When asked if he swallows a lot of air while wearing his CPAP he thinks he does.  He has been taking Cipro for the past week for a urinary tract infection  He has HYPERTENSION.   Today his blood pressure is perfect.  He is taking Coreg and a small dose of Norvasc.  He tolerates these well.    Patient is also having trouble with memory.  His girlfriend feels like he needs be further evaluated.  He has problems remembering names and he sometimes forgets what he was going to do.  He saw neurologist and so far not dimentia.   Got worse after stopping Effexor.  He is now back on it and feels better.  Patient also states his CPAP machine is not working very good.  This could also be contributing to his forgetfulness.  He requires 7 cm water.    He is urinating better now that he is on Flomax and Proscar.  He is still having delayed ejaculation.  Stopping Effexor did not make much of a difference.  He is doing well on Effexor XR 75 mg po daily.  Patient brought all of his medication in.  This was reviewed.  Patient uses testosterone gel.  He tolerates it well.  His urologist feels it is okay to take this.  Having dysuria      Otalgia    There is pain in both ears. This is a recurrent problem. The current episode started 1 to 4 weeks ago. The problem has been unchanged. There has been no fever. Associated symptoms include coughing. Pertinent negatives include no abdominal pain, headaches, rash or sore throat.   Cough   This is a chronic problem. The current episode started more than 1 year ago. The problem has been waxing and waning. The cough is non-productive. Associated symptoms include ear pain, heartburn and nasal congestion. Pertinent  negatives include no chest pain, chills, fever, headaches, postnasal drip, rash, sore throat or shortness of breath. Nothing aggravates the symptoms. He has tried prescription cough suppressant for the symptoms. The treatment provided moderate relief.   Hypertension   Pertinent negatives include no chest pain, headaches, palpitations or shortness of breath.   Fatigue   This is a new problem. The current episode started in the past 7 days. The problem occurs 2 to 4 times per day. The problem has been unchanged. Associated symptoms include arthralgias, congestion, coughing and fatigue. Pertinent negatives include no abdominal pain, chest pain, chills, diaphoresis, fever, headaches, joint swelling, numbness, rash, sore throat or weakness.   Heartburn   He complains of coughing and heartburn. He reports no abdominal pain, no chest pain, no choking or no sore throat. Associated symptoms include fatigue.     Review of Systems   Constitutional: Positive for fatigue. Negative for activity change, chills, diaphoresis, fever and unexpected weight change.   HENT: Positive for congestion and ear pain. Negative for nosebleeds, postnasal drip, sinus pressure, sore throat, trouble swallowing and voice change.    Eyes: Negative for photophobia and visual disturbance.   Respiratory: Positive for cough. Negative for apnea, choking, chest tightness and shortness of breath.    Cardiovascular: Negative for chest pain, palpitations and leg swelling.   Gastrointestinal: Positive for heartburn. Negative for abdominal pain and blood in stool.        Increased belching   Endocrine: Negative for cold intolerance, heat intolerance, polydipsia and polyphagia.   Genitourinary: Negative for decreased urine volume, difficulty urinating and dysuria.   Musculoskeletal: Positive for arthralgias and back pain. Negative for joint swelling.   Skin: Negative for color change, pallor and rash.   Neurological: Negative for dizziness, weakness, numbness and  headaches.   Hematological: Negative for adenopathy. Does not bruise/bleed easily.   Psychiatric/Behavioral: Negative for behavioral problems, decreased concentration, dysphoric mood and sleep disturbance. The patient is not nervous/anxious.        Objective:      Vitals:    09/20/17 1313   BP: 138/82   Pulse: 72   Resp: 18     Physical Exam   Constitutional: He is oriented to person, place, and time. He appears well-developed and well-nourished.   Eyes: EOM are normal. Pupils are equal, round, and reactive to light.   Neck: No JVD present.   No carotid bruits   Cardiovascular: Normal rate, regular rhythm, normal heart sounds and intact distal pulses.  Exam reveals no gallop.    No murmur heard.  Pulmonary/Chest: Effort normal and breath sounds normal.   Musculoskeletal: He exhibits no edema or tenderness.   Lymphadenopathy:     He has no cervical adenopathy.   Neurological: He is alert and oriented to person, place, and time. He has normal reflexes. He displays normal reflexes. No cranial nerve deficit. He exhibits normal muscle tone. Coordination normal.   Psychiatric: He has a normal mood and affect. His behavior is normal. Judgment and thought content normal.       Lab Results   Component Value Date    TSH 3.220 03/06/2017     BMP  Lab Results   Component Value Date     09/05/2017    K 3.9 09/05/2017     09/05/2017    CO2 26 09/05/2017    BUN 16 09/05/2017    CREATININE 0.9 09/05/2017    CALCIUM 9.9 09/05/2017    ANIONGAP 8 09/05/2017    ESTGFRAFRICA >60 09/05/2017    EGFRNONAA >60 09/05/2017       Lab Results   Component Value Date    LDLCALC 77.6 09/05/2017     UA - 10 wbc's  Assessment:       1. Gastroesophageal reflux disease without esophagitis    2. Acute cystitis without hematuria    3. Chest pain, unspecified type    4. Adverse reaction to ciprofloxacin, initial encounter        Plan:     Gastroesophageal reflux disease without esophagitis  -     pantoprazole (PROTONIX) 40 MG tablet; Take 1  tablet (40 mg total) by mouth once daily.  Dispense: 30 tablet; Refill: 5    Acute cystitis without hematuria  -     POCT urinalysis, dipstick or tablet reag  -     POCT URINE SEDIMENT EXAM    Chest pain, unspecified type  -     EKG 12-lead  -     Ambulatory referral to Cardiology    Adverse reaction to ciprofloxacin, initial encounter      Stop Cipro.  Urine looks good  Use a different antibiotic the next time he has a prostate infection    Memory loss/MCI        Continue follow up with Dr. Gusman    Essential hypertension  Two gram sodium diet.    Weight loss discussed.    Try to walk 2 miles per day.    The following medications will be restarted today  -     carvedilol (COREG) 25 MG tablet; Take 1 tablet (25 mg total) by mouth 2 (two) times daily with meals.  -     amlodipine (NORVASC) 2.5 MG tablet; Take 1 tablet (2.5 mg total) by mouth once daily.    KARL on CPAP  Continue CPAP at 7 cm water pressure  Try using a smaller mask    Depression, unspecified depression type  - Continue venlafaxine (EFFEXOR-XR) 150 MG 24 hr capsule; Take 1 capsule by mouth once daily.  Dispense: 30 capsule; Refill: 5    Hyperlipidemia, unspecified hyperlipidemia type  Zocor 40 mg daily    Chronic gout without tophus, unspecified cause, unspecified site  Allopurinol 300 mg daily    Hypothyroidism due to acquired atrophy of thyroid  Synthroid 88 µg by mouth daily    Benign non-nodular prostatic hyperplasia with lower urinary tract symptoms  Continue Proscar and Flomax.  Keep appointment with urology    COPD  Continue Anoro    RTC in 3 weeks   Hide Additional Size Dimension?: No Biopsy Type: H and E Electrodesiccation And Curettage Text: The wound bed was treated with electrodesiccation and curettage after the biopsy was performed. Was A Bandage Applied: Yes Wound Care: Bacitracin Type Of Destruction Used: Curettage X Size Of Lesion In Cm: 0 Cryotherapy Text: The wound bed was treated with cryotherapy after the biopsy was performed. Notification Instructions: Pt may call office in 1 to 2 weeks for biopsy results.  If treatment is needed, pt will be notified of biopsy results Anesthesia Volume In Cc (Will Not Render If 0): 1 Depth Of Biopsy: dermis Hemostasis: Aluminum Chloride and Electrocautery Silver Nitrate Text: The wound bed was treated with silver nitrate after the biopsy was performed. Billing Type: [employer] Anesthesia Type: 1% lidocaine without epinephrine Biopsy Method: double edge Personna blade Electrodesiccation Text: The wound bed was treated with electrodesiccation after the biopsy was performed. Detail Level: Detailed Dressing: pressure dressing Information: Selecting Yes will display possible errors in your note based on the variables you have selected. This validation is only offered as a suggestion for you. PLEASE NOTE THAT THE VALIDATION TEXT WILL BE REMOVED WHEN YOU FINALIZE YOUR NOTE. IF YOU WANT TO FAX A PRELIMINARY NOTE YOU WILL NEED TO TOGGLE THIS TO 'NO' IF YOU DO NOT WANT IT IN YOUR FAXED NOTE. Consent: Written consent was obtained and risks were reviewed including but not limited to scarring, infection, bleeding, scabbing, incomplete removal, nerve damage and allergy to anesthesia. Post-Care Instructions: I reviewed with the patient in detail post-care instructions. Patient is to keep the biopsy site dry overnight, and then apply bacitracin twice daily until healed. Patient may apply hydrogen peroxide soaks to remove any crusting.

## 2020-12-04 DIAGNOSIS — Z01.818 PREOP EXAMINATION: ICD-10-CM

## 2020-12-04 DIAGNOSIS — M17.12 PRIMARY OSTEOARTHRITIS OF LEFT KNEE: Primary | ICD-10-CM

## 2020-12-10 NOTE — PROGRESS NOTES
CC: Left knee pain    Domonique Hernandez Jr. is a 80 y.o. male here today for a pre-operative visit in preparation for a Left total knee arthroplasty to be performed by Dr. Chang on 12/21/20.     Domonique Hernandez Jr. has a chronic history of Left knee pain. Pain is worse with activity and weight bearing. Patient has experienced interference of activities of daily living due to decreased range of motion and an increase in joint pain and swelling. Patient has failed non-operative treatment including NSAIDs, corticosteroid injections, and activity modification. Domonique Hernandez Jr. currently ambulates with a cane.     he was last seen and treated in the clinic on 11/17/2020. he will be medically optimized by the pre op center. There has been no significant change in medical status since last visit. No fever, chills, malaise, or unexplained weight change.      History of COPD, GERD, HTN, hypothyroidism, PVD, KARL with CPAP, and CKD 3. History of CABG x 3.     PCP: Emiliano Cam MD  Cardiologist: Manjula AMAYA    He was seen by Dr. Fair on 12/4/20 and cleared for surgery. Will get clearance in chart.     He is on ELIQUIS- last day of taking it will be Friday 12/18/20. Patient instructed.       Past Medical History:   Diagnosis Date    Anxiety     Arthritis     Back pain     Chronic bronchitis     Chronic gout     COPD (chronic obstructive pulmonary disease)     Depression     Emphysema of lung     Generalized headaches     GERD (gastroesophageal reflux disease)     Hyperlipidemia     Hypertension     Hypothyroidism     Obesity     Peripheral vascular disease     Sleep apnea     On CPAP    Stage 3 chronic kidney disease 4/18/2018    Thyroid disease        Past Surgical History:   Procedure Laterality Date    Bilateral mastoidectomies Bilateral 1984    for ear infection    COLONOSCOPY  2010    COLONOSCOPY N/A 1/31/2019    Procedure: COLONOSCOPY;  Surgeon: Joaquín Crawford MD;  Location: [facility];  Service: Colon and  Rectal;  Laterality: N/A;    COLONOSCOPY N/A 8/13/2019    Procedure: COLONOSCOPY;  Surgeon: Joaquín Crawford MD;  Location: [facility] ([address] [place] [address]);  Service: Colon and Rectal;  Laterality: N/A;  Patient had inadequate prep with his last colonoscopy, was unable to finish the large volume GoLYTELY prep.  Please give him a smaller volume split dose prep, such as Movi-Prep or SuPrep.  He should do a light diet of easily digested food 2 days prior to the procedure     Eardrum repair  1984    Not sure which side    HERNIA REPAIR  1984    Umbilical-screen    KNEE ARTHROSCOPY Left 1989    ROTATOR CUFF REPAIR Left 2002    Left       Family History   Problem Relation Age of Onset    Cancer Mother 65        rectal    Cancer Father     No Known Problems Sister     Kidney disease Brother     Stroke Son     Stroke Son     Atrial fibrillation Son     Melanoma Neg Hx        Review of patient's allergies indicates:   Allergen Reactions    Percocet [oxycodone-acetaminophen] Other (See Comments)     hyperactivity    Percodan [oxycodone hcl-oxycodone-asa] Other (See Comments)     hyperactivity         Current Outpatient Medications:     albuterol 90 mcg/actuation inhaler, Inhale 2 puffs into the lungs every 6 (six) hours as needed for Wheezing. Rescue, Disp: 18 g, Rfl: 5    albuterol-ipratropium (DUO-NEB) 2.5 mg-0.5 mg/3 mL nebulizer solution, Inhale contents of 1 vial via nebulizer every 6 hours as needed for wheezing., Disp: 360 mL, Rfl: 5    allopurinoL (ZYLOPRIM) 300 MG tablet, Take one tablet by mouth once a day, Disp: 90 tablet, Rfl: 1    apixaban (ELIQUIS) 5 mg Tab, Take one tablet by mouth twice a day, Disp: 60 tablet, Rfl: 3    aspirin (ECOTRIN) 81 MG EC tablet, Take 1 tablet orally once a day., Disp: 30 tablet, Rfl: 11    cetirizine (ZYRTEC) 10 MG tablet, Take 1 tablet (10 mg total) by mouth once daily., Disp: 90 tablet, Rfl: 1    cholecalciferol, vitamin D3, (VITAMIN D3) 50 mcg (2,000 unit) Cap, Take 1  capsule orally once a day., Disp: 30 capsule, Rfl: 11    dutasteride (AVODART) 0.5 mg capsule, Take 1 capsule (0.5 mg total) by mouth once daily., Disp: 90 capsule, Rfl: 1    fluticasone (FLONASE) 50 mcg/actuation nasal spray, 2 sprays (100 mcg total) by Each Nare route 2 (two) times daily., Disp: 1 Bottle, Rfl: 11    HYDROcodone-acetaminophen (NORCO) 7.5-325 mg per tablet, , Disp: , Rfl:     hydrocortisone (ANUSOL-HC) 2.5 % rectal cream, Place rectally 2 (two) times daily., Disp: 30 g, Rfl: 3    levothyroxine (SYNTHROID) 100 MCG tablet, TAKE 1 TABLET BY MOUTH ONCE DAILY, Disp: 90 tablet, Rfl: 1    LIDOCAINE VISCOUS 2 % solution, , Disp: , Rfl:     memantine (NAMENDA) 10 MG Tab, Take 1 tablet (10 mg total) by mouth 2 (two) times daily., Disp: 60 tablet, Rfl: 5    metoprolol succinate (TOPROL-XL) 50 MG 24 hr tablet, Take one tablet by mouth once in the morning and then one-half tablet by mouth on the evening, Disp: 45 tablet, Rfl: 11    multivitamin (ONE DAILY MULTIVITAMIN) per tablet, Take 1 tablet by mouth once daily. Centrum Silver for Men, Disp: , Rfl:     nitroGLYCERIN (NITROSTAT) 0.4 MG SL tablet, Take 1 tablet (sublingual) under tongue as needed for chest pain, no more than 3 tablets in 15 mins, 5 mins apart., Disp: 25 tablet, Rfl: 1    olmesartan-hydrochlorothiazide (BENICAR HCT) 20-12.5 mg per tablet, Take 1 tablet by mouth once daily., Disp: 90 tablet, Rfl: 1    omega 3-dha-epa-fish oil 1,000 mg (120 mg-180 mg) Cap, Take 2 capsules orally once a day., Disp: 60 capsule, Rfl: 11    pantoprazole (PROTONIX) 40 MG tablet, Take one tablet by mouth once a day, Disp: 30 tablet, Rfl: 3    promethazine-dextromethorphan (PROMETHAZINE-DM) 6.25-15 mg/5 mL Syrp, Take 5 mLs by mouth 4 (four) times daily as needed (cough)., Disp: 120 mL, Rfl: 3    simvastatin (ZOCOR) 40 MG tablet, Take 1 tablet (40 mg total) by mouth every evening., Disp: 90 tablet, Rfl: 1    tamsulosin (FLOMAX) 0.4 mg Cap, Take 1 capsule  (0.4 mg total) by mouth once daily., Disp: 30 capsule, Rfl: 11    traMADoL (ULTRAM) 50 mg tablet, Take one tablet by mouth every 6 hours, Disp: 35 tablet, Rfl: 0    umeclidinium-vilanterol (ANORO ELLIPTA) 62.5-25 mcg/actuation DsDv, Inhale 1 Dose into the lungs once daily., Disp: 60 each, Rfl: 5    venlafaxine (EFFEXOR-XR) 150 MG Cp24, Take 1 capsule (150 mg total) by mouth once daily., Disp: 90 capsule, Rfl: 1    Review of Systems:  Constitutional: no fever or chills  Eyes: no visual changes  ENT: no nasal congestion or sore throat  Respiratory: no cough or shortness of breath  Cardiovascular: no chest pain or palpitations  Gastrointestinal: no nausea or vomiting, tolerating diet  Genitourinary: no hematuria or dysuria  Integument/Breast: no rash or pruritis  Hematologic/Lymphatic: no easy bruising or lymphadenopathy  Musculoskeletal: see HPI  Neurological: no seizures or tremors  Behavioral/Psych: no auditory or visual hallucinations  Endocrine: no heat or cold intolerance    PE:  /80   Pulse 70   Resp 18   Wt 112.9 kg (249 lb)   BMI 38.42 kg/m²   General: Pleasant, cooperative, NAD   Gait: antalgic  HEENT: Normocephalic/Atraumatic, sclera nonicteric   Lungs: Respirations clear bilaterally; equal and unlabored.   CV: S1S2, 2+ bilateral upper and lower extremity pulses.   Skin: Intact throughout with no rashes, erythema, or lesions  Extremities: No LE edema,  no erythema or warmth of the skin in either lower extremity.    Body habitus is normal.  The patient walks with a limp.    Left knee exam:  The skin over the knee is intact.  Knee effusion 1+  Tendernes is located medially  Range of motion- Flexion 110 deg, Extension 5 deg,     Ligament laxity exam:   MCL 2+   Lachman 0   Post sag  0    LCL 0    Patellar apprehension negative.  Popliteal cyst negative  Patellar crepitation present.  Meniscal irritability not applicable    Pulses DP present, PT present.  Motor normal 5/5 strength in all tested  muscle groups.   Sensory normal.    Radiographs: Radiographs reveal complete loss of medial joint space with osteophytes and patellofemoral DJD.    Diagnosis: osteoarthritis Left knee    Plan: Left total knee arthroplasty on 12/21/20.    Pre-op labs to be done including CBC with diff and CMP.     He saw cardiology (Ramik) 12/4/20 and was cleared. Will get clearance in chart.     He is on ELIQUIS- last dose prior to surgery will be Friday 12/18/20.     Patient will be contacted by [facility] and by anesthesia for pre-op visits. Patient will be screened for covid within 72 hours prior to surgery.     Patient has 2 week postop scheduled with Dr. Chang on 1/5/21.

## 2020-12-11 ENCOUNTER — OFFICE VISIT (OUTPATIENT)
Dept: ORTHOPEDICS | Facility: CLINIC | Age: 80
End: 2020-12-11
Payer: MEDICARE

## 2020-12-11 VITALS
HEART RATE: 70 BPM | BODY MASS INDEX: 38.42 KG/M2 | WEIGHT: 249 LBS | DIASTOLIC BLOOD PRESSURE: 80 MMHG | RESPIRATION RATE: 18 BRPM | SYSTOLIC BLOOD PRESSURE: 122 MMHG

## 2020-12-11 DIAGNOSIS — E78.5 HYPERLIPIDEMIA, UNSPECIFIED HYPERLIPIDEMIA TYPE: ICD-10-CM

## 2020-12-11 DIAGNOSIS — N40.1 BENIGN PROSTATIC HYPERPLASIA WITH NOCTURIA: ICD-10-CM

## 2020-12-11 DIAGNOSIS — M17.12 PRIMARY OSTEOARTHRITIS OF LEFT KNEE: Primary | ICD-10-CM

## 2020-12-11 DIAGNOSIS — E03.4 HYPOTHYROIDISM DUE TO ACQUIRED ATROPHY OF THYROID: ICD-10-CM

## 2020-12-11 DIAGNOSIS — F32.A DEPRESSION, UNSPECIFIED DEPRESSION TYPE: ICD-10-CM

## 2020-12-11 DIAGNOSIS — R35.1 BENIGN PROSTATIC HYPERPLASIA WITH NOCTURIA: ICD-10-CM

## 2020-12-11 PROCEDURE — 1125F PR PAIN SEVERITY QUANTIFIED, PAIN PRESENT: ICD-10-PCS | Mod: S$GLB,,, | Performed by: PHYSICIAN ASSISTANT

## 2020-12-11 PROCEDURE — 99999 PR PBB SHADOW E&M-EST. PATIENT-LVL III: ICD-10-PCS | Mod: PBBFAC,,, | Performed by: PHYSICIAN ASSISTANT

## 2020-12-11 PROCEDURE — 99999 PR PBB SHADOW E&M-EST. PATIENT-LVL III: CPT | Mod: PBBFAC,,, | Performed by: PHYSICIAN ASSISTANT

## 2020-12-11 PROCEDURE — 1125F AMNT PAIN NOTED PAIN PRSNT: CPT | Mod: S$GLB,,, | Performed by: PHYSICIAN ASSISTANT

## 2020-12-11 PROCEDURE — 99499 NO LOS: ICD-10-PCS | Mod: S$GLB,,, | Performed by: PHYSICIAN ASSISTANT

## 2020-12-11 PROCEDURE — 99499 UNLISTED E&M SERVICE: CPT | Mod: S$GLB,,, | Performed by: PHYSICIAN ASSISTANT

## 2020-12-11 RX ORDER — LEVOTHYROXINE SODIUM 100 UG/1
TABLET ORAL
Qty: 90 TABLET | Refills: 1 | Status: SHIPPED | OUTPATIENT
Start: 2020-12-11 | End: 2021-06-08 | Stop reason: SDUPTHER

## 2020-12-11 RX ORDER — VENLAFAXINE HYDROCHLORIDE 150 MG/1
150 CAPSULE, EXTENDED RELEASE ORAL DAILY
Qty: 90 CAPSULE | Refills: 1 | Status: SHIPPED | OUTPATIENT
Start: 2020-12-11 | End: 2021-05-26 | Stop reason: SDUPTHER

## 2020-12-11 RX ORDER — DUTASTERIDE 0.5 MG/1
0.5 CAPSULE, LIQUID FILLED ORAL DAILY
Qty: 90 CAPSULE | Refills: 1 | Status: ON HOLD | OUTPATIENT
Start: 2020-12-11 | End: 2021-05-06 | Stop reason: HOSPADM

## 2020-12-11 RX ORDER — SIMVASTATIN 40 MG/1
40 TABLET, FILM COATED ORAL NIGHTLY
Qty: 90 TABLET | Refills: 1 | Status: SHIPPED | OUTPATIENT
Start: 2020-12-11 | End: 2021-06-08 | Stop reason: SDUPTHER

## 2020-12-11 NOTE — H&P
CC: Left knee pain    Domonique Hernandez Jr. is a 80 y.o. male here today for a pre-operative visit in preparation for a Left total knee arthroplasty to be performed by Dr. Chang on 12/21/20.     Domonique Hernandez Jr. has a chronic history of Left knee pain. Pain is worse with activity and weight bearing. Patient has experienced interference of activities of daily living due to decreased range of motion and an increase in joint pain and swelling. Patient has failed non-operative treatment including NSAIDs, corticosteroid injections, and activity modification. Domonique Hernandez Jr. currently ambulates with a cane.     he was last seen and treated in the clinic on 11/17/2020. he will be medically optimized by the pre op center. There has been no significant change in medical status since last visit. No fever, chills, malaise, or unexplained weight change.      History of COPD, GERD, HTN, hypothyroidism, PVD, KARL with CPAP, and CKD 3. History of CABG x 3.     PCP: Emiliano Cam MD  Cardiologist: Manjula AMAYA    He was seen by Dr. Fair on 12/4/20 and cleared for surgery. Will get clearance in chart.     He is on ELIQUIS- last day of taking it will be Friday 12/18/20. Patient instructed.       Past Medical History:   Diagnosis Date    Anxiety     Arthritis     Back pain     Chronic bronchitis     Chronic gout     COPD (chronic obstructive pulmonary disease)     Depression     Emphysema of lung     Generalized headaches     GERD (gastroesophageal reflux disease)     Hyperlipidemia     Hypertension     Hypothyroidism     Obesity     Peripheral vascular disease     Sleep apnea     On CPAP    Stage 3 chronic kidney disease 4/18/2018    Thyroid disease        Past Surgical History:   Procedure Laterality Date    Bilateral mastoidectomies Bilateral 1984    for ear infection    COLONOSCOPY  2010    COLONOSCOPY N/A 1/31/2019    Procedure: COLONOSCOPY;  Surgeon: Joaquín Crawford MD;  Location: [facility];  Service: Colon and  Rectal;  Laterality: N/A;    COLONOSCOPY N/A 8/13/2019    Procedure: COLONOSCOPY;  Surgeon: Joaquní Crawford MD;  Location: [facility] ([address] [place] [address]);  Service: Colon and Rectal;  Laterality: N/A;  Patient had inadequate prep with his last colonoscopy, was unable to finish the large volume GoLYTELY prep.  Please give him a smaller volume split dose prep, such as Movi-Prep or SuPrep.  He should do a light diet of easily digested food 2 days prior to the procedure     Eardrum repair  1984    Not sure which side    HERNIA REPAIR  1984    Umbilical-screen    KNEE ARTHROSCOPY Left 1989    ROTATOR CUFF REPAIR Left 2002    Left       Family History   Problem Relation Age of Onset    Cancer Mother 65        rectal    Cancer Father     No Known Problems Sister     Kidney disease Brother     Stroke Son     Stroke Son     Atrial fibrillation Son     Melanoma Neg Hx        Review of patient's allergies indicates:   Allergen Reactions    Percocet [oxycodone-acetaminophen] Other (See Comments)     hyperactivity    Percodan [oxycodone hcl-oxycodone-asa] Other (See Comments)     hyperactivity         Current Outpatient Medications:     albuterol 90 mcg/actuation inhaler, Inhale 2 puffs into the lungs every 6 (six) hours as needed for Wheezing. Rescue, Disp: 18 g, Rfl: 5    albuterol-ipratropium (DUO-NEB) 2.5 mg-0.5 mg/3 mL nebulizer solution, Inhale contents of 1 vial via nebulizer every 6 hours as needed for wheezing., Disp: 360 mL, Rfl: 5    allopurinoL (ZYLOPRIM) 300 MG tablet, Take one tablet by mouth once a day, Disp: 90 tablet, Rfl: 1    apixaban (ELIQUIS) 5 mg Tab, Take one tablet by mouth twice a day, Disp: 60 tablet, Rfl: 3    aspirin (ECOTRIN) 81 MG EC tablet, Take 1 tablet orally once a day., Disp: 30 tablet, Rfl: 11    cetirizine (ZYRTEC) 10 MG tablet, Take 1 tablet (10 mg total) by mouth once daily., Disp: 90 tablet, Rfl: 1    cholecalciferol, vitamin D3, (VITAMIN D3) 50 mcg (2,000 unit) Cap, Take 1  capsule orally once a day., Disp: 30 capsule, Rfl: 11    dutasteride (AVODART) 0.5 mg capsule, Take 1 capsule (0.5 mg total) by mouth once daily., Disp: 90 capsule, Rfl: 1    fluticasone (FLONASE) 50 mcg/actuation nasal spray, 2 sprays (100 mcg total) by Each Nare route 2 (two) times daily., Disp: 1 Bottle, Rfl: 11    HYDROcodone-acetaminophen (NORCO) 7.5-325 mg per tablet, , Disp: , Rfl:     hydrocortisone (ANUSOL-HC) 2.5 % rectal cream, Place rectally 2 (two) times daily., Disp: 30 g, Rfl: 3    levothyroxine (SYNTHROID) 100 MCG tablet, TAKE 1 TABLET BY MOUTH ONCE DAILY, Disp: 90 tablet, Rfl: 1    LIDOCAINE VISCOUS 2 % solution, , Disp: , Rfl:     memantine (NAMENDA) 10 MG Tab, Take 1 tablet (10 mg total) by mouth 2 (two) times daily., Disp: 60 tablet, Rfl: 5    metoprolol succinate (TOPROL-XL) 50 MG 24 hr tablet, Take one tablet by mouth once in the morning and then one-half tablet by mouth on the evening, Disp: 45 tablet, Rfl: 11    multivitamin (ONE DAILY MULTIVITAMIN) per tablet, Take 1 tablet by mouth once daily. Centrum Silver for Men, Disp: , Rfl:     nitroGLYCERIN (NITROSTAT) 0.4 MG SL tablet, Take 1 tablet (sublingual) under tongue as needed for chest pain, no more than 3 tablets in 15 mins, 5 mins apart., Disp: 25 tablet, Rfl: 1    olmesartan-hydrochlorothiazide (BENICAR HCT) 20-12.5 mg per tablet, Take 1 tablet by mouth once daily., Disp: 90 tablet, Rfl: 1    omega 3-dha-epa-fish oil 1,000 mg (120 mg-180 mg) Cap, Take 2 capsules orally once a day., Disp: 60 capsule, Rfl: 11    pantoprazole (PROTONIX) 40 MG tablet, Take one tablet by mouth once a day, Disp: 30 tablet, Rfl: 3    promethazine-dextromethorphan (PROMETHAZINE-DM) 6.25-15 mg/5 mL Syrp, Take 5 mLs by mouth 4 (four) times daily as needed (cough)., Disp: 120 mL, Rfl: 3    simvastatin (ZOCOR) 40 MG tablet, Take 1 tablet (40 mg total) by mouth every evening., Disp: 90 tablet, Rfl: 1    tamsulosin (FLOMAX) 0.4 mg Cap, Take 1 capsule  (0.4 mg total) by mouth once daily., Disp: 30 capsule, Rfl: 11    traMADoL (ULTRAM) 50 mg tablet, Take one tablet by mouth every 6 hours, Disp: 35 tablet, Rfl: 0    umeclidinium-vilanterol (ANORO ELLIPTA) 62.5-25 mcg/actuation DsDv, Inhale 1 Dose into the lungs once daily., Disp: 60 each, Rfl: 5    venlafaxine (EFFEXOR-XR) 150 MG Cp24, Take 1 capsule (150 mg total) by mouth once daily., Disp: 90 capsule, Rfl: 1    Review of Systems:  Constitutional: no fever or chills  Eyes: no visual changes  ENT: no nasal congestion or sore throat  Respiratory: no cough or shortness of breath  Cardiovascular: no chest pain or palpitations  Gastrointestinal: no nausea or vomiting, tolerating diet  Genitourinary: no hematuria or dysuria  Integument/Breast: no rash or pruritis  Hematologic/Lymphatic: no easy bruising or lymphadenopathy  Musculoskeletal: see HPI  Neurological: no seizures or tremors  Behavioral/Psych: no auditory or visual hallucinations  Endocrine: no heat or cold intolerance    PE:  /80   Pulse 70   Resp 18   Wt 112.9 kg (249 lb)   BMI 38.42 kg/m²   General: Pleasant, cooperative, NAD   Gait: antalgic  HEENT: Normocephalic/Atraumatic, sclera nonicteric   Lungs: Respirations clear bilaterally; equal and unlabored.   CV: S1S2, 2+ bilateral upper and lower extremity pulses.   Skin: Intact throughout with no rashes, erythema, or lesions  Extremities: No LE edema,  no erythema or warmth of the skin in either lower extremity.    Body habitus is normal.  The patient walks with a limp.    Left knee exam:  The skin over the knee is intact.  Knee effusion 1+  Tendernes is located medially  Range of motion- Flexion 110 deg, Extension 5 deg,     Ligament laxity exam:   MCL 2+   Lachman 0   Post sag  0    LCL 0    Patellar apprehension negative.  Popliteal cyst negative  Patellar crepitation present.  Meniscal irritability not applicable    Pulses DP present, PT present.  Motor normal 5/5 strength in all tested  muscle groups.   Sensory normal.    Radiographs: Radiographs reveal complete loss of medial joint space with osteophytes and patellofemoral DJD.    Diagnosis: osteoarthritis Left knee    Plan: Left total knee arthroplasty on 12/21/20.    Pre-op labs to be done including CBC with diff and CMP.     He saw cardiology (Ramik) 12/4/20 and was cleared. Will get clearance in chart.     He is on ELIQUIS- last dose prior to surgery will be Friday 12/18/20.     Patient will be contacted by [facility] and by anesthesia for pre-op visits. Patient will be screened for covid within 72 hours prior to surgery.     Patient has 2 week postop scheduled with Dr. Chang on 1/5/21.

## 2020-12-18 ENCOUNTER — LAB VISIT (OUTPATIENT)
Dept: FAMILY MEDICINE | Facility: CLINIC | Age: 80
End: 2020-12-18
Payer: MEDICARE

## 2020-12-18 DIAGNOSIS — Z41.9 SURGERY, ELECTIVE: ICD-10-CM

## 2020-12-18 PROCEDURE — U0003 INFECTIOUS AGENT DETECTION BY NUCLEIC ACID (DNA OR RNA); SEVERE ACUTE RESPIRATORY SYNDROME CORONAVIRUS 2 (SARS-COV-2) (CORONAVIRUS DISEASE [COVID-19]), AMPLIFIED PROBE TECHNIQUE, MAKING USE OF HIGH THROUGHPUT TECHNOLOGIES AS DESCRIBED BY CMS-2020-01-R: HCPCS

## 2020-12-19 LAB — SARS-COV-2 RNA RESP QL NAA+PROBE: NOT DETECTED

## 2020-12-21 PROBLEM — M17.12 PRIMARY OSTEOARTHRITIS OF LEFT KNEE: Status: ACTIVE | Noted: 2020-12-21

## 2020-12-21 PROBLEM — Z96.652 HISTORY OF LEFT KNEE REPLACEMENT: Status: ACTIVE | Noted: 2020-12-21

## 2020-12-22 DIAGNOSIS — M17.12 PRIMARY OSTEOARTHRITIS OF LEFT KNEE: Primary | ICD-10-CM

## 2020-12-22 DIAGNOSIS — Z96.652 S/P TOTAL KNEE ARTHROPLASTY, LEFT: ICD-10-CM

## 2020-12-22 DIAGNOSIS — M1A.9XX0 CHRONIC GOUT WITHOUT TOPHUS, UNSPECIFIED CAUSE, UNSPECIFIED SITE: ICD-10-CM

## 2020-12-23 ENCOUNTER — TELEPHONE (OUTPATIENT)
Dept: ORTHOPEDICS | Facility: CLINIC | Age: 80
End: 2020-12-23

## 2020-12-23 PROCEDURE — G0180 MD CERTIFICATION HHA PATIENT: HCPCS | Mod: ,,, | Performed by: ORTHOPAEDIC SURGERY

## 2020-12-23 PROCEDURE — G0180 PR HOME HEALTH MD CERTIFICATION: ICD-10-PCS | Mod: ,,, | Performed by: ORTHOPAEDIC SURGERY

## 2020-12-23 RX ORDER — ALLOPURINOL 300 MG/1
TABLET ORAL
Qty: 90 TABLET | Refills: 1 | Status: SHIPPED | OUTPATIENT
Start: 2020-12-23 | End: 2021-10-13 | Stop reason: SDUPTHER

## 2020-12-24 DIAGNOSIS — M17.12 PRIMARY OSTEOARTHRITIS OF LEFT KNEE: Primary | ICD-10-CM

## 2020-12-28 ENCOUNTER — TELEPHONE (OUTPATIENT)
Dept: ORTHOPEDICS | Facility: CLINIC | Age: 80
End: 2020-12-28

## 2020-12-28 NOTE — TELEPHONE ENCOUNTER
----- Message from Rosenda Carroll sent at 12/28/2020 11:06 AM CST -----  Type:  Needs Medical Advice    Who Called: Eliseo home health nurse  Symptoms (please be specific): Eliseo is calling to speak with a nurse in the office regarding the pts bandage being saturated in blood and she wants to get approval to change it while she is at his home   How long has patient had these symptoms:  unknown  Pharmacy name and phone #:  n/a  Would the patient rather a call back or a response via [facility]? Call back  Best Call Back Number:  [phone]  Additional Information: none

## 2020-12-28 NOTE — TELEPHONE ENCOUNTER
Spoke with home health nurse, Eliseo, and informed her that if dressing is heavily saturated, she can replace it with a similar dressing. Eliseo verbalized understanding.

## 2020-12-31 ENCOUNTER — OFFICE VISIT (OUTPATIENT)
Dept: FAMILY MEDICINE | Facility: CLINIC | Age: 80
End: 2020-12-31
Payer: MEDICARE

## 2020-12-31 ENCOUNTER — HOSPITAL ENCOUNTER (INPATIENT)
Facility: HOSPITAL | Age: 80
LOS: 20 days | Discharge: NURSING, REHAB OR LONG-TERM CARE | DRG: 177 | End: 2021-01-20
Attending: SURGERY | Admitting: FAMILY MEDICINE
Payer: MEDICARE

## 2020-12-31 ENCOUNTER — TELEPHONE (OUTPATIENT)
Dept: ORTHOPEDICS | Facility: CLINIC | Age: 80
End: 2020-12-31

## 2020-12-31 ENCOUNTER — EXTERNAL HOME HEALTH (OUTPATIENT)
Dept: HOME HEALTH SERVICES | Facility: HOSPITAL | Age: 80
End: 2020-12-31
Payer: MEDICARE

## 2020-12-31 VITALS
HEART RATE: 103 BPM | OXYGEN SATURATION: 95 % | SYSTOLIC BLOOD PRESSURE: 98 MMHG | DIASTOLIC BLOOD PRESSURE: 59 MMHG | WEIGHT: 248 LBS | TEMPERATURE: 97 F | RESPIRATION RATE: 36 BRPM | BODY MASS INDEX: 38.92 KG/M2 | HEIGHT: 67 IN

## 2020-12-31 DIAGNOSIS — G93.41 ACUTE METABOLIC ENCEPHALOPATHY: ICD-10-CM

## 2020-12-31 DIAGNOSIS — G31.84 MILD COGNITIVE IMPAIRMENT: ICD-10-CM

## 2020-12-31 DIAGNOSIS — J41.1 MUCOPURULENT CHRONIC BRONCHITIS: ICD-10-CM

## 2020-12-31 DIAGNOSIS — U07.1 COVID-19 VIRUS INFECTION: ICD-10-CM

## 2020-12-31 DIAGNOSIS — R41.82 ALTERED MENTAL STATUS: ICD-10-CM

## 2020-12-31 DIAGNOSIS — R53.81 DEBILITY: ICD-10-CM

## 2020-12-31 DIAGNOSIS — R41.0 CONFUSION: Primary | ICD-10-CM

## 2020-12-31 DIAGNOSIS — R41.3 MEMORY LOSS: ICD-10-CM

## 2020-12-31 DIAGNOSIS — G47.33 OSA ON CPAP: ICD-10-CM

## 2020-12-31 DIAGNOSIS — Z96.652 HISTORY OF LEFT KNEE REPLACEMENT: Primary | ICD-10-CM

## 2020-12-31 DIAGNOSIS — I95.9 HYPOTENSIVE EPISODE: ICD-10-CM

## 2020-12-31 PROBLEM — I10 ESSENTIAL HYPERTENSION: Status: ACTIVE | Noted: 2020-12-31

## 2020-12-31 LAB
ALBUMIN SERPL BCP-MCNC: 2.8 G/DL (ref 3.5–5.2)
ALP SERPL-CCNC: 105 U/L (ref 55–135)
ALT SERPL W/O P-5'-P-CCNC: 17 U/L (ref 10–44)
AMMONIA PLAS-SCNC: 22 UMOL/L (ref 10–50)
AMORPH CRY URNS QL MICRO: ABNORMAL
AMPHET+METHAMPHET UR QL: NEGATIVE
ANION GAP SERPL CALC-SCNC: 12 MMOL/L (ref 8–16)
APAP SERPL-MCNC: <3 UG/ML (ref 10–20)
APTT BLDCRRT: 34.2 SEC (ref 21–32)
APTT BLDCRRT: 34.2 SEC (ref 21–32)
AST SERPL-CCNC: 28 U/L (ref 10–40)
BACTERIA #/AREA URNS HPF: ABNORMAL /HPF
BARBITURATES UR QL SCN>200 NG/ML: NEGATIVE
BASOPHILS # BLD AUTO: 0.01 K/UL (ref 0–0.2)
BASOPHILS NFR BLD: 0.2 % (ref 0–1.9)
BENZODIAZ UR QL SCN>200 NG/ML: NEGATIVE
BILIRUB SERPL-MCNC: 0.6 MG/DL (ref 0.1–1)
BILIRUB UR QL STRIP: NEGATIVE
BNP SERPL-MCNC: 34 PG/ML (ref 0–99)
BUN SERPL-MCNC: 38 MG/DL (ref 8–23)
BZE UR QL SCN: NEGATIVE
CALCIUM SERPL-MCNC: 8.7 MG/DL (ref 8.7–10.5)
CANNABINOIDS UR QL SCN: NEGATIVE
CHLORIDE SERPL-SCNC: 102 MMOL/L (ref 95–110)
CK MB SERPL-MCNC: 1.8 NG/ML (ref 0.1–6.5)
CK MB SERPL-RTO: 1.7 % (ref 0–5)
CK SERPL-CCNC: 103 U/L (ref 20–200)
CK SERPL-CCNC: 103 U/L (ref 20–200)
CLARITY UR: CLEAR
CO2 SERPL-SCNC: 21 MMOL/L (ref 23–29)
COLOR UR: YELLOW
CREAT SERPL-MCNC: 1.6 MG/DL (ref 0.5–1.4)
CREAT UR-MCNC: 139 MG/DL (ref 23–375)
D DIMER PPP IA.FEU-MCNC: 1.52 MG/L FEU
DIFFERENTIAL METHOD: ABNORMAL
EOSINOPHIL # BLD AUTO: 0 K/UL (ref 0–0.5)
EOSINOPHIL NFR BLD: 0.2 % (ref 0–8)
ERYTHROCYTE [DISTWIDTH] IN BLOOD BY AUTOMATED COUNT: 16.2 % (ref 11.5–14.5)
ERYTHROCYTE [SEDIMENTATION RATE] IN BLOOD BY WESTERGREN METHOD: 44 MM/HR (ref 0–10)
EST. GFR  (AFRICAN AMERICAN): 47 ML/MIN/1.73 M^2
EST. GFR  (NON AFRICAN AMERICAN): 41 ML/MIN/1.73 M^2
ETHANOL SERPL-MCNC: <10 MG/DL
GLUCOSE SERPL-MCNC: 98 MG/DL (ref 70–110)
GLUCOSE UR QL STRIP: NEGATIVE
GROUP A STREP, MOLECULAR: NEGATIVE
HCT VFR BLD AUTO: 33.3 % (ref 40–54)
HGB BLD-MCNC: 10.8 G/DL (ref 14–18)
HGB UR QL STRIP: ABNORMAL
HYALINE CASTS #/AREA URNS LPF: 0 /LPF
IMM GRANULOCYTES # BLD AUTO: 0.03 K/UL (ref 0–0.04)
IMM GRANULOCYTES NFR BLD AUTO: 0.7 % (ref 0–0.5)
INFLUENZA A, MOLECULAR: NEGATIVE
INFLUENZA B, MOLECULAR: NEGATIVE
INR PPP: 1 (ref 0.8–1.2)
INR PPP: 1 (ref 0.8–1.2)
KETONES UR QL STRIP: NEGATIVE
LACTATE SERPL-SCNC: 1.4 MMOL/L (ref 0.5–2.2)
LDH SERPL L TO P-CCNC: 228 U/L (ref 110–260)
LEUKOCYTE ESTERASE UR QL STRIP: NEGATIVE
LYMPHOCYTES # BLD AUTO: 0.8 K/UL (ref 1–4.8)
LYMPHOCYTES NFR BLD: 16.7 % (ref 18–48)
MCH RBC QN AUTO: 30.1 PG (ref 27–31)
MCHC RBC AUTO-ENTMCNC: 32.4 G/DL (ref 32–36)
MCV RBC AUTO: 93 FL (ref 82–98)
METHADONE UR QL SCN>300 NG/ML: NEGATIVE
MICROSCOPIC COMMENT: ABNORMAL
MONOCYTES # BLD AUTO: 0.4 K/UL (ref 0.3–1)
MONOCYTES NFR BLD: 9.3 % (ref 4–15)
NEUTROPHILS # BLD AUTO: 3.4 K/UL (ref 1.8–7.7)
NEUTROPHILS NFR BLD: 72.9 % (ref 38–73)
NITRITE UR QL STRIP: NEGATIVE
NRBC BLD-RTO: 0 /100 WBC
OPIATES UR QL SCN: NORMAL
PCP UR QL SCN>25 NG/ML: NEGATIVE
PH UR STRIP: 6 [PH] (ref 5–8)
PLATELET # BLD AUTO: 201 K/UL (ref 150–350)
PMV BLD AUTO: 10.6 FL (ref 9.2–12.9)
POTASSIUM SERPL-SCNC: 3.5 MMOL/L (ref 3.5–5.1)
PROCALCITONIN SERPL IA-MCNC: 0.26 NG/ML
PROT SERPL-MCNC: 5.9 G/DL (ref 6–8.4)
PROT UR QL STRIP: ABNORMAL
PROTHROMBIN TIME: 11 SEC (ref 9–12.5)
PROTHROMBIN TIME: 11 SEC (ref 9–12.5)
RBC # BLD AUTO: 3.59 M/UL (ref 4.6–6.2)
RBC #/AREA URNS HPF: 4 /HPF (ref 0–4)
SALICYLATES SERPL-MCNC: <5 MG/DL (ref 15–30)
SARS-COV-2 RDRP RESP QL NAA+PROBE: POSITIVE
SODIUM SERPL-SCNC: 135 MMOL/L (ref 136–145)
SP GR UR STRIP: 1.02 (ref 1–1.03)
SPECIMEN SOURCE: NORMAL
SQUAMOUS #/AREA URNS HPF: 15 /HPF
TOXICOLOGY INFORMATION: NORMAL
TROPONIN I SERPL DL<=0.01 NG/ML-MCNC: 0.01 NG/ML (ref 0–0.03)
URN SPEC COLLECT METH UR: ABNORMAL
UROBILINOGEN UR STRIP-ACNC: ABNORMAL EU/DL
WBC # BLD AUTO: 4.61 K/UL (ref 3.9–12.7)
WBC #/AREA URNS HPF: 11 /HPF (ref 0–5)

## 2020-12-31 PROCEDURE — 1126F PR PAIN SEVERITY QUANTIFIED, NO PAIN PRESENT: ICD-10-PCS | Mod: S$GLB,,, | Performed by: FAMILY MEDICINE

## 2020-12-31 PROCEDURE — 3288F PR FALLS RISK ASSESSMENT DOCUMENTED: ICD-10-PCS | Mod: CPTII,S$GLB,, | Performed by: FAMILY MEDICINE

## 2020-12-31 PROCEDURE — 82728 ASSAY OF FERRITIN: CPT

## 2020-12-31 PROCEDURE — 86900 BLOOD TYPING SEROLOGIC ABO: CPT | Mod: 91

## 2020-12-31 PROCEDURE — 84484 ASSAY OF TROPONIN QUANT: CPT

## 2020-12-31 PROCEDURE — 99285 EMERGENCY DEPT VISIT HI MDM: CPT | Mod: 25

## 2020-12-31 PROCEDURE — 82140 ASSAY OF AMMONIA: CPT

## 2020-12-31 PROCEDURE — 83605 ASSAY OF LACTIC ACID: CPT

## 2020-12-31 PROCEDURE — 3074F PR MOST RECENT SYSTOLIC BLOOD PRESSURE < 130 MM HG: ICD-10-PCS | Mod: CPTII,S$GLB,, | Performed by: FAMILY MEDICINE

## 2020-12-31 PROCEDURE — U0002 COVID-19 LAB TEST NON-CDC: HCPCS

## 2020-12-31 PROCEDURE — 85025 COMPLETE CBC W/AUTO DIFF WBC: CPT

## 2020-12-31 PROCEDURE — 99999 PR PBB SHADOW E&M-EST. PATIENT-LVL V: CPT | Mod: PBBFAC,,, | Performed by: FAMILY MEDICINE

## 2020-12-31 PROCEDURE — 3078F DIAST BP <80 MM HG: CPT | Mod: CPTII,S$GLB,, | Performed by: FAMILY MEDICINE

## 2020-12-31 PROCEDURE — 80320 DRUG SCREEN QUANTALCOHOLS: CPT

## 2020-12-31 PROCEDURE — 86901 BLOOD TYPING SEROLOGIC RH(D): CPT

## 2020-12-31 PROCEDURE — 87651 STREP A DNA AMP PROBE: CPT

## 2020-12-31 PROCEDURE — 83615 LACTATE (LD) (LDH) ENZYME: CPT

## 2020-12-31 PROCEDURE — 99213 PR OFFICE/OUTPT VISIT, EST, LEVL III, 20-29 MIN: ICD-10-PCS | Mod: S$GLB,,, | Performed by: FAMILY MEDICINE

## 2020-12-31 PROCEDURE — 85730 THROMBOPLASTIN TIME PARTIAL: CPT

## 2020-12-31 PROCEDURE — 82553 CREATINE MB FRACTION: CPT

## 2020-12-31 PROCEDURE — 93010 EKG 12-LEAD: ICD-10-PCS | Mod: ,,, | Performed by: INTERNAL MEDICINE

## 2020-12-31 PROCEDURE — 84145 PROCALCITONIN (PCT): CPT

## 2020-12-31 PROCEDURE — 3288F FALL RISK ASSESSMENT DOCD: CPT | Mod: CPTII,S$GLB,, | Performed by: FAMILY MEDICINE

## 2020-12-31 PROCEDURE — 87502 INFLUENZA DNA AMP PROBE: CPT

## 2020-12-31 PROCEDURE — 93010 ELECTROCARDIOGRAM REPORT: CPT | Mod: ,,, | Performed by: INTERNAL MEDICINE

## 2020-12-31 PROCEDURE — 3078F PR MOST RECENT DIASTOLIC BLOOD PRESSURE < 80 MM HG: ICD-10-PCS | Mod: CPTII,S$GLB,, | Performed by: FAMILY MEDICINE

## 2020-12-31 PROCEDURE — 85379 FIBRIN DEGRADATION QUANT: CPT

## 2020-12-31 PROCEDURE — 80329 ANALGESICS NON-OPIOID 1 OR 2: CPT

## 2020-12-31 PROCEDURE — 63600175 PHARM REV CODE 636 W HCPCS: Performed by: SURGERY

## 2020-12-31 PROCEDURE — 80307 DRUG TEST PRSMV CHEM ANLYZR: CPT

## 2020-12-31 PROCEDURE — 1159F MED LIST DOCD IN RCRD: CPT | Mod: S$GLB,,, | Performed by: FAMILY MEDICINE

## 2020-12-31 PROCEDURE — 82550 ASSAY OF CK (CPK): CPT

## 2020-12-31 PROCEDURE — 99213 OFFICE O/P EST LOW 20 MIN: CPT | Mod: S$GLB,,, | Performed by: FAMILY MEDICINE

## 2020-12-31 PROCEDURE — 86140 C-REACTIVE PROTEIN: CPT

## 2020-12-31 PROCEDURE — 94760 N-INVAS EAR/PLS OXIMETRY 1: CPT

## 2020-12-31 PROCEDURE — G0378 HOSPITAL OBSERVATION PER HR: HCPCS

## 2020-12-31 PROCEDURE — 87040 BLOOD CULTURE FOR BACTERIA: CPT | Mod: 59

## 2020-12-31 PROCEDURE — 1100F PTFALLS ASSESS-DOCD GE2>/YR: CPT | Mod: CPTII,S$GLB,, | Performed by: FAMILY MEDICINE

## 2020-12-31 PROCEDURE — 87086 URINE CULTURE/COLONY COUNT: CPT

## 2020-12-31 PROCEDURE — 80053 COMPREHEN METABOLIC PANEL: CPT

## 2020-12-31 PROCEDURE — 11000001 HC ACUTE MED/SURG PRIVATE ROOM

## 2020-12-31 PROCEDURE — 3074F SYST BP LT 130 MM HG: CPT | Mod: CPTII,S$GLB,, | Performed by: FAMILY MEDICINE

## 2020-12-31 PROCEDURE — 86900 BLOOD TYPING SEROLOGIC ABO: CPT

## 2020-12-31 PROCEDURE — 1159F PR MEDICATION LIST DOCUMENTED IN MEDICAL RECORD: ICD-10-PCS | Mod: S$GLB,,, | Performed by: FAMILY MEDICINE

## 2020-12-31 PROCEDURE — 96374 THER/PROPH/DIAG INJ IV PUSH: CPT

## 2020-12-31 PROCEDURE — 1100F PR PT FALLS ASSESS DOC 2+ FALLS/FALL W/INJURY/YR: ICD-10-PCS | Mod: CPTII,S$GLB,, | Performed by: FAMILY MEDICINE

## 2020-12-31 PROCEDURE — 85610 PROTHROMBIN TIME: CPT

## 2020-12-31 PROCEDURE — 99999 PR PBB SHADOW E&M-EST. PATIENT-LVL V: ICD-10-PCS | Mod: PBBFAC,,, | Performed by: FAMILY MEDICINE

## 2020-12-31 PROCEDURE — 1126F AMNT PAIN NOTED NONE PRSNT: CPT | Mod: S$GLB,,, | Performed by: FAMILY MEDICINE

## 2020-12-31 PROCEDURE — 93005 ELECTROCARDIOGRAM TRACING: CPT

## 2020-12-31 PROCEDURE — 36415 COLL VENOUS BLD VENIPUNCTURE: CPT

## 2020-12-31 PROCEDURE — 83880 ASSAY OF NATRIURETIC PEPTIDE: CPT

## 2020-12-31 PROCEDURE — 85651 RBC SED RATE NONAUTOMATED: CPT

## 2020-12-31 PROCEDURE — 25000003 PHARM REV CODE 250: Performed by: SURGERY

## 2020-12-31 PROCEDURE — 81000 URINALYSIS NONAUTO W/SCOPE: CPT | Mod: 59

## 2020-12-31 RX ORDER — SODIUM CHLORIDE 9 MG/ML
INJECTION, SOLUTION INTRAVENOUS CONTINUOUS
Status: DISCONTINUED | OUTPATIENT
Start: 2020-12-31 | End: 2021-01-13

## 2020-12-31 RX ORDER — TRAMADOL HYDROCHLORIDE 50 MG/1
50 TABLET ORAL EVERY 6 HOURS PRN
Status: ON HOLD | COMMUNITY
End: 2021-01-20 | Stop reason: HOSPADM

## 2020-12-31 RX ORDER — NALOXONE HCL 0.4 MG/ML
0.4 VIAL (ML) INJECTION
Status: COMPLETED | OUTPATIENT
Start: 2020-12-31 | End: 2020-12-31

## 2020-12-31 RX ORDER — SODIUM CHLORIDE 0.9 % (FLUSH) 0.9 %
10 SYRINGE (ML) INJECTION
Status: DISCONTINUED | OUTPATIENT
Start: 2020-12-31 | End: 2021-01-20 | Stop reason: HOSPADM

## 2020-12-31 RX ORDER — ONDANSETRON 2 MG/ML
4 INJECTION INTRAMUSCULAR; INTRAVENOUS EVERY 8 HOURS PRN
Status: DISCONTINUED | OUTPATIENT
Start: 2020-12-31 | End: 2021-01-20 | Stop reason: HOSPADM

## 2020-12-31 RX ORDER — TALC
6 POWDER (GRAM) TOPICAL NIGHTLY PRN
Status: DISCONTINUED | OUTPATIENT
Start: 2020-12-31 | End: 2021-01-20 | Stop reason: HOSPADM

## 2020-12-31 RX ORDER — HYDROCODONE BITARTRATE AND ACETAMINOPHEN 500; 5 MG/1; MG/1
TABLET ORAL
Status: DISCONTINUED | OUTPATIENT
Start: 2020-12-31 | End: 2021-01-20 | Stop reason: HOSPADM

## 2020-12-31 RX ADMIN — NALOXONE HYDROCHLORIDE 0.4 MG: 0.4 INJECTION, SOLUTION INTRAMUSCULAR; INTRAVENOUS; SUBCUTANEOUS at 09:12

## 2020-12-31 RX ADMIN — SODIUM CHLORIDE 75 ML/HR: 0.9 INJECTION, SOLUTION INTRAVENOUS at 11:12

## 2020-12-31 NOTE — PROGRESS NOTES
Subjective:       Patient ID: Domonique Hernandez Jr. is a 80 y.o. male.    Chief Complaint: Memory Loss (can't concentrate, talks in his sleep, and has a little appetite) and Shortness of Breath    Pt is a 80 y.o. male who presents for check up for eval of general medical decline  Since his L knee TKA on 12021-20 and has nto done well since general anesthesia  History of left knee replacement  Devin on cpap  Memory loss. Doing well on current meds. Denies any side effects. Prevention is up to date.    Review of Systems   Constitutional: Positive for activity change and appetite change.        On NOrco for pain   Respiratory: Positive for shortness of breath.         Labored breathing   Genitourinary: Positive for penile swelling.        Urinary incontinence   Neurological:        Memory is poor; not understanding wel and is mentlally confused since general anesthesia       Objective:      Physical Exam  Constitutional:       General: He is in acute distress.      Appearance: He is obese.      Comments: Somnolent   Pulmonary:      Effort: Respiratory distress present.   Abdominal:      Tenderness: There is guarding.   Neurological:      Motor: Weakness present.         Assessment:       1. Essential hypertension    2. History of left knee replacement    3. DEVIN on CPAP    4. Memory loss        Plan:   Domonique was seen today for memory loss and shortness of breath.    Diagnoses and all orders for this visit:    Essential hypertension    History of left knee replacement    DEVIN on CPAP    Memory loss

## 2021-01-01 PROBLEM — G93.41 ACUTE METABOLIC ENCEPHALOPATHY: Status: ACTIVE | Noted: 2020-12-31

## 2021-01-01 PROBLEM — U07.1 COVID-19 VIRUS INFECTION: Status: ACTIVE | Noted: 2021-01-01

## 2021-01-01 LAB
ABO AND RH: NORMAL
ALBUMIN SERPL BCP-MCNC: 2.8 G/DL (ref 3.5–5.2)
ALP SERPL-CCNC: 102 U/L (ref 55–135)
ALT SERPL W/O P-5'-P-CCNC: 16 U/L (ref 10–44)
ANION GAP SERPL CALC-SCNC: 12 MMOL/L (ref 8–16)
AST SERPL-CCNC: 28 U/L (ref 10–40)
BASOPHILS # BLD AUTO: 0.01 K/UL (ref 0–0.2)
BASOPHILS NFR BLD: 0.3 % (ref 0–1.9)
BILIRUB SERPL-MCNC: 0.6 MG/DL (ref 0.1–1)
BUN SERPL-MCNC: 34 MG/DL (ref 8–23)
CALCIUM SERPL-MCNC: 8.7 MG/DL (ref 8.7–10.5)
CHLORIDE SERPL-SCNC: 103 MMOL/L (ref 95–110)
CO2 SERPL-SCNC: 22 MMOL/L (ref 23–29)
CREAT SERPL-MCNC: 1.4 MG/DL (ref 0.5–1.4)
CRP SERPL-MCNC: 35.2 MG/L (ref 0–8.2)
DIFFERENTIAL METHOD: ABNORMAL
EOSINOPHIL # BLD AUTO: 0 K/UL (ref 0–0.5)
EOSINOPHIL NFR BLD: 0.8 % (ref 0–8)
ERYTHROCYTE [DISTWIDTH] IN BLOOD BY AUTOMATED COUNT: 16.1 % (ref 11.5–14.5)
EST. GFR  (AFRICAN AMERICAN): 55 ML/MIN/1.73 M^2
EST. GFR  (NON AFRICAN AMERICAN): 48 ML/MIN/1.73 M^2
FERRITIN SERPL-MCNC: 300 NG/ML (ref 20–300)
GLUCOSE SERPL-MCNC: 89 MG/DL (ref 70–110)
HCT VFR BLD AUTO: 33.6 % (ref 40–54)
HGB BLD-MCNC: 10.7 G/DL (ref 14–18)
IMM GRANULOCYTES # BLD AUTO: 0.02 K/UL (ref 0–0.04)
IMM GRANULOCYTES NFR BLD AUTO: 0.5 % (ref 0–0.5)
LYMPHOCYTES # BLD AUTO: 0.8 K/UL (ref 1–4.8)
LYMPHOCYTES NFR BLD: 20.7 % (ref 18–48)
MCH RBC QN AUTO: 29.6 PG (ref 27–31)
MCHC RBC AUTO-ENTMCNC: 31.8 G/DL (ref 32–36)
MCV RBC AUTO: 93 FL (ref 82–98)
MONOCYTES # BLD AUTO: 0.5 K/UL (ref 0.3–1)
MONOCYTES NFR BLD: 11.8 % (ref 4–15)
NEUTROPHILS # BLD AUTO: 2.5 K/UL (ref 1.8–7.7)
NEUTROPHILS NFR BLD: 65.9 % (ref 38–73)
NRBC BLD-RTO: 0 /100 WBC
PLATELET # BLD AUTO: 181 K/UL (ref 150–350)
PMV BLD AUTO: 10.5 FL (ref 9.2–12.9)
POTASSIUM SERPL-SCNC: 3.4 MMOL/L (ref 3.5–5.1)
PROT SERPL-MCNC: 5.9 G/DL (ref 6–8.4)
RBC # BLD AUTO: 3.62 M/UL (ref 4.6–6.2)
SODIUM SERPL-SCNC: 137 MMOL/L (ref 136–145)
TSH SERPL DL<=0.005 MIU/L-ACNC: 2.42 UIU/ML (ref 0.4–4)
WBC # BLD AUTO: 3.82 K/UL (ref 3.9–12.7)

## 2021-01-01 PROCEDURE — 84443 ASSAY THYROID STIM HORMONE: CPT

## 2021-01-01 PROCEDURE — 94761 N-INVAS EAR/PLS OXIMETRY MLT: CPT

## 2021-01-01 PROCEDURE — 80053 COMPREHEN METABOLIC PANEL: CPT

## 2021-01-01 PROCEDURE — 96376 TX/PRO/DX INJ SAME DRUG ADON: CPT

## 2021-01-01 PROCEDURE — 96375 TX/PRO/DX INJ NEW DRUG ADDON: CPT

## 2021-01-01 PROCEDURE — 25000003 PHARM REV CODE 250: Performed by: INTERNAL MEDICINE

## 2021-01-01 PROCEDURE — 99223 1ST HOSP IP/OBS HIGH 75: CPT | Mod: ,,, | Performed by: INTERNAL MEDICINE

## 2021-01-01 PROCEDURE — 25000003 PHARM REV CODE 250: Performed by: SURGERY

## 2021-01-01 PROCEDURE — 63700000 PHARM REV CODE 250 ALT 637 W/O HCPCS: Performed by: INTERNAL MEDICINE

## 2021-01-01 PROCEDURE — 94640 AIRWAY INHALATION TREATMENT: CPT

## 2021-01-01 PROCEDURE — 63600175 PHARM REV CODE 636 W HCPCS: Performed by: INTERNAL MEDICINE

## 2021-01-01 PROCEDURE — 11000001 HC ACUTE MED/SURG PRIVATE ROOM

## 2021-01-01 PROCEDURE — 99223 PR INITIAL HOSPITAL CARE,LEVL III: ICD-10-PCS | Mod: ,,, | Performed by: INTERNAL MEDICINE

## 2021-01-01 PROCEDURE — G0378 HOSPITAL OBSERVATION PER HR: HCPCS

## 2021-01-01 PROCEDURE — 25000242 PHARM REV CODE 250 ALT 637 W/ HCPCS: Performed by: INTERNAL MEDICINE

## 2021-01-01 PROCEDURE — 85025 COMPLETE CBC W/AUTO DIFF WBC: CPT

## 2021-01-01 PROCEDURE — 36415 COLL VENOUS BLD VENIPUNCTURE: CPT

## 2021-01-01 PROCEDURE — 27000221 HC OXYGEN, UP TO 24 HOURS

## 2021-01-01 PROCEDURE — 96361 HYDRATE IV INFUSION ADD-ON: CPT

## 2021-01-01 RX ORDER — AZITHROMYCIN 250 MG/1
500 TABLET, FILM COATED ORAL DAILY
Status: COMPLETED | OUTPATIENT
Start: 2021-01-01 | End: 2021-01-05

## 2021-01-01 RX ORDER — LEVOTHYROXINE SODIUM 100 UG/1
100 TABLET ORAL
Status: DISCONTINUED | OUTPATIENT
Start: 2021-01-02 | End: 2021-01-20 | Stop reason: HOSPADM

## 2021-01-01 RX ORDER — CHOLECALCIFEROL (VITAMIN D3) 25 MCG
1000 TABLET ORAL DAILY
Status: DISCONTINUED | OUTPATIENT
Start: 2021-01-01 | End: 2021-01-20 | Stop reason: HOSPADM

## 2021-01-01 RX ORDER — TAMSULOSIN HYDROCHLORIDE 0.4 MG/1
0.4 CAPSULE ORAL DAILY
Status: DISCONTINUED | OUTPATIENT
Start: 2021-01-01 | End: 2021-01-20 | Stop reason: HOSPADM

## 2021-01-01 RX ORDER — SIMVASTATIN 40 MG/1
40 TABLET, FILM COATED ORAL NIGHTLY
Status: DISCONTINUED | OUTPATIENT
Start: 2021-01-01 | End: 2021-01-20 | Stop reason: HOSPADM

## 2021-01-01 RX ORDER — ALBUTEROL SULFATE 90 UG/1
2 AEROSOL, METERED RESPIRATORY (INHALATION) EVERY 4 HOURS
Status: DISCONTINUED | OUTPATIENT
Start: 2021-01-01 | End: 2021-01-01

## 2021-01-01 RX ORDER — ZINC SULFATE 50(220)MG
220 CAPSULE ORAL DAILY
Status: DISCONTINUED | OUTPATIENT
Start: 2021-01-01 | End: 2021-01-20 | Stop reason: HOSPADM

## 2021-01-01 RX ORDER — PANTOPRAZOLE SODIUM 40 MG/1
40 TABLET, DELAYED RELEASE ORAL DAILY
Status: DISCONTINUED | OUTPATIENT
Start: 2021-01-01 | End: 2021-01-20 | Stop reason: HOSPADM

## 2021-01-01 RX ORDER — ALBUTEROL SULFATE 90 UG/1
2 AEROSOL, METERED RESPIRATORY (INHALATION) EVERY 4 HOURS
Status: DISCONTINUED | OUTPATIENT
Start: 2021-01-01 | End: 2021-01-07

## 2021-01-01 RX ORDER — ALLOPURINOL 100 MG/1
300 TABLET ORAL DAILY
Status: DISCONTINUED | OUTPATIENT
Start: 2021-01-01 | End: 2021-01-20 | Stop reason: HOSPADM

## 2021-01-01 RX ORDER — POTASSIUM CHLORIDE 20 MEQ/1
40 TABLET, EXTENDED RELEASE ORAL ONCE
Status: COMPLETED | OUTPATIENT
Start: 2021-01-01 | End: 2021-01-01

## 2021-01-01 RX ORDER — DEXAMETHASONE SODIUM PHOSPHATE 4 MG/ML
6 INJECTION, SOLUTION INTRA-ARTICULAR; INTRALESIONAL; INTRAMUSCULAR; INTRAVENOUS; SOFT TISSUE DAILY
Status: DISCONTINUED | OUTPATIENT
Start: 2021-01-01 | End: 2021-01-07

## 2021-01-01 RX ORDER — MEMANTINE HYDROCHLORIDE 10 MG/1
10 TABLET ORAL 2 TIMES DAILY
Status: DISCONTINUED | OUTPATIENT
Start: 2021-01-01 | End: 2021-01-20 | Stop reason: HOSPADM

## 2021-01-01 RX ORDER — ASPIRIN 81 MG/1
81 TABLET ORAL DAILY
Status: DISCONTINUED | OUTPATIENT
Start: 2021-01-01 | End: 2021-01-20 | Stop reason: HOSPADM

## 2021-01-01 RX ORDER — BENZONATATE 100 MG/1
200 CAPSULE ORAL 3 TIMES DAILY PRN
Status: DISCONTINUED | OUTPATIENT
Start: 2021-01-01 | End: 2021-01-20 | Stop reason: HOSPADM

## 2021-01-01 RX ORDER — CETIRIZINE HYDROCHLORIDE 10 MG/1
10 TABLET ORAL DAILY
Status: DISCONTINUED | OUTPATIENT
Start: 2021-01-01 | End: 2021-01-20 | Stop reason: HOSPADM

## 2021-01-01 RX ORDER — VENLAFAXINE HYDROCHLORIDE 150 MG/1
150 CAPSULE, EXTENDED RELEASE ORAL DAILY
Status: DISCONTINUED | OUTPATIENT
Start: 2021-01-01 | End: 2021-01-20 | Stop reason: HOSPADM

## 2021-01-01 RX ORDER — ASCORBIC ACID 500 MG
500 TABLET ORAL DAILY
Status: DISCONTINUED | OUTPATIENT
Start: 2021-01-01 | End: 2021-01-20 | Stop reason: HOSPADM

## 2021-01-01 RX ADMIN — DEXAMETHASONE SODIUM PHOSPHATE 6 MG: 4 INJECTION, SOLUTION INTRA-ARTICULAR; INTRALESIONAL; INTRAMUSCULAR; INTRAVENOUS; SOFT TISSUE at 12:01

## 2021-01-01 RX ADMIN — ZINC SULFATE 220 MG (50 MG) CAPSULE 220 MG: CAPSULE at 12:01

## 2021-01-01 RX ADMIN — REMDESIVIR 200 MG: 100 INJECTION, POWDER, LYOPHILIZED, FOR SOLUTION INTRAVENOUS at 04:01

## 2021-01-01 RX ADMIN — POTASSIUM CHLORIDE 40 MEQ: 1500 TABLET, EXTENDED RELEASE ORAL at 12:01

## 2021-01-01 RX ADMIN — MEMANTINE 10 MG: 10 TABLET ORAL at 08:01

## 2021-01-01 RX ADMIN — BENZONATATE 200 MG: 100 CAPSULE ORAL at 12:01

## 2021-01-01 RX ADMIN — ALBUTEROL SULFATE 2 PUFF: 90 AEROSOL, METERED RESPIRATORY (INHALATION) at 07:01

## 2021-01-01 RX ADMIN — TAMSULOSIN HYDROCHLORIDE 0.4 MG: 0.4 CAPSULE ORAL at 12:01

## 2021-01-01 RX ADMIN — ALBUTEROL SULFATE 2 PUFF: 90 AEROSOL, METERED RESPIRATORY (INHALATION) at 03:01

## 2021-01-01 RX ADMIN — CEFTRIAXONE 1 G: 1 INJECTION, SOLUTION INTRAVENOUS at 11:01

## 2021-01-01 RX ADMIN — ASPIRIN 81 MG: 81 TABLET, COATED ORAL at 12:01

## 2021-01-01 RX ADMIN — APIXABAN 5 MG: 5 TABLET, FILM COATED ORAL at 12:01

## 2021-01-01 RX ADMIN — CHOLECALCIFEROL TAB 25 MCG (1000 UNIT) 1000 UNITS: 25 TAB at 12:01

## 2021-01-01 RX ADMIN — CETIRIZINE HYDROCHLORIDE 10 MG: 10 TABLET, FILM COATED ORAL at 12:01

## 2021-01-01 RX ADMIN — SIMVASTATIN 40 MG: 40 TABLET, FILM COATED ORAL at 08:01

## 2021-01-01 RX ADMIN — ALBUTEROL SULFATE 2 PUFF: 90 AEROSOL, METERED RESPIRATORY (INHALATION) at 01:01

## 2021-01-01 RX ADMIN — AZITHROMYCIN 500 MG: 250 TABLET, FILM COATED ORAL at 12:01

## 2021-01-01 RX ADMIN — MEMANTINE 10 MG: 10 TABLET ORAL at 12:01

## 2021-01-01 RX ADMIN — PANTOPRAZOLE SODIUM 40 MG: 40 TABLET, DELAYED RELEASE ORAL at 12:01

## 2021-01-01 RX ADMIN — SODIUM CHLORIDE 75 ML/HR: 0.9 INJECTION, SOLUTION INTRAVENOUS at 01:01

## 2021-01-01 RX ADMIN — VENLAFAXINE HYDROCHLORIDE 150 MG: 150 CAPSULE, EXTENDED RELEASE ORAL at 12:01

## 2021-01-01 RX ADMIN — ALLOPURINOL 300 MG: 100 TABLET ORAL at 12:01

## 2021-01-01 RX ADMIN — OXYCODONE HYDROCHLORIDE AND ACETAMINOPHEN 500 MG: 500 TABLET ORAL at 12:01

## 2021-01-01 RX ADMIN — CEFTRIAXONE 1 G: 1 INJECTION, SOLUTION INTRAVENOUS at 12:01

## 2021-01-01 RX ADMIN — APIXABAN 5 MG: 5 TABLET, FILM COATED ORAL at 08:01

## 2021-01-02 LAB
ALBUMIN SERPL BCP-MCNC: 2.7 G/DL (ref 3.5–5.2)
ALBUMIN SERPL BCP-MCNC: 2.7 G/DL (ref 3.5–5.2)
ALLENS TEST: ABNORMAL
ALP SERPL-CCNC: 102 U/L (ref 55–135)
ALP SERPL-CCNC: 102 U/L (ref 55–135)
ALT SERPL W/O P-5'-P-CCNC: 15 U/L (ref 10–44)
ALT SERPL W/O P-5'-P-CCNC: 15 U/L (ref 10–44)
ANION GAP SERPL CALC-SCNC: 11 MMOL/L (ref 8–16)
ANION GAP SERPL CALC-SCNC: 11 MMOL/L (ref 8–16)
AST SERPL-CCNC: 28 U/L (ref 10–40)
AST SERPL-CCNC: 28 U/L (ref 10–40)
BASOPHILS # BLD AUTO: 0 K/UL (ref 0–0.2)
BASOPHILS NFR BLD: 0 % (ref 0–1.9)
BILIRUB SERPL-MCNC: 0.4 MG/DL (ref 0.1–1)
BILIRUB SERPL-MCNC: 0.4 MG/DL (ref 0.1–1)
BLD PROD TYP BPU: NORMAL
BLOOD UNIT EXPIRATION DATE: NORMAL
BLOOD UNIT TYPE CODE: 2800
BLOOD UNIT TYPE: NORMAL
BUN SERPL-MCNC: 24 MG/DL (ref 8–23)
BUN SERPL-MCNC: 24 MG/DL (ref 8–23)
CALCIUM SERPL-MCNC: 8.9 MG/DL (ref 8.7–10.5)
CALCIUM SERPL-MCNC: 8.9 MG/DL (ref 8.7–10.5)
CHLORIDE SERPL-SCNC: 108 MMOL/L (ref 95–110)
CHLORIDE SERPL-SCNC: 108 MMOL/L (ref 95–110)
CO2 SERPL-SCNC: 19 MMOL/L (ref 23–29)
CO2 SERPL-SCNC: 19 MMOL/L (ref 23–29)
CODING SYSTEM: NORMAL
CREAT SERPL-MCNC: 1.1 MG/DL (ref 0.5–1.4)
CREAT SERPL-MCNC: 1.1 MG/DL (ref 0.5–1.4)
DELSYS: ABNORMAL
DIFFERENTIAL METHOD: ABNORMAL
DISPENSE STATUS: NORMAL
EOSINOPHIL # BLD AUTO: 0 K/UL (ref 0–0.5)
EOSINOPHIL NFR BLD: 0 % (ref 0–8)
ERYTHROCYTE [DISTWIDTH] IN BLOOD BY AUTOMATED COUNT: 16 % (ref 11.5–14.5)
EST. GFR  (AFRICAN AMERICAN): >60 ML/MIN/1.73 M^2
EST. GFR  (AFRICAN AMERICAN): >60 ML/MIN/1.73 M^2
EST. GFR  (NON AFRICAN AMERICAN): >60 ML/MIN/1.73 M^2
EST. GFR  (NON AFRICAN AMERICAN): >60 ML/MIN/1.73 M^2
GLUCOSE SERPL-MCNC: 120 MG/DL (ref 70–110)
GLUCOSE SERPL-MCNC: 120 MG/DL (ref 70–110)
HCO3 UR-SCNC: 19.7 MMOL/L (ref 22–26)
HCT VFR BLD AUTO: 33.7 % (ref 40–54)
HGB BLD-MCNC: 10.9 G/DL (ref 14–18)
IMM GRANULOCYTES # BLD AUTO: 0.02 K/UL (ref 0–0.04)
IMM GRANULOCYTES NFR BLD AUTO: 0.6 % (ref 0–0.5)
LYMPHOCYTES # BLD AUTO: 0.3 K/UL (ref 1–4.8)
LYMPHOCYTES NFR BLD: 9.2 % (ref 18–48)
MCH RBC QN AUTO: 30.2 PG (ref 27–31)
MCHC RBC AUTO-ENTMCNC: 32.3 G/DL (ref 32–36)
MCV RBC AUTO: 93 FL (ref 82–98)
MONOCYTES # BLD AUTO: 0.3 K/UL (ref 0.3–1)
MONOCYTES NFR BLD: 9.8 % (ref 4–15)
NEUTROPHILS # BLD AUTO: 2.8 K/UL (ref 1.8–7.7)
NEUTROPHILS NFR BLD: 80.4 % (ref 38–73)
NRBC BLD-RTO: 0 /100 WBC
PCO2 BLDA: 27 MMHG (ref 35–45)
PH SMN: 7.47 [PH] (ref 7.35–7.45)
PLATELET # BLD AUTO: 200 K/UL (ref 150–350)
PMV BLD AUTO: 10.2 FL (ref 9.2–12.9)
PO2 BLDA: 66 MMHG (ref 75–100)
POC BE: -2.8 MMOL/L (ref -2–2)
POC COHB: 1 % (ref 0–3)
POC METHB: 0.8 % (ref 0–1.5)
POC O2HB ARTERIAL: 92.4 % (ref 94–100)
POC SATURATED O2: 94.1 % (ref 90–100)
POC TCO2: 20.5 MMOL/L
POC THB: 12.1 G/DL (ref 12–18)
POTASSIUM SERPL-SCNC: 3.7 MMOL/L (ref 3.5–5.1)
POTASSIUM SERPL-SCNC: 3.7 MMOL/L (ref 3.5–5.1)
PROT SERPL-MCNC: 6.1 G/DL (ref 6–8.4)
PROT SERPL-MCNC: 6.1 G/DL (ref 6–8.4)
RBC # BLD AUTO: 3.61 M/UL (ref 4.6–6.2)
SITE: ABNORMAL
SODIUM SERPL-SCNC: 138 MMOL/L (ref 136–145)
SODIUM SERPL-SCNC: 138 MMOL/L (ref 136–145)
UNIT NUMBER: NORMAL
WBC # BLD AUTO: 3.48 K/UL (ref 3.9–12.7)

## 2021-01-02 PROCEDURE — 25000003 PHARM REV CODE 250: Performed by: SURGERY

## 2021-01-02 PROCEDURE — 36430 TRANSFUSION BLD/BLD COMPNT: CPT

## 2021-01-02 PROCEDURE — 25000003 PHARM REV CODE 250: Performed by: INTERNAL MEDICINE

## 2021-01-02 PROCEDURE — 80053 COMPREHEN METABOLIC PANEL: CPT

## 2021-01-02 PROCEDURE — 85025 COMPLETE CBC W/AUTO DIFF WBC: CPT

## 2021-01-02 PROCEDURE — 36600 WITHDRAWAL OF ARTERIAL BLOOD: CPT

## 2021-01-02 PROCEDURE — 63700000 PHARM REV CODE 250 ALT 637 W/O HCPCS: Performed by: INTERNAL MEDICINE

## 2021-01-02 PROCEDURE — 82803 BLOOD GASES ANY COMBINATION: CPT | Performed by: INTERNAL MEDICINE

## 2021-01-02 PROCEDURE — 36415 COLL VENOUS BLD VENIPUNCTURE: CPT

## 2021-01-02 PROCEDURE — 99233 PR SUBSEQUENT HOSPITAL CARE,LEVL III: ICD-10-PCS | Mod: ,,, | Performed by: INTERNAL MEDICINE

## 2021-01-02 PROCEDURE — 94660 CPAP INITIATION&MGMT: CPT

## 2021-01-02 PROCEDURE — 96361 HYDRATE IV INFUSION ADD-ON: CPT

## 2021-01-02 PROCEDURE — 99900035 HC TECH TIME PER 15 MIN (STAT)

## 2021-01-02 PROCEDURE — 27000190 HC CPAP FULL FACE MASK W/VALVE

## 2021-01-02 PROCEDURE — 97163 PT EVAL HIGH COMPLEX 45 MIN: CPT

## 2021-01-02 PROCEDURE — 94761 N-INVAS EAR/PLS OXIMETRY MLT: CPT

## 2021-01-02 PROCEDURE — 27000221 HC OXYGEN, UP TO 24 HOURS

## 2021-01-02 PROCEDURE — 63600175 PHARM REV CODE 636 W HCPCS: Performed by: INTERNAL MEDICINE

## 2021-01-02 PROCEDURE — 99233 SBSQ HOSP IP/OBS HIGH 50: CPT | Mod: ,,, | Performed by: INTERNAL MEDICINE

## 2021-01-02 PROCEDURE — 11000001 HC ACUTE MED/SURG PRIVATE ROOM

## 2021-01-02 PROCEDURE — P9017 PLASMA 1 DONOR FRZ W/IN 8 HR: HCPCS

## 2021-01-02 PROCEDURE — 94640 AIRWAY INHALATION TREATMENT: CPT

## 2021-01-02 RX ORDER — OLMESARTAN MEDOXOMIL 20 MG/1
20 TABLET ORAL DAILY
Status: DISCONTINUED | OUTPATIENT
Start: 2021-01-02 | End: 2021-01-06

## 2021-01-02 RX ADMIN — ALBUTEROL SULFATE 2 PUFF: 90 AEROSOL, METERED RESPIRATORY (INHALATION) at 04:01

## 2021-01-02 RX ADMIN — AZITHROMYCIN 500 MG: 250 TABLET, FILM COATED ORAL at 09:01

## 2021-01-02 RX ADMIN — OXYCODONE HYDROCHLORIDE AND ACETAMINOPHEN 500 MG: 500 TABLET ORAL at 09:01

## 2021-01-02 RX ADMIN — MEMANTINE 10 MG: 10 TABLET ORAL at 09:01

## 2021-01-02 RX ADMIN — OLMESARTAN MEDOXOMIL 20 MG: 20 TABLET, FILM COATED ORAL at 04:01

## 2021-01-02 RX ADMIN — ALBUTEROL SULFATE 2 PUFF: 90 AEROSOL, METERED RESPIRATORY (INHALATION) at 03:01

## 2021-01-02 RX ADMIN — MEMANTINE 10 MG: 10 TABLET ORAL at 08:01

## 2021-01-02 RX ADMIN — ZINC SULFATE 220 MG (50 MG) CAPSULE 220 MG: CAPSULE at 09:01

## 2021-01-02 RX ADMIN — REMDESIVIR 100 MG: 100 INJECTION, POWDER, LYOPHILIZED, FOR SOLUTION INTRAVENOUS at 04:01

## 2021-01-02 RX ADMIN — CEFTRIAXONE 1 G: 1 INJECTION, SOLUTION INTRAVENOUS at 11:01

## 2021-01-02 RX ADMIN — BENZONATATE 200 MG: 100 CAPSULE ORAL at 09:01

## 2021-01-02 RX ADMIN — ALLOPURINOL 300 MG: 100 TABLET ORAL at 09:01

## 2021-01-02 RX ADMIN — ALBUTEROL SULFATE 2 PUFF: 90 AEROSOL, METERED RESPIRATORY (INHALATION) at 07:01

## 2021-01-02 RX ADMIN — CETIRIZINE HYDROCHLORIDE 10 MG: 10 TABLET, FILM COATED ORAL at 09:01

## 2021-01-02 RX ADMIN — SODIUM CHLORIDE 75 ML/HR: 0.9 INJECTION, SOLUTION INTRAVENOUS at 12:01

## 2021-01-02 RX ADMIN — SODIUM CHLORIDE 75 ML/HR: 0.9 INJECTION, SOLUTION INTRAVENOUS at 05:01

## 2021-01-02 RX ADMIN — ALBUTEROL SULFATE 2 PUFF: 90 AEROSOL, METERED RESPIRATORY (INHALATION) at 12:01

## 2021-01-02 RX ADMIN — SIMVASTATIN 40 MG: 40 TABLET, FILM COATED ORAL at 08:01

## 2021-01-02 RX ADMIN — PANTOPRAZOLE SODIUM 40 MG: 40 TABLET, DELAYED RELEASE ORAL at 09:01

## 2021-01-02 RX ADMIN — VENLAFAXINE HYDROCHLORIDE 150 MG: 150 CAPSULE, EXTENDED RELEASE ORAL at 09:01

## 2021-01-02 RX ADMIN — DEXAMETHASONE SODIUM PHOSPHATE 6 MG: 4 INJECTION, SOLUTION INTRA-ARTICULAR; INTRALESIONAL; INTRAMUSCULAR; INTRAVENOUS; SOFT TISSUE at 09:01

## 2021-01-02 RX ADMIN — APIXABAN 5 MG: 5 TABLET, FILM COATED ORAL at 09:01

## 2021-01-02 RX ADMIN — APIXABAN 5 MG: 5 TABLET, FILM COATED ORAL at 08:01

## 2021-01-02 RX ADMIN — ASPIRIN 81 MG: 81 TABLET, COATED ORAL at 09:01

## 2021-01-02 RX ADMIN — CHOLECALCIFEROL TAB 25 MCG (1000 UNIT) 1000 UNITS: 25 TAB at 09:01

## 2021-01-02 RX ADMIN — TAMSULOSIN HYDROCHLORIDE 0.4 MG: 0.4 CAPSULE ORAL at 09:01

## 2021-01-02 RX ADMIN — LEVOTHYROXINE SODIUM 100 MCG: 100 TABLET ORAL at 05:01

## 2021-01-02 RX ADMIN — CEFTRIAXONE 1 G: 1 INJECTION, SOLUTION INTRAVENOUS at 12:01

## 2021-01-03 LAB
ALBUMIN SERPL BCP-MCNC: 2.6 G/DL (ref 3.5–5.2)
ALBUMIN SERPL BCP-MCNC: 2.6 G/DL (ref 3.5–5.2)
ALP SERPL-CCNC: 96 U/L (ref 55–135)
ALP SERPL-CCNC: 96 U/L (ref 55–135)
ALT SERPL W/O P-5'-P-CCNC: 14 U/L (ref 10–44)
ALT SERPL W/O P-5'-P-CCNC: 14 U/L (ref 10–44)
ANION GAP SERPL CALC-SCNC: 11 MMOL/L (ref 8–16)
ANION GAP SERPL CALC-SCNC: 11 MMOL/L (ref 8–16)
AST SERPL-CCNC: 26 U/L (ref 10–40)
AST SERPL-CCNC: 26 U/L (ref 10–40)
BACTERIA UR CULT: ABNORMAL
BASOPHILS # BLD AUTO: 0.01 K/UL (ref 0–0.2)
BASOPHILS NFR BLD: 0.2 % (ref 0–1.9)
BILIRUB SERPL-MCNC: 0.3 MG/DL (ref 0.1–1)
BILIRUB SERPL-MCNC: 0.3 MG/DL (ref 0.1–1)
BUN SERPL-MCNC: 23 MG/DL (ref 8–23)
BUN SERPL-MCNC: 23 MG/DL (ref 8–23)
CALCIUM SERPL-MCNC: 9 MG/DL (ref 8.7–10.5)
CALCIUM SERPL-MCNC: 9 MG/DL (ref 8.7–10.5)
CHLORIDE SERPL-SCNC: 108 MMOL/L (ref 95–110)
CHLORIDE SERPL-SCNC: 108 MMOL/L (ref 95–110)
CO2 SERPL-SCNC: 21 MMOL/L (ref 23–29)
CO2 SERPL-SCNC: 21 MMOL/L (ref 23–29)
CREAT SERPL-MCNC: 1 MG/DL (ref 0.5–1.4)
CREAT SERPL-MCNC: 1 MG/DL (ref 0.5–1.4)
DIFFERENTIAL METHOD: ABNORMAL
EOSINOPHIL # BLD AUTO: 0 K/UL (ref 0–0.5)
EOSINOPHIL NFR BLD: 0 % (ref 0–8)
ERYTHROCYTE [DISTWIDTH] IN BLOOD BY AUTOMATED COUNT: 16.5 % (ref 11.5–14.5)
EST. GFR  (AFRICAN AMERICAN): >60 ML/MIN/1.73 M^2
EST. GFR  (AFRICAN AMERICAN): >60 ML/MIN/1.73 M^2
EST. GFR  (NON AFRICAN AMERICAN): >60 ML/MIN/1.73 M^2
EST. GFR  (NON AFRICAN AMERICAN): >60 ML/MIN/1.73 M^2
GLUCOSE SERPL-MCNC: 97 MG/DL (ref 70–110)
GLUCOSE SERPL-MCNC: 97 MG/DL (ref 70–110)
HCT VFR BLD AUTO: 34.3 % (ref 40–54)
HGB BLD-MCNC: 10.9 G/DL (ref 14–18)
IMM GRANULOCYTES # BLD AUTO: 0.08 K/UL (ref 0–0.04)
IMM GRANULOCYTES NFR BLD AUTO: 1.7 % (ref 0–0.5)
LYMPHOCYTES # BLD AUTO: 0.5 K/UL (ref 1–4.8)
LYMPHOCYTES NFR BLD: 9.9 % (ref 18–48)
MCH RBC QN AUTO: 30 PG (ref 27–31)
MCHC RBC AUTO-ENTMCNC: 31.8 G/DL (ref 32–36)
MCV RBC AUTO: 95 FL (ref 82–98)
MONOCYTES # BLD AUTO: 0.5 K/UL (ref 0.3–1)
MONOCYTES NFR BLD: 9.7 % (ref 4–15)
NEUTROPHILS # BLD AUTO: 3.8 K/UL (ref 1.8–7.7)
NEUTROPHILS NFR BLD: 78.5 % (ref 38–73)
NRBC BLD-RTO: 0 /100 WBC
PLATELET # BLD AUTO: 226 K/UL (ref 150–350)
PMV BLD AUTO: 10.4 FL (ref 9.2–12.9)
POTASSIUM SERPL-SCNC: 4.2 MMOL/L (ref 3.5–5.1)
POTASSIUM SERPL-SCNC: 4.2 MMOL/L (ref 3.5–5.1)
PROT SERPL-MCNC: 5.7 G/DL (ref 6–8.4)
PROT SERPL-MCNC: 5.7 G/DL (ref 6–8.4)
RBC # BLD AUTO: 3.63 M/UL (ref 4.6–6.2)
SODIUM SERPL-SCNC: 140 MMOL/L (ref 136–145)
SODIUM SERPL-SCNC: 140 MMOL/L (ref 136–145)
WBC # BLD AUTO: 4.84 K/UL (ref 3.9–12.7)

## 2021-01-03 PROCEDURE — 94761 N-INVAS EAR/PLS OXIMETRY MLT: CPT

## 2021-01-03 PROCEDURE — 25000003 PHARM REV CODE 250: Performed by: INTERNAL MEDICINE

## 2021-01-03 PROCEDURE — 94660 CPAP INITIATION&MGMT: CPT

## 2021-01-03 PROCEDURE — 99233 SBSQ HOSP IP/OBS HIGH 50: CPT | Mod: ,,, | Performed by: INTERNAL MEDICINE

## 2021-01-03 PROCEDURE — 27000221 HC OXYGEN, UP TO 24 HOURS

## 2021-01-03 PROCEDURE — 36415 COLL VENOUS BLD VENIPUNCTURE: CPT

## 2021-01-03 PROCEDURE — 99233 PR SUBSEQUENT HOSPITAL CARE,LEVL III: ICD-10-PCS | Mod: ,,, | Performed by: INTERNAL MEDICINE

## 2021-01-03 PROCEDURE — 94640 AIRWAY INHALATION TREATMENT: CPT

## 2021-01-03 PROCEDURE — 11000001 HC ACUTE MED/SURG PRIVATE ROOM

## 2021-01-03 PROCEDURE — 63700000 PHARM REV CODE 250 ALT 637 W/O HCPCS: Performed by: INTERNAL MEDICINE

## 2021-01-03 PROCEDURE — 85025 COMPLETE CBC W/AUTO DIFF WBC: CPT

## 2021-01-03 PROCEDURE — 63600175 PHARM REV CODE 636 W HCPCS: Performed by: INTERNAL MEDICINE

## 2021-01-03 PROCEDURE — 99900035 HC TECH TIME PER 15 MIN (STAT)

## 2021-01-03 PROCEDURE — 80053 COMPREHEN METABOLIC PANEL: CPT

## 2021-01-03 RX ORDER — METOPROLOL SUCCINATE 50 MG/1
50 TABLET, EXTENDED RELEASE ORAL DAILY
Status: DISCONTINUED | OUTPATIENT
Start: 2021-01-03 | End: 2021-01-20 | Stop reason: HOSPADM

## 2021-01-03 RX ORDER — HYDROCHLOROTHIAZIDE 12.5 MG/1
12.5 TABLET ORAL DAILY
Status: DISCONTINUED | OUTPATIENT
Start: 2021-01-03 | End: 2021-01-11

## 2021-01-03 RX ADMIN — VENLAFAXINE HYDROCHLORIDE 150 MG: 150 CAPSULE, EXTENDED RELEASE ORAL at 08:01

## 2021-01-03 RX ADMIN — REMDESIVIR 100 MG: 100 INJECTION, POWDER, LYOPHILIZED, FOR SOLUTION INTRAVENOUS at 04:01

## 2021-01-03 RX ADMIN — HYDROCHLOROTHIAZIDE 12.5 MG: 12.5 TABLET ORAL at 11:01

## 2021-01-03 RX ADMIN — ALBUTEROL SULFATE 2 PUFF: 90 AEROSOL, METERED RESPIRATORY (INHALATION) at 08:01

## 2021-01-03 RX ADMIN — ALBUTEROL SULFATE 2 PUFF: 90 AEROSOL, METERED RESPIRATORY (INHALATION) at 11:01

## 2021-01-03 RX ADMIN — DEXAMETHASONE SODIUM PHOSPHATE 6 MG: 4 INJECTION, SOLUTION INTRA-ARTICULAR; INTRALESIONAL; INTRAMUSCULAR; INTRAVENOUS; SOFT TISSUE at 08:01

## 2021-01-03 RX ADMIN — CETIRIZINE HYDROCHLORIDE 10 MG: 10 TABLET, FILM COATED ORAL at 08:01

## 2021-01-03 RX ADMIN — MEMANTINE 10 MG: 10 TABLET ORAL at 08:01

## 2021-01-03 RX ADMIN — ZINC SULFATE 220 MG (50 MG) CAPSULE 220 MG: CAPSULE at 08:01

## 2021-01-03 RX ADMIN — SIMVASTATIN 40 MG: 40 TABLET, FILM COATED ORAL at 08:01

## 2021-01-03 RX ADMIN — ALBUTEROL SULFATE 2 PUFF: 90 AEROSOL, METERED RESPIRATORY (INHALATION) at 04:01

## 2021-01-03 RX ADMIN — BENZONATATE 200 MG: 100 CAPSULE ORAL at 08:01

## 2021-01-03 RX ADMIN — LEVOTHYROXINE SODIUM 100 MCG: 100 TABLET ORAL at 05:01

## 2021-01-03 RX ADMIN — TAMSULOSIN HYDROCHLORIDE 0.4 MG: 0.4 CAPSULE ORAL at 08:01

## 2021-01-03 RX ADMIN — ALBUTEROL SULFATE 2 PUFF: 90 AEROSOL, METERED RESPIRATORY (INHALATION) at 03:01

## 2021-01-03 RX ADMIN — AZITHROMYCIN 500 MG: 250 TABLET, FILM COATED ORAL at 08:01

## 2021-01-03 RX ADMIN — CEFTRIAXONE 1 G: 1 INJECTION, SOLUTION INTRAVENOUS at 11:01

## 2021-01-03 RX ADMIN — ASPIRIN 81 MG: 81 TABLET, COATED ORAL at 08:01

## 2021-01-03 RX ADMIN — APIXABAN 5 MG: 5 TABLET, FILM COATED ORAL at 08:01

## 2021-01-03 RX ADMIN — PANTOPRAZOLE SODIUM 40 MG: 40 TABLET, DELAYED RELEASE ORAL at 08:01

## 2021-01-03 RX ADMIN — METOPROLOL SUCCINATE 50 MG: 50 TABLET, EXTENDED RELEASE ORAL at 11:01

## 2021-01-03 RX ADMIN — CHOLECALCIFEROL TAB 25 MCG (1000 UNIT) 1000 UNITS: 25 TAB at 08:01

## 2021-01-03 RX ADMIN — OLMESARTAN MEDOXOMIL 20 MG: 20 TABLET, FILM COATED ORAL at 08:01

## 2021-01-03 RX ADMIN — ALLOPURINOL 300 MG: 100 TABLET ORAL at 08:01

## 2021-01-03 RX ADMIN — OXYCODONE HYDROCHLORIDE AND ACETAMINOPHEN 500 MG: 500 TABLET ORAL at 08:01

## 2021-01-03 RX ADMIN — ALBUTEROL SULFATE 2 PUFF: 90 AEROSOL, METERED RESPIRATORY (INHALATION) at 07:01

## 2021-01-03 RX ADMIN — ALBUTEROL SULFATE 2 PUFF: 90 AEROSOL, METERED RESPIRATORY (INHALATION) at 12:01

## 2021-01-04 LAB
ALBUMIN SERPL BCP-MCNC: 2.5 G/DL (ref 3.5–5.2)
ALBUMIN SERPL BCP-MCNC: 2.5 G/DL (ref 3.5–5.2)
ALLENS TEST: ABNORMAL
ALP SERPL-CCNC: 101 U/L (ref 55–135)
ALP SERPL-CCNC: 101 U/L (ref 55–135)
ALT SERPL W/O P-5'-P-CCNC: 15 U/L (ref 10–44)
ALT SERPL W/O P-5'-P-CCNC: 15 U/L (ref 10–44)
ANION GAP SERPL CALC-SCNC: 10 MMOL/L (ref 8–16)
ANION GAP SERPL CALC-SCNC: 10 MMOL/L (ref 8–16)
AST SERPL-CCNC: 27 U/L (ref 10–40)
AST SERPL-CCNC: 27 U/L (ref 10–40)
BASOPHILS # BLD AUTO: 0 K/UL (ref 0–0.2)
BASOPHILS NFR BLD: 0 % (ref 0–1.9)
BILIRUB SERPL-MCNC: 0.3 MG/DL (ref 0.1–1)
BILIRUB SERPL-MCNC: 0.3 MG/DL (ref 0.1–1)
BUN SERPL-MCNC: 26 MG/DL (ref 8–23)
BUN SERPL-MCNC: 26 MG/DL (ref 8–23)
CALCIUM SERPL-MCNC: 9 MG/DL (ref 8.7–10.5)
CALCIUM SERPL-MCNC: 9 MG/DL (ref 8.7–10.5)
CHLORIDE SERPL-SCNC: 107 MMOL/L (ref 95–110)
CHLORIDE SERPL-SCNC: 107 MMOL/L (ref 95–110)
CO2 SERPL-SCNC: 23 MMOL/L (ref 23–29)
CO2 SERPL-SCNC: 23 MMOL/L (ref 23–29)
CREAT SERPL-MCNC: 1 MG/DL (ref 0.5–1.4)
CREAT SERPL-MCNC: 1 MG/DL (ref 0.5–1.4)
DELSYS: ABNORMAL
DIFFERENTIAL METHOD: ABNORMAL
EOSINOPHIL # BLD AUTO: 0 K/UL (ref 0–0.5)
EOSINOPHIL NFR BLD: 0 % (ref 0–8)
ERYTHROCYTE [DISTWIDTH] IN BLOOD BY AUTOMATED COUNT: 16.4 % (ref 11.5–14.5)
EST. GFR  (AFRICAN AMERICAN): >60 ML/MIN/1.73 M^2
EST. GFR  (AFRICAN AMERICAN): >60 ML/MIN/1.73 M^2
EST. GFR  (NON AFRICAN AMERICAN): >60 ML/MIN/1.73 M^2
EST. GFR  (NON AFRICAN AMERICAN): >60 ML/MIN/1.73 M^2
GLUCOSE SERPL-MCNC: 84 MG/DL (ref 70–110)
GLUCOSE SERPL-MCNC: 84 MG/DL (ref 70–110)
HCO3 UR-SCNC: 22.1 MMOL/L (ref 22–26)
HCT VFR BLD AUTO: 35 % (ref 40–54)
HGB BLD-MCNC: 11 G/DL (ref 14–18)
IMM GRANULOCYTES # BLD AUTO: 0.06 K/UL (ref 0–0.04)
IMM GRANULOCYTES NFR BLD AUTO: 0.7 % (ref 0–0.5)
LYMPHOCYTES # BLD AUTO: 0.8 K/UL (ref 1–4.8)
LYMPHOCYTES NFR BLD: 9.3 % (ref 18–48)
MCH RBC QN AUTO: 29.4 PG (ref 27–31)
MCHC RBC AUTO-ENTMCNC: 31.4 G/DL (ref 32–36)
MCV RBC AUTO: 94 FL (ref 82–98)
MONOCYTES # BLD AUTO: 0.6 K/UL (ref 0.3–1)
MONOCYTES NFR BLD: 7.7 % (ref 4–15)
NEUTROPHILS # BLD AUTO: 6.6 K/UL (ref 1.8–7.7)
NEUTROPHILS NFR BLD: 82.3 % (ref 38–73)
NRBC BLD-RTO: 0 /100 WBC
PCO2 BLDA: 29 MMHG (ref 35–45)
PH SMN: 7.49 [PH] (ref 7.35–7.45)
PLATELET # BLD AUTO: 253 K/UL (ref 150–350)
PMV BLD AUTO: 10.1 FL (ref 9.2–12.9)
PO2 BLDA: 59 MMHG (ref 75–100)
POC BE: -0.5 MMOL/L (ref -2–2)
POC COHB: 0.8 % (ref 0–3)
POC METHB: 0.9 % (ref 0–1.5)
POC O2HB ARTERIAL: 92 % (ref 94–100)
POC SATURATED O2: 93.6 % (ref 90–100)
POC TCO2: 23 MMOL/L
POC THB: 11.1 G/DL (ref 12–18)
POTASSIUM SERPL-SCNC: 3.9 MMOL/L (ref 3.5–5.1)
POTASSIUM SERPL-SCNC: 3.9 MMOL/L (ref 3.5–5.1)
PROT SERPL-MCNC: 5.8 G/DL (ref 6–8.4)
PROT SERPL-MCNC: 5.8 G/DL (ref 6–8.4)
RBC # BLD AUTO: 3.74 M/UL (ref 4.6–6.2)
SITE: ABNORMAL
SODIUM SERPL-SCNC: 140 MMOL/L (ref 136–145)
SODIUM SERPL-SCNC: 140 MMOL/L (ref 136–145)
WBC # BLD AUTO: 8.03 K/UL (ref 3.9–12.7)

## 2021-01-04 PROCEDURE — 63600175 PHARM REV CODE 636 W HCPCS: Performed by: INTERNAL MEDICINE

## 2021-01-04 PROCEDURE — 80053 COMPREHEN METABOLIC PANEL: CPT

## 2021-01-04 PROCEDURE — 97530 THERAPEUTIC ACTIVITIES: CPT

## 2021-01-04 PROCEDURE — 25000003 PHARM REV CODE 250: Performed by: INTERNAL MEDICINE

## 2021-01-04 PROCEDURE — 36415 COLL VENOUS BLD VENIPUNCTURE: CPT

## 2021-01-04 PROCEDURE — 94660 CPAP INITIATION&MGMT: CPT

## 2021-01-04 PROCEDURE — 97116 GAIT TRAINING THERAPY: CPT

## 2021-01-04 PROCEDURE — 99233 SBSQ HOSP IP/OBS HIGH 50: CPT | Mod: ,,, | Performed by: INTERNAL MEDICINE

## 2021-01-04 PROCEDURE — 82803 BLOOD GASES ANY COMBINATION: CPT | Performed by: NURSE PRACTITIONER

## 2021-01-04 PROCEDURE — 11000001 HC ACUTE MED/SURG PRIVATE ROOM

## 2021-01-04 PROCEDURE — 27000221 HC OXYGEN, UP TO 24 HOURS

## 2021-01-04 PROCEDURE — 85025 COMPLETE CBC W/AUTO DIFF WBC: CPT

## 2021-01-04 PROCEDURE — 36600 WITHDRAWAL OF ARTERIAL BLOOD: CPT

## 2021-01-04 PROCEDURE — 63700000 PHARM REV CODE 250 ALT 637 W/O HCPCS: Performed by: INTERNAL MEDICINE

## 2021-01-04 PROCEDURE — 94640 AIRWAY INHALATION TREATMENT: CPT

## 2021-01-04 PROCEDURE — 99900035 HC TECH TIME PER 15 MIN (STAT)

## 2021-01-04 PROCEDURE — 99233 PR SUBSEQUENT HOSPITAL CARE,LEVL III: ICD-10-PCS | Mod: ,,, | Performed by: INTERNAL MEDICINE

## 2021-01-04 PROCEDURE — 94761 N-INVAS EAR/PLS OXIMETRY MLT: CPT

## 2021-01-04 RX ADMIN — REMDESIVIR 100 MG: 100 INJECTION, POWDER, LYOPHILIZED, FOR SOLUTION INTRAVENOUS at 03:01

## 2021-01-04 RX ADMIN — OXYCODONE HYDROCHLORIDE AND ACETAMINOPHEN 500 MG: 500 TABLET ORAL at 08:01

## 2021-01-04 RX ADMIN — AZITHROMYCIN 500 MG: 250 TABLET, FILM COATED ORAL at 08:01

## 2021-01-04 RX ADMIN — PANTOPRAZOLE SODIUM 40 MG: 40 TABLET, DELAYED RELEASE ORAL at 08:01

## 2021-01-04 RX ADMIN — CEFTRIAXONE 1 G: 1 INJECTION, SOLUTION INTRAVENOUS at 12:01

## 2021-01-04 RX ADMIN — ALBUTEROL SULFATE 2 PUFF: 90 AEROSOL, METERED RESPIRATORY (INHALATION) at 08:01

## 2021-01-04 RX ADMIN — DEXAMETHASONE SODIUM PHOSPHATE 6 MG: 4 INJECTION, SOLUTION INTRA-ARTICULAR; INTRALESIONAL; INTRAMUSCULAR; INTRAVENOUS; SOFT TISSUE at 09:01

## 2021-01-04 RX ADMIN — BENZONATATE 200 MG: 100 CAPSULE ORAL at 08:01

## 2021-01-04 RX ADMIN — APIXABAN 5 MG: 5 TABLET, FILM COATED ORAL at 08:01

## 2021-01-04 RX ADMIN — LEVOTHYROXINE SODIUM 100 MCG: 100 TABLET ORAL at 05:01

## 2021-01-04 RX ADMIN — SIMVASTATIN 40 MG: 40 TABLET, FILM COATED ORAL at 08:01

## 2021-01-04 RX ADMIN — MEMANTINE 10 MG: 10 TABLET ORAL at 08:01

## 2021-01-04 RX ADMIN — ZINC SULFATE 220 MG (50 MG) CAPSULE 220 MG: CAPSULE at 08:01

## 2021-01-04 RX ADMIN — ALBUTEROL SULFATE 2 PUFF: 90 AEROSOL, METERED RESPIRATORY (INHALATION) at 03:01

## 2021-01-04 RX ADMIN — CEFTRIAXONE 1 G: 1 INJECTION, SOLUTION INTRAVENOUS at 01:01

## 2021-01-04 RX ADMIN — HYDROCHLOROTHIAZIDE 12.5 MG: 12.5 TABLET ORAL at 08:01

## 2021-01-04 RX ADMIN — ALBUTEROL SULFATE 2 PUFF: 90 AEROSOL, METERED RESPIRATORY (INHALATION) at 11:01

## 2021-01-04 RX ADMIN — OLMESARTAN MEDOXOMIL 20 MG: 20 TABLET, FILM COATED ORAL at 08:01

## 2021-01-04 RX ADMIN — ALLOPURINOL 300 MG: 100 TABLET ORAL at 08:01

## 2021-01-04 RX ADMIN — TAMSULOSIN HYDROCHLORIDE 0.4 MG: 0.4 CAPSULE ORAL at 08:01

## 2021-01-04 RX ADMIN — CETIRIZINE HYDROCHLORIDE 10 MG: 10 TABLET, FILM COATED ORAL at 08:01

## 2021-01-04 RX ADMIN — ASPIRIN 81 MG: 81 TABLET, COATED ORAL at 08:01

## 2021-01-04 RX ADMIN — CEFTRIAXONE 1 G: 1 INJECTION, SOLUTION INTRAVENOUS at 11:01

## 2021-01-04 RX ADMIN — VENLAFAXINE HYDROCHLORIDE 150 MG: 150 CAPSULE, EXTENDED RELEASE ORAL at 08:01

## 2021-01-04 RX ADMIN — BENZONATATE 200 MG: 100 CAPSULE ORAL at 03:01

## 2021-01-04 RX ADMIN — METOPROLOL SUCCINATE 50 MG: 50 TABLET, EXTENDED RELEASE ORAL at 08:01

## 2021-01-04 RX ADMIN — CHOLECALCIFEROL TAB 25 MCG (1000 UNIT) 1000 UNITS: 25 TAB at 08:01

## 2021-01-04 RX ADMIN — ALBUTEROL SULFATE 2 PUFF: 90 AEROSOL, METERED RESPIRATORY (INHALATION) at 07:01

## 2021-01-05 LAB
ALBUMIN SERPL BCP-MCNC: 2.8 G/DL (ref 3.5–5.2)
ALLENS TEST: ABNORMAL
ALP SERPL-CCNC: 109 U/L (ref 55–135)
ALT SERPL W/O P-5'-P-CCNC: 20 U/L (ref 10–44)
ANION GAP SERPL CALC-SCNC: 15 MMOL/L (ref 8–16)
AST SERPL-CCNC: 33 U/L (ref 10–40)
BASOPHILS # BLD AUTO: 0 K/UL (ref 0–0.2)
BASOPHILS NFR BLD: 0 % (ref 0–1.9)
BILIRUB SERPL-MCNC: 0.4 MG/DL (ref 0.1–1)
BUN SERPL-MCNC: 28 MG/DL (ref 8–23)
CALCIUM SERPL-MCNC: 9.3 MG/DL (ref 8.7–10.5)
CHLORIDE SERPL-SCNC: 105 MMOL/L (ref 95–110)
CO2 SERPL-SCNC: 19 MMOL/L (ref 23–29)
CREAT SERPL-MCNC: 0.9 MG/DL (ref 0.5–1.4)
DELSYS: ABNORMAL
DIFFERENTIAL METHOD: ABNORMAL
EOSINOPHIL # BLD AUTO: 0 K/UL (ref 0–0.5)
EOSINOPHIL NFR BLD: 0 % (ref 0–8)
ERYTHROCYTE [DISTWIDTH] IN BLOOD BY AUTOMATED COUNT: 16.2 % (ref 11.5–14.5)
EST. GFR  (AFRICAN AMERICAN): >60 ML/MIN/1.73 M^2
EST. GFR  (NON AFRICAN AMERICAN): >60 ML/MIN/1.73 M^2
GLUCOSE SERPL-MCNC: 86 MG/DL (ref 70–110)
HCO3 UR-SCNC: 22 MMOL/L (ref 22–26)
HCT VFR BLD AUTO: 36.7 % (ref 40–54)
HGB BLD-MCNC: 11.6 G/DL (ref 14–18)
IMM GRANULOCYTES # BLD AUTO: 0.05 K/UL (ref 0–0.04)
IMM GRANULOCYTES NFR BLD AUTO: 0.6 % (ref 0–0.5)
LYMPHOCYTES # BLD AUTO: 0.7 K/UL (ref 1–4.8)
LYMPHOCYTES NFR BLD: 8.5 % (ref 18–48)
MCH RBC QN AUTO: 29.6 PG (ref 27–31)
MCHC RBC AUTO-ENTMCNC: 31.6 G/DL (ref 32–36)
MCV RBC AUTO: 94 FL (ref 82–98)
MONOCYTES # BLD AUTO: 0.8 K/UL (ref 0.3–1)
MONOCYTES NFR BLD: 9 % (ref 4–15)
NEUTROPHILS # BLD AUTO: 6.9 K/UL (ref 1.8–7.7)
NEUTROPHILS NFR BLD: 81.9 % (ref 38–73)
NRBC BLD-RTO: 0 /100 WBC
PCO2 BLDA: 31 MMHG (ref 35–45)
PH SMN: 7.46 [PH] (ref 7.35–7.45)
PLATELET # BLD AUTO: 270 K/UL (ref 150–350)
PMV BLD AUTO: 10.8 FL (ref 9.2–12.9)
PO2 BLDA: 84 MMHG (ref 75–100)
POC BE: -1.1 MMOL/L (ref -2–2)
POC COHB: 1.2 % (ref 0–3)
POC METHB: 0.8 % (ref 0–1.5)
POC O2HB ARTERIAL: 94.8 % (ref 94–100)
POC SATURATED O2: 96.7 % (ref 90–100)
POC TCO2: 23 MMOL/L
POC THB: 11.7 G/DL (ref 12–18)
POTASSIUM SERPL-SCNC: 3.9 MMOL/L (ref 3.5–5.1)
PROT SERPL-MCNC: 6.4 G/DL (ref 6–8.4)
RBC # BLD AUTO: 3.92 M/UL (ref 4.6–6.2)
SITE: ABNORMAL
SODIUM SERPL-SCNC: 139 MMOL/L (ref 136–145)
WBC # BLD AUTO: 8.45 K/UL (ref 3.9–12.7)

## 2021-01-05 PROCEDURE — 94761 N-INVAS EAR/PLS OXIMETRY MLT: CPT

## 2021-01-05 PROCEDURE — 63600175 PHARM REV CODE 636 W HCPCS: Performed by: INTERNAL MEDICINE

## 2021-01-05 PROCEDURE — 94640 AIRWAY INHALATION TREATMENT: CPT

## 2021-01-05 PROCEDURE — 85025 COMPLETE CBC W/AUTO DIFF WBC: CPT

## 2021-01-05 PROCEDURE — 25000003 PHARM REV CODE 250: Performed by: INTERNAL MEDICINE

## 2021-01-05 PROCEDURE — 36415 COLL VENOUS BLD VENIPUNCTURE: CPT

## 2021-01-05 PROCEDURE — 97530 THERAPEUTIC ACTIVITIES: CPT

## 2021-01-05 PROCEDURE — 63700000 PHARM REV CODE 250 ALT 637 W/O HCPCS: Performed by: INTERNAL MEDICINE

## 2021-01-05 PROCEDURE — 27000221 HC OXYGEN, UP TO 24 HOURS

## 2021-01-05 PROCEDURE — 99900035 HC TECH TIME PER 15 MIN (STAT)

## 2021-01-05 PROCEDURE — 82803 BLOOD GASES ANY COMBINATION: CPT | Performed by: NURSE PRACTITIONER

## 2021-01-05 PROCEDURE — 94660 CPAP INITIATION&MGMT: CPT

## 2021-01-05 PROCEDURE — 97116 GAIT TRAINING THERAPY: CPT

## 2021-01-05 PROCEDURE — 80053 COMPREHEN METABOLIC PANEL: CPT

## 2021-01-05 PROCEDURE — 11000001 HC ACUTE MED/SURG PRIVATE ROOM

## 2021-01-05 PROCEDURE — 99233 PR SUBSEQUENT HOSPITAL CARE,LEVL III: ICD-10-PCS | Mod: ,,, | Performed by: INTERNAL MEDICINE

## 2021-01-05 PROCEDURE — 99233 SBSQ HOSP IP/OBS HIGH 50: CPT | Mod: ,,, | Performed by: INTERNAL MEDICINE

## 2021-01-05 RX ADMIN — MEMANTINE 10 MG: 10 TABLET ORAL at 08:01

## 2021-01-05 RX ADMIN — PANTOPRAZOLE SODIUM 40 MG: 40 TABLET, DELAYED RELEASE ORAL at 07:01

## 2021-01-05 RX ADMIN — ALBUTEROL SULFATE 2 PUFF: 90 AEROSOL, METERED RESPIRATORY (INHALATION) at 04:01

## 2021-01-05 RX ADMIN — CETIRIZINE HYDROCHLORIDE 10 MG: 10 TABLET, FILM COATED ORAL at 08:01

## 2021-01-05 RX ADMIN — ALBUTEROL SULFATE 2 PUFF: 90 AEROSOL, METERED RESPIRATORY (INHALATION) at 03:01

## 2021-01-05 RX ADMIN — TAMSULOSIN HYDROCHLORIDE 0.4 MG: 0.4 CAPSULE ORAL at 07:01

## 2021-01-05 RX ADMIN — CHOLECALCIFEROL TAB 25 MCG (1000 UNIT) 1000 UNITS: 25 TAB at 07:01

## 2021-01-05 RX ADMIN — VENLAFAXINE HYDROCHLORIDE 150 MG: 150 CAPSULE, EXTENDED RELEASE ORAL at 07:01

## 2021-01-05 RX ADMIN — MEMANTINE 10 MG: 10 TABLET ORAL at 07:01

## 2021-01-05 RX ADMIN — LEVOTHYROXINE SODIUM 100 MCG: 100 TABLET ORAL at 05:01

## 2021-01-05 RX ADMIN — METOPROLOL SUCCINATE 50 MG: 50 TABLET, EXTENDED RELEASE ORAL at 07:01

## 2021-01-05 RX ADMIN — AZITHROMYCIN 500 MG: 250 TABLET, FILM COATED ORAL at 07:01

## 2021-01-05 RX ADMIN — ALBUTEROL SULFATE 2 PUFF: 90 AEROSOL, METERED RESPIRATORY (INHALATION) at 07:01

## 2021-01-05 RX ADMIN — ALBUTEROL SULFATE 2 PUFF: 90 AEROSOL, METERED RESPIRATORY (INHALATION) at 12:01

## 2021-01-05 RX ADMIN — ASPIRIN 81 MG: 81 TABLET, COATED ORAL at 07:01

## 2021-01-05 RX ADMIN — APIXABAN 5 MG: 5 TABLET, FILM COATED ORAL at 08:01

## 2021-01-05 RX ADMIN — SIMVASTATIN 40 MG: 40 TABLET, FILM COATED ORAL at 08:01

## 2021-01-05 RX ADMIN — HYDROCHLOROTHIAZIDE 12.5 MG: 12.5 TABLET ORAL at 07:01

## 2021-01-05 RX ADMIN — ZINC SULFATE 220 MG (50 MG) CAPSULE 220 MG: CAPSULE at 07:01

## 2021-01-05 RX ADMIN — DEXAMETHASONE SODIUM PHOSPHATE 6 MG: 4 INJECTION, SOLUTION INTRA-ARTICULAR; INTRALESIONAL; INTRAMUSCULAR; INTRAVENOUS; SOFT TISSUE at 07:01

## 2021-01-05 RX ADMIN — ALBUTEROL SULFATE 2 PUFF: 90 AEROSOL, METERED RESPIRATORY (INHALATION) at 11:01

## 2021-01-05 RX ADMIN — OLMESARTAN MEDOXOMIL 20 MG: 20 TABLET, FILM COATED ORAL at 07:01

## 2021-01-05 RX ADMIN — ALLOPURINOL 300 MG: 100 TABLET ORAL at 07:01

## 2021-01-05 RX ADMIN — CEFTRIAXONE 1 G: 1 INJECTION, SOLUTION INTRAVENOUS at 12:01

## 2021-01-05 RX ADMIN — CEFTRIAXONE 1 G: 1 INJECTION, SOLUTION INTRAVENOUS at 11:01

## 2021-01-05 RX ADMIN — APIXABAN 5 MG: 5 TABLET, FILM COATED ORAL at 07:01

## 2021-01-05 RX ADMIN — OXYCODONE HYDROCHLORIDE AND ACETAMINOPHEN 500 MG: 500 TABLET ORAL at 07:01

## 2021-01-05 RX ADMIN — REMDESIVIR 100 MG: 100 INJECTION, POWDER, LYOPHILIZED, FOR SOLUTION INTRAVENOUS at 03:01

## 2021-01-06 LAB
ALBUMIN SERPL BCP-MCNC: 2.7 G/DL (ref 3.5–5.2)
ALP SERPL-CCNC: 104 U/L (ref 55–135)
ALT SERPL W/O P-5'-P-CCNC: 18 U/L (ref 10–44)
ANION GAP SERPL CALC-SCNC: 12 MMOL/L (ref 8–16)
AST SERPL-CCNC: 26 U/L (ref 10–40)
BACTERIA BLD CULT: NORMAL
BACTERIA BLD CULT: NORMAL
BASOPHILS # BLD AUTO: 0 K/UL (ref 0–0.2)
BASOPHILS NFR BLD: 0 % (ref 0–1.9)
BILIRUB SERPL-MCNC: 0.4 MG/DL (ref 0.1–1)
BUN SERPL-MCNC: 28 MG/DL (ref 8–23)
CALCIUM SERPL-MCNC: 9.2 MG/DL (ref 8.7–10.5)
CHLORIDE SERPL-SCNC: 104 MMOL/L (ref 95–110)
CO2 SERPL-SCNC: 23 MMOL/L (ref 23–29)
CREAT SERPL-MCNC: 0.8 MG/DL (ref 0.5–1.4)
DIFFERENTIAL METHOD: ABNORMAL
EOSINOPHIL # BLD AUTO: 0 K/UL (ref 0–0.5)
EOSINOPHIL NFR BLD: 0 % (ref 0–8)
ERYTHROCYTE [DISTWIDTH] IN BLOOD BY AUTOMATED COUNT: 15.9 % (ref 11.5–14.5)
EST. GFR  (AFRICAN AMERICAN): >60 ML/MIN/1.73 M^2
EST. GFR  (NON AFRICAN AMERICAN): >60 ML/MIN/1.73 M^2
GLUCOSE SERPL-MCNC: 82 MG/DL (ref 70–110)
HCT VFR BLD AUTO: 37.9 % (ref 40–54)
HGB BLD-MCNC: 11.9 G/DL (ref 14–18)
IMM GRANULOCYTES # BLD AUTO: 0.1 K/UL (ref 0–0.04)
IMM GRANULOCYTES NFR BLD AUTO: 1 % (ref 0–0.5)
LYMPHOCYTES # BLD AUTO: 0.8 K/UL (ref 1–4.8)
LYMPHOCYTES NFR BLD: 8.6 % (ref 18–48)
MCH RBC QN AUTO: 29.2 PG (ref 27–31)
MCHC RBC AUTO-ENTMCNC: 31.4 G/DL (ref 32–36)
MCV RBC AUTO: 93 FL (ref 82–98)
MONOCYTES # BLD AUTO: 0.9 K/UL (ref 0.3–1)
MONOCYTES NFR BLD: 8.9 % (ref 4–15)
NEUTROPHILS # BLD AUTO: 7.8 K/UL (ref 1.8–7.7)
NEUTROPHILS NFR BLD: 81.5 % (ref 38–73)
NRBC BLD-RTO: 0 /100 WBC
PLATELET # BLD AUTO: 295 K/UL (ref 150–350)
PMV BLD AUTO: 10.6 FL (ref 9.2–12.9)
POTASSIUM SERPL-SCNC: 3.8 MMOL/L (ref 3.5–5.1)
PROT SERPL-MCNC: 6.2 G/DL (ref 6–8.4)
RBC # BLD AUTO: 4.07 M/UL (ref 4.6–6.2)
SODIUM SERPL-SCNC: 139 MMOL/L (ref 136–145)
WBC # BLD AUTO: 9.62 K/UL (ref 3.9–12.7)

## 2021-01-06 PROCEDURE — 63600175 PHARM REV CODE 636 W HCPCS: Performed by: INTERNAL MEDICINE

## 2021-01-06 PROCEDURE — 27000221 HC OXYGEN, UP TO 24 HOURS

## 2021-01-06 PROCEDURE — 85025 COMPLETE CBC W/AUTO DIFF WBC: CPT

## 2021-01-06 PROCEDURE — 99233 SBSQ HOSP IP/OBS HIGH 50: CPT | Mod: ,,, | Performed by: FAMILY MEDICINE

## 2021-01-06 PROCEDURE — 94640 AIRWAY INHALATION TREATMENT: CPT

## 2021-01-06 PROCEDURE — 36415 COLL VENOUS BLD VENIPUNCTURE: CPT

## 2021-01-06 PROCEDURE — 25000003 PHARM REV CODE 250: Performed by: NURSE PRACTITIONER

## 2021-01-06 PROCEDURE — 99900035 HC TECH TIME PER 15 MIN (STAT)

## 2021-01-06 PROCEDURE — 99233 PR SUBSEQUENT HOSPITAL CARE,LEVL III: ICD-10-PCS | Mod: ,,, | Performed by: FAMILY MEDICINE

## 2021-01-06 PROCEDURE — 94660 CPAP INITIATION&MGMT: CPT

## 2021-01-06 PROCEDURE — 97530 THERAPEUTIC ACTIVITIES: CPT

## 2021-01-06 PROCEDURE — 11000001 HC ACUTE MED/SURG PRIVATE ROOM

## 2021-01-06 PROCEDURE — 80053 COMPREHEN METABOLIC PANEL: CPT

## 2021-01-06 PROCEDURE — 25000242 PHARM REV CODE 250 ALT 637 W/ HCPCS: Performed by: FAMILY MEDICINE

## 2021-01-06 PROCEDURE — 94761 N-INVAS EAR/PLS OXIMETRY MLT: CPT

## 2021-01-06 PROCEDURE — 25000242 PHARM REV CODE 250 ALT 637 W/ HCPCS: Performed by: NURSE PRACTITIONER

## 2021-01-06 PROCEDURE — 25000003 PHARM REV CODE 250: Performed by: INTERNAL MEDICINE

## 2021-01-06 RX ORDER — OLMESARTAN MEDOXOMIL 20 MG/1
40 TABLET ORAL DAILY
Status: DISCONTINUED | OUTPATIENT
Start: 2021-01-06 | End: 2021-01-12

## 2021-01-06 RX ORDER — FLUTICASONE FUROATE AND VILANTEROL 200; 25 UG/1; UG/1
1 POWDER RESPIRATORY (INHALATION) DAILY
Status: DISCONTINUED | OUTPATIENT
Start: 2021-01-06 | End: 2021-01-16

## 2021-01-06 RX ADMIN — CHOLECALCIFEROL TAB 25 MCG (1000 UNIT) 1000 UNITS: 25 TAB at 09:01

## 2021-01-06 RX ADMIN — ALBUTEROL SULFATE 2 PUFF: 90 AEROSOL, METERED RESPIRATORY (INHALATION) at 08:01

## 2021-01-06 RX ADMIN — VENLAFAXINE HYDROCHLORIDE 150 MG: 150 CAPSULE, EXTENDED RELEASE ORAL at 09:01

## 2021-01-06 RX ADMIN — METOPROLOL SUCCINATE 50 MG: 50 TABLET, EXTENDED RELEASE ORAL at 09:01

## 2021-01-06 RX ADMIN — ALBUTEROL SULFATE 2 PUFF: 90 AEROSOL, METERED RESPIRATORY (INHALATION) at 12:01

## 2021-01-06 RX ADMIN — APIXABAN 5 MG: 5 TABLET, FILM COATED ORAL at 08:01

## 2021-01-06 RX ADMIN — ALBUTEROL SULFATE 2 PUFF: 90 AEROSOL, METERED RESPIRATORY (INHALATION) at 03:01

## 2021-01-06 RX ADMIN — ALLOPURINOL 300 MG: 100 TABLET ORAL at 09:01

## 2021-01-06 RX ADMIN — OLMESARTAN MEDOXOMIL 40 MG: 20 TABLET, FILM COATED ORAL at 09:01

## 2021-01-06 RX ADMIN — PANTOPRAZOLE SODIUM 40 MG: 40 TABLET, DELAYED RELEASE ORAL at 09:01

## 2021-01-06 RX ADMIN — HYDROCHLOROTHIAZIDE 12.5 MG: 12.5 TABLET ORAL at 09:01

## 2021-01-06 RX ADMIN — ALBUTEROL SULFATE 2 PUFF: 90 AEROSOL, METERED RESPIRATORY (INHALATION) at 07:01

## 2021-01-06 RX ADMIN — ALBUTEROL SULFATE 2 PUFF: 90 AEROSOL, METERED RESPIRATORY (INHALATION) at 11:01

## 2021-01-06 RX ADMIN — APIXABAN 5 MG: 5 TABLET, FILM COATED ORAL at 09:01

## 2021-01-06 RX ADMIN — MEMANTINE 10 MG: 10 TABLET ORAL at 09:01

## 2021-01-06 RX ADMIN — FLUTICASONE FUROATE AND VILANTEROL TRIFENATATE 1 PUFF: 200; 25 POWDER RESPIRATORY (INHALATION) at 12:01

## 2021-01-06 RX ADMIN — MEMANTINE 10 MG: 10 TABLET ORAL at 08:01

## 2021-01-06 RX ADMIN — SIMVASTATIN 40 MG: 40 TABLET, FILM COATED ORAL at 08:01

## 2021-01-06 RX ADMIN — DEXAMETHASONE SODIUM PHOSPHATE 6 MG: 4 INJECTION, SOLUTION INTRA-ARTICULAR; INTRALESIONAL; INTRAMUSCULAR; INTRAVENOUS; SOFT TISSUE at 09:01

## 2021-01-06 RX ADMIN — ZINC SULFATE 220 MG (50 MG) CAPSULE 220 MG: CAPSULE at 09:01

## 2021-01-06 RX ADMIN — CETIRIZINE HYDROCHLORIDE 10 MG: 10 TABLET, FILM COATED ORAL at 09:01

## 2021-01-06 RX ADMIN — OXYCODONE HYDROCHLORIDE AND ACETAMINOPHEN 500 MG: 500 TABLET ORAL at 09:01

## 2021-01-06 RX ADMIN — LEVOTHYROXINE SODIUM 100 MCG: 100 TABLET ORAL at 05:01

## 2021-01-06 RX ADMIN — TAMSULOSIN HYDROCHLORIDE 0.4 MG: 0.4 CAPSULE ORAL at 09:01

## 2021-01-06 RX ADMIN — CEFTRIAXONE 1 G: 1 INJECTION, SOLUTION INTRAVENOUS at 11:01

## 2021-01-06 RX ADMIN — ASPIRIN 81 MG: 81 TABLET, COATED ORAL at 09:01

## 2021-01-07 PROBLEM — N39.0 UTI (URINARY TRACT INFECTION): Status: ACTIVE | Noted: 2021-01-07

## 2021-01-07 PROBLEM — R53.81 DEBILITY: Status: ACTIVE | Noted: 2021-01-07

## 2021-01-07 LAB
ALBUMIN SERPL BCP-MCNC: 2.7 G/DL (ref 3.5–5.2)
ALP SERPL-CCNC: 104 U/L (ref 55–135)
ALT SERPL W/O P-5'-P-CCNC: 20 U/L (ref 10–44)
ANION GAP SERPL CALC-SCNC: 12 MMOL/L (ref 8–16)
AST SERPL-CCNC: 25 U/L (ref 10–40)
BASOPHILS # BLD AUTO: 0.01 K/UL (ref 0–0.2)
BASOPHILS NFR BLD: 0.1 % (ref 0–1.9)
BILIRUB SERPL-MCNC: 0.5 MG/DL (ref 0.1–1)
BUN SERPL-MCNC: 31 MG/DL (ref 8–23)
CALCIUM SERPL-MCNC: 9.3 MG/DL (ref 8.7–10.5)
CHLORIDE SERPL-SCNC: 103 MMOL/L (ref 95–110)
CO2 SERPL-SCNC: 23 MMOL/L (ref 23–29)
CREAT SERPL-MCNC: 0.8 MG/DL (ref 0.5–1.4)
DIFFERENTIAL METHOD: ABNORMAL
EOSINOPHIL # BLD AUTO: 0 K/UL (ref 0–0.5)
EOSINOPHIL NFR BLD: 0 % (ref 0–8)
ERYTHROCYTE [DISTWIDTH] IN BLOOD BY AUTOMATED COUNT: 15.9 % (ref 11.5–14.5)
EST. GFR  (AFRICAN AMERICAN): >60 ML/MIN/1.73 M^2
EST. GFR  (NON AFRICAN AMERICAN): >60 ML/MIN/1.73 M^2
GLUCOSE SERPL-MCNC: 86 MG/DL (ref 70–110)
HCT VFR BLD AUTO: 38.4 % (ref 40–54)
HGB BLD-MCNC: 12.2 G/DL (ref 14–18)
IMM GRANULOCYTES # BLD AUTO: 0.15 K/UL (ref 0–0.04)
IMM GRANULOCYTES NFR BLD AUTO: 1.4 % (ref 0–0.5)
LYMPHOCYTES # BLD AUTO: 0.8 K/UL (ref 1–4.8)
LYMPHOCYTES NFR BLD: 7.9 % (ref 18–48)
MCH RBC QN AUTO: 29.5 PG (ref 27–31)
MCHC RBC AUTO-ENTMCNC: 31.8 G/DL (ref 32–36)
MCV RBC AUTO: 93 FL (ref 82–98)
MONOCYTES # BLD AUTO: 0.8 K/UL (ref 0.3–1)
MONOCYTES NFR BLD: 7.3 % (ref 4–15)
NEUTROPHILS # BLD AUTO: 8.7 K/UL (ref 1.8–7.7)
NEUTROPHILS NFR BLD: 83.3 % (ref 38–73)
NRBC BLD-RTO: 0 /100 WBC
PLATELET # BLD AUTO: 334 K/UL (ref 150–350)
PMV BLD AUTO: 10.5 FL (ref 9.2–12.9)
POTASSIUM SERPL-SCNC: 3.7 MMOL/L (ref 3.5–5.1)
PROT SERPL-MCNC: 6.2 G/DL (ref 6–8.4)
RBC # BLD AUTO: 4.13 M/UL (ref 4.6–6.2)
SODIUM SERPL-SCNC: 138 MMOL/L (ref 136–145)
WBC # BLD AUTO: 10.44 K/UL (ref 3.9–12.7)

## 2021-01-07 PROCEDURE — 99900035 HC TECH TIME PER 15 MIN (STAT)

## 2021-01-07 PROCEDURE — 99232 SBSQ HOSP IP/OBS MODERATE 35: CPT | Mod: ,,, | Performed by: STUDENT IN AN ORGANIZED HEALTH CARE EDUCATION/TRAINING PROGRAM

## 2021-01-07 PROCEDURE — 36415 COLL VENOUS BLD VENIPUNCTURE: CPT

## 2021-01-07 PROCEDURE — 11000001 HC ACUTE MED/SURG PRIVATE ROOM

## 2021-01-07 PROCEDURE — 80053 COMPREHEN METABOLIC PANEL: CPT

## 2021-01-07 PROCEDURE — 25000003 PHARM REV CODE 250: Performed by: INTERNAL MEDICINE

## 2021-01-07 PROCEDURE — 94640 AIRWAY INHALATION TREATMENT: CPT

## 2021-01-07 PROCEDURE — 85025 COMPLETE CBC W/AUTO DIFF WBC: CPT

## 2021-01-07 PROCEDURE — 25000242 PHARM REV CODE 250 ALT 637 W/ HCPCS: Performed by: FAMILY MEDICINE

## 2021-01-07 PROCEDURE — 97530 THERAPEUTIC ACTIVITIES: CPT

## 2021-01-07 PROCEDURE — 27000221 HC OXYGEN, UP TO 24 HOURS

## 2021-01-07 PROCEDURE — 63600175 PHARM REV CODE 636 W HCPCS: Performed by: INTERNAL MEDICINE

## 2021-01-07 PROCEDURE — 94660 CPAP INITIATION&MGMT: CPT

## 2021-01-07 PROCEDURE — 25000003 PHARM REV CODE 250: Performed by: NURSE PRACTITIONER

## 2021-01-07 PROCEDURE — 94761 N-INVAS EAR/PLS OXIMETRY MLT: CPT

## 2021-01-07 PROCEDURE — 99232 PR SUBSEQUENT HOSPITAL CARE,LEVL II: ICD-10-PCS | Mod: ,,, | Performed by: STUDENT IN AN ORGANIZED HEALTH CARE EDUCATION/TRAINING PROGRAM

## 2021-01-07 RX ORDER — ALBUTEROL SULFATE 90 UG/1
2 AEROSOL, METERED RESPIRATORY (INHALATION)
Status: DISCONTINUED | OUTPATIENT
Start: 2021-01-07 | End: 2021-01-20 | Stop reason: HOSPADM

## 2021-01-07 RX ADMIN — OLMESARTAN MEDOXOMIL 40 MG: 20 TABLET, FILM COATED ORAL at 09:01

## 2021-01-07 RX ADMIN — MEMANTINE 10 MG: 10 TABLET ORAL at 09:01

## 2021-01-07 RX ADMIN — DEXAMETHASONE SODIUM PHOSPHATE 6 MG: 4 INJECTION, SOLUTION INTRA-ARTICULAR; INTRALESIONAL; INTRAMUSCULAR; INTRAVENOUS; SOFT TISSUE at 09:01

## 2021-01-07 RX ADMIN — MEMANTINE 10 MG: 10 TABLET ORAL at 08:01

## 2021-01-07 RX ADMIN — BENZONATATE 200 MG: 100 CAPSULE ORAL at 09:01

## 2021-01-07 RX ADMIN — LEVOTHYROXINE SODIUM 100 MCG: 100 TABLET ORAL at 05:01

## 2021-01-07 RX ADMIN — ASPIRIN 81 MG: 81 TABLET, COATED ORAL at 09:01

## 2021-01-07 RX ADMIN — VENLAFAXINE HYDROCHLORIDE 150 MG: 150 CAPSULE, EXTENDED RELEASE ORAL at 09:01

## 2021-01-07 RX ADMIN — CEFTRIAXONE 1 G: 1 INJECTION, SOLUTION INTRAVENOUS at 12:01

## 2021-01-07 RX ADMIN — ALBUTEROL SULFATE 2 PUFF: 90 AEROSOL, METERED RESPIRATORY (INHALATION) at 07:01

## 2021-01-07 RX ADMIN — ALLOPURINOL 300 MG: 100 TABLET ORAL at 09:01

## 2021-01-07 RX ADMIN — ALBUTEROL SULFATE 2 PUFF: 90 AEROSOL, METERED RESPIRATORY (INHALATION) at 04:01

## 2021-01-07 RX ADMIN — TAMSULOSIN HYDROCHLORIDE 0.4 MG: 0.4 CAPSULE ORAL at 09:01

## 2021-01-07 RX ADMIN — OXYCODONE HYDROCHLORIDE AND ACETAMINOPHEN 500 MG: 500 TABLET ORAL at 09:01

## 2021-01-07 RX ADMIN — APIXABAN 5 MG: 5 TABLET, FILM COATED ORAL at 08:01

## 2021-01-07 RX ADMIN — CETIRIZINE HYDROCHLORIDE 10 MG: 10 TABLET, FILM COATED ORAL at 09:01

## 2021-01-07 RX ADMIN — PANTOPRAZOLE SODIUM 40 MG: 40 TABLET, DELAYED RELEASE ORAL at 09:01

## 2021-01-07 RX ADMIN — FLUTICASONE FUROATE AND VILANTEROL TRIFENATATE 1 PUFF: 200; 25 POWDER RESPIRATORY (INHALATION) at 08:01

## 2021-01-07 RX ADMIN — CHOLECALCIFEROL TAB 25 MCG (1000 UNIT) 1000 UNITS: 25 TAB at 09:01

## 2021-01-07 RX ADMIN — SIMVASTATIN 40 MG: 40 TABLET, FILM COATED ORAL at 08:01

## 2021-01-07 RX ADMIN — HYDROCHLOROTHIAZIDE 12.5 MG: 12.5 TABLET ORAL at 09:01

## 2021-01-07 RX ADMIN — ALBUTEROL SULFATE 2 PUFF: 90 AEROSOL, METERED RESPIRATORY (INHALATION) at 11:01

## 2021-01-07 RX ADMIN — ZINC SULFATE 220 MG (50 MG) CAPSULE 220 MG: CAPSULE at 09:01

## 2021-01-07 RX ADMIN — BENZONATATE 200 MG: 100 CAPSULE ORAL at 08:01

## 2021-01-07 RX ADMIN — METOPROLOL SUCCINATE 50 MG: 50 TABLET, EXTENDED RELEASE ORAL at 09:01

## 2021-01-07 RX ADMIN — ALBUTEROL SULFATE 2 PUFF: 90 AEROSOL, METERED RESPIRATORY (INHALATION) at 03:01

## 2021-01-07 RX ADMIN — APIXABAN 5 MG: 5 TABLET, FILM COATED ORAL at 09:01

## 2021-01-08 LAB
ALBUMIN SERPL BCP-MCNC: 2.7 G/DL (ref 3.5–5.2)
ALP SERPL-CCNC: 119 U/L (ref 55–135)
ALT SERPL W/O P-5'-P-CCNC: 27 U/L (ref 10–44)
ANION GAP SERPL CALC-SCNC: 11 MMOL/L (ref 8–16)
AST SERPL-CCNC: 30 U/L (ref 10–40)
BASOPHILS # BLD AUTO: 0.01 K/UL (ref 0–0.2)
BASOPHILS NFR BLD: 0.1 % (ref 0–1.9)
BILIRUB SERPL-MCNC: 0.6 MG/DL (ref 0.1–1)
BUN SERPL-MCNC: 36 MG/DL (ref 8–23)
CALCIUM SERPL-MCNC: 9.4 MG/DL (ref 8.7–10.5)
CHLORIDE SERPL-SCNC: 103 MMOL/L (ref 95–110)
CO2 SERPL-SCNC: 24 MMOL/L (ref 23–29)
CREAT SERPL-MCNC: 1 MG/DL (ref 0.5–1.4)
DIFFERENTIAL METHOD: ABNORMAL
EOSINOPHIL # BLD AUTO: 0 K/UL (ref 0–0.5)
EOSINOPHIL NFR BLD: 0.2 % (ref 0–8)
ERYTHROCYTE [DISTWIDTH] IN BLOOD BY AUTOMATED COUNT: 15.8 % (ref 11.5–14.5)
EST. GFR  (AFRICAN AMERICAN): >60 ML/MIN/1.73 M^2
EST. GFR  (NON AFRICAN AMERICAN): >60 ML/MIN/1.73 M^2
GLUCOSE SERPL-MCNC: 100 MG/DL (ref 70–110)
HCT VFR BLD AUTO: 37.8 % (ref 40–54)
HGB BLD-MCNC: 12 G/DL (ref 14–18)
IMM GRANULOCYTES # BLD AUTO: 0.1 K/UL (ref 0–0.04)
IMM GRANULOCYTES NFR BLD AUTO: 1.2 % (ref 0–0.5)
LYMPHOCYTES # BLD AUTO: 0.6 K/UL (ref 1–4.8)
LYMPHOCYTES NFR BLD: 6.5 % (ref 18–48)
MCH RBC QN AUTO: 29.4 PG (ref 27–31)
MCHC RBC AUTO-ENTMCNC: 31.7 G/DL (ref 32–36)
MCV RBC AUTO: 93 FL (ref 82–98)
MONOCYTES # BLD AUTO: 0.5 K/UL (ref 0.3–1)
MONOCYTES NFR BLD: 6.2 % (ref 4–15)
NEUTROPHILS # BLD AUTO: 7.4 K/UL (ref 1.8–7.7)
NEUTROPHILS NFR BLD: 85.8 % (ref 38–73)
NRBC BLD-RTO: 0 /100 WBC
PLATELET # BLD AUTO: 343 K/UL (ref 150–350)
PMV BLD AUTO: 10.6 FL (ref 9.2–12.9)
POTASSIUM SERPL-SCNC: 3.9 MMOL/L (ref 3.5–5.1)
PROT SERPL-MCNC: 6.3 G/DL (ref 6–8.4)
RBC # BLD AUTO: 4.08 M/UL (ref 4.6–6.2)
SODIUM SERPL-SCNC: 138 MMOL/L (ref 136–145)
WBC # BLD AUTO: 8.6 K/UL (ref 3.9–12.7)

## 2021-01-08 PROCEDURE — 99233 SBSQ HOSP IP/OBS HIGH 50: CPT | Mod: ,,, | Performed by: INTERNAL MEDICINE

## 2021-01-08 PROCEDURE — 99900035 HC TECH TIME PER 15 MIN (STAT)

## 2021-01-08 PROCEDURE — 25000003 PHARM REV CODE 250: Performed by: NURSE PRACTITIONER

## 2021-01-08 PROCEDURE — 97530 THERAPEUTIC ACTIVITIES: CPT

## 2021-01-08 PROCEDURE — 94640 AIRWAY INHALATION TREATMENT: CPT

## 2021-01-08 PROCEDURE — 80053 COMPREHEN METABOLIC PANEL: CPT

## 2021-01-08 PROCEDURE — 94761 N-INVAS EAR/PLS OXIMETRY MLT: CPT

## 2021-01-08 PROCEDURE — 36415 COLL VENOUS BLD VENIPUNCTURE: CPT

## 2021-01-08 PROCEDURE — 97166 OT EVAL MOD COMPLEX 45 MIN: CPT

## 2021-01-08 PROCEDURE — 11000001 HC ACUTE MED/SURG PRIVATE ROOM

## 2021-01-08 PROCEDURE — 25000003 PHARM REV CODE 250: Performed by: INTERNAL MEDICINE

## 2021-01-08 PROCEDURE — 85025 COMPLETE CBC W/AUTO DIFF WBC: CPT

## 2021-01-08 PROCEDURE — 63600175 PHARM REV CODE 636 W HCPCS: Performed by: NURSE PRACTITIONER

## 2021-01-08 PROCEDURE — 99233 PR SUBSEQUENT HOSPITAL CARE,LEVL III: ICD-10-PCS | Mod: ,,, | Performed by: INTERNAL MEDICINE

## 2021-01-08 PROCEDURE — 94660 CPAP INITIATION&MGMT: CPT

## 2021-01-08 RX ADMIN — ALLOPURINOL 300 MG: 100 TABLET ORAL at 09:01

## 2021-01-08 RX ADMIN — APIXABAN 5 MG: 5 TABLET, FILM COATED ORAL at 09:01

## 2021-01-08 RX ADMIN — LEVOTHYROXINE SODIUM 100 MCG: 100 TABLET ORAL at 05:01

## 2021-01-08 RX ADMIN — MEMANTINE 10 MG: 10 TABLET ORAL at 09:01

## 2021-01-08 RX ADMIN — CETIRIZINE HYDROCHLORIDE 10 MG: 10 TABLET, FILM COATED ORAL at 09:01

## 2021-01-08 RX ADMIN — OXYCODONE HYDROCHLORIDE AND ACETAMINOPHEN 500 MG: 500 TABLET ORAL at 09:01

## 2021-01-08 RX ADMIN — CHOLECALCIFEROL TAB 25 MCG (1000 UNIT) 1000 UNITS: 25 TAB at 09:01

## 2021-01-08 RX ADMIN — MEMANTINE 10 MG: 10 TABLET ORAL at 08:01

## 2021-01-08 RX ADMIN — FLUTICASONE FUROATE AND VILANTEROL TRIFENATATE 1 PUFF: 200; 25 POWDER RESPIRATORY (INHALATION) at 07:01

## 2021-01-08 RX ADMIN — VENLAFAXINE HYDROCHLORIDE 150 MG: 150 CAPSULE, EXTENDED RELEASE ORAL at 09:01

## 2021-01-08 RX ADMIN — METOPROLOL SUCCINATE 50 MG: 50 TABLET, EXTENDED RELEASE ORAL at 09:01

## 2021-01-08 RX ADMIN — SIMVASTATIN 40 MG: 40 TABLET, FILM COATED ORAL at 08:01

## 2021-01-08 RX ADMIN — OLMESARTAN MEDOXOMIL 40 MG: 20 TABLET, FILM COATED ORAL at 09:01

## 2021-01-08 RX ADMIN — ALBUTEROL SULFATE 2 PUFF: 90 AEROSOL, METERED RESPIRATORY (INHALATION) at 01:01

## 2021-01-08 RX ADMIN — DEXAMETHASONE 6 MG: 4 TABLET ORAL at 09:01

## 2021-01-08 RX ADMIN — HYDROCHLOROTHIAZIDE 12.5 MG: 12.5 TABLET ORAL at 09:01

## 2021-01-08 RX ADMIN — PANTOPRAZOLE SODIUM 40 MG: 40 TABLET, DELAYED RELEASE ORAL at 09:01

## 2021-01-08 RX ADMIN — ASPIRIN 81 MG: 81 TABLET, COATED ORAL at 09:01

## 2021-01-08 RX ADMIN — APIXABAN 5 MG: 5 TABLET, FILM COATED ORAL at 08:01

## 2021-01-08 RX ADMIN — ZINC SULFATE 220 MG (50 MG) CAPSULE 220 MG: CAPSULE at 09:01

## 2021-01-08 RX ADMIN — ALBUTEROL SULFATE 2 PUFF: 90 AEROSOL, METERED RESPIRATORY (INHALATION) at 07:01

## 2021-01-08 RX ADMIN — TAMSULOSIN HYDROCHLORIDE 0.4 MG: 0.4 CAPSULE ORAL at 09:01

## 2021-01-09 LAB
ALBUMIN SERPL BCP-MCNC: 2.8 G/DL (ref 3.5–5.2)
ALP SERPL-CCNC: 112 U/L (ref 55–135)
ALT SERPL W/O P-5'-P-CCNC: 25 U/L (ref 10–44)
ANION GAP SERPL CALC-SCNC: 12 MMOL/L (ref 8–16)
AST SERPL-CCNC: 28 U/L (ref 10–40)
BASOPHILS # BLD AUTO: 0.01 K/UL (ref 0–0.2)
BASOPHILS NFR BLD: 0.1 % (ref 0–1.9)
BILIRUB SERPL-MCNC: 0.7 MG/DL (ref 0.1–1)
BUN SERPL-MCNC: 36 MG/DL (ref 8–23)
CALCIUM SERPL-MCNC: 9.5 MG/DL (ref 8.7–10.5)
CHLORIDE SERPL-SCNC: 103 MMOL/L (ref 95–110)
CO2 SERPL-SCNC: 22 MMOL/L (ref 23–29)
CREAT SERPL-MCNC: 1 MG/DL (ref 0.5–1.4)
DIFFERENTIAL METHOD: ABNORMAL
EOSINOPHIL # BLD AUTO: 0 K/UL (ref 0–0.5)
EOSINOPHIL NFR BLD: 0.1 % (ref 0–8)
ERYTHROCYTE [DISTWIDTH] IN BLOOD BY AUTOMATED COUNT: 15.6 % (ref 11.5–14.5)
EST. GFR  (AFRICAN AMERICAN): >60 ML/MIN/1.73 M^2
EST. GFR  (NON AFRICAN AMERICAN): >60 ML/MIN/1.73 M^2
GLUCOSE SERPL-MCNC: 83 MG/DL (ref 70–110)
HCT VFR BLD AUTO: 39 % (ref 40–54)
HGB BLD-MCNC: 12.7 G/DL (ref 14–18)
IMM GRANULOCYTES # BLD AUTO: 0.15 K/UL (ref 0–0.04)
IMM GRANULOCYTES NFR BLD AUTO: 1.5 % (ref 0–0.5)
LYMPHOCYTES # BLD AUTO: 0.6 K/UL (ref 1–4.8)
LYMPHOCYTES NFR BLD: 5.8 % (ref 18–48)
MCH RBC QN AUTO: 30.2 PG (ref 27–31)
MCHC RBC AUTO-ENTMCNC: 32.6 G/DL (ref 32–36)
MCV RBC AUTO: 93 FL (ref 82–98)
MONOCYTES # BLD AUTO: 0.8 K/UL (ref 0.3–1)
MONOCYTES NFR BLD: 7.8 % (ref 4–15)
NEUTROPHILS # BLD AUTO: 8.4 K/UL (ref 1.8–7.7)
NEUTROPHILS NFR BLD: 84.7 % (ref 38–73)
NRBC BLD-RTO: 0 /100 WBC
PLATELET # BLD AUTO: 383 K/UL (ref 150–350)
PMV BLD AUTO: 10.5 FL (ref 9.2–12.9)
POTASSIUM SERPL-SCNC: 3.8 MMOL/L (ref 3.5–5.1)
PROT SERPL-MCNC: 6.3 G/DL (ref 6–8.4)
RBC # BLD AUTO: 4.2 M/UL (ref 4.6–6.2)
SODIUM SERPL-SCNC: 137 MMOL/L (ref 136–145)
WBC # BLD AUTO: 9.94 K/UL (ref 3.9–12.7)

## 2021-01-09 PROCEDURE — 25000003 PHARM REV CODE 250: Performed by: INTERNAL MEDICINE

## 2021-01-09 PROCEDURE — 94640 AIRWAY INHALATION TREATMENT: CPT

## 2021-01-09 PROCEDURE — 25000003 PHARM REV CODE 250: Performed by: NURSE PRACTITIONER

## 2021-01-09 PROCEDURE — 94660 CPAP INITIATION&MGMT: CPT

## 2021-01-09 PROCEDURE — 27000221 HC OXYGEN, UP TO 24 HOURS

## 2021-01-09 PROCEDURE — 11000001 HC ACUTE MED/SURG PRIVATE ROOM

## 2021-01-09 PROCEDURE — 63600175 PHARM REV CODE 636 W HCPCS: Performed by: NURSE PRACTITIONER

## 2021-01-09 PROCEDURE — 99233 SBSQ HOSP IP/OBS HIGH 50: CPT | Mod: ,,, | Performed by: INTERNAL MEDICINE

## 2021-01-09 PROCEDURE — 99900035 HC TECH TIME PER 15 MIN (STAT)

## 2021-01-09 PROCEDURE — 80053 COMPREHEN METABOLIC PANEL: CPT

## 2021-01-09 PROCEDURE — 36415 COLL VENOUS BLD VENIPUNCTURE: CPT

## 2021-01-09 PROCEDURE — 94761 N-INVAS EAR/PLS OXIMETRY MLT: CPT

## 2021-01-09 PROCEDURE — 99233 PR SUBSEQUENT HOSPITAL CARE,LEVL III: ICD-10-PCS | Mod: ,,, | Performed by: INTERNAL MEDICINE

## 2021-01-09 PROCEDURE — 85025 COMPLETE CBC W/AUTO DIFF WBC: CPT

## 2021-01-09 RX ADMIN — ALBUTEROL SULFATE 2 PUFF: 90 AEROSOL, METERED RESPIRATORY (INHALATION) at 07:01

## 2021-01-09 RX ADMIN — SIMVASTATIN 40 MG: 40 TABLET, FILM COATED ORAL at 08:01

## 2021-01-09 RX ADMIN — CETIRIZINE HYDROCHLORIDE 10 MG: 10 TABLET, FILM COATED ORAL at 08:01

## 2021-01-09 RX ADMIN — ZINC SULFATE 220 MG (50 MG) CAPSULE 220 MG: CAPSULE at 08:01

## 2021-01-09 RX ADMIN — METOPROLOL SUCCINATE 50 MG: 50 TABLET, EXTENDED RELEASE ORAL at 08:01

## 2021-01-09 RX ADMIN — TAMSULOSIN HYDROCHLORIDE 0.4 MG: 0.4 CAPSULE ORAL at 08:01

## 2021-01-09 RX ADMIN — OXYCODONE HYDROCHLORIDE AND ACETAMINOPHEN 500 MG: 500 TABLET ORAL at 08:01

## 2021-01-09 RX ADMIN — PANTOPRAZOLE SODIUM 40 MG: 40 TABLET, DELAYED RELEASE ORAL at 08:01

## 2021-01-09 RX ADMIN — ASPIRIN 81 MG: 81 TABLET, COATED ORAL at 08:01

## 2021-01-09 RX ADMIN — APIXABAN 5 MG: 5 TABLET, FILM COATED ORAL at 08:01

## 2021-01-09 RX ADMIN — VENLAFAXINE HYDROCHLORIDE 150 MG: 150 CAPSULE, EXTENDED RELEASE ORAL at 08:01

## 2021-01-09 RX ADMIN — OLMESARTAN MEDOXOMIL 40 MG: 20 TABLET, FILM COATED ORAL at 08:01

## 2021-01-09 RX ADMIN — HYDROCHLOROTHIAZIDE 12.5 MG: 12.5 TABLET ORAL at 08:01

## 2021-01-09 RX ADMIN — MEMANTINE 10 MG: 10 TABLET ORAL at 08:01

## 2021-01-09 RX ADMIN — FLUTICASONE FUROATE AND VILANTEROL TRIFENATATE 1 PUFF: 200; 25 POWDER RESPIRATORY (INHALATION) at 07:01

## 2021-01-09 RX ADMIN — CHOLECALCIFEROL TAB 25 MCG (1000 UNIT) 1000 UNITS: 25 TAB at 08:01

## 2021-01-09 RX ADMIN — ALLOPURINOL 300 MG: 100 TABLET ORAL at 08:01

## 2021-01-09 RX ADMIN — ALBUTEROL SULFATE 2 PUFF: 90 AEROSOL, METERED RESPIRATORY (INHALATION) at 01:01

## 2021-01-09 RX ADMIN — LEVOTHYROXINE SODIUM 100 MCG: 100 TABLET ORAL at 05:01

## 2021-01-09 RX ADMIN — DEXAMETHASONE 6 MG: 4 TABLET ORAL at 08:01

## 2021-01-10 LAB
ALBUMIN SERPL BCP-MCNC: 2.7 G/DL (ref 3.5–5.2)
ALP SERPL-CCNC: 112 U/L (ref 55–135)
ALT SERPL W/O P-5'-P-CCNC: 26 U/L (ref 10–44)
ANION GAP SERPL CALC-SCNC: 12 MMOL/L (ref 8–16)
AST SERPL-CCNC: 24 U/L (ref 10–40)
BASOPHILS # BLD AUTO: 0.01 K/UL (ref 0–0.2)
BASOPHILS NFR BLD: 0.1 % (ref 0–1.9)
BILIRUB SERPL-MCNC: 0.7 MG/DL (ref 0.1–1)
BUN SERPL-MCNC: 45 MG/DL (ref 8–23)
CALCIUM SERPL-MCNC: 9.6 MG/DL (ref 8.7–10.5)
CHLORIDE SERPL-SCNC: 104 MMOL/L (ref 95–110)
CO2 SERPL-SCNC: 22 MMOL/L (ref 23–29)
CREAT SERPL-MCNC: 1.2 MG/DL (ref 0.5–1.4)
DIFFERENTIAL METHOD: ABNORMAL
EOSINOPHIL # BLD AUTO: 0 K/UL (ref 0–0.5)
EOSINOPHIL NFR BLD: 0.1 % (ref 0–8)
ERYTHROCYTE [DISTWIDTH] IN BLOOD BY AUTOMATED COUNT: 15.6 % (ref 11.5–14.5)
EST. GFR  (AFRICAN AMERICAN): >60 ML/MIN/1.73 M^2
EST. GFR  (NON AFRICAN AMERICAN): 58 ML/MIN/1.73 M^2
GLUCOSE SERPL-MCNC: 84 MG/DL (ref 70–110)
HCT VFR BLD AUTO: 38.4 % (ref 40–54)
HGB BLD-MCNC: 12.5 G/DL (ref 14–18)
IMM GRANULOCYTES # BLD AUTO: 0.2 K/UL (ref 0–0.04)
IMM GRANULOCYTES NFR BLD AUTO: 2 % (ref 0–0.5)
LYMPHOCYTES # BLD AUTO: 0.7 K/UL (ref 1–4.8)
LYMPHOCYTES NFR BLD: 6.7 % (ref 18–48)
MCH RBC QN AUTO: 30 PG (ref 27–31)
MCHC RBC AUTO-ENTMCNC: 32.6 G/DL (ref 32–36)
MCV RBC AUTO: 92 FL (ref 82–98)
MONOCYTES # BLD AUTO: 0.8 K/UL (ref 0.3–1)
MONOCYTES NFR BLD: 7.8 % (ref 4–15)
NEUTROPHILS # BLD AUTO: 8.2 K/UL (ref 1.8–7.7)
NEUTROPHILS NFR BLD: 83.3 % (ref 38–73)
NRBC BLD-RTO: 0 /100 WBC
PLATELET # BLD AUTO: 394 K/UL (ref 150–350)
PMV BLD AUTO: 10.7 FL (ref 9.2–12.9)
POCT GLUCOSE: 157 MG/DL (ref 70–110)
POTASSIUM SERPL-SCNC: 3.8 MMOL/L (ref 3.5–5.1)
PROT SERPL-MCNC: 6.2 G/DL (ref 6–8.4)
RBC # BLD AUTO: 4.16 M/UL (ref 4.6–6.2)
SODIUM SERPL-SCNC: 138 MMOL/L (ref 136–145)
WBC # BLD AUTO: 9.89 K/UL (ref 3.9–12.7)

## 2021-01-10 PROCEDURE — 94660 CPAP INITIATION&MGMT: CPT

## 2021-01-10 PROCEDURE — 25000003 PHARM REV CODE 250: Performed by: NURSE PRACTITIONER

## 2021-01-10 PROCEDURE — 94640 AIRWAY INHALATION TREATMENT: CPT

## 2021-01-10 PROCEDURE — 94761 N-INVAS EAR/PLS OXIMETRY MLT: CPT

## 2021-01-10 PROCEDURE — 36415 COLL VENOUS BLD VENIPUNCTURE: CPT

## 2021-01-10 PROCEDURE — 27000221 HC OXYGEN, UP TO 24 HOURS

## 2021-01-10 PROCEDURE — 80053 COMPREHEN METABOLIC PANEL: CPT

## 2021-01-10 PROCEDURE — 11000001 HC ACUTE MED/SURG PRIVATE ROOM

## 2021-01-10 PROCEDURE — 25000003 PHARM REV CODE 250: Performed by: INTERNAL MEDICINE

## 2021-01-10 PROCEDURE — 99233 SBSQ HOSP IP/OBS HIGH 50: CPT | Mod: ,,, | Performed by: INTERNAL MEDICINE

## 2021-01-10 PROCEDURE — 63600175 PHARM REV CODE 636 W HCPCS: Performed by: NURSE PRACTITIONER

## 2021-01-10 PROCEDURE — 99900035 HC TECH TIME PER 15 MIN (STAT)

## 2021-01-10 PROCEDURE — 85025 COMPLETE CBC W/AUTO DIFF WBC: CPT

## 2021-01-10 PROCEDURE — 99233 PR SUBSEQUENT HOSPITAL CARE,LEVL III: ICD-10-PCS | Mod: ,,, | Performed by: INTERNAL MEDICINE

## 2021-01-10 RX ADMIN — APIXABAN 5 MG: 5 TABLET, FILM COATED ORAL at 08:01

## 2021-01-10 RX ADMIN — OXYCODONE HYDROCHLORIDE AND ACETAMINOPHEN 500 MG: 500 TABLET ORAL at 08:01

## 2021-01-10 RX ADMIN — CHOLECALCIFEROL TAB 25 MCG (1000 UNIT) 1000 UNITS: 25 TAB at 08:01

## 2021-01-10 RX ADMIN — MEMANTINE 10 MG: 10 TABLET ORAL at 08:01

## 2021-01-10 RX ADMIN — SIMVASTATIN 40 MG: 40 TABLET, FILM COATED ORAL at 08:01

## 2021-01-10 RX ADMIN — TAMSULOSIN HYDROCHLORIDE 0.4 MG: 0.4 CAPSULE ORAL at 08:01

## 2021-01-10 RX ADMIN — ALBUTEROL SULFATE 2 PUFF: 90 AEROSOL, METERED RESPIRATORY (INHALATION) at 01:01

## 2021-01-10 RX ADMIN — DEXAMETHASONE 6 MG: 4 TABLET ORAL at 08:01

## 2021-01-10 RX ADMIN — ZINC SULFATE 220 MG (50 MG) CAPSULE 220 MG: CAPSULE at 08:01

## 2021-01-10 RX ADMIN — HYDROCHLOROTHIAZIDE 12.5 MG: 12.5 TABLET ORAL at 08:01

## 2021-01-10 RX ADMIN — VENLAFAXINE HYDROCHLORIDE 150 MG: 150 CAPSULE, EXTENDED RELEASE ORAL at 08:01

## 2021-01-10 RX ADMIN — ALLOPURINOL 300 MG: 100 TABLET ORAL at 08:01

## 2021-01-10 RX ADMIN — METOPROLOL SUCCINATE 50 MG: 50 TABLET, EXTENDED RELEASE ORAL at 08:01

## 2021-01-10 RX ADMIN — ALBUTEROL SULFATE 2 PUFF: 90 AEROSOL, METERED RESPIRATORY (INHALATION) at 07:01

## 2021-01-10 RX ADMIN — OLMESARTAN MEDOXOMIL 40 MG: 20 TABLET, FILM COATED ORAL at 08:01

## 2021-01-10 RX ADMIN — CETIRIZINE HYDROCHLORIDE 10 MG: 10 TABLET, FILM COATED ORAL at 08:01

## 2021-01-10 RX ADMIN — FLUTICASONE FUROATE AND VILANTEROL TRIFENATATE 1 PUFF: 200; 25 POWDER RESPIRATORY (INHALATION) at 07:01

## 2021-01-10 RX ADMIN — LEVOTHYROXINE SODIUM 100 MCG: 100 TABLET ORAL at 05:01

## 2021-01-10 RX ADMIN — ASPIRIN 81 MG: 81 TABLET, COATED ORAL at 08:01

## 2021-01-10 RX ADMIN — PANTOPRAZOLE SODIUM 40 MG: 40 TABLET, DELAYED RELEASE ORAL at 08:01

## 2021-01-11 ENCOUNTER — DOCUMENT SCAN (OUTPATIENT)
Dept: HOME HEALTH SERVICES | Facility: HOSPITAL | Age: 81
End: 2021-01-11
Payer: MEDICARE

## 2021-01-11 LAB
BASOPHILS # BLD AUTO: 0.02 K/UL (ref 0–0.2)
BASOPHILS NFR BLD: 0.2 % (ref 0–1.9)
DIFFERENTIAL METHOD: ABNORMAL
EOSINOPHIL # BLD AUTO: 0 K/UL (ref 0–0.5)
EOSINOPHIL NFR BLD: 0.1 % (ref 0–8)
ERYTHROCYTE [DISTWIDTH] IN BLOOD BY AUTOMATED COUNT: 15.6 % (ref 11.5–14.5)
HCT VFR BLD AUTO: 40.9 % (ref 40–54)
HGB BLD-MCNC: 13.3 G/DL (ref 14–18)
IMM GRANULOCYTES # BLD AUTO: 0.19 K/UL (ref 0–0.04)
IMM GRANULOCYTES NFR BLD AUTO: 1.9 % (ref 0–0.5)
LYMPHOCYTES # BLD AUTO: 0.7 K/UL (ref 1–4.8)
LYMPHOCYTES NFR BLD: 7 % (ref 18–48)
MCH RBC QN AUTO: 30.3 PG (ref 27–31)
MCHC RBC AUTO-ENTMCNC: 32.5 G/DL (ref 32–36)
MCV RBC AUTO: 93 FL (ref 82–98)
MONOCYTES # BLD AUTO: 0.7 K/UL (ref 0.3–1)
MONOCYTES NFR BLD: 7.2 % (ref 4–15)
NEUTROPHILS # BLD AUTO: 8.4 K/UL (ref 1.8–7.7)
NEUTROPHILS NFR BLD: 83.6 % (ref 38–73)
NRBC BLD-RTO: 0 /100 WBC
PLATELET # BLD AUTO: 395 K/UL (ref 150–350)
PMV BLD AUTO: 10.3 FL (ref 9.2–12.9)
RBC # BLD AUTO: 4.39 M/UL (ref 4.6–6.2)
WBC # BLD AUTO: 10.03 K/UL (ref 3.9–12.7)

## 2021-01-11 PROCEDURE — 99233 PR SUBSEQUENT HOSPITAL CARE,LEVL III: ICD-10-PCS | Mod: ,,, | Performed by: INTERNAL MEDICINE

## 2021-01-11 PROCEDURE — 99900035 HC TECH TIME PER 15 MIN (STAT)

## 2021-01-11 PROCEDURE — 25000003 PHARM REV CODE 250: Performed by: NURSE PRACTITIONER

## 2021-01-11 PROCEDURE — 25000003 PHARM REV CODE 250: Performed by: INTERNAL MEDICINE

## 2021-01-11 PROCEDURE — 97530 THERAPEUTIC ACTIVITIES: CPT

## 2021-01-11 PROCEDURE — 99233 SBSQ HOSP IP/OBS HIGH 50: CPT | Mod: ,,, | Performed by: INTERNAL MEDICINE

## 2021-01-11 PROCEDURE — 94640 AIRWAY INHALATION TREATMENT: CPT

## 2021-01-11 PROCEDURE — 27000221 HC OXYGEN, UP TO 24 HOURS

## 2021-01-11 PROCEDURE — 63600175 PHARM REV CODE 636 W HCPCS: Performed by: NURSE PRACTITIONER

## 2021-01-11 PROCEDURE — 11000001 HC ACUTE MED/SURG PRIVATE ROOM

## 2021-01-11 PROCEDURE — 94761 N-INVAS EAR/PLS OXIMETRY MLT: CPT

## 2021-01-11 PROCEDURE — 85025 COMPLETE CBC W/AUTO DIFF WBC: CPT

## 2021-01-11 PROCEDURE — 36415 COLL VENOUS BLD VENIPUNCTURE: CPT

## 2021-01-11 RX ADMIN — MEMANTINE 10 MG: 10 TABLET ORAL at 09:01

## 2021-01-11 RX ADMIN — HYDROCHLOROTHIAZIDE 12.5 MG: 12.5 TABLET ORAL at 09:01

## 2021-01-11 RX ADMIN — ALBUTEROL SULFATE 2 PUFF: 90 AEROSOL, METERED RESPIRATORY (INHALATION) at 08:01

## 2021-01-11 RX ADMIN — ALBUTEROL SULFATE 2 PUFF: 90 AEROSOL, METERED RESPIRATORY (INHALATION) at 01:01

## 2021-01-11 RX ADMIN — ALLOPURINOL 300 MG: 100 TABLET ORAL at 09:01

## 2021-01-11 RX ADMIN — TAMSULOSIN HYDROCHLORIDE 0.4 MG: 0.4 CAPSULE ORAL at 09:01

## 2021-01-11 RX ADMIN — VENLAFAXINE HYDROCHLORIDE 150 MG: 150 CAPSULE, EXTENDED RELEASE ORAL at 09:01

## 2021-01-11 RX ADMIN — CETIRIZINE HYDROCHLORIDE 10 MG: 10 TABLET, FILM COATED ORAL at 09:01

## 2021-01-11 RX ADMIN — APIXABAN 5 MG: 5 TABLET, FILM COATED ORAL at 09:01

## 2021-01-11 RX ADMIN — DEXAMETHASONE 6 MG: 4 TABLET ORAL at 09:01

## 2021-01-11 RX ADMIN — ASPIRIN 81 MG: 81 TABLET, COATED ORAL at 09:01

## 2021-01-11 RX ADMIN — METOPROLOL SUCCINATE 50 MG: 50 TABLET, EXTENDED RELEASE ORAL at 09:01

## 2021-01-11 RX ADMIN — OLMESARTAN MEDOXOMIL 40 MG: 20 TABLET, FILM COATED ORAL at 09:01

## 2021-01-11 RX ADMIN — OXYCODONE HYDROCHLORIDE AND ACETAMINOPHEN 500 MG: 500 TABLET ORAL at 09:01

## 2021-01-11 RX ADMIN — ALBUTEROL SULFATE 2 PUFF: 90 AEROSOL, METERED RESPIRATORY (INHALATION) at 07:01

## 2021-01-11 RX ADMIN — LEVOTHYROXINE SODIUM 100 MCG: 100 TABLET ORAL at 05:01

## 2021-01-11 RX ADMIN — SIMVASTATIN 40 MG: 40 TABLET, FILM COATED ORAL at 09:01

## 2021-01-11 RX ADMIN — CHOLECALCIFEROL TAB 25 MCG (1000 UNIT) 1000 UNITS: 25 TAB at 09:01

## 2021-01-11 RX ADMIN — PANTOPRAZOLE SODIUM 40 MG: 40 TABLET, DELAYED RELEASE ORAL at 09:01

## 2021-01-11 RX ADMIN — FLUTICASONE FUROATE AND VILANTEROL TRIFENATATE 1 PUFF: 200; 25 POWDER RESPIRATORY (INHALATION) at 08:01

## 2021-01-11 RX ADMIN — ZINC SULFATE 220 MG (50 MG) CAPSULE 220 MG: CAPSULE at 09:01

## 2021-01-12 LAB
BASOPHILS # BLD AUTO: 0.01 K/UL (ref 0–0.2)
BASOPHILS NFR BLD: 0.1 % (ref 0–1.9)
DIFFERENTIAL METHOD: ABNORMAL
EOSINOPHIL # BLD AUTO: 0 K/UL (ref 0–0.5)
EOSINOPHIL NFR BLD: 0.1 % (ref 0–8)
ERYTHROCYTE [DISTWIDTH] IN BLOOD BY AUTOMATED COUNT: 15.8 % (ref 11.5–14.5)
HCT VFR BLD AUTO: 41 % (ref 40–54)
HGB BLD-MCNC: 13 G/DL (ref 14–18)
IMM GRANULOCYTES # BLD AUTO: 0.21 K/UL (ref 0–0.04)
IMM GRANULOCYTES NFR BLD AUTO: 2 % (ref 0–0.5)
LYMPHOCYTES # BLD AUTO: 0.7 K/UL (ref 1–4.8)
LYMPHOCYTES NFR BLD: 6.7 % (ref 18–48)
MCH RBC QN AUTO: 29.7 PG (ref 27–31)
MCHC RBC AUTO-ENTMCNC: 31.7 G/DL (ref 32–36)
MCV RBC AUTO: 94 FL (ref 82–98)
MONOCYTES # BLD AUTO: 0.7 K/UL (ref 0.3–1)
MONOCYTES NFR BLD: 6.8 % (ref 4–15)
NEUTROPHILS # BLD AUTO: 9 K/UL (ref 1.8–7.7)
NEUTROPHILS NFR BLD: 84.3 % (ref 38–73)
NRBC BLD-RTO: 0 /100 WBC
PLATELET # BLD AUTO: 405 K/UL (ref 150–350)
PMV BLD AUTO: 10.7 FL (ref 9.2–12.9)
RBC # BLD AUTO: 4.38 M/UL (ref 4.6–6.2)
WBC # BLD AUTO: 10.63 K/UL (ref 3.9–12.7)

## 2021-01-12 PROCEDURE — 11000001 HC ACUTE MED/SURG PRIVATE ROOM

## 2021-01-12 PROCEDURE — 97535 SELF CARE MNGMENT TRAINING: CPT

## 2021-01-12 PROCEDURE — 27000221 HC OXYGEN, UP TO 24 HOURS

## 2021-01-12 PROCEDURE — 85025 COMPLETE CBC W/AUTO DIFF WBC: CPT

## 2021-01-12 PROCEDURE — 94640 AIRWAY INHALATION TREATMENT: CPT

## 2021-01-12 PROCEDURE — 63600175 PHARM REV CODE 636 W HCPCS: Performed by: NURSE PRACTITIONER

## 2021-01-12 PROCEDURE — 97530 THERAPEUTIC ACTIVITIES: CPT

## 2021-01-12 PROCEDURE — 25000003 PHARM REV CODE 250: Performed by: NURSE PRACTITIONER

## 2021-01-12 PROCEDURE — 99233 SBSQ HOSP IP/OBS HIGH 50: CPT | Mod: ,,, | Performed by: INTERNAL MEDICINE

## 2021-01-12 PROCEDURE — 94761 N-INVAS EAR/PLS OXIMETRY MLT: CPT

## 2021-01-12 PROCEDURE — 36415 COLL VENOUS BLD VENIPUNCTURE: CPT

## 2021-01-12 PROCEDURE — 25000003 PHARM REV CODE 250: Performed by: INTERNAL MEDICINE

## 2021-01-12 PROCEDURE — 99233 PR SUBSEQUENT HOSPITAL CARE,LEVL III: ICD-10-PCS | Mod: ,,, | Performed by: INTERNAL MEDICINE

## 2021-01-12 RX ADMIN — CHOLECALCIFEROL TAB 25 MCG (1000 UNIT) 1000 UNITS: 25 TAB at 08:01

## 2021-01-12 RX ADMIN — APIXABAN 5 MG: 5 TABLET, FILM COATED ORAL at 08:01

## 2021-01-12 RX ADMIN — OXYCODONE HYDROCHLORIDE AND ACETAMINOPHEN 500 MG: 500 TABLET ORAL at 08:01

## 2021-01-12 RX ADMIN — APIXABAN 5 MG: 5 TABLET, FILM COATED ORAL at 09:01

## 2021-01-12 RX ADMIN — MEMANTINE 10 MG: 10 TABLET ORAL at 09:01

## 2021-01-12 RX ADMIN — VENLAFAXINE HYDROCHLORIDE 150 MG: 150 CAPSULE, EXTENDED RELEASE ORAL at 08:01

## 2021-01-12 RX ADMIN — ZINC SULFATE 220 MG (50 MG) CAPSULE 220 MG: CAPSULE at 08:01

## 2021-01-12 RX ADMIN — MEMANTINE 10 MG: 10 TABLET ORAL at 08:01

## 2021-01-12 RX ADMIN — ALBUTEROL SULFATE 2 PUFF: 90 AEROSOL, METERED RESPIRATORY (INHALATION) at 07:01

## 2021-01-12 RX ADMIN — ALBUTEROL SULFATE 2 PUFF: 90 AEROSOL, METERED RESPIRATORY (INHALATION) at 01:01

## 2021-01-12 RX ADMIN — ALLOPURINOL 300 MG: 100 TABLET ORAL at 08:01

## 2021-01-12 RX ADMIN — ASPIRIN 81 MG: 81 TABLET, COATED ORAL at 08:01

## 2021-01-12 RX ADMIN — PANTOPRAZOLE SODIUM 40 MG: 40 TABLET, DELAYED RELEASE ORAL at 08:01

## 2021-01-12 RX ADMIN — FLUTICASONE FUROATE AND VILANTEROL TRIFENATATE 1 PUFF: 200; 25 POWDER RESPIRATORY (INHALATION) at 07:01

## 2021-01-12 RX ADMIN — LEVOTHYROXINE SODIUM 100 MCG: 100 TABLET ORAL at 05:01

## 2021-01-12 RX ADMIN — CETIRIZINE HYDROCHLORIDE 10 MG: 10 TABLET, FILM COATED ORAL at 08:01

## 2021-01-12 RX ADMIN — ALBUTEROL SULFATE 2 PUFF: 90 AEROSOL, METERED RESPIRATORY (INHALATION) at 08:01

## 2021-01-12 RX ADMIN — SIMVASTATIN 40 MG: 40 TABLET, FILM COATED ORAL at 09:01

## 2021-01-12 RX ADMIN — METOPROLOL SUCCINATE 50 MG: 50 TABLET, EXTENDED RELEASE ORAL at 08:01

## 2021-01-12 RX ADMIN — TAMSULOSIN HYDROCHLORIDE 0.4 MG: 0.4 CAPSULE ORAL at 08:01

## 2021-01-12 RX ADMIN — OLMESARTAN MEDOXOMIL 40 MG: 20 TABLET, FILM COATED ORAL at 08:01

## 2021-01-12 RX ADMIN — DEXAMETHASONE 6 MG: 4 TABLET ORAL at 08:01

## 2021-01-13 LAB
BASOPHILS # BLD AUTO: 0.02 K/UL (ref 0–0.2)
BASOPHILS NFR BLD: 0.2 % (ref 0–1.9)
DIFFERENTIAL METHOD: ABNORMAL
EOSINOPHIL # BLD AUTO: 0 K/UL (ref 0–0.5)
EOSINOPHIL NFR BLD: 0.1 % (ref 0–8)
ERYTHROCYTE [DISTWIDTH] IN BLOOD BY AUTOMATED COUNT: 15.8 % (ref 11.5–14.5)
HCT VFR BLD AUTO: 41.5 % (ref 40–54)
HGB BLD-MCNC: 13.3 G/DL (ref 14–18)
IMM GRANULOCYTES # BLD AUTO: 0.27 K/UL (ref 0–0.04)
IMM GRANULOCYTES NFR BLD AUTO: 2.1 % (ref 0–0.5)
LYMPHOCYTES # BLD AUTO: 0.9 K/UL (ref 1–4.8)
LYMPHOCYTES NFR BLD: 6.7 % (ref 18–48)
MCH RBC QN AUTO: 29.8 PG (ref 27–31)
MCHC RBC AUTO-ENTMCNC: 32 G/DL (ref 32–36)
MCV RBC AUTO: 93 FL (ref 82–98)
MONOCYTES # BLD AUTO: 0.8 K/UL (ref 0.3–1)
MONOCYTES NFR BLD: 6.1 % (ref 4–15)
NEUTROPHILS # BLD AUTO: 11 K/UL (ref 1.8–7.7)
NEUTROPHILS NFR BLD: 84.8 % (ref 38–73)
NRBC BLD-RTO: 0 /100 WBC
PLATELET # BLD AUTO: 373 K/UL (ref 150–350)
PMV BLD AUTO: 10.5 FL (ref 9.2–12.9)
RBC # BLD AUTO: 4.47 M/UL (ref 4.6–6.2)
WBC # BLD AUTO: 13.01 K/UL (ref 3.9–12.7)

## 2021-01-13 PROCEDURE — 97530 THERAPEUTIC ACTIVITIES: CPT

## 2021-01-13 PROCEDURE — 85025 COMPLETE CBC W/AUTO DIFF WBC: CPT

## 2021-01-13 PROCEDURE — 99233 SBSQ HOSP IP/OBS HIGH 50: CPT | Mod: ,,, | Performed by: FAMILY MEDICINE

## 2021-01-13 PROCEDURE — 94640 AIRWAY INHALATION TREATMENT: CPT

## 2021-01-13 PROCEDURE — 63600175 PHARM REV CODE 636 W HCPCS: Performed by: NURSE PRACTITIONER

## 2021-01-13 PROCEDURE — U0003 INFECTIOUS AGENT DETECTION BY NUCLEIC ACID (DNA OR RNA); SEVERE ACUTE RESPIRATORY SYNDROME CORONAVIRUS 2 (SARS-COV-2) (CORONAVIRUS DISEASE [COVID-19]), AMPLIFIED PROBE TECHNIQUE, MAKING USE OF HIGH THROUGHPUT TECHNOLOGIES AS DESCRIBED BY CMS-2020-01-R: HCPCS

## 2021-01-13 PROCEDURE — 36415 COLL VENOUS BLD VENIPUNCTURE: CPT

## 2021-01-13 PROCEDURE — 25000003 PHARM REV CODE 250: Performed by: INTERNAL MEDICINE

## 2021-01-13 PROCEDURE — 27000221 HC OXYGEN, UP TO 24 HOURS

## 2021-01-13 PROCEDURE — 94761 N-INVAS EAR/PLS OXIMETRY MLT: CPT

## 2021-01-13 PROCEDURE — 99233 PR SUBSEQUENT HOSPITAL CARE,LEVL III: ICD-10-PCS | Mod: ,,, | Performed by: FAMILY MEDICINE

## 2021-01-13 PROCEDURE — 11000001 HC ACUTE MED/SURG PRIVATE ROOM

## 2021-01-13 RX ADMIN — CHOLECALCIFEROL TAB 25 MCG (1000 UNIT) 1000 UNITS: 25 TAB at 09:01

## 2021-01-13 RX ADMIN — MEMANTINE 10 MG: 10 TABLET ORAL at 09:01

## 2021-01-13 RX ADMIN — LEVOTHYROXINE SODIUM 100 MCG: 100 TABLET ORAL at 06:01

## 2021-01-13 RX ADMIN — ZINC SULFATE 220 MG (50 MG) CAPSULE 220 MG: CAPSULE at 09:01

## 2021-01-13 RX ADMIN — OXYCODONE HYDROCHLORIDE AND ACETAMINOPHEN 500 MG: 500 TABLET ORAL at 09:01

## 2021-01-13 RX ADMIN — ASPIRIN 81 MG: 81 TABLET, COATED ORAL at 09:01

## 2021-01-13 RX ADMIN — APIXABAN 5 MG: 5 TABLET, FILM COATED ORAL at 08:01

## 2021-01-13 RX ADMIN — VENLAFAXINE HYDROCHLORIDE 150 MG: 150 CAPSULE, EXTENDED RELEASE ORAL at 09:01

## 2021-01-13 RX ADMIN — CETIRIZINE HYDROCHLORIDE 10 MG: 10 TABLET, FILM COATED ORAL at 09:01

## 2021-01-13 RX ADMIN — MEMANTINE 10 MG: 10 TABLET ORAL at 08:01

## 2021-01-13 RX ADMIN — ALBUTEROL SULFATE 2 PUFF: 90 AEROSOL, METERED RESPIRATORY (INHALATION) at 01:01

## 2021-01-13 RX ADMIN — DEXAMETHASONE 6 MG: 4 TABLET ORAL at 09:01

## 2021-01-13 RX ADMIN — TAMSULOSIN HYDROCHLORIDE 0.4 MG: 0.4 CAPSULE ORAL at 09:01

## 2021-01-13 RX ADMIN — METOPROLOL SUCCINATE 50 MG: 50 TABLET, EXTENDED RELEASE ORAL at 09:01

## 2021-01-13 RX ADMIN — FLUTICASONE FUROATE AND VILANTEROL TRIFENATATE 1 PUFF: 200; 25 POWDER RESPIRATORY (INHALATION) at 07:01

## 2021-01-13 RX ADMIN — SIMVASTATIN 40 MG: 40 TABLET, FILM COATED ORAL at 08:01

## 2021-01-13 RX ADMIN — APIXABAN 5 MG: 5 TABLET, FILM COATED ORAL at 09:01

## 2021-01-13 RX ADMIN — ALBUTEROL SULFATE 2 PUFF: 90 AEROSOL, METERED RESPIRATORY (INHALATION) at 08:01

## 2021-01-13 RX ADMIN — ALBUTEROL SULFATE 2 PUFF: 90 AEROSOL, METERED RESPIRATORY (INHALATION) at 07:01

## 2021-01-13 RX ADMIN — ALLOPURINOL 300 MG: 100 TABLET ORAL at 09:01

## 2021-01-13 RX ADMIN — PANTOPRAZOLE SODIUM 40 MG: 40 TABLET, DELAYED RELEASE ORAL at 09:01

## 2021-01-14 LAB
ALLENS TEST: ABNORMAL
BASOPHILS # BLD AUTO: 0.01 K/UL (ref 0–0.2)
BASOPHILS NFR BLD: 0.1 % (ref 0–1.9)
DELSYS: ABNORMAL
DIFFERENTIAL METHOD: ABNORMAL
EOSINOPHIL # BLD AUTO: 0 K/UL (ref 0–0.5)
EOSINOPHIL NFR BLD: 0 % (ref 0–8)
ERYTHROCYTE [DISTWIDTH] IN BLOOD BY AUTOMATED COUNT: 15.5 % (ref 11.5–14.5)
HCO3 UR-SCNC: 16.9 MMOL/L (ref 22–26)
HCT VFR BLD AUTO: 43.9 % (ref 40–54)
HGB BLD-MCNC: 14.2 G/DL (ref 14–18)
IMM GRANULOCYTES # BLD AUTO: 0.13 K/UL (ref 0–0.04)
IMM GRANULOCYTES NFR BLD AUTO: 1.1 % (ref 0–0.5)
LYMPHOCYTES # BLD AUTO: 0.8 K/UL (ref 1–4.8)
LYMPHOCYTES NFR BLD: 6.7 % (ref 18–48)
MCH RBC QN AUTO: 30.1 PG (ref 27–31)
MCHC RBC AUTO-ENTMCNC: 32.3 G/DL (ref 32–36)
MCV RBC AUTO: 93 FL (ref 82–98)
MONOCYTES # BLD AUTO: 0.8 K/UL (ref 0.3–1)
MONOCYTES NFR BLD: 6.3 % (ref 4–15)
NEUTROPHILS # BLD AUTO: 10.4 K/UL (ref 1.8–7.7)
NEUTROPHILS NFR BLD: 85.8 % (ref 38–73)
NRBC BLD-RTO: 0 /100 WBC
PCO2 BLDA: 26 MMHG (ref 35–45)
PH SMN: 7.42 [PH] (ref 7.35–7.45)
PLATELET # BLD AUTO: 361 K/UL (ref 150–350)
PMV BLD AUTO: 10.9 FL (ref 9.2–12.9)
PO2 BLDA: 64 MMHG (ref 75–100)
POC BE: -5.9 MMOL/L (ref -2–2)
POC COHB: 1.8 % (ref 0–3)
POC METHB: 0.7 % (ref 0–1.5)
POC O2HB ARTERIAL: 91.5 % (ref 94–100)
POC SATURATED O2: 93.8 % (ref 90–100)
POC TCO2: 17.7 MMOL/L
POC THB: 14.4 G/DL (ref 12–18)
RBC # BLD AUTO: 4.72 M/UL (ref 4.6–6.2)
SITE: ABNORMAL
WBC # BLD AUTO: 12.12 K/UL (ref 3.9–12.7)

## 2021-01-14 PROCEDURE — 94640 AIRWAY INHALATION TREATMENT: CPT

## 2021-01-14 PROCEDURE — 99232 SBSQ HOSP IP/OBS MODERATE 35: CPT | Mod: ,,, | Performed by: FAMILY MEDICINE

## 2021-01-14 PROCEDURE — 94761 N-INVAS EAR/PLS OXIMETRY MLT: CPT

## 2021-01-14 PROCEDURE — 36600 WITHDRAWAL OF ARTERIAL BLOOD: CPT

## 2021-01-14 PROCEDURE — 36415 COLL VENOUS BLD VENIPUNCTURE: CPT

## 2021-01-14 PROCEDURE — 27000221 HC OXYGEN, UP TO 24 HOURS

## 2021-01-14 PROCEDURE — 63600175 PHARM REV CODE 636 W HCPCS: Performed by: NURSE PRACTITIONER

## 2021-01-14 PROCEDURE — 97530 THERAPEUTIC ACTIVITIES: CPT

## 2021-01-14 PROCEDURE — 85025 COMPLETE CBC W/AUTO DIFF WBC: CPT

## 2021-01-14 PROCEDURE — 97535 SELF CARE MNGMENT TRAINING: CPT

## 2021-01-14 PROCEDURE — 11000001 HC ACUTE MED/SURG PRIVATE ROOM

## 2021-01-14 PROCEDURE — 99232 PR SUBSEQUENT HOSPITAL CARE,LEVL II: ICD-10-PCS | Mod: ,,, | Performed by: FAMILY MEDICINE

## 2021-01-14 PROCEDURE — 82803 BLOOD GASES ANY COMBINATION: CPT | Performed by: NURSE PRACTITIONER

## 2021-01-14 PROCEDURE — 99900035 HC TECH TIME PER 15 MIN (STAT)

## 2021-01-14 PROCEDURE — 25000003 PHARM REV CODE 250: Performed by: INTERNAL MEDICINE

## 2021-01-14 RX ADMIN — ZINC SULFATE 220 MG (50 MG) CAPSULE 220 MG: CAPSULE at 08:01

## 2021-01-14 RX ADMIN — ALBUTEROL SULFATE 2 PUFF: 90 AEROSOL, METERED RESPIRATORY (INHALATION) at 08:01

## 2021-01-14 RX ADMIN — CETIRIZINE HYDROCHLORIDE 10 MG: 10 TABLET, FILM COATED ORAL at 08:01

## 2021-01-14 RX ADMIN — MEMANTINE 10 MG: 10 TABLET ORAL at 09:01

## 2021-01-14 RX ADMIN — FLUTICASONE FUROATE AND VILANTEROL TRIFENATATE 1 PUFF: 200; 25 POWDER RESPIRATORY (INHALATION) at 07:01

## 2021-01-14 RX ADMIN — SIMVASTATIN 40 MG: 40 TABLET, FILM COATED ORAL at 09:01

## 2021-01-14 RX ADMIN — APIXABAN 5 MG: 5 TABLET, FILM COATED ORAL at 08:01

## 2021-01-14 RX ADMIN — MEMANTINE 10 MG: 10 TABLET ORAL at 08:01

## 2021-01-14 RX ADMIN — APIXABAN 5 MG: 5 TABLET, FILM COATED ORAL at 09:01

## 2021-01-14 RX ADMIN — ALBUTEROL SULFATE 2 PUFF: 90 AEROSOL, METERED RESPIRATORY (INHALATION) at 07:01

## 2021-01-14 RX ADMIN — VENLAFAXINE HYDROCHLORIDE 150 MG: 150 CAPSULE, EXTENDED RELEASE ORAL at 08:01

## 2021-01-14 RX ADMIN — DEXAMETHASONE 6 MG: 4 TABLET ORAL at 08:01

## 2021-01-14 RX ADMIN — LEVOTHYROXINE SODIUM 100 MCG: 100 TABLET ORAL at 05:01

## 2021-01-14 RX ADMIN — TAMSULOSIN HYDROCHLORIDE 0.4 MG: 0.4 CAPSULE ORAL at 08:01

## 2021-01-14 RX ADMIN — PANTOPRAZOLE SODIUM 40 MG: 40 TABLET, DELAYED RELEASE ORAL at 08:01

## 2021-01-14 RX ADMIN — ASPIRIN 81 MG: 81 TABLET, COATED ORAL at 08:01

## 2021-01-14 RX ADMIN — OXYCODONE HYDROCHLORIDE AND ACETAMINOPHEN 500 MG: 500 TABLET ORAL at 08:01

## 2021-01-14 RX ADMIN — ALBUTEROL SULFATE 2 PUFF: 90 AEROSOL, METERED RESPIRATORY (INHALATION) at 01:01

## 2021-01-14 RX ADMIN — CHOLECALCIFEROL TAB 25 MCG (1000 UNIT) 1000 UNITS: 25 TAB at 08:01

## 2021-01-14 RX ADMIN — ALLOPURINOL 300 MG: 100 TABLET ORAL at 08:01

## 2021-01-14 RX ADMIN — METOPROLOL SUCCINATE 50 MG: 50 TABLET, EXTENDED RELEASE ORAL at 08:01

## 2021-01-15 ENCOUNTER — TELEPHONE (OUTPATIENT)
Dept: INTERNAL MEDICINE | Facility: CLINIC | Age: 81
End: 2021-01-15

## 2021-01-15 LAB
ALBUMIN SERPL BCP-MCNC: 2.7 G/DL (ref 3.5–5.2)
ALP SERPL-CCNC: 104 U/L (ref 55–135)
ALT SERPL W/O P-5'-P-CCNC: 33 U/L (ref 10–44)
ANION GAP SERPL CALC-SCNC: 13 MMOL/L (ref 8–16)
AST SERPL-CCNC: 19 U/L (ref 10–40)
BASOPHILS # BLD AUTO: 0 K/UL (ref 0–0.2)
BASOPHILS NFR BLD: 0 % (ref 0–1.9)
BILIRUB SERPL-MCNC: 0.6 MG/DL (ref 0.1–1)
BUN SERPL-MCNC: 51 MG/DL (ref 8–23)
CALCIUM SERPL-MCNC: 9.5 MG/DL (ref 8.7–10.5)
CHLORIDE SERPL-SCNC: 109 MMOL/L (ref 95–110)
CO2 SERPL-SCNC: 17 MMOL/L (ref 23–29)
CREAT SERPL-MCNC: 1.2 MG/DL (ref 0.5–1.4)
DIFFERENTIAL METHOD: ABNORMAL
EOSINOPHIL # BLD AUTO: 0 K/UL (ref 0–0.5)
EOSINOPHIL NFR BLD: 0.1 % (ref 0–8)
ERYTHROCYTE [DISTWIDTH] IN BLOOD BY AUTOMATED COUNT: 15.6 % (ref 11.5–14.5)
EST. GFR  (AFRICAN AMERICAN): >60 ML/MIN/1.73 M^2
EST. GFR  (NON AFRICAN AMERICAN): 58 ML/MIN/1.73 M^2
GLUCOSE SERPL-MCNC: 103 MG/DL (ref 70–110)
HCT VFR BLD AUTO: 42 % (ref 40–54)
HGB BLD-MCNC: 13.4 G/DL (ref 14–18)
IMM GRANULOCYTES # BLD AUTO: 0.14 K/UL (ref 0–0.04)
IMM GRANULOCYTES NFR BLD AUTO: 1.2 % (ref 0–0.5)
LYMPHOCYTES # BLD AUTO: 0.7 K/UL (ref 1–4.8)
LYMPHOCYTES NFR BLD: 6.4 % (ref 18–48)
MCH RBC QN AUTO: 29.8 PG (ref 27–31)
MCHC RBC AUTO-ENTMCNC: 31.9 G/DL (ref 32–36)
MCV RBC AUTO: 94 FL (ref 82–98)
MONOCYTES # BLD AUTO: 0.6 K/UL (ref 0.3–1)
MONOCYTES NFR BLD: 4.9 % (ref 4–15)
NEUTROPHILS # BLD AUTO: 9.9 K/UL (ref 1.8–7.7)
NEUTROPHILS NFR BLD: 87.4 % (ref 38–73)
NRBC BLD-RTO: 0 /100 WBC
PLATELET # BLD AUTO: 318 K/UL (ref 150–350)
PMV BLD AUTO: 11.2 FL (ref 9.2–12.9)
POTASSIUM SERPL-SCNC: 4.3 MMOL/L (ref 3.5–5.1)
PROT SERPL-MCNC: 6 G/DL (ref 6–8.4)
RBC # BLD AUTO: 4.49 M/UL (ref 4.6–6.2)
SARS-COV-2 RNA RESP QL NAA+PROBE: DETECTED
SODIUM SERPL-SCNC: 139 MMOL/L (ref 136–145)
WBC # BLD AUTO: 11.32 K/UL (ref 3.9–12.7)

## 2021-01-15 PROCEDURE — 99232 SBSQ HOSP IP/OBS MODERATE 35: CPT | Mod: ,,, | Performed by: STUDENT IN AN ORGANIZED HEALTH CARE EDUCATION/TRAINING PROGRAM

## 2021-01-15 PROCEDURE — 94640 AIRWAY INHALATION TREATMENT: CPT

## 2021-01-15 PROCEDURE — 99232 PR SUBSEQUENT HOSPITAL CARE,LEVL II: ICD-10-PCS | Mod: ,,, | Performed by: STUDENT IN AN ORGANIZED HEALTH CARE EDUCATION/TRAINING PROGRAM

## 2021-01-15 PROCEDURE — 85025 COMPLETE CBC W/AUTO DIFF WBC: CPT

## 2021-01-15 PROCEDURE — 36415 COLL VENOUS BLD VENIPUNCTURE: CPT

## 2021-01-15 PROCEDURE — 63600175 PHARM REV CODE 636 W HCPCS: Performed by: FAMILY MEDICINE

## 2021-01-15 PROCEDURE — 86580 TB INTRADERMAL TEST: CPT | Performed by: FAMILY MEDICINE

## 2021-01-15 PROCEDURE — 25000003 PHARM REV CODE 250: Performed by: INTERNAL MEDICINE

## 2021-01-15 PROCEDURE — 27000221 HC OXYGEN, UP TO 24 HOURS

## 2021-01-15 PROCEDURE — G0008 ADMIN INFLUENZA VIRUS VAC: HCPCS | Performed by: FAMILY MEDICINE

## 2021-01-15 PROCEDURE — 80053 COMPREHEN METABOLIC PANEL: CPT

## 2021-01-15 PROCEDURE — 94761 N-INVAS EAR/PLS OXIMETRY MLT: CPT

## 2021-01-15 PROCEDURE — 97530 THERAPEUTIC ACTIVITIES: CPT

## 2021-01-15 PROCEDURE — 97535 SELF CARE MNGMENT TRAINING: CPT

## 2021-01-15 PROCEDURE — 63600175 PHARM REV CODE 636 W HCPCS: Performed by: NURSE PRACTITIONER

## 2021-01-15 PROCEDURE — 30200315 PPD INTRADERMAL TEST REV CODE 302: Performed by: FAMILY MEDICINE

## 2021-01-15 PROCEDURE — 94799 UNLISTED PULMONARY SVC/PX: CPT

## 2021-01-15 PROCEDURE — 90694 VACC AIIV4 NO PRSRV 0.5ML IM: CPT | Performed by: FAMILY MEDICINE

## 2021-01-15 PROCEDURE — 11000001 HC ACUTE MED/SURG PRIVATE ROOM

## 2021-01-15 PROCEDURE — 90471 IMMUNIZATION ADMIN: CPT | Performed by: FAMILY MEDICINE

## 2021-01-15 RX ADMIN — ALLOPURINOL 300 MG: 100 TABLET ORAL at 09:01

## 2021-01-15 RX ADMIN — VENLAFAXINE HYDROCHLORIDE 150 MG: 150 CAPSULE, EXTENDED RELEASE ORAL at 09:01

## 2021-01-15 RX ADMIN — CHOLECALCIFEROL TAB 25 MCG (1000 UNIT) 1000 UNITS: 25 TAB at 09:01

## 2021-01-15 RX ADMIN — PANTOPRAZOLE SODIUM 40 MG: 40 TABLET, DELAYED RELEASE ORAL at 09:01

## 2021-01-15 RX ADMIN — ALBUTEROL SULFATE 2 PUFF: 90 AEROSOL, METERED RESPIRATORY (INHALATION) at 07:01

## 2021-01-15 RX ADMIN — TUBERCULIN PURIFIED PROTEIN DERIVATIVE 5 UNITS: 5 INJECTION, SOLUTION INTRADERMAL at 12:01

## 2021-01-15 RX ADMIN — APIXABAN 5 MG: 5 TABLET, FILM COATED ORAL at 09:01

## 2021-01-15 RX ADMIN — CETIRIZINE HYDROCHLORIDE 10 MG: 10 TABLET, FILM COATED ORAL at 09:01

## 2021-01-15 RX ADMIN — ASPIRIN 81 MG: 81 TABLET, COATED ORAL at 09:01

## 2021-01-15 RX ADMIN — MEMANTINE 10 MG: 10 TABLET ORAL at 09:01

## 2021-01-15 RX ADMIN — ALBUTEROL SULFATE 2 PUFF: 90 AEROSOL, METERED RESPIRATORY (INHALATION) at 01:01

## 2021-01-15 RX ADMIN — ZINC SULFATE 220 MG (50 MG) CAPSULE 220 MG: CAPSULE at 09:01

## 2021-01-15 RX ADMIN — A/SINGAPORE/GP1908/2015 IVR-180 (AN A/MICHIGAN/45/2015 (H1N1)PDM09-LIKE VIRUS, A/HONG KONG/4801/2014, NYMC X-263B (H3N2) (AN A/HONG KONG/4801/2014-LIKE VIRUS), AND B/BRISBANE/60/2008, WILD TYPE (A B/BRISBANE/60/2008-LIKE VIRUS) 0.5 ML: 15; 15; 15 INJECTION, SUSPENSION INTRAMUSCULAR at 12:01

## 2021-01-15 RX ADMIN — SIMVASTATIN 40 MG: 40 TABLET, FILM COATED ORAL at 08:01

## 2021-01-15 RX ADMIN — DEXAMETHASONE 6 MG: 4 TABLET ORAL at 09:01

## 2021-01-15 RX ADMIN — OXYCODONE HYDROCHLORIDE AND ACETAMINOPHEN 500 MG: 500 TABLET ORAL at 09:01

## 2021-01-15 RX ADMIN — TAMSULOSIN HYDROCHLORIDE 0.4 MG: 0.4 CAPSULE ORAL at 09:01

## 2021-01-15 RX ADMIN — APIXABAN 5 MG: 5 TABLET, FILM COATED ORAL at 08:01

## 2021-01-15 RX ADMIN — METOPROLOL SUCCINATE 50 MG: 50 TABLET, EXTENDED RELEASE ORAL at 09:01

## 2021-01-15 RX ADMIN — FLUTICASONE FUROATE AND VILANTEROL TRIFENATATE 1 PUFF: 200; 25 POWDER RESPIRATORY (INHALATION) at 07:01

## 2021-01-15 RX ADMIN — MEMANTINE 10 MG: 10 TABLET ORAL at 08:01

## 2021-01-15 RX ADMIN — LEVOTHYROXINE SODIUM 100 MCG: 100 TABLET ORAL at 05:01

## 2021-01-16 LAB
BASOPHILS # BLD AUTO: 0.02 K/UL (ref 0–0.2)
BASOPHILS NFR BLD: 0.1 % (ref 0–1.9)
DIFFERENTIAL METHOD: ABNORMAL
EOSINOPHIL # BLD AUTO: 0 K/UL (ref 0–0.5)
EOSINOPHIL NFR BLD: 0 % (ref 0–8)
ERYTHROCYTE [DISTWIDTH] IN BLOOD BY AUTOMATED COUNT: 15.7 % (ref 11.5–14.5)
HCT VFR BLD AUTO: 45.4 % (ref 40–54)
HGB BLD-MCNC: 14.7 G/DL (ref 14–18)
IMM GRANULOCYTES # BLD AUTO: 0.19 K/UL (ref 0–0.04)
IMM GRANULOCYTES NFR BLD AUTO: 1.3 % (ref 0–0.5)
LYMPHOCYTES # BLD AUTO: 0.9 K/UL (ref 1–4.8)
LYMPHOCYTES NFR BLD: 6.2 % (ref 18–48)
MCH RBC QN AUTO: 30.3 PG (ref 27–31)
MCHC RBC AUTO-ENTMCNC: 32.4 G/DL (ref 32–36)
MCV RBC AUTO: 94 FL (ref 82–98)
MONOCYTES # BLD AUTO: 0.9 K/UL (ref 0.3–1)
MONOCYTES NFR BLD: 5.8 % (ref 4–15)
NEUTROPHILS # BLD AUTO: 12.9 K/UL (ref 1.8–7.7)
NEUTROPHILS NFR BLD: 86.6 % (ref 38–73)
NRBC BLD-RTO: 0 /100 WBC
PLATELET # BLD AUTO: 295 K/UL (ref 150–350)
PMV BLD AUTO: 11.1 FL (ref 9.2–12.9)
RBC # BLD AUTO: 4.85 M/UL (ref 4.6–6.2)
WBC # BLD AUTO: 14.93 K/UL (ref 3.9–12.7)

## 2021-01-16 PROCEDURE — 36415 COLL VENOUS BLD VENIPUNCTURE: CPT

## 2021-01-16 PROCEDURE — 99232 SBSQ HOSP IP/OBS MODERATE 35: CPT | Mod: ,,, | Performed by: STUDENT IN AN ORGANIZED HEALTH CARE EDUCATION/TRAINING PROGRAM

## 2021-01-16 PROCEDURE — 85025 COMPLETE CBC W/AUTO DIFF WBC: CPT

## 2021-01-16 PROCEDURE — 25000003 PHARM REV CODE 250: Performed by: INTERNAL MEDICINE

## 2021-01-16 PROCEDURE — 94761 N-INVAS EAR/PLS OXIMETRY MLT: CPT

## 2021-01-16 PROCEDURE — 63600175 PHARM REV CODE 636 W HCPCS: Performed by: NURSE PRACTITIONER

## 2021-01-16 PROCEDURE — 94799 UNLISTED PULMONARY SVC/PX: CPT

## 2021-01-16 PROCEDURE — 27000221 HC OXYGEN, UP TO 24 HOURS

## 2021-01-16 PROCEDURE — 99232 PR SUBSEQUENT HOSPITAL CARE,LEVL II: ICD-10-PCS | Mod: ,,, | Performed by: STUDENT IN AN ORGANIZED HEALTH CARE EDUCATION/TRAINING PROGRAM

## 2021-01-16 PROCEDURE — 11000001 HC ACUTE MED/SURG PRIVATE ROOM

## 2021-01-16 PROCEDURE — 94640 AIRWAY INHALATION TREATMENT: CPT

## 2021-01-16 RX ORDER — FLUTICASONE FUROATE AND VILANTEROL 200; 25 UG/1; UG/1
1 POWDER RESPIRATORY (INHALATION) DAILY
Status: DISCONTINUED | OUTPATIENT
Start: 2021-01-17 | End: 2021-01-20 | Stop reason: HOSPADM

## 2021-01-16 RX ADMIN — ZINC SULFATE 220 MG (50 MG) CAPSULE 220 MG: CAPSULE at 09:01

## 2021-01-16 RX ADMIN — MEMANTINE 10 MG: 10 TABLET ORAL at 09:01

## 2021-01-16 RX ADMIN — ALLOPURINOL 300 MG: 100 TABLET ORAL at 09:01

## 2021-01-16 RX ADMIN — SIMVASTATIN 40 MG: 40 TABLET, FILM COATED ORAL at 09:01

## 2021-01-16 RX ADMIN — LEVOTHYROXINE SODIUM 100 MCG: 100 TABLET ORAL at 05:01

## 2021-01-16 RX ADMIN — OXYCODONE HYDROCHLORIDE AND ACETAMINOPHEN 500 MG: 500 TABLET ORAL at 09:01

## 2021-01-16 RX ADMIN — ALBUTEROL SULFATE 2 PUFF: 90 AEROSOL, METERED RESPIRATORY (INHALATION) at 07:01

## 2021-01-16 RX ADMIN — PANTOPRAZOLE SODIUM 40 MG: 40 TABLET, DELAYED RELEASE ORAL at 09:01

## 2021-01-16 RX ADMIN — APIXABAN 5 MG: 5 TABLET, FILM COATED ORAL at 09:01

## 2021-01-16 RX ADMIN — FLUTICASONE FUROATE AND VILANTEROL TRIFENATATE 1 PUFF: 200; 25 POWDER RESPIRATORY (INHALATION) at 07:01

## 2021-01-16 RX ADMIN — METOPROLOL SUCCINATE 50 MG: 50 TABLET, EXTENDED RELEASE ORAL at 09:01

## 2021-01-16 RX ADMIN — VENLAFAXINE HYDROCHLORIDE 150 MG: 150 CAPSULE, EXTENDED RELEASE ORAL at 09:01

## 2021-01-16 RX ADMIN — DEXAMETHASONE 6 MG: 4 TABLET ORAL at 09:01

## 2021-01-16 RX ADMIN — ALBUTEROL SULFATE 2 PUFF: 90 AEROSOL, METERED RESPIRATORY (INHALATION) at 03:01

## 2021-01-16 RX ADMIN — CETIRIZINE HYDROCHLORIDE 10 MG: 10 TABLET, FILM COATED ORAL at 09:01

## 2021-01-16 RX ADMIN — CHOLECALCIFEROL TAB 25 MCG (1000 UNIT) 1000 UNITS: 25 TAB at 09:01

## 2021-01-16 RX ADMIN — ASPIRIN 81 MG: 81 TABLET, COATED ORAL at 09:01

## 2021-01-16 RX ADMIN — TAMSULOSIN HYDROCHLORIDE 0.4 MG: 0.4 CAPSULE ORAL at 09:01

## 2021-01-17 LAB
BASOPHILS # BLD AUTO: 0.03 K/UL (ref 0–0.2)
BASOPHILS NFR BLD: 0.2 % (ref 0–1.9)
DIFFERENTIAL METHOD: ABNORMAL
EOSINOPHIL # BLD AUTO: 0 K/UL (ref 0–0.5)
EOSINOPHIL NFR BLD: 0 % (ref 0–8)
ERYTHROCYTE [DISTWIDTH] IN BLOOD BY AUTOMATED COUNT: 15.7 % (ref 11.5–14.5)
HCT VFR BLD AUTO: 45.6 % (ref 40–54)
HGB BLD-MCNC: 14.8 G/DL (ref 14–18)
IMM GRANULOCYTES # BLD AUTO: 0.26 K/UL (ref 0–0.04)
IMM GRANULOCYTES NFR BLD AUTO: 1.4 % (ref 0–0.5)
LYMPHOCYTES # BLD AUTO: 1 K/UL (ref 1–4.8)
LYMPHOCYTES NFR BLD: 5.7 % (ref 18–48)
MCH RBC QN AUTO: 30.3 PG (ref 27–31)
MCHC RBC AUTO-ENTMCNC: 32.5 G/DL (ref 32–36)
MCV RBC AUTO: 93 FL (ref 82–98)
MONOCYTES # BLD AUTO: 1.2 K/UL (ref 0.3–1)
MONOCYTES NFR BLD: 6.6 % (ref 4–15)
NEUTROPHILS # BLD AUTO: 15.7 K/UL (ref 1.8–7.7)
NEUTROPHILS NFR BLD: 86.1 % (ref 38–73)
NRBC BLD-RTO: 0 /100 WBC
PLATELET # BLD AUTO: 297 K/UL (ref 150–350)
PMV BLD AUTO: 11.2 FL (ref 9.2–12.9)
RBC # BLD AUTO: 4.89 M/UL (ref 4.6–6.2)
TB INDURATION 48 - 72 HR READ: 0 MM
WBC # BLD AUTO: 18.2 K/UL (ref 3.9–12.7)

## 2021-01-17 PROCEDURE — 25000003 PHARM REV CODE 250: Performed by: INTERNAL MEDICINE

## 2021-01-17 PROCEDURE — 25000242 PHARM REV CODE 250 ALT 637 W/ HCPCS: Performed by: NURSE PRACTITIONER

## 2021-01-17 PROCEDURE — 27000221 HC OXYGEN, UP TO 24 HOURS

## 2021-01-17 PROCEDURE — 94761 N-INVAS EAR/PLS OXIMETRY MLT: CPT

## 2021-01-17 PROCEDURE — 63600175 PHARM REV CODE 636 W HCPCS: Performed by: NURSE PRACTITIONER

## 2021-01-17 PROCEDURE — 36415 COLL VENOUS BLD VENIPUNCTURE: CPT

## 2021-01-17 PROCEDURE — 99900035 HC TECH TIME PER 15 MIN (STAT)

## 2021-01-17 PROCEDURE — 99232 SBSQ HOSP IP/OBS MODERATE 35: CPT | Mod: ,,, | Performed by: STUDENT IN AN ORGANIZED HEALTH CARE EDUCATION/TRAINING PROGRAM

## 2021-01-17 PROCEDURE — 94660 CPAP INITIATION&MGMT: CPT

## 2021-01-17 PROCEDURE — 85025 COMPLETE CBC W/AUTO DIFF WBC: CPT

## 2021-01-17 PROCEDURE — 99232 PR SUBSEQUENT HOSPITAL CARE,LEVL II: ICD-10-PCS | Mod: ,,, | Performed by: STUDENT IN AN ORGANIZED HEALTH CARE EDUCATION/TRAINING PROGRAM

## 2021-01-17 PROCEDURE — 11000001 HC ACUTE MED/SURG PRIVATE ROOM

## 2021-01-17 PROCEDURE — 94640 AIRWAY INHALATION TREATMENT: CPT

## 2021-01-17 RX ADMIN — APIXABAN 5 MG: 5 TABLET, FILM COATED ORAL at 08:01

## 2021-01-17 RX ADMIN — ASPIRIN 81 MG: 81 TABLET, COATED ORAL at 09:01

## 2021-01-17 RX ADMIN — PANTOPRAZOLE SODIUM 40 MG: 40 TABLET, DELAYED RELEASE ORAL at 09:01

## 2021-01-17 RX ADMIN — ALLOPURINOL 300 MG: 100 TABLET ORAL at 09:01

## 2021-01-17 RX ADMIN — MEMANTINE 10 MG: 10 TABLET ORAL at 08:01

## 2021-01-17 RX ADMIN — VENLAFAXINE HYDROCHLORIDE 150 MG: 150 CAPSULE, EXTENDED RELEASE ORAL at 09:01

## 2021-01-17 RX ADMIN — DEXAMETHASONE 6 MG: 4 TABLET ORAL at 09:01

## 2021-01-17 RX ADMIN — BENZONATATE 200 MG: 100 CAPSULE ORAL at 08:01

## 2021-01-17 RX ADMIN — CHOLECALCIFEROL TAB 25 MCG (1000 UNIT) 1000 UNITS: 25 TAB at 09:01

## 2021-01-17 RX ADMIN — ALBUTEROL SULFATE 2 PUFF: 90 AEROSOL, METERED RESPIRATORY (INHALATION) at 01:01

## 2021-01-17 RX ADMIN — MEMANTINE 10 MG: 10 TABLET ORAL at 09:01

## 2021-01-17 RX ADMIN — APIXABAN 5 MG: 5 TABLET, FILM COATED ORAL at 09:01

## 2021-01-17 RX ADMIN — SIMVASTATIN 40 MG: 40 TABLET, FILM COATED ORAL at 08:01

## 2021-01-17 RX ADMIN — LEVOTHYROXINE SODIUM 100 MCG: 100 TABLET ORAL at 04:01

## 2021-01-17 RX ADMIN — METOPROLOL SUCCINATE 50 MG: 50 TABLET, EXTENDED RELEASE ORAL at 09:01

## 2021-01-17 RX ADMIN — CETIRIZINE HYDROCHLORIDE 10 MG: 10 TABLET, FILM COATED ORAL at 09:01

## 2021-01-17 RX ADMIN — FLUTICASONE FUROATE AND VILANTEROL TRIFENATATE 1 PUFF: 200; 25 POWDER RESPIRATORY (INHALATION) at 07:01

## 2021-01-17 RX ADMIN — ZINC SULFATE 220 MG (50 MG) CAPSULE 220 MG: CAPSULE at 09:01

## 2021-01-17 RX ADMIN — ALBUTEROL SULFATE 2 PUFF: 90 AEROSOL, METERED RESPIRATORY (INHALATION) at 07:01

## 2021-01-17 RX ADMIN — TAMSULOSIN HYDROCHLORIDE 0.4 MG: 0.4 CAPSULE ORAL at 09:01

## 2021-01-17 RX ADMIN — OXYCODONE HYDROCHLORIDE AND ACETAMINOPHEN 500 MG: 500 TABLET ORAL at 09:01

## 2021-01-18 PROBLEM — I95.0 IDIOPATHIC HYPOTENSION: Status: ACTIVE | Noted: 2021-01-18

## 2021-01-18 LAB
ALBUMIN SERPL BCP-MCNC: 2.9 G/DL (ref 3.5–5.2)
ALP SERPL-CCNC: 115 U/L (ref 55–135)
ALT SERPL W/O P-5'-P-CCNC: 41 U/L (ref 10–44)
ANION GAP SERPL CALC-SCNC: 12 MMOL/L (ref 8–16)
AST SERPL-CCNC: 31 U/L (ref 10–40)
BASOPHILS # BLD AUTO: 0.02 K/UL (ref 0–0.2)
BASOPHILS NFR BLD: 0.1 % (ref 0–1.9)
BILIRUB SERPL-MCNC: 0.7 MG/DL (ref 0.1–1)
BUN SERPL-MCNC: 49 MG/DL (ref 8–23)
CALCIUM SERPL-MCNC: 9.6 MG/DL (ref 8.7–10.5)
CHLORIDE SERPL-SCNC: 107 MMOL/L (ref 95–110)
CO2 SERPL-SCNC: 18 MMOL/L (ref 23–29)
CREAT SERPL-MCNC: 1.3 MG/DL (ref 0.5–1.4)
DIFFERENTIAL METHOD: ABNORMAL
EOSINOPHIL # BLD AUTO: 0 K/UL (ref 0–0.5)
EOSINOPHIL NFR BLD: 0 % (ref 0–8)
ERYTHROCYTE [DISTWIDTH] IN BLOOD BY AUTOMATED COUNT: 15.9 % (ref 11.5–14.5)
EST. GFR  (AFRICAN AMERICAN): >60 ML/MIN/1.73 M^2
EST. GFR  (NON AFRICAN AMERICAN): 52 ML/MIN/1.73 M^2
GLUCOSE SERPL-MCNC: 101 MG/DL (ref 70–110)
HCT VFR BLD AUTO: 44.9 % (ref 40–54)
HGB BLD-MCNC: 14.7 G/DL (ref 14–18)
IMM GRANULOCYTES # BLD AUTO: 0.29 K/UL (ref 0–0.04)
IMM GRANULOCYTES NFR BLD AUTO: 1.8 % (ref 0–0.5)
LYMPHOCYTES # BLD AUTO: 0.9 K/UL (ref 1–4.8)
LYMPHOCYTES NFR BLD: 5.6 % (ref 18–48)
MCH RBC QN AUTO: 30.6 PG (ref 27–31)
MCHC RBC AUTO-ENTMCNC: 32.7 G/DL (ref 32–36)
MCV RBC AUTO: 94 FL (ref 82–98)
MONOCYTES # BLD AUTO: 0.9 K/UL (ref 0.3–1)
MONOCYTES NFR BLD: 5.3 % (ref 4–15)
NEUTROPHILS # BLD AUTO: 14.3 K/UL (ref 1.8–7.7)
NEUTROPHILS NFR BLD: 87.2 % (ref 38–73)
NRBC BLD-RTO: 0 /100 WBC
PLATELET # BLD AUTO: 271 K/UL (ref 150–350)
PMV BLD AUTO: 11.5 FL (ref 9.2–12.9)
POTASSIUM SERPL-SCNC: 5.1 MMOL/L (ref 3.5–5.1)
PROT SERPL-MCNC: 6.6 G/DL (ref 6–8.4)
RBC # BLD AUTO: 4.8 M/UL (ref 4.6–6.2)
SARS-COV-2 RDRP RESP QL NAA+PROBE: POSITIVE
SODIUM SERPL-SCNC: 137 MMOL/L (ref 136–145)
WBC # BLD AUTO: 16.36 K/UL (ref 3.9–12.7)

## 2021-01-18 PROCEDURE — 94660 CPAP INITIATION&MGMT: CPT

## 2021-01-18 PROCEDURE — U0002 COVID-19 LAB TEST NON-CDC: HCPCS

## 2021-01-18 PROCEDURE — 94640 AIRWAY INHALATION TREATMENT: CPT

## 2021-01-18 PROCEDURE — 99233 SBSQ HOSP IP/OBS HIGH 50: CPT | Mod: ,,, | Performed by: INTERNAL MEDICINE

## 2021-01-18 PROCEDURE — 99233 PR SUBSEQUENT HOSPITAL CARE,LEVL III: ICD-10-PCS | Mod: ,,, | Performed by: INTERNAL MEDICINE

## 2021-01-18 PROCEDURE — 25000003 PHARM REV CODE 250: Performed by: INTERNAL MEDICINE

## 2021-01-18 PROCEDURE — 80053 COMPREHEN METABOLIC PANEL: CPT

## 2021-01-18 PROCEDURE — 63600175 PHARM REV CODE 636 W HCPCS: Performed by: NURSE PRACTITIONER

## 2021-01-18 PROCEDURE — 11000001 HC ACUTE MED/SURG PRIVATE ROOM

## 2021-01-18 PROCEDURE — 99900035 HC TECH TIME PER 15 MIN (STAT)

## 2021-01-18 PROCEDURE — 27000221 HC OXYGEN, UP TO 24 HOURS

## 2021-01-18 PROCEDURE — 36415 COLL VENOUS BLD VENIPUNCTURE: CPT

## 2021-01-18 PROCEDURE — 97530 THERAPEUTIC ACTIVITIES: CPT

## 2021-01-18 PROCEDURE — 94799 UNLISTED PULMONARY SVC/PX: CPT

## 2021-01-18 PROCEDURE — 85025 COMPLETE CBC W/AUTO DIFF WBC: CPT

## 2021-01-18 PROCEDURE — 94761 N-INVAS EAR/PLS OXIMETRY MLT: CPT

## 2021-01-18 RX ADMIN — FLUTICASONE FUROATE AND VILANTEROL TRIFENATATE 1 PUFF: 200; 25 POWDER RESPIRATORY (INHALATION) at 08:01

## 2021-01-18 RX ADMIN — ALLOPURINOL 300 MG: 100 TABLET ORAL at 10:01

## 2021-01-18 RX ADMIN — MEMANTINE 10 MG: 10 TABLET ORAL at 10:01

## 2021-01-18 RX ADMIN — ALBUTEROL SULFATE 2 PUFF: 90 AEROSOL, METERED RESPIRATORY (INHALATION) at 01:01

## 2021-01-18 RX ADMIN — METOPROLOL SUCCINATE 50 MG: 50 TABLET, EXTENDED RELEASE ORAL at 10:01

## 2021-01-18 RX ADMIN — DEXAMETHASONE 6 MG: 4 TABLET ORAL at 10:01

## 2021-01-18 RX ADMIN — VENLAFAXINE HYDROCHLORIDE 150 MG: 150 CAPSULE, EXTENDED RELEASE ORAL at 10:01

## 2021-01-18 RX ADMIN — CHOLECALCIFEROL TAB 25 MCG (1000 UNIT) 1000 UNITS: 25 TAB at 10:01

## 2021-01-18 RX ADMIN — ALBUTEROL SULFATE 2 PUFF: 90 AEROSOL, METERED RESPIRATORY (INHALATION) at 07:01

## 2021-01-18 RX ADMIN — ZINC SULFATE 220 MG (50 MG) CAPSULE 220 MG: CAPSULE at 10:01

## 2021-01-18 RX ADMIN — ASPIRIN 81 MG: 81 TABLET, COATED ORAL at 10:01

## 2021-01-18 RX ADMIN — SIMVASTATIN 40 MG: 40 TABLET, FILM COATED ORAL at 08:01

## 2021-01-18 RX ADMIN — APIXABAN 5 MG: 5 TABLET, FILM COATED ORAL at 08:01

## 2021-01-18 RX ADMIN — MEMANTINE 10 MG: 10 TABLET ORAL at 08:01

## 2021-01-18 RX ADMIN — SODIUM CHLORIDE 500 ML: 0.9 INJECTION, SOLUTION INTRAVENOUS at 12:01

## 2021-01-18 RX ADMIN — LEVOTHYROXINE SODIUM 100 MCG: 100 TABLET ORAL at 05:01

## 2021-01-18 RX ADMIN — PANTOPRAZOLE SODIUM 40 MG: 40 TABLET, DELAYED RELEASE ORAL at 10:01

## 2021-01-18 RX ADMIN — OXYCODONE HYDROCHLORIDE AND ACETAMINOPHEN 500 MG: 500 TABLET ORAL at 10:01

## 2021-01-18 RX ADMIN — CETIRIZINE HYDROCHLORIDE 10 MG: 10 TABLET, FILM COATED ORAL at 10:01

## 2021-01-18 RX ADMIN — TAMSULOSIN HYDROCHLORIDE 0.4 MG: 0.4 CAPSULE ORAL at 10:01

## 2021-01-18 RX ADMIN — APIXABAN 5 MG: 5 TABLET, FILM COATED ORAL at 10:01

## 2021-01-19 LAB
BASOPHILS # BLD AUTO: 0.03 K/UL (ref 0–0.2)
BASOPHILS NFR BLD: 0.2 % (ref 0–1.9)
DIFFERENTIAL METHOD: ABNORMAL
EOSINOPHIL # BLD AUTO: 0 K/UL (ref 0–0.5)
EOSINOPHIL NFR BLD: 0.1 % (ref 0–8)
ERYTHROCYTE [DISTWIDTH] IN BLOOD BY AUTOMATED COUNT: 15.9 % (ref 11.5–14.5)
HCT VFR BLD AUTO: 46.9 % (ref 40–54)
HGB BLD-MCNC: 14.8 G/DL (ref 14–18)
IMM GRANULOCYTES # BLD AUTO: 0.19 K/UL (ref 0–0.04)
IMM GRANULOCYTES NFR BLD AUTO: 1.1 % (ref 0–0.5)
LYMPHOCYTES # BLD AUTO: 0.9 K/UL (ref 1–4.8)
LYMPHOCYTES NFR BLD: 5.6 % (ref 18–48)
MCH RBC QN AUTO: 29.8 PG (ref 27–31)
MCHC RBC AUTO-ENTMCNC: 31.6 G/DL (ref 32–36)
MCV RBC AUTO: 95 FL (ref 82–98)
MONOCYTES # BLD AUTO: 0.9 K/UL (ref 0.3–1)
MONOCYTES NFR BLD: 5.3 % (ref 4–15)
NEUTROPHILS # BLD AUTO: 14.7 K/UL (ref 1.8–7.7)
NEUTROPHILS NFR BLD: 87.7 % (ref 38–73)
NRBC BLD-RTO: 0 /100 WBC
PLATELET # BLD AUTO: 244 K/UL (ref 150–350)
PMV BLD AUTO: 11.9 FL (ref 9.2–12.9)
RBC # BLD AUTO: 4.96 M/UL (ref 4.6–6.2)
WBC # BLD AUTO: 16.71 K/UL (ref 3.9–12.7)

## 2021-01-19 PROCEDURE — 99232 SBSQ HOSP IP/OBS MODERATE 35: CPT | Mod: ,,, | Performed by: INTERNAL MEDICINE

## 2021-01-19 PROCEDURE — 97530 THERAPEUTIC ACTIVITIES: CPT

## 2021-01-19 PROCEDURE — 27000221 HC OXYGEN, UP TO 24 HOURS

## 2021-01-19 PROCEDURE — 94660 CPAP INITIATION&MGMT: CPT

## 2021-01-19 PROCEDURE — 85025 COMPLETE CBC W/AUTO DIFF WBC: CPT

## 2021-01-19 PROCEDURE — 94761 N-INVAS EAR/PLS OXIMETRY MLT: CPT

## 2021-01-19 PROCEDURE — 27000190 HC CPAP FULL FACE MASK W/VALVE

## 2021-01-19 PROCEDURE — 99232 PR SUBSEQUENT HOSPITAL CARE,LEVL II: ICD-10-PCS | Mod: ,,, | Performed by: INTERNAL MEDICINE

## 2021-01-19 PROCEDURE — 63600175 PHARM REV CODE 636 W HCPCS: Performed by: NURSE PRACTITIONER

## 2021-01-19 PROCEDURE — 99900035 HC TECH TIME PER 15 MIN (STAT)

## 2021-01-19 PROCEDURE — 94640 AIRWAY INHALATION TREATMENT: CPT

## 2021-01-19 PROCEDURE — 11000001 HC ACUTE MED/SURG PRIVATE ROOM

## 2021-01-19 PROCEDURE — 25000003 PHARM REV CODE 250: Performed by: INTERNAL MEDICINE

## 2021-01-19 PROCEDURE — 36415 COLL VENOUS BLD VENIPUNCTURE: CPT

## 2021-01-19 RX ADMIN — ASPIRIN 81 MG: 81 TABLET, COATED ORAL at 09:01

## 2021-01-19 RX ADMIN — CETIRIZINE HYDROCHLORIDE 10 MG: 10 TABLET, FILM COATED ORAL at 09:01

## 2021-01-19 RX ADMIN — PANTOPRAZOLE SODIUM 40 MG: 40 TABLET, DELAYED RELEASE ORAL at 09:01

## 2021-01-19 RX ADMIN — CHOLECALCIFEROL TAB 25 MCG (1000 UNIT) 1000 UNITS: 25 TAB at 09:01

## 2021-01-19 RX ADMIN — ALLOPURINOL 300 MG: 100 TABLET ORAL at 09:01

## 2021-01-19 RX ADMIN — APIXABAN 5 MG: 5 TABLET, FILM COATED ORAL at 08:01

## 2021-01-19 RX ADMIN — VENLAFAXINE HYDROCHLORIDE 150 MG: 150 CAPSULE, EXTENDED RELEASE ORAL at 09:01

## 2021-01-19 RX ADMIN — METOPROLOL SUCCINATE 50 MG: 50 TABLET, EXTENDED RELEASE ORAL at 09:01

## 2021-01-19 RX ADMIN — TAMSULOSIN HYDROCHLORIDE 0.4 MG: 0.4 CAPSULE ORAL at 09:01

## 2021-01-19 RX ADMIN — SIMVASTATIN 40 MG: 40 TABLET, FILM COATED ORAL at 08:01

## 2021-01-19 RX ADMIN — ALBUTEROL SULFATE 2 PUFF: 90 AEROSOL, METERED RESPIRATORY (INHALATION) at 07:01

## 2021-01-19 RX ADMIN — LEVOTHYROXINE SODIUM 100 MCG: 100 TABLET ORAL at 05:01

## 2021-01-19 RX ADMIN — APIXABAN 5 MG: 5 TABLET, FILM COATED ORAL at 09:01

## 2021-01-19 RX ADMIN — OXYCODONE HYDROCHLORIDE AND ACETAMINOPHEN 500 MG: 500 TABLET ORAL at 09:01

## 2021-01-19 RX ADMIN — ALBUTEROL SULFATE 2 PUFF: 90 AEROSOL, METERED RESPIRATORY (INHALATION) at 01:01

## 2021-01-19 RX ADMIN — MEMANTINE 10 MG: 10 TABLET ORAL at 08:01

## 2021-01-19 RX ADMIN — ZINC SULFATE 220 MG (50 MG) CAPSULE 220 MG: CAPSULE at 09:01

## 2021-01-19 RX ADMIN — MEMANTINE 10 MG: 10 TABLET ORAL at 09:01

## 2021-01-19 RX ADMIN — FLUTICASONE FUROATE AND VILANTEROL TRIFENATATE 1 PUFF: 200; 25 POWDER RESPIRATORY (INHALATION) at 07:01

## 2021-01-19 RX ADMIN — DEXAMETHASONE 6 MG: 4 TABLET ORAL at 09:01

## 2021-01-20 VITALS
BODY MASS INDEX: 38.92 KG/M2 | HEART RATE: 77 BPM | HEIGHT: 67 IN | SYSTOLIC BLOOD PRESSURE: 108 MMHG | DIASTOLIC BLOOD PRESSURE: 63 MMHG | OXYGEN SATURATION: 91 % | RESPIRATION RATE: 18 BRPM | TEMPERATURE: 98 F | WEIGHT: 248 LBS

## 2021-01-20 DIAGNOSIS — U07.1 COVID-19 VIRUS DETECTED: ICD-10-CM

## 2021-01-20 LAB
BASOPHILS # BLD AUTO: 0.02 K/UL (ref 0–0.2)
BASOPHILS NFR BLD: 0.1 % (ref 0–1.9)
DIFFERENTIAL METHOD: ABNORMAL
EOSINOPHIL # BLD AUTO: 0 K/UL (ref 0–0.5)
EOSINOPHIL NFR BLD: 0.1 % (ref 0–8)
ERYTHROCYTE [DISTWIDTH] IN BLOOD BY AUTOMATED COUNT: 15.5 % (ref 11.5–14.5)
HCT VFR BLD AUTO: 45.1 % (ref 40–54)
HGB BLD-MCNC: 14.6 G/DL (ref 14–18)
IMM GRANULOCYTES # BLD AUTO: 0.2 K/UL (ref 0–0.04)
IMM GRANULOCYTES NFR BLD AUTO: 1.3 % (ref 0–0.5)
LYMPHOCYTES # BLD AUTO: 0.9 K/UL (ref 1–4.8)
LYMPHOCYTES NFR BLD: 5.9 % (ref 18–48)
MCH RBC QN AUTO: 30.3 PG (ref 27–31)
MCHC RBC AUTO-ENTMCNC: 32.4 G/DL (ref 32–36)
MCV RBC AUTO: 94 FL (ref 82–98)
MONOCYTES # BLD AUTO: 0.8 K/UL (ref 0.3–1)
MONOCYTES NFR BLD: 5.4 % (ref 4–15)
NEUTROPHILS # BLD AUTO: 13.1 K/UL (ref 1.8–7.7)
NEUTROPHILS NFR BLD: 87.2 % (ref 38–73)
NRBC BLD-RTO: 0 /100 WBC
PLATELET # BLD AUTO: 224 K/UL (ref 150–350)
PMV BLD AUTO: 11.6 FL (ref 9.2–12.9)
RBC # BLD AUTO: 4.82 M/UL (ref 4.6–6.2)
WBC # BLD AUTO: 15.03 K/UL (ref 3.9–12.7)

## 2021-01-20 PROCEDURE — 99239 PR HOSPITAL DISCHARGE DAY,>30 MIN: ICD-10-PCS | Mod: ,,, | Performed by: FAMILY MEDICINE

## 2021-01-20 PROCEDURE — 94760 N-INVAS EAR/PLS OXIMETRY 1: CPT

## 2021-01-20 PROCEDURE — 94640 AIRWAY INHALATION TREATMENT: CPT

## 2021-01-20 PROCEDURE — 94761 N-INVAS EAR/PLS OXIMETRY MLT: CPT

## 2021-01-20 PROCEDURE — 99239 HOSP IP/OBS DSCHRG MGMT >30: CPT | Mod: ,,, | Performed by: FAMILY MEDICINE

## 2021-01-20 PROCEDURE — 94799 UNLISTED PULMONARY SVC/PX: CPT

## 2021-01-20 PROCEDURE — 27000221 HC OXYGEN, UP TO 24 HOURS

## 2021-01-20 PROCEDURE — 36415 COLL VENOUS BLD VENIPUNCTURE: CPT

## 2021-01-20 PROCEDURE — 94660 CPAP INITIATION&MGMT: CPT

## 2021-01-20 PROCEDURE — 25000003 PHARM REV CODE 250: Performed by: INTERNAL MEDICINE

## 2021-01-20 PROCEDURE — 99900035 HC TECH TIME PER 15 MIN (STAT)

## 2021-01-20 PROCEDURE — 85025 COMPLETE CBC W/AUTO DIFF WBC: CPT

## 2021-01-20 RX ADMIN — CHOLECALCIFEROL TAB 25 MCG (1000 UNIT) 1000 UNITS: 25 TAB at 09:01

## 2021-01-20 RX ADMIN — ZINC SULFATE 220 MG (50 MG) CAPSULE 220 MG: CAPSULE at 08:01

## 2021-01-20 RX ADMIN — PANTOPRAZOLE SODIUM 40 MG: 40 TABLET, DELAYED RELEASE ORAL at 08:01

## 2021-01-20 RX ADMIN — CETIRIZINE HYDROCHLORIDE 10 MG: 10 TABLET, FILM COATED ORAL at 08:01

## 2021-01-20 RX ADMIN — ALLOPURINOL 300 MG: 100 TABLET ORAL at 08:01

## 2021-01-20 RX ADMIN — TAMSULOSIN HYDROCHLORIDE 0.4 MG: 0.4 CAPSULE ORAL at 08:01

## 2021-01-20 RX ADMIN — VENLAFAXINE HYDROCHLORIDE 150 MG: 150 CAPSULE, EXTENDED RELEASE ORAL at 08:01

## 2021-01-20 RX ADMIN — OXYCODONE HYDROCHLORIDE AND ACETAMINOPHEN 500 MG: 500 TABLET ORAL at 08:01

## 2021-01-20 RX ADMIN — ASPIRIN 81 MG: 81 TABLET, COATED ORAL at 08:01

## 2021-01-20 RX ADMIN — LEVOTHYROXINE SODIUM 100 MCG: 100 TABLET ORAL at 05:01

## 2021-01-20 RX ADMIN — ALBUTEROL SULFATE 2 PUFF: 90 AEROSOL, METERED RESPIRATORY (INHALATION) at 01:01

## 2021-01-20 RX ADMIN — ALBUTEROL SULFATE 2 PUFF: 90 AEROSOL, METERED RESPIRATORY (INHALATION) at 07:01

## 2021-01-20 RX ADMIN — FLUTICASONE FUROATE AND VILANTEROL TRIFENATATE 1 PUFF: 200; 25 POWDER RESPIRATORY (INHALATION) at 07:01

## 2021-01-20 RX ADMIN — APIXABAN 5 MG: 5 TABLET, FILM COATED ORAL at 08:01

## 2021-01-20 RX ADMIN — METOPROLOL SUCCINATE 50 MG: 50 TABLET, EXTENDED RELEASE ORAL at 08:01

## 2021-01-20 RX ADMIN — MEMANTINE 10 MG: 10 TABLET ORAL at 08:01

## 2021-01-26 ENCOUNTER — HOSPITAL ENCOUNTER (INPATIENT)
Facility: HOSPITAL | Age: 81
LOS: 8 days | Discharge: NURSING, REHAB OR LONG-TERM CARE | DRG: 871 | End: 2021-02-03
Attending: EMERGENCY MEDICINE | Admitting: INTERNAL MEDICINE
Payer: MEDICARE

## 2021-01-26 DIAGNOSIS — R53.81 DEBILITY: ICD-10-CM

## 2021-01-26 DIAGNOSIS — Z20.822 SUSPECTED COVID-19 VIRUS INFECTION: ICD-10-CM

## 2021-01-26 DIAGNOSIS — G93.40 ACUTE ENCEPHALOPATHY: ICD-10-CM

## 2021-01-26 DIAGNOSIS — N18.30 STAGE 3 CHRONIC KIDNEY DISEASE, UNSPECIFIED WHETHER STAGE 3A OR 3B CKD: ICD-10-CM

## 2021-01-26 DIAGNOSIS — I10 ESSENTIAL HYPERTENSION: ICD-10-CM

## 2021-01-26 DIAGNOSIS — R13.12 OROPHARYNGEAL DYSPHAGIA: ICD-10-CM

## 2021-01-26 DIAGNOSIS — Z51.5 PALLIATIVE CARE ENCOUNTER: ICD-10-CM

## 2021-01-26 DIAGNOSIS — R79.89 ELEVATED TROPONIN: ICD-10-CM

## 2021-01-26 DIAGNOSIS — N17.9 AKI (ACUTE KIDNEY INJURY): ICD-10-CM

## 2021-01-26 DIAGNOSIS — R65.20 SEVERE SEPSIS: ICD-10-CM

## 2021-01-26 DIAGNOSIS — J69.0 ASPIRATION PNEUMONIA OF BOTH LOWER LOBES DUE TO GASTRIC SECRETIONS: ICD-10-CM

## 2021-01-26 DIAGNOSIS — I47.10 SVT (SUPRAVENTRICULAR TACHYCARDIA): ICD-10-CM

## 2021-01-26 DIAGNOSIS — G47.33 OSA ON CPAP: ICD-10-CM

## 2021-01-26 DIAGNOSIS — J42 CHRONIC BRONCHITIS, UNSPECIFIED CHRONIC BRONCHITIS TYPE: ICD-10-CM

## 2021-01-26 DIAGNOSIS — R53.81 PHYSICAL DECONDITIONING: ICD-10-CM

## 2021-01-26 DIAGNOSIS — E78.2 MIXED HYPERLIPIDEMIA: ICD-10-CM

## 2021-01-26 DIAGNOSIS — J69.0 ASPIRATION PNEUMONIA OF BOTH LOWER LOBES, UNSPECIFIED ASPIRATION PNEUMONIA TYPE: ICD-10-CM

## 2021-01-26 DIAGNOSIS — Z93.1 PEG (PERCUTANEOUS ENDOSCOPIC GASTROSTOMY) STATUS: ICD-10-CM

## 2021-01-26 DIAGNOSIS — J96.21 ACUTE ON CHRONIC RESPIRATORY FAILURE WITH HYPOXEMIA: Primary | ICD-10-CM

## 2021-01-26 DIAGNOSIS — I95.9 HYPOTENSION, UNSPECIFIED HYPOTENSION TYPE: ICD-10-CM

## 2021-01-26 DIAGNOSIS — U07.1 COVID-19 VIRUS INFECTION: ICD-10-CM

## 2021-01-26 DIAGNOSIS — A41.9 SEVERE SEPSIS: ICD-10-CM

## 2021-01-26 PROBLEM — I95.0 IDIOPATHIC HYPOTENSION: Status: RESOLVED | Noted: 2021-01-18 | Resolved: 2021-01-26

## 2021-01-26 PROBLEM — R41.3 MEMORY LOSS: Status: RESOLVED | Noted: 2020-12-31 | Resolved: 2021-01-26

## 2021-01-26 PROBLEM — N39.0 UTI (URINARY TRACT INFECTION): Status: RESOLVED | Noted: 2021-01-07 | Resolved: 2021-01-26

## 2021-01-26 PROBLEM — R55 PRE-SYNCOPE: Status: RESOLVED | Noted: 2020-02-17 | Resolved: 2021-01-26

## 2021-01-26 PROBLEM — G93.41 ACUTE METABOLIC ENCEPHALOPATHY: Status: RESOLVED | Noted: 2020-12-31 | Resolved: 2021-01-26

## 2021-01-26 LAB
ALBUMIN SERPL BCP-MCNC: 2.6 G/DL (ref 3.5–5.2)
ALLENS TEST: ABNORMAL
ALP SERPL-CCNC: 150 U/L (ref 55–135)
ALT SERPL W/O P-5'-P-CCNC: 33 U/L (ref 10–44)
AMMONIA PLAS-SCNC: 23 UMOL/L (ref 10–50)
AMPHET+METHAMPHET UR QL: NEGATIVE
ANION GAP SERPL CALC-SCNC: 12 MMOL/L (ref 8–16)
AST SERPL-CCNC: 21 U/L (ref 10–40)
BARBITURATES UR QL SCN>200 NG/ML: NEGATIVE
BASOPHILS # BLD AUTO: 0.03 K/UL (ref 0–0.2)
BASOPHILS NFR BLD: 0.2 % (ref 0–1.9)
BENZODIAZ UR QL SCN>200 NG/ML: NEGATIVE
BILIRUB SERPL-MCNC: 1.1 MG/DL (ref 0.1–1)
BILIRUB UR QL STRIP: ABNORMAL
BNP SERPL-MCNC: 110 PG/ML (ref 0–99)
BUN SERPL-MCNC: 72 MG/DL (ref 8–23)
BZE UR QL SCN: NEGATIVE
CALCIUM SERPL-MCNC: 10.1 MG/DL (ref 8.7–10.5)
CANNABINOIDS UR QL SCN: NEGATIVE
CHLORIDE SERPL-SCNC: 112 MMOL/L (ref 95–110)
CK SERPL-CCNC: 29 U/L (ref 20–200)
CLARITY UR: CLEAR
CO2 SERPL-SCNC: 20 MMOL/L (ref 23–29)
COLOR UR: YELLOW
CREAT SERPL-MCNC: 2.2 MG/DL (ref 0.5–1.4)
CREAT UR-MCNC: 155.1 MG/DL (ref 23–375)
CREAT UR-MCNC: 155.1 MG/DL (ref 23–375)
CRP SERPL-MCNC: 61.9 MG/L (ref 0–8.2)
D DIMER PPP IA.FEU-MCNC: 0.64 MG/L FEU
DELSYS: ABNORMAL
DIFFERENTIAL METHOD: ABNORMAL
EOSINOPHIL # BLD AUTO: 0.1 K/UL (ref 0–0.5)
EOSINOPHIL NFR BLD: 0.8 % (ref 0–8)
ERYTHROCYTE [DISTWIDTH] IN BLOOD BY AUTOMATED COUNT: 16.2 % (ref 11.5–14.5)
EST. GFR  (AFRICAN AMERICAN): 32 ML/MIN/1.73 M^2
EST. GFR  (NON AFRICAN AMERICAN): 28 ML/MIN/1.73 M^2
ETHANOL UR-MCNC: <10 MG/DL
FERRITIN SERPL-MCNC: 286 NG/ML (ref 20–300)
FLOW: 5
GLUCOSE SERPL-MCNC: 127 MG/DL (ref 70–110)
GLUCOSE UR QL STRIP: NEGATIVE
HCO3 UR-SCNC: 13.6 MMOL/L (ref 24–28)
HCT VFR BLD AUTO: 49.8 % (ref 40–54)
HGB BLD-MCNC: 16.4 G/DL (ref 14–18)
HGB UR QL STRIP: NEGATIVE
IMM GRANULOCYTES # BLD AUTO: 0.12 K/UL (ref 0–0.04)
IMM GRANULOCYTES NFR BLD AUTO: 0.9 % (ref 0–0.5)
KETONES UR QL STRIP: NEGATIVE
LACTATE SERPL-SCNC: 2.2 MMOL/L (ref 0.5–2.2)
LACTATE SERPL-SCNC: 3 MMOL/L (ref 0.5–2.2)
LDH SERPL L TO P-CCNC: 223 U/L (ref 110–260)
LEUKOCYTE ESTERASE UR QL STRIP: NEGATIVE
LYMPHOCYTES # BLD AUTO: 1.2 K/UL (ref 1–4.8)
LYMPHOCYTES NFR BLD: 9.1 % (ref 18–48)
MCH RBC QN AUTO: 31.4 PG (ref 27–31)
MCHC RBC AUTO-ENTMCNC: 32.9 G/DL (ref 32–36)
MCV RBC AUTO: 95 FL (ref 82–98)
METHADONE UR QL SCN>300 NG/ML: NEGATIVE
MODE: ABNORMAL
MONOCYTES # BLD AUTO: 0.8 K/UL (ref 0.3–1)
MONOCYTES NFR BLD: 6 % (ref 4–15)
NEUTROPHILS # BLD AUTO: 11 K/UL (ref 1.8–7.7)
NEUTROPHILS NFR BLD: 83 % (ref 38–73)
NITRITE UR QL STRIP: NEGATIVE
NRBC BLD-RTO: 0 /100 WBC
OPIATES UR QL SCN: NEGATIVE
PCO2 BLDA: 21.9 MMHG (ref 35–45)
PCP UR QL SCN>25 NG/ML: NEGATIVE
PH SMN: 7.4 [PH] (ref 7.35–7.45)
PH UR STRIP: 5 [PH] (ref 5–8)
PLATELET # BLD AUTO: 200 K/UL (ref 150–350)
PMV BLD AUTO: 12.1 FL (ref 9.2–12.9)
PO2 BLDA: 63 MMHG (ref 80–100)
POC BE: -11 MMOL/L
POC SATURATED O2: 93 % (ref 95–100)
POC TCO2: 14 MMOL/L (ref 23–27)
POTASSIUM SERPL-SCNC: 4.9 MMOL/L (ref 3.5–5.1)
PROCALCITONIN SERPL IA-MCNC: 0.13 NG/ML
PROT SERPL-MCNC: 6.7 G/DL (ref 6–8.4)
PROT UR QL STRIP: ABNORMAL
RBC # BLD AUTO: 5.22 M/UL (ref 4.6–6.2)
SAMPLE: ABNORMAL
SITE: ABNORMAL
SODIUM SERPL-SCNC: 144 MMOL/L (ref 136–145)
SODIUM UR-SCNC: 27 MMOL/L (ref 20–250)
SP GR UR STRIP: 1.02 (ref 1–1.03)
SP02: 100
TOXICOLOGY INFORMATION: NORMAL
TROPONIN I SERPL DL<=0.01 NG/ML-MCNC: 0.03 NG/ML (ref 0–0.03)
TROPONIN I SERPL DL<=0.01 NG/ML-MCNC: 0.04 NG/ML (ref 0–0.03)
URN SPEC COLLECT METH UR: ABNORMAL
UROBILINOGEN UR STRIP-ACNC: NEGATIVE EU/DL
UUN UR-MCNC: 1102 MG/DL (ref 140–1050)
WBC # BLD AUTO: 13.24 K/UL (ref 3.9–12.7)

## 2021-01-26 PROCEDURE — 82803 BLOOD GASES ANY COMBINATION: CPT

## 2021-01-26 PROCEDURE — 27000221 HC OXYGEN, UP TO 24 HOURS

## 2021-01-26 PROCEDURE — 63600175 PHARM REV CODE 636 W HCPCS: Performed by: STUDENT IN AN ORGANIZED HEALTH CARE EDUCATION/TRAINING PROGRAM

## 2021-01-26 PROCEDURE — 93010 ELECTROCARDIOGRAM REPORT: CPT | Mod: ,,, | Performed by: INTERNAL MEDICINE

## 2021-01-26 PROCEDURE — 82550 ASSAY OF CK (CPK): CPT

## 2021-01-26 PROCEDURE — 82728 ASSAY OF FERRITIN: CPT

## 2021-01-26 PROCEDURE — 82140 ASSAY OF AMMONIA: CPT

## 2021-01-26 PROCEDURE — 92610 EVALUATE SWALLOWING FUNCTION: CPT

## 2021-01-26 PROCEDURE — 99900035 HC TECH TIME PER 15 MIN (STAT)

## 2021-01-26 PROCEDURE — 85379 FIBRIN DEGRADATION QUANT: CPT

## 2021-01-26 PROCEDURE — 25500020 PHARM REV CODE 255: Performed by: INTERNAL MEDICINE

## 2021-01-26 PROCEDURE — 99497 ADVNCD CARE PLAN 30 MIN: CPT | Mod: 25,,, | Performed by: NURSE PRACTITIONER

## 2021-01-26 PROCEDURE — 80307 DRUG TEST PRSMV CHEM ANLYZR: CPT

## 2021-01-26 PROCEDURE — 25000003 PHARM REV CODE 250: Performed by: STUDENT IN AN ORGANIZED HEALTH CARE EDUCATION/TRAINING PROGRAM

## 2021-01-26 PROCEDURE — 84145 PROCALCITONIN (PCT): CPT

## 2021-01-26 PROCEDURE — 99497 PR ADVNCD CARE PLAN 30 MIN: ICD-10-PCS | Mod: 25,,, | Performed by: NURSE PRACTITIONER

## 2021-01-26 PROCEDURE — 11000001 HC ACUTE MED/SURG PRIVATE ROOM

## 2021-01-26 PROCEDURE — 83605 ASSAY OF LACTIC ACID: CPT | Mod: 91

## 2021-01-26 PROCEDURE — 86140 C-REACTIVE PROTEIN: CPT

## 2021-01-26 PROCEDURE — 84484 ASSAY OF TROPONIN QUANT: CPT | Mod: 91

## 2021-01-26 PROCEDURE — 93005 ELECTROCARDIOGRAM TRACING: CPT

## 2021-01-26 PROCEDURE — 81003 URINALYSIS AUTO W/O SCOPE: CPT

## 2021-01-26 PROCEDURE — 83615 LACTATE (LD) (LDH) ENZYME: CPT

## 2021-01-26 PROCEDURE — 85025 COMPLETE CBC W/AUTO DIFF WBC: CPT

## 2021-01-26 PROCEDURE — 87040 BLOOD CULTURE FOR BACTERIA: CPT | Mod: 59

## 2021-01-26 PROCEDURE — 99223 PR INITIAL HOSPITAL CARE,LEVL III: ICD-10-PCS | Mod: ,,, | Performed by: NURSE PRACTITIONER

## 2021-01-26 PROCEDURE — 84300 ASSAY OF URINE SODIUM: CPT

## 2021-01-26 PROCEDURE — 83880 ASSAY OF NATRIURETIC PEPTIDE: CPT

## 2021-01-26 PROCEDURE — 93010 EKG 12-LEAD: ICD-10-PCS | Mod: ,,, | Performed by: INTERNAL MEDICINE

## 2021-01-26 PROCEDURE — 94761 N-INVAS EAR/PLS OXIMETRY MLT: CPT

## 2021-01-26 PROCEDURE — 36600 WITHDRAWAL OF ARTERIAL BLOOD: CPT

## 2021-01-26 PROCEDURE — 84484 ASSAY OF TROPONIN QUANT: CPT

## 2021-01-26 PROCEDURE — 99223 1ST HOSP IP/OBS HIGH 75: CPT | Mod: ,,, | Performed by: NURSE PRACTITIONER

## 2021-01-26 PROCEDURE — 83605 ASSAY OF LACTIC ACID: CPT

## 2021-01-26 PROCEDURE — 99285 EMERGENCY DEPT VISIT HI MDM: CPT | Mod: 25

## 2021-01-26 PROCEDURE — 80053 COMPREHEN METABOLIC PANEL: CPT

## 2021-01-26 PROCEDURE — 84540 ASSAY OF URINE/UREA-N: CPT

## 2021-01-26 RX ORDER — IBUPROFEN 200 MG
24 TABLET ORAL
Status: DISCONTINUED | OUTPATIENT
Start: 2021-01-26 | End: 2021-02-03 | Stop reason: HOSPADM

## 2021-01-26 RX ORDER — SIMVASTATIN 20 MG/1
40 TABLET, FILM COATED ORAL NIGHTLY
Status: DISCONTINUED | OUTPATIENT
Start: 2021-01-26 | End: 2021-02-03 | Stop reason: HOSPADM

## 2021-01-26 RX ORDER — CETIRIZINE HYDROCHLORIDE 10 MG/1
10 TABLET ORAL DAILY
Status: DISCONTINUED | OUTPATIENT
Start: 2021-01-26 | End: 2021-01-26

## 2021-01-26 RX ORDER — VENLAFAXINE HYDROCHLORIDE 37.5 MG/1
150 CAPSULE, EXTENDED RELEASE ORAL DAILY
Status: DISCONTINUED | OUTPATIENT
Start: 2021-01-26 | End: 2021-01-26

## 2021-01-26 RX ORDER — PANTOPRAZOLE SODIUM 40 MG/1
40 TABLET, DELAYED RELEASE ORAL DAILY
Status: DISCONTINUED | OUTPATIENT
Start: 2021-01-26 | End: 2021-01-29

## 2021-01-26 RX ORDER — TAMSULOSIN HYDROCHLORIDE 0.4 MG/1
0.4 CAPSULE ORAL DAILY
Status: DISCONTINUED | OUTPATIENT
Start: 2021-01-26 | End: 2021-02-03 | Stop reason: HOSPADM

## 2021-01-26 RX ORDER — MEMANTINE HYDROCHLORIDE 5 MG/1
10 TABLET ORAL 2 TIMES DAILY
Status: DISCONTINUED | OUTPATIENT
Start: 2021-01-26 | End: 2021-02-03 | Stop reason: HOSPADM

## 2021-01-26 RX ORDER — LEVOTHYROXINE SODIUM 50 UG/1
100 TABLET ORAL
Status: DISCONTINUED | OUTPATIENT
Start: 2021-01-26 | End: 2021-02-03 | Stop reason: HOSPADM

## 2021-01-26 RX ORDER — CEFEPIME HYDROCHLORIDE 1 G/50ML
1 INJECTION, SOLUTION INTRAVENOUS
Status: COMPLETED | OUTPATIENT
Start: 2021-01-26 | End: 2021-01-26

## 2021-01-26 RX ORDER — ASPIRIN 81 MG/1
81 TABLET ORAL DAILY
Status: DISCONTINUED | OUTPATIENT
Start: 2021-01-26 | End: 2021-01-29

## 2021-01-26 RX ORDER — IBUPROFEN 200 MG
16 TABLET ORAL
Status: DISCONTINUED | OUTPATIENT
Start: 2021-01-26 | End: 2021-02-03 | Stop reason: HOSPADM

## 2021-01-26 RX ORDER — CEFEPIME HYDROCHLORIDE 1 G/50ML
1 INJECTION, SOLUTION INTRAVENOUS
Status: DISCONTINUED | OUTPATIENT
Start: 2021-01-26 | End: 2021-01-26

## 2021-01-26 RX ORDER — ALBUTEROL SULFATE 90 UG/1
2 AEROSOL, METERED RESPIRATORY (INHALATION) EVERY 6 HOURS PRN
Status: DISCONTINUED | OUTPATIENT
Start: 2021-01-26 | End: 2021-02-03 | Stop reason: HOSPADM

## 2021-01-26 RX ORDER — SODIUM CHLORIDE 0.9 % (FLUSH) 0.9 %
10 SYRINGE (ML) INJECTION
Status: DISCONTINUED | OUTPATIENT
Start: 2021-01-26 | End: 2021-02-03 | Stop reason: HOSPADM

## 2021-01-26 RX ORDER — GLUCAGON 1 MG
1 KIT INJECTION
Status: DISCONTINUED | OUTPATIENT
Start: 2021-01-26 | End: 2021-02-03 | Stop reason: HOSPADM

## 2021-01-26 RX ORDER — CEFEPIME HYDROCHLORIDE 1 G/50ML
2 INJECTION, SOLUTION INTRAVENOUS
Status: COMPLETED | OUTPATIENT
Start: 2021-01-26 | End: 2021-01-26

## 2021-01-26 RX ORDER — DUTASTERIDE 0.5 MG/1
0.5 CAPSULE, LIQUID FILLED ORAL DAILY
Status: DISCONTINUED | OUTPATIENT
Start: 2021-01-26 | End: 2021-02-03 | Stop reason: HOSPADM

## 2021-01-26 RX ADMIN — SODIUM CHLORIDE, SODIUM LACTATE, POTASSIUM CHLORIDE, AND CALCIUM CHLORIDE 500 ML: .6; .31; .03; .02 INJECTION, SOLUTION INTRAVENOUS at 07:01

## 2021-01-26 RX ADMIN — VANCOMYCIN HYDROCHLORIDE 2000 MG: 100 INJECTION, POWDER, LYOPHILIZED, FOR SOLUTION INTRAVENOUS at 07:01

## 2021-01-26 RX ADMIN — HUMAN ALBUMIN MICROSPHERES AND PERFLUTREN 0.11 MG: 10; .22 INJECTION, SOLUTION INTRAVENOUS at 04:01

## 2021-01-26 RX ADMIN — SODIUM CHLORIDE, SODIUM LACTATE, POTASSIUM CHLORIDE, AND CALCIUM CHLORIDE 1500 ML: .6; .31; .03; .02 INJECTION, SOLUTION INTRAVENOUS at 10:01

## 2021-01-26 RX ADMIN — CEFEPIME HYDROCHLORIDE 2 G: 2 INJECTION, SOLUTION INTRAVENOUS at 06:01

## 2021-01-26 RX ADMIN — CEFEPIME HYDROCHLORIDE 1 G: 1 INJECTION, SOLUTION INTRAVENOUS at 05:01

## 2021-01-26 RX ADMIN — AZITHROMYCIN 500 MG: 500 INJECTION, POWDER, LYOPHILIZED, FOR SOLUTION INTRAVENOUS at 11:01

## 2021-01-27 LAB
ALBUMIN SERPL BCP-MCNC: 2.2 G/DL (ref 3.5–5.2)
ALP SERPL-CCNC: 128 U/L (ref 55–135)
ALT SERPL W/O P-5'-P-CCNC: 29 U/L (ref 10–44)
ANION GAP SERPL CALC-SCNC: 11 MMOL/L (ref 8–16)
AORTIC ROOT ANNULUS: 3.88 CM
AST SERPL-CCNC: 22 U/L (ref 10–40)
AV INDEX (PROSTH): 0.87
AV MEAN GRADIENT: 2 MMHG
AV PEAK GRADIENT: 5 MMHG
AV VALVE AREA: 4.25 CM2
AV VELOCITY RATIO: 0.69
BASOPHILS # BLD AUTO: 0.02 K/UL (ref 0–0.2)
BASOPHILS NFR BLD: 0.2 % (ref 0–1.9)
BILIRUB SERPL-MCNC: 1 MG/DL (ref 0.1–1)
BSA FOR ECHO PROCEDURE: 2.31 M2
BUN SERPL-MCNC: 64 MG/DL (ref 8–23)
CALCIUM SERPL-MCNC: 9.2 MG/DL (ref 8.7–10.5)
CHLORIDE SERPL-SCNC: 115 MMOL/L (ref 95–110)
CO2 SERPL-SCNC: 17 MMOL/L (ref 23–29)
CREAT SERPL-MCNC: 1.7 MG/DL (ref 0.5–1.4)
CV ECHO LV RWT: 0.5 CM
DIFFERENTIAL METHOD: ABNORMAL
DOP CALC AO PEAK VEL: 1.08 M/S
DOP CALC AO VTI: 16.36 CM
DOP CALC LVOT AREA: 4.9 CM2
DOP CALC LVOT DIAMETER: 2.5 CM
DOP CALC LVOT PEAK VEL: 0.74 M/S
DOP CALC LVOT STROKE VOLUME: 69.57 CM3
DOP CALCLVOT PEAK VEL VTI: 14.18 CM
E WAVE DECELERATION TIME: 151.13 MSEC
E/A RATIO: 0.48
E/E' RATIO: 6.92 M/S
ECHO LV POSTERIOR WALL: 1.23 CM (ref 0.6–1.1)
EOSINOPHIL # BLD AUTO: 0.3 K/UL (ref 0–0.5)
EOSINOPHIL NFR BLD: 3.1 % (ref 0–8)
ERYTHROCYTE [DISTWIDTH] IN BLOOD BY AUTOMATED COUNT: 16.2 % (ref 11.5–14.5)
EST. GFR  (AFRICAN AMERICAN): 44 ML/MIN/1.73 M^2
EST. GFR  (NON AFRICAN AMERICAN): 38 ML/MIN/1.73 M^2
FRACTIONAL SHORTENING: 29 % (ref 28–44)
GLUCOSE SERPL-MCNC: 83 MG/DL (ref 70–110)
HCT VFR BLD AUTO: 43.7 % (ref 40–54)
HGB BLD-MCNC: 14.3 G/DL (ref 14–18)
IMM GRANULOCYTES # BLD AUTO: 0.08 K/UL (ref 0–0.04)
IMM GRANULOCYTES NFR BLD AUTO: 0.8 % (ref 0–0.5)
INTERVENTRICULAR SEPTUM: 1.73 CM (ref 0.6–1.1)
LEFT ATRIUM SIZE: 4.46 CM
LEFT INTERNAL DIMENSION IN SYSTOLE: 3.51 CM (ref 2.1–4)
LEFT VENTRICLE DIASTOLIC VOLUME INDEX: 51.55 ML/M2
LEFT VENTRICLE DIASTOLIC VOLUME: 114.45 ML
LEFT VENTRICLE MASS INDEX: 139 G/M2
LEFT VENTRICLE SYSTOLIC VOLUME INDEX: 23.1 ML/M2
LEFT VENTRICLE SYSTOLIC VOLUME: 51.2 ML
LEFT VENTRICULAR INTERNAL DIMENSION IN DIASTOLE: 4.93 CM (ref 3.5–6)
LEFT VENTRICULAR MASS: 309.56 G
LV LATERAL E/E' RATIO: 5.63 M/S
LV SEPTAL E/E' RATIO: 9 M/S
LYMPHOCYTES # BLD AUTO: 1 K/UL (ref 1–4.8)
LYMPHOCYTES NFR BLD: 10.5 % (ref 18–48)
MAGNESIUM SERPL-MCNC: 2.2 MG/DL (ref 1.6–2.6)
MCH RBC QN AUTO: 30.6 PG (ref 27–31)
MCHC RBC AUTO-ENTMCNC: 32.7 G/DL (ref 32–36)
MCV RBC AUTO: 94 FL (ref 82–98)
MONOCYTES # BLD AUTO: 0.6 K/UL (ref 0.3–1)
MONOCYTES NFR BLD: 6 % (ref 4–15)
MV PEAK A VEL: 0.94 M/S
MV PEAK E VEL: 0.45 M/S
NEUTROPHILS # BLD AUTO: 7.8 K/UL (ref 1.8–7.7)
NEUTROPHILS NFR BLD: 79.4 % (ref 38–73)
NRBC BLD-RTO: 0 /100 WBC
PHOSPHATE SERPL-MCNC: 3.8 MG/DL (ref 2.7–4.5)
PLATELET # BLD AUTO: 158 K/UL (ref 150–350)
PMV BLD AUTO: 12.1 FL (ref 9.2–12.9)
POTASSIUM SERPL-SCNC: 4.2 MMOL/L (ref 3.5–5.1)
PROT SERPL-MCNC: 5.7 G/DL (ref 6–8.4)
RA PRESSURE: 3 MMHG
RBC # BLD AUTO: 4.67 M/UL (ref 4.6–6.2)
SARS-COV-2 RDRP RESP QL NAA+PROBE: NEGATIVE
SODIUM SERPL-SCNC: 143 MMOL/L (ref 136–145)
TDI LATERAL: 0.08 M/S
TDI SEPTAL: 0.05 M/S
TDI: 0.07 M/S
VANCOMYCIN SERPL-MCNC: 13.7 UG/ML
WBC # BLD AUTO: 9.82 K/UL (ref 3.9–12.7)

## 2021-01-27 PROCEDURE — 85025 COMPLETE CBC W/AUTO DIFF WBC: CPT

## 2021-01-27 PROCEDURE — 80202 ASSAY OF VANCOMYCIN: CPT

## 2021-01-27 PROCEDURE — 25000003 PHARM REV CODE 250: Performed by: STUDENT IN AN ORGANIZED HEALTH CARE EDUCATION/TRAINING PROGRAM

## 2021-01-27 PROCEDURE — 94761 N-INVAS EAR/PLS OXIMETRY MLT: CPT

## 2021-01-27 PROCEDURE — 92526 ORAL FUNCTION THERAPY: CPT

## 2021-01-27 PROCEDURE — 11000001 HC ACUTE MED/SURG PRIVATE ROOM

## 2021-01-27 PROCEDURE — U0002 COVID-19 LAB TEST NON-CDC: HCPCS

## 2021-01-27 PROCEDURE — 27000221 HC OXYGEN, UP TO 24 HOURS

## 2021-01-27 PROCEDURE — 63600175 PHARM REV CODE 636 W HCPCS: Performed by: INTERNAL MEDICINE

## 2021-01-27 PROCEDURE — 63600175 PHARM REV CODE 636 W HCPCS: Performed by: STUDENT IN AN ORGANIZED HEALTH CARE EDUCATION/TRAINING PROGRAM

## 2021-01-27 PROCEDURE — 36415 COLL VENOUS BLD VENIPUNCTURE: CPT

## 2021-01-27 PROCEDURE — 80053 COMPREHEN METABOLIC PANEL: CPT

## 2021-01-27 PROCEDURE — 84100 ASSAY OF PHOSPHORUS: CPT

## 2021-01-27 PROCEDURE — 97165 OT EVAL LOW COMPLEX 30 MIN: CPT

## 2021-01-27 PROCEDURE — 97535 SELF CARE MNGMENT TRAINING: CPT

## 2021-01-27 PROCEDURE — 83735 ASSAY OF MAGNESIUM: CPT

## 2021-01-27 RX ORDER — CHLORHEXIDINE GLUCONATE ORAL RINSE 1.2 MG/ML
15 SOLUTION DENTAL 2 TIMES DAILY
Status: DISCONTINUED | OUTPATIENT
Start: 2021-01-27 | End: 2021-02-03 | Stop reason: HOSPADM

## 2021-01-27 RX ORDER — LEVOFLOXACIN 5 MG/ML
750 INJECTION, SOLUTION INTRAVENOUS
Status: COMPLETED | OUTPATIENT
Start: 2021-01-27 | End: 2021-01-29

## 2021-01-27 RX ORDER — SODIUM CHLORIDE, SODIUM LACTATE, POTASSIUM CHLORIDE, CALCIUM CHLORIDE 600; 310; 30; 20 MG/100ML; MG/100ML; MG/100ML; MG/100ML
INJECTION, SOLUTION INTRAVENOUS CONTINUOUS
Status: ACTIVE | OUTPATIENT
Start: 2021-01-27 | End: 2021-01-28

## 2021-01-27 RX ORDER — LEVOFLOXACIN 5 MG/ML
750 INJECTION, SOLUTION INTRAVENOUS
Status: DISCONTINUED | OUTPATIENT
Start: 2021-01-27 | End: 2021-01-27 | Stop reason: DRUGHIGH

## 2021-01-27 RX ADMIN — SODIUM CHLORIDE, SODIUM LACTATE, POTASSIUM CHLORIDE, AND CALCIUM CHLORIDE: .6; .31; .03; .02 INJECTION, SOLUTION INTRAVENOUS at 09:01

## 2021-01-27 RX ADMIN — SODIUM CHLORIDE, SODIUM LACTATE, POTASSIUM CHLORIDE, AND CALCIUM CHLORIDE: .6; .31; .03; .02 INJECTION, SOLUTION INTRAVENOUS at 04:01

## 2021-01-27 RX ADMIN — CHLORHEXIDINE GLUCONATE 15 ML: 1.2 RINSE ORAL at 08:01

## 2021-01-27 RX ADMIN — LEVOFLOXACIN 750 MG: 750 INJECTION, SOLUTION INTRAVENOUS at 12:01

## 2021-01-28 LAB
ALBUMIN SERPL BCP-MCNC: 2.1 G/DL (ref 3.5–5.2)
ALP SERPL-CCNC: 131 U/L (ref 55–135)
ALT SERPL W/O P-5'-P-CCNC: 30 U/L (ref 10–44)
ANION GAP SERPL CALC-SCNC: 10 MMOL/L (ref 8–16)
AST SERPL-CCNC: 26 U/L (ref 10–40)
BASOPHILS # BLD AUTO: 0.03 K/UL (ref 0–0.2)
BASOPHILS NFR BLD: 0.4 % (ref 0–1.9)
BILIRUB SERPL-MCNC: 1 MG/DL (ref 0.1–1)
BUN SERPL-MCNC: 50 MG/DL (ref 8–23)
CALCIUM SERPL-MCNC: 9 MG/DL (ref 8.7–10.5)
CHLORIDE SERPL-SCNC: 115 MMOL/L (ref 95–110)
CO2 SERPL-SCNC: 19 MMOL/L (ref 23–29)
CREAT SERPL-MCNC: 1.5 MG/DL (ref 0.5–1.4)
D DIMER PPP IA.FEU-MCNC: 1.26 MG/L FEU
DIFFERENTIAL METHOD: ABNORMAL
EOSINOPHIL # BLD AUTO: 0.3 K/UL (ref 0–0.5)
EOSINOPHIL NFR BLD: 3.2 % (ref 0–8)
ERYTHROCYTE [DISTWIDTH] IN BLOOD BY AUTOMATED COUNT: 16.3 % (ref 11.5–14.5)
EST. GFR  (AFRICAN AMERICAN): 51 ML/MIN/1.73 M^2
EST. GFR  (NON AFRICAN AMERICAN): 44 ML/MIN/1.73 M^2
GLUCOSE SERPL-MCNC: 84 MG/DL (ref 70–110)
HCT VFR BLD AUTO: 45.7 % (ref 40–54)
HGB BLD-MCNC: 14.2 G/DL (ref 14–18)
IMM GRANULOCYTES # BLD AUTO: 0.06 K/UL (ref 0–0.04)
IMM GRANULOCYTES NFR BLD AUTO: 0.8 % (ref 0–0.5)
LYMPHOCYTES # BLD AUTO: 0.8 K/UL (ref 1–4.8)
LYMPHOCYTES NFR BLD: 10.5 % (ref 18–48)
MAGNESIUM SERPL-MCNC: 2 MG/DL (ref 1.6–2.6)
MCH RBC QN AUTO: 31.3 PG (ref 27–31)
MCHC RBC AUTO-ENTMCNC: 31.1 G/DL (ref 32–36)
MCV RBC AUTO: 101 FL (ref 82–98)
MONOCYTES # BLD AUTO: 0.5 K/UL (ref 0.3–1)
MONOCYTES NFR BLD: 5.8 % (ref 4–15)
NEUTROPHILS # BLD AUTO: 6.3 K/UL (ref 1.8–7.7)
NEUTROPHILS NFR BLD: 79.3 % (ref 38–73)
NRBC BLD-RTO: 0 /100 WBC
PHOSPHATE SERPL-MCNC: 3.3 MG/DL (ref 2.7–4.5)
PLATELET # BLD AUTO: 130 K/UL (ref 150–350)
PMV BLD AUTO: 12.4 FL (ref 9.2–12.9)
POTASSIUM SERPL-SCNC: 4.3 MMOL/L (ref 3.5–5.1)
PROT SERPL-MCNC: 5.5 G/DL (ref 6–8.4)
RBC # BLD AUTO: 4.53 M/UL (ref 4.6–6.2)
SODIUM SERPL-SCNC: 144 MMOL/L (ref 136–145)
WBC # BLD AUTO: 7.68 K/UL (ref 3.9–12.7)

## 2021-01-28 PROCEDURE — 84100 ASSAY OF PHOSPHORUS: CPT

## 2021-01-28 PROCEDURE — 85379 FIBRIN DEGRADATION QUANT: CPT

## 2021-01-28 PROCEDURE — 97530 THERAPEUTIC ACTIVITIES: CPT

## 2021-01-28 PROCEDURE — 63600175 PHARM REV CODE 636 W HCPCS: Performed by: STUDENT IN AN ORGANIZED HEALTH CARE EDUCATION/TRAINING PROGRAM

## 2021-01-28 PROCEDURE — 27000221 HC OXYGEN, UP TO 24 HOURS

## 2021-01-28 PROCEDURE — 97162 PT EVAL MOD COMPLEX 30 MIN: CPT

## 2021-01-28 PROCEDURE — 97802 MEDICAL NUTRITION INDIV IN: CPT

## 2021-01-28 PROCEDURE — 83735 ASSAY OF MAGNESIUM: CPT

## 2021-01-28 PROCEDURE — 85025 COMPLETE CBC W/AUTO DIFF WBC: CPT

## 2021-01-28 PROCEDURE — 99900035 HC TECH TIME PER 15 MIN (STAT)

## 2021-01-28 PROCEDURE — 11000001 HC ACUTE MED/SURG PRIVATE ROOM

## 2021-01-28 PROCEDURE — 97535 SELF CARE MNGMENT TRAINING: CPT

## 2021-01-28 PROCEDURE — 94760 N-INVAS EAR/PLS OXIMETRY 1: CPT

## 2021-01-28 PROCEDURE — 80053 COMPREHEN METABOLIC PANEL: CPT

## 2021-01-28 PROCEDURE — 25000003 PHARM REV CODE 250: Performed by: STUDENT IN AN ORGANIZED HEALTH CARE EDUCATION/TRAINING PROGRAM

## 2021-01-28 PROCEDURE — 36415 COLL VENOUS BLD VENIPUNCTURE: CPT

## 2021-01-28 PROCEDURE — 92526 ORAL FUNCTION THERAPY: CPT

## 2021-01-28 PROCEDURE — 97110 THERAPEUTIC EXERCISES: CPT

## 2021-01-28 RX ORDER — SODIUM CHLORIDE, SODIUM LACTATE, POTASSIUM CHLORIDE, CALCIUM CHLORIDE 600; 310; 30; 20 MG/100ML; MG/100ML; MG/100ML; MG/100ML
INJECTION, SOLUTION INTRAVENOUS CONTINUOUS
Status: ACTIVE | OUTPATIENT
Start: 2021-01-28 | End: 2021-01-28

## 2021-01-28 RX ADMIN — CHLORHEXIDINE GLUCONATE 15 ML: 1.2 RINSE ORAL at 09:01

## 2021-01-28 RX ADMIN — SODIUM CHLORIDE, SODIUM LACTATE, POTASSIUM CHLORIDE, AND CALCIUM CHLORIDE: .6; .31; .03; .02 INJECTION, SOLUTION INTRAVENOUS at 09:01

## 2021-01-28 RX ADMIN — SODIUM CHLORIDE, SODIUM LACTATE, POTASSIUM CHLORIDE, AND CALCIUM CHLORIDE: .6; .31; .03; .02 INJECTION, SOLUTION INTRAVENOUS at 02:01

## 2021-01-29 LAB
ALBUMIN SERPL BCP-MCNC: 2.1 G/DL (ref 3.5–5.2)
ALP SERPL-CCNC: 134 U/L (ref 55–135)
ALT SERPL W/O P-5'-P-CCNC: 41 U/L (ref 10–44)
ANION GAP SERPL CALC-SCNC: 11 MMOL/L (ref 8–16)
AST SERPL-CCNC: 36 U/L (ref 10–40)
BASOPHILS # BLD AUTO: 0.02 K/UL (ref 0–0.2)
BASOPHILS NFR BLD: 0.2 % (ref 0–1.9)
BILIRUB SERPL-MCNC: 1.1 MG/DL (ref 0.1–1)
BUN SERPL-MCNC: 35 MG/DL (ref 8–23)
CALCIUM SERPL-MCNC: 9 MG/DL (ref 8.7–10.5)
CHLORIDE SERPL-SCNC: 115 MMOL/L (ref 95–110)
CO2 SERPL-SCNC: 17 MMOL/L (ref 23–29)
CREAT SERPL-MCNC: 1.2 MG/DL (ref 0.5–1.4)
DIFFERENTIAL METHOD: ABNORMAL
EOSINOPHIL # BLD AUTO: 0.3 K/UL (ref 0–0.5)
EOSINOPHIL NFR BLD: 3.1 % (ref 0–8)
ERYTHROCYTE [DISTWIDTH] IN BLOOD BY AUTOMATED COUNT: 15.9 % (ref 11.5–14.5)
EST. GFR  (AFRICAN AMERICAN): >60 ML/MIN/1.73 M^2
EST. GFR  (NON AFRICAN AMERICAN): 58 ML/MIN/1.73 M^2
GLUCOSE SERPL-MCNC: 79 MG/DL (ref 70–110)
HCT VFR BLD AUTO: 45.5 % (ref 40–54)
HGB BLD-MCNC: 14.5 G/DL (ref 14–18)
IMM GRANULOCYTES # BLD AUTO: 0.08 K/UL (ref 0–0.04)
IMM GRANULOCYTES NFR BLD AUTO: 0.8 % (ref 0–0.5)
LYMPHOCYTES # BLD AUTO: 1.1 K/UL (ref 1–4.8)
LYMPHOCYTES NFR BLD: 11 % (ref 18–48)
MAGNESIUM SERPL-MCNC: 1.9 MG/DL (ref 1.6–2.6)
MCH RBC QN AUTO: 30.9 PG (ref 27–31)
MCHC RBC AUTO-ENTMCNC: 31.9 G/DL (ref 32–36)
MCV RBC AUTO: 97 FL (ref 82–98)
MONOCYTES # BLD AUTO: 0.7 K/UL (ref 0.3–1)
MONOCYTES NFR BLD: 7.3 % (ref 4–15)
NEUTROPHILS # BLD AUTO: 7.9 K/UL (ref 1.8–7.7)
NEUTROPHILS NFR BLD: 77.6 % (ref 38–73)
NRBC BLD-RTO: 0 /100 WBC
PHOSPHATE SERPL-MCNC: 2.9 MG/DL (ref 2.7–4.5)
PLATELET # BLD AUTO: 129 K/UL (ref 150–350)
PMV BLD AUTO: 11.9 FL (ref 9.2–12.9)
POTASSIUM SERPL-SCNC: 4.3 MMOL/L (ref 3.5–5.1)
PROT SERPL-MCNC: 5.5 G/DL (ref 6–8.4)
RBC # BLD AUTO: 4.69 M/UL (ref 4.6–6.2)
SODIUM SERPL-SCNC: 143 MMOL/L (ref 136–145)
WBC # BLD AUTO: 10.11 K/UL (ref 3.9–12.7)

## 2021-01-29 PROCEDURE — 99223 1ST HOSP IP/OBS HIGH 75: CPT | Mod: ,,, | Performed by: INTERNAL MEDICINE

## 2021-01-29 PROCEDURE — 85025 COMPLETE CBC W/AUTO DIFF WBC: CPT

## 2021-01-29 PROCEDURE — 25000003 PHARM REV CODE 250: Performed by: STUDENT IN AN ORGANIZED HEALTH CARE EDUCATION/TRAINING PROGRAM

## 2021-01-29 PROCEDURE — 97535 SELF CARE MNGMENT TRAINING: CPT

## 2021-01-29 PROCEDURE — 99233 PR SUBSEQUENT HOSPITAL CARE,LEVL III: ICD-10-PCS | Mod: ,,, | Performed by: NURSE PRACTITIONER

## 2021-01-29 PROCEDURE — 63600175 PHARM REV CODE 636 W HCPCS: Performed by: INTERNAL MEDICINE

## 2021-01-29 PROCEDURE — 97110 THERAPEUTIC EXERCISES: CPT | Mod: CQ

## 2021-01-29 PROCEDURE — 11000001 HC ACUTE MED/SURG PRIVATE ROOM

## 2021-01-29 PROCEDURE — 80053 COMPREHEN METABOLIC PANEL: CPT

## 2021-01-29 PROCEDURE — 97530 THERAPEUTIC ACTIVITIES: CPT | Mod: CQ

## 2021-01-29 PROCEDURE — 97530 THERAPEUTIC ACTIVITIES: CPT | Mod: CO

## 2021-01-29 PROCEDURE — 99233 SBSQ HOSP IP/OBS HIGH 50: CPT | Mod: ,,, | Performed by: NURSE PRACTITIONER

## 2021-01-29 PROCEDURE — 27000221 HC OXYGEN, UP TO 24 HOURS

## 2021-01-29 PROCEDURE — 99223 PR INITIAL HOSPITAL CARE,LEVL III: ICD-10-PCS | Mod: ,,, | Performed by: INTERNAL MEDICINE

## 2021-01-29 PROCEDURE — 84100 ASSAY OF PHOSPHORUS: CPT

## 2021-01-29 PROCEDURE — 94761 N-INVAS EAR/PLS OXIMETRY MLT: CPT

## 2021-01-29 PROCEDURE — 83735 ASSAY OF MAGNESIUM: CPT

## 2021-01-29 PROCEDURE — 92526 ORAL FUNCTION THERAPY: CPT

## 2021-01-29 PROCEDURE — 99900035 HC TECH TIME PER 15 MIN (STAT)

## 2021-01-29 PROCEDURE — 36415 COLL VENOUS BLD VENIPUNCTURE: CPT

## 2021-01-29 RX ORDER — PANTOPRAZOLE SODIUM 40 MG/1
40 FOR SUSPENSION ORAL DAILY
Status: DISCONTINUED | OUTPATIENT
Start: 2021-01-30 | End: 2021-02-03 | Stop reason: HOSPADM

## 2021-01-29 RX ORDER — NAPROXEN SODIUM 220 MG/1
81 TABLET, FILM COATED ORAL DAILY
Status: DISCONTINUED | OUTPATIENT
Start: 2021-01-30 | End: 2021-02-03 | Stop reason: HOSPADM

## 2021-01-29 RX ADMIN — LEVOFLOXACIN 750 MG: 750 INJECTION, SOLUTION INTRAVENOUS at 12:01

## 2021-01-29 RX ADMIN — MEMANTINE HYDROCHLORIDE 10 MG: 5 TABLET ORAL at 10:01

## 2021-01-29 RX ADMIN — APIXABAN 5 MG: 5 TABLET, FILM COATED ORAL at 10:01

## 2021-01-29 RX ADMIN — CHLORHEXIDINE GLUCONATE 15 ML: 1.2 RINSE ORAL at 10:01

## 2021-01-29 RX ADMIN — SIMVASTATIN 40 MG: 20 TABLET, FILM COATED ORAL at 10:01

## 2021-01-29 RX ADMIN — CHLORHEXIDINE GLUCONATE 15 ML: 1.2 RINSE ORAL at 08:01

## 2021-01-30 LAB
ALBUMIN SERPL BCP-MCNC: 2.2 G/DL (ref 3.5–5.2)
ALBUMIN SERPL BCP-MCNC: 2.2 G/DL (ref 3.5–5.2)
ALP SERPL-CCNC: 167 U/L (ref 55–135)
ALP SERPL-CCNC: 170 U/L (ref 55–135)
ALT SERPL W/O P-5'-P-CCNC: 66 U/L (ref 10–44)
ALT SERPL W/O P-5'-P-CCNC: 70 U/L (ref 10–44)
ANION GAP SERPL CALC-SCNC: 11 MMOL/L (ref 8–16)
ANION GAP SERPL CALC-SCNC: 12 MMOL/L (ref 8–16)
ANION GAP SERPL CALC-SCNC: 12 MMOL/L (ref 8–16)
AST SERPL-CCNC: 50 U/L (ref 10–40)
AST SERPL-CCNC: 53 U/L (ref 10–40)
BASOPHILS # BLD AUTO: 0.03 K/UL (ref 0–0.2)
BASOPHILS # BLD AUTO: 0.03 K/UL (ref 0–0.2)
BASOPHILS NFR BLD: 0.4 % (ref 0–1.9)
BASOPHILS NFR BLD: 0.4 % (ref 0–1.9)
BILIRUB SERPL-MCNC: 1 MG/DL (ref 0.1–1)
BILIRUB SERPL-MCNC: 1.1 MG/DL (ref 0.1–1)
BUN SERPL-MCNC: 30 MG/DL (ref 8–23)
BUN SERPL-MCNC: 30 MG/DL (ref 8–23)
BUN SERPL-MCNC: 31 MG/DL (ref 8–23)
CALCIUM SERPL-MCNC: 9 MG/DL (ref 8.7–10.5)
CALCIUM SERPL-MCNC: 9 MG/DL (ref 8.7–10.5)
CALCIUM SERPL-MCNC: 9.1 MG/DL (ref 8.7–10.5)
CHLORIDE SERPL-SCNC: 112 MMOL/L (ref 95–110)
CHLORIDE SERPL-SCNC: 112 MMOL/L (ref 95–110)
CHLORIDE SERPL-SCNC: 113 MMOL/L (ref 95–110)
CO2 SERPL-SCNC: 19 MMOL/L (ref 23–29)
CO2 SERPL-SCNC: 20 MMOL/L (ref 23–29)
CO2 SERPL-SCNC: 20 MMOL/L (ref 23–29)
CREAT SERPL-MCNC: 1.3 MG/DL (ref 0.5–1.4)
CREAT SERPL-MCNC: 1.4 MG/DL (ref 0.5–1.4)
CREAT SERPL-MCNC: 1.4 MG/DL (ref 0.5–1.4)
D DIMER PPP IA.FEU-MCNC: 1.13 MG/L FEU
DIFFERENTIAL METHOD: ABNORMAL
DIFFERENTIAL METHOD: ABNORMAL
EOSINOPHIL # BLD AUTO: 0.2 K/UL (ref 0–0.5)
EOSINOPHIL # BLD AUTO: 0.2 K/UL (ref 0–0.5)
EOSINOPHIL NFR BLD: 2.2 % (ref 0–8)
EOSINOPHIL NFR BLD: 3 % (ref 0–8)
ERYTHROCYTE [DISTWIDTH] IN BLOOD BY AUTOMATED COUNT: 16.2 % (ref 11.5–14.5)
ERYTHROCYTE [DISTWIDTH] IN BLOOD BY AUTOMATED COUNT: 16.4 % (ref 11.5–14.5)
EST. GFR  (AFRICAN AMERICAN): 55 ML/MIN/1.73 M^2
EST. GFR  (AFRICAN AMERICAN): 55 ML/MIN/1.73 M^2
EST. GFR  (AFRICAN AMERICAN): >60 ML/MIN/1.73 M^2
EST. GFR  (NON AFRICAN AMERICAN): 48 ML/MIN/1.73 M^2
EST. GFR  (NON AFRICAN AMERICAN): 48 ML/MIN/1.73 M^2
EST. GFR  (NON AFRICAN AMERICAN): 52 ML/MIN/1.73 M^2
GLUCOSE SERPL-MCNC: 92 MG/DL (ref 70–110)
GLUCOSE SERPL-MCNC: 92 MG/DL (ref 70–110)
GLUCOSE SERPL-MCNC: 93 MG/DL (ref 70–110)
HCT VFR BLD AUTO: 43.3 % (ref 40–54)
HCT VFR BLD AUTO: 44.8 % (ref 40–54)
HGB BLD-MCNC: 13.5 G/DL (ref 14–18)
HGB BLD-MCNC: 14.4 G/DL (ref 14–18)
IMM GRANULOCYTES # BLD AUTO: 0.08 K/UL (ref 0–0.04)
IMM GRANULOCYTES # BLD AUTO: 0.12 K/UL (ref 0–0.04)
IMM GRANULOCYTES NFR BLD AUTO: 0.9 % (ref 0–0.5)
IMM GRANULOCYTES NFR BLD AUTO: 1.5 % (ref 0–0.5)
INR PPP: 1.2 (ref 0.8–1.2)
LYMPHOCYTES # BLD AUTO: 1.1 K/UL (ref 1–4.8)
LYMPHOCYTES # BLD AUTO: 1.2 K/UL (ref 1–4.8)
LYMPHOCYTES NFR BLD: 13.4 % (ref 18–48)
LYMPHOCYTES NFR BLD: 15.6 % (ref 18–48)
MAGNESIUM SERPL-MCNC: 1.7 MG/DL (ref 1.6–2.6)
MAGNESIUM SERPL-MCNC: 1.8 MG/DL (ref 1.6–2.6)
MCH RBC QN AUTO: 30.7 PG (ref 27–31)
MCH RBC QN AUTO: 30.7 PG (ref 27–31)
MCHC RBC AUTO-ENTMCNC: 31.2 G/DL (ref 32–36)
MCHC RBC AUTO-ENTMCNC: 32.1 G/DL (ref 32–36)
MCV RBC AUTO: 96 FL (ref 82–98)
MCV RBC AUTO: 98 FL (ref 82–98)
MONOCYTES # BLD AUTO: 0.4 K/UL (ref 0.3–1)
MONOCYTES # BLD AUTO: 0.5 K/UL (ref 0.3–1)
MONOCYTES NFR BLD: 4.6 % (ref 4–15)
MONOCYTES NFR BLD: 6.4 % (ref 4–15)
NEUTROPHILS # BLD AUTO: 5.9 K/UL (ref 1.8–7.7)
NEUTROPHILS # BLD AUTO: 6.5 K/UL (ref 1.8–7.7)
NEUTROPHILS NFR BLD: 74.9 % (ref 38–73)
NEUTROPHILS NFR BLD: 76.7 % (ref 38–73)
NRBC BLD-RTO: 0 /100 WBC
NRBC BLD-RTO: 0 /100 WBC
PHOSPHATE SERPL-MCNC: 2.5 MG/DL (ref 2.7–4.5)
PHOSPHATE SERPL-MCNC: 2.8 MG/DL (ref 2.7–4.5)
PLATELET # BLD AUTO: 132 K/UL (ref 150–350)
PLATELET # BLD AUTO: 158 K/UL (ref 150–350)
PLATELET BLD QL SMEAR: ABNORMAL
PMV BLD AUTO: 11.9 FL (ref 9.2–12.9)
PMV BLD AUTO: 11.9 FL (ref 9.2–12.9)
POCT GLUCOSE: 102 MG/DL (ref 70–110)
POIKILOCYTOSIS BLD QL SMEAR: SLIGHT
POTASSIUM SERPL-SCNC: 4 MMOL/L (ref 3.5–5.1)
PROT SERPL-MCNC: 5.6 G/DL (ref 6–8.4)
PROT SERPL-MCNC: 5.7 G/DL (ref 6–8.4)
PROTHROMBIN TIME: 12.1 SEC (ref 9–12.5)
RBC # BLD AUTO: 4.4 M/UL (ref 4.6–6.2)
RBC # BLD AUTO: 4.69 M/UL (ref 4.6–6.2)
SODIUM SERPL-SCNC: 143 MMOL/L (ref 136–145)
SODIUM SERPL-SCNC: 144 MMOL/L (ref 136–145)
SODIUM SERPL-SCNC: 144 MMOL/L (ref 136–145)
WBC # BLD AUTO: 7.89 K/UL (ref 3.9–12.7)
WBC # BLD AUTO: 8.46 K/UL (ref 3.9–12.7)

## 2021-01-30 PROCEDURE — 25000003 PHARM REV CODE 250: Performed by: STUDENT IN AN ORGANIZED HEALTH CARE EDUCATION/TRAINING PROGRAM

## 2021-01-30 PROCEDURE — 36410 VNPNXR 3YR/> PHY/QHP DX/THER: CPT

## 2021-01-30 PROCEDURE — 93005 ELECTROCARDIOGRAM TRACING: CPT

## 2021-01-30 PROCEDURE — 80053 COMPREHEN METABOLIC PANEL: CPT | Mod: 91

## 2021-01-30 PROCEDURE — 93010 ELECTROCARDIOGRAM REPORT: CPT | Mod: 76,,, | Performed by: INTERNAL MEDICINE

## 2021-01-30 PROCEDURE — 99900035 HC TECH TIME PER 15 MIN (STAT)

## 2021-01-30 PROCEDURE — 63600175 PHARM REV CODE 636 W HCPCS: Performed by: STUDENT IN AN ORGANIZED HEALTH CARE EDUCATION/TRAINING PROGRAM

## 2021-01-30 PROCEDURE — 99233 SBSQ HOSP IP/OBS HIGH 50: CPT | Mod: ,,, | Performed by: INTERNAL MEDICINE

## 2021-01-30 PROCEDURE — 36415 COLL VENOUS BLD VENIPUNCTURE: CPT

## 2021-01-30 PROCEDURE — 85025 COMPLETE CBC W/AUTO DIFF WBC: CPT

## 2021-01-30 PROCEDURE — 27000221 HC OXYGEN, UP TO 24 HOURS

## 2021-01-30 PROCEDURE — 99233 PR SUBSEQUENT HOSPITAL CARE,LEVL III: ICD-10-PCS | Mod: ,,, | Performed by: INTERNAL MEDICINE

## 2021-01-30 PROCEDURE — 94761 N-INVAS EAR/PLS OXIMETRY MLT: CPT

## 2021-01-30 PROCEDURE — 93010 EKG 12-LEAD: ICD-10-PCS | Mod: ,,, | Performed by: INTERNAL MEDICINE

## 2021-01-30 PROCEDURE — 85610 PROTHROMBIN TIME: CPT

## 2021-01-30 PROCEDURE — 84100 ASSAY OF PHOSPHORUS: CPT

## 2021-01-30 PROCEDURE — 83735 ASSAY OF MAGNESIUM: CPT | Mod: 91

## 2021-01-30 PROCEDURE — 11000001 HC ACUTE MED/SURG PRIVATE ROOM

## 2021-01-30 PROCEDURE — 85379 FIBRIN DEGRADATION QUANT: CPT

## 2021-01-30 RX ORDER — ADENOSINE 3 MG/ML
6 INJECTION INTRAVENOUS ONCE
Status: COMPLETED | OUTPATIENT
Start: 2021-01-30 | End: 2021-01-30

## 2021-01-30 RX ORDER — METOPROLOL TARTRATE 1 MG/ML
INJECTION, SOLUTION INTRAVENOUS
Status: DISPENSED
Start: 2021-01-30 | End: 2021-01-31

## 2021-01-30 RX ORDER — METOPROLOL TARTRATE 1 MG/ML
5 INJECTION, SOLUTION INTRAVENOUS ONCE
Status: DISCONTINUED | OUTPATIENT
Start: 2021-01-30 | End: 2021-01-30

## 2021-01-30 RX ORDER — METOPROLOL SUCCINATE 50 MG/1
50 TABLET, EXTENDED RELEASE ORAL ONCE
Status: COMPLETED | OUTPATIENT
Start: 2021-01-30 | End: 2021-01-30

## 2021-01-30 RX ORDER — METOPROLOL TARTRATE 1 MG/ML
5 INJECTION, SOLUTION INTRAVENOUS ONCE
Status: COMPLETED | OUTPATIENT
Start: 2021-01-30 | End: 2021-01-30

## 2021-01-30 RX ORDER — ADENOSINE 3 MG/ML
INJECTION, SOLUTION INTRAVENOUS
Status: DISPENSED
Start: 2021-01-30 | End: 2021-01-31

## 2021-01-30 RX ORDER — ADENOSINE 3 MG/ML
INJECTION, SOLUTION INTRAVENOUS
Status: COMPLETED
Start: 2021-01-30 | End: 2021-01-30

## 2021-01-30 RX ORDER — METOPROLOL SUCCINATE 50 MG/1
50 TABLET, EXTENDED RELEASE ORAL DAILY
Status: DISCONTINUED | OUTPATIENT
Start: 2021-01-31 | End: 2021-01-30

## 2021-01-30 RX ORDER — LEVOFLOXACIN 5 MG/ML
750 INJECTION, SOLUTION INTRAVENOUS
Status: COMPLETED | OUTPATIENT
Start: 2021-01-30 | End: 2021-01-31

## 2021-01-30 RX ORDER — ADENOSINE 3 MG/ML
6 INJECTION INTRAVENOUS ONCE
Status: DISCONTINUED | OUTPATIENT
Start: 2021-01-30 | End: 2021-01-30

## 2021-01-30 RX ORDER — METOPROLOL SUCCINATE 50 MG/1
50 TABLET, EXTENDED RELEASE ORAL DAILY
Status: DISCONTINUED | OUTPATIENT
Start: 2021-01-30 | End: 2021-01-31

## 2021-01-30 RX ADMIN — LEVOFLOXACIN 750 MG: 750 INJECTION, SOLUTION INTRAVENOUS at 09:01

## 2021-01-30 RX ADMIN — PANTOPRAZOLE SODIUM 40 MG: 40 GRANULE, DELAYED RELEASE ORAL at 08:01

## 2021-01-30 RX ADMIN — ADENOSINE 6 MG: 3 INJECTION INTRAVENOUS at 04:01

## 2021-01-30 RX ADMIN — LEVOTHYROXINE SODIUM 100 MCG: 50 TABLET ORAL at 05:01

## 2021-01-30 RX ADMIN — MEMANTINE HYDROCHLORIDE 10 MG: 5 TABLET ORAL at 08:01

## 2021-01-30 RX ADMIN — APIXABAN 5 MG: 5 TABLET, FILM COATED ORAL at 08:01

## 2021-01-30 RX ADMIN — METOPROLOL TARTRATE 5 MG: 5 INJECTION INTRAVENOUS at 04:01

## 2021-01-30 RX ADMIN — CHLORHEXIDINE GLUCONATE 15 ML: 1.2 RINSE ORAL at 08:01

## 2021-01-30 RX ADMIN — SIMVASTATIN 40 MG: 20 TABLET, FILM COATED ORAL at 08:01

## 2021-01-30 RX ADMIN — METOPROLOL SUCCINATE 50 MG: 50 TABLET, EXTENDED RELEASE ORAL at 05:01

## 2021-01-30 RX ADMIN — SODIUM CHLORIDE, SODIUM LACTATE, POTASSIUM CHLORIDE, AND CALCIUM CHLORIDE 1000 ML: .6; .31; .03; .02 INJECTION, SOLUTION INTRAVENOUS at 06:01

## 2021-01-30 RX ADMIN — TAMSULOSIN HYDROCHLORIDE 0.4 MG: 0.4 CAPSULE ORAL at 08:01

## 2021-01-30 RX ADMIN — ASPIRIN 81 MG: 81 TABLET, CHEWABLE ORAL at 08:01

## 2021-01-31 LAB
ALBUMIN SERPL BCP-MCNC: 2 G/DL (ref 3.5–5.2)
ALP SERPL-CCNC: 146 U/L (ref 55–135)
ALT SERPL W/O P-5'-P-CCNC: 53 U/L (ref 10–44)
ANION GAP SERPL CALC-SCNC: 8 MMOL/L (ref 8–16)
AST SERPL-CCNC: 40 U/L (ref 10–40)
BACTERIA BLD CULT: NORMAL
BACTERIA BLD CULT: NORMAL
BASOPHILS # BLD AUTO: 0.01 K/UL (ref 0–0.2)
BASOPHILS NFR BLD: 0.1 % (ref 0–1.9)
BILIRUB SERPL-MCNC: 0.8 MG/DL (ref 0.1–1)
BUN SERPL-MCNC: 27 MG/DL (ref 8–23)
CALCIUM SERPL-MCNC: 8.6 MG/DL (ref 8.7–10.5)
CHLORIDE SERPL-SCNC: 113 MMOL/L (ref 95–110)
CO2 SERPL-SCNC: 21 MMOL/L (ref 23–29)
CREAT SERPL-MCNC: 1.2 MG/DL (ref 0.5–1.4)
DIFFERENTIAL METHOD: ABNORMAL
EOSINOPHIL # BLD AUTO: 0.2 K/UL (ref 0–0.5)
EOSINOPHIL NFR BLD: 3.4 % (ref 0–8)
ERYTHROCYTE [DISTWIDTH] IN BLOOD BY AUTOMATED COUNT: 16 % (ref 11.5–14.5)
EST. GFR  (AFRICAN AMERICAN): >60 ML/MIN/1.73 M^2
EST. GFR  (NON AFRICAN AMERICAN): 58 ML/MIN/1.73 M^2
GLUCOSE SERPL-MCNC: 89 MG/DL (ref 70–110)
HCT VFR BLD AUTO: 40.1 % (ref 40–54)
HGB BLD-MCNC: 13.1 G/DL (ref 14–18)
IMM GRANULOCYTES # BLD AUTO: 0.07 K/UL (ref 0–0.04)
IMM GRANULOCYTES NFR BLD AUTO: 1 % (ref 0–0.5)
LYMPHOCYTES # BLD AUTO: 0.9 K/UL (ref 1–4.8)
LYMPHOCYTES NFR BLD: 13.5 % (ref 18–48)
MAGNESIUM SERPL-MCNC: 1.6 MG/DL (ref 1.6–2.6)
MCH RBC QN AUTO: 31 PG (ref 27–31)
MCHC RBC AUTO-ENTMCNC: 32.7 G/DL (ref 32–36)
MCV RBC AUTO: 95 FL (ref 82–98)
MONOCYTES # BLD AUTO: 0.3 K/UL (ref 0.3–1)
MONOCYTES NFR BLD: 4.7 % (ref 4–15)
NEUTROPHILS # BLD AUTO: 5.2 K/UL (ref 1.8–7.7)
NEUTROPHILS NFR BLD: 77.3 % (ref 38–73)
NRBC BLD-RTO: 0 /100 WBC
PHOSPHATE SERPL-MCNC: 3.2 MG/DL (ref 2.7–4.5)
PLATELET # BLD AUTO: 129 K/UL (ref 150–350)
PMV BLD AUTO: 11.4 FL (ref 9.2–12.9)
POTASSIUM SERPL-SCNC: 4.2 MMOL/L (ref 3.5–5.1)
PROT SERPL-MCNC: 5.1 G/DL (ref 6–8.4)
RBC # BLD AUTO: 4.23 M/UL (ref 4.6–6.2)
SODIUM SERPL-SCNC: 142 MMOL/L (ref 136–145)
WBC # BLD AUTO: 6.76 K/UL (ref 3.9–12.7)

## 2021-01-31 PROCEDURE — 93010 ELECTROCARDIOGRAM REPORT: CPT | Mod: ,,, | Performed by: INTERNAL MEDICINE

## 2021-01-31 PROCEDURE — 83735 ASSAY OF MAGNESIUM: CPT

## 2021-01-31 PROCEDURE — 99900035 HC TECH TIME PER 15 MIN (STAT)

## 2021-01-31 PROCEDURE — 25000003 PHARM REV CODE 250: Performed by: STUDENT IN AN ORGANIZED HEALTH CARE EDUCATION/TRAINING PROGRAM

## 2021-01-31 PROCEDURE — 93010 EKG 12-LEAD: ICD-10-PCS | Mod: ,,, | Performed by: INTERNAL MEDICINE

## 2021-01-31 PROCEDURE — 94660 CPAP INITIATION&MGMT: CPT

## 2021-01-31 PROCEDURE — 85025 COMPLETE CBC W/AUTO DIFF WBC: CPT

## 2021-01-31 PROCEDURE — 94761 N-INVAS EAR/PLS OXIMETRY MLT: CPT

## 2021-01-31 PROCEDURE — 80053 COMPREHEN METABOLIC PANEL: CPT

## 2021-01-31 PROCEDURE — 27000190 HC CPAP FULL FACE MASK W/VALVE

## 2021-01-31 PROCEDURE — 84100 ASSAY OF PHOSPHORUS: CPT

## 2021-01-31 PROCEDURE — 36415 COLL VENOUS BLD VENIPUNCTURE: CPT

## 2021-01-31 PROCEDURE — 93005 ELECTROCARDIOGRAM TRACING: CPT

## 2021-01-31 PROCEDURE — 27000221 HC OXYGEN, UP TO 24 HOURS

## 2021-01-31 PROCEDURE — 11000001 HC ACUTE MED/SURG PRIVATE ROOM

## 2021-01-31 PROCEDURE — 63600175 PHARM REV CODE 636 W HCPCS: Performed by: STUDENT IN AN ORGANIZED HEALTH CARE EDUCATION/TRAINING PROGRAM

## 2021-01-31 RX ORDER — METOPROLOL TARTRATE 50 MG/1
50 TABLET ORAL ONCE
Status: COMPLETED | OUTPATIENT
Start: 2021-01-31 | End: 2021-01-31

## 2021-01-31 RX ORDER — METOPROLOL TARTRATE 1 MG/ML
5 INJECTION, SOLUTION INTRAVENOUS EVERY 5 MIN PRN
Status: DISCONTINUED | OUTPATIENT
Start: 2021-01-31 | End: 2021-02-03 | Stop reason: HOSPADM

## 2021-01-31 RX ORDER — METOPROLOL SUCCINATE 50 MG/1
100 TABLET, EXTENDED RELEASE ORAL DAILY
Status: DISCONTINUED | OUTPATIENT
Start: 2021-02-01 | End: 2021-02-03 | Stop reason: HOSPADM

## 2021-01-31 RX ADMIN — METOPROLOL TARTRATE 5 MG: 5 INJECTION INTRAVENOUS at 02:01

## 2021-01-31 RX ADMIN — MEMANTINE HYDROCHLORIDE 10 MG: 5 TABLET ORAL at 08:01

## 2021-01-31 RX ADMIN — ASPIRIN 81 MG: 81 TABLET, CHEWABLE ORAL at 09:01

## 2021-01-31 RX ADMIN — PANTOPRAZOLE SODIUM 40 MG: 40 GRANULE, DELAYED RELEASE ORAL at 09:01

## 2021-01-31 RX ADMIN — METOPROLOL SUCCINATE 50 MG: 50 TABLET, EXTENDED RELEASE ORAL at 09:01

## 2021-01-31 RX ADMIN — LEVOTHYROXINE SODIUM 100 MCG: 50 TABLET ORAL at 05:01

## 2021-01-31 RX ADMIN — SIMVASTATIN 40 MG: 20 TABLET, FILM COATED ORAL at 08:01

## 2021-01-31 RX ADMIN — CHLORHEXIDINE GLUCONATE 15 ML: 1.2 RINSE ORAL at 08:01

## 2021-01-31 RX ADMIN — METOPROLOL TARTRATE 50 MG: 50 TABLET, FILM COATED ORAL at 03:01

## 2021-01-31 RX ADMIN — CHLORHEXIDINE GLUCONATE 15 ML: 1.2 RINSE ORAL at 09:01

## 2021-01-31 RX ADMIN — LEVOFLOXACIN 750 MG: 750 INJECTION, SOLUTION INTRAVENOUS at 09:01

## 2021-01-31 RX ADMIN — MEMANTINE HYDROCHLORIDE 10 MG: 5 TABLET ORAL at 09:01

## 2021-01-31 RX ADMIN — DUTASTERIDE 0.5 MG: 0.5 CAPSULE, LIQUID FILLED ORAL at 09:01

## 2021-01-31 RX ADMIN — TAMSULOSIN HYDROCHLORIDE 0.4 MG: 0.4 CAPSULE ORAL at 09:01

## 2021-02-01 ENCOUNTER — ANESTHESIA EVENT (OUTPATIENT)
Dept: ENDOSCOPY | Facility: HOSPITAL | Age: 81
DRG: 871 | End: 2021-02-01
Payer: MEDICARE

## 2021-02-01 ENCOUNTER — ANESTHESIA (OUTPATIENT)
Dept: ENDOSCOPY | Facility: HOSPITAL | Age: 81
DRG: 871 | End: 2021-02-01
Payer: MEDICARE

## 2021-02-01 LAB — D DIMER PPP IA.FEU-MCNC: 1.79 MG/L FEU

## 2021-02-01 PROCEDURE — 37000008 HC ANESTHESIA 1ST 15 MINUTES: Performed by: INTERNAL MEDICINE

## 2021-02-01 PROCEDURE — 25000003 PHARM REV CODE 250: Performed by: STUDENT IN AN ORGANIZED HEALTH CARE EDUCATION/TRAINING PROGRAM

## 2021-02-01 PROCEDURE — 36415 COLL VENOUS BLD VENIPUNCTURE: CPT

## 2021-02-01 PROCEDURE — 94761 N-INVAS EAR/PLS OXIMETRY MLT: CPT

## 2021-02-01 PROCEDURE — 97110 THERAPEUTIC EXERCISES: CPT | Mod: CQ

## 2021-02-01 PROCEDURE — 63600175 PHARM REV CODE 636 W HCPCS: Performed by: NURSE ANESTHETIST, CERTIFIED REGISTERED

## 2021-02-01 PROCEDURE — 43246 EGD PLACE GASTROSTOMY TUBE: CPT | Performed by: INTERNAL MEDICINE

## 2021-02-01 PROCEDURE — 43246 EGD PLACE GASTROSTOMY TUBE: CPT | Mod: ,,, | Performed by: INTERNAL MEDICINE

## 2021-02-01 PROCEDURE — 43246 PR EGD, FLEX, W/PLCMT, GASTROSTOMY TUBE: ICD-10-PCS | Mod: ,,, | Performed by: INTERNAL MEDICINE

## 2021-02-01 PROCEDURE — 11000001 HC ACUTE MED/SURG PRIVATE ROOM

## 2021-02-01 PROCEDURE — 63600175 PHARM REV CODE 636 W HCPCS: Performed by: STUDENT IN AN ORGANIZED HEALTH CARE EDUCATION/TRAINING PROGRAM

## 2021-02-01 PROCEDURE — 94660 CPAP INITIATION&MGMT: CPT

## 2021-02-01 PROCEDURE — 99900035 HC TECH TIME PER 15 MIN (STAT)

## 2021-02-01 PROCEDURE — 27201018 HC KIT, PEG (ANY): Performed by: INTERNAL MEDICINE

## 2021-02-01 PROCEDURE — 97530 THERAPEUTIC ACTIVITIES: CPT | Mod: CO

## 2021-02-01 PROCEDURE — 27000221 HC OXYGEN, UP TO 24 HOURS

## 2021-02-01 PROCEDURE — 97803 MED NUTRITION INDIV SUBSEQ: CPT

## 2021-02-01 PROCEDURE — 37000009 HC ANESTHESIA EA ADD 15 MINS: Performed by: INTERNAL MEDICINE

## 2021-02-01 PROCEDURE — 25000003 PHARM REV CODE 250: Performed by: NURSE ANESTHETIST, CERTIFIED REGISTERED

## 2021-02-01 PROCEDURE — 85379 FIBRIN DEGRADATION QUANT: CPT

## 2021-02-01 PROCEDURE — 63600175 PHARM REV CODE 636 W HCPCS: Performed by: INTERNAL MEDICINE

## 2021-02-01 RX ORDER — CEFAZOLIN SODIUM 1 G/50ML
1 SOLUTION INTRAVENOUS ONCE
Status: COMPLETED | OUTPATIENT
Start: 2021-02-01 | End: 2021-02-01

## 2021-02-01 RX ORDER — LIDOCAINE HCL/PF 100 MG/5ML
SYRINGE (ML) INTRAVENOUS
Status: DISCONTINUED | OUTPATIENT
Start: 2021-02-01 | End: 2021-02-01

## 2021-02-01 RX ORDER — PROPOFOL 10 MG/ML
VIAL (ML) INTRAVENOUS CONTINUOUS PRN
Status: DISCONTINUED | OUTPATIENT
Start: 2021-02-01 | End: 2021-02-01

## 2021-02-01 RX ORDER — PROPOFOL 10 MG/ML
VIAL (ML) INTRAVENOUS
Status: DISCONTINUED | OUTPATIENT
Start: 2021-02-01 | End: 2021-02-01

## 2021-02-01 RX ORDER — CEFAZOLIN SODIUM 1 G/3ML
INJECTION, POWDER, FOR SOLUTION INTRAMUSCULAR; INTRAVENOUS
Status: DISCONTINUED | OUTPATIENT
Start: 2021-02-01 | End: 2021-02-01

## 2021-02-01 RX ADMIN — PROPOFOL 30 MG: 10 INJECTION, EMULSION INTRAVENOUS at 03:02

## 2021-02-01 RX ADMIN — CHLORHEXIDINE GLUCONATE 15 ML: 1.2 RINSE ORAL at 08:02

## 2021-02-01 RX ADMIN — SODIUM CHLORIDE, SODIUM LACTATE, POTASSIUM CHLORIDE, AND CALCIUM CHLORIDE 500 ML: .6; .31; .03; .02 INJECTION, SOLUTION INTRAVENOUS at 07:02

## 2021-02-01 RX ADMIN — CHLORHEXIDINE GLUCONATE 15 ML: 1.2 RINSE ORAL at 09:02

## 2021-02-01 RX ADMIN — MEMANTINE HYDROCHLORIDE 10 MG: 5 TABLET ORAL at 08:02

## 2021-02-01 RX ADMIN — METOPROLOL SUCCINATE 100 MG: 50 TABLET, EXTENDED RELEASE ORAL at 08:02

## 2021-02-01 RX ADMIN — LEVOTHYROXINE SODIUM 100 MCG: 50 TABLET ORAL at 06:02

## 2021-02-01 RX ADMIN — LIDOCAINE HYDROCHLORIDE 100 MG: 20 INJECTION, SOLUTION INTRAVENOUS at 03:02

## 2021-02-01 RX ADMIN — CEFAZOLIN SODIUM 1 G: 1 SOLUTION INTRAVENOUS at 06:02

## 2021-02-01 RX ADMIN — CEFAZOLIN 1 G: 330 INJECTION, POWDER, FOR SOLUTION INTRAMUSCULAR; INTRAVENOUS at 03:02

## 2021-02-01 RX ADMIN — PANTOPRAZOLE SODIUM 40 MG: 40 GRANULE, DELAYED RELEASE ORAL at 08:02

## 2021-02-01 RX ADMIN — DUTASTERIDE 0.5 MG: 0.5 CAPSULE, LIQUID FILLED ORAL at 08:02

## 2021-02-01 RX ADMIN — PROPOFOL 50 MCG/KG/MIN: 10 INJECTION, EMULSION INTRAVENOUS at 03:02

## 2021-02-01 RX ADMIN — ASPIRIN 81 MG: 81 TABLET, CHEWABLE ORAL at 08:02

## 2021-02-01 RX ADMIN — SIMVASTATIN 40 MG: 20 TABLET, FILM COATED ORAL at 09:02

## 2021-02-01 RX ADMIN — MEMANTINE HYDROCHLORIDE 10 MG: 5 TABLET ORAL at 09:02

## 2021-02-01 RX ADMIN — TAMSULOSIN HYDROCHLORIDE 0.4 MG: 0.4 CAPSULE ORAL at 08:02

## 2021-02-02 LAB
ALBUMIN SERPL BCP-MCNC: 2 G/DL (ref 3.5–5.2)
ALP SERPL-CCNC: 132 U/L (ref 55–135)
ALT SERPL W/O P-5'-P-CCNC: 34 U/L (ref 10–44)
ANION GAP SERPL CALC-SCNC: 9 MMOL/L (ref 8–16)
AST SERPL-CCNC: 33 U/L (ref 10–40)
BASOPHILS # BLD AUTO: 0.02 K/UL (ref 0–0.2)
BASOPHILS NFR BLD: 0.4 % (ref 0–1.9)
BILIRUB SERPL-MCNC: 0.7 MG/DL (ref 0.1–1)
BUN SERPL-MCNC: 21 MG/DL (ref 8–23)
CALCIUM SERPL-MCNC: 8.4 MG/DL (ref 8.7–10.5)
CHLORIDE SERPL-SCNC: 113 MMOL/L (ref 95–110)
CO2 SERPL-SCNC: 20 MMOL/L (ref 23–29)
CREAT SERPL-MCNC: 1.2 MG/DL (ref 0.5–1.4)
DIFFERENTIAL METHOD: ABNORMAL
EOSINOPHIL # BLD AUTO: 0.2 K/UL (ref 0–0.5)
EOSINOPHIL NFR BLD: 2.8 % (ref 0–8)
ERYTHROCYTE [DISTWIDTH] IN BLOOD BY AUTOMATED COUNT: 15.9 % (ref 11.5–14.5)
EST. GFR  (AFRICAN AMERICAN): >60 ML/MIN/1.73 M^2
EST. GFR  (NON AFRICAN AMERICAN): 58 ML/MIN/1.73 M^2
GLUCOSE SERPL-MCNC: 74 MG/DL (ref 70–110)
HCT VFR BLD AUTO: 38.4 % (ref 40–54)
HGB BLD-MCNC: 12.4 G/DL (ref 14–18)
IMM GRANULOCYTES # BLD AUTO: 0.07 K/UL (ref 0–0.04)
IMM GRANULOCYTES NFR BLD AUTO: 1.2 % (ref 0–0.5)
LYMPHOCYTES # BLD AUTO: 0.8 K/UL (ref 1–4.8)
LYMPHOCYTES NFR BLD: 14.6 % (ref 18–48)
MCH RBC QN AUTO: 30.8 PG (ref 27–31)
MCHC RBC AUTO-ENTMCNC: 32.3 G/DL (ref 32–36)
MCV RBC AUTO: 95 FL (ref 82–98)
MONOCYTES # BLD AUTO: 0.3 K/UL (ref 0.3–1)
MONOCYTES NFR BLD: 5.3 % (ref 4–15)
NEUTROPHILS # BLD AUTO: 4.3 K/UL (ref 1.8–7.7)
NEUTROPHILS NFR BLD: 75.7 % (ref 38–73)
NRBC BLD-RTO: 0 /100 WBC
PLATELET # BLD AUTO: 138 K/UL (ref 150–350)
PMV BLD AUTO: 11.9 FL (ref 9.2–12.9)
POTASSIUM SERPL-SCNC: 4 MMOL/L (ref 3.5–5.1)
PROT SERPL-MCNC: 5 G/DL (ref 6–8.4)
RBC # BLD AUTO: 4.03 M/UL (ref 4.6–6.2)
SODIUM SERPL-SCNC: 142 MMOL/L (ref 136–145)
WBC # BLD AUTO: 5.7 K/UL (ref 3.9–12.7)

## 2021-02-02 PROCEDURE — 25000003 PHARM REV CODE 250: Performed by: STUDENT IN AN ORGANIZED HEALTH CARE EDUCATION/TRAINING PROGRAM

## 2021-02-02 PROCEDURE — 97530 THERAPEUTIC ACTIVITIES: CPT | Mod: CQ

## 2021-02-02 PROCEDURE — 36415 COLL VENOUS BLD VENIPUNCTURE: CPT

## 2021-02-02 PROCEDURE — 85025 COMPLETE CBC W/AUTO DIFF WBC: CPT

## 2021-02-02 PROCEDURE — 27100171 HC OXYGEN HIGH FLOW UP TO 24 HOURS

## 2021-02-02 PROCEDURE — 97530 THERAPEUTIC ACTIVITIES: CPT | Mod: CO

## 2021-02-02 PROCEDURE — 99900035 HC TECH TIME PER 15 MIN (STAT)

## 2021-02-02 PROCEDURE — 63600175 PHARM REV CODE 636 W HCPCS: Performed by: STUDENT IN AN ORGANIZED HEALTH CARE EDUCATION/TRAINING PROGRAM

## 2021-02-02 PROCEDURE — 80053 COMPREHEN METABOLIC PANEL: CPT

## 2021-02-02 PROCEDURE — 94660 CPAP INITIATION&MGMT: CPT

## 2021-02-02 PROCEDURE — 11000001 HC ACUTE MED/SURG PRIVATE ROOM

## 2021-02-02 PROCEDURE — 92526 ORAL FUNCTION THERAPY: CPT

## 2021-02-02 PROCEDURE — 94761 N-INVAS EAR/PLS OXIMETRY MLT: CPT

## 2021-02-02 PROCEDURE — 99232 SBSQ HOSP IP/OBS MODERATE 35: CPT | Mod: ,,, | Performed by: INTERNAL MEDICINE

## 2021-02-02 PROCEDURE — 99232 PR SUBSEQUENT HOSPITAL CARE,LEVL II: ICD-10-PCS | Mod: ,,, | Performed by: INTERNAL MEDICINE

## 2021-02-02 RX ORDER — LEVOFLOXACIN 5 MG/ML
750 INJECTION, SOLUTION INTRAVENOUS
Status: COMPLETED | OUTPATIENT
Start: 2021-02-02 | End: 2021-02-02

## 2021-02-02 RX ADMIN — APIXABAN 5 MG: 5 TABLET, FILM COATED ORAL at 08:02

## 2021-02-02 RX ADMIN — PANTOPRAZOLE SODIUM 40 MG: 40 GRANULE, DELAYED RELEASE ORAL at 08:02

## 2021-02-02 RX ADMIN — LEVOFLOXACIN 750 MG: 750 INJECTION, SOLUTION INTRAVENOUS at 09:02

## 2021-02-02 RX ADMIN — ASPIRIN 81 MG: 81 TABLET, CHEWABLE ORAL at 08:02

## 2021-02-02 RX ADMIN — MEMANTINE HYDROCHLORIDE 10 MG: 5 TABLET ORAL at 08:02

## 2021-02-02 RX ADMIN — CHLORHEXIDINE GLUCONATE 15 ML: 1.2 RINSE ORAL at 08:02

## 2021-02-02 RX ADMIN — SIMVASTATIN 40 MG: 20 TABLET, FILM COATED ORAL at 08:02

## 2021-02-02 RX ADMIN — TAMSULOSIN HYDROCHLORIDE 0.4 MG: 0.4 CAPSULE ORAL at 08:02

## 2021-02-02 RX ADMIN — LEVOTHYROXINE SODIUM 100 MCG: 50 TABLET ORAL at 05:02

## 2021-02-02 RX ADMIN — METOPROLOL SUCCINATE 100 MG: 50 TABLET, EXTENDED RELEASE ORAL at 08:02

## 2021-02-03 VITALS
HEART RATE: 89 BPM | BODY MASS INDEX: 35.61 KG/M2 | SYSTOLIC BLOOD PRESSURE: 107 MMHG | OXYGEN SATURATION: 95 % | TEMPERATURE: 96 F | HEIGHT: 67 IN | DIASTOLIC BLOOD PRESSURE: 68 MMHG | WEIGHT: 226.88 LBS | RESPIRATION RATE: 18 BRPM

## 2021-02-03 PROBLEM — N17.9 AKI (ACUTE KIDNEY INJURY): Status: RESOLVED | Noted: 2021-01-26 | Resolved: 2021-02-03

## 2021-02-03 PROBLEM — I95.9 HYPOTENSION ARTERIAL: Status: RESOLVED | Noted: 2021-01-26 | Resolved: 2021-02-03

## 2021-02-03 PROBLEM — A41.9 SEVERE SEPSIS: Status: RESOLVED | Noted: 2021-01-26 | Resolved: 2021-02-03

## 2021-02-03 PROBLEM — Z51.5 PALLIATIVE CARE ENCOUNTER: Status: RESOLVED | Noted: 2021-01-26 | Resolved: 2021-02-03

## 2021-02-03 PROBLEM — R79.89 ELEVATED TROPONIN: Status: RESOLVED | Noted: 2021-01-26 | Resolved: 2021-02-03

## 2021-02-03 PROBLEM — U07.1 COVID-19 VIRUS INFECTION: Status: RESOLVED | Noted: 2021-01-01 | Resolved: 2021-02-03

## 2021-02-03 PROBLEM — J96.21 ACUTE ON CHRONIC RESPIRATORY FAILURE WITH HYPOXEMIA: Status: RESOLVED | Noted: 2021-01-26 | Resolved: 2021-02-03

## 2021-02-03 PROBLEM — R65.20 SEVERE SEPSIS: Status: RESOLVED | Noted: 2021-01-26 | Resolved: 2021-02-03

## 2021-02-03 PROBLEM — Z20.822 SUSPECTED COVID-19 VIRUS INFECTION: Status: RESOLVED | Noted: 2021-01-26 | Resolved: 2021-02-03

## 2021-02-03 PROBLEM — G93.40 ACUTE ENCEPHALOPATHY: Status: RESOLVED | Noted: 2021-01-26 | Resolved: 2021-02-03

## 2021-02-03 LAB
D DIMER PPP IA.FEU-MCNC: 2.01 MG/L FEU
SARS-COV-2 RDRP RESP QL NAA+PROBE: NEGATIVE

## 2021-02-03 PROCEDURE — 27000221 HC OXYGEN, UP TO 24 HOURS

## 2021-02-03 PROCEDURE — 25000003 PHARM REV CODE 250: Performed by: STUDENT IN AN ORGANIZED HEALTH CARE EDUCATION/TRAINING PROGRAM

## 2021-02-03 PROCEDURE — 99900035 HC TECH TIME PER 15 MIN (STAT)

## 2021-02-03 PROCEDURE — 25000242 PHARM REV CODE 250 ALT 637 W/ HCPCS: Performed by: STUDENT IN AN ORGANIZED HEALTH CARE EDUCATION/TRAINING PROGRAM

## 2021-02-03 PROCEDURE — 94640 AIRWAY INHALATION TREATMENT: CPT

## 2021-02-03 PROCEDURE — 94761 N-INVAS EAR/PLS OXIMETRY MLT: CPT

## 2021-02-03 PROCEDURE — U0002 COVID-19 LAB TEST NON-CDC: HCPCS

## 2021-02-03 PROCEDURE — 97530 THERAPEUTIC ACTIVITIES: CPT | Mod: CQ

## 2021-02-03 PROCEDURE — 85379 FIBRIN DEGRADATION QUANT: CPT

## 2021-02-03 PROCEDURE — 36415 COLL VENOUS BLD VENIPUNCTURE: CPT

## 2021-02-03 PROCEDURE — 97535 SELF CARE MNGMENT TRAINING: CPT | Mod: CO

## 2021-02-03 RX ADMIN — TAMSULOSIN HYDROCHLORIDE 0.4 MG: 0.4 CAPSULE ORAL at 08:02

## 2021-02-03 RX ADMIN — METOPROLOL SUCCINATE 100 MG: 50 TABLET, EXTENDED RELEASE ORAL at 08:02

## 2021-02-03 RX ADMIN — CHLORHEXIDINE GLUCONATE 15 ML: 1.2 RINSE ORAL at 08:02

## 2021-02-03 RX ADMIN — APIXABAN 5 MG: 5 TABLET, FILM COATED ORAL at 08:02

## 2021-02-03 RX ADMIN — ASPIRIN 81 MG: 81 TABLET, CHEWABLE ORAL at 08:02

## 2021-02-03 RX ADMIN — DUTASTERIDE 0.5 MG: 0.5 CAPSULE, LIQUID FILLED ORAL at 08:02

## 2021-02-03 RX ADMIN — ALBUTEROL SULFATE 2 PUFF: 90 AEROSOL, METERED RESPIRATORY (INHALATION) at 05:02

## 2021-02-03 RX ADMIN — MEMANTINE HYDROCHLORIDE 10 MG: 5 TABLET ORAL at 08:02

## 2021-02-03 RX ADMIN — LEVOTHYROXINE SODIUM 100 MCG: 50 TABLET ORAL at 05:02

## 2021-02-03 RX ADMIN — PANTOPRAZOLE SODIUM 40 MG: 40 GRANULE, DELAYED RELEASE ORAL at 08:02

## 2021-02-04 ENCOUNTER — EXTERNAL HOSPITAL ADMISSION (OUTPATIENT)
Dept: SKILLED NURSING FACILITY | Facility: HOSPITAL | Age: 81
End: 2021-02-04
Payer: MEDICARE

## 2021-02-04 DIAGNOSIS — R53.81 PHYSICAL DECONDITIONING: Primary | ICD-10-CM

## 2021-02-08 ENCOUNTER — EXTERNAL HOSPITAL ADMISSION (OUTPATIENT)
Dept: SKILLED NURSING FACILITY | Facility: HOSPITAL | Age: 81
End: 2021-02-08
Payer: MEDICARE

## 2021-02-08 DIAGNOSIS — R53.81 DEBILITY: Primary | ICD-10-CM

## 2021-02-11 ENCOUNTER — EXTERNAL HOSPITAL ADMISSION (OUTPATIENT)
Dept: SKILLED NURSING FACILITY | Facility: HOSPITAL | Age: 81
End: 2021-02-11
Payer: MEDICARE

## 2021-02-11 DIAGNOSIS — R53.81 DEBILITY: Primary | ICD-10-CM

## 2021-02-14 ENCOUNTER — HOSPITAL ENCOUNTER (EMERGENCY)
Facility: HOSPITAL | Age: 81
Discharge: HOME | End: 2021-02-14
Attending: EMERGENCY MEDICINE
Payer: MEDICARE

## 2021-02-14 VITALS
RESPIRATION RATE: 17 BRPM | HEART RATE: 98 BPM | DIASTOLIC BLOOD PRESSURE: 64 MMHG | SYSTOLIC BLOOD PRESSURE: 108 MMHG | TEMPERATURE: 98 F | OXYGEN SATURATION: 96 %

## 2021-02-14 DIAGNOSIS — S00.01XA ABRASION OF SCALP, INITIAL ENCOUNTER: ICD-10-CM
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
## 2021-02-14 PROCEDURE — 99283 EMERGENCY DEPT VISIT LOW MDM: CPT

## 2021-02-16 ENCOUNTER — EXTERNAL HOSPITAL ADMISSION (OUTPATIENT)
Dept: SKILLED NURSING FACILITY | Facility: HOSPITAL | Age: 81
End: 2021-02-16
Payer: MEDICARE

## 2021-02-16 DIAGNOSIS — R53.81 DEBILITY: Primary | ICD-10-CM

## 2021-02-17 ENCOUNTER — HOSPITAL ENCOUNTER (EMERGENCY)
Facility: HOSPITAL | Age: 81
Discharge: HOME | End: 2021-02-17
Attending: EMERGENCY MEDICINE
Payer: MEDICARE

## 2021-02-17 VITALS
TEMPERATURE: 98 F | HEART RATE: 95 BPM | RESPIRATION RATE: 20 BRPM | BODY MASS INDEX: 35.01 KG/M2 | SYSTOLIC BLOOD PRESSURE: 120 MMHG | OXYGEN SATURATION: 97 % | DIASTOLIC BLOOD PRESSURE: 72 MMHG | HEIGHT: 68 IN
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
## 2021-02-17 DIAGNOSIS — S02.2XXA CLOSED FRACTURE OF NASAL BONE, INITIAL ENCOUNTER: Primary | ICD-10-CM

## 2021-02-17 LAB
ALBUMIN SERPL BCP-MCNC: 2.8 G/DL (ref 3.5–5.2)
ALP SERPL-CCNC: 157 U/L (ref 55–135)
ALT SERPL W/O P-5'-P-CCNC: 27 U/L (ref 10–44)
ANION GAP SERPL CALC-SCNC: 9 MMOL/L (ref 8–16)
AST SERPL-CCNC: 34 U/L (ref 10–40)
BASOPHILS # BLD AUTO: 0.05 K/UL (ref 0–0.2)
BASOPHILS NFR BLD: 0.6 % (ref 0–1.9)
BILIRUB SERPL-MCNC: 0.7 MG/DL (ref 0.1–1)
BNP SERPL-MCNC: 83 PG/ML (ref 0–99)
BUN SERPL-MCNC: 22 MG/DL (ref 8–23)
CALCIUM SERPL-MCNC: 9.7 MG/DL (ref 8.7–10.5)
CHLORIDE SERPL-SCNC: 102 MMOL/L (ref 95–110)
CK SERPL-CCNC: 44 U/L (ref 20–200)
CO2 SERPL-SCNC: 26 MMOL/L (ref 23–29)
CREAT SERPL-MCNC: 1 MG/DL (ref 0.5–1.4)
DIFFERENTIAL METHOD: ABNORMAL
EOSINOPHIL # BLD AUTO: 0.1 K/UL (ref 0–0.5)
EOSINOPHIL NFR BLD: 0.9 % (ref 0–8)
ERYTHROCYTE [DISTWIDTH] IN BLOOD BY AUTOMATED COUNT: 17.2 % (ref 11.5–14.5)
EST. GFR  (AFRICAN AMERICAN): >60 ML/MIN/1.73 M^2
EST. GFR  (NON AFRICAN AMERICAN): >60 ML/MIN/1.73 M^2
GLUCOSE SERPL-MCNC: 108 MG/DL (ref 70–110)
HCT VFR BLD AUTO: 41.1 % (ref 40–54)
HGB BLD-MCNC: 13.3 G/DL (ref 14–18)
IMM GRANULOCYTES # BLD AUTO: 0.06 K/UL (ref 0–0.04)
IMM GRANULOCYTES NFR BLD AUTO: 0.7 % (ref 0–0.5)
INR PPP: 1.1 (ref 0.8–1.2)
LYMPHOCYTES # BLD AUTO: 1.9 K/UL (ref 1–4.8)
LYMPHOCYTES NFR BLD: 22 % (ref 18–48)
MAGNESIUM SERPL-MCNC: 1.8 MG/DL (ref 1.6–2.6)
MCH RBC QN AUTO: 31.1 PG (ref 27–31)
MCHC RBC AUTO-ENTMCNC: 32.4 G/DL (ref 32–36)
MCV RBC AUTO: 96 FL (ref 82–98)
MONOCYTES # BLD AUTO: 0.7 K/UL (ref 0.3–1)
MONOCYTES NFR BLD: 8.6 % (ref 4–15)
NEUTROPHILS # BLD AUTO: 5.8 K/UL (ref 1.8–7.7)
NEUTROPHILS NFR BLD: 67.2 % (ref 38–73)
NRBC BLD-RTO: 0 /100 WBC
PLATELET # BLD AUTO: 270 K/UL (ref 150–350)
PMV BLD AUTO: 10.3 FL (ref 9.2–12.9)
POTASSIUM SERPL-SCNC: 4.1 MMOL/L (ref 3.5–5.1)
PROT SERPL-MCNC: 6.2 G/DL (ref 6–8.4)
PROTHROMBIN TIME: 11.4 SEC (ref 9–12.5)
RBC # BLD AUTO: 4.28 M/UL (ref 4.6–6.2)
SODIUM SERPL-SCNC: 137 MMOL/L (ref 136–145)
T4 FREE SERPL-MCNC: 1.08 NG/DL (ref 0.71–1.51)
TROPONIN I SERPL DL<=0.01 NG/ML-MCNC: 0.02 NG/ML (ref 0–0.03)
TSH SERPL DL<=0.005 MIU/L-ACNC: 7.16 UIU/ML (ref 0.4–4)
WBC # BLD AUTO: 8.61 K/UL (ref 3.9–12.7)

## 2021-02-17 PROCEDURE — 85610 PROTHROMBIN TIME: CPT

## 2021-02-17 PROCEDURE — 93010 ELECTROCARDIOGRAM REPORT: CPT | Mod: ,,, | Performed by: INTERNAL MEDICINE

## 2021-02-17 PROCEDURE — 83880 ASSAY OF NATRIURETIC PEPTIDE: CPT

## 2021-02-17 PROCEDURE — 83735 ASSAY OF MAGNESIUM: CPT

## 2021-02-17 PROCEDURE — 84439 ASSAY OF FREE THYROXINE: CPT

## 2021-02-17 PROCEDURE — 93005 ELECTROCARDIOGRAM TRACING: CPT

## 2021-02-17 PROCEDURE — 99285 EMERGENCY DEPT VISIT HI MDM: CPT | Mod: 25

## 2021-02-17 PROCEDURE — 85025 COMPLETE CBC W/AUTO DIFF WBC: CPT

## 2021-02-17 PROCEDURE — 80053 COMPREHEN METABOLIC PANEL: CPT

## 2021-02-17 PROCEDURE — 93010 EKG 12-LEAD: ICD-10-PCS | Mod: ,,, | Performed by: INTERNAL MEDICINE

## 2021-02-17 PROCEDURE — 84484 ASSAY OF TROPONIN QUANT: CPT

## 2021-02-17 PROCEDURE — 82550 ASSAY OF CK (CPK): CPT

## 2021-02-17 PROCEDURE — 84443 ASSAY THYROID STIM HORMONE: CPT

## 2021-02-17 RX ORDER — AMOXICILLIN 500 MG/1
500 CAPSULE ORAL 3 TIMES DAILY
Qty: 30 CAPSULE | Refills: 0 | Status: SHIPPED | OUTPATIENT
Start: 2021-02-17 | End: 2021-03-19

## 2021-02-18 ENCOUNTER — EXTERNAL HOSPITAL ADMISSION (OUTPATIENT)
Dept: SKILLED NURSING FACILITY | Facility: HOSPITAL | Age: 81
End: 2021-02-18
Payer: MEDICARE

## 2021-02-18 DIAGNOSIS — R53.81 DEBILITY: Primary | ICD-10-CM

## 2021-02-19 ENCOUNTER — TELEPHONE (OUTPATIENT)
Dept: FAMILY MEDICINE | Facility: CLINIC | Age: 81
End: 2021-02-19

## 2021-02-20 ENCOUNTER — HOSPITAL ENCOUNTER (EMERGENCY)
Facility: HOSPITAL | Age: 81
Discharge: HOME | End: 2021-02-20
Attending: EMERGENCY MEDICINE
Payer: MEDICARE

## 2021-02-20 VITALS
DIASTOLIC BLOOD PRESSURE: 85 MMHG | TEMPERATURE: 98 F | BODY MASS INDEX: 35.01 KG/M2 | RESPIRATION RATE: 16 BRPM | HEART RATE: 99 BPM | SYSTOLIC BLOOD PRESSURE: 159 MMHG | OXYGEN SATURATION: 96 % | WEIGHT: 226.88 LBS

## 2021-02-20 DIAGNOSIS — R11.0 NAUSEA: ICD-10-CM

## 2021-02-20 DIAGNOSIS — R10.84 GENERALIZED ABDOMINAL PAIN: Primary | ICD-10-CM

## 2021-02-20 DIAGNOSIS — R10.9 ABDOMINAL PAIN: ICD-10-CM

## 2021-02-20 LAB
ALBUMIN SERPL BCP-MCNC: 3.1 G/DL (ref 3.5–5.2)
ALP SERPL-CCNC: 168 U/L (ref 55–135)
ALT SERPL W/O P-5'-P-CCNC: 26 U/L (ref 10–44)
ANION GAP SERPL CALC-SCNC: 10 MMOL/L (ref 8–16)
AST SERPL-CCNC: 31 U/L (ref 10–40)
BACTERIA #/AREA URNS HPF: ABNORMAL /HPF
BASOPHILS # BLD AUTO: 0.03 K/UL (ref 0–0.2)
BASOPHILS NFR BLD: 0.3 % (ref 0–1.9)
BILIRUB SERPL-MCNC: 0.8 MG/DL (ref 0.1–1)
BILIRUB UR QL STRIP: NEGATIVE
BUN SERPL-MCNC: 26 MG/DL (ref 8–23)
CALCIUM SERPL-MCNC: 9.7 MG/DL (ref 8.7–10.5)
CHLORIDE SERPL-SCNC: 102 MMOL/L (ref 95–110)
CLARITY UR: CLEAR
CO2 SERPL-SCNC: 26 MMOL/L (ref 23–29)
COLOR UR: YELLOW
CREAT SERPL-MCNC: 1.1 MG/DL (ref 0.5–1.4)
DIFFERENTIAL METHOD: ABNORMAL
EOSINOPHIL # BLD AUTO: 0.1 K/UL (ref 0–0.5)
EOSINOPHIL NFR BLD: 0.8 % (ref 0–8)
ERYTHROCYTE [DISTWIDTH] IN BLOOD BY AUTOMATED COUNT: 17.2 % (ref 11.5–14.5)
EST. GFR  (AFRICAN AMERICAN): >60 ML/MIN/1.73 M^2
EST. GFR  (NON AFRICAN AMERICAN): >60 ML/MIN/1.73 M^2
GLUCOSE SERPL-MCNC: 104 MG/DL (ref 70–110)
GLUCOSE UR QL STRIP: NEGATIVE
HCT VFR BLD AUTO: 43.5 % (ref 40–54)
HGB BLD-MCNC: 13.9 G/DL (ref 14–18)
HGB UR QL STRIP: ABNORMAL
HYALINE CASTS #/AREA URNS LPF: 1 /LPF
IMM GRANULOCYTES # BLD AUTO: 0.06 K/UL (ref 0–0.04)
IMM GRANULOCYTES NFR BLD AUTO: 0.5 % (ref 0–0.5)
KETONES UR QL STRIP: NEGATIVE
LEUKOCYTE ESTERASE UR QL STRIP: NEGATIVE
LIPASE SERPL-CCNC: 57 U/L (ref 4–60)
LYMPHOCYTES # BLD AUTO: 0.8 K/UL (ref 1–4.8)
LYMPHOCYTES NFR BLD: 6.9 % (ref 18–48)
MCH RBC QN AUTO: 30.8 PG (ref 27–31)
MCHC RBC AUTO-ENTMCNC: 32 G/DL (ref 32–36)
MCV RBC AUTO: 96 FL (ref 82–98)
MICROSCOPIC COMMENT: ABNORMAL
MONOCYTES # BLD AUTO: 0.7 K/UL (ref 0.3–1)
MONOCYTES NFR BLD: 6.6 % (ref 4–15)
NEUTROPHILS # BLD AUTO: 9.3 K/UL (ref 1.8–7.7)
NEUTROPHILS NFR BLD: 84.9 % (ref 38–73)
NITRITE UR QL STRIP: NEGATIVE
NRBC BLD-RTO: 0 /100 WBC
PH UR STRIP: 7 [PH] (ref 5–8)
PLATELET # BLD AUTO: 240 K/UL (ref 150–350)
PMV BLD AUTO: 11.3 FL (ref 9.2–12.9)
POCT GLUCOSE: 105 MG/DL (ref 70–110)
POTASSIUM SERPL-SCNC: 4 MMOL/L (ref 3.5–5.1)
PROT SERPL-MCNC: 6.7 G/DL (ref 6–8.4)
PROT UR QL STRIP: ABNORMAL
RBC # BLD AUTO: 4.52 M/UL (ref 4.6–6.2)
RBC #/AREA URNS HPF: 10 /HPF (ref 0–4)
SODIUM SERPL-SCNC: 138 MMOL/L (ref 136–145)
SP GR UR STRIP: 1.01 (ref 1–1.03)
TROPONIN I SERPL DL<=0.01 NG/ML-MCNC: 0.01 NG/ML (ref 0–0.03)
URN SPEC COLLECT METH UR: ABNORMAL
UROBILINOGEN UR STRIP-ACNC: NEGATIVE EU/DL
WBC # BLD AUTO: 10.98 K/UL (ref 3.9–12.7)
WBC #/AREA URNS HPF: 3 /HPF (ref 0–5)

## 2021-02-20 PROCEDURE — 93010 EKG 12-LEAD: ICD-10-PCS | Mod: ,,, | Performed by: INTERNAL MEDICINE

## 2021-02-20 PROCEDURE — 96361 HYDRATE IV INFUSION ADD-ON: CPT

## 2021-02-20 PROCEDURE — 84484 ASSAY OF TROPONIN QUANT: CPT

## 2021-02-20 PROCEDURE — 83690 ASSAY OF LIPASE: CPT

## 2021-02-20 PROCEDURE — 63600175 PHARM REV CODE 636 W HCPCS: Performed by: EMERGENCY MEDICINE

## 2021-02-20 PROCEDURE — 93010 ELECTROCARDIOGRAM REPORT: CPT | Mod: ,,, | Performed by: INTERNAL MEDICINE

## 2021-02-20 PROCEDURE — 93005 ELECTROCARDIOGRAM TRACING: CPT

## 2021-02-20 PROCEDURE — 82962 GLUCOSE BLOOD TEST: CPT

## 2021-02-20 PROCEDURE — 80053 COMPREHEN METABOLIC PANEL: CPT

## 2021-02-20 PROCEDURE — 96374 THER/PROPH/DIAG INJ IV PUSH: CPT

## 2021-02-20 PROCEDURE — 99284 EMERGENCY DEPT VISIT MOD MDM: CPT | Mod: 25

## 2021-02-20 PROCEDURE — 81000 URINALYSIS NONAUTO W/SCOPE: CPT

## 2021-02-20 PROCEDURE — 25000003 PHARM REV CODE 250: Performed by: EMERGENCY MEDICINE

## 2021-02-20 PROCEDURE — 85025 COMPLETE CBC W/AUTO DIFF WBC: CPT

## 2021-02-20 RX ORDER — METOCLOPRAMIDE HYDROCHLORIDE 5 MG/ML
10 INJECTION INTRAMUSCULAR; INTRAVENOUS
Status: COMPLETED | OUTPATIENT
Start: 2021-02-20 | End: 2021-02-20

## 2021-02-20 RX ORDER — METOCLOPRAMIDE 10 MG/1
10 TABLET ORAL EVERY 6 HOURS PRN
Qty: 14 TABLET | Refills: 0 | Status: ON HOLD | OUTPATIENT
Start: 2021-02-20 | End: 2021-05-06 | Stop reason: HOSPADM

## 2021-02-20 RX ORDER — DICYCLOMINE HYDROCHLORIDE 20 MG/1
20 TABLET ORAL 3 TIMES DAILY PRN
Qty: 14 TABLET | Refills: 0 | Status: ON HOLD | OUTPATIENT
Start: 2021-02-20 | End: 2021-03-26 | Stop reason: HOSPADM

## 2021-02-20 RX ORDER — ACETAMINOPHEN 325 MG/1
650 TABLET ORAL
Status: COMPLETED | OUTPATIENT
Start: 2021-02-20 | End: 2021-02-20

## 2021-02-20 RX ADMIN — METOCLOPRAMIDE 10 MG: 5 INJECTION, SOLUTION INTRAMUSCULAR; INTRAVENOUS at 04:02

## 2021-02-20 RX ADMIN — SODIUM CHLORIDE 1000 ML: 0.9 INJECTION, SOLUTION INTRAVENOUS at 04:02

## 2021-02-20 RX ADMIN — ACETAMINOPHEN 650 MG: 325 TABLET ORAL at 05:02

## 2021-02-22 ENCOUNTER — EXTERNAL HOSPITAL ADMISSION (OUTPATIENT)
Dept: SKILLED NURSING FACILITY | Facility: HOSPITAL | Age: 81
End: 2021-02-22
Payer: MEDICARE

## 2021-02-22 DIAGNOSIS — R53.81 DEBILITY: Primary | ICD-10-CM

## 2021-02-25 ENCOUNTER — EXTERNAL HOSPITAL ADMISSION (OUTPATIENT)
Dept: SKILLED NURSING FACILITY | Facility: HOSPITAL | Age: 81
End: 2021-02-25
Payer: MEDICARE

## 2021-02-25 DIAGNOSIS — R53.81 PHYSICAL DECONDITIONING: Primary | ICD-10-CM

## 2021-03-01 ENCOUNTER — EXTERNAL HOSPITAL ADMISSION (OUTPATIENT)
Dept: SKILLED NURSING FACILITY | Facility: HOSPITAL | Age: 81
End: 2021-03-01
Payer: MEDICARE

## 2021-03-01 DIAGNOSIS — R53.81 PHYSICAL DECONDITIONING: Primary | ICD-10-CM

## 2021-03-02 PROBLEM — R13.12 OROPHARYNGEAL DYSPHAGIA: Status: ACTIVE | Noted: 2021-03-02

## 2021-03-04 ENCOUNTER — EXTERNAL HOSPITAL ADMISSION (OUTPATIENT)
Dept: SKILLED NURSING FACILITY | Facility: HOSPITAL | Age: 81
End: 2021-03-04
Payer: MEDICARE

## 2021-03-04 DIAGNOSIS — R53.81 PHYSICAL DECONDITIONING: Primary | ICD-10-CM

## 2021-03-08 ENCOUNTER — EXTERNAL HOSPITAL ADMISSION (OUTPATIENT)
Dept: SKILLED NURSING FACILITY | Facility: HOSPITAL | Age: 81
End: 2021-03-08
Payer: MEDICARE

## 2021-03-08 DIAGNOSIS — R53.81 PHYSICAL DECONDITIONING: Primary | ICD-10-CM

## 2021-03-11 ENCOUNTER — EXTERNAL HOSPITAL ADMISSION (OUTPATIENT)
Dept: SKILLED NURSING FACILITY | Facility: HOSPITAL | Age: 81
End: 2021-03-11
Payer: MEDICARE

## 2021-03-11 DIAGNOSIS — R13.12 OROPHARYNGEAL DYSPHAGIA: Primary | ICD-10-CM

## 2021-03-13 PROCEDURE — G0180 MD CERTIFICATION HHA PATIENT: HCPCS | Mod: ,,, | Performed by: INTERNAL MEDICINE

## 2021-03-13 PROCEDURE — G0180 PR HOME HEALTH MD CERTIFICATION: ICD-10-PCS | Mod: ,,, | Performed by: INTERNAL MEDICINE

## 2021-03-19 ENCOUNTER — OFFICE VISIT (OUTPATIENT)
Dept: INTERNAL MEDICINE | Facility: CLINIC | Age: 81
End: 2021-03-19
Payer: MEDICARE

## 2021-03-19 VITALS
BODY MASS INDEX: 35.01 KG/M2 | OXYGEN SATURATION: 99 % | DIASTOLIC BLOOD PRESSURE: 68 MMHG | HEIGHT: 68 IN | RESPIRATION RATE: 16 BRPM | SYSTOLIC BLOOD PRESSURE: 122 MMHG | HEART RATE: 87 BPM

## 2021-03-19 DIAGNOSIS — E78.2 MIXED HYPERLIPIDEMIA: ICD-10-CM

## 2021-03-19 DIAGNOSIS — I10 ESSENTIAL HYPERTENSION: Primary | ICD-10-CM

## 2021-03-19 DIAGNOSIS — E66.2 CLASS 2 OBESITY WITH ALVEOLAR HYPOVENTILATION, SERIOUS COMORBIDITY, AND BODY MASS INDEX (BMI) OF 39.0 TO 39.9 IN ADULT: ICD-10-CM

## 2021-03-19 DIAGNOSIS — J42 CHRONIC BRONCHITIS, UNSPECIFIED CHRONIC BRONCHITIS TYPE: ICD-10-CM

## 2021-03-19 DIAGNOSIS — M1A.9XX0 CHRONIC GOUT INVOLVING TOE WITHOUT TOPHUS, UNSPECIFIED CAUSE, UNSPECIFIED LATERALITY: ICD-10-CM

## 2021-03-19 DIAGNOSIS — R53.81 DEBILITY: ICD-10-CM

## 2021-03-19 DIAGNOSIS — R13.12 OROPHARYNGEAL DYSPHAGIA: ICD-10-CM

## 2021-03-19 DIAGNOSIS — M1A.49X0 OTHER SECONDARY CHRONIC GOUT OF MULTIPLE SITES WITHOUT TOPHUS: ICD-10-CM

## 2021-03-19 DIAGNOSIS — E03.4 HYPOTHYROIDISM DUE TO ACQUIRED ATROPHY OF THYROID: ICD-10-CM

## 2021-03-19 DIAGNOSIS — G31.84 MILD COGNITIVE IMPAIRMENT: ICD-10-CM

## 2021-03-19 DIAGNOSIS — N18.31 STAGE 3A CHRONIC KIDNEY DISEASE: ICD-10-CM

## 2021-03-19 PROCEDURE — 99999 PR PBB SHADOW E&M-EST. PATIENT-LVL V: CPT | Mod: PBBFAC,,, | Performed by: FAMILY MEDICINE

## 2021-03-19 PROCEDURE — 3288F FALL RISK ASSESSMENT DOCD: CPT | Mod: CPTII,S$GLB,, | Performed by: FAMILY MEDICINE

## 2021-03-19 PROCEDURE — 3288F PR FALLS RISK ASSESSMENT DOCUMENTED: ICD-10-PCS | Mod: CPTII,S$GLB,, | Performed by: FAMILY MEDICINE

## 2021-03-19 PROCEDURE — 1100F PR PT FALLS ASSESS DOC 2+ FALLS/FALL W/INJURY/YR: ICD-10-PCS | Mod: CPTII,S$GLB,, | Performed by: FAMILY MEDICINE

## 2021-03-19 PROCEDURE — 1126F AMNT PAIN NOTED NONE PRSNT: CPT | Mod: S$GLB,,, | Performed by: FAMILY MEDICINE

## 2021-03-19 PROCEDURE — 99999 PR PBB SHADOW E&M-EST. PATIENT-LVL V: ICD-10-PCS | Mod: PBBFAC,,, | Performed by: FAMILY MEDICINE

## 2021-03-19 PROCEDURE — 1157F PR ADVANCE CARE PLAN OR EQUIV PRESENT IN MEDICAL RECORD: ICD-10-PCS | Mod: S$GLB,,, | Performed by: FAMILY MEDICINE

## 2021-03-19 PROCEDURE — 99214 PR OFFICE/OUTPT VISIT, EST, LEVL IV, 30-39 MIN: ICD-10-PCS | Mod: S$GLB,,, | Performed by: FAMILY MEDICINE

## 2021-03-19 PROCEDURE — 99214 OFFICE O/P EST MOD 30 MIN: CPT | Mod: S$GLB,,, | Performed by: FAMILY MEDICINE

## 2021-03-19 PROCEDURE — 1126F PR PAIN SEVERITY QUANTIFIED, NO PAIN PRESENT: ICD-10-PCS | Mod: S$GLB,,, | Performed by: FAMILY MEDICINE

## 2021-03-19 PROCEDURE — 1157F ADVNC CARE PLAN IN RCRD: CPT | Mod: S$GLB,,, | Performed by: FAMILY MEDICINE

## 2021-03-19 PROCEDURE — 1100F PTFALLS ASSESS-DOCD GE2>/YR: CPT | Mod: CPTII,S$GLB,, | Performed by: FAMILY MEDICINE

## 2021-03-22 ENCOUNTER — TELEPHONE (OUTPATIENT)
Dept: FAMILY MEDICINE | Facility: CLINIC | Age: 81
End: 2021-03-22

## 2021-03-23 ENCOUNTER — HOSPITAL ENCOUNTER (EMERGENCY)
Facility: HOSPITAL | Age: 81
Discharge: ANOTHER HOSPITAL | End: 2021-03-23
Attending: SURGERY
Payer: MEDICARE

## 2021-03-23 ENCOUNTER — NURSE TRIAGE (OUTPATIENT)
Dept: ADMINISTRATIVE | Facility: CLINIC | Age: 81
End: 2021-03-23

## 2021-03-23 VITALS
BODY MASS INDEX: 34.86 KG/M2 | DIASTOLIC BLOOD PRESSURE: 94 MMHG | TEMPERATURE: 97 F | SYSTOLIC BLOOD PRESSURE: 131 MMHG | HEIGHT: 68 IN | RESPIRATION RATE: 19 BRPM | OXYGEN SATURATION: 97 % | HEART RATE: 101 BPM | WEIGHT: 230 LBS

## 2021-03-23 DIAGNOSIS — R53.83 FATIGUE: ICD-10-CM

## 2021-03-23 DIAGNOSIS — N20.0 KIDNEY STONE: ICD-10-CM

## 2021-03-23 DIAGNOSIS — N30.00 ACUTE CYSTITIS WITHOUT HEMATURIA: Primary | ICD-10-CM

## 2021-03-23 PROBLEM — E87.6 HYPOKALEMIA: Status: ACTIVE | Noted: 2021-03-23

## 2021-03-23 LAB
ALBUMIN SERPL BCP-MCNC: 2.7 G/DL (ref 3.5–5.2)
ALP SERPL-CCNC: 114 U/L (ref 55–135)
ALT SERPL W/O P-5'-P-CCNC: 11 U/L (ref 10–44)
ANION GAP SERPL CALC-SCNC: 8 MMOL/L (ref 8–16)
APTT BLDCRRT: 28.7 SEC (ref 21–32)
AST SERPL-CCNC: 18 U/L (ref 10–40)
BACTERIA #/AREA URNS HPF: ABNORMAL /HPF
BASOPHILS # BLD AUTO: 0.05 K/UL (ref 0–0.2)
BASOPHILS NFR BLD: 0.5 % (ref 0–1.9)
BILIRUB SERPL-MCNC: 0.5 MG/DL (ref 0.1–1)
BILIRUB UR QL STRIP: NEGATIVE
BNP SERPL-MCNC: 197 PG/ML (ref 0–99)
BUN SERPL-MCNC: 13 MG/DL (ref 8–23)
CALCIUM SERPL-MCNC: 8.9 MG/DL (ref 8.7–10.5)
CHLORIDE SERPL-SCNC: 107 MMOL/L (ref 95–110)
CK MB SERPL-MCNC: 2.1 NG/ML (ref 0.1–6.5)
CK MB SERPL-RTO: 2.6 % (ref 0–5)
CK SERPL-CCNC: 80 U/L (ref 20–200)
CK SERPL-CCNC: 80 U/L (ref 20–200)
CLARITY UR: ABNORMAL
CO2 SERPL-SCNC: 22 MMOL/L (ref 23–29)
COLOR UR: YELLOW
CREAT SERPL-MCNC: 0.8 MG/DL (ref 0.5–1.4)
DIFFERENTIAL METHOD: ABNORMAL
EOSINOPHIL # BLD AUTO: 0.3 K/UL (ref 0–0.5)
EOSINOPHIL NFR BLD: 2.8 % (ref 0–8)
ERYTHROCYTE [DISTWIDTH] IN BLOOD BY AUTOMATED COUNT: 16.2 % (ref 11.5–14.5)
EST. GFR  (AFRICAN AMERICAN): >60 ML/MIN/1.73 M^2
EST. GFR  (NON AFRICAN AMERICAN): >60 ML/MIN/1.73 M^2
GLUCOSE SERPL-MCNC: 109 MG/DL (ref 70–110)
GLUCOSE UR QL STRIP: NEGATIVE
HCT VFR BLD AUTO: 38.5 % (ref 40–54)
HGB BLD-MCNC: 12.4 G/DL (ref 14–18)
HGB UR QL STRIP: ABNORMAL
HYALINE CASTS #/AREA URNS LPF: 0 /LPF
IMM GRANULOCYTES # BLD AUTO: 0.05 K/UL (ref 0–0.04)
IMM GRANULOCYTES NFR BLD AUTO: 0.5 % (ref 0–0.5)
INR PPP: 1 (ref 0.8–1.2)
KETONES UR QL STRIP: NEGATIVE
LACTATE SERPL-SCNC: 1.6 MMOL/L (ref 0.5–2.2)
LEUKOCYTE ESTERASE UR QL STRIP: ABNORMAL
LYMPHOCYTES # BLD AUTO: 1.2 K/UL (ref 1–4.8)
LYMPHOCYTES NFR BLD: 11 % (ref 18–48)
MCH RBC QN AUTO: 31.3 PG (ref 27–31)
MCHC RBC AUTO-ENTMCNC: 32.2 G/DL (ref 32–36)
MCV RBC AUTO: 97 FL (ref 82–98)
MICROSCOPIC COMMENT: ABNORMAL
MONOCYTES # BLD AUTO: 0.8 K/UL (ref 0.3–1)
MONOCYTES NFR BLD: 7.7 % (ref 4–15)
NEUTROPHILS # BLD AUTO: 8.2 K/UL (ref 1.8–7.7)
NEUTROPHILS NFR BLD: 77.5 % (ref 38–73)
NITRITE UR QL STRIP: NEGATIVE
NRBC BLD-RTO: 0 /100 WBC
PH UR STRIP: 7 [PH] (ref 5–8)
PLATELET # BLD AUTO: 192 K/UL (ref 150–350)
PMV BLD AUTO: 10.5 FL (ref 9.2–12.9)
POTASSIUM SERPL-SCNC: 3.4 MMOL/L (ref 3.5–5.1)
PROT SERPL-MCNC: 5.9 G/DL (ref 6–8.4)
PROT UR QL STRIP: ABNORMAL
PROTHROMBIN TIME: 10.9 SEC (ref 9–12.5)
RBC # BLD AUTO: 3.96 M/UL (ref 4.6–6.2)
RBC #/AREA URNS HPF: 80 /HPF (ref 0–4)
SARS-COV-2 RDRP RESP QL NAA+PROBE: NEGATIVE
SODIUM SERPL-SCNC: 137 MMOL/L (ref 136–145)
SP GR UR STRIP: 1.02 (ref 1–1.03)
SQUAMOUS #/AREA URNS HPF: 1 /HPF
TROPONIN I SERPL DL<=0.01 NG/ML-MCNC: 0.02 NG/ML (ref 0–0.03)
URN SPEC COLLECT METH UR: ABNORMAL
UROBILINOGEN UR STRIP-ACNC: NEGATIVE EU/DL
WBC # BLD AUTO: 10.63 K/UL (ref 3.9–12.7)
WBC #/AREA URNS HPF: >100 /HPF (ref 0–5)

## 2021-03-23 PROCEDURE — 81000 URINALYSIS NONAUTO W/SCOPE: CPT | Performed by: SURGERY

## 2021-03-23 PROCEDURE — 93005 ELECTROCARDIOGRAM TRACING: CPT

## 2021-03-23 PROCEDURE — 87147 CULTURE TYPE IMMUNOLOGIC: CPT | Performed by: SURGERY

## 2021-03-23 PROCEDURE — 87040 BLOOD CULTURE FOR BACTERIA: CPT | Mod: 59 | Performed by: SURGERY

## 2021-03-23 PROCEDURE — 93010 EKG 12-LEAD: ICD-10-PCS | Mod: ,,, | Performed by: INTERNAL MEDICINE

## 2021-03-23 PROCEDURE — 84484 ASSAY OF TROPONIN QUANT: CPT | Performed by: SURGERY

## 2021-03-23 PROCEDURE — 82550 ASSAY OF CK (CPK): CPT | Performed by: SURGERY

## 2021-03-23 PROCEDURE — 82553 CREATINE MB FRACTION: CPT | Performed by: SURGERY

## 2021-03-23 PROCEDURE — 93010 ELECTROCARDIOGRAM REPORT: CPT | Mod: ,,, | Performed by: INTERNAL MEDICINE

## 2021-03-23 PROCEDURE — 85025 COMPLETE CBC W/AUTO DIFF WBC: CPT | Performed by: SURGERY

## 2021-03-23 PROCEDURE — 85730 THROMBOPLASTIN TIME PARTIAL: CPT | Performed by: SURGERY

## 2021-03-23 PROCEDURE — 25000003 PHARM REV CODE 250: Performed by: SURGERY

## 2021-03-23 PROCEDURE — 83605 ASSAY OF LACTIC ACID: CPT | Performed by: SURGERY

## 2021-03-23 PROCEDURE — 63600175 PHARM REV CODE 636 W HCPCS: Performed by: SURGERY

## 2021-03-23 PROCEDURE — U0002 COVID-19 LAB TEST NON-CDC: HCPCS | Performed by: SURGERY

## 2021-03-23 PROCEDURE — 96365 THER/PROPH/DIAG IV INF INIT: CPT

## 2021-03-23 PROCEDURE — 83880 ASSAY OF NATRIURETIC PEPTIDE: CPT | Performed by: SURGERY

## 2021-03-23 PROCEDURE — 99285 EMERGENCY DEPT VISIT HI MDM: CPT | Mod: 25

## 2021-03-23 PROCEDURE — 87086 URINE CULTURE/COLONY COUNT: CPT | Performed by: SURGERY

## 2021-03-23 PROCEDURE — 85610 PROTHROMBIN TIME: CPT | Performed by: SURGERY

## 2021-03-23 PROCEDURE — 80053 COMPREHEN METABOLIC PANEL: CPT | Performed by: SURGERY

## 2021-03-23 PROCEDURE — 36415 COLL VENOUS BLD VENIPUNCTURE: CPT | Performed by: SURGERY

## 2021-03-23 PROCEDURE — 87088 URINE BACTERIA CULTURE: CPT | Performed by: SURGERY

## 2021-03-23 RX ADMIN — CEFTRIAXONE SODIUM 2 G: 2 INJECTION, POWDER, FOR SOLUTION INTRAMUSCULAR; INTRAVENOUS at 07:03

## 2021-03-24 PROBLEM — N20.0 RENAL STONE: Status: RESOLVED | Noted: 2021-03-23 | Resolved: 2021-03-24

## 2021-03-24 LAB — BACTERIA UR CULT: ABNORMAL

## 2021-03-28 LAB
BACTERIA BLD CULT: NORMAL
BACTERIA BLD CULT: NORMAL

## 2021-04-06 ENCOUNTER — HOSPITAL ENCOUNTER (INPATIENT)
Facility: HOSPITAL | Age: 81
LOS: 30 days | Discharge: HOME HEALTH | DRG: 698 | End: 2021-05-06
Attending: EMERGENCY MEDICINE | Admitting: INTERNAL MEDICINE
Payer: MEDICARE

## 2021-04-06 DIAGNOSIS — R53.1 WEAKNESS: ICD-10-CM

## 2021-04-06 DIAGNOSIS — R40.0 SOMNOLENCE: Primary | ICD-10-CM

## 2021-04-06 DIAGNOSIS — N10 ACUTE PYELONEPHRITIS: ICD-10-CM

## 2021-04-06 DIAGNOSIS — F32.0 MAJOR DEPRESSIVE DISORDER, SINGLE EPISODE, MILD: ICD-10-CM

## 2021-04-06 DIAGNOSIS — J42 CHRONIC BRONCHITIS, UNSPECIFIED CHRONIC BRONCHITIS TYPE: ICD-10-CM

## 2021-04-06 DIAGNOSIS — N30.01 ACUTE CYSTITIS WITH HEMATURIA: ICD-10-CM

## 2021-04-06 DIAGNOSIS — R53.81 DEBILITY: ICD-10-CM

## 2021-04-06 DIAGNOSIS — R31.9 URINARY TRACT INFECTION WITH HEMATURIA: ICD-10-CM

## 2021-04-06 DIAGNOSIS — N39.0 URINARY TRACT INFECTION WITH HEMATURIA: ICD-10-CM

## 2021-04-06 LAB
ALBUMIN SERPL BCP-MCNC: 2.8 G/DL (ref 3.5–5.2)
ALP SERPL-CCNC: 114 U/L (ref 55–135)
ALT SERPL W/O P-5'-P-CCNC: 12 U/L (ref 10–44)
AMPHET+METHAMPHET UR QL: NEGATIVE
ANION GAP SERPL CALC-SCNC: 9 MMOL/L (ref 8–16)
AST SERPL-CCNC: 21 U/L (ref 10–40)
BACTERIA #/AREA URNS HPF: ABNORMAL /HPF
BARBITURATES UR QL SCN>200 NG/ML: NEGATIVE
BASOPHILS # BLD AUTO: 0.07 K/UL (ref 0–0.2)
BASOPHILS NFR BLD: 0.7 % (ref 0–1.9)
BENZODIAZ UR QL SCN>200 NG/ML: NEGATIVE
BILIRUB SERPL-MCNC: 0.5 MG/DL (ref 0.1–1)
BILIRUB UR QL STRIP: NEGATIVE
BUN SERPL-MCNC: 16 MG/DL (ref 8–23)
BZE UR QL SCN: NEGATIVE
CALCIUM SERPL-MCNC: 9.7 MG/DL (ref 8.7–10.5)
CANNABINOIDS UR QL SCN: NEGATIVE
CHLORIDE SERPL-SCNC: 106 MMOL/L (ref 95–110)
CLARITY UR: ABNORMAL
CO2 SERPL-SCNC: 25 MMOL/L (ref 23–29)
COLOR UR: YELLOW
CREAT SERPL-MCNC: 1.2 MG/DL (ref 0.5–1.4)
CREAT UR-MCNC: 66.2 MG/DL (ref 23–375)
DIFFERENTIAL METHOD: ABNORMAL
EOSINOPHIL # BLD AUTO: 0.3 K/UL (ref 0–0.5)
EOSINOPHIL NFR BLD: 3.2 % (ref 0–8)
ERYTHROCYTE [DISTWIDTH] IN BLOOD BY AUTOMATED COUNT: 15.9 % (ref 11.5–14.5)
EST. GFR  (AFRICAN AMERICAN): >60 ML/MIN/1.73 M^2
EST. GFR  (NON AFRICAN AMERICAN): 58 ML/MIN/1.73 M^2
GLUCOSE SERPL-MCNC: 98 MG/DL (ref 70–110)
GLUCOSE UR QL STRIP: NEGATIVE
HCT VFR BLD AUTO: 35.8 % (ref 40–54)
HGB BLD-MCNC: 11.4 G/DL (ref 14–18)
HGB UR QL STRIP: ABNORMAL
HYALINE CASTS #/AREA URNS LPF: 0 /LPF
IMM GRANULOCYTES # BLD AUTO: 0.05 K/UL (ref 0–0.04)
IMM GRANULOCYTES NFR BLD AUTO: 0.5 % (ref 0–0.5)
KETONES UR QL STRIP: NEGATIVE
LACTATE SERPL-SCNC: 1.4 MMOL/L (ref 0.5–2.2)
LEUKOCYTE ESTERASE UR QL STRIP: ABNORMAL
LYMPHOCYTES # BLD AUTO: 1.3 K/UL (ref 1–4.8)
LYMPHOCYTES NFR BLD: 12.1 % (ref 18–48)
MCH RBC QN AUTO: 31.3 PG (ref 27–31)
MCHC RBC AUTO-ENTMCNC: 31.8 G/DL (ref 32–36)
MCV RBC AUTO: 98 FL (ref 82–98)
METHADONE UR QL SCN>300 NG/ML: NEGATIVE
MICROSCOPIC COMMENT: ABNORMAL
MONOCYTES # BLD AUTO: 0.7 K/UL (ref 0.3–1)
MONOCYTES NFR BLD: 6.5 % (ref 4–15)
NEUTROPHILS # BLD AUTO: 8.2 K/UL (ref 1.8–7.7)
NEUTROPHILS NFR BLD: 77 % (ref 38–73)
NITRITE UR QL STRIP: POSITIVE
NRBC BLD-RTO: 0 /100 WBC
OPIATES UR QL SCN: NEGATIVE
PCP UR QL SCN>25 NG/ML: NEGATIVE
PH UR STRIP: 7 [PH] (ref 5–8)
PLATELET # BLD AUTO: 238 K/UL (ref 150–450)
PMV BLD AUTO: 10.6 FL (ref 9.2–12.9)
POC PH VENOUS: 7.4
POCT GLUCOSE: 105 MG/DL (ref 70–110)
POTASSIUM SERPL-SCNC: 3.9 MMOL/L (ref 3.5–5.1)
PROT SERPL-MCNC: 5.9 G/DL (ref 6–8.4)
PROT UR QL STRIP: ABNORMAL
RBC # BLD AUTO: 3.64 M/UL (ref 4.6–6.2)
RBC #/AREA URNS HPF: >100 /HPF (ref 0–4)
SARS-COV-2 RDRP RESP QL NAA+PROBE: NEGATIVE
SODIUM SERPL-SCNC: 140 MMOL/L (ref 136–145)
SP GR UR STRIP: 1.01 (ref 1–1.03)
TOXICOLOGY INFORMATION: NORMAL
TROPONIN I SERPL DL<=0.01 NG/ML-MCNC: 0.02 NG/ML (ref 0–0.03)
URN SPEC COLLECT METH UR: ABNORMAL
UROBILINOGEN UR STRIP-ACNC: NEGATIVE EU/DL
WBC # BLD AUTO: 10.69 K/UL (ref 3.9–12.7)
WBC #/AREA URNS HPF: >100 /HPF (ref 0–5)

## 2021-04-06 PROCEDURE — 80053 COMPREHEN METABOLIC PANEL: CPT | Performed by: EMERGENCY MEDICINE

## 2021-04-06 PROCEDURE — 87088 URINE BACTERIA CULTURE: CPT | Performed by: EMERGENCY MEDICINE

## 2021-04-06 PROCEDURE — 99291 CRITICAL CARE FIRST HOUR: CPT | Mod: 25

## 2021-04-06 PROCEDURE — 93005 ELECTROCARDIOGRAM TRACING: CPT

## 2021-04-06 PROCEDURE — 94760 N-INVAS EAR/PLS OXIMETRY 1: CPT

## 2021-04-06 PROCEDURE — 63600175 PHARM REV CODE 636 W HCPCS: Performed by: EMERGENCY MEDICINE

## 2021-04-06 PROCEDURE — 83605 ASSAY OF LACTIC ACID: CPT | Performed by: EMERGENCY MEDICINE

## 2021-04-06 PROCEDURE — 93010 EKG 12-LEAD: ICD-10-PCS | Mod: ,,, | Performed by: INTERNAL MEDICINE

## 2021-04-06 PROCEDURE — 87077 CULTURE AEROBIC IDENTIFY: CPT | Performed by: EMERGENCY MEDICINE

## 2021-04-06 PROCEDURE — 96361 HYDRATE IV INFUSION ADD-ON: CPT

## 2021-04-06 PROCEDURE — 87186 SC STD MICRODIL/AGAR DIL: CPT | Performed by: EMERGENCY MEDICINE

## 2021-04-06 PROCEDURE — 96365 THER/PROPH/DIAG IV INF INIT: CPT

## 2021-04-06 PROCEDURE — 25000003 PHARM REV CODE 250: Performed by: EMERGENCY MEDICINE

## 2021-04-06 PROCEDURE — 36415 COLL VENOUS BLD VENIPUNCTURE: CPT | Performed by: EMERGENCY MEDICINE

## 2021-04-06 PROCEDURE — 85025 COMPLETE CBC W/AUTO DIFF WBC: CPT | Performed by: EMERGENCY MEDICINE

## 2021-04-06 PROCEDURE — 93010 ELECTROCARDIOGRAM REPORT: CPT | Mod: ,,, | Performed by: INTERNAL MEDICINE

## 2021-04-06 PROCEDURE — U0002 COVID-19 LAB TEST NON-CDC: HCPCS | Performed by: EMERGENCY MEDICINE

## 2021-04-06 PROCEDURE — 20000000 HC ICU ROOM

## 2021-04-06 PROCEDURE — 82800 BLOOD PH: CPT | Performed by: EMERGENCY MEDICINE

## 2021-04-06 PROCEDURE — 87086 URINE CULTURE/COLONY COUNT: CPT | Performed by: EMERGENCY MEDICINE

## 2021-04-06 PROCEDURE — 82962 GLUCOSE BLOOD TEST: CPT

## 2021-04-06 PROCEDURE — 81000 URINALYSIS NONAUTO W/SCOPE: CPT | Mod: 59 | Performed by: EMERGENCY MEDICINE

## 2021-04-06 PROCEDURE — 84484 ASSAY OF TROPONIN QUANT: CPT | Performed by: EMERGENCY MEDICINE

## 2021-04-06 PROCEDURE — 87040 BLOOD CULTURE FOR BACTERIA: CPT | Mod: 59 | Performed by: EMERGENCY MEDICINE

## 2021-04-06 PROCEDURE — 80307 DRUG TEST PRSMV CHEM ANLYZR: CPT | Performed by: EMERGENCY MEDICINE

## 2021-04-06 PROCEDURE — 84443 ASSAY THYROID STIM HORMONE: CPT | Performed by: FAMILY MEDICINE

## 2021-04-06 RX ADMIN — SODIUM CHLORIDE 1000 ML: 0.9 INJECTION, SOLUTION INTRAVENOUS at 07:04

## 2021-04-06 RX ADMIN — CEFTRIAXONE 2 G: 2 INJECTION, SOLUTION INTRAVENOUS at 09:04

## 2021-04-07 PROBLEM — R31.9 URINARY TRACT INFECTION WITH HEMATURIA: Status: ACTIVE | Noted: 2021-01-07

## 2021-04-07 PROBLEM — R40.0 SOMNOLENCE: Status: ACTIVE | Noted: 2021-04-07

## 2021-04-07 LAB
ALLENS TEST: ABNORMAL
DELSYS: ABNORMAL
HCO3 UR-SCNC: 24.6 MMOL/L (ref 22–26)
PCO2 BLDA: 37 MMHG (ref 35–45)
PH SMN: 7.43 [PH] (ref 7.35–7.45)
PO2 BLDA: 73 MMHG (ref 75–100)
POC BE: 0.4 MMOL/L (ref -2–2)
POC COHB: 2.5 % (ref 0–3)
POC METHB: 0.5 % (ref 0–1.5)
POC O2HB ARTERIAL: 94.7 % (ref 94–100)
POC SATURATED O2: 97.6 % (ref 90–100)
POC TCO2: 25.7 MMOL/L
POC THB: 11.2 G/DL (ref 12–18)
SITE: ABNORMAL
TSH SERPL DL<=0.005 MIU/L-ACNC: 1.25 UIU/ML (ref 0.4–4)

## 2021-04-07 PROCEDURE — 63600175 PHARM REV CODE 636 W HCPCS: Performed by: EMERGENCY MEDICINE

## 2021-04-07 PROCEDURE — 97162 PT EVAL MOD COMPLEX 30 MIN: CPT

## 2021-04-07 PROCEDURE — 94660 CPAP INITIATION&MGMT: CPT

## 2021-04-07 PROCEDURE — 97165 OT EVAL LOW COMPLEX 30 MIN: CPT

## 2021-04-07 PROCEDURE — 25000003 PHARM REV CODE 250: Performed by: EMERGENCY MEDICINE

## 2021-04-07 PROCEDURE — G0378 HOSPITAL OBSERVATION PER HR: HCPCS

## 2021-04-07 PROCEDURE — 94761 N-INVAS EAR/PLS OXIMETRY MLT: CPT

## 2021-04-07 PROCEDURE — 96366 THER/PROPH/DIAG IV INF ADDON: CPT | Performed by: EMERGENCY MEDICINE

## 2021-04-07 PROCEDURE — 99900035 HC TECH TIME PER 15 MIN (STAT)

## 2021-04-07 PROCEDURE — 82803 BLOOD GASES ANY COMBINATION: CPT | Performed by: FAMILY MEDICINE

## 2021-04-07 PROCEDURE — 27000221 HC OXYGEN, UP TO 24 HOURS

## 2021-04-07 PROCEDURE — 96372 THER/PROPH/DIAG INJ SC/IM: CPT | Mod: 59 | Performed by: EMERGENCY MEDICINE

## 2021-04-07 PROCEDURE — 96361 HYDRATE IV INFUSION ADD-ON: CPT | Performed by: EMERGENCY MEDICINE

## 2021-04-07 PROCEDURE — 63600175 PHARM REV CODE 636 W HCPCS: Performed by: INTERNAL MEDICINE

## 2021-04-07 PROCEDURE — 20000000 HC ICU ROOM

## 2021-04-07 PROCEDURE — 36415 COLL VENOUS BLD VENIPUNCTURE: CPT | Performed by: FAMILY MEDICINE

## 2021-04-07 PROCEDURE — 25000003 PHARM REV CODE 250: Performed by: FAMILY MEDICINE

## 2021-04-07 PROCEDURE — 99220 PR INITIAL OBSERVATION CARE,LEVL III: CPT | Mod: ,,, | Performed by: FAMILY MEDICINE

## 2021-04-07 PROCEDURE — 63600175 PHARM REV CODE 636 W HCPCS: Performed by: FAMILY MEDICINE

## 2021-04-07 PROCEDURE — 36600 WITHDRAWAL OF ARTERIAL BLOOD: CPT

## 2021-04-07 PROCEDURE — 25000003 PHARM REV CODE 250: Performed by: INTERNAL MEDICINE

## 2021-04-07 PROCEDURE — 99220 PR INITIAL OBSERVATION CARE,LEVL III: ICD-10-PCS | Mod: ,,, | Performed by: FAMILY MEDICINE

## 2021-04-07 PROCEDURE — 27000190 HC CPAP FULL FACE MASK W/VALVE

## 2021-04-07 RX ORDER — TALC
6 POWDER (GRAM) TOPICAL NIGHTLY PRN
Status: DISCONTINUED | OUTPATIENT
Start: 2021-04-07 | End: 2021-05-06 | Stop reason: HOSPADM

## 2021-04-07 RX ORDER — ALLOPURINOL 100 MG/1
300 TABLET ORAL DAILY
Status: DISCONTINUED | OUTPATIENT
Start: 2021-04-07 | End: 2021-05-06 | Stop reason: HOSPADM

## 2021-04-07 RX ORDER — LEVOTHYROXINE SODIUM 100 UG/1
100 TABLET ORAL
Status: DISCONTINUED | OUTPATIENT
Start: 2021-04-07 | End: 2021-05-06 | Stop reason: HOSPADM

## 2021-04-07 RX ORDER — SODIUM CHLORIDE 0.9 % (FLUSH) 0.9 %
10 SYRINGE (ML) INJECTION
Status: DISCONTINUED | OUTPATIENT
Start: 2021-04-07 | End: 2021-05-06 | Stop reason: HOSPADM

## 2021-04-07 RX ORDER — PANTOPRAZOLE SODIUM 40 MG/1
40 TABLET, DELAYED RELEASE ORAL DAILY
Status: DISCONTINUED | OUTPATIENT
Start: 2021-04-07 | End: 2021-05-06 | Stop reason: HOSPADM

## 2021-04-07 RX ORDER — MEMANTINE HYDROCHLORIDE 10 MG/1
10 TABLET ORAL 2 TIMES DAILY
Status: DISCONTINUED | OUTPATIENT
Start: 2021-04-07 | End: 2021-05-06 | Stop reason: HOSPADM

## 2021-04-07 RX ORDER — SIMVASTATIN 40 MG/1
40 TABLET, FILM COATED ORAL NIGHTLY
Status: DISCONTINUED | OUTPATIENT
Start: 2021-04-07 | End: 2021-05-06 | Stop reason: HOSPADM

## 2021-04-07 RX ORDER — MAGNESIUM SULFATE HEPTAHYDRATE 40 MG/ML
2 INJECTION, SOLUTION INTRAVENOUS ONCE
Status: DISCONTINUED | OUTPATIENT
Start: 2021-04-07 | End: 2021-04-07

## 2021-04-07 RX ORDER — SODIUM CHLORIDE 9 MG/ML
INJECTION, SOLUTION INTRAVENOUS
Status: COMPLETED | OUTPATIENT
Start: 2021-04-07 | End: 2021-04-07

## 2021-04-07 RX ORDER — METOPROLOL SUCCINATE 50 MG/1
50 TABLET, EXTENDED RELEASE ORAL DAILY
Status: DISCONTINUED | OUTPATIENT
Start: 2021-04-07 | End: 2021-05-06 | Stop reason: HOSPADM

## 2021-04-07 RX ORDER — HEPARIN SODIUM 5000 [USP'U]/ML
5000 INJECTION, SOLUTION INTRAVENOUS; SUBCUTANEOUS EVERY 8 HOURS
Status: DISCONTINUED | OUTPATIENT
Start: 2021-04-07 | End: 2021-04-07

## 2021-04-07 RX ORDER — METOPROLOL SUCCINATE 25 MG/1
25 TABLET, EXTENDED RELEASE ORAL DAILY
Status: DISCONTINUED | OUTPATIENT
Start: 2021-04-07 | End: 2021-04-07

## 2021-04-07 RX ORDER — PANTOPRAZOLE SODIUM 40 MG/1
40 TABLET, DELAYED RELEASE ORAL DAILY
Status: DISCONTINUED | OUTPATIENT
Start: 2021-04-07 | End: 2021-04-07 | Stop reason: SDUPTHER

## 2021-04-07 RX ORDER — VENLAFAXINE HYDROCHLORIDE 150 MG/1
150 CAPSULE, EXTENDED RELEASE ORAL DAILY
Status: DISCONTINUED | OUTPATIENT
Start: 2021-04-07 | End: 2021-05-06 | Stop reason: HOSPADM

## 2021-04-07 RX ORDER — TAMSULOSIN HYDROCHLORIDE 0.4 MG/1
0.4 CAPSULE ORAL DAILY
Status: DISCONTINUED | OUTPATIENT
Start: 2021-04-07 | End: 2021-05-03

## 2021-04-07 RX ORDER — METOPROLOL SUCCINATE 50 MG/1
50 TABLET, EXTENDED RELEASE ORAL DAILY
Status: DISCONTINUED | OUTPATIENT
Start: 2021-04-07 | End: 2021-04-07

## 2021-04-07 RX ADMIN — HEPARIN SODIUM 5000 UNITS: 5000 INJECTION, SOLUTION INTRAVENOUS; SUBCUTANEOUS at 06:04

## 2021-04-07 RX ADMIN — ALLOPURINOL 300 MG: 100 TABLET ORAL at 09:04

## 2021-04-07 RX ADMIN — VENLAFAXINE HYDROCHLORIDE 150 MG: 150 CAPSULE, EXTENDED RELEASE ORAL at 09:04

## 2021-04-07 RX ADMIN — LEVOTHYROXINE SODIUM 100 MCG: 100 TABLET ORAL at 09:04

## 2021-04-07 RX ADMIN — TAMSULOSIN HYDROCHLORIDE 0.4 MG: 0.4 CAPSULE ORAL at 09:04

## 2021-04-07 RX ADMIN — SODIUM CHLORIDE: 0.9 INJECTION, SOLUTION INTRAVENOUS at 01:04

## 2021-04-07 RX ADMIN — MEMANTINE 10 MG: 10 TABLET ORAL at 08:04

## 2021-04-07 RX ADMIN — CEFTRIAXONE 2 G: 2 INJECTION, SOLUTION INTRAVENOUS at 08:04

## 2021-04-07 RX ADMIN — MEMANTINE 10 MG: 10 TABLET ORAL at 09:04

## 2021-04-07 RX ADMIN — METOPROLOL SUCCINATE 50 MG: 50 TABLET, EXTENDED RELEASE ORAL at 08:04

## 2021-04-07 RX ADMIN — APIXABAN 5 MG: 5 TABLET, FILM COATED ORAL at 08:04

## 2021-04-07 RX ADMIN — SIMVASTATIN 40 MG: 40 TABLET, FILM COATED ORAL at 08:04

## 2021-04-07 RX ADMIN — METOPROLOL SUCCINATE 25 MG: 25 TABLET, EXTENDED RELEASE ORAL at 10:04

## 2021-04-07 RX ADMIN — PANTOPRAZOLE SODIUM 40 MG: 40 TABLET, DELAYED RELEASE ORAL at 09:04

## 2021-04-08 ENCOUNTER — DOCUMENT SCAN (OUTPATIENT)
Dept: HOME HEALTH SERVICES | Facility: HOSPITAL | Age: 81
End: 2021-04-08
Payer: MEDICARE

## 2021-04-08 LAB
ANION GAP SERPL CALC-SCNC: 7 MMOL/L (ref 8–16)
ANION GAP SERPL CALC-SCNC: 7 MMOL/L (ref 8–16)
BASOPHILS # BLD AUTO: 0.06 K/UL (ref 0–0.2)
BASOPHILS # BLD AUTO: 0.07 K/UL (ref 0–0.2)
BASOPHILS NFR BLD: 0.7 % (ref 0–1.9)
BASOPHILS NFR BLD: 0.9 % (ref 0–1.9)
BUN SERPL-MCNC: 13 MG/DL (ref 8–23)
BUN SERPL-MCNC: 14 MG/DL (ref 8–23)
CALCIUM SERPL-MCNC: 9.1 MG/DL (ref 8.7–10.5)
CALCIUM SERPL-MCNC: 9.3 MG/DL (ref 8.7–10.5)
CHLORIDE SERPL-SCNC: 107 MMOL/L (ref 95–110)
CHLORIDE SERPL-SCNC: 108 MMOL/L (ref 95–110)
CO2 SERPL-SCNC: 25 MMOL/L (ref 23–29)
CO2 SERPL-SCNC: 25 MMOL/L (ref 23–29)
CREAT SERPL-MCNC: 1.1 MG/DL (ref 0.5–1.4)
CREAT SERPL-MCNC: 1.1 MG/DL (ref 0.5–1.4)
DIFFERENTIAL METHOD: ABNORMAL
DIFFERENTIAL METHOD: ABNORMAL
EOSINOPHIL # BLD AUTO: 0.4 K/UL (ref 0–0.5)
EOSINOPHIL # BLD AUTO: 0.4 K/UL (ref 0–0.5)
EOSINOPHIL NFR BLD: 4.9 % (ref 0–8)
EOSINOPHIL NFR BLD: 5.3 % (ref 0–8)
ERYTHROCYTE [DISTWIDTH] IN BLOOD BY AUTOMATED COUNT: 15.7 % (ref 11.5–14.5)
ERYTHROCYTE [DISTWIDTH] IN BLOOD BY AUTOMATED COUNT: 15.8 % (ref 11.5–14.5)
EST. GFR  (AFRICAN AMERICAN): >60 ML/MIN/1.73 M^2
EST. GFR  (AFRICAN AMERICAN): >60 ML/MIN/1.73 M^2
EST. GFR  (NON AFRICAN AMERICAN): >60 ML/MIN/1.73 M^2
EST. GFR  (NON AFRICAN AMERICAN): >60 ML/MIN/1.73 M^2
GLUCOSE SERPL-MCNC: 86 MG/DL (ref 70–110)
GLUCOSE SERPL-MCNC: 88 MG/DL (ref 70–110)
HCT VFR BLD AUTO: 36.7 % (ref 40–54)
HCT VFR BLD AUTO: 36.7 % (ref 40–54)
HGB BLD-MCNC: 11.5 G/DL (ref 14–18)
HGB BLD-MCNC: 11.6 G/DL (ref 14–18)
IMM GRANULOCYTES # BLD AUTO: 0.02 K/UL (ref 0–0.04)
IMM GRANULOCYTES # BLD AUTO: 0.03 K/UL (ref 0–0.04)
IMM GRANULOCYTES NFR BLD AUTO: 0.2 % (ref 0–0.5)
IMM GRANULOCYTES NFR BLD AUTO: 0.4 % (ref 0–0.5)
LYMPHOCYTES # BLD AUTO: 1.3 K/UL (ref 1–4.8)
LYMPHOCYTES # BLD AUTO: 1.3 K/UL (ref 1–4.8)
LYMPHOCYTES NFR BLD: 15.6 % (ref 18–48)
LYMPHOCYTES NFR BLD: 15.9 % (ref 18–48)
MCH RBC QN AUTO: 30.7 PG (ref 27–31)
MCH RBC QN AUTO: 31 PG (ref 27–31)
MCHC RBC AUTO-ENTMCNC: 31.3 G/DL (ref 32–36)
MCHC RBC AUTO-ENTMCNC: 31.6 G/DL (ref 32–36)
MCV RBC AUTO: 98 FL (ref 82–98)
MCV RBC AUTO: 98 FL (ref 82–98)
MONOCYTES # BLD AUTO: 0.7 K/UL (ref 0.3–1)
MONOCYTES # BLD AUTO: 0.7 K/UL (ref 0.3–1)
MONOCYTES NFR BLD: 8.4 % (ref 4–15)
MONOCYTES NFR BLD: 8.8 % (ref 4–15)
NEUTROPHILS # BLD AUTO: 5.6 K/UL (ref 1.8–7.7)
NEUTROPHILS # BLD AUTO: 5.8 K/UL (ref 1.8–7.7)
NEUTROPHILS NFR BLD: 69.1 % (ref 38–73)
NEUTROPHILS NFR BLD: 69.8 % (ref 38–73)
NRBC BLD-RTO: 0 /100 WBC
NRBC BLD-RTO: 0 /100 WBC
PLATELET # BLD AUTO: 223 K/UL (ref 150–450)
PLATELET # BLD AUTO: 234 K/UL (ref 150–450)
PMV BLD AUTO: 10.6 FL (ref 9.2–12.9)
PMV BLD AUTO: 10.9 FL (ref 9.2–12.9)
POTASSIUM SERPL-SCNC: 3.9 MMOL/L (ref 3.5–5.1)
POTASSIUM SERPL-SCNC: 4 MMOL/L (ref 3.5–5.1)
RBC # BLD AUTO: 3.74 M/UL (ref 4.6–6.2)
RBC # BLD AUTO: 3.75 M/UL (ref 4.6–6.2)
SODIUM SERPL-SCNC: 139 MMOL/L (ref 136–145)
SODIUM SERPL-SCNC: 140 MMOL/L (ref 136–145)
WBC # BLD AUTO: 8.1 K/UL (ref 3.9–12.7)
WBC # BLD AUTO: 8.34 K/UL (ref 3.9–12.7)

## 2021-04-08 PROCEDURE — 96376 TX/PRO/DX INJ SAME DRUG ADON: CPT | Performed by: EMERGENCY MEDICINE

## 2021-04-08 PROCEDURE — 85025 COMPLETE CBC W/AUTO DIFF WBC: CPT | Mod: 91 | Performed by: INTERNAL MEDICINE

## 2021-04-08 PROCEDURE — 25000003 PHARM REV CODE 250: Performed by: INTERNAL MEDICINE

## 2021-04-08 PROCEDURE — G0378 HOSPITAL OBSERVATION PER HR: HCPCS

## 2021-04-08 PROCEDURE — 99900035 HC TECH TIME PER 15 MIN (STAT)

## 2021-04-08 PROCEDURE — 11000001 HC ACUTE MED/SURG PRIVATE ROOM

## 2021-04-08 PROCEDURE — 80048 BASIC METABOLIC PNL TOTAL CA: CPT | Performed by: INTERNAL MEDICINE

## 2021-04-08 PROCEDURE — 80048 BASIC METABOLIC PNL TOTAL CA: CPT | Mod: 91 | Performed by: INTERNAL MEDICINE

## 2021-04-08 PROCEDURE — 99225 PR SUBSEQUENT OBSERVATION CARE,LEVEL II: ICD-10-PCS | Mod: ,,, | Performed by: FAMILY MEDICINE

## 2021-04-08 PROCEDURE — 25000003 PHARM REV CODE 250: Performed by: FAMILY MEDICINE

## 2021-04-08 PROCEDURE — 94660 CPAP INITIATION&MGMT: CPT

## 2021-04-08 PROCEDURE — 99225 PR SUBSEQUENT OBSERVATION CARE,LEVEL II: CPT | Mod: ,,, | Performed by: FAMILY MEDICINE

## 2021-04-08 PROCEDURE — 97535 SELF CARE MNGMENT TRAINING: CPT

## 2021-04-08 PROCEDURE — 36415 COLL VENOUS BLD VENIPUNCTURE: CPT | Performed by: INTERNAL MEDICINE

## 2021-04-08 PROCEDURE — 63600175 PHARM REV CODE 636 W HCPCS: Performed by: FAMILY MEDICINE

## 2021-04-08 PROCEDURE — 94761 N-INVAS EAR/PLS OXIMETRY MLT: CPT

## 2021-04-08 PROCEDURE — 27000221 HC OXYGEN, UP TO 24 HOURS

## 2021-04-08 RX ADMIN — CEFTRIAXONE 2 G: 2 INJECTION, SOLUTION INTRAVENOUS at 09:04

## 2021-04-08 RX ADMIN — APIXABAN 5 MG: 5 TABLET, FILM COATED ORAL at 09:04

## 2021-04-08 RX ADMIN — SIMVASTATIN 40 MG: 40 TABLET, FILM COATED ORAL at 09:04

## 2021-04-08 RX ADMIN — PANTOPRAZOLE SODIUM 40 MG: 40 TABLET, DELAYED RELEASE ORAL at 09:04

## 2021-04-08 RX ADMIN — MEMANTINE 10 MG: 10 TABLET ORAL at 09:04

## 2021-04-08 RX ADMIN — VENLAFAXINE HYDROCHLORIDE 150 MG: 150 CAPSULE, EXTENDED RELEASE ORAL at 09:04

## 2021-04-08 RX ADMIN — ALLOPURINOL 300 MG: 100 TABLET ORAL at 09:04

## 2021-04-08 RX ADMIN — METOPROLOL SUCCINATE 50 MG: 50 TABLET, EXTENDED RELEASE ORAL at 09:04

## 2021-04-08 RX ADMIN — TAMSULOSIN HYDROCHLORIDE 0.4 MG: 0.4 CAPSULE ORAL at 09:04

## 2021-04-08 RX ADMIN — LEVOTHYROXINE SODIUM 100 MCG: 100 TABLET ORAL at 05:04

## 2021-04-09 LAB
ANION GAP SERPL CALC-SCNC: 9 MMOL/L (ref 8–16)
BACTERIA #/AREA URNS HPF: ABNORMAL /HPF
BASOPHILS # BLD AUTO: 0.05 K/UL (ref 0–0.2)
BASOPHILS NFR BLD: 0.7 % (ref 0–1.9)
BILIRUB UR QL STRIP: NEGATIVE
BUN SERPL-MCNC: 13 MG/DL (ref 8–23)
CALCIUM SERPL-MCNC: 9 MG/DL (ref 8.7–10.5)
CHLORIDE SERPL-SCNC: 108 MMOL/L (ref 95–110)
CLARITY UR: ABNORMAL
CO2 SERPL-SCNC: 22 MMOL/L (ref 23–29)
COLOR UR: YELLOW
CREAT SERPL-MCNC: 0.9 MG/DL (ref 0.5–1.4)
DIFFERENTIAL METHOD: ABNORMAL
EOSINOPHIL # BLD AUTO: 0.3 K/UL (ref 0–0.5)
EOSINOPHIL NFR BLD: 4.9 % (ref 0–8)
ERYTHROCYTE [DISTWIDTH] IN BLOOD BY AUTOMATED COUNT: 15.4 % (ref 11.5–14.5)
EST. GFR  (AFRICAN AMERICAN): >60 ML/MIN/1.73 M^2
EST. GFR  (NON AFRICAN AMERICAN): >60 ML/MIN/1.73 M^2
GLUCOSE SERPL-MCNC: 84 MG/DL (ref 70–110)
GLUCOSE UR QL STRIP: NEGATIVE
HCT VFR BLD AUTO: 35.2 % (ref 40–54)
HGB BLD-MCNC: 11.1 G/DL (ref 14–18)
HGB UR QL STRIP: ABNORMAL
HYALINE CASTS #/AREA URNS LPF: 2 /LPF
IMM GRANULOCYTES # BLD AUTO: 0.02 K/UL (ref 0–0.04)
IMM GRANULOCYTES NFR BLD AUTO: 0.3 % (ref 0–0.5)
KETONES UR QL STRIP: NEGATIVE
LEUKOCYTE ESTERASE UR QL STRIP: ABNORMAL
LYMPHOCYTES # BLD AUTO: 1.3 K/UL (ref 1–4.8)
LYMPHOCYTES NFR BLD: 18.1 % (ref 18–48)
MCH RBC QN AUTO: 30.5 PG (ref 27–31)
MCHC RBC AUTO-ENTMCNC: 31.5 G/DL (ref 32–36)
MCV RBC AUTO: 97 FL (ref 82–98)
MICROSCOPIC COMMENT: ABNORMAL
MONOCYTES # BLD AUTO: 0.5 K/UL (ref 0.3–1)
MONOCYTES NFR BLD: 7.6 % (ref 4–15)
NEUTROPHILS # BLD AUTO: 4.8 K/UL (ref 1.8–7.7)
NEUTROPHILS NFR BLD: 68.4 % (ref 38–73)
NITRITE UR QL STRIP: POSITIVE
NRBC BLD-RTO: 0 /100 WBC
PH UR STRIP: 6 [PH] (ref 5–8)
PLATELET # BLD AUTO: 210 K/UL (ref 150–450)
PMV BLD AUTO: 11.2 FL (ref 9.2–12.9)
POTASSIUM SERPL-SCNC: 3.5 MMOL/L (ref 3.5–5.1)
PROT UR QL STRIP: ABNORMAL
RBC # BLD AUTO: 3.64 M/UL (ref 4.6–6.2)
RBC #/AREA URNS HPF: 40 /HPF (ref 0–4)
SODIUM SERPL-SCNC: 139 MMOL/L (ref 136–145)
SP GR UR STRIP: 1.02 (ref 1–1.03)
SQUAMOUS #/AREA URNS HPF: 5 /HPF
URN SPEC COLLECT METH UR: ABNORMAL
UROBILINOGEN UR STRIP-ACNC: NEGATIVE EU/DL
WBC # BLD AUTO: 6.98 K/UL (ref 3.9–12.7)
WBC #/AREA URNS HPF: >100 /HPF (ref 0–5)

## 2021-04-09 PROCEDURE — 97530 THERAPEUTIC ACTIVITIES: CPT

## 2021-04-09 PROCEDURE — 63600175 PHARM REV CODE 636 W HCPCS: Performed by: FAMILY MEDICINE

## 2021-04-09 PROCEDURE — 25000003 PHARM REV CODE 250: Performed by: INTERNAL MEDICINE

## 2021-04-09 PROCEDURE — 36415 COLL VENOUS BLD VENIPUNCTURE: CPT | Performed by: FAMILY MEDICINE

## 2021-04-09 PROCEDURE — 11000001 HC ACUTE MED/SURG PRIVATE ROOM

## 2021-04-09 PROCEDURE — 94660 CPAP INITIATION&MGMT: CPT

## 2021-04-09 PROCEDURE — 25000003 PHARM REV CODE 250: Performed by: FAMILY MEDICINE

## 2021-04-09 PROCEDURE — 99232 SBSQ HOSP IP/OBS MODERATE 35: CPT | Mod: ,,, | Performed by: FAMILY MEDICINE

## 2021-04-09 PROCEDURE — 94761 N-INVAS EAR/PLS OXIMETRY MLT: CPT

## 2021-04-09 PROCEDURE — 92611 MOTION FLUOROSCOPY/SWALLOW: CPT

## 2021-04-09 PROCEDURE — 80048 BASIC METABOLIC PNL TOTAL CA: CPT | Performed by: FAMILY MEDICINE

## 2021-04-09 PROCEDURE — 81000 URINALYSIS NONAUTO W/SCOPE: CPT | Performed by: NURSE PRACTITIONER

## 2021-04-09 PROCEDURE — 99232 PR SUBSEQUENT HOSPITAL CARE,LEVL II: ICD-10-PCS | Mod: ,,, | Performed by: FAMILY MEDICINE

## 2021-04-09 PROCEDURE — 27000221 HC OXYGEN, UP TO 24 HOURS

## 2021-04-09 PROCEDURE — 85025 COMPLETE CBC W/AUTO DIFF WBC: CPT | Performed by: FAMILY MEDICINE

## 2021-04-09 PROCEDURE — 97116 GAIT TRAINING THERAPY: CPT

## 2021-04-09 PROCEDURE — 99900035 HC TECH TIME PER 15 MIN (STAT)

## 2021-04-09 PROCEDURE — 92610 EVALUATE SWALLOWING FUNCTION: CPT

## 2021-04-09 RX ORDER — DIPHENHYDRAMINE HCL 25 MG
25 CAPSULE ORAL EVERY 6 HOURS PRN
Status: DISCONTINUED | OUTPATIENT
Start: 2021-04-09 | End: 2021-05-06 | Stop reason: HOSPADM

## 2021-04-09 RX ADMIN — TAMSULOSIN HYDROCHLORIDE 0.4 MG: 0.4 CAPSULE ORAL at 08:04

## 2021-04-09 RX ADMIN — DIPHENHYDRAMINE HYDROCHLORIDE 25 MG: 25 CAPSULE ORAL at 08:04

## 2021-04-09 RX ADMIN — SIMVASTATIN 40 MG: 40 TABLET, FILM COATED ORAL at 08:04

## 2021-04-09 RX ADMIN — MEMANTINE 10 MG: 10 TABLET ORAL at 08:04

## 2021-04-09 RX ADMIN — APIXABAN 5 MG: 5 TABLET, FILM COATED ORAL at 08:04

## 2021-04-09 RX ADMIN — ALLOPURINOL 300 MG: 100 TABLET ORAL at 08:04

## 2021-04-09 RX ADMIN — METOPROLOL SUCCINATE 50 MG: 50 TABLET, EXTENDED RELEASE ORAL at 08:04

## 2021-04-09 RX ADMIN — PANTOPRAZOLE SODIUM 40 MG: 40 TABLET, DELAYED RELEASE ORAL at 08:04

## 2021-04-09 RX ADMIN — CEFTRIAXONE 2 G: 2 INJECTION, SOLUTION INTRAVENOUS at 08:04

## 2021-04-09 RX ADMIN — VENLAFAXINE HYDROCHLORIDE 150 MG: 150 CAPSULE, EXTENDED RELEASE ORAL at 08:04

## 2021-04-09 RX ADMIN — LEVOTHYROXINE SODIUM 100 MCG: 100 TABLET ORAL at 05:04

## 2021-04-09 RX ADMIN — DIPHENHYDRAMINE HYDROCHLORIDE 25 MG: 25 CAPSULE ORAL at 11:04

## 2021-04-10 LAB
ANION GAP SERPL CALC-SCNC: 8 MMOL/L (ref 8–16)
BACTERIA UR CULT: ABNORMAL
BASOPHILS # BLD AUTO: 0.03 K/UL (ref 0–0.2)
BASOPHILS NFR BLD: 0.5 % (ref 0–1.9)
BUN SERPL-MCNC: 13 MG/DL (ref 8–23)
CALCIUM SERPL-MCNC: 8.9 MG/DL (ref 8.7–10.5)
CHLORIDE SERPL-SCNC: 109 MMOL/L (ref 95–110)
CO2 SERPL-SCNC: 22 MMOL/L (ref 23–29)
CREAT SERPL-MCNC: 0.9 MG/DL (ref 0.5–1.4)
DIFFERENTIAL METHOD: ABNORMAL
EOSINOPHIL # BLD AUTO: 0.4 K/UL (ref 0–0.5)
EOSINOPHIL NFR BLD: 6 % (ref 0–8)
ERYTHROCYTE [DISTWIDTH] IN BLOOD BY AUTOMATED COUNT: 15.3 % (ref 11.5–14.5)
EST. GFR  (AFRICAN AMERICAN): >60 ML/MIN/1.73 M^2
EST. GFR  (NON AFRICAN AMERICAN): >60 ML/MIN/1.73 M^2
GLUCOSE SERPL-MCNC: 82 MG/DL (ref 70–110)
HCT VFR BLD AUTO: 33.1 % (ref 40–54)
HGB BLD-MCNC: 10.7 G/DL (ref 14–18)
IMM GRANULOCYTES # BLD AUTO: 0.03 K/UL (ref 0–0.04)
IMM GRANULOCYTES NFR BLD AUTO: 0.5 % (ref 0–0.5)
LYMPHOCYTES # BLD AUTO: 1.3 K/UL (ref 1–4.8)
LYMPHOCYTES NFR BLD: 21.8 % (ref 18–48)
MCH RBC QN AUTO: 31.1 PG (ref 27–31)
MCHC RBC AUTO-ENTMCNC: 32.3 G/DL (ref 32–36)
MCV RBC AUTO: 96 FL (ref 82–98)
MONOCYTES # BLD AUTO: 0.5 K/UL (ref 0.3–1)
MONOCYTES NFR BLD: 8.4 % (ref 4–15)
NEUTROPHILS # BLD AUTO: 3.8 K/UL (ref 1.8–7.7)
NEUTROPHILS NFR BLD: 62.8 % (ref 38–73)
NRBC BLD-RTO: 0 /100 WBC
PLATELET # BLD AUTO: 189 K/UL (ref 150–450)
PMV BLD AUTO: 10.2 FL (ref 9.2–12.9)
POTASSIUM SERPL-SCNC: 3.4 MMOL/L (ref 3.5–5.1)
RBC # BLD AUTO: 3.44 M/UL (ref 4.6–6.2)
SODIUM SERPL-SCNC: 139 MMOL/L (ref 136–145)
WBC # BLD AUTO: 6.05 K/UL (ref 3.9–12.7)

## 2021-04-10 PROCEDURE — 25000003 PHARM REV CODE 250: Performed by: FAMILY MEDICINE

## 2021-04-10 PROCEDURE — 85025 COMPLETE CBC W/AUTO DIFF WBC: CPT | Performed by: FAMILY MEDICINE

## 2021-04-10 PROCEDURE — 80048 BASIC METABOLIC PNL TOTAL CA: CPT | Performed by: FAMILY MEDICINE

## 2021-04-10 PROCEDURE — 97110 THERAPEUTIC EXERCISES: CPT

## 2021-04-10 PROCEDURE — 27000221 HC OXYGEN, UP TO 24 HOURS

## 2021-04-10 PROCEDURE — 36415 COLL VENOUS BLD VENIPUNCTURE: CPT | Performed by: FAMILY MEDICINE

## 2021-04-10 PROCEDURE — 94761 N-INVAS EAR/PLS OXIMETRY MLT: CPT

## 2021-04-10 PROCEDURE — 99232 SBSQ HOSP IP/OBS MODERATE 35: CPT | Mod: ,,, | Performed by: FAMILY MEDICINE

## 2021-04-10 PROCEDURE — 99232 PR SUBSEQUENT HOSPITAL CARE,LEVL II: ICD-10-PCS | Mod: ,,, | Performed by: FAMILY MEDICINE

## 2021-04-10 PROCEDURE — 97530 THERAPEUTIC ACTIVITIES: CPT

## 2021-04-10 PROCEDURE — 99900035 HC TECH TIME PER 15 MIN (STAT)

## 2021-04-10 PROCEDURE — 94660 CPAP INITIATION&MGMT: CPT

## 2021-04-10 PROCEDURE — 11000001 HC ACUTE MED/SURG PRIVATE ROOM

## 2021-04-10 PROCEDURE — 63600175 PHARM REV CODE 636 W HCPCS: Performed by: FAMILY MEDICINE

## 2021-04-10 PROCEDURE — 25000003 PHARM REV CODE 250: Performed by: INTERNAL MEDICINE

## 2021-04-10 RX ORDER — MUPIROCIN 20 MG/G
OINTMENT TOPICAL 2 TIMES DAILY
Status: COMPLETED | OUTPATIENT
Start: 2021-04-10 | End: 2021-04-15

## 2021-04-10 RX ORDER — CIPROFLOXACIN 2 MG/ML
400 INJECTION, SOLUTION INTRAVENOUS
Status: DISCONTINUED | OUTPATIENT
Start: 2021-04-10 | End: 2021-04-13

## 2021-04-10 RX ADMIN — MEMANTINE 10 MG: 10 TABLET ORAL at 08:04

## 2021-04-10 RX ADMIN — METOPROLOL SUCCINATE 50 MG: 50 TABLET, EXTENDED RELEASE ORAL at 09:04

## 2021-04-10 RX ADMIN — MEMANTINE 10 MG: 10 TABLET ORAL at 09:04

## 2021-04-10 RX ADMIN — MUPIROCIN: 20 OINTMENT TOPICAL at 08:04

## 2021-04-10 RX ADMIN — LEVOTHYROXINE SODIUM 100 MCG: 100 TABLET ORAL at 06:04

## 2021-04-10 RX ADMIN — DIPHENHYDRAMINE HYDROCHLORIDE 25 MG: 25 CAPSULE ORAL at 09:04

## 2021-04-10 RX ADMIN — APIXABAN 5 MG: 5 TABLET, FILM COATED ORAL at 08:04

## 2021-04-10 RX ADMIN — PANTOPRAZOLE SODIUM 40 MG: 40 TABLET, DELAYED RELEASE ORAL at 09:04

## 2021-04-10 RX ADMIN — TAMSULOSIN HYDROCHLORIDE 0.4 MG: 0.4 CAPSULE ORAL at 09:04

## 2021-04-10 RX ADMIN — CIPROFLOXACIN 400 MG: 2 INJECTION, SOLUTION INTRAVENOUS at 02:04

## 2021-04-10 RX ADMIN — VENLAFAXINE HYDROCHLORIDE 150 MG: 150 CAPSULE, EXTENDED RELEASE ORAL at 09:04

## 2021-04-10 RX ADMIN — SIMVASTATIN 40 MG: 40 TABLET, FILM COATED ORAL at 08:04

## 2021-04-10 RX ADMIN — APIXABAN 5 MG: 5 TABLET, FILM COATED ORAL at 09:04

## 2021-04-10 RX ADMIN — ALLOPURINOL 300 MG: 100 TABLET ORAL at 09:04

## 2021-04-11 LAB
ANION GAP SERPL CALC-SCNC: 10 MMOL/L (ref 8–16)
BASOPHILS # BLD AUTO: 0.03 K/UL (ref 0–0.2)
BASOPHILS NFR BLD: 0.4 % (ref 0–1.9)
BUN SERPL-MCNC: 15 MG/DL (ref 8–23)
CALCIUM SERPL-MCNC: 9.3 MG/DL (ref 8.7–10.5)
CHLORIDE SERPL-SCNC: 107 MMOL/L (ref 95–110)
CO2 SERPL-SCNC: 23 MMOL/L (ref 23–29)
CREAT SERPL-MCNC: 1.1 MG/DL (ref 0.5–1.4)
DIFFERENTIAL METHOD: ABNORMAL
EOSINOPHIL # BLD AUTO: 0.4 K/UL (ref 0–0.5)
EOSINOPHIL NFR BLD: 5.2 % (ref 0–8)
ERYTHROCYTE [DISTWIDTH] IN BLOOD BY AUTOMATED COUNT: 15.4 % (ref 11.5–14.5)
EST. GFR  (AFRICAN AMERICAN): >60 ML/MIN/1.73 M^2
EST. GFR  (NON AFRICAN AMERICAN): >60 ML/MIN/1.73 M^2
GLUCOSE SERPL-MCNC: 81 MG/DL (ref 70–110)
HCT VFR BLD AUTO: 33.2 % (ref 40–54)
HGB BLD-MCNC: 10.6 G/DL (ref 14–18)
IMM GRANULOCYTES # BLD AUTO: 0.01 K/UL (ref 0–0.04)
IMM GRANULOCYTES NFR BLD AUTO: 0.1 % (ref 0–0.5)
LYMPHOCYTES # BLD AUTO: 1.3 K/UL (ref 1–4.8)
LYMPHOCYTES NFR BLD: 19 % (ref 18–48)
MCH RBC QN AUTO: 30.9 PG (ref 27–31)
MCHC RBC AUTO-ENTMCNC: 31.9 G/DL (ref 32–36)
MCV RBC AUTO: 97 FL (ref 82–98)
MONOCYTES # BLD AUTO: 0.6 K/UL (ref 0.3–1)
MONOCYTES NFR BLD: 9 % (ref 4–15)
NEUTROPHILS # BLD AUTO: 4.5 K/UL (ref 1.8–7.7)
NEUTROPHILS NFR BLD: 66.3 % (ref 38–73)
NRBC BLD-RTO: 0 /100 WBC
PLATELET # BLD AUTO: 195 K/UL (ref 150–450)
PMV BLD AUTO: 10.9 FL (ref 9.2–12.9)
POTASSIUM SERPL-SCNC: 3.5 MMOL/L (ref 3.5–5.1)
RBC # BLD AUTO: 3.43 M/UL (ref 4.6–6.2)
SODIUM SERPL-SCNC: 140 MMOL/L (ref 136–145)
WBC # BLD AUTO: 6.78 K/UL (ref 3.9–12.7)

## 2021-04-11 PROCEDURE — 27000221 HC OXYGEN, UP TO 24 HOURS

## 2021-04-11 PROCEDURE — 94660 CPAP INITIATION&MGMT: CPT

## 2021-04-11 PROCEDURE — 99900035 HC TECH TIME PER 15 MIN (STAT)

## 2021-04-11 PROCEDURE — 63600175 PHARM REV CODE 636 W HCPCS: Performed by: FAMILY MEDICINE

## 2021-04-11 PROCEDURE — 99232 PR SUBSEQUENT HOSPITAL CARE,LEVL II: ICD-10-PCS | Mod: ,,, | Performed by: FAMILY MEDICINE

## 2021-04-11 PROCEDURE — 80048 BASIC METABOLIC PNL TOTAL CA: CPT | Performed by: FAMILY MEDICINE

## 2021-04-11 PROCEDURE — 36415 COLL VENOUS BLD VENIPUNCTURE: CPT | Performed by: FAMILY MEDICINE

## 2021-04-11 PROCEDURE — 25000003 PHARM REV CODE 250: Performed by: FAMILY MEDICINE

## 2021-04-11 PROCEDURE — 99232 SBSQ HOSP IP/OBS MODERATE 35: CPT | Mod: ,,, | Performed by: FAMILY MEDICINE

## 2021-04-11 PROCEDURE — 85025 COMPLETE CBC W/AUTO DIFF WBC: CPT | Performed by: FAMILY MEDICINE

## 2021-04-11 PROCEDURE — 11000001 HC ACUTE MED/SURG PRIVATE ROOM

## 2021-04-11 PROCEDURE — 25000003 PHARM REV CODE 250: Performed by: INTERNAL MEDICINE

## 2021-04-11 PROCEDURE — 97530 THERAPEUTIC ACTIVITIES: CPT

## 2021-04-11 PROCEDURE — 94761 N-INVAS EAR/PLS OXIMETRY MLT: CPT

## 2021-04-11 RX ADMIN — MEMANTINE 10 MG: 10 TABLET ORAL at 08:04

## 2021-04-11 RX ADMIN — VENLAFAXINE HYDROCHLORIDE 150 MG: 150 CAPSULE, EXTENDED RELEASE ORAL at 08:04

## 2021-04-11 RX ADMIN — APIXABAN 5 MG: 5 TABLET, FILM COATED ORAL at 08:04

## 2021-04-11 RX ADMIN — ALLOPURINOL 300 MG: 100 TABLET ORAL at 08:04

## 2021-04-11 RX ADMIN — DIPHENHYDRAMINE HYDROCHLORIDE 25 MG: 25 CAPSULE ORAL at 08:04

## 2021-04-11 RX ADMIN — METOPROLOL SUCCINATE 50 MG: 50 TABLET, EXTENDED RELEASE ORAL at 08:04

## 2021-04-11 RX ADMIN — PANTOPRAZOLE SODIUM 40 MG: 40 TABLET, DELAYED RELEASE ORAL at 08:04

## 2021-04-11 RX ADMIN — MUPIROCIN: 20 OINTMENT TOPICAL at 08:04

## 2021-04-11 RX ADMIN — CIPROFLOXACIN 400 MG: 2 INJECTION, SOLUTION INTRAVENOUS at 02:04

## 2021-04-11 RX ADMIN — LEVOTHYROXINE SODIUM 100 MCG: 100 TABLET ORAL at 05:04

## 2021-04-11 RX ADMIN — SIMVASTATIN 40 MG: 40 TABLET, FILM COATED ORAL at 08:04

## 2021-04-11 RX ADMIN — TAMSULOSIN HYDROCHLORIDE 0.4 MG: 0.4 CAPSULE ORAL at 08:04

## 2021-04-12 LAB
ANION GAP SERPL CALC-SCNC: 8 MMOL/L (ref 8–16)
BACTERIA BLD CULT: NORMAL
BACTERIA BLD CULT: NORMAL
BASOPHILS # BLD AUTO: 0.04 K/UL (ref 0–0.2)
BASOPHILS NFR BLD: 0.6 % (ref 0–1.9)
BUN SERPL-MCNC: 13 MG/DL (ref 8–23)
CALCIUM SERPL-MCNC: 8.9 MG/DL (ref 8.7–10.5)
CHLORIDE SERPL-SCNC: 106 MMOL/L (ref 95–110)
CO2 SERPL-SCNC: 25 MMOL/L (ref 23–29)
CREAT SERPL-MCNC: 1 MG/DL (ref 0.5–1.4)
DIFFERENTIAL METHOD: ABNORMAL
EOSINOPHIL # BLD AUTO: 0.4 K/UL (ref 0–0.5)
EOSINOPHIL NFR BLD: 6.5 % (ref 0–8)
ERYTHROCYTE [DISTWIDTH] IN BLOOD BY AUTOMATED COUNT: 15.1 % (ref 11.5–14.5)
EST. GFR  (AFRICAN AMERICAN): >60 ML/MIN/1.73 M^2
EST. GFR  (NON AFRICAN AMERICAN): >60 ML/MIN/1.73 M^2
GLUCOSE SERPL-MCNC: 80 MG/DL (ref 70–110)
HCT VFR BLD AUTO: 32.8 % (ref 40–54)
HGB BLD-MCNC: 10.3 G/DL (ref 14–18)
IMM GRANULOCYTES # BLD AUTO: 0.02 K/UL (ref 0–0.04)
IMM GRANULOCYTES NFR BLD AUTO: 0.3 % (ref 0–0.5)
LYMPHOCYTES # BLD AUTO: 1.4 K/UL (ref 1–4.8)
LYMPHOCYTES NFR BLD: 20.5 % (ref 18–48)
MCH RBC QN AUTO: 30.2 PG (ref 27–31)
MCHC RBC AUTO-ENTMCNC: 31.4 G/DL (ref 32–36)
MCV RBC AUTO: 96 FL (ref 82–98)
MONOCYTES # BLD AUTO: 0.6 K/UL (ref 0.3–1)
MONOCYTES NFR BLD: 9.5 % (ref 4–15)
NEUTROPHILS # BLD AUTO: 4.2 K/UL (ref 1.8–7.7)
NEUTROPHILS NFR BLD: 62.6 % (ref 38–73)
NRBC BLD-RTO: 0 /100 WBC
PLATELET # BLD AUTO: 181 K/UL (ref 150–450)
PMV BLD AUTO: 10.6 FL (ref 9.2–12.9)
POTASSIUM SERPL-SCNC: 3.5 MMOL/L (ref 3.5–5.1)
RBC # BLD AUTO: 3.41 M/UL (ref 4.6–6.2)
SODIUM SERPL-SCNC: 139 MMOL/L (ref 136–145)
WBC # BLD AUTO: 6.72 K/UL (ref 3.9–12.7)

## 2021-04-12 PROCEDURE — 36415 COLL VENOUS BLD VENIPUNCTURE: CPT | Performed by: FAMILY MEDICINE

## 2021-04-12 PROCEDURE — 25000003 PHARM REV CODE 250: Performed by: FAMILY MEDICINE

## 2021-04-12 PROCEDURE — 97530 THERAPEUTIC ACTIVITIES: CPT

## 2021-04-12 PROCEDURE — 99222 1ST HOSP IP/OBS MODERATE 55: CPT | Mod: ,,, | Performed by: PSYCHIATRY & NEUROLOGY

## 2021-04-12 PROCEDURE — 99900035 HC TECH TIME PER 15 MIN (STAT)

## 2021-04-12 PROCEDURE — 99233 PR SUBSEQUENT HOSPITAL CARE,LEVL III: ICD-10-PCS | Mod: ,,, | Performed by: INTERNAL MEDICINE

## 2021-04-12 PROCEDURE — 11000001 HC ACUTE MED/SURG PRIVATE ROOM

## 2021-04-12 PROCEDURE — 99222 PR INITIAL HOSPITAL CARE,LEVL II: ICD-10-PCS | Mod: ,,, | Performed by: PSYCHIATRY & NEUROLOGY

## 2021-04-12 PROCEDURE — 90833 PR PSYCHOTHERAPY W/PATIENT W/E&M, 30 MIN (ADD ON): ICD-10-PCS | Mod: ,,, | Performed by: PSYCHIATRY & NEUROLOGY

## 2021-04-12 PROCEDURE — 94761 N-INVAS EAR/PLS OXIMETRY MLT: CPT

## 2021-04-12 PROCEDURE — 97535 SELF CARE MNGMENT TRAINING: CPT

## 2021-04-12 PROCEDURE — 99233 SBSQ HOSP IP/OBS HIGH 50: CPT | Mod: ,,, | Performed by: INTERNAL MEDICINE

## 2021-04-12 PROCEDURE — 80048 BASIC METABOLIC PNL TOTAL CA: CPT | Performed by: FAMILY MEDICINE

## 2021-04-12 PROCEDURE — 94660 CPAP INITIATION&MGMT: CPT

## 2021-04-12 PROCEDURE — 85025 COMPLETE CBC W/AUTO DIFF WBC: CPT | Performed by: FAMILY MEDICINE

## 2021-04-12 PROCEDURE — 90833 PSYTX W PT W E/M 30 MIN: CPT | Mod: ,,, | Performed by: PSYCHIATRY & NEUROLOGY

## 2021-04-12 PROCEDURE — 27000221 HC OXYGEN, UP TO 24 HOURS

## 2021-04-12 PROCEDURE — 63600175 PHARM REV CODE 636 W HCPCS: Performed by: FAMILY MEDICINE

## 2021-04-12 RX ADMIN — APIXABAN 5 MG: 5 TABLET, FILM COATED ORAL at 09:04

## 2021-04-12 RX ADMIN — MUPIROCIN: 20 OINTMENT TOPICAL at 09:04

## 2021-04-12 RX ADMIN — TAMSULOSIN HYDROCHLORIDE 0.4 MG: 0.4 CAPSULE ORAL at 09:04

## 2021-04-12 RX ADMIN — SIMVASTATIN 40 MG: 40 TABLET, FILM COATED ORAL at 08:04

## 2021-04-12 RX ADMIN — METOPROLOL SUCCINATE 50 MG: 50 TABLET, EXTENDED RELEASE ORAL at 09:04

## 2021-04-12 RX ADMIN — CIPROFLOXACIN 400 MG: 2 INJECTION, SOLUTION INTRAVENOUS at 02:04

## 2021-04-12 RX ADMIN — PANTOPRAZOLE SODIUM 40 MG: 40 TABLET, DELAYED RELEASE ORAL at 09:04

## 2021-04-12 RX ADMIN — VENLAFAXINE HYDROCHLORIDE 150 MG: 150 CAPSULE, EXTENDED RELEASE ORAL at 09:04

## 2021-04-12 RX ADMIN — MUPIROCIN: 20 OINTMENT TOPICAL at 08:04

## 2021-04-12 RX ADMIN — MEMANTINE 10 MG: 10 TABLET ORAL at 09:04

## 2021-04-12 RX ADMIN — CIPROFLOXACIN 400 MG: 2 INJECTION, SOLUTION INTRAVENOUS at 03:04

## 2021-04-12 RX ADMIN — MEMANTINE 10 MG: 10 TABLET ORAL at 08:04

## 2021-04-12 RX ADMIN — APIXABAN 5 MG: 5 TABLET, FILM COATED ORAL at 08:04

## 2021-04-12 RX ADMIN — ALLOPURINOL 300 MG: 100 TABLET ORAL at 09:04

## 2021-04-12 RX ADMIN — LEVOTHYROXINE SODIUM 100 MCG: 100 TABLET ORAL at 05:04

## 2021-04-13 LAB
ANION GAP SERPL CALC-SCNC: 9 MMOL/L (ref 8–16)
BASOPHILS # BLD AUTO: 0.03 K/UL (ref 0–0.2)
BASOPHILS NFR BLD: 0.5 % (ref 0–1.9)
BUN SERPL-MCNC: 13 MG/DL (ref 8–23)
CALCIUM SERPL-MCNC: 8.9 MG/DL (ref 8.7–10.5)
CHLORIDE SERPL-SCNC: 108 MMOL/L (ref 95–110)
CO2 SERPL-SCNC: 22 MMOL/L (ref 23–29)
CREAT SERPL-MCNC: 0.9 MG/DL (ref 0.5–1.4)
DIFFERENTIAL METHOD: ABNORMAL
EOSINOPHIL # BLD AUTO: 0.4 K/UL (ref 0–0.5)
EOSINOPHIL NFR BLD: 6.2 % (ref 0–8)
ERYTHROCYTE [DISTWIDTH] IN BLOOD BY AUTOMATED COUNT: 15.2 % (ref 11.5–14.5)
EST. GFR  (AFRICAN AMERICAN): >60 ML/MIN/1.73 M^2
EST. GFR  (NON AFRICAN AMERICAN): >60 ML/MIN/1.73 M^2
GLUCOSE SERPL-MCNC: 84 MG/DL (ref 70–110)
HCT VFR BLD AUTO: 32.1 % (ref 40–54)
HGB BLD-MCNC: 10.2 G/DL (ref 14–18)
IMM GRANULOCYTES # BLD AUTO: 0.02 K/UL (ref 0–0.04)
IMM GRANULOCYTES NFR BLD AUTO: 0.3 % (ref 0–0.5)
LYMPHOCYTES # BLD AUTO: 1.4 K/UL (ref 1–4.8)
LYMPHOCYTES NFR BLD: 21.5 % (ref 18–48)
MCH RBC QN AUTO: 30.4 PG (ref 27–31)
MCHC RBC AUTO-ENTMCNC: 31.8 G/DL (ref 32–36)
MCV RBC AUTO: 96 FL (ref 82–98)
MONOCYTES # BLD AUTO: 0.5 K/UL (ref 0.3–1)
MONOCYTES NFR BLD: 7.9 % (ref 4–15)
NEUTROPHILS # BLD AUTO: 4 K/UL (ref 1.8–7.7)
NEUTROPHILS NFR BLD: 63.6 % (ref 38–73)
NRBC BLD-RTO: 0 /100 WBC
PLATELET # BLD AUTO: 180 K/UL (ref 150–450)
PMV BLD AUTO: 10.8 FL (ref 9.2–12.9)
POTASSIUM SERPL-SCNC: 3.3 MMOL/L (ref 3.5–5.1)
RBC # BLD AUTO: 3.35 M/UL (ref 4.6–6.2)
SODIUM SERPL-SCNC: 139 MMOL/L (ref 136–145)
WBC # BLD AUTO: 6.34 K/UL (ref 3.9–12.7)

## 2021-04-13 PROCEDURE — 25000003 PHARM REV CODE 250: Performed by: FAMILY MEDICINE

## 2021-04-13 PROCEDURE — 97530 THERAPEUTIC ACTIVITIES: CPT

## 2021-04-13 PROCEDURE — 94761 N-INVAS EAR/PLS OXIMETRY MLT: CPT

## 2021-04-13 PROCEDURE — 27000221 HC OXYGEN, UP TO 24 HOURS

## 2021-04-13 PROCEDURE — 94660 CPAP INITIATION&MGMT: CPT

## 2021-04-13 PROCEDURE — 25000003 PHARM REV CODE 250: Performed by: NURSE PRACTITIONER

## 2021-04-13 PROCEDURE — 25500020 PHARM REV CODE 255: Performed by: FAMILY MEDICINE

## 2021-04-13 PROCEDURE — 63600175 PHARM REV CODE 636 W HCPCS: Performed by: FAMILY MEDICINE

## 2021-04-13 PROCEDURE — 99900035 HC TECH TIME PER 15 MIN (STAT)

## 2021-04-13 PROCEDURE — 25000003 PHARM REV CODE 250: Performed by: INTERNAL MEDICINE

## 2021-04-13 PROCEDURE — 36415 COLL VENOUS BLD VENIPUNCTURE: CPT | Performed by: FAMILY MEDICINE

## 2021-04-13 PROCEDURE — 80048 BASIC METABOLIC PNL TOTAL CA: CPT | Performed by: FAMILY MEDICINE

## 2021-04-13 PROCEDURE — 99232 SBSQ HOSP IP/OBS MODERATE 35: CPT | Mod: ,,, | Performed by: INTERNAL MEDICINE

## 2021-04-13 PROCEDURE — 99232 PR SUBSEQUENT HOSPITAL CARE,LEVL II: ICD-10-PCS | Mod: ,,, | Performed by: INTERNAL MEDICINE

## 2021-04-13 PROCEDURE — 85025 COMPLETE CBC W/AUTO DIFF WBC: CPT | Performed by: FAMILY MEDICINE

## 2021-04-13 PROCEDURE — A9698 NON-RAD CONTRAST MATERIALNOC: HCPCS | Performed by: FAMILY MEDICINE

## 2021-04-13 PROCEDURE — 92611 MOTION FLUOROSCOPY/SWALLOW: CPT

## 2021-04-13 PROCEDURE — 11000001 HC ACUTE MED/SURG PRIVATE ROOM

## 2021-04-13 PROCEDURE — 97535 SELF CARE MNGMENT TRAINING: CPT | Mod: CO

## 2021-04-13 RX ORDER — POTASSIUM CHLORIDE 20 MEQ/1
40 TABLET, EXTENDED RELEASE ORAL ONCE
Status: COMPLETED | OUTPATIENT
Start: 2021-04-13 | End: 2021-04-13

## 2021-04-13 RX ORDER — CIPROFLOXACIN 500 MG/1
500 TABLET ORAL EVERY 12 HOURS
Status: DISCONTINUED | OUTPATIENT
Start: 2021-04-13 | End: 2021-04-24

## 2021-04-13 RX ADMIN — CIPROFLOXACIN 400 MG: 2 INJECTION, SOLUTION INTRAVENOUS at 02:04

## 2021-04-13 RX ADMIN — MUPIROCIN: 20 OINTMENT TOPICAL at 08:04

## 2021-04-13 RX ADMIN — VENLAFAXINE HYDROCHLORIDE 150 MG: 150 CAPSULE, EXTENDED RELEASE ORAL at 07:04

## 2021-04-13 RX ADMIN — CIPROFLOXACIN 500 MG: 500 TABLET, FILM COATED ORAL at 01:04

## 2021-04-13 RX ADMIN — MEMANTINE 10 MG: 10 TABLET ORAL at 08:04

## 2021-04-13 RX ADMIN — PANTOPRAZOLE SODIUM 40 MG: 40 TABLET, DELAYED RELEASE ORAL at 07:04

## 2021-04-13 RX ADMIN — MUPIROCIN: 20 OINTMENT TOPICAL at 07:04

## 2021-04-13 RX ADMIN — SIMVASTATIN 40 MG: 40 TABLET, FILM COATED ORAL at 08:04

## 2021-04-13 RX ADMIN — MEMANTINE 10 MG: 10 TABLET ORAL at 07:04

## 2021-04-13 RX ADMIN — APIXABAN 5 MG: 5 TABLET, FILM COATED ORAL at 08:04

## 2021-04-13 RX ADMIN — ALLOPURINOL 300 MG: 100 TABLET ORAL at 07:04

## 2021-04-13 RX ADMIN — CIPROFLOXACIN 500 MG: 500 TABLET, FILM COATED ORAL at 08:04

## 2021-04-13 RX ADMIN — POTASSIUM CHLORIDE 40 MEQ: 1500 TABLET, EXTENDED RELEASE ORAL at 01:04

## 2021-04-13 RX ADMIN — APIXABAN 5 MG: 5 TABLET, FILM COATED ORAL at 07:04

## 2021-04-13 RX ADMIN — LEVOTHYROXINE SODIUM 100 MCG: 100 TABLET ORAL at 05:04

## 2021-04-13 RX ADMIN — TAMSULOSIN HYDROCHLORIDE 0.4 MG: 0.4 CAPSULE ORAL at 07:04

## 2021-04-13 RX ADMIN — METOPROLOL SUCCINATE 50 MG: 50 TABLET, EXTENDED RELEASE ORAL at 07:04

## 2021-04-13 RX ADMIN — DIPHENHYDRAMINE HYDROCHLORIDE 25 MG: 25 CAPSULE ORAL at 07:04

## 2021-04-13 RX ADMIN — BARIUM SULFATE 176 G: 960 POWDER, FOR SUSPENSION ORAL at 10:04

## 2021-04-14 LAB
ANION GAP SERPL CALC-SCNC: 10 MMOL/L (ref 8–16)
BASOPHILS # BLD AUTO: 0.05 K/UL (ref 0–0.2)
BASOPHILS NFR BLD: 0.7 % (ref 0–1.9)
BUN SERPL-MCNC: 15 MG/DL (ref 8–23)
CALCIUM SERPL-MCNC: 9.2 MG/DL (ref 8.7–10.5)
CHLORIDE SERPL-SCNC: 109 MMOL/L (ref 95–110)
CO2 SERPL-SCNC: 22 MMOL/L (ref 23–29)
CREAT SERPL-MCNC: 1.1 MG/DL (ref 0.5–1.4)
DIFFERENTIAL METHOD: ABNORMAL
EOSINOPHIL # BLD AUTO: 0.5 K/UL (ref 0–0.5)
EOSINOPHIL NFR BLD: 6.9 % (ref 0–8)
ERYTHROCYTE [DISTWIDTH] IN BLOOD BY AUTOMATED COUNT: 15.2 % (ref 11.5–14.5)
EST. GFR  (AFRICAN AMERICAN): >60 ML/MIN/1.73 M^2
EST. GFR  (NON AFRICAN AMERICAN): >60 ML/MIN/1.73 M^2
GLUCOSE SERPL-MCNC: 89 MG/DL (ref 70–110)
HCT VFR BLD AUTO: 33.1 % (ref 40–54)
HGB BLD-MCNC: 10.7 G/DL (ref 14–18)
IMM GRANULOCYTES # BLD AUTO: 0.02 K/UL (ref 0–0.04)
IMM GRANULOCYTES NFR BLD AUTO: 0.3 % (ref 0–0.5)
LYMPHOCYTES # BLD AUTO: 1.5 K/UL (ref 1–4.8)
LYMPHOCYTES NFR BLD: 21.1 % (ref 18–48)
MCH RBC QN AUTO: 31.3 PG (ref 27–31)
MCHC RBC AUTO-ENTMCNC: 32.3 G/DL (ref 32–36)
MCV RBC AUTO: 97 FL (ref 82–98)
MONOCYTES # BLD AUTO: 0.6 K/UL (ref 0.3–1)
MONOCYTES NFR BLD: 8.4 % (ref 4–15)
NEUTROPHILS # BLD AUTO: 4.3 K/UL (ref 1.8–7.7)
NEUTROPHILS NFR BLD: 62.6 % (ref 38–73)
NRBC BLD-RTO: 0 /100 WBC
PLATELET # BLD AUTO: 188 K/UL (ref 150–450)
PMV BLD AUTO: 11.4 FL (ref 9.2–12.9)
POTASSIUM SERPL-SCNC: 3.6 MMOL/L (ref 3.5–5.1)
RBC # BLD AUTO: 3.42 M/UL (ref 4.6–6.2)
SODIUM SERPL-SCNC: 141 MMOL/L (ref 136–145)
WBC # BLD AUTO: 6.93 K/UL (ref 3.9–12.7)

## 2021-04-14 PROCEDURE — 25000003 PHARM REV CODE 250: Performed by: NURSE PRACTITIONER

## 2021-04-14 PROCEDURE — 94761 N-INVAS EAR/PLS OXIMETRY MLT: CPT

## 2021-04-14 PROCEDURE — 97530 THERAPEUTIC ACTIVITIES: CPT

## 2021-04-14 PROCEDURE — 99231 PR SUBSEQUENT HOSPITAL CARE,LEVL I: ICD-10-PCS | Mod: ,,, | Performed by: FAMILY MEDICINE

## 2021-04-14 PROCEDURE — 36415 COLL VENOUS BLD VENIPUNCTURE: CPT | Performed by: FAMILY MEDICINE

## 2021-04-14 PROCEDURE — 94660 CPAP INITIATION&MGMT: CPT

## 2021-04-14 PROCEDURE — 99231 SBSQ HOSP IP/OBS SF/LOW 25: CPT | Mod: ,,, | Performed by: FAMILY MEDICINE

## 2021-04-14 PROCEDURE — A4216 STERILE WATER/SALINE, 10 ML: HCPCS | Performed by: FAMILY MEDICINE

## 2021-04-14 PROCEDURE — 25000003 PHARM REV CODE 250: Performed by: INTERNAL MEDICINE

## 2021-04-14 PROCEDURE — 25000003 PHARM REV CODE 250: Performed by: FAMILY MEDICINE

## 2021-04-14 PROCEDURE — 11000001 HC ACUTE MED/SURG PRIVATE ROOM

## 2021-04-14 PROCEDURE — 27000221 HC OXYGEN, UP TO 24 HOURS

## 2021-04-14 PROCEDURE — 97110 THERAPEUTIC EXERCISES: CPT

## 2021-04-14 PROCEDURE — 85025 COMPLETE CBC W/AUTO DIFF WBC: CPT | Performed by: FAMILY MEDICINE

## 2021-04-14 PROCEDURE — 80048 BASIC METABOLIC PNL TOTAL CA: CPT | Performed by: FAMILY MEDICINE

## 2021-04-14 PROCEDURE — 99900035 HC TECH TIME PER 15 MIN (STAT)

## 2021-04-14 RX ADMIN — METOPROLOL SUCCINATE 50 MG: 50 TABLET, EXTENDED RELEASE ORAL at 08:04

## 2021-04-14 RX ADMIN — CIPROFLOXACIN 500 MG: 500 TABLET, FILM COATED ORAL at 08:04

## 2021-04-14 RX ADMIN — APIXABAN 5 MG: 5 TABLET, FILM COATED ORAL at 08:04

## 2021-04-14 RX ADMIN — Medication 10 ML: at 05:04

## 2021-04-14 RX ADMIN — MEMANTINE 10 MG: 10 TABLET ORAL at 08:04

## 2021-04-14 RX ADMIN — DIPHENHYDRAMINE HYDROCHLORIDE 25 MG: 25 CAPSULE ORAL at 09:04

## 2021-04-14 RX ADMIN — MUPIROCIN: 20 OINTMENT TOPICAL at 08:04

## 2021-04-14 RX ADMIN — LEVOTHYROXINE SODIUM 100 MCG: 100 TABLET ORAL at 05:04

## 2021-04-14 RX ADMIN — TAMSULOSIN HYDROCHLORIDE 0.4 MG: 0.4 CAPSULE ORAL at 08:04

## 2021-04-14 RX ADMIN — ALLOPURINOL 300 MG: 100 TABLET ORAL at 08:04

## 2021-04-14 RX ADMIN — VENLAFAXINE HYDROCHLORIDE 150 MG: 150 CAPSULE, EXTENDED RELEASE ORAL at 08:04

## 2021-04-14 RX ADMIN — SIMVASTATIN 40 MG: 40 TABLET, FILM COATED ORAL at 08:04

## 2021-04-14 RX ADMIN — PANTOPRAZOLE SODIUM 40 MG: 40 TABLET, DELAYED RELEASE ORAL at 08:04

## 2021-04-15 LAB
ANION GAP SERPL CALC-SCNC: 10 MMOL/L (ref 8–16)
BASOPHILS # BLD AUTO: 0.05 K/UL (ref 0–0.2)
BASOPHILS NFR BLD: 0.7 % (ref 0–1.9)
BUN SERPL-MCNC: 17 MG/DL (ref 8–23)
CALCIUM SERPL-MCNC: 9.4 MG/DL (ref 8.7–10.5)
CHLORIDE SERPL-SCNC: 107 MMOL/L (ref 95–110)
CO2 SERPL-SCNC: 23 MMOL/L (ref 23–29)
CREAT SERPL-MCNC: 1.3 MG/DL (ref 0.5–1.4)
DIFFERENTIAL METHOD: ABNORMAL
EOSINOPHIL # BLD AUTO: 0.5 K/UL (ref 0–0.5)
EOSINOPHIL NFR BLD: 6.4 % (ref 0–8)
ERYTHROCYTE [DISTWIDTH] IN BLOOD BY AUTOMATED COUNT: 15.3 % (ref 11.5–14.5)
EST. GFR  (AFRICAN AMERICAN): >60 ML/MIN/1.73 M^2
EST. GFR  (NON AFRICAN AMERICAN): 52 ML/MIN/1.73 M^2
GLUCOSE SERPL-MCNC: 81 MG/DL (ref 70–110)
HCT VFR BLD AUTO: 33.3 % (ref 40–54)
HGB BLD-MCNC: 10.5 G/DL (ref 14–18)
IMM GRANULOCYTES # BLD AUTO: 0.02 K/UL (ref 0–0.04)
IMM GRANULOCYTES NFR BLD AUTO: 0.3 % (ref 0–0.5)
LYMPHOCYTES # BLD AUTO: 1.4 K/UL (ref 1–4.8)
LYMPHOCYTES NFR BLD: 20.1 % (ref 18–48)
MCH RBC QN AUTO: 30.6 PG (ref 27–31)
MCHC RBC AUTO-ENTMCNC: 31.5 G/DL (ref 32–36)
MCV RBC AUTO: 97 FL (ref 82–98)
MONOCYTES # BLD AUTO: 0.6 K/UL (ref 0.3–1)
MONOCYTES NFR BLD: 8.9 % (ref 4–15)
NEUTROPHILS # BLD AUTO: 4.6 K/UL (ref 1.8–7.7)
NEUTROPHILS NFR BLD: 63.6 % (ref 38–73)
NRBC BLD-RTO: 0 /100 WBC
PLATELET # BLD AUTO: 187 K/UL (ref 150–450)
PMV BLD AUTO: 11.3 FL (ref 9.2–12.9)
POTASSIUM SERPL-SCNC: 3.6 MMOL/L (ref 3.5–5.1)
RBC # BLD AUTO: 3.43 M/UL (ref 4.6–6.2)
SODIUM SERPL-SCNC: 140 MMOL/L (ref 136–145)
WBC # BLD AUTO: 7.16 K/UL (ref 3.9–12.7)

## 2021-04-15 PROCEDURE — 11000001 HC ACUTE MED/SURG PRIVATE ROOM

## 2021-04-15 PROCEDURE — 85025 COMPLETE CBC W/AUTO DIFF WBC: CPT | Performed by: FAMILY MEDICINE

## 2021-04-15 PROCEDURE — 99900035 HC TECH TIME PER 15 MIN (STAT)

## 2021-04-15 PROCEDURE — 94761 N-INVAS EAR/PLS OXIMETRY MLT: CPT

## 2021-04-15 PROCEDURE — 25000003 PHARM REV CODE 250: Performed by: INTERNAL MEDICINE

## 2021-04-15 PROCEDURE — 97530 THERAPEUTIC ACTIVITIES: CPT

## 2021-04-15 PROCEDURE — 25000003 PHARM REV CODE 250: Performed by: NURSE PRACTITIONER

## 2021-04-15 PROCEDURE — 80048 BASIC METABOLIC PNL TOTAL CA: CPT | Performed by: FAMILY MEDICINE

## 2021-04-15 PROCEDURE — 25000003 PHARM REV CODE 250: Performed by: FAMILY MEDICINE

## 2021-04-15 PROCEDURE — 99231 PR SUBSEQUENT HOSPITAL CARE,LEVL I: ICD-10-PCS | Mod: ,,, | Performed by: STUDENT IN AN ORGANIZED HEALTH CARE EDUCATION/TRAINING PROGRAM

## 2021-04-15 PROCEDURE — 99231 SBSQ HOSP IP/OBS SF/LOW 25: CPT | Mod: ,,, | Performed by: STUDENT IN AN ORGANIZED HEALTH CARE EDUCATION/TRAINING PROGRAM

## 2021-04-15 PROCEDURE — 36415 COLL VENOUS BLD VENIPUNCTURE: CPT | Performed by: FAMILY MEDICINE

## 2021-04-15 RX ADMIN — VENLAFAXINE HYDROCHLORIDE 150 MG: 150 CAPSULE, EXTENDED RELEASE ORAL at 08:04

## 2021-04-15 RX ADMIN — DIPHENHYDRAMINE HYDROCHLORIDE 25 MG: 25 CAPSULE ORAL at 08:04

## 2021-04-15 RX ADMIN — MEMANTINE 10 MG: 10 TABLET ORAL at 08:04

## 2021-04-15 RX ADMIN — TAMSULOSIN HYDROCHLORIDE 0.4 MG: 0.4 CAPSULE ORAL at 08:04

## 2021-04-15 RX ADMIN — LEVOTHYROXINE SODIUM 100 MCG: 100 TABLET ORAL at 05:04

## 2021-04-15 RX ADMIN — METOPROLOL SUCCINATE 50 MG: 50 TABLET, EXTENDED RELEASE ORAL at 08:04

## 2021-04-15 RX ADMIN — CIPROFLOXACIN 500 MG: 500 TABLET, FILM COATED ORAL at 08:04

## 2021-04-15 RX ADMIN — SIMVASTATIN 40 MG: 40 TABLET, FILM COATED ORAL at 08:04

## 2021-04-15 RX ADMIN — ALLOPURINOL 300 MG: 100 TABLET ORAL at 08:04

## 2021-04-15 RX ADMIN — APIXABAN 5 MG: 5 TABLET, FILM COATED ORAL at 08:04

## 2021-04-15 RX ADMIN — PANTOPRAZOLE SODIUM 40 MG: 40 TABLET, DELAYED RELEASE ORAL at 08:04

## 2021-04-15 RX ADMIN — MUPIROCIN: 20 OINTMENT TOPICAL at 08:04

## 2021-04-16 LAB
ANION GAP SERPL CALC-SCNC: 9 MMOL/L (ref 8–16)
BASOPHILS # BLD AUTO: 0.06 K/UL (ref 0–0.2)
BASOPHILS NFR BLD: 0.8 % (ref 0–1.9)
BUN SERPL-MCNC: 16 MG/DL (ref 8–23)
CALCIUM SERPL-MCNC: 9.2 MG/DL (ref 8.7–10.5)
CHLORIDE SERPL-SCNC: 108 MMOL/L (ref 95–110)
CO2 SERPL-SCNC: 23 MMOL/L (ref 23–29)
CREAT SERPL-MCNC: 1.1 MG/DL (ref 0.5–1.4)
DIFFERENTIAL METHOD: ABNORMAL
EOSINOPHIL # BLD AUTO: 0.5 K/UL (ref 0–0.5)
EOSINOPHIL NFR BLD: 6.9 % (ref 0–8)
ERYTHROCYTE [DISTWIDTH] IN BLOOD BY AUTOMATED COUNT: 15.2 % (ref 11.5–14.5)
EST. GFR  (AFRICAN AMERICAN): >60 ML/MIN/1.73 M^2
EST. GFR  (NON AFRICAN AMERICAN): >60 ML/MIN/1.73 M^2
GLUCOSE SERPL-MCNC: 83 MG/DL (ref 70–110)
HCT VFR BLD AUTO: 35.2 % (ref 40–54)
HGB BLD-MCNC: 11.1 G/DL (ref 14–18)
IMM GRANULOCYTES # BLD AUTO: 0.04 K/UL (ref 0–0.04)
IMM GRANULOCYTES NFR BLD AUTO: 0.5 % (ref 0–0.5)
LYMPHOCYTES # BLD AUTO: 1.5 K/UL (ref 1–4.8)
LYMPHOCYTES NFR BLD: 20.2 % (ref 18–48)
MCH RBC QN AUTO: 30.7 PG (ref 27–31)
MCHC RBC AUTO-ENTMCNC: 31.5 G/DL (ref 32–36)
MCV RBC AUTO: 97 FL (ref 82–98)
MONOCYTES # BLD AUTO: 0.6 K/UL (ref 0.3–1)
MONOCYTES NFR BLD: 8.7 % (ref 4–15)
NEUTROPHILS # BLD AUTO: 4.6 K/UL (ref 1.8–7.7)
NEUTROPHILS NFR BLD: 62.9 % (ref 38–73)
NRBC BLD-RTO: 0 /100 WBC
PLATELET # BLD AUTO: 199 K/UL (ref 150–450)
PMV BLD AUTO: 11.4 FL (ref 9.2–12.9)
POTASSIUM SERPL-SCNC: 3.6 MMOL/L (ref 3.5–5.1)
RBC # BLD AUTO: 3.62 M/UL (ref 4.6–6.2)
SODIUM SERPL-SCNC: 140 MMOL/L (ref 136–145)
WBC # BLD AUTO: 7.36 K/UL (ref 3.9–12.7)

## 2021-04-16 PROCEDURE — 99231 PR SUBSEQUENT HOSPITAL CARE,LEVL I: ICD-10-PCS | Mod: ,,, | Performed by: FAMILY MEDICINE

## 2021-04-16 PROCEDURE — 85025 COMPLETE CBC W/AUTO DIFF WBC: CPT | Performed by: FAMILY MEDICINE

## 2021-04-16 PROCEDURE — 99900035 HC TECH TIME PER 15 MIN (STAT)

## 2021-04-16 PROCEDURE — 97530 THERAPEUTIC ACTIVITIES: CPT

## 2021-04-16 PROCEDURE — 99231 SBSQ HOSP IP/OBS SF/LOW 25: CPT | Mod: ,,, | Performed by: FAMILY MEDICINE

## 2021-04-16 PROCEDURE — 94761 N-INVAS EAR/PLS OXIMETRY MLT: CPT

## 2021-04-16 PROCEDURE — 25000003 PHARM REV CODE 250: Performed by: FAMILY MEDICINE

## 2021-04-16 PROCEDURE — 11000001 HC ACUTE MED/SURG PRIVATE ROOM

## 2021-04-16 PROCEDURE — 27000221 HC OXYGEN, UP TO 24 HOURS

## 2021-04-16 PROCEDURE — 25000003 PHARM REV CODE 250: Performed by: NURSE PRACTITIONER

## 2021-04-16 PROCEDURE — 36415 COLL VENOUS BLD VENIPUNCTURE: CPT | Performed by: FAMILY MEDICINE

## 2021-04-16 PROCEDURE — 80048 BASIC METABOLIC PNL TOTAL CA: CPT | Performed by: FAMILY MEDICINE

## 2021-04-16 RX ORDER — ERYTHROMYCIN 5 MG/G
OINTMENT OPHTHALMIC ONCE
Status: CANCELLED | OUTPATIENT
Start: 2021-04-16 | End: 2021-04-16

## 2021-04-16 RX ORDER — DEXTROSE 40 %
200 GEL (GRAM) ORAL
Status: CANCELLED | OUTPATIENT
Start: 2021-04-16

## 2021-04-16 RX ADMIN — MEMANTINE 10 MG: 10 TABLET ORAL at 09:04

## 2021-04-16 RX ADMIN — TAMSULOSIN HYDROCHLORIDE 0.4 MG: 0.4 CAPSULE ORAL at 09:04

## 2021-04-16 RX ADMIN — CIPROFLOXACIN 500 MG: 500 TABLET, FILM COATED ORAL at 09:04

## 2021-04-16 RX ADMIN — APIXABAN 5 MG: 5 TABLET, FILM COATED ORAL at 09:04

## 2021-04-16 RX ADMIN — LEVOTHYROXINE SODIUM 100 MCG: 100 TABLET ORAL at 05:04

## 2021-04-16 RX ADMIN — VENLAFAXINE HYDROCHLORIDE 150 MG: 150 CAPSULE, EXTENDED RELEASE ORAL at 09:04

## 2021-04-16 RX ADMIN — SIMVASTATIN 40 MG: 40 TABLET, FILM COATED ORAL at 09:04

## 2021-04-16 RX ADMIN — PANTOPRAZOLE SODIUM 40 MG: 40 TABLET, DELAYED RELEASE ORAL at 09:04

## 2021-04-16 RX ADMIN — ALLOPURINOL 300 MG: 100 TABLET ORAL at 09:04

## 2021-04-16 RX ADMIN — METOPROLOL SUCCINATE 50 MG: 50 TABLET, EXTENDED RELEASE ORAL at 09:04

## 2021-04-17 LAB
ANION GAP SERPL CALC-SCNC: 9 MMOL/L (ref 8–16)
BASOPHILS # BLD AUTO: 0.05 K/UL (ref 0–0.2)
BASOPHILS NFR BLD: 0.8 % (ref 0–1.9)
BUN SERPL-MCNC: 17 MG/DL (ref 8–23)
CALCIUM SERPL-MCNC: 9.1 MG/DL (ref 8.7–10.5)
CHLORIDE SERPL-SCNC: 109 MMOL/L (ref 95–110)
CO2 SERPL-SCNC: 22 MMOL/L (ref 23–29)
CREAT SERPL-MCNC: 1.3 MG/DL (ref 0.5–1.4)
DIFFERENTIAL METHOD: ABNORMAL
EOSINOPHIL # BLD AUTO: 0.5 K/UL (ref 0–0.5)
EOSINOPHIL NFR BLD: 7.3 % (ref 0–8)
ERYTHROCYTE [DISTWIDTH] IN BLOOD BY AUTOMATED COUNT: 15.2 % (ref 11.5–14.5)
EST. GFR  (AFRICAN AMERICAN): >60 ML/MIN/1.73 M^2
EST. GFR  (NON AFRICAN AMERICAN): 52 ML/MIN/1.73 M^2
GLUCOSE SERPL-MCNC: 83 MG/DL (ref 70–110)
HCT VFR BLD AUTO: 33.4 % (ref 40–54)
HGB BLD-MCNC: 10.5 G/DL (ref 14–18)
IMM GRANULOCYTES # BLD AUTO: 0.01 K/UL (ref 0–0.04)
IMM GRANULOCYTES NFR BLD AUTO: 0.2 % (ref 0–0.5)
LYMPHOCYTES # BLD AUTO: 1.3 K/UL (ref 1–4.8)
LYMPHOCYTES NFR BLD: 20.2 % (ref 18–48)
MCH RBC QN AUTO: 30.4 PG (ref 27–31)
MCHC RBC AUTO-ENTMCNC: 31.4 G/DL (ref 32–36)
MCV RBC AUTO: 97 FL (ref 82–98)
MONOCYTES # BLD AUTO: 0.6 K/UL (ref 0.3–1)
MONOCYTES NFR BLD: 8.5 % (ref 4–15)
NEUTROPHILS # BLD AUTO: 4.2 K/UL (ref 1.8–7.7)
NEUTROPHILS NFR BLD: 63 % (ref 38–73)
NRBC BLD-RTO: 0 /100 WBC
PLATELET # BLD AUTO: 177 K/UL (ref 150–450)
PMV BLD AUTO: 11 FL (ref 9.2–12.9)
POTASSIUM SERPL-SCNC: 3.4 MMOL/L (ref 3.5–5.1)
RBC # BLD AUTO: 3.45 M/UL (ref 4.6–6.2)
SODIUM SERPL-SCNC: 140 MMOL/L (ref 136–145)
WBC # BLD AUTO: 6.58 K/UL (ref 3.9–12.7)

## 2021-04-17 PROCEDURE — 11000001 HC ACUTE MED/SURG PRIVATE ROOM

## 2021-04-17 PROCEDURE — 80048 BASIC METABOLIC PNL TOTAL CA: CPT | Performed by: FAMILY MEDICINE

## 2021-04-17 PROCEDURE — 25000003 PHARM REV CODE 250: Performed by: FAMILY MEDICINE

## 2021-04-17 PROCEDURE — 27000221 HC OXYGEN, UP TO 24 HOURS

## 2021-04-17 PROCEDURE — 94761 N-INVAS EAR/PLS OXIMETRY MLT: CPT

## 2021-04-17 PROCEDURE — 85025 COMPLETE CBC W/AUTO DIFF WBC: CPT | Performed by: FAMILY MEDICINE

## 2021-04-17 PROCEDURE — 99231 PR SUBSEQUENT HOSPITAL CARE,LEVL I: ICD-10-PCS | Mod: ,,, | Performed by: FAMILY MEDICINE

## 2021-04-17 PROCEDURE — 25000003 PHARM REV CODE 250: Performed by: NURSE PRACTITIONER

## 2021-04-17 PROCEDURE — 99231 SBSQ HOSP IP/OBS SF/LOW 25: CPT | Mod: ,,, | Performed by: FAMILY MEDICINE

## 2021-04-17 PROCEDURE — 99900035 HC TECH TIME PER 15 MIN (STAT)

## 2021-04-17 PROCEDURE — 36415 COLL VENOUS BLD VENIPUNCTURE: CPT | Performed by: FAMILY MEDICINE

## 2021-04-17 RX ADMIN — METOPROLOL SUCCINATE 50 MG: 50 TABLET, EXTENDED RELEASE ORAL at 09:04

## 2021-04-17 RX ADMIN — MEMANTINE 10 MG: 10 TABLET ORAL at 09:04

## 2021-04-17 RX ADMIN — LEVOTHYROXINE SODIUM 100 MCG: 100 TABLET ORAL at 05:04

## 2021-04-17 RX ADMIN — SIMVASTATIN 40 MG: 40 TABLET, FILM COATED ORAL at 09:04

## 2021-04-17 RX ADMIN — VENLAFAXINE HYDROCHLORIDE 150 MG: 150 CAPSULE, EXTENDED RELEASE ORAL at 09:04

## 2021-04-17 RX ADMIN — CIPROFLOXACIN 500 MG: 500 TABLET, FILM COATED ORAL at 09:04

## 2021-04-17 RX ADMIN — APIXABAN 5 MG: 5 TABLET, FILM COATED ORAL at 09:04

## 2021-04-17 RX ADMIN — TAMSULOSIN HYDROCHLORIDE 0.4 MG: 0.4 CAPSULE ORAL at 09:04

## 2021-04-17 RX ADMIN — PANTOPRAZOLE SODIUM 40 MG: 40 TABLET, DELAYED RELEASE ORAL at 09:04

## 2021-04-17 RX ADMIN — ALLOPURINOL 300 MG: 100 TABLET ORAL at 09:04

## 2021-04-18 LAB
ANION GAP SERPL CALC-SCNC: 7 MMOL/L (ref 8–16)
BASOPHILS # BLD AUTO: 0.04 K/UL (ref 0–0.2)
BASOPHILS NFR BLD: 0.6 % (ref 0–1.9)
BUN SERPL-MCNC: 15 MG/DL (ref 8–23)
CALCIUM SERPL-MCNC: 9.2 MG/DL (ref 8.7–10.5)
CHLORIDE SERPL-SCNC: 108 MMOL/L (ref 95–110)
CO2 SERPL-SCNC: 24 MMOL/L (ref 23–29)
CREAT SERPL-MCNC: 1 MG/DL (ref 0.5–1.4)
DIFFERENTIAL METHOD: ABNORMAL
EOSINOPHIL # BLD AUTO: 0.5 K/UL (ref 0–0.5)
EOSINOPHIL NFR BLD: 7.6 % (ref 0–8)
ERYTHROCYTE [DISTWIDTH] IN BLOOD BY AUTOMATED COUNT: 14.7 % (ref 11.5–14.5)
EST. GFR  (AFRICAN AMERICAN): >60 ML/MIN/1.73 M^2
EST. GFR  (NON AFRICAN AMERICAN): >60 ML/MIN/1.73 M^2
GLUCOSE SERPL-MCNC: 87 MG/DL (ref 70–110)
HCT VFR BLD AUTO: 35.6 % (ref 40–54)
HGB BLD-MCNC: 11.2 G/DL (ref 14–18)
IMM GRANULOCYTES # BLD AUTO: 0.02 K/UL (ref 0–0.04)
IMM GRANULOCYTES NFR BLD AUTO: 0.3 % (ref 0–0.5)
LYMPHOCYTES # BLD AUTO: 1.3 K/UL (ref 1–4.8)
LYMPHOCYTES NFR BLD: 18.2 % (ref 18–48)
MCH RBC QN AUTO: 30.7 PG (ref 27–31)
MCHC RBC AUTO-ENTMCNC: 31.5 G/DL (ref 32–36)
MCV RBC AUTO: 98 FL (ref 82–98)
MONOCYTES # BLD AUTO: 0.6 K/UL (ref 0.3–1)
MONOCYTES NFR BLD: 8.3 % (ref 4–15)
NEUTROPHILS # BLD AUTO: 4.6 K/UL (ref 1.8–7.7)
NEUTROPHILS NFR BLD: 65 % (ref 38–73)
NRBC BLD-RTO: 0 /100 WBC
PLATELET # BLD AUTO: 189 K/UL (ref 150–450)
PMV BLD AUTO: 11.3 FL (ref 9.2–12.9)
POTASSIUM SERPL-SCNC: 3.3 MMOL/L (ref 3.5–5.1)
RBC # BLD AUTO: 3.65 M/UL (ref 4.6–6.2)
SODIUM SERPL-SCNC: 139 MMOL/L (ref 136–145)
WBC # BLD AUTO: 7.09 K/UL (ref 3.9–12.7)

## 2021-04-18 PROCEDURE — 25000003 PHARM REV CODE 250: Performed by: FAMILY MEDICINE

## 2021-04-18 PROCEDURE — 99231 SBSQ HOSP IP/OBS SF/LOW 25: CPT | Mod: ,,, | Performed by: FAMILY MEDICINE

## 2021-04-18 PROCEDURE — 11000001 HC ACUTE MED/SURG PRIVATE ROOM

## 2021-04-18 PROCEDURE — 36415 COLL VENOUS BLD VENIPUNCTURE: CPT | Performed by: FAMILY MEDICINE

## 2021-04-18 PROCEDURE — 80048 BASIC METABOLIC PNL TOTAL CA: CPT | Performed by: FAMILY MEDICINE

## 2021-04-18 PROCEDURE — 99231 PR SUBSEQUENT HOSPITAL CARE,LEVL I: ICD-10-PCS | Mod: ,,, | Performed by: FAMILY MEDICINE

## 2021-04-18 PROCEDURE — 25000003 PHARM REV CODE 250: Performed by: NURSE PRACTITIONER

## 2021-04-18 PROCEDURE — 99900035 HC TECH TIME PER 15 MIN (STAT)

## 2021-04-18 PROCEDURE — 94761 N-INVAS EAR/PLS OXIMETRY MLT: CPT

## 2021-04-18 PROCEDURE — 85025 COMPLETE CBC W/AUTO DIFF WBC: CPT | Performed by: FAMILY MEDICINE

## 2021-04-18 RX ORDER — POTASSIUM CHLORIDE 20 MEQ/1
40 TABLET, EXTENDED RELEASE ORAL ONCE
Status: COMPLETED | OUTPATIENT
Start: 2021-04-18 | End: 2021-04-18

## 2021-04-18 RX ORDER — BISACODYL 5 MG
10 TABLET, DELAYED RELEASE (ENTERIC COATED) ORAL ONCE
Status: COMPLETED | OUTPATIENT
Start: 2021-04-18 | End: 2021-04-18

## 2021-04-18 RX ADMIN — BISACODYL 10 MG: 5 TABLET, COATED ORAL at 01:04

## 2021-04-18 RX ADMIN — POTASSIUM CHLORIDE 40 MEQ: 1500 TABLET, EXTENDED RELEASE ORAL at 09:04

## 2021-04-18 RX ADMIN — APIXABAN 5 MG: 5 TABLET, FILM COATED ORAL at 08:04

## 2021-04-18 RX ADMIN — CIPROFLOXACIN 500 MG: 500 TABLET, FILM COATED ORAL at 08:04

## 2021-04-18 RX ADMIN — TAMSULOSIN HYDROCHLORIDE 0.4 MG: 0.4 CAPSULE ORAL at 08:04

## 2021-04-18 RX ADMIN — METOPROLOL SUCCINATE 50 MG: 50 TABLET, EXTENDED RELEASE ORAL at 08:04

## 2021-04-18 RX ADMIN — LEVOTHYROXINE SODIUM 100 MCG: 100 TABLET ORAL at 06:04

## 2021-04-18 RX ADMIN — MEMANTINE 10 MG: 10 TABLET ORAL at 08:04

## 2021-04-18 RX ADMIN — ALLOPURINOL 300 MG: 100 TABLET ORAL at 08:04

## 2021-04-18 RX ADMIN — SIMVASTATIN 40 MG: 40 TABLET, FILM COATED ORAL at 08:04

## 2021-04-18 RX ADMIN — PANTOPRAZOLE SODIUM 40 MG: 40 TABLET, DELAYED RELEASE ORAL at 08:04

## 2021-04-18 RX ADMIN — VENLAFAXINE HYDROCHLORIDE 150 MG: 150 CAPSULE, EXTENDED RELEASE ORAL at 08:04

## 2021-04-19 LAB
ANION GAP SERPL CALC-SCNC: 8 MMOL/L (ref 8–16)
BASOPHILS # BLD AUTO: 0.04 K/UL (ref 0–0.2)
BASOPHILS NFR BLD: 0.6 % (ref 0–1.9)
BUN SERPL-MCNC: 14 MG/DL (ref 8–23)
CALCIUM SERPL-MCNC: 9 MG/DL (ref 8.7–10.5)
CHLORIDE SERPL-SCNC: 109 MMOL/L (ref 95–110)
CO2 SERPL-SCNC: 23 MMOL/L (ref 23–29)
CREAT SERPL-MCNC: 1 MG/DL (ref 0.5–1.4)
DIFFERENTIAL METHOD: ABNORMAL
EOSINOPHIL # BLD AUTO: 0.6 K/UL (ref 0–0.5)
EOSINOPHIL NFR BLD: 8.6 % (ref 0–8)
ERYTHROCYTE [DISTWIDTH] IN BLOOD BY AUTOMATED COUNT: 14.9 % (ref 11.5–14.5)
EST. GFR  (AFRICAN AMERICAN): >60 ML/MIN/1.73 M^2
EST. GFR  (NON AFRICAN AMERICAN): >60 ML/MIN/1.73 M^2
GLUCOSE SERPL-MCNC: 83 MG/DL (ref 70–110)
HCT VFR BLD AUTO: 34.5 % (ref 40–54)
HGB BLD-MCNC: 10.8 G/DL (ref 14–18)
IMM GRANULOCYTES # BLD AUTO: 0.02 K/UL (ref 0–0.04)
IMM GRANULOCYTES NFR BLD AUTO: 0.3 % (ref 0–0.5)
LYMPHOCYTES # BLD AUTO: 1.3 K/UL (ref 1–4.8)
LYMPHOCYTES NFR BLD: 20.1 % (ref 18–48)
MCH RBC QN AUTO: 30.5 PG (ref 27–31)
MCHC RBC AUTO-ENTMCNC: 31.3 G/DL (ref 32–36)
MCV RBC AUTO: 98 FL (ref 82–98)
MONOCYTES # BLD AUTO: 0.6 K/UL (ref 0.3–1)
MONOCYTES NFR BLD: 9.5 % (ref 4–15)
NEUTROPHILS # BLD AUTO: 4 K/UL (ref 1.8–7.7)
NEUTROPHILS NFR BLD: 60.9 % (ref 38–73)
NRBC BLD-RTO: 0 /100 WBC
PLATELET # BLD AUTO: 189 K/UL (ref 150–450)
PMV BLD AUTO: 11.5 FL (ref 9.2–12.9)
POTASSIUM SERPL-SCNC: 3.6 MMOL/L (ref 3.5–5.1)
RBC # BLD AUTO: 3.54 M/UL (ref 4.6–6.2)
SODIUM SERPL-SCNC: 140 MMOL/L (ref 136–145)
WBC # BLD AUTO: 6.52 K/UL (ref 3.9–12.7)

## 2021-04-19 PROCEDURE — 94761 N-INVAS EAR/PLS OXIMETRY MLT: CPT

## 2021-04-19 PROCEDURE — 99232 PR SUBSEQUENT HOSPITAL CARE,LEVL II: ICD-10-PCS | Mod: ,,, | Performed by: INTERNAL MEDICINE

## 2021-04-19 PROCEDURE — 11000001 HC ACUTE MED/SURG PRIVATE ROOM

## 2021-04-19 PROCEDURE — 99232 SBSQ HOSP IP/OBS MODERATE 35: CPT | Mod: ,,, | Performed by: INTERNAL MEDICINE

## 2021-04-19 PROCEDURE — 97110 THERAPEUTIC EXERCISES: CPT

## 2021-04-19 PROCEDURE — 36415 COLL VENOUS BLD VENIPUNCTURE: CPT | Performed by: FAMILY MEDICINE

## 2021-04-19 PROCEDURE — 97530 THERAPEUTIC ACTIVITIES: CPT

## 2021-04-19 PROCEDURE — 25000003 PHARM REV CODE 250: Performed by: FAMILY MEDICINE

## 2021-04-19 PROCEDURE — 25000003 PHARM REV CODE 250: Performed by: NURSE PRACTITIONER

## 2021-04-19 PROCEDURE — 85025 COMPLETE CBC W/AUTO DIFF WBC: CPT | Performed by: FAMILY MEDICINE

## 2021-04-19 PROCEDURE — 80048 BASIC METABOLIC PNL TOTAL CA: CPT | Performed by: FAMILY MEDICINE

## 2021-04-19 PROCEDURE — 99900035 HC TECH TIME PER 15 MIN (STAT)

## 2021-04-19 RX ADMIN — SIMVASTATIN 40 MG: 40 TABLET, FILM COATED ORAL at 09:04

## 2021-04-19 RX ADMIN — MEMANTINE 10 MG: 10 TABLET ORAL at 08:04

## 2021-04-19 RX ADMIN — LEVOTHYROXINE SODIUM 100 MCG: 100 TABLET ORAL at 05:04

## 2021-04-19 RX ADMIN — APIXABAN 5 MG: 5 TABLET, FILM COATED ORAL at 08:04

## 2021-04-19 RX ADMIN — METOPROLOL SUCCINATE 50 MG: 50 TABLET, EXTENDED RELEASE ORAL at 08:04

## 2021-04-19 RX ADMIN — PANTOPRAZOLE SODIUM 40 MG: 40 TABLET, DELAYED RELEASE ORAL at 08:04

## 2021-04-19 RX ADMIN — ALLOPURINOL 300 MG: 100 TABLET ORAL at 08:04

## 2021-04-19 RX ADMIN — APIXABAN 5 MG: 5 TABLET, FILM COATED ORAL at 09:04

## 2021-04-19 RX ADMIN — CIPROFLOXACIN 500 MG: 500 TABLET, FILM COATED ORAL at 09:04

## 2021-04-19 RX ADMIN — MEMANTINE 10 MG: 10 TABLET ORAL at 09:04

## 2021-04-19 RX ADMIN — TAMSULOSIN HYDROCHLORIDE 0.4 MG: 0.4 CAPSULE ORAL at 08:04

## 2021-04-19 RX ADMIN — VENLAFAXINE HYDROCHLORIDE 150 MG: 150 CAPSULE, EXTENDED RELEASE ORAL at 08:04

## 2021-04-19 RX ADMIN — CIPROFLOXACIN 500 MG: 500 TABLET, FILM COATED ORAL at 08:04

## 2021-04-20 PROBLEM — K59.00 CONSTIPATION: Status: ACTIVE | Noted: 2021-04-20

## 2021-04-20 LAB
ANION GAP SERPL CALC-SCNC: 7 MMOL/L (ref 8–16)
BASOPHILS # BLD AUTO: 0.06 K/UL (ref 0–0.2)
BASOPHILS NFR BLD: 0.9 % (ref 0–1.9)
BUN SERPL-MCNC: 14 MG/DL (ref 8–23)
CALCIUM SERPL-MCNC: 9.1 MG/DL (ref 8.7–10.5)
CHLORIDE SERPL-SCNC: 110 MMOL/L (ref 95–110)
CO2 SERPL-SCNC: 22 MMOL/L (ref 23–29)
CREAT SERPL-MCNC: 1 MG/DL (ref 0.5–1.4)
DIFFERENTIAL METHOD: ABNORMAL
EOSINOPHIL # BLD AUTO: 0.4 K/UL (ref 0–0.5)
EOSINOPHIL NFR BLD: 6.4 % (ref 0–8)
ERYTHROCYTE [DISTWIDTH] IN BLOOD BY AUTOMATED COUNT: 15 % (ref 11.5–14.5)
EST. GFR  (AFRICAN AMERICAN): >60 ML/MIN/1.73 M^2
EST. GFR  (NON AFRICAN AMERICAN): >60 ML/MIN/1.73 M^2
GLUCOSE SERPL-MCNC: 84 MG/DL (ref 70–110)
HCT VFR BLD AUTO: 33.9 % (ref 40–54)
HGB BLD-MCNC: 10.6 G/DL (ref 14–18)
IMM GRANULOCYTES # BLD AUTO: 0.03 K/UL (ref 0–0.04)
IMM GRANULOCYTES NFR BLD AUTO: 0.4 % (ref 0–0.5)
LYMPHOCYTES # BLD AUTO: 1.4 K/UL (ref 1–4.8)
LYMPHOCYTES NFR BLD: 20.7 % (ref 18–48)
MCH RBC QN AUTO: 30.4 PG (ref 27–31)
MCHC RBC AUTO-ENTMCNC: 31.3 G/DL (ref 32–36)
MCV RBC AUTO: 97 FL (ref 82–98)
MONOCYTES # BLD AUTO: 0.6 K/UL (ref 0.3–1)
MONOCYTES NFR BLD: 9.6 % (ref 4–15)
NEUTROPHILS # BLD AUTO: 4.1 K/UL (ref 1.8–7.7)
NEUTROPHILS NFR BLD: 62 % (ref 38–73)
NRBC BLD-RTO: 0 /100 WBC
PLATELET # BLD AUTO: 182 K/UL (ref 150–450)
PMV BLD AUTO: 11.3 FL (ref 9.2–12.9)
POTASSIUM SERPL-SCNC: 3.4 MMOL/L (ref 3.5–5.1)
RBC # BLD AUTO: 3.49 M/UL (ref 4.6–6.2)
SODIUM SERPL-SCNC: 139 MMOL/L (ref 136–145)
WBC # BLD AUTO: 6.68 K/UL (ref 3.9–12.7)

## 2021-04-20 PROCEDURE — 99233 SBSQ HOSP IP/OBS HIGH 50: CPT | Mod: ,,, | Performed by: INTERNAL MEDICINE

## 2021-04-20 PROCEDURE — 25000003 PHARM REV CODE 250: Performed by: FAMILY MEDICINE

## 2021-04-20 PROCEDURE — 11000001 HC ACUTE MED/SURG PRIVATE ROOM

## 2021-04-20 PROCEDURE — 94761 N-INVAS EAR/PLS OXIMETRY MLT: CPT

## 2021-04-20 PROCEDURE — 25000003 PHARM REV CODE 250: Performed by: NURSE PRACTITIONER

## 2021-04-20 PROCEDURE — 97530 THERAPEUTIC ACTIVITIES: CPT

## 2021-04-20 PROCEDURE — 85025 COMPLETE CBC W/AUTO DIFF WBC: CPT | Performed by: FAMILY MEDICINE

## 2021-04-20 PROCEDURE — 80048 BASIC METABOLIC PNL TOTAL CA: CPT | Performed by: FAMILY MEDICINE

## 2021-04-20 PROCEDURE — 99233 PR SUBSEQUENT HOSPITAL CARE,LEVL III: ICD-10-PCS | Mod: ,,, | Performed by: INTERNAL MEDICINE

## 2021-04-20 PROCEDURE — 99900035 HC TECH TIME PER 15 MIN (STAT)

## 2021-04-20 PROCEDURE — 36415 COLL VENOUS BLD VENIPUNCTURE: CPT | Performed by: FAMILY MEDICINE

## 2021-04-20 RX ORDER — LACTULOSE 10 G/15ML
20 SOLUTION ORAL 2 TIMES DAILY PRN
Status: DISCONTINUED | OUTPATIENT
Start: 2021-04-20 | End: 2021-05-06 | Stop reason: HOSPADM

## 2021-04-20 RX ADMIN — PANTOPRAZOLE SODIUM 40 MG: 40 TABLET, DELAYED RELEASE ORAL at 08:04

## 2021-04-20 RX ADMIN — APIXABAN 5 MG: 5 TABLET, FILM COATED ORAL at 09:04

## 2021-04-20 RX ADMIN — METOPROLOL SUCCINATE 50 MG: 50 TABLET, EXTENDED RELEASE ORAL at 08:04

## 2021-04-20 RX ADMIN — APIXABAN 5 MG: 5 TABLET, FILM COATED ORAL at 08:04

## 2021-04-20 RX ADMIN — TAMSULOSIN HYDROCHLORIDE 0.4 MG: 0.4 CAPSULE ORAL at 08:04

## 2021-04-20 RX ADMIN — LEVOTHYROXINE SODIUM 100 MCG: 100 TABLET ORAL at 05:04

## 2021-04-20 RX ADMIN — SIMVASTATIN 40 MG: 40 TABLET, FILM COATED ORAL at 09:04

## 2021-04-20 RX ADMIN — ALLOPURINOL 300 MG: 100 TABLET ORAL at 08:04

## 2021-04-20 RX ADMIN — MEMANTINE 10 MG: 10 TABLET ORAL at 08:04

## 2021-04-20 RX ADMIN — CIPROFLOXACIN 500 MG: 500 TABLET, FILM COATED ORAL at 08:04

## 2021-04-20 RX ADMIN — MEMANTINE 10 MG: 10 TABLET ORAL at 09:04

## 2021-04-20 RX ADMIN — LACTULOSE 20 G: 20 SOLUTION ORAL at 05:04

## 2021-04-20 RX ADMIN — VENLAFAXINE HYDROCHLORIDE 150 MG: 150 CAPSULE, EXTENDED RELEASE ORAL at 08:04

## 2021-04-20 RX ADMIN — CIPROFLOXACIN 500 MG: 500 TABLET, FILM COATED ORAL at 09:04

## 2021-04-21 LAB
ANION GAP SERPL CALC-SCNC: 7 MMOL/L (ref 8–16)
BASOPHILS # BLD AUTO: 0.04 K/UL (ref 0–0.2)
BASOPHILS NFR BLD: 0.6 % (ref 0–1.9)
BUN SERPL-MCNC: 15 MG/DL (ref 8–23)
CALCIUM SERPL-MCNC: 9.1 MG/DL (ref 8.7–10.5)
CHLORIDE SERPL-SCNC: 110 MMOL/L (ref 95–110)
CO2 SERPL-SCNC: 22 MMOL/L (ref 23–29)
CREAT SERPL-MCNC: 1 MG/DL (ref 0.5–1.4)
DIFFERENTIAL METHOD: ABNORMAL
EOSINOPHIL # BLD AUTO: 0.4 K/UL (ref 0–0.5)
EOSINOPHIL NFR BLD: 6.3 % (ref 0–8)
ERYTHROCYTE [DISTWIDTH] IN BLOOD BY AUTOMATED COUNT: 14.9 % (ref 11.5–14.5)
EST. GFR  (AFRICAN AMERICAN): >60 ML/MIN/1.73 M^2
EST. GFR  (NON AFRICAN AMERICAN): >60 ML/MIN/1.73 M^2
GLUCOSE SERPL-MCNC: 81 MG/DL (ref 70–110)
HCT VFR BLD AUTO: 34.1 % (ref 40–54)
HGB BLD-MCNC: 10.8 G/DL (ref 14–18)
IMM GRANULOCYTES # BLD AUTO: 0.02 K/UL (ref 0–0.04)
IMM GRANULOCYTES NFR BLD AUTO: 0.3 % (ref 0–0.5)
LYMPHOCYTES # BLD AUTO: 1.5 K/UL (ref 1–4.8)
LYMPHOCYTES NFR BLD: 21 % (ref 18–48)
MCH RBC QN AUTO: 30.5 PG (ref 27–31)
MCHC RBC AUTO-ENTMCNC: 31.7 G/DL (ref 32–36)
MCV RBC AUTO: 96 FL (ref 82–98)
MONOCYTES # BLD AUTO: 0.6 K/UL (ref 0.3–1)
MONOCYTES NFR BLD: 8.1 % (ref 4–15)
NEUTROPHILS # BLD AUTO: 4.5 K/UL (ref 1.8–7.7)
NEUTROPHILS NFR BLD: 63.7 % (ref 38–73)
NRBC BLD-RTO: 0 /100 WBC
PLATELET # BLD AUTO: 191 K/UL (ref 150–450)
PMV BLD AUTO: 11.3 FL (ref 9.2–12.9)
POTASSIUM SERPL-SCNC: 3.4 MMOL/L (ref 3.5–5.1)
RBC # BLD AUTO: 3.54 M/UL (ref 4.6–6.2)
SODIUM SERPL-SCNC: 139 MMOL/L (ref 136–145)
WBC # BLD AUTO: 7 K/UL (ref 3.9–12.7)

## 2021-04-21 PROCEDURE — 25000003 PHARM REV CODE 250: Performed by: FAMILY MEDICINE

## 2021-04-21 PROCEDURE — 85025 COMPLETE CBC W/AUTO DIFF WBC: CPT | Performed by: FAMILY MEDICINE

## 2021-04-21 PROCEDURE — 99233 PR SUBSEQUENT HOSPITAL CARE,LEVL III: ICD-10-PCS | Mod: ,,, | Performed by: FAMILY MEDICINE

## 2021-04-21 PROCEDURE — 11000001 HC ACUTE MED/SURG PRIVATE ROOM

## 2021-04-21 PROCEDURE — 80048 BASIC METABOLIC PNL TOTAL CA: CPT | Performed by: FAMILY MEDICINE

## 2021-04-21 PROCEDURE — 27000221 HC OXYGEN, UP TO 24 HOURS

## 2021-04-21 PROCEDURE — 25000003 PHARM REV CODE 250: Performed by: NURSE PRACTITIONER

## 2021-04-21 PROCEDURE — 97530 THERAPEUTIC ACTIVITIES: CPT

## 2021-04-21 PROCEDURE — 99900035 HC TECH TIME PER 15 MIN (STAT)

## 2021-04-21 PROCEDURE — 94761 N-INVAS EAR/PLS OXIMETRY MLT: CPT

## 2021-04-21 PROCEDURE — 99233 SBSQ HOSP IP/OBS HIGH 50: CPT | Mod: ,,, | Performed by: FAMILY MEDICINE

## 2021-04-21 PROCEDURE — 36415 COLL VENOUS BLD VENIPUNCTURE: CPT | Performed by: FAMILY MEDICINE

## 2021-04-21 RX ORDER — POTASSIUM CHLORIDE 20 MEQ/1
40 TABLET, EXTENDED RELEASE ORAL ONCE
Status: COMPLETED | OUTPATIENT
Start: 2021-04-21 | End: 2021-04-21

## 2021-04-21 RX ADMIN — CIPROFLOXACIN 500 MG: 500 TABLET, FILM COATED ORAL at 08:04

## 2021-04-21 RX ADMIN — APIXABAN 5 MG: 5 TABLET, FILM COATED ORAL at 08:04

## 2021-04-21 RX ADMIN — METOPROLOL SUCCINATE 50 MG: 50 TABLET, EXTENDED RELEASE ORAL at 08:04

## 2021-04-21 RX ADMIN — SIMVASTATIN 40 MG: 40 TABLET, FILM COATED ORAL at 08:04

## 2021-04-21 RX ADMIN — POTASSIUM CHLORIDE 40 MEQ: 1500 TABLET, EXTENDED RELEASE ORAL at 01:04

## 2021-04-21 RX ADMIN — LEVOTHYROXINE SODIUM 100 MCG: 100 TABLET ORAL at 05:04

## 2021-04-21 RX ADMIN — PANTOPRAZOLE SODIUM 40 MG: 40 TABLET, DELAYED RELEASE ORAL at 08:04

## 2021-04-21 RX ADMIN — TAMSULOSIN HYDROCHLORIDE 0.4 MG: 0.4 CAPSULE ORAL at 08:04

## 2021-04-21 RX ADMIN — MEMANTINE 10 MG: 10 TABLET ORAL at 08:04

## 2021-04-21 RX ADMIN — ALLOPURINOL 300 MG: 100 TABLET ORAL at 08:04

## 2021-04-21 RX ADMIN — VENLAFAXINE HYDROCHLORIDE 150 MG: 150 CAPSULE, EXTENDED RELEASE ORAL at 08:04

## 2021-04-22 LAB
ANION GAP SERPL CALC-SCNC: 8 MMOL/L (ref 8–16)
BASOPHILS # BLD AUTO: 0.06 K/UL (ref 0–0.2)
BASOPHILS NFR BLD: 0.9 % (ref 0–1.9)
BUN SERPL-MCNC: 16 MG/DL (ref 8–23)
CALCIUM SERPL-MCNC: 9.1 MG/DL (ref 8.7–10.5)
CHLORIDE SERPL-SCNC: 109 MMOL/L (ref 95–110)
CO2 SERPL-SCNC: 22 MMOL/L (ref 23–29)
CREAT SERPL-MCNC: 1 MG/DL (ref 0.5–1.4)
DIFFERENTIAL METHOD: ABNORMAL
EOSINOPHIL # BLD AUTO: 0.5 K/UL (ref 0–0.5)
EOSINOPHIL NFR BLD: 6.4 % (ref 0–8)
ERYTHROCYTE [DISTWIDTH] IN BLOOD BY AUTOMATED COUNT: 14.9 % (ref 11.5–14.5)
EST. GFR  (AFRICAN AMERICAN): >60 ML/MIN/1.73 M^2
EST. GFR  (NON AFRICAN AMERICAN): >60 ML/MIN/1.73 M^2
GLUCOSE SERPL-MCNC: 84 MG/DL (ref 70–110)
HCT VFR BLD AUTO: 33.4 % (ref 40–54)
HGB BLD-MCNC: 10.5 G/DL (ref 14–18)
IMM GRANULOCYTES # BLD AUTO: 0.02 K/UL (ref 0–0.04)
IMM GRANULOCYTES NFR BLD AUTO: 0.3 % (ref 0–0.5)
LYMPHOCYTES # BLD AUTO: 1.5 K/UL (ref 1–4.8)
LYMPHOCYTES NFR BLD: 21.7 % (ref 18–48)
MCH RBC QN AUTO: 30 PG (ref 27–31)
MCHC RBC AUTO-ENTMCNC: 31.4 G/DL (ref 32–36)
MCV RBC AUTO: 95 FL (ref 82–98)
MONOCYTES # BLD AUTO: 0.6 K/UL (ref 0.3–1)
MONOCYTES NFR BLD: 8.4 % (ref 4–15)
NEUTROPHILS # BLD AUTO: 4.4 K/UL (ref 1.8–7.7)
NEUTROPHILS NFR BLD: 62.3 % (ref 38–73)
NRBC BLD-RTO: 0 /100 WBC
PLATELET # BLD AUTO: 190 K/UL (ref 150–450)
PMV BLD AUTO: 11.1 FL (ref 9.2–12.9)
POTASSIUM SERPL-SCNC: 3.6 MMOL/L (ref 3.5–5.1)
RBC # BLD AUTO: 3.5 M/UL (ref 4.6–6.2)
SODIUM SERPL-SCNC: 139 MMOL/L (ref 136–145)
WBC # BLD AUTO: 6.99 K/UL (ref 3.9–12.7)

## 2021-04-22 PROCEDURE — 94660 CPAP INITIATION&MGMT: CPT

## 2021-04-22 PROCEDURE — 25000003 PHARM REV CODE 250: Performed by: FAMILY MEDICINE

## 2021-04-22 PROCEDURE — 97530 THERAPEUTIC ACTIVITIES: CPT

## 2021-04-22 PROCEDURE — 11000001 HC ACUTE MED/SURG PRIVATE ROOM

## 2021-04-22 PROCEDURE — 99900035 HC TECH TIME PER 15 MIN (STAT)

## 2021-04-22 PROCEDURE — 85025 COMPLETE CBC W/AUTO DIFF WBC: CPT | Performed by: FAMILY MEDICINE

## 2021-04-22 PROCEDURE — 80048 BASIC METABOLIC PNL TOTAL CA: CPT | Performed by: FAMILY MEDICINE

## 2021-04-22 PROCEDURE — 36415 COLL VENOUS BLD VENIPUNCTURE: CPT | Performed by: FAMILY MEDICINE

## 2021-04-22 PROCEDURE — 94761 N-INVAS EAR/PLS OXIMETRY MLT: CPT

## 2021-04-22 PROCEDURE — 99231 SBSQ HOSP IP/OBS SF/LOW 25: CPT | Mod: ,,, | Performed by: FAMILY MEDICINE

## 2021-04-22 PROCEDURE — 99231 PR SUBSEQUENT HOSPITAL CARE,LEVL I: ICD-10-PCS | Mod: ,,, | Performed by: FAMILY MEDICINE

## 2021-04-22 PROCEDURE — 94760 N-INVAS EAR/PLS OXIMETRY 1: CPT

## 2021-04-22 PROCEDURE — 27000221 HC OXYGEN, UP TO 24 HOURS

## 2021-04-22 PROCEDURE — 25000003 PHARM REV CODE 250: Performed by: NURSE PRACTITIONER

## 2021-04-22 RX ADMIN — APIXABAN 5 MG: 5 TABLET, FILM COATED ORAL at 08:04

## 2021-04-22 RX ADMIN — METOPROLOL SUCCINATE 50 MG: 50 TABLET, EXTENDED RELEASE ORAL at 08:04

## 2021-04-22 RX ADMIN — LEVOTHYROXINE SODIUM 100 MCG: 100 TABLET ORAL at 05:04

## 2021-04-22 RX ADMIN — CIPROFLOXACIN 500 MG: 500 TABLET, FILM COATED ORAL at 08:04

## 2021-04-22 RX ADMIN — MEMANTINE 10 MG: 10 TABLET ORAL at 08:04

## 2021-04-22 RX ADMIN — VENLAFAXINE HYDROCHLORIDE 150 MG: 150 CAPSULE, EXTENDED RELEASE ORAL at 08:04

## 2021-04-22 RX ADMIN — ALLOPURINOL 300 MG: 100 TABLET ORAL at 08:04

## 2021-04-22 RX ADMIN — PANTOPRAZOLE SODIUM 40 MG: 40 TABLET, DELAYED RELEASE ORAL at 08:04

## 2021-04-22 RX ADMIN — TAMSULOSIN HYDROCHLORIDE 0.4 MG: 0.4 CAPSULE ORAL at 08:04

## 2021-04-22 RX ADMIN — SIMVASTATIN 40 MG: 40 TABLET, FILM COATED ORAL at 08:04

## 2021-04-23 ENCOUNTER — TELEPHONE (OUTPATIENT)
Dept: HEPATOLOGY | Facility: HOSPITAL | Age: 81
End: 2021-04-23

## 2021-04-23 LAB
ANION GAP SERPL CALC-SCNC: 9 MMOL/L (ref 8–16)
BASOPHILS # BLD AUTO: 0.05 K/UL (ref 0–0.2)
BASOPHILS NFR BLD: 0.7 % (ref 0–1.9)
BUN SERPL-MCNC: 16 MG/DL (ref 8–23)
CALCIUM SERPL-MCNC: 9.1 MG/DL (ref 8.7–10.5)
CHLORIDE SERPL-SCNC: 109 MMOL/L (ref 95–110)
CO2 SERPL-SCNC: 21 MMOL/L (ref 23–29)
CREAT SERPL-MCNC: 1.1 MG/DL (ref 0.5–1.4)
DIFFERENTIAL METHOD: ABNORMAL
EOSINOPHIL # BLD AUTO: 0.4 K/UL (ref 0–0.5)
EOSINOPHIL NFR BLD: 6.2 % (ref 0–8)
ERYTHROCYTE [DISTWIDTH] IN BLOOD BY AUTOMATED COUNT: 14.7 % (ref 11.5–14.5)
EST. GFR  (AFRICAN AMERICAN): >60 ML/MIN/1.73 M^2
EST. GFR  (NON AFRICAN AMERICAN): >60 ML/MIN/1.73 M^2
GLUCOSE SERPL-MCNC: 83 MG/DL (ref 70–110)
HCT VFR BLD AUTO: 34.5 % (ref 40–54)
HGB BLD-MCNC: 11 G/DL (ref 14–18)
IMM GRANULOCYTES # BLD AUTO: 0.04 K/UL (ref 0–0.04)
IMM GRANULOCYTES NFR BLD AUTO: 0.6 % (ref 0–0.5)
LYMPHOCYTES # BLD AUTO: 1.3 K/UL (ref 1–4.8)
LYMPHOCYTES NFR BLD: 18.4 % (ref 18–48)
MCH RBC QN AUTO: 30.5 PG (ref 27–31)
MCHC RBC AUTO-ENTMCNC: 31.9 G/DL (ref 32–36)
MCV RBC AUTO: 96 FL (ref 82–98)
MONOCYTES # BLD AUTO: 0.6 K/UL (ref 0.3–1)
MONOCYTES NFR BLD: 8.9 % (ref 4–15)
NEUTROPHILS # BLD AUTO: 4.6 K/UL (ref 1.8–7.7)
NEUTROPHILS NFR BLD: 65.2 % (ref 38–73)
NRBC BLD-RTO: 0 /100 WBC
PLATELET # BLD AUTO: 206 K/UL (ref 150–450)
PMV BLD AUTO: 11.1 FL (ref 9.2–12.9)
POTASSIUM SERPL-SCNC: 3.4 MMOL/L (ref 3.5–5.1)
RBC # BLD AUTO: 3.61 M/UL (ref 4.6–6.2)
SODIUM SERPL-SCNC: 139 MMOL/L (ref 136–145)
WBC # BLD AUTO: 7.08 K/UL (ref 3.9–12.7)

## 2021-04-23 PROCEDURE — 85025 COMPLETE CBC W/AUTO DIFF WBC: CPT | Performed by: FAMILY MEDICINE

## 2021-04-23 PROCEDURE — 99232 PR SUBSEQUENT HOSPITAL CARE,LEVL II: ICD-10-PCS | Mod: ,,, | Performed by: STUDENT IN AN ORGANIZED HEALTH CARE EDUCATION/TRAINING PROGRAM

## 2021-04-23 PROCEDURE — 36415 COLL VENOUS BLD VENIPUNCTURE: CPT | Performed by: FAMILY MEDICINE

## 2021-04-23 PROCEDURE — 94761 N-INVAS EAR/PLS OXIMETRY MLT: CPT

## 2021-04-23 PROCEDURE — 99232 SBSQ HOSP IP/OBS MODERATE 35: CPT | Mod: ,,, | Performed by: STUDENT IN AN ORGANIZED HEALTH CARE EDUCATION/TRAINING PROGRAM

## 2021-04-23 PROCEDURE — 80048 BASIC METABOLIC PNL TOTAL CA: CPT | Performed by: FAMILY MEDICINE

## 2021-04-23 PROCEDURE — 25000003 PHARM REV CODE 250: Performed by: NURSE PRACTITIONER

## 2021-04-23 PROCEDURE — 25000003 PHARM REV CODE 250: Performed by: FAMILY MEDICINE

## 2021-04-23 PROCEDURE — 11000001 HC ACUTE MED/SURG PRIVATE ROOM

## 2021-04-23 PROCEDURE — 99900035 HC TECH TIME PER 15 MIN (STAT)

## 2021-04-23 RX ORDER — POTASSIUM CHLORIDE 20 MEQ/1
40 TABLET, EXTENDED RELEASE ORAL ONCE
Status: COMPLETED | OUTPATIENT
Start: 2021-04-23 | End: 2021-04-23

## 2021-04-23 RX ADMIN — APIXABAN 5 MG: 5 TABLET, FILM COATED ORAL at 08:04

## 2021-04-23 RX ADMIN — ALLOPURINOL 300 MG: 100 TABLET ORAL at 08:04

## 2021-04-23 RX ADMIN — PANTOPRAZOLE SODIUM 40 MG: 40 TABLET, DELAYED RELEASE ORAL at 08:04

## 2021-04-23 RX ADMIN — METOPROLOL SUCCINATE 50 MG: 50 TABLET, EXTENDED RELEASE ORAL at 08:04

## 2021-04-23 RX ADMIN — VENLAFAXINE HYDROCHLORIDE 150 MG: 150 CAPSULE, EXTENDED RELEASE ORAL at 09:04

## 2021-04-23 RX ADMIN — MEMANTINE 10 MG: 10 TABLET ORAL at 08:04

## 2021-04-23 RX ADMIN — CIPROFLOXACIN 500 MG: 500 TABLET, FILM COATED ORAL at 08:04

## 2021-04-23 RX ADMIN — MEMANTINE 10 MG: 10 TABLET ORAL at 09:04

## 2021-04-23 RX ADMIN — SIMVASTATIN 40 MG: 40 TABLET, FILM COATED ORAL at 09:04

## 2021-04-23 RX ADMIN — CIPROFLOXACIN 500 MG: 500 TABLET, FILM COATED ORAL at 09:04

## 2021-04-23 RX ADMIN — APIXABAN 5 MG: 5 TABLET, FILM COATED ORAL at 09:04

## 2021-04-23 RX ADMIN — LEVOTHYROXINE SODIUM 100 MCG: 100 TABLET ORAL at 05:04

## 2021-04-23 RX ADMIN — POTASSIUM CHLORIDE 40 MEQ: 1500 TABLET, EXTENDED RELEASE ORAL at 08:04

## 2021-04-23 RX ADMIN — TAMSULOSIN HYDROCHLORIDE 0.4 MG: 0.4 CAPSULE ORAL at 08:04

## 2021-04-24 LAB
ANION GAP SERPL CALC-SCNC: 10 MMOL/L (ref 8–16)
BACTERIA #/AREA URNS HPF: ABNORMAL /HPF
BASOPHILS # BLD AUTO: 0.05 K/UL (ref 0–0.2)
BASOPHILS NFR BLD: 0.7 % (ref 0–1.9)
BILIRUB UR QL STRIP: NEGATIVE
BUN SERPL-MCNC: 15 MG/DL (ref 8–23)
CALCIUM SERPL-MCNC: 9.1 MG/DL (ref 8.7–10.5)
CHLORIDE SERPL-SCNC: 111 MMOL/L (ref 95–110)
CLARITY UR: ABNORMAL
CO2 SERPL-SCNC: 17 MMOL/L (ref 23–29)
COLOR UR: ABNORMAL
CREAT SERPL-MCNC: 1 MG/DL (ref 0.5–1.4)
DIFFERENTIAL METHOD: ABNORMAL
EOSINOPHIL # BLD AUTO: 0.5 K/UL (ref 0–0.5)
EOSINOPHIL NFR BLD: 5.9 % (ref 0–8)
ERYTHROCYTE [DISTWIDTH] IN BLOOD BY AUTOMATED COUNT: 14.8 % (ref 11.5–14.5)
EST. GFR  (AFRICAN AMERICAN): >60 ML/MIN/1.73 M^2
EST. GFR  (NON AFRICAN AMERICAN): >60 ML/MIN/1.73 M^2
GLUCOSE SERPL-MCNC: 90 MG/DL (ref 70–110)
GLUCOSE UR QL STRIP: NEGATIVE
HCT VFR BLD AUTO: 34.2 % (ref 40–54)
HGB BLD-MCNC: 10.8 G/DL (ref 14–18)
HGB UR QL STRIP: ABNORMAL
HYALINE CASTS #/AREA URNS LPF: 0 /LPF
IMM GRANULOCYTES # BLD AUTO: 0.02 K/UL (ref 0–0.04)
IMM GRANULOCYTES NFR BLD AUTO: 0.3 % (ref 0–0.5)
KETONES UR QL STRIP: NEGATIVE
LEUKOCYTE ESTERASE UR QL STRIP: ABNORMAL
LYMPHOCYTES # BLD AUTO: 1.6 K/UL (ref 1–4.8)
LYMPHOCYTES NFR BLD: 20.9 % (ref 18–48)
MCH RBC QN AUTO: 30.2 PG (ref 27–31)
MCHC RBC AUTO-ENTMCNC: 31.6 G/DL (ref 32–36)
MCV RBC AUTO: 96 FL (ref 82–98)
MICROSCOPIC COMMENT: ABNORMAL
MONOCYTES # BLD AUTO: 0.7 K/UL (ref 0.3–1)
MONOCYTES NFR BLD: 9.1 % (ref 4–15)
NEUTROPHILS # BLD AUTO: 4.9 K/UL (ref 1.8–7.7)
NEUTROPHILS NFR BLD: 63.1 % (ref 38–73)
NITRITE UR QL STRIP: NEGATIVE
NRBC BLD-RTO: 0 /100 WBC
PH UR STRIP: 6 [PH] (ref 5–8)
PLATELET # BLD AUTO: 195 K/UL (ref 150–450)
PMV BLD AUTO: 11.5 FL (ref 9.2–12.9)
POTASSIUM SERPL-SCNC: 3.6 MMOL/L (ref 3.5–5.1)
PROT UR QL STRIP: ABNORMAL
RBC # BLD AUTO: 3.58 M/UL (ref 4.6–6.2)
RBC #/AREA URNS HPF: >100 /HPF (ref 0–4)
SODIUM SERPL-SCNC: 138 MMOL/L (ref 136–145)
SP GR UR STRIP: 1.02 (ref 1–1.03)
SQUAMOUS #/AREA URNS HPF: 1 /HPF
URN SPEC COLLECT METH UR: ABNORMAL
UROBILINOGEN UR STRIP-ACNC: NEGATIVE EU/DL
WBC # BLD AUTO: 7.67 K/UL (ref 3.9–12.7)
WBC #/AREA URNS HPF: >100 /HPF (ref 0–5)
YEAST URNS QL MICRO: ABNORMAL

## 2021-04-24 PROCEDURE — 11000001 HC ACUTE MED/SURG PRIVATE ROOM

## 2021-04-24 PROCEDURE — 25000003 PHARM REV CODE 250: Performed by: STUDENT IN AN ORGANIZED HEALTH CARE EDUCATION/TRAINING PROGRAM

## 2021-04-24 PROCEDURE — 94760 N-INVAS EAR/PLS OXIMETRY 1: CPT

## 2021-04-24 PROCEDURE — 87086 URINE CULTURE/COLONY COUNT: CPT | Performed by: FAMILY MEDICINE

## 2021-04-24 PROCEDURE — 25000003 PHARM REV CODE 250: Performed by: NURSE PRACTITIONER

## 2021-04-24 PROCEDURE — 99232 SBSQ HOSP IP/OBS MODERATE 35: CPT | Mod: ,,, | Performed by: STUDENT IN AN ORGANIZED HEALTH CARE EDUCATION/TRAINING PROGRAM

## 2021-04-24 PROCEDURE — 94761 N-INVAS EAR/PLS OXIMETRY MLT: CPT

## 2021-04-24 PROCEDURE — 25000003 PHARM REV CODE 250: Performed by: FAMILY MEDICINE

## 2021-04-24 PROCEDURE — 94660 CPAP INITIATION&MGMT: CPT

## 2021-04-24 PROCEDURE — 85025 COMPLETE CBC W/AUTO DIFF WBC: CPT | Performed by: FAMILY MEDICINE

## 2021-04-24 PROCEDURE — 80048 BASIC METABOLIC PNL TOTAL CA: CPT | Performed by: FAMILY MEDICINE

## 2021-04-24 PROCEDURE — 81000 URINALYSIS NONAUTO W/SCOPE: CPT | Performed by: STUDENT IN AN ORGANIZED HEALTH CARE EDUCATION/TRAINING PROGRAM

## 2021-04-24 PROCEDURE — 99900035 HC TECH TIME PER 15 MIN (STAT)

## 2021-04-24 PROCEDURE — 99232 PR SUBSEQUENT HOSPITAL CARE,LEVL II: ICD-10-PCS | Mod: ,,, | Performed by: STUDENT IN AN ORGANIZED HEALTH CARE EDUCATION/TRAINING PROGRAM

## 2021-04-24 PROCEDURE — 36415 COLL VENOUS BLD VENIPUNCTURE: CPT | Performed by: FAMILY MEDICINE

## 2021-04-24 RX ORDER — CIPROFLOXACIN 500 MG/1
500 TABLET ORAL EVERY 12 HOURS
Status: DISCONTINUED | OUTPATIENT
Start: 2021-04-24 | End: 2021-04-26

## 2021-04-24 RX ADMIN — LEVOTHYROXINE SODIUM 100 MCG: 100 TABLET ORAL at 05:04

## 2021-04-24 RX ADMIN — VENLAFAXINE HYDROCHLORIDE 150 MG: 150 CAPSULE, EXTENDED RELEASE ORAL at 08:04

## 2021-04-24 RX ADMIN — CIPROFLOXACIN 500 MG: 500 TABLET, FILM COATED ORAL at 09:04

## 2021-04-24 RX ADMIN — MEMANTINE 10 MG: 10 TABLET ORAL at 08:04

## 2021-04-24 RX ADMIN — ALLOPURINOL 300 MG: 100 TABLET ORAL at 08:04

## 2021-04-24 RX ADMIN — CIPROFLOXACIN 500 MG: 500 TABLET, FILM COATED ORAL at 08:04

## 2021-04-24 RX ADMIN — SIMVASTATIN 40 MG: 40 TABLET, FILM COATED ORAL at 08:04

## 2021-04-24 RX ADMIN — APIXABAN 5 MG: 5 TABLET, FILM COATED ORAL at 08:04

## 2021-04-24 RX ADMIN — TAMSULOSIN HYDROCHLORIDE 0.4 MG: 0.4 CAPSULE ORAL at 08:04

## 2021-04-24 RX ADMIN — PANTOPRAZOLE SODIUM 40 MG: 40 TABLET, DELAYED RELEASE ORAL at 08:04

## 2021-04-24 RX ADMIN — METOPROLOL SUCCINATE 50 MG: 50 TABLET, EXTENDED RELEASE ORAL at 08:04

## 2021-04-25 LAB
ANION GAP SERPL CALC-SCNC: 10 MMOL/L (ref 8–16)
BASOPHILS # BLD AUTO: 0.05 K/UL (ref 0–0.2)
BASOPHILS NFR BLD: 0.6 % (ref 0–1.9)
BUN SERPL-MCNC: 16 MG/DL (ref 8–23)
CALCIUM SERPL-MCNC: 9.2 MG/DL (ref 8.7–10.5)
CHLORIDE SERPL-SCNC: 110 MMOL/L (ref 95–110)
CO2 SERPL-SCNC: 18 MMOL/L (ref 23–29)
CREAT SERPL-MCNC: 1 MG/DL (ref 0.5–1.4)
DIFFERENTIAL METHOD: ABNORMAL
EOSINOPHIL # BLD AUTO: 0.5 K/UL (ref 0–0.5)
EOSINOPHIL NFR BLD: 5.6 % (ref 0–8)
ERYTHROCYTE [DISTWIDTH] IN BLOOD BY AUTOMATED COUNT: 14.6 % (ref 11.5–14.5)
EST. GFR  (AFRICAN AMERICAN): >60 ML/MIN/1.73 M^2
EST. GFR  (NON AFRICAN AMERICAN): >60 ML/MIN/1.73 M^2
GLUCOSE SERPL-MCNC: 88 MG/DL (ref 70–110)
HCT VFR BLD AUTO: 34 % (ref 40–54)
HGB BLD-MCNC: 10.9 G/DL (ref 14–18)
IMM GRANULOCYTES # BLD AUTO: 0.06 K/UL (ref 0–0.04)
IMM GRANULOCYTES NFR BLD AUTO: 0.8 % (ref 0–0.5)
LYMPHOCYTES # BLD AUTO: 1.7 K/UL (ref 1–4.8)
LYMPHOCYTES NFR BLD: 21.8 % (ref 18–48)
MCH RBC QN AUTO: 30.4 PG (ref 27–31)
MCHC RBC AUTO-ENTMCNC: 32.1 G/DL (ref 32–36)
MCV RBC AUTO: 95 FL (ref 82–98)
MONOCYTES # BLD AUTO: 0.7 K/UL (ref 0.3–1)
MONOCYTES NFR BLD: 9.3 % (ref 4–15)
NEUTROPHILS # BLD AUTO: 5 K/UL (ref 1.8–7.7)
NEUTROPHILS NFR BLD: 61.9 % (ref 38–73)
NRBC BLD-RTO: 0 /100 WBC
PLATELET # BLD AUTO: 220 K/UL (ref 150–450)
PMV BLD AUTO: 11.4 FL (ref 9.2–12.9)
POTASSIUM SERPL-SCNC: 3.6 MMOL/L (ref 3.5–5.1)
RBC # BLD AUTO: 3.59 M/UL (ref 4.6–6.2)
SODIUM SERPL-SCNC: 138 MMOL/L (ref 136–145)
WBC # BLD AUTO: 8 K/UL (ref 3.9–12.7)

## 2021-04-25 PROCEDURE — 85025 COMPLETE CBC W/AUTO DIFF WBC: CPT | Performed by: FAMILY MEDICINE

## 2021-04-25 PROCEDURE — 80048 BASIC METABOLIC PNL TOTAL CA: CPT | Performed by: FAMILY MEDICINE

## 2021-04-25 PROCEDURE — 25000003 PHARM REV CODE 250: Performed by: FAMILY MEDICINE

## 2021-04-25 PROCEDURE — 25000003 PHARM REV CODE 250: Performed by: STUDENT IN AN ORGANIZED HEALTH CARE EDUCATION/TRAINING PROGRAM

## 2021-04-25 PROCEDURE — 11000001 HC ACUTE MED/SURG PRIVATE ROOM

## 2021-04-25 PROCEDURE — 94761 N-INVAS EAR/PLS OXIMETRY MLT: CPT

## 2021-04-25 PROCEDURE — 94660 CPAP INITIATION&MGMT: CPT

## 2021-04-25 PROCEDURE — 99900035 HC TECH TIME PER 15 MIN (STAT)

## 2021-04-25 PROCEDURE — 36415 COLL VENOUS BLD VENIPUNCTURE: CPT | Performed by: FAMILY MEDICINE

## 2021-04-25 PROCEDURE — 25000003 PHARM REV CODE 250: Performed by: INTERNAL MEDICINE

## 2021-04-25 RX ADMIN — METOPROLOL SUCCINATE 50 MG: 50 TABLET, EXTENDED RELEASE ORAL at 08:04

## 2021-04-25 RX ADMIN — VENLAFAXINE HYDROCHLORIDE 150 MG: 150 CAPSULE, EXTENDED RELEASE ORAL at 08:04

## 2021-04-25 RX ADMIN — MEMANTINE 10 MG: 10 TABLET ORAL at 08:04

## 2021-04-25 RX ADMIN — TAMSULOSIN HYDROCHLORIDE 0.4 MG: 0.4 CAPSULE ORAL at 08:04

## 2021-04-25 RX ADMIN — LEVOTHYROXINE SODIUM 100 MCG: 100 TABLET ORAL at 06:04

## 2021-04-25 RX ADMIN — DIPHENHYDRAMINE HYDROCHLORIDE 25 MG: 25 CAPSULE ORAL at 07:04

## 2021-04-25 RX ADMIN — APIXABAN 5 MG: 5 TABLET, FILM COATED ORAL at 08:04

## 2021-04-25 RX ADMIN — SIMVASTATIN 40 MG: 40 TABLET, FILM COATED ORAL at 08:04

## 2021-04-25 RX ADMIN — PANTOPRAZOLE SODIUM 40 MG: 40 TABLET, DELAYED RELEASE ORAL at 08:04

## 2021-04-25 RX ADMIN — CIPROFLOXACIN 500 MG: 500 TABLET, FILM COATED ORAL at 08:04

## 2021-04-25 RX ADMIN — ALLOPURINOL 300 MG: 100 TABLET ORAL at 08:04

## 2021-04-26 LAB
ANION GAP SERPL CALC-SCNC: 9 MMOL/L (ref 8–16)
BACTERIA UR CULT: NORMAL
BACTERIA UR CULT: NORMAL
BASOPHILS # BLD AUTO: 0.05 K/UL (ref 0–0.2)
BASOPHILS NFR BLD: 0.6 % (ref 0–1.9)
BUN SERPL-MCNC: 17 MG/DL (ref 8–23)
CALCIUM SERPL-MCNC: 9 MG/DL (ref 8.7–10.5)
CHLORIDE SERPL-SCNC: 110 MMOL/L (ref 95–110)
CO2 SERPL-SCNC: 20 MMOL/L (ref 23–29)
CREAT SERPL-MCNC: 1.1 MG/DL (ref 0.5–1.4)
DIFFERENTIAL METHOD: ABNORMAL
EOSINOPHIL # BLD AUTO: 0.5 K/UL (ref 0–0.5)
EOSINOPHIL NFR BLD: 5.9 % (ref 0–8)
ERYTHROCYTE [DISTWIDTH] IN BLOOD BY AUTOMATED COUNT: 14.6 % (ref 11.5–14.5)
EST. GFR  (AFRICAN AMERICAN): >60 ML/MIN/1.73 M^2
EST. GFR  (NON AFRICAN AMERICAN): >60 ML/MIN/1.73 M^2
GLUCOSE SERPL-MCNC: 84 MG/DL (ref 70–110)
HCT VFR BLD AUTO: 32.6 % (ref 40–54)
HGB BLD-MCNC: 10.4 G/DL (ref 14–18)
IMM GRANULOCYTES # BLD AUTO: 0.02 K/UL (ref 0–0.04)
IMM GRANULOCYTES NFR BLD AUTO: 0.2 % (ref 0–0.5)
LYMPHOCYTES # BLD AUTO: 1.6 K/UL (ref 1–4.8)
LYMPHOCYTES NFR BLD: 18.3 % (ref 18–48)
MCH RBC QN AUTO: 30.4 PG (ref 27–31)
MCHC RBC AUTO-ENTMCNC: 31.9 G/DL (ref 32–36)
MCV RBC AUTO: 95 FL (ref 82–98)
MONOCYTES # BLD AUTO: 0.8 K/UL (ref 0.3–1)
MONOCYTES NFR BLD: 9.5 % (ref 4–15)
NEUTROPHILS # BLD AUTO: 5.5 K/UL (ref 1.8–7.7)
NEUTROPHILS NFR BLD: 65.5 % (ref 38–73)
NRBC BLD-RTO: 0 /100 WBC
PLATELET # BLD AUTO: 206 K/UL (ref 150–450)
PMV BLD AUTO: 11.4 FL (ref 9.2–12.9)
POTASSIUM SERPL-SCNC: 3.5 MMOL/L (ref 3.5–5.1)
RBC # BLD AUTO: 3.42 M/UL (ref 4.6–6.2)
SODIUM SERPL-SCNC: 139 MMOL/L (ref 136–145)
WBC # BLD AUTO: 8.45 K/UL (ref 3.9–12.7)

## 2021-04-26 PROCEDURE — 25000003 PHARM REV CODE 250: Performed by: FAMILY MEDICINE

## 2021-04-26 PROCEDURE — 85025 COMPLETE CBC W/AUTO DIFF WBC: CPT | Performed by: FAMILY MEDICINE

## 2021-04-26 PROCEDURE — 11000001 HC ACUTE MED/SURG PRIVATE ROOM

## 2021-04-26 PROCEDURE — 94761 N-INVAS EAR/PLS OXIMETRY MLT: CPT

## 2021-04-26 PROCEDURE — 25000003 PHARM REV CODE 250: Performed by: STUDENT IN AN ORGANIZED HEALTH CARE EDUCATION/TRAINING PROGRAM

## 2021-04-26 PROCEDURE — 80048 BASIC METABOLIC PNL TOTAL CA: CPT | Performed by: FAMILY MEDICINE

## 2021-04-26 PROCEDURE — 36415 COLL VENOUS BLD VENIPUNCTURE: CPT | Performed by: FAMILY MEDICINE

## 2021-04-26 PROCEDURE — 97530 THERAPEUTIC ACTIVITIES: CPT

## 2021-04-26 PROCEDURE — 99900035 HC TECH TIME PER 15 MIN (STAT)

## 2021-04-26 PROCEDURE — 25000003 PHARM REV CODE 250: Performed by: NURSE PRACTITIONER

## 2021-04-26 PROCEDURE — 99232 PR SUBSEQUENT HOSPITAL CARE,LEVL II: ICD-10-PCS | Mod: ,,, | Performed by: INTERNAL MEDICINE

## 2021-04-26 PROCEDURE — 97535 SELF CARE MNGMENT TRAINING: CPT

## 2021-04-26 PROCEDURE — 99232 SBSQ HOSP IP/OBS MODERATE 35: CPT | Mod: ,,, | Performed by: INTERNAL MEDICINE

## 2021-04-26 RX ORDER — NITROFURANTOIN 25; 75 MG/1; MG/1
100 CAPSULE ORAL EVERY 12 HOURS
Status: DISCONTINUED | OUTPATIENT
Start: 2021-04-26 | End: 2021-05-01

## 2021-04-26 RX ADMIN — PANTOPRAZOLE SODIUM 40 MG: 40 TABLET, DELAYED RELEASE ORAL at 09:04

## 2021-04-26 RX ADMIN — VENLAFAXINE HYDROCHLORIDE 150 MG: 150 CAPSULE, EXTENDED RELEASE ORAL at 09:04

## 2021-04-26 RX ADMIN — TAMSULOSIN HYDROCHLORIDE 0.4 MG: 0.4 CAPSULE ORAL at 09:04

## 2021-04-26 RX ADMIN — MEMANTINE 10 MG: 10 TABLET ORAL at 09:04

## 2021-04-26 RX ADMIN — APIXABAN 5 MG: 5 TABLET, FILM COATED ORAL at 08:04

## 2021-04-26 RX ADMIN — APIXABAN 5 MG: 5 TABLET, FILM COATED ORAL at 09:04

## 2021-04-26 RX ADMIN — MEMANTINE 10 MG: 10 TABLET ORAL at 08:04

## 2021-04-26 RX ADMIN — NITROFURANTOIN (MONOHYDRATE/MACROCRYSTALS) 100 MG: 75; 25 CAPSULE ORAL at 09:04

## 2021-04-26 RX ADMIN — LEVOTHYROXINE SODIUM 100 MCG: 100 TABLET ORAL at 06:04

## 2021-04-26 RX ADMIN — SIMVASTATIN 40 MG: 40 TABLET, FILM COATED ORAL at 08:04

## 2021-04-26 RX ADMIN — CIPROFLOXACIN 500 MG: 500 TABLET, FILM COATED ORAL at 09:04

## 2021-04-26 RX ADMIN — ALLOPURINOL 300 MG: 100 TABLET ORAL at 09:04

## 2021-04-26 RX ADMIN — NITROFURANTOIN (MONOHYDRATE/MACROCRYSTALS) 100 MG: 75; 25 CAPSULE ORAL at 08:04

## 2021-04-26 RX ADMIN — METOPROLOL SUCCINATE 50 MG: 50 TABLET, EXTENDED RELEASE ORAL at 09:04

## 2021-04-27 LAB
ANION GAP SERPL CALC-SCNC: 10 MMOL/L (ref 8–16)
BASOPHILS # BLD AUTO: 0.04 K/UL (ref 0–0.2)
BASOPHILS NFR BLD: 0.5 % (ref 0–1.9)
BUN SERPL-MCNC: 16 MG/DL (ref 8–23)
CALCIUM SERPL-MCNC: 9.2 MG/DL (ref 8.7–10.5)
CHLORIDE SERPL-SCNC: 110 MMOL/L (ref 95–110)
CO2 SERPL-SCNC: 19 MMOL/L (ref 23–29)
CREAT SERPL-MCNC: 1 MG/DL (ref 0.5–1.4)
DIFFERENTIAL METHOD: ABNORMAL
EOSINOPHIL # BLD AUTO: 0.5 K/UL (ref 0–0.5)
EOSINOPHIL NFR BLD: 5.7 % (ref 0–8)
ERYTHROCYTE [DISTWIDTH] IN BLOOD BY AUTOMATED COUNT: 14.4 % (ref 11.5–14.5)
EST. GFR  (AFRICAN AMERICAN): >60 ML/MIN/1.73 M^2
EST. GFR  (NON AFRICAN AMERICAN): >60 ML/MIN/1.73 M^2
GLUCOSE SERPL-MCNC: 93 MG/DL (ref 70–110)
HCT VFR BLD AUTO: 33.9 % (ref 40–54)
HGB BLD-MCNC: 10.8 G/DL (ref 14–18)
IMM GRANULOCYTES # BLD AUTO: 0.02 K/UL (ref 0–0.04)
IMM GRANULOCYTES NFR BLD AUTO: 0.2 % (ref 0–0.5)
LYMPHOCYTES # BLD AUTO: 1.2 K/UL (ref 1–4.8)
LYMPHOCYTES NFR BLD: 15.1 % (ref 18–48)
MCH RBC QN AUTO: 30.1 PG (ref 27–31)
MCHC RBC AUTO-ENTMCNC: 31.9 G/DL (ref 32–36)
MCV RBC AUTO: 94 FL (ref 82–98)
MONOCYTES # BLD AUTO: 0.7 K/UL (ref 0.3–1)
MONOCYTES NFR BLD: 9 % (ref 4–15)
NEUTROPHILS # BLD AUTO: 5.7 K/UL (ref 1.8–7.7)
NEUTROPHILS NFR BLD: 69.5 % (ref 38–73)
NRBC BLD-RTO: 0 /100 WBC
PLATELET # BLD AUTO: 210 K/UL (ref 150–450)
PMV BLD AUTO: 11.3 FL (ref 9.2–12.9)
POTASSIUM SERPL-SCNC: 3.4 MMOL/L (ref 3.5–5.1)
RBC # BLD AUTO: 3.59 M/UL (ref 4.6–6.2)
SODIUM SERPL-SCNC: 139 MMOL/L (ref 136–145)
WBC # BLD AUTO: 8.23 K/UL (ref 3.9–12.7)

## 2021-04-27 PROCEDURE — 36415 COLL VENOUS BLD VENIPUNCTURE: CPT | Performed by: FAMILY MEDICINE

## 2021-04-27 PROCEDURE — 94760 N-INVAS EAR/PLS OXIMETRY 1: CPT

## 2021-04-27 PROCEDURE — 25000003 PHARM REV CODE 250: Performed by: NURSE PRACTITIONER

## 2021-04-27 PROCEDURE — 27000221 HC OXYGEN, UP TO 24 HOURS

## 2021-04-27 PROCEDURE — 94660 CPAP INITIATION&MGMT: CPT

## 2021-04-27 PROCEDURE — 97530 THERAPEUTIC ACTIVITIES: CPT

## 2021-04-27 PROCEDURE — 97535 SELF CARE MNGMENT TRAINING: CPT

## 2021-04-27 PROCEDURE — 99900035 HC TECH TIME PER 15 MIN (STAT)

## 2021-04-27 PROCEDURE — 99232 PR SUBSEQUENT HOSPITAL CARE,LEVL II: ICD-10-PCS | Mod: ,,, | Performed by: INTERNAL MEDICINE

## 2021-04-27 PROCEDURE — 25000003 PHARM REV CODE 250: Performed by: FAMILY MEDICINE

## 2021-04-27 PROCEDURE — 11000001 HC ACUTE MED/SURG PRIVATE ROOM

## 2021-04-27 PROCEDURE — 99232 SBSQ HOSP IP/OBS MODERATE 35: CPT | Mod: ,,, | Performed by: INTERNAL MEDICINE

## 2021-04-27 PROCEDURE — 80048 BASIC METABOLIC PNL TOTAL CA: CPT | Performed by: FAMILY MEDICINE

## 2021-04-27 PROCEDURE — 85025 COMPLETE CBC W/AUTO DIFF WBC: CPT | Performed by: FAMILY MEDICINE

## 2021-04-27 PROCEDURE — 94761 N-INVAS EAR/PLS OXIMETRY MLT: CPT

## 2021-04-27 RX ADMIN — NITROFURANTOIN (MONOHYDRATE/MACROCRYSTALS) 100 MG: 75; 25 CAPSULE ORAL at 09:04

## 2021-04-27 RX ADMIN — APIXABAN 5 MG: 5 TABLET, FILM COATED ORAL at 09:04

## 2021-04-27 RX ADMIN — LEVOTHYROXINE SODIUM 100 MCG: 100 TABLET ORAL at 05:04

## 2021-04-27 RX ADMIN — TAMSULOSIN HYDROCHLORIDE 0.4 MG: 0.4 CAPSULE ORAL at 09:04

## 2021-04-27 RX ADMIN — MEMANTINE 10 MG: 10 TABLET ORAL at 09:04

## 2021-04-27 RX ADMIN — SIMVASTATIN 40 MG: 40 TABLET, FILM COATED ORAL at 09:04

## 2021-04-27 RX ADMIN — VENLAFAXINE HYDROCHLORIDE 150 MG: 150 CAPSULE, EXTENDED RELEASE ORAL at 09:04

## 2021-04-27 RX ADMIN — ALLOPURINOL 300 MG: 100 TABLET ORAL at 09:04

## 2021-04-27 RX ADMIN — PANTOPRAZOLE SODIUM 40 MG: 40 TABLET, DELAYED RELEASE ORAL at 09:04

## 2021-04-27 RX ADMIN — METOPROLOL SUCCINATE 50 MG: 50 TABLET, EXTENDED RELEASE ORAL at 09:04

## 2021-04-28 LAB
ANION GAP SERPL CALC-SCNC: 11 MMOL/L (ref 8–16)
BACTERIA #/AREA URNS HPF: ABNORMAL /HPF
BASOPHILS # BLD AUTO: 0.05 K/UL (ref 0–0.2)
BASOPHILS NFR BLD: 0.6 % (ref 0–1.9)
BILIRUB UR QL STRIP: NEGATIVE
BUN SERPL-MCNC: 15 MG/DL (ref 8–23)
CALCIUM SERPL-MCNC: 9.1 MG/DL (ref 8.7–10.5)
CHLORIDE SERPL-SCNC: 111 MMOL/L (ref 95–110)
CLARITY UR: ABNORMAL
CO2 SERPL-SCNC: 20 MMOL/L (ref 23–29)
COLOR UR: ABNORMAL
CREAT SERPL-MCNC: 1 MG/DL (ref 0.5–1.4)
DIFFERENTIAL METHOD: ABNORMAL
EOSINOPHIL # BLD AUTO: 0.5 K/UL (ref 0–0.5)
EOSINOPHIL NFR BLD: 6.7 % (ref 0–8)
ERYTHROCYTE [DISTWIDTH] IN BLOOD BY AUTOMATED COUNT: 14.5 % (ref 11.5–14.5)
EST. GFR  (AFRICAN AMERICAN): >60 ML/MIN/1.73 M^2
EST. GFR  (NON AFRICAN AMERICAN): >60 ML/MIN/1.73 M^2
GLUCOSE SERPL-MCNC: 93 MG/DL (ref 70–110)
GLUCOSE UR QL STRIP: NEGATIVE
HCT VFR BLD AUTO: 34.4 % (ref 40–54)
HGB BLD-MCNC: 10.9 G/DL (ref 14–18)
HGB UR QL STRIP: ABNORMAL
HYALINE CASTS #/AREA URNS LPF: 1 /LPF
IMM GRANULOCYTES # BLD AUTO: 0.02 K/UL (ref 0–0.04)
IMM GRANULOCYTES NFR BLD AUTO: 0.3 % (ref 0–0.5)
KETONES UR QL STRIP: NEGATIVE
LEUKOCYTE ESTERASE UR QL STRIP: ABNORMAL
LYMPHOCYTES # BLD AUTO: 1.4 K/UL (ref 1–4.8)
LYMPHOCYTES NFR BLD: 17.2 % (ref 18–48)
MCH RBC QN AUTO: 29.9 PG (ref 27–31)
MCHC RBC AUTO-ENTMCNC: 31.7 G/DL (ref 32–36)
MCV RBC AUTO: 94 FL (ref 82–98)
MICROSCOPIC COMMENT: ABNORMAL
MONOCYTES # BLD AUTO: 0.7 K/UL (ref 0.3–1)
MONOCYTES NFR BLD: 9.1 % (ref 4–15)
NEUTROPHILS # BLD AUTO: 5.2 K/UL (ref 1.8–7.7)
NEUTROPHILS NFR BLD: 66.1 % (ref 38–73)
NITRITE UR QL STRIP: NEGATIVE
NON-SQ EPI CELLS #/AREA URNS HPF: 2 /HPF
NRBC BLD-RTO: 0 /100 WBC
PH UR STRIP: 6 [PH] (ref 5–8)
PLATELET # BLD AUTO: 210 K/UL (ref 150–450)
PMV BLD AUTO: 11.1 FL (ref 9.2–12.9)
POTASSIUM SERPL-SCNC: 3.4 MMOL/L (ref 3.5–5.1)
PROT UR QL STRIP: ABNORMAL
RBC # BLD AUTO: 3.65 M/UL (ref 4.6–6.2)
RBC #/AREA URNS HPF: >100 /HPF (ref 0–4)
SODIUM SERPL-SCNC: 142 MMOL/L (ref 136–145)
SP GR UR STRIP: 1.02 (ref 1–1.03)
SQUAMOUS #/AREA URNS HPF: 10 /HPF
URN SPEC COLLECT METH UR: ABNORMAL
UROBILINOGEN UR STRIP-ACNC: NEGATIVE EU/DL
WBC # BLD AUTO: 7.89 K/UL (ref 3.9–12.7)
WBC #/AREA URNS HPF: >100 /HPF (ref 0–5)

## 2021-04-28 PROCEDURE — 87086 URINE CULTURE/COLONY COUNT: CPT | Performed by: FAMILY MEDICINE

## 2021-04-28 PROCEDURE — 97535 SELF CARE MNGMENT TRAINING: CPT

## 2021-04-28 PROCEDURE — 25000003 PHARM REV CODE 250: Performed by: FAMILY MEDICINE

## 2021-04-28 PROCEDURE — 80048 BASIC METABOLIC PNL TOTAL CA: CPT | Performed by: FAMILY MEDICINE

## 2021-04-28 PROCEDURE — 87077 CULTURE AEROBIC IDENTIFY: CPT | Performed by: FAMILY MEDICINE

## 2021-04-28 PROCEDURE — 99232 PR SUBSEQUENT HOSPITAL CARE,LEVL II: ICD-10-PCS | Mod: ,,, | Performed by: FAMILY MEDICINE

## 2021-04-28 PROCEDURE — 27000221 HC OXYGEN, UP TO 24 HOURS

## 2021-04-28 PROCEDURE — 85025 COMPLETE CBC W/AUTO DIFF WBC: CPT | Performed by: FAMILY MEDICINE

## 2021-04-28 PROCEDURE — 25000003 PHARM REV CODE 250: Performed by: NURSE PRACTITIONER

## 2021-04-28 PROCEDURE — 87088 URINE BACTERIA CULTURE: CPT | Performed by: FAMILY MEDICINE

## 2021-04-28 PROCEDURE — 11000001 HC ACUTE MED/SURG PRIVATE ROOM

## 2021-04-28 PROCEDURE — 94761 N-INVAS EAR/PLS OXIMETRY MLT: CPT

## 2021-04-28 PROCEDURE — 99232 SBSQ HOSP IP/OBS MODERATE 35: CPT | Mod: ,,, | Performed by: FAMILY MEDICINE

## 2021-04-28 PROCEDURE — 99900035 HC TECH TIME PER 15 MIN (STAT)

## 2021-04-28 PROCEDURE — 81000 URINALYSIS NONAUTO W/SCOPE: CPT | Performed by: FAMILY MEDICINE

## 2021-04-28 PROCEDURE — 36415 COLL VENOUS BLD VENIPUNCTURE: CPT | Performed by: FAMILY MEDICINE

## 2021-04-28 PROCEDURE — 87186 SC STD MICRODIL/AGAR DIL: CPT | Performed by: FAMILY MEDICINE

## 2021-04-28 RX ADMIN — METOPROLOL SUCCINATE 50 MG: 50 TABLET, EXTENDED RELEASE ORAL at 08:04

## 2021-04-28 RX ADMIN — MEMANTINE 10 MG: 10 TABLET ORAL at 08:04

## 2021-04-28 RX ADMIN — VENLAFAXINE HYDROCHLORIDE 150 MG: 150 CAPSULE, EXTENDED RELEASE ORAL at 08:04

## 2021-04-28 RX ADMIN — TAMSULOSIN HYDROCHLORIDE 0.4 MG: 0.4 CAPSULE ORAL at 08:04

## 2021-04-28 RX ADMIN — APIXABAN 5 MG: 5 TABLET, FILM COATED ORAL at 08:04

## 2021-04-28 RX ADMIN — NITROFURANTOIN (MONOHYDRATE/MACROCRYSTALS) 100 MG: 75; 25 CAPSULE ORAL at 08:04

## 2021-04-28 RX ADMIN — LEVOTHYROXINE SODIUM 100 MCG: 100 TABLET ORAL at 06:04

## 2021-04-28 RX ADMIN — SODIUM CHLORIDE 500 ML: 0.9 INJECTION, SOLUTION INTRAVENOUS at 05:04

## 2021-04-28 RX ADMIN — SIMVASTATIN 40 MG: 40 TABLET, FILM COATED ORAL at 08:04

## 2021-04-28 RX ADMIN — ALLOPURINOL 300 MG: 100 TABLET ORAL at 08:04

## 2021-04-28 RX ADMIN — PANTOPRAZOLE SODIUM 40 MG: 40 TABLET, DELAYED RELEASE ORAL at 08:04

## 2021-04-29 ENCOUNTER — DOCUMENT SCAN (OUTPATIENT)
Dept: HOME HEALTH SERVICES | Facility: HOSPITAL | Age: 81
End: 2021-04-29
Payer: MEDICARE

## 2021-04-29 LAB
ANION GAP SERPL CALC-SCNC: 7 MMOL/L (ref 8–16)
BASOPHILS # BLD AUTO: 0.04 K/UL (ref 0–0.2)
BASOPHILS NFR BLD: 0.4 % (ref 0–1.9)
BUN SERPL-MCNC: 13 MG/DL (ref 8–23)
CALCIUM SERPL-MCNC: 9.5 MG/DL (ref 8.7–10.5)
CHLORIDE SERPL-SCNC: 108 MMOL/L (ref 95–110)
CO2 SERPL-SCNC: 25 MMOL/L (ref 23–29)
CREAT SERPL-MCNC: 1 MG/DL (ref 0.5–1.4)
DIFFERENTIAL METHOD: ABNORMAL
EOSINOPHIL # BLD AUTO: 0.4 K/UL (ref 0–0.5)
EOSINOPHIL NFR BLD: 3.7 % (ref 0–8)
ERYTHROCYTE [DISTWIDTH] IN BLOOD BY AUTOMATED COUNT: 14.6 % (ref 11.5–14.5)
EST. GFR  (AFRICAN AMERICAN): >60 ML/MIN/1.73 M^2
EST. GFR  (NON AFRICAN AMERICAN): >60 ML/MIN/1.73 M^2
GLUCOSE SERPL-MCNC: 102 MG/DL (ref 70–110)
HCT VFR BLD AUTO: 34.6 % (ref 40–54)
HGB BLD-MCNC: 11 G/DL (ref 14–18)
IMM GRANULOCYTES # BLD AUTO: 0.04 K/UL (ref 0–0.04)
IMM GRANULOCYTES NFR BLD AUTO: 0.4 % (ref 0–0.5)
LYMPHOCYTES # BLD AUTO: 1 K/UL (ref 1–4.8)
LYMPHOCYTES NFR BLD: 10.1 % (ref 18–48)
MCH RBC QN AUTO: 30.1 PG (ref 27–31)
MCHC RBC AUTO-ENTMCNC: 31.8 G/DL (ref 32–36)
MCV RBC AUTO: 95 FL (ref 82–98)
MONOCYTES # BLD AUTO: 1 K/UL (ref 0.3–1)
MONOCYTES NFR BLD: 9.3 % (ref 4–15)
NEUTROPHILS # BLD AUTO: 7.9 K/UL (ref 1.8–7.7)
NEUTROPHILS NFR BLD: 76.1 % (ref 38–73)
NRBC BLD-RTO: 0 /100 WBC
PLATELET # BLD AUTO: 211 K/UL (ref 150–450)
PMV BLD AUTO: 11.4 FL (ref 9.2–12.9)
POTASSIUM SERPL-SCNC: 4 MMOL/L (ref 3.5–5.1)
RBC # BLD AUTO: 3.65 M/UL (ref 4.6–6.2)
SODIUM SERPL-SCNC: 140 MMOL/L (ref 136–145)
WBC # BLD AUTO: 10.34 K/UL (ref 3.9–12.7)

## 2021-04-29 PROCEDURE — 97535 SELF CARE MNGMENT TRAINING: CPT

## 2021-04-29 PROCEDURE — 80048 BASIC METABOLIC PNL TOTAL CA: CPT | Performed by: FAMILY MEDICINE

## 2021-04-29 PROCEDURE — 25000003 PHARM REV CODE 250: Performed by: FAMILY MEDICINE

## 2021-04-29 PROCEDURE — 99900035 HC TECH TIME PER 15 MIN (STAT)

## 2021-04-29 PROCEDURE — 94660 CPAP INITIATION&MGMT: CPT

## 2021-04-29 PROCEDURE — 99232 SBSQ HOSP IP/OBS MODERATE 35: CPT | Mod: ,,, | Performed by: STUDENT IN AN ORGANIZED HEALTH CARE EDUCATION/TRAINING PROGRAM

## 2021-04-29 PROCEDURE — 51798 US URINE CAPACITY MEASURE: CPT

## 2021-04-29 PROCEDURE — 94761 N-INVAS EAR/PLS OXIMETRY MLT: CPT

## 2021-04-29 PROCEDURE — 97530 THERAPEUTIC ACTIVITIES: CPT

## 2021-04-29 PROCEDURE — 99232 PR SUBSEQUENT HOSPITAL CARE,LEVL II: ICD-10-PCS | Mod: ,,, | Performed by: STUDENT IN AN ORGANIZED HEALTH CARE EDUCATION/TRAINING PROGRAM

## 2021-04-29 PROCEDURE — 25000003 PHARM REV CODE 250: Performed by: NURSE PRACTITIONER

## 2021-04-29 PROCEDURE — 85025 COMPLETE CBC W/AUTO DIFF WBC: CPT | Performed by: FAMILY MEDICINE

## 2021-04-29 PROCEDURE — 36415 COLL VENOUS BLD VENIPUNCTURE: CPT | Performed by: FAMILY MEDICINE

## 2021-04-29 PROCEDURE — 11000001 HC ACUTE MED/SURG PRIVATE ROOM

## 2021-04-29 RX ADMIN — NITROFURANTOIN (MONOHYDRATE/MACROCRYSTALS) 100 MG: 75; 25 CAPSULE ORAL at 08:04

## 2021-04-29 RX ADMIN — ALLOPURINOL 300 MG: 100 TABLET ORAL at 08:04

## 2021-04-29 RX ADMIN — APIXABAN 5 MG: 5 TABLET, FILM COATED ORAL at 09:04

## 2021-04-29 RX ADMIN — PANTOPRAZOLE SODIUM 40 MG: 40 TABLET, DELAYED RELEASE ORAL at 08:04

## 2021-04-29 RX ADMIN — LEVOTHYROXINE SODIUM 100 MCG: 100 TABLET ORAL at 05:04

## 2021-04-29 RX ADMIN — SIMVASTATIN 40 MG: 40 TABLET, FILM COATED ORAL at 09:04

## 2021-04-29 RX ADMIN — TAMSULOSIN HYDROCHLORIDE 0.4 MG: 0.4 CAPSULE ORAL at 08:04

## 2021-04-29 RX ADMIN — APIXABAN 5 MG: 5 TABLET, FILM COATED ORAL at 08:04

## 2021-04-29 RX ADMIN — VENLAFAXINE HYDROCHLORIDE 150 MG: 150 CAPSULE, EXTENDED RELEASE ORAL at 08:04

## 2021-04-29 RX ADMIN — MEMANTINE 10 MG: 10 TABLET ORAL at 09:04

## 2021-04-29 RX ADMIN — METOPROLOL SUCCINATE 50 MG: 50 TABLET, EXTENDED RELEASE ORAL at 08:04

## 2021-04-29 RX ADMIN — MEMANTINE 10 MG: 10 TABLET ORAL at 08:04

## 2021-04-29 RX ADMIN — NITROFURANTOIN (MONOHYDRATE/MACROCRYSTALS) 100 MG: 75; 25 CAPSULE ORAL at 09:04

## 2021-04-30 LAB
ANION GAP SERPL CALC-SCNC: 12 MMOL/L (ref 8–16)
BASOPHILS # BLD AUTO: 0.05 K/UL (ref 0–0.2)
BASOPHILS NFR BLD: 0.6 % (ref 0–1.9)
BUN SERPL-MCNC: 13 MG/DL (ref 8–23)
CALCIUM SERPL-MCNC: 9.3 MG/DL (ref 8.7–10.5)
CHLORIDE SERPL-SCNC: 107 MMOL/L (ref 95–110)
CO2 SERPL-SCNC: 19 MMOL/L (ref 23–29)
CREAT SERPL-MCNC: 0.9 MG/DL (ref 0.5–1.4)
DIFFERENTIAL METHOD: ABNORMAL
EOSINOPHIL # BLD AUTO: 0.4 K/UL (ref 0–0.5)
EOSINOPHIL NFR BLD: 5 % (ref 0–8)
ERYTHROCYTE [DISTWIDTH] IN BLOOD BY AUTOMATED COUNT: 14.4 % (ref 11.5–14.5)
EST. GFR  (AFRICAN AMERICAN): >60 ML/MIN/1.73 M^2
EST. GFR  (NON AFRICAN AMERICAN): >60 ML/MIN/1.73 M^2
GLUCOSE SERPL-MCNC: 85 MG/DL (ref 70–110)
HCT VFR BLD AUTO: 33.5 % (ref 40–54)
HGB BLD-MCNC: 10.6 G/DL (ref 14–18)
IMM GRANULOCYTES # BLD AUTO: 0.02 K/UL (ref 0–0.04)
IMM GRANULOCYTES NFR BLD AUTO: 0.2 % (ref 0–0.5)
LYMPHOCYTES # BLD AUTO: 1.3 K/UL (ref 1–4.8)
LYMPHOCYTES NFR BLD: 15.2 % (ref 18–48)
MCH RBC QN AUTO: 29.4 PG (ref 27–31)
MCHC RBC AUTO-ENTMCNC: 31.6 G/DL (ref 32–36)
MCV RBC AUTO: 93 FL (ref 82–98)
MONOCYTES # BLD AUTO: 0.8 K/UL (ref 0.3–1)
MONOCYTES NFR BLD: 9.4 % (ref 4–15)
NEUTROPHILS # BLD AUTO: 6 K/UL (ref 1.8–7.7)
NEUTROPHILS NFR BLD: 69.6 % (ref 38–73)
NRBC BLD-RTO: 0 /100 WBC
PLATELET # BLD AUTO: 213 K/UL (ref 150–450)
PMV BLD AUTO: 11.4 FL (ref 9.2–12.9)
POTASSIUM SERPL-SCNC: 3.1 MMOL/L (ref 3.5–5.1)
RBC # BLD AUTO: 3.6 M/UL (ref 4.6–6.2)
SODIUM SERPL-SCNC: 138 MMOL/L (ref 136–145)
WBC # BLD AUTO: 8.66 K/UL (ref 3.9–12.7)

## 2021-04-30 PROCEDURE — 97530 THERAPEUTIC ACTIVITIES: CPT

## 2021-04-30 PROCEDURE — 25000003 PHARM REV CODE 250: Performed by: INTERNAL MEDICINE

## 2021-04-30 PROCEDURE — 94761 N-INVAS EAR/PLS OXIMETRY MLT: CPT

## 2021-04-30 PROCEDURE — 97535 SELF CARE MNGMENT TRAINING: CPT

## 2021-04-30 PROCEDURE — 99232 PR SUBSEQUENT HOSPITAL CARE,LEVL II: ICD-10-PCS | Mod: ,,, | Performed by: FAMILY MEDICINE

## 2021-04-30 PROCEDURE — 99900035 HC TECH TIME PER 15 MIN (STAT)

## 2021-04-30 PROCEDURE — 80048 BASIC METABOLIC PNL TOTAL CA: CPT | Performed by: FAMILY MEDICINE

## 2021-04-30 PROCEDURE — 36415 COLL VENOUS BLD VENIPUNCTURE: CPT | Performed by: FAMILY MEDICINE

## 2021-04-30 PROCEDURE — 85025 COMPLETE CBC W/AUTO DIFF WBC: CPT | Performed by: FAMILY MEDICINE

## 2021-04-30 PROCEDURE — 25000003 PHARM REV CODE 250: Performed by: FAMILY MEDICINE

## 2021-04-30 PROCEDURE — 25000003 PHARM REV CODE 250: Performed by: NURSE PRACTITIONER

## 2021-04-30 PROCEDURE — 11000001 HC ACUTE MED/SURG PRIVATE ROOM

## 2021-04-30 PROCEDURE — 99232 SBSQ HOSP IP/OBS MODERATE 35: CPT | Mod: ,,, | Performed by: FAMILY MEDICINE

## 2021-04-30 PROCEDURE — 94660 CPAP INITIATION&MGMT: CPT

## 2021-04-30 RX ORDER — POTASSIUM CHLORIDE 20 MEQ/1
40 TABLET, EXTENDED RELEASE ORAL ONCE
Status: DISCONTINUED | OUTPATIENT
Start: 2021-04-30 | End: 2021-04-30

## 2021-04-30 RX ORDER — POTASSIUM CHLORIDE 750 MG/1
30 TABLET, EXTENDED RELEASE ORAL ONCE
Status: DISCONTINUED | OUTPATIENT
Start: 2021-04-30 | End: 2021-04-30

## 2021-04-30 RX ORDER — POTASSIUM CHLORIDE 20 MEQ/1
40 TABLET, EXTENDED RELEASE ORAL ONCE
Status: COMPLETED | OUTPATIENT
Start: 2021-04-30 | End: 2021-04-30

## 2021-04-30 RX ORDER — POTASSIUM CHLORIDE 20 MEQ/1
20 TABLET, EXTENDED RELEASE ORAL ONCE
Status: COMPLETED | OUTPATIENT
Start: 2021-04-30 | End: 2021-04-30

## 2021-04-30 RX ADMIN — POTASSIUM CHLORIDE 40 MEQ: 1500 TABLET, EXTENDED RELEASE ORAL at 11:04

## 2021-04-30 RX ADMIN — APIXABAN 5 MG: 5 TABLET, FILM COATED ORAL at 08:04

## 2021-04-30 RX ADMIN — MEMANTINE 10 MG: 10 TABLET ORAL at 08:04

## 2021-04-30 RX ADMIN — ALLOPURINOL 300 MG: 100 TABLET ORAL at 08:04

## 2021-04-30 RX ADMIN — LEVOTHYROXINE SODIUM 100 MCG: 100 TABLET ORAL at 05:04

## 2021-04-30 RX ADMIN — SIMVASTATIN 40 MG: 40 TABLET, FILM COATED ORAL at 08:04

## 2021-04-30 RX ADMIN — POTASSIUM CHLORIDE 20 MEQ: 1500 TABLET, EXTENDED RELEASE ORAL at 04:04

## 2021-04-30 RX ADMIN — DIPHENHYDRAMINE HYDROCHLORIDE 25 MG: 25 CAPSULE ORAL at 09:04

## 2021-04-30 RX ADMIN — PANTOPRAZOLE SODIUM 40 MG: 40 TABLET, DELAYED RELEASE ORAL at 08:04

## 2021-04-30 RX ADMIN — NITROFURANTOIN (MONOHYDRATE/MACROCRYSTALS) 100 MG: 75; 25 CAPSULE ORAL at 08:04

## 2021-04-30 RX ADMIN — VENLAFAXINE HYDROCHLORIDE 150 MG: 150 CAPSULE, EXTENDED RELEASE ORAL at 08:04

## 2021-04-30 RX ADMIN — TAMSULOSIN HYDROCHLORIDE 0.4 MG: 0.4 CAPSULE ORAL at 08:04

## 2021-04-30 RX ADMIN — METOPROLOL SUCCINATE 50 MG: 50 TABLET, EXTENDED RELEASE ORAL at 08:04

## 2021-05-01 ENCOUNTER — EXTERNAL HOME HEALTH (OUTPATIENT)
Dept: HOME HEALTH SERVICES | Facility: HOSPITAL | Age: 81
End: 2021-05-01
Payer: MEDICARE

## 2021-05-01 LAB
ANION GAP SERPL CALC-SCNC: 12 MMOL/L (ref 8–16)
BACTERIA UR CULT: ABNORMAL
BASOPHILS # BLD AUTO: 0.04 K/UL (ref 0–0.2)
BASOPHILS NFR BLD: 0.4 % (ref 0–1.9)
BUN SERPL-MCNC: 13 MG/DL (ref 8–23)
CALCIUM SERPL-MCNC: 9.6 MG/DL (ref 8.7–10.5)
CHLORIDE SERPL-SCNC: 107 MMOL/L (ref 95–110)
CO2 SERPL-SCNC: 20 MMOL/L (ref 23–29)
CREAT SERPL-MCNC: 0.9 MG/DL (ref 0.5–1.4)
DIFFERENTIAL METHOD: ABNORMAL
EOSINOPHIL # BLD AUTO: 0.4 K/UL (ref 0–0.5)
EOSINOPHIL NFR BLD: 4.8 % (ref 0–8)
ERYTHROCYTE [DISTWIDTH] IN BLOOD BY AUTOMATED COUNT: 14.3 % (ref 11.5–14.5)
EST. GFR  (AFRICAN AMERICAN): >60 ML/MIN/1.73 M^2
EST. GFR  (NON AFRICAN AMERICAN): >60 ML/MIN/1.73 M^2
GLUCOSE SERPL-MCNC: 88 MG/DL (ref 70–110)
HCT VFR BLD AUTO: 35.2 % (ref 40–54)
HGB BLD-MCNC: 11.3 G/DL (ref 14–18)
IMM GRANULOCYTES # BLD AUTO: 0.03 K/UL (ref 0–0.04)
IMM GRANULOCYTES NFR BLD AUTO: 0.3 % (ref 0–0.5)
LYMPHOCYTES # BLD AUTO: 1.2 K/UL (ref 1–4.8)
LYMPHOCYTES NFR BLD: 13.5 % (ref 18–48)
MCH RBC QN AUTO: 29.7 PG (ref 27–31)
MCHC RBC AUTO-ENTMCNC: 32.1 G/DL (ref 32–36)
MCV RBC AUTO: 93 FL (ref 82–98)
MONOCYTES # BLD AUTO: 0.9 K/UL (ref 0.3–1)
MONOCYTES NFR BLD: 9.8 % (ref 4–15)
NEUTROPHILS # BLD AUTO: 6.4 K/UL (ref 1.8–7.7)
NEUTROPHILS NFR BLD: 71.2 % (ref 38–73)
NRBC BLD-RTO: 0 /100 WBC
PLATELET # BLD AUTO: 234 K/UL (ref 150–450)
PMV BLD AUTO: 11.2 FL (ref 9.2–12.9)
POTASSIUM SERPL-SCNC: 3.6 MMOL/L (ref 3.5–5.1)
RBC # BLD AUTO: 3.8 M/UL (ref 4.6–6.2)
SODIUM SERPL-SCNC: 139 MMOL/L (ref 136–145)
WBC # BLD AUTO: 8.95 K/UL (ref 3.9–12.7)

## 2021-05-01 PROCEDURE — 25000003 PHARM REV CODE 250: Performed by: FAMILY MEDICINE

## 2021-05-01 PROCEDURE — 85025 COMPLETE CBC W/AUTO DIFF WBC: CPT | Performed by: FAMILY MEDICINE

## 2021-05-01 PROCEDURE — 99232 PR SUBSEQUENT HOSPITAL CARE,LEVL II: ICD-10-PCS | Mod: ,,, | Performed by: FAMILY MEDICINE

## 2021-05-01 PROCEDURE — 99232 SBSQ HOSP IP/OBS MODERATE 35: CPT | Mod: ,,, | Performed by: FAMILY MEDICINE

## 2021-05-01 PROCEDURE — 11000001 HC ACUTE MED/SURG PRIVATE ROOM

## 2021-05-01 PROCEDURE — 80048 BASIC METABOLIC PNL TOTAL CA: CPT | Performed by: FAMILY MEDICINE

## 2021-05-01 PROCEDURE — 94761 N-INVAS EAR/PLS OXIMETRY MLT: CPT

## 2021-05-01 PROCEDURE — 25000003 PHARM REV CODE 250: Performed by: NURSE PRACTITIONER

## 2021-05-01 PROCEDURE — 99900035 HC TECH TIME PER 15 MIN (STAT)

## 2021-05-01 PROCEDURE — 27000221 HC OXYGEN, UP TO 24 HOURS

## 2021-05-01 PROCEDURE — 36415 COLL VENOUS BLD VENIPUNCTURE: CPT | Performed by: FAMILY MEDICINE

## 2021-05-01 RX ADMIN — MEMANTINE 10 MG: 10 TABLET ORAL at 08:05

## 2021-05-01 RX ADMIN — TAMSULOSIN HYDROCHLORIDE 0.4 MG: 0.4 CAPSULE ORAL at 08:05

## 2021-05-01 RX ADMIN — PANTOPRAZOLE SODIUM 40 MG: 40 TABLET, DELAYED RELEASE ORAL at 08:05

## 2021-05-01 RX ADMIN — APIXABAN 5 MG: 5 TABLET, FILM COATED ORAL at 08:05

## 2021-05-01 RX ADMIN — SIMVASTATIN 40 MG: 40 TABLET, FILM COATED ORAL at 08:05

## 2021-05-01 RX ADMIN — VENLAFAXINE HYDROCHLORIDE 150 MG: 150 CAPSULE, EXTENDED RELEASE ORAL at 08:05

## 2021-05-01 RX ADMIN — METOPROLOL SUCCINATE 50 MG: 50 TABLET, EXTENDED RELEASE ORAL at 08:05

## 2021-05-01 RX ADMIN — NITROFURANTOIN (MONOHYDRATE/MACROCRYSTALS) 100 MG: 75; 25 CAPSULE ORAL at 08:05

## 2021-05-01 RX ADMIN — ALLOPURINOL 300 MG: 100 TABLET ORAL at 08:05

## 2021-05-01 RX ADMIN — LEVOTHYROXINE SODIUM 100 MCG: 100 TABLET ORAL at 05:05

## 2021-05-02 LAB
ANION GAP SERPL CALC-SCNC: 15 MMOL/L (ref 8–16)
BASOPHILS # BLD AUTO: 0.05 K/UL (ref 0–0.2)
BASOPHILS NFR BLD: 0.6 % (ref 0–1.9)
BUN SERPL-MCNC: 13 MG/DL (ref 8–23)
CALCIUM SERPL-MCNC: 9.9 MG/DL (ref 8.7–10.5)
CHLORIDE SERPL-SCNC: 105 MMOL/L (ref 95–110)
CO2 SERPL-SCNC: 21 MMOL/L (ref 23–29)
CREAT SERPL-MCNC: 1 MG/DL (ref 0.5–1.4)
DIFFERENTIAL METHOD: ABNORMAL
EOSINOPHIL # BLD AUTO: 0.5 K/UL (ref 0–0.5)
EOSINOPHIL NFR BLD: 5.8 % (ref 0–8)
ERYTHROCYTE [DISTWIDTH] IN BLOOD BY AUTOMATED COUNT: 14.2 % (ref 11.5–14.5)
EST. GFR  (AFRICAN AMERICAN): >60 ML/MIN/1.73 M^2
EST. GFR  (NON AFRICAN AMERICAN): >60 ML/MIN/1.73 M^2
GLUCOSE SERPL-MCNC: 95 MG/DL (ref 70–110)
HCT VFR BLD AUTO: 36.3 % (ref 40–54)
HGB BLD-MCNC: 11.7 G/DL (ref 14–18)
IMM GRANULOCYTES # BLD AUTO: 0.04 K/UL (ref 0–0.04)
IMM GRANULOCYTES NFR BLD AUTO: 0.4 % (ref 0–0.5)
LYMPHOCYTES # BLD AUTO: 1.4 K/UL (ref 1–4.8)
LYMPHOCYTES NFR BLD: 16 % (ref 18–48)
MCH RBC QN AUTO: 29.8 PG (ref 27–31)
MCHC RBC AUTO-ENTMCNC: 32.2 G/DL (ref 32–36)
MCV RBC AUTO: 92 FL (ref 82–98)
MONOCYTES # BLD AUTO: 0.9 K/UL (ref 0.3–1)
MONOCYTES NFR BLD: 10 % (ref 4–15)
NEUTROPHILS # BLD AUTO: 6.1 K/UL (ref 1.8–7.7)
NEUTROPHILS NFR BLD: 67.2 % (ref 38–73)
NRBC BLD-RTO: 0 /100 WBC
PLATELET # BLD AUTO: 235 K/UL (ref 150–450)
PMV BLD AUTO: 10.7 FL (ref 9.2–12.9)
POTASSIUM SERPL-SCNC: 3.6 MMOL/L (ref 3.5–5.1)
RBC # BLD AUTO: 3.93 M/UL (ref 4.6–6.2)
SODIUM SERPL-SCNC: 141 MMOL/L (ref 136–145)
WBC # BLD AUTO: 9.01 K/UL (ref 3.9–12.7)

## 2021-05-02 PROCEDURE — 85025 COMPLETE CBC W/AUTO DIFF WBC: CPT | Performed by: FAMILY MEDICINE

## 2021-05-02 PROCEDURE — 80048 BASIC METABOLIC PNL TOTAL CA: CPT | Performed by: FAMILY MEDICINE

## 2021-05-02 PROCEDURE — 25000003 PHARM REV CODE 250: Performed by: FAMILY MEDICINE

## 2021-05-02 PROCEDURE — 94660 CPAP INITIATION&MGMT: CPT

## 2021-05-02 PROCEDURE — 94761 N-INVAS EAR/PLS OXIMETRY MLT: CPT

## 2021-05-02 PROCEDURE — 36415 COLL VENOUS BLD VENIPUNCTURE: CPT | Performed by: FAMILY MEDICINE

## 2021-05-02 PROCEDURE — 99900035 HC TECH TIME PER 15 MIN (STAT)

## 2021-05-02 PROCEDURE — 99232 SBSQ HOSP IP/OBS MODERATE 35: CPT | Mod: ,,, | Performed by: FAMILY MEDICINE

## 2021-05-02 PROCEDURE — 99232 PR SUBSEQUENT HOSPITAL CARE,LEVL II: ICD-10-PCS | Mod: ,,, | Performed by: FAMILY MEDICINE

## 2021-05-02 PROCEDURE — 11000001 HC ACUTE MED/SURG PRIVATE ROOM

## 2021-05-02 RX ADMIN — MEMANTINE 10 MG: 10 TABLET ORAL at 08:05

## 2021-05-02 RX ADMIN — APIXABAN 5 MG: 5 TABLET, FILM COATED ORAL at 08:05

## 2021-05-02 RX ADMIN — SIMVASTATIN 40 MG: 40 TABLET, FILM COATED ORAL at 08:05

## 2021-05-02 RX ADMIN — LEVOTHYROXINE SODIUM 100 MCG: 100 TABLET ORAL at 05:05

## 2021-05-02 RX ADMIN — VENLAFAXINE HYDROCHLORIDE 150 MG: 150 CAPSULE, EXTENDED RELEASE ORAL at 08:05

## 2021-05-02 RX ADMIN — PANTOPRAZOLE SODIUM 40 MG: 40 TABLET, DELAYED RELEASE ORAL at 08:05

## 2021-05-02 RX ADMIN — TAMSULOSIN HYDROCHLORIDE 0.4 MG: 0.4 CAPSULE ORAL at 08:05

## 2021-05-02 RX ADMIN — ALLOPURINOL 300 MG: 100 TABLET ORAL at 08:05

## 2021-05-02 RX ADMIN — METOPROLOL SUCCINATE 50 MG: 50 TABLET, EXTENDED RELEASE ORAL at 08:05

## 2021-05-03 LAB
ANION GAP SERPL CALC-SCNC: 12 MMOL/L (ref 8–16)
BASOPHILS # BLD AUTO: 0.05 K/UL (ref 0–0.2)
BASOPHILS NFR BLD: 0.6 % (ref 0–1.9)
BUN SERPL-MCNC: 15 MG/DL (ref 8–23)
CALCIUM SERPL-MCNC: 9.4 MG/DL (ref 8.7–10.5)
CHLORIDE SERPL-SCNC: 106 MMOL/L (ref 95–110)
CO2 SERPL-SCNC: 23 MMOL/L (ref 23–29)
CREAT SERPL-MCNC: 1.1 MG/DL (ref 0.5–1.4)
DIFFERENTIAL METHOD: ABNORMAL
EOSINOPHIL # BLD AUTO: 0.6 K/UL (ref 0–0.5)
EOSINOPHIL NFR BLD: 7.1 % (ref 0–8)
ERYTHROCYTE [DISTWIDTH] IN BLOOD BY AUTOMATED COUNT: 14.3 % (ref 11.5–14.5)
EST. GFR  (AFRICAN AMERICAN): >60 ML/MIN/1.73 M^2
EST. GFR  (NON AFRICAN AMERICAN): >60 ML/MIN/1.73 M^2
GLUCOSE SERPL-MCNC: 85 MG/DL (ref 70–110)
HCT VFR BLD AUTO: 34.6 % (ref 40–54)
HGB BLD-MCNC: 11 G/DL (ref 14–18)
IMM GRANULOCYTES # BLD AUTO: 0.02 K/UL (ref 0–0.04)
IMM GRANULOCYTES NFR BLD AUTO: 0.2 % (ref 0–0.5)
LYMPHOCYTES # BLD AUTO: 1.6 K/UL (ref 1–4.8)
LYMPHOCYTES NFR BLD: 17.9 % (ref 18–48)
MCH RBC QN AUTO: 29.3 PG (ref 27–31)
MCHC RBC AUTO-ENTMCNC: 31.8 G/DL (ref 32–36)
MCV RBC AUTO: 92 FL (ref 82–98)
MONOCYTES # BLD AUTO: 0.8 K/UL (ref 0.3–1)
MONOCYTES NFR BLD: 9.4 % (ref 4–15)
NEUTROPHILS # BLD AUTO: 5.7 K/UL (ref 1.8–7.7)
NEUTROPHILS NFR BLD: 64.8 % (ref 38–73)
NRBC BLD-RTO: 0 /100 WBC
PLATELET # BLD AUTO: 229 K/UL (ref 150–450)
PMV BLD AUTO: 11.1 FL (ref 9.2–12.9)
POTASSIUM SERPL-SCNC: 3.4 MMOL/L (ref 3.5–5.1)
RBC # BLD AUTO: 3.76 M/UL (ref 4.6–6.2)
SODIUM SERPL-SCNC: 141 MMOL/L (ref 136–145)
WBC # BLD AUTO: 8.83 K/UL (ref 3.9–12.7)

## 2021-05-03 PROCEDURE — 25000003 PHARM REV CODE 250: Performed by: NURSE PRACTITIONER

## 2021-05-03 PROCEDURE — 25000003 PHARM REV CODE 250: Performed by: FAMILY MEDICINE

## 2021-05-03 PROCEDURE — 99232 SBSQ HOSP IP/OBS MODERATE 35: CPT | Mod: ,,, | Performed by: INTERNAL MEDICINE

## 2021-05-03 PROCEDURE — 94761 N-INVAS EAR/PLS OXIMETRY MLT: CPT

## 2021-05-03 PROCEDURE — 97530 THERAPEUTIC ACTIVITIES: CPT

## 2021-05-03 PROCEDURE — 36415 COLL VENOUS BLD VENIPUNCTURE: CPT | Performed by: FAMILY MEDICINE

## 2021-05-03 PROCEDURE — 94760 N-INVAS EAR/PLS OXIMETRY 1: CPT

## 2021-05-03 PROCEDURE — 99232 PR SUBSEQUENT HOSPITAL CARE,LEVL II: ICD-10-PCS | Mod: ,,, | Performed by: INTERNAL MEDICINE

## 2021-05-03 PROCEDURE — 11000001 HC ACUTE MED/SURG PRIVATE ROOM

## 2021-05-03 PROCEDURE — 99900035 HC TECH TIME PER 15 MIN (STAT)

## 2021-05-03 PROCEDURE — 80048 BASIC METABOLIC PNL TOTAL CA: CPT | Performed by: FAMILY MEDICINE

## 2021-05-03 PROCEDURE — 85025 COMPLETE CBC W/AUTO DIFF WBC: CPT | Performed by: FAMILY MEDICINE

## 2021-05-03 RX ORDER — POTASSIUM CHLORIDE 20 MEQ/1
20 TABLET, EXTENDED RELEASE ORAL ONCE
Status: COMPLETED | OUTPATIENT
Start: 2021-05-03 | End: 2021-05-03

## 2021-05-03 RX ORDER — TAMSULOSIN HYDROCHLORIDE 0.4 MG/1
0.4 CAPSULE ORAL NIGHTLY
Status: DISCONTINUED | OUTPATIENT
Start: 2021-05-04 | End: 2021-05-06 | Stop reason: HOSPADM

## 2021-05-03 RX ADMIN — TAMSULOSIN HYDROCHLORIDE 0.4 MG: 0.4 CAPSULE ORAL at 08:05

## 2021-05-03 RX ADMIN — MEMANTINE 10 MG: 10 TABLET ORAL at 08:05

## 2021-05-03 RX ADMIN — APIXABAN 5 MG: 5 TABLET, FILM COATED ORAL at 08:05

## 2021-05-03 RX ADMIN — LEVOTHYROXINE SODIUM 100 MCG: 100 TABLET ORAL at 05:05

## 2021-05-03 RX ADMIN — POTASSIUM CHLORIDE 20 MEQ: 1500 TABLET, EXTENDED RELEASE ORAL at 01:05

## 2021-05-03 RX ADMIN — METOPROLOL SUCCINATE 50 MG: 50 TABLET, EXTENDED RELEASE ORAL at 08:05

## 2021-05-03 RX ADMIN — SIMVASTATIN 40 MG: 40 TABLET, FILM COATED ORAL at 08:05

## 2021-05-03 RX ADMIN — VENLAFAXINE HYDROCHLORIDE 150 MG: 150 CAPSULE, EXTENDED RELEASE ORAL at 08:05

## 2021-05-03 RX ADMIN — PANTOPRAZOLE SODIUM 40 MG: 40 TABLET, DELAYED RELEASE ORAL at 08:05

## 2021-05-03 RX ADMIN — ALLOPURINOL 300 MG: 100 TABLET ORAL at 08:05

## 2021-05-04 LAB
ANION GAP SERPL CALC-SCNC: 11 MMOL/L (ref 8–16)
BASOPHILS # BLD AUTO: 0.06 K/UL (ref 0–0.2)
BASOPHILS NFR BLD: 0.7 % (ref 0–1.9)
BUN SERPL-MCNC: 16 MG/DL (ref 8–23)
CALCIUM SERPL-MCNC: 9.4 MG/DL (ref 8.7–10.5)
CHLORIDE SERPL-SCNC: 107 MMOL/L (ref 95–110)
CO2 SERPL-SCNC: 20 MMOL/L (ref 23–29)
CREAT SERPL-MCNC: 1.1 MG/DL (ref 0.5–1.4)
DIFFERENTIAL METHOD: ABNORMAL
EOSINOPHIL # BLD AUTO: 0.5 K/UL (ref 0–0.5)
EOSINOPHIL NFR BLD: 6.5 % (ref 0–8)
ERYTHROCYTE [DISTWIDTH] IN BLOOD BY AUTOMATED COUNT: 14.3 % (ref 11.5–14.5)
EST. GFR  (AFRICAN AMERICAN): >60 ML/MIN/1.73 M^2
EST. GFR  (NON AFRICAN AMERICAN): >60 ML/MIN/1.73 M^2
GLUCOSE SERPL-MCNC: 87 MG/DL (ref 70–110)
HCT VFR BLD AUTO: 34.2 % (ref 40–54)
HGB BLD-MCNC: 10.8 G/DL (ref 14–18)
IMM GRANULOCYTES # BLD AUTO: 0.03 K/UL (ref 0–0.04)
IMM GRANULOCYTES NFR BLD AUTO: 0.4 % (ref 0–0.5)
LYMPHOCYTES # BLD AUTO: 1.7 K/UL (ref 1–4.8)
LYMPHOCYTES NFR BLD: 20 % (ref 18–48)
MCH RBC QN AUTO: 29 PG (ref 27–31)
MCHC RBC AUTO-ENTMCNC: 31.6 G/DL (ref 32–36)
MCV RBC AUTO: 92 FL (ref 82–98)
MONOCYTES # BLD AUTO: 0.8 K/UL (ref 0.3–1)
MONOCYTES NFR BLD: 9.1 % (ref 4–15)
NEUTROPHILS # BLD AUTO: 5.3 K/UL (ref 1.8–7.7)
NEUTROPHILS NFR BLD: 63.3 % (ref 38–73)
NRBC BLD-RTO: 0 /100 WBC
PLATELET # BLD AUTO: 249 K/UL (ref 150–450)
PMV BLD AUTO: 11 FL (ref 9.2–12.9)
POTASSIUM SERPL-SCNC: 3.5 MMOL/L (ref 3.5–5.1)
RBC # BLD AUTO: 3.72 M/UL (ref 4.6–6.2)
SODIUM SERPL-SCNC: 138 MMOL/L (ref 136–145)
WBC # BLD AUTO: 8.33 K/UL (ref 3.9–12.7)

## 2021-05-04 PROCEDURE — 97530 THERAPEUTIC ACTIVITIES: CPT

## 2021-05-04 PROCEDURE — 36415 COLL VENOUS BLD VENIPUNCTURE: CPT | Performed by: FAMILY MEDICINE

## 2021-05-04 PROCEDURE — 25000003 PHARM REV CODE 250: Performed by: FAMILY MEDICINE

## 2021-05-04 PROCEDURE — 99232 PR SUBSEQUENT HOSPITAL CARE,LEVL II: ICD-10-PCS | Mod: ,,, | Performed by: INTERNAL MEDICINE

## 2021-05-04 PROCEDURE — 94761 N-INVAS EAR/PLS OXIMETRY MLT: CPT

## 2021-05-04 PROCEDURE — 97535 SELF CARE MNGMENT TRAINING: CPT

## 2021-05-04 PROCEDURE — 25000003 PHARM REV CODE 250: Performed by: NURSE PRACTITIONER

## 2021-05-04 PROCEDURE — 85025 COMPLETE CBC W/AUTO DIFF WBC: CPT | Performed by: FAMILY MEDICINE

## 2021-05-04 PROCEDURE — 99900035 HC TECH TIME PER 15 MIN (STAT)

## 2021-05-04 PROCEDURE — 80048 BASIC METABOLIC PNL TOTAL CA: CPT | Performed by: FAMILY MEDICINE

## 2021-05-04 PROCEDURE — 99232 SBSQ HOSP IP/OBS MODERATE 35: CPT | Mod: ,,, | Performed by: INTERNAL MEDICINE

## 2021-05-04 PROCEDURE — 11000001 HC ACUTE MED/SURG PRIVATE ROOM

## 2021-05-04 RX ADMIN — TAMSULOSIN HYDROCHLORIDE 0.4 MG: 0.4 CAPSULE ORAL at 08:05

## 2021-05-04 RX ADMIN — PANTOPRAZOLE SODIUM 40 MG: 40 TABLET, DELAYED RELEASE ORAL at 08:05

## 2021-05-04 RX ADMIN — MEMANTINE 10 MG: 10 TABLET ORAL at 08:05

## 2021-05-04 RX ADMIN — APIXABAN 5 MG: 5 TABLET, FILM COATED ORAL at 08:05

## 2021-05-04 RX ADMIN — METOPROLOL SUCCINATE 50 MG: 50 TABLET, EXTENDED RELEASE ORAL at 08:05

## 2021-05-04 RX ADMIN — SIMVASTATIN 40 MG: 40 TABLET, FILM COATED ORAL at 08:05

## 2021-05-04 RX ADMIN — ALLOPURINOL 300 MG: 100 TABLET ORAL at 08:05

## 2021-05-04 RX ADMIN — LEVOTHYROXINE SODIUM 100 MCG: 100 TABLET ORAL at 05:05

## 2021-05-04 RX ADMIN — VENLAFAXINE HYDROCHLORIDE 150 MG: 150 CAPSULE, EXTENDED RELEASE ORAL at 08:05

## 2021-05-05 ENCOUNTER — DOCUMENT SCAN (OUTPATIENT)
Dept: HOME HEALTH SERVICES | Facility: HOSPITAL | Age: 81
End: 2021-05-05
Payer: MEDICARE

## 2021-05-05 ENCOUNTER — DOCUMENT SCAN (OUTPATIENT)
Dept: HOME HEALTH SERVICES | Facility: HOSPITAL | Age: 81
End: 2021-05-05

## 2021-05-05 PROBLEM — Z60.9 POOR SOCIAL SITUATION: Status: ACTIVE | Noted: 2021-05-05

## 2021-05-05 LAB
ANION GAP SERPL CALC-SCNC: 11 MMOL/L (ref 8–16)
BASOPHILS # BLD AUTO: 0.06 K/UL (ref 0–0.2)
BASOPHILS NFR BLD: 0.5 % (ref 0–1.9)
BUN SERPL-MCNC: 15 MG/DL (ref 8–23)
CALCIUM SERPL-MCNC: 9.7 MG/DL (ref 8.7–10.5)
CHLORIDE SERPL-SCNC: 108 MMOL/L (ref 95–110)
CO2 SERPL-SCNC: 23 MMOL/L (ref 23–29)
CREAT SERPL-MCNC: 1.2 MG/DL (ref 0.5–1.4)
DIFFERENTIAL METHOD: ABNORMAL
EOSINOPHIL # BLD AUTO: 0.4 K/UL (ref 0–0.5)
EOSINOPHIL NFR BLD: 3.2 % (ref 0–8)
ERYTHROCYTE [DISTWIDTH] IN BLOOD BY AUTOMATED COUNT: 14.3 % (ref 11.5–14.5)
EST. GFR  (AFRICAN AMERICAN): >60 ML/MIN/1.73 M^2
EST. GFR  (NON AFRICAN AMERICAN): 58 ML/MIN/1.73 M^2
GLUCOSE SERPL-MCNC: 100 MG/DL (ref 70–110)
HCT VFR BLD AUTO: 35.6 % (ref 40–54)
HGB BLD-MCNC: 11.1 G/DL (ref 14–18)
IMM GRANULOCYTES # BLD AUTO: 0.04 K/UL (ref 0–0.04)
IMM GRANULOCYTES NFR BLD AUTO: 0.4 % (ref 0–0.5)
LYMPHOCYTES # BLD AUTO: 1.5 K/UL (ref 1–4.8)
LYMPHOCYTES NFR BLD: 13.9 % (ref 18–48)
MCH RBC QN AUTO: 29 PG (ref 27–31)
MCHC RBC AUTO-ENTMCNC: 31.2 G/DL (ref 32–36)
MCV RBC AUTO: 93 FL (ref 82–98)
MONOCYTES # BLD AUTO: 1 K/UL (ref 0.3–1)
MONOCYTES NFR BLD: 9.2 % (ref 4–15)
NEUTROPHILS # BLD AUTO: 8.1 K/UL (ref 1.8–7.7)
NEUTROPHILS NFR BLD: 72.8 % (ref 38–73)
NRBC BLD-RTO: 0 /100 WBC
PLATELET # BLD AUTO: 256 K/UL (ref 150–450)
PMV BLD AUTO: 11.1 FL (ref 9.2–12.9)
POCT GLUCOSE: 100 MG/DL (ref 70–110)
POTASSIUM SERPL-SCNC: 4 MMOL/L (ref 3.5–5.1)
RBC # BLD AUTO: 3.83 M/UL (ref 4.6–6.2)
SODIUM SERPL-SCNC: 142 MMOL/L (ref 136–145)
WBC # BLD AUTO: 11.06 K/UL (ref 3.9–12.7)

## 2021-05-05 PROCEDURE — 25000003 PHARM REV CODE 250: Performed by: FAMILY MEDICINE

## 2021-05-05 PROCEDURE — 99232 PR SUBSEQUENT HOSPITAL CARE,LEVL II: ICD-10-PCS | Mod: ,,, | Performed by: FAMILY MEDICINE

## 2021-05-05 PROCEDURE — 90833 PR PSYCHOTHERAPY W/PATIENT W/E&M, 30 MIN (ADD ON): ICD-10-PCS | Mod: ,,, | Performed by: PSYCHIATRY & NEUROLOGY

## 2021-05-05 PROCEDURE — 97535 SELF CARE MNGMENT TRAINING: CPT

## 2021-05-05 PROCEDURE — 90833 PSYTX W PT W E/M 30 MIN: CPT | Mod: ,,, | Performed by: PSYCHIATRY & NEUROLOGY

## 2021-05-05 PROCEDURE — 25000003 PHARM REV CODE 250: Performed by: NURSE PRACTITIONER

## 2021-05-05 PROCEDURE — 97530 THERAPEUTIC ACTIVITIES: CPT

## 2021-05-05 PROCEDURE — 99232 PR SUBSEQUENT HOSPITAL CARE,LEVL II: ICD-10-PCS | Mod: ,,, | Performed by: PSYCHIATRY & NEUROLOGY

## 2021-05-05 PROCEDURE — 94761 N-INVAS EAR/PLS OXIMETRY MLT: CPT

## 2021-05-05 PROCEDURE — 80048 BASIC METABOLIC PNL TOTAL CA: CPT | Performed by: FAMILY MEDICINE

## 2021-05-05 PROCEDURE — 99232 SBSQ HOSP IP/OBS MODERATE 35: CPT | Mod: ,,, | Performed by: PSYCHIATRY & NEUROLOGY

## 2021-05-05 PROCEDURE — 11000001 HC ACUTE MED/SURG PRIVATE ROOM

## 2021-05-05 PROCEDURE — 85025 COMPLETE CBC W/AUTO DIFF WBC: CPT | Performed by: FAMILY MEDICINE

## 2021-05-05 PROCEDURE — 99232 SBSQ HOSP IP/OBS MODERATE 35: CPT | Mod: ,,, | Performed by: FAMILY MEDICINE

## 2021-05-05 PROCEDURE — 36415 COLL VENOUS BLD VENIPUNCTURE: CPT | Performed by: FAMILY MEDICINE

## 2021-05-05 PROCEDURE — 99900035 HC TECH TIME PER 15 MIN (STAT)

## 2021-05-05 RX ADMIN — TAMSULOSIN HYDROCHLORIDE 0.4 MG: 0.4 CAPSULE ORAL at 08:05

## 2021-05-05 RX ADMIN — VENLAFAXINE HYDROCHLORIDE 150 MG: 150 CAPSULE, EXTENDED RELEASE ORAL at 08:05

## 2021-05-05 RX ADMIN — METOPROLOL SUCCINATE 50 MG: 50 TABLET, EXTENDED RELEASE ORAL at 08:05

## 2021-05-05 RX ADMIN — ALLOPURINOL 300 MG: 100 TABLET ORAL at 08:05

## 2021-05-05 RX ADMIN — SIMVASTATIN 40 MG: 40 TABLET, FILM COATED ORAL at 08:05

## 2021-05-05 RX ADMIN — LEVOTHYROXINE SODIUM 100 MCG: 100 TABLET ORAL at 05:05

## 2021-05-05 RX ADMIN — APIXABAN 5 MG: 5 TABLET, FILM COATED ORAL at 08:05

## 2021-05-05 RX ADMIN — MEMANTINE 10 MG: 10 TABLET ORAL at 08:05

## 2021-05-05 RX ADMIN — PANTOPRAZOLE SODIUM 40 MG: 40 TABLET, DELAYED RELEASE ORAL at 08:05

## 2021-05-06 ENCOUNTER — TELEPHONE (OUTPATIENT)
Dept: FAMILY MEDICINE | Facility: CLINIC | Age: 81
End: 2021-05-06

## 2021-05-06 VITALS
RESPIRATION RATE: 20 BRPM | DIASTOLIC BLOOD PRESSURE: 64 MMHG | HEIGHT: 68 IN | TEMPERATURE: 97 F | SYSTOLIC BLOOD PRESSURE: 102 MMHG | BODY MASS INDEX: 32.68 KG/M2 | WEIGHT: 215.63 LBS | HEART RATE: 92 BPM | OXYGEN SATURATION: 96 %

## 2021-05-06 LAB
ANION GAP SERPL CALC-SCNC: 12 MMOL/L (ref 8–16)
BASOPHILS # BLD AUTO: 0.05 K/UL (ref 0–0.2)
BASOPHILS NFR BLD: 0.6 % (ref 0–1.9)
BUN SERPL-MCNC: 13 MG/DL (ref 8–23)
CALCIUM SERPL-MCNC: 9.6 MG/DL (ref 8.7–10.5)
CHLORIDE SERPL-SCNC: 107 MMOL/L (ref 95–110)
CO2 SERPL-SCNC: 21 MMOL/L (ref 23–29)
CREAT SERPL-MCNC: 1 MG/DL (ref 0.5–1.4)
DIFFERENTIAL METHOD: ABNORMAL
EOSINOPHIL # BLD AUTO: 0.4 K/UL (ref 0–0.5)
EOSINOPHIL NFR BLD: 4.2 % (ref 0–8)
ERYTHROCYTE [DISTWIDTH] IN BLOOD BY AUTOMATED COUNT: 14.3 % (ref 11.5–14.5)
EST. GFR  (AFRICAN AMERICAN): >60 ML/MIN/1.73 M^2
EST. GFR  (NON AFRICAN AMERICAN): >60 ML/MIN/1.73 M^2
GLUCOSE SERPL-MCNC: 95 MG/DL (ref 70–110)
HCT VFR BLD AUTO: 36.4 % (ref 40–54)
HGB BLD-MCNC: 11.6 G/DL (ref 14–18)
IMM GRANULOCYTES # BLD AUTO: 0.04 K/UL (ref 0–0.04)
IMM GRANULOCYTES NFR BLD AUTO: 0.5 % (ref 0–0.5)
LYMPHOCYTES # BLD AUTO: 1.3 K/UL (ref 1–4.8)
LYMPHOCYTES NFR BLD: 15.6 % (ref 18–48)
MCH RBC QN AUTO: 29.2 PG (ref 27–31)
MCHC RBC AUTO-ENTMCNC: 31.9 G/DL (ref 32–36)
MCV RBC AUTO: 92 FL (ref 82–98)
MONOCYTES # BLD AUTO: 0.6 K/UL (ref 0.3–1)
MONOCYTES NFR BLD: 7.1 % (ref 4–15)
NEUTROPHILS # BLD AUTO: 6.2 K/UL (ref 1.8–7.7)
NEUTROPHILS NFR BLD: 72 % (ref 38–73)
NRBC BLD-RTO: 0 /100 WBC
PLATELET # BLD AUTO: 255 K/UL (ref 150–450)
PMV BLD AUTO: 11.2 FL (ref 9.2–12.9)
POTASSIUM SERPL-SCNC: 3.3 MMOL/L (ref 3.5–5.1)
RBC # BLD AUTO: 3.97 M/UL (ref 4.6–6.2)
SODIUM SERPL-SCNC: 140 MMOL/L (ref 136–145)
WBC # BLD AUTO: 8.59 K/UL (ref 3.9–12.7)

## 2021-05-06 PROCEDURE — 97535 SELF CARE MNGMENT TRAINING: CPT

## 2021-05-06 PROCEDURE — 99900035 HC TECH TIME PER 15 MIN (STAT)

## 2021-05-06 PROCEDURE — 99239 HOSP IP/OBS DSCHRG MGMT >30: CPT | Mod: ,,, | Performed by: FAMILY MEDICINE

## 2021-05-06 PROCEDURE — 36415 COLL VENOUS BLD VENIPUNCTURE: CPT | Performed by: FAMILY MEDICINE

## 2021-05-06 PROCEDURE — 94660 CPAP INITIATION&MGMT: CPT

## 2021-05-06 PROCEDURE — 85025 COMPLETE CBC W/AUTO DIFF WBC: CPT | Performed by: FAMILY MEDICINE

## 2021-05-06 PROCEDURE — 94760 N-INVAS EAR/PLS OXIMETRY 1: CPT

## 2021-05-06 PROCEDURE — 99239 PR HOSPITAL DISCHARGE DAY,>30 MIN: ICD-10-PCS | Mod: ,,, | Performed by: FAMILY MEDICINE

## 2021-05-06 PROCEDURE — 80048 BASIC METABOLIC PNL TOTAL CA: CPT | Performed by: FAMILY MEDICINE

## 2021-05-06 PROCEDURE — 25000003 PHARM REV CODE 250: Performed by: FAMILY MEDICINE

## 2021-05-06 PROCEDURE — 97530 THERAPEUTIC ACTIVITIES: CPT

## 2021-05-06 RX ADMIN — PANTOPRAZOLE SODIUM 40 MG: 40 TABLET, DELAYED RELEASE ORAL at 08:05

## 2021-05-06 RX ADMIN — ALLOPURINOL 300 MG: 100 TABLET ORAL at 08:05

## 2021-05-06 RX ADMIN — LEVOTHYROXINE SODIUM 100 MCG: 100 TABLET ORAL at 05:05

## 2021-05-06 RX ADMIN — VENLAFAXINE HYDROCHLORIDE 150 MG: 150 CAPSULE, EXTENDED RELEASE ORAL at 08:05

## 2021-05-06 RX ADMIN — APIXABAN 5 MG: 5 TABLET, FILM COATED ORAL at 08:05

## 2021-05-06 RX ADMIN — METOPROLOL SUCCINATE 50 MG: 50 TABLET, EXTENDED RELEASE ORAL at 08:05

## 2021-05-06 RX ADMIN — MEMANTINE 10 MG: 10 TABLET ORAL at 08:05

## 2021-05-07 ENCOUNTER — TELEPHONE (OUTPATIENT)
Dept: FAMILY MEDICINE | Facility: CLINIC | Age: 81
End: 2021-05-07

## 2021-05-07 ENCOUNTER — PATIENT OUTREACH (OUTPATIENT)
Dept: ADMINISTRATIVE | Facility: CLINIC | Age: 81
End: 2021-05-07

## 2021-05-07 ENCOUNTER — HOSPITAL ENCOUNTER (OUTPATIENT)
Facility: HOSPITAL | Age: 81
Discharge: HOME | End: 2021-05-09
Attending: EMERGENCY MEDICINE | Admitting: INTERNAL MEDICINE
Payer: MEDICARE

## 2021-05-07 DIAGNOSIS — Z60.9 POOR SOCIAL SITUATION: ICD-10-CM

## 2021-05-07 DIAGNOSIS — R53.1 WEAKNESS: ICD-10-CM

## 2021-05-07 DIAGNOSIS — G47.33 OSA ON CPAP: ICD-10-CM

## 2021-05-07 DIAGNOSIS — E66.2 CLASS 2 OBESITY WITH ALVEOLAR HYPOVENTILATION, SERIOUS COMORBIDITY, AND BODY MASS INDEX (BMI) OF 39.0 TO 39.9 IN ADULT: ICD-10-CM

## 2021-05-07 DIAGNOSIS — E86.0 DEHYDRATION: ICD-10-CM

## 2021-05-07 DIAGNOSIS — N28.9 ACUTE RENAL INSUFFICIENCY: Primary | ICD-10-CM

## 2021-05-07 DIAGNOSIS — R53.1 WEAKNESS: Primary | ICD-10-CM

## 2021-05-07 DIAGNOSIS — F01.518 VASCULAR DEMENTIA WITH BEHAVIOR DISTURBANCE: ICD-10-CM

## 2021-05-07 DIAGNOSIS — I95.1 ORTHOSTATIC HYPOTENSION: ICD-10-CM

## 2021-05-07 DIAGNOSIS — N20.0 RIGHT NEPHROLITHIASIS: ICD-10-CM

## 2021-05-07 DIAGNOSIS — N39.0 URINARY TRACT INFECTION WITHOUT HEMATURIA, SITE UNSPECIFIED: ICD-10-CM

## 2021-05-07 LAB
ALBUMIN SERPL BCP-MCNC: 2.8 G/DL (ref 3.5–5.2)
ALP SERPL-CCNC: 93 U/L (ref 55–135)
ALT SERPL W/O P-5'-P-CCNC: 11 U/L (ref 10–44)
ANION GAP SERPL CALC-SCNC: 12 MMOL/L (ref 8–16)
AST SERPL-CCNC: 12 U/L (ref 10–40)
BACTERIA #/AREA URNS HPF: ABNORMAL /HPF
BASOPHILS # BLD AUTO: 0.04 K/UL (ref 0–0.2)
BASOPHILS NFR BLD: 0.4 % (ref 0–1.9)
BILIRUB SERPL-MCNC: 0.4 MG/DL (ref 0.1–1)
BILIRUB UR QL STRIP: ABNORMAL
BUN SERPL-MCNC: 21 MG/DL (ref 8–23)
CALCIUM SERPL-MCNC: 9.6 MG/DL (ref 8.7–10.5)
CHLORIDE SERPL-SCNC: 104 MMOL/L (ref 95–110)
CLARITY UR: ABNORMAL
CO2 SERPL-SCNC: 22 MMOL/L (ref 23–29)
COLOR UR: ABNORMAL
CREAT SERPL-MCNC: 1.8 MG/DL (ref 0.5–1.4)
DIFFERENTIAL METHOD: ABNORMAL
EOSINOPHIL # BLD AUTO: 0.3 K/UL (ref 0–0.5)
EOSINOPHIL NFR BLD: 3 % (ref 0–8)
ERYTHROCYTE [DISTWIDTH] IN BLOOD BY AUTOMATED COUNT: 14.5 % (ref 11.5–14.5)
EST. GFR  (AFRICAN AMERICAN): 41 ML/MIN/1.73 M^2
EST. GFR  (NON AFRICAN AMERICAN): 35 ML/MIN/1.73 M^2
GLUCOSE SERPL-MCNC: 115 MG/DL (ref 70–110)
GLUCOSE UR QL STRIP: NEGATIVE
HCT VFR BLD AUTO: 34.2 % (ref 40–54)
HGB BLD-MCNC: 10.8 G/DL (ref 14–18)
HGB UR QL STRIP: ABNORMAL
HYALINE CASTS #/AREA URNS LPF: 1 /LPF
IMM GRANULOCYTES # BLD AUTO: 0.06 K/UL (ref 0–0.04)
IMM GRANULOCYTES NFR BLD AUTO: 0.6 % (ref 0–0.5)
KETONES UR QL STRIP: ABNORMAL
LACTATE SERPL-SCNC: 1.5 MMOL/L (ref 0.5–2.2)
LEUKOCYTE ESTERASE UR QL STRIP: ABNORMAL
LYMPHOCYTES # BLD AUTO: 1.3 K/UL (ref 1–4.8)
LYMPHOCYTES NFR BLD: 12.7 % (ref 18–48)
MCH RBC QN AUTO: 29.2 PG (ref 27–31)
MCHC RBC AUTO-ENTMCNC: 31.6 G/DL (ref 32–36)
MCV RBC AUTO: 92 FL (ref 82–98)
MICROSCOPIC COMMENT: ABNORMAL
MONOCYTES # BLD AUTO: 0.7 K/UL (ref 0.3–1)
MONOCYTES NFR BLD: 7.1 % (ref 4–15)
NEUTROPHILS # BLD AUTO: 8 K/UL (ref 1.8–7.7)
NEUTROPHILS NFR BLD: 76.2 % (ref 38–73)
NITRITE UR QL STRIP: NEGATIVE
NON-SQ EPI CELLS #/AREA URNS HPF: 1 /HPF
NRBC BLD-RTO: 0 /100 WBC
PH UR STRIP: 6 [PH] (ref 5–8)
PLATELET # BLD AUTO: 273 K/UL (ref 150–450)
PMV BLD AUTO: 11 FL (ref 9.2–12.9)
POTASSIUM SERPL-SCNC: 3.6 MMOL/L (ref 3.5–5.1)
PROT SERPL-MCNC: 6.5 G/DL (ref 6–8.4)
PROT UR QL STRIP: ABNORMAL
RBC # BLD AUTO: 3.7 M/UL (ref 4.6–6.2)
RBC #/AREA URNS HPF: >100 /HPF (ref 0–4)
SARS-COV-2 RDRP RESP QL NAA+PROBE: NEGATIVE
SODIUM SERPL-SCNC: 138 MMOL/L (ref 136–145)
SP GR UR STRIP: 1.02 (ref 1–1.03)
SQUAMOUS #/AREA URNS HPF: 3 /HPF
URN SPEC COLLECT METH UR: ABNORMAL
UROBILINOGEN UR STRIP-ACNC: NEGATIVE EU/DL
WBC # BLD AUTO: 10.45 K/UL (ref 3.9–12.7)
WBC #/AREA URNS HPF: >100 /HPF (ref 0–5)
YEAST URNS QL MICRO: ABNORMAL

## 2021-05-07 PROCEDURE — 80053 COMPREHEN METABOLIC PANEL: CPT | Performed by: EMERGENCY MEDICINE

## 2021-05-07 PROCEDURE — 83605 ASSAY OF LACTIC ACID: CPT | Performed by: EMERGENCY MEDICINE

## 2021-05-07 PROCEDURE — 85025 COMPLETE CBC W/AUTO DIFF WBC: CPT | Performed by: EMERGENCY MEDICINE

## 2021-05-07 PROCEDURE — 25000003 PHARM REV CODE 250: Performed by: EMERGENCY MEDICINE

## 2021-05-07 PROCEDURE — 94760 N-INVAS EAR/PLS OXIMETRY 1: CPT

## 2021-05-07 PROCEDURE — 87086 URINE CULTURE/COLONY COUNT: CPT | Performed by: EMERGENCY MEDICINE

## 2021-05-07 PROCEDURE — 81000 URINALYSIS NONAUTO W/SCOPE: CPT | Performed by: EMERGENCY MEDICINE

## 2021-05-07 PROCEDURE — 36415 COLL VENOUS BLD VENIPUNCTURE: CPT | Performed by: EMERGENCY MEDICINE

## 2021-05-07 PROCEDURE — 96365 THER/PROPH/DIAG IV INF INIT: CPT

## 2021-05-07 PROCEDURE — 63600175 PHARM REV CODE 636 W HCPCS: Performed by: EMERGENCY MEDICINE

## 2021-05-07 PROCEDURE — U0002 COVID-19 LAB TEST NON-CDC: HCPCS | Performed by: EMERGENCY MEDICINE

## 2021-05-07 PROCEDURE — G0378 HOSPITAL OBSERVATION PER HR: HCPCS

## 2021-05-07 PROCEDURE — 99285 EMERGENCY DEPT VISIT HI MDM: CPT | Mod: 25

## 2021-05-07 RX ORDER — SODIUM CHLORIDE 9 MG/ML
INJECTION, SOLUTION INTRAVENOUS
Status: COMPLETED | OUTPATIENT
Start: 2021-05-07 | End: 2021-05-07

## 2021-05-07 RX ORDER — SODIUM CHLORIDE 0.9 % (FLUSH) 0.9 %
10 SYRINGE (ML) INJECTION
Status: DISCONTINUED | OUTPATIENT
Start: 2021-05-07 | End: 2021-05-09 | Stop reason: HOSPADM

## 2021-05-07 RX ORDER — TALC
6 POWDER (GRAM) TOPICAL NIGHTLY PRN
Status: DISCONTINUED | OUTPATIENT
Start: 2021-05-07 | End: 2021-05-09 | Stop reason: HOSPADM

## 2021-05-07 RX ADMIN — CEFTRIAXONE 2 G: 2 INJECTION, SOLUTION INTRAVENOUS at 08:05

## 2021-05-07 RX ADMIN — SODIUM CHLORIDE 999 ML/HR: 0.9 INJECTION, SOLUTION INTRAVENOUS at 08:05

## 2021-05-07 SDOH — SOCIAL DETERMINANTS OF HEALTH (SDOH): PROBLEM RELATED TO SOCIAL ENVIRONMENT, UNSPECIFIED: Z60.9

## 2021-05-08 PROBLEM — I95.1 ORTHOSTATIC HYPOTENSION: Status: ACTIVE | Noted: 2021-05-08

## 2021-05-08 PROBLEM — N28.9 ACUTE RENAL INSUFFICIENCY: Status: ACTIVE | Noted: 2018-04-18

## 2021-05-08 PROBLEM — N30.01 ACUTE CYSTITIS WITH HEMATURIA: Status: ACTIVE | Noted: 2021-05-08

## 2021-05-08 LAB
ALBUMIN SERPL BCP-MCNC: 2.5 G/DL (ref 3.5–5.2)
ALP SERPL-CCNC: 88 U/L (ref 55–135)
ALT SERPL W/O P-5'-P-CCNC: 6 U/L (ref 10–44)
ANION GAP SERPL CALC-SCNC: 10 MMOL/L (ref 8–16)
AST SERPL-CCNC: 12 U/L (ref 10–40)
BASOPHILS # BLD AUTO: 0.04 K/UL (ref 0–0.2)
BASOPHILS NFR BLD: 0.5 % (ref 0–1.9)
BILIRUB SERPL-MCNC: 0.3 MG/DL (ref 0.1–1)
BUN SERPL-MCNC: 18 MG/DL (ref 8–23)
CALCIUM SERPL-MCNC: 9.1 MG/DL (ref 8.7–10.5)
CHLORIDE SERPL-SCNC: 108 MMOL/L (ref 95–110)
CO2 SERPL-SCNC: 22 MMOL/L (ref 23–29)
CREAT SERPL-MCNC: 1.3 MG/DL (ref 0.5–1.4)
DIFFERENTIAL METHOD: ABNORMAL
EOSINOPHIL # BLD AUTO: 0.5 K/UL (ref 0–0.5)
EOSINOPHIL NFR BLD: 5.5 % (ref 0–8)
ERYTHROCYTE [DISTWIDTH] IN BLOOD BY AUTOMATED COUNT: 14.6 % (ref 11.5–14.5)
EST. GFR  (AFRICAN AMERICAN): >60 ML/MIN/1.73 M^2
EST. GFR  (NON AFRICAN AMERICAN): 52 ML/MIN/1.73 M^2
GLUCOSE SERPL-MCNC: 100 MG/DL (ref 70–110)
HCT VFR BLD AUTO: 32.8 % (ref 40–54)
HGB BLD-MCNC: 10.3 G/DL (ref 14–18)
IMM GRANULOCYTES # BLD AUTO: 0.05 K/UL (ref 0–0.04)
IMM GRANULOCYTES NFR BLD AUTO: 0.6 % (ref 0–0.5)
LYMPHOCYTES # BLD AUTO: 1.2 K/UL (ref 1–4.8)
LYMPHOCYTES NFR BLD: 14.2 % (ref 18–48)
MCH RBC QN AUTO: 29.1 PG (ref 27–31)
MCHC RBC AUTO-ENTMCNC: 31.4 G/DL (ref 32–36)
MCV RBC AUTO: 93 FL (ref 82–98)
MONOCYTES # BLD AUTO: 0.5 K/UL (ref 0.3–1)
MONOCYTES NFR BLD: 5.9 % (ref 4–15)
NEUTROPHILS # BLD AUTO: 6 K/UL (ref 1.8–7.7)
NEUTROPHILS NFR BLD: 73.3 % (ref 38–73)
NRBC BLD-RTO: 0 /100 WBC
PLATELET # BLD AUTO: 239 K/UL (ref 150–450)
PMV BLD AUTO: 11.4 FL (ref 9.2–12.9)
POTASSIUM SERPL-SCNC: 3.1 MMOL/L (ref 3.5–5.1)
PROT SERPL-MCNC: 5.8 G/DL (ref 6–8.4)
RBC # BLD AUTO: 3.54 M/UL (ref 4.6–6.2)
SODIUM SERPL-SCNC: 140 MMOL/L (ref 136–145)
WBC # BLD AUTO: 8.16 K/UL (ref 3.9–12.7)

## 2021-05-08 PROCEDURE — 80053 COMPREHEN METABOLIC PANEL: CPT | Performed by: EMERGENCY MEDICINE

## 2021-05-08 PROCEDURE — 25000003 PHARM REV CODE 250: Performed by: INTERNAL MEDICINE

## 2021-05-08 PROCEDURE — 99220 PR INITIAL OBSERVATION CARE,LEVL III: CPT | Mod: ,,, | Performed by: INTERNAL MEDICINE

## 2021-05-08 PROCEDURE — 94761 N-INVAS EAR/PLS OXIMETRY MLT: CPT

## 2021-05-08 PROCEDURE — G0378 HOSPITAL OBSERVATION PER HR: HCPCS

## 2021-05-08 PROCEDURE — 99900035 HC TECH TIME PER 15 MIN (STAT)

## 2021-05-08 PROCEDURE — 99220 PR INITIAL OBSERVATION CARE,LEVL III: ICD-10-PCS | Mod: ,,, | Performed by: INTERNAL MEDICINE

## 2021-05-08 PROCEDURE — 96361 HYDRATE IV INFUSION ADD-ON: CPT

## 2021-05-08 PROCEDURE — 36415 COLL VENOUS BLD VENIPUNCTURE: CPT | Performed by: EMERGENCY MEDICINE

## 2021-05-08 PROCEDURE — 96376 TX/PRO/DX INJ SAME DRUG ADON: CPT

## 2021-05-08 PROCEDURE — 63600175 PHARM REV CODE 636 W HCPCS: Performed by: INTERNAL MEDICINE

## 2021-05-08 PROCEDURE — 85025 COMPLETE CBC W/AUTO DIFF WBC: CPT | Performed by: EMERGENCY MEDICINE

## 2021-05-08 RX ORDER — SIMVASTATIN 40 MG/1
40 TABLET, FILM COATED ORAL NIGHTLY
Status: DISCONTINUED | OUTPATIENT
Start: 2021-05-08 | End: 2021-05-09 | Stop reason: HOSPADM

## 2021-05-08 RX ORDER — VENLAFAXINE HYDROCHLORIDE 150 MG/1
150 CAPSULE, EXTENDED RELEASE ORAL DAILY
Status: DISCONTINUED | OUTPATIENT
Start: 2021-05-08 | End: 2021-05-09 | Stop reason: HOSPADM

## 2021-05-08 RX ORDER — TAMSULOSIN HYDROCHLORIDE 0.4 MG/1
0.4 CAPSULE ORAL NIGHTLY
Status: DISCONTINUED | OUTPATIENT
Start: 2021-05-08 | End: 2021-05-09 | Stop reason: HOSPADM

## 2021-05-08 RX ORDER — ALLOPURINOL 100 MG/1
300 TABLET ORAL DAILY
Status: DISCONTINUED | OUTPATIENT
Start: 2021-05-08 | End: 2021-05-09 | Stop reason: HOSPADM

## 2021-05-08 RX ORDER — METOPROLOL SUCCINATE 50 MG/1
50 TABLET, EXTENDED RELEASE ORAL DAILY
Status: DISCONTINUED | OUTPATIENT
Start: 2021-05-08 | End: 2021-05-09 | Stop reason: HOSPADM

## 2021-05-08 RX ORDER — TAMSULOSIN HYDROCHLORIDE 0.4 MG/1
0.4 CAPSULE ORAL DAILY
Status: DISCONTINUED | OUTPATIENT
Start: 2021-05-08 | End: 2021-05-08

## 2021-05-08 RX ORDER — LEVOTHYROXINE SODIUM 100 UG/1
100 TABLET ORAL
Status: DISCONTINUED | OUTPATIENT
Start: 2021-05-09 | End: 2021-05-09 | Stop reason: HOSPADM

## 2021-05-08 RX ORDER — LEVALBUTEROL 1.25 MG/.5ML
1.25 SOLUTION, CONCENTRATE RESPIRATORY (INHALATION) EVERY 8 HOURS PRN
Status: DISCONTINUED | OUTPATIENT
Start: 2021-05-08 | End: 2021-05-09 | Stop reason: HOSPADM

## 2021-05-08 RX ORDER — SODIUM CHLORIDE 9 MG/ML
INJECTION, SOLUTION INTRAVENOUS CONTINUOUS
Status: DISCONTINUED | OUTPATIENT
Start: 2021-05-08 | End: 2021-05-09 | Stop reason: HOSPADM

## 2021-05-08 RX ORDER — MEMANTINE HYDROCHLORIDE 10 MG/1
10 TABLET ORAL 2 TIMES DAILY
Status: DISCONTINUED | OUTPATIENT
Start: 2021-05-08 | End: 2021-05-09 | Stop reason: HOSPADM

## 2021-05-08 RX ADMIN — MEMANTINE 10 MG: 10 TABLET ORAL at 10:05

## 2021-05-08 RX ADMIN — SIMVASTATIN 40 MG: 40 TABLET, FILM COATED ORAL at 10:05

## 2021-05-08 RX ADMIN — MEMANTINE 10 MG: 10 TABLET ORAL at 11:05

## 2021-05-08 RX ADMIN — ALLOPURINOL 300 MG: 100 TABLET ORAL at 11:05

## 2021-05-08 RX ADMIN — VENLAFAXINE HYDROCHLORIDE 150 MG: 150 CAPSULE, EXTENDED RELEASE ORAL at 11:05

## 2021-05-08 RX ADMIN — SODIUM CHLORIDE: 0.9 INJECTION, SOLUTION INTRAVENOUS at 10:05

## 2021-05-08 RX ADMIN — CEFTRIAXONE 1 G: 1 INJECTION, SOLUTION INTRAVENOUS at 11:05

## 2021-05-08 RX ADMIN — MICONAZOLE NITRATE: 20 OINTMENT TOPICAL at 01:05

## 2021-05-08 RX ADMIN — APIXABAN 5 MG: 5 TABLET, FILM COATED ORAL at 11:05

## 2021-05-08 RX ADMIN — TAMSULOSIN HYDROCHLORIDE 0.4 MG: 0.4 CAPSULE ORAL at 10:05

## 2021-05-08 RX ADMIN — SODIUM CHLORIDE: 0.9 INJECTION, SOLUTION INTRAVENOUS at 11:05

## 2021-05-08 RX ADMIN — MICONAZOLE NITRATE: 20 OINTMENT TOPICAL at 10:05

## 2021-05-08 RX ADMIN — METOPROLOL SUCCINATE 50 MG: 50 TABLET, EXTENDED RELEASE ORAL at 11:05

## 2021-05-08 RX ADMIN — APIXABAN 5 MG: 5 TABLET, FILM COATED ORAL at 10:05

## 2021-05-09 VITALS
BODY MASS INDEX: 33.41 KG/M2 | HEIGHT: 68 IN | OXYGEN SATURATION: 95 % | DIASTOLIC BLOOD PRESSURE: 66 MMHG | WEIGHT: 220.44 LBS | HEART RATE: 91 BPM | TEMPERATURE: 96 F | SYSTOLIC BLOOD PRESSURE: 94 MMHG | RESPIRATION RATE: 19 BRPM

## 2021-05-09 LAB
ALBUMIN SERPL BCP-MCNC: 2.5 G/DL (ref 3.5–5.2)
ALP SERPL-CCNC: 148 U/L (ref 55–135)
ALT SERPL W/O P-5'-P-CCNC: 31 U/L (ref 10–44)
ANION GAP SERPL CALC-SCNC: 9 MMOL/L (ref 8–16)
AST SERPL-CCNC: 53 U/L (ref 10–40)
BACTERIA UR CULT: NORMAL
BACTERIA UR CULT: NORMAL
BASOPHILS # BLD AUTO: 0.03 K/UL (ref 0–0.2)
BASOPHILS NFR BLD: 0.3 % (ref 0–1.9)
BILIRUB SERPL-MCNC: 0.4 MG/DL (ref 0.1–1)
BUN SERPL-MCNC: 13 MG/DL (ref 8–23)
CALCIUM SERPL-MCNC: 8.9 MG/DL (ref 8.7–10.5)
CHLORIDE SERPL-SCNC: 109 MMOL/L (ref 95–110)
CO2 SERPL-SCNC: 21 MMOL/L (ref 23–29)
CREAT SERPL-MCNC: 1 MG/DL (ref 0.5–1.4)
DIFFERENTIAL METHOD: ABNORMAL
EOSINOPHIL # BLD AUTO: 0.1 K/UL (ref 0–0.5)
EOSINOPHIL NFR BLD: 1.5 % (ref 0–8)
ERYTHROCYTE [DISTWIDTH] IN BLOOD BY AUTOMATED COUNT: 14.4 % (ref 11.5–14.5)
EST. GFR  (AFRICAN AMERICAN): >60 ML/MIN/1.73 M^2
EST. GFR  (NON AFRICAN AMERICAN): >60 ML/MIN/1.73 M^2
GLUCOSE SERPL-MCNC: 102 MG/DL (ref 70–110)
HCT VFR BLD AUTO: 32.6 % (ref 40–54)
HGB BLD-MCNC: 10.5 G/DL (ref 14–18)
IMM GRANULOCYTES # BLD AUTO: 0.04 K/UL (ref 0–0.04)
IMM GRANULOCYTES NFR BLD AUTO: 0.4 % (ref 0–0.5)
LYMPHOCYTES # BLD AUTO: 1.1 K/UL (ref 1–4.8)
LYMPHOCYTES NFR BLD: 11.5 % (ref 18–48)
MCH RBC QN AUTO: 29.4 PG (ref 27–31)
MCHC RBC AUTO-ENTMCNC: 32.2 G/DL (ref 32–36)
MCV RBC AUTO: 91 FL (ref 82–98)
MONOCYTES # BLD AUTO: 0.8 K/UL (ref 0.3–1)
MONOCYTES NFR BLD: 8.5 % (ref 4–15)
NEUTROPHILS # BLD AUTO: 7.4 K/UL (ref 1.8–7.7)
NEUTROPHILS NFR BLD: 77.8 % (ref 38–73)
NRBC BLD-RTO: 0 /100 WBC
PLATELET # BLD AUTO: 240 K/UL (ref 150–450)
PMV BLD AUTO: 11.1 FL (ref 9.2–12.9)
POTASSIUM SERPL-SCNC: 3.1 MMOL/L (ref 3.5–5.1)
PROT SERPL-MCNC: 5.9 G/DL (ref 6–8.4)
RBC # BLD AUTO: 3.57 M/UL (ref 4.6–6.2)
SODIUM SERPL-SCNC: 139 MMOL/L (ref 136–145)
WBC # BLD AUTO: 9.53 K/UL (ref 3.9–12.7)

## 2021-05-09 PROCEDURE — 85025 COMPLETE CBC W/AUTO DIFF WBC: CPT | Performed by: EMERGENCY MEDICINE

## 2021-05-09 PROCEDURE — 99217 PR OBSERVATION CARE DISCHARGE: CPT | Mod: ,,, | Performed by: INTERNAL MEDICINE

## 2021-05-09 PROCEDURE — 25000003 PHARM REV CODE 250: Performed by: INTERNAL MEDICINE

## 2021-05-09 PROCEDURE — 80053 COMPREHEN METABOLIC PANEL: CPT | Performed by: EMERGENCY MEDICINE

## 2021-05-09 PROCEDURE — 96361 HYDRATE IV INFUSION ADD-ON: CPT

## 2021-05-09 PROCEDURE — 99217 PR OBSERVATION CARE DISCHARGE: ICD-10-PCS | Mod: ,,, | Performed by: INTERNAL MEDICINE

## 2021-05-09 PROCEDURE — G0378 HOSPITAL OBSERVATION PER HR: HCPCS

## 2021-05-09 PROCEDURE — 36415 COLL VENOUS BLD VENIPUNCTURE: CPT | Performed by: EMERGENCY MEDICINE

## 2021-05-09 RX ORDER — NITROFURANTOIN 25; 75 MG/1; MG/1
100 CAPSULE ORAL DAILY
Qty: 10 CAPSULE | Refills: 0 | Status: SHIPPED | OUTPATIENT
Start: 2021-05-09 | End: 2021-05-19

## 2021-05-09 RX ORDER — POTASSIUM CHLORIDE 20 MEQ/1
40 TABLET, EXTENDED RELEASE ORAL ONCE
Status: COMPLETED | OUTPATIENT
Start: 2021-05-09 | End: 2021-05-09

## 2021-05-09 RX ORDER — ACETAMINOPHEN 325 MG/1
650 TABLET ORAL EVERY 6 HOURS PRN
Status: DISCONTINUED | OUTPATIENT
Start: 2021-05-09 | End: 2021-05-09 | Stop reason: HOSPADM

## 2021-05-09 RX ADMIN — MICONAZOLE NITRATE: 20 OINTMENT TOPICAL at 09:05

## 2021-05-09 RX ADMIN — ALLOPURINOL 300 MG: 100 TABLET ORAL at 08:05

## 2021-05-09 RX ADMIN — VENLAFAXINE HYDROCHLORIDE 150 MG: 150 CAPSULE, EXTENDED RELEASE ORAL at 08:05

## 2021-05-09 RX ADMIN — POTASSIUM CHLORIDE 40 MEQ: 1500 TABLET, EXTENDED RELEASE ORAL at 10:05

## 2021-05-09 RX ADMIN — SODIUM CHLORIDE: 0.9 INJECTION, SOLUTION INTRAVENOUS at 08:05

## 2021-05-09 RX ADMIN — MEMANTINE 10 MG: 10 TABLET ORAL at 08:05

## 2021-05-09 RX ADMIN — APIXABAN 5 MG: 5 TABLET, FILM COATED ORAL at 08:05

## 2021-05-09 RX ADMIN — METOPROLOL SUCCINATE 50 MG: 50 TABLET, EXTENDED RELEASE ORAL at 08:05

## 2021-05-09 RX ADMIN — LEVOTHYROXINE SODIUM 100 MCG: 100 TABLET ORAL at 07:05

## 2021-05-09 RX ADMIN — ACETAMINOPHEN 650 MG: 325 TABLET ORAL at 08:05

## 2021-05-12 ENCOUNTER — LAB VISIT (OUTPATIENT)
Dept: LAB | Facility: HOSPITAL | Age: 81
End: 2021-05-12
Attending: FAMILY MEDICINE
Payer: MEDICARE

## 2021-05-12 ENCOUNTER — TELEPHONE (OUTPATIENT)
Dept: FAMILY MEDICINE | Facility: CLINIC | Age: 81
End: 2021-05-12

## 2021-05-12 DIAGNOSIS — I10 HTN (HYPERTENSION): Primary | ICD-10-CM

## 2021-05-12 LAB
ANION GAP SERPL CALC-SCNC: 12 MMOL/L (ref 8–16)
BASOPHILS # BLD AUTO: 0.05 K/UL (ref 0–0.2)
BASOPHILS NFR BLD: 0.6 % (ref 0–1.9)
BUN SERPL-MCNC: 13 MG/DL (ref 8–23)
CALCIUM SERPL-MCNC: 9.4 MG/DL (ref 8.7–10.5)
CHLORIDE SERPL-SCNC: 106 MMOL/L (ref 95–110)
CO2 SERPL-SCNC: 22 MMOL/L (ref 23–29)
CREAT SERPL-MCNC: 1 MG/DL (ref 0.5–1.4)
DIFFERENTIAL METHOD: ABNORMAL
EOSINOPHIL # BLD AUTO: 0.4 K/UL (ref 0–0.5)
EOSINOPHIL NFR BLD: 4.9 % (ref 0–8)
ERYTHROCYTE [DISTWIDTH] IN BLOOD BY AUTOMATED COUNT: 14.6 % (ref 11.5–14.5)
EST. GFR  (AFRICAN AMERICAN): >60 ML/MIN/1.73 M^2
EST. GFR  (NON AFRICAN AMERICAN): >60 ML/MIN/1.73 M^2
GLUCOSE SERPL-MCNC: 64 MG/DL (ref 70–110)
HCT VFR BLD AUTO: 36.2 % (ref 40–54)
HGB BLD-MCNC: 11.2 G/DL (ref 14–18)
IMM GRANULOCYTES # BLD AUTO: 0.04 K/UL (ref 0–0.04)
IMM GRANULOCYTES NFR BLD AUTO: 0.4 % (ref 0–0.5)
LYMPHOCYTES # BLD AUTO: 1.4 K/UL (ref 1–4.8)
LYMPHOCYTES NFR BLD: 15.6 % (ref 18–48)
MCH RBC QN AUTO: 29 PG (ref 27–31)
MCHC RBC AUTO-ENTMCNC: 30.9 G/DL (ref 32–36)
MCV RBC AUTO: 94 FL (ref 82–98)
MONOCYTES # BLD AUTO: 0.8 K/UL (ref 0.3–1)
MONOCYTES NFR BLD: 8.5 % (ref 4–15)
NEUTROPHILS # BLD AUTO: 6.3 K/UL (ref 1.8–7.7)
NEUTROPHILS NFR BLD: 70 % (ref 38–73)
NRBC BLD-RTO: 0 /100 WBC
PLATELET # BLD AUTO: 291 K/UL (ref 150–450)
PMV BLD AUTO: 11 FL (ref 9.2–12.9)
POTASSIUM SERPL-SCNC: 3.1 MMOL/L (ref 3.5–5.1)
RBC # BLD AUTO: 3.86 M/UL (ref 4.6–6.2)
SODIUM SERPL-SCNC: 140 MMOL/L (ref 136–145)
WBC # BLD AUTO: 9.02 K/UL (ref 3.9–12.7)

## 2021-05-12 PROCEDURE — G0180 PR HOME HEALTH MD CERTIFICATION: ICD-10-PCS | Mod: ,,, | Performed by: NURSE PRACTITIONER

## 2021-05-12 PROCEDURE — 85025 COMPLETE CBC W/AUTO DIFF WBC: CPT

## 2021-05-12 PROCEDURE — 80048 BASIC METABOLIC PNL TOTAL CA: CPT

## 2021-05-12 PROCEDURE — G0180 MD CERTIFICATION HHA PATIENT: HCPCS | Mod: ,,, | Performed by: NURSE PRACTITIONER

## 2021-05-13 ENCOUNTER — OFFICE VISIT (OUTPATIENT)
Dept: FAMILY MEDICINE | Facility: CLINIC | Age: 81
End: 2021-05-13
Payer: MEDICARE

## 2021-05-13 VITALS
BODY MASS INDEX: 33.52 KG/M2 | HEART RATE: 58 BPM | DIASTOLIC BLOOD PRESSURE: 76 MMHG | RESPIRATION RATE: 16 BRPM | SYSTOLIC BLOOD PRESSURE: 124 MMHG | HEIGHT: 68 IN

## 2021-05-13 DIAGNOSIS — I95.1 ORTHOSTATIC HYPOTENSION: ICD-10-CM

## 2021-05-13 DIAGNOSIS — N30.01 ACUTE CYSTITIS WITH HEMATURIA: ICD-10-CM

## 2021-05-13 DIAGNOSIS — G47.33 OSA (OBSTRUCTIVE SLEEP APNEA): ICD-10-CM

## 2021-05-13 DIAGNOSIS — R53.81 DEBILITY: ICD-10-CM

## 2021-05-13 DIAGNOSIS — Z09 HOSPITAL DISCHARGE FOLLOW-UP: Primary | ICD-10-CM

## 2021-05-13 DIAGNOSIS — N28.9 ACUTE RENAL INSUFFICIENCY: ICD-10-CM

## 2021-05-13 PROCEDURE — 3288F FALL RISK ASSESSMENT DOCD: CPT | Mod: CPTII,S$GLB,, | Performed by: FAMILY MEDICINE

## 2021-05-13 PROCEDURE — 1126F AMNT PAIN NOTED NONE PRSNT: CPT | Mod: S$GLB,,, | Performed by: FAMILY MEDICINE

## 2021-05-13 PROCEDURE — 99999 PR PBB SHADOW E&M-EST. PATIENT-LVL III: CPT | Mod: PBBFAC,,, | Performed by: FAMILY MEDICINE

## 2021-05-13 PROCEDURE — 1157F PR ADVANCE CARE PLAN OR EQUIV PRESENT IN MEDICAL RECORD: ICD-10-PCS | Mod: S$GLB,,, | Performed by: FAMILY MEDICINE

## 2021-05-13 PROCEDURE — 1100F PTFALLS ASSESS-DOCD GE2>/YR: CPT | Mod: CPTII,S$GLB,, | Performed by: FAMILY MEDICINE

## 2021-05-13 PROCEDURE — 1159F PR MEDICATION LIST DOCUMENTED IN MEDICAL RECORD: ICD-10-PCS | Mod: S$GLB,,, | Performed by: FAMILY MEDICINE

## 2021-05-13 PROCEDURE — 99999 PR PBB SHADOW E&M-EST. PATIENT-LVL III: ICD-10-PCS | Mod: PBBFAC,,, | Performed by: FAMILY MEDICINE

## 2021-05-13 PROCEDURE — 1100F PR PT FALLS ASSESS DOC 2+ FALLS/FALL W/INJURY/YR: ICD-10-PCS | Mod: CPTII,S$GLB,, | Performed by: FAMILY MEDICINE

## 2021-05-13 PROCEDURE — 1159F MED LIST DOCD IN RCRD: CPT | Mod: S$GLB,,, | Performed by: FAMILY MEDICINE

## 2021-05-13 PROCEDURE — 1126F PR PAIN SEVERITY QUANTIFIED, NO PAIN PRESENT: ICD-10-PCS | Mod: S$GLB,,, | Performed by: FAMILY MEDICINE

## 2021-05-13 PROCEDURE — 99214 PR OFFICE/OUTPT VISIT, EST, LEVL IV, 30-39 MIN: ICD-10-PCS | Mod: S$GLB,,, | Performed by: FAMILY MEDICINE

## 2021-05-13 PROCEDURE — 3288F PR FALLS RISK ASSESSMENT DOCUMENTED: ICD-10-PCS | Mod: CPTII,S$GLB,, | Performed by: FAMILY MEDICINE

## 2021-05-13 PROCEDURE — 99214 OFFICE O/P EST MOD 30 MIN: CPT | Mod: S$GLB,,, | Performed by: FAMILY MEDICINE

## 2021-05-13 PROCEDURE — 1157F ADVNC CARE PLAN IN RCRD: CPT | Mod: S$GLB,,, | Performed by: FAMILY MEDICINE

## 2021-05-19 ENCOUNTER — TELEPHONE (OUTPATIENT)
Dept: INTERNAL MEDICINE | Facility: CLINIC | Age: 81
End: 2021-05-19

## 2021-05-19 ENCOUNTER — EXTERNAL HOME HEALTH (OUTPATIENT)
Dept: HOME HEALTH SERVICES | Facility: HOSPITAL | Age: 81
End: 2021-05-19
Payer: MEDICARE

## 2021-05-24 ENCOUNTER — HOSPITAL ENCOUNTER (EMERGENCY)
Facility: HOSPITAL | Age: 81
Discharge: HOME | End: 2021-05-24
Attending: EMERGENCY MEDICINE
Payer: MEDICARE

## 2021-05-24 ENCOUNTER — TELEPHONE (OUTPATIENT)
Dept: FAMILY MEDICINE | Facility: CLINIC | Age: 81
End: 2021-05-24

## 2021-05-24 VITALS
BODY MASS INDEX: 28.7 KG/M2 | SYSTOLIC BLOOD PRESSURE: 140 MMHG | DIASTOLIC BLOOD PRESSURE: 74 MMHG | HEART RATE: 76 BPM | WEIGHT: 205 LBS | HEIGHT: 71 IN | OXYGEN SATURATION: 97 % | RESPIRATION RATE: 20 BRPM | TEMPERATURE: 97 F

## 2021-05-24 DIAGNOSIS — R53.1 WEAKNESS: ICD-10-CM

## 2021-05-24 DIAGNOSIS — S09.90XA INJURY OF HEAD, INITIAL ENCOUNTER: Primary | ICD-10-CM

## 2021-05-24 DIAGNOSIS — Z96.652 HISTORY OF LEFT KNEE REPLACEMENT: ICD-10-CM

## 2021-05-24 DIAGNOSIS — F01.518 VASCULAR DEMENTIA WITH BEHAVIOR DISTURBANCE: ICD-10-CM

## 2021-05-24 DIAGNOSIS — S01.81XA LACERATION OF FOREHEAD, INITIAL ENCOUNTER: ICD-10-CM

## 2021-05-24 DIAGNOSIS — R53.81 DEBILITY: Primary | ICD-10-CM

## 2021-05-24 PROCEDURE — 25000003 PHARM REV CODE 250: Performed by: EMERGENCY MEDICINE

## 2021-05-24 PROCEDURE — 99284 EMERGENCY DEPT VISIT MOD MDM: CPT | Mod: 25

## 2021-05-24 PROCEDURE — 12013 RPR F/E/E/N/L/M 2.6-5.0 CM: CPT

## 2021-05-24 RX ORDER — LIDOCAINE HYDROCHLORIDE AND EPINEPHRINE 20; 10 MG/ML; UG/ML
10 INJECTION, SOLUTION INFILTRATION; PERINEURAL
Status: COMPLETED | OUTPATIENT
Start: 2021-05-24 | End: 2021-05-24

## 2021-05-24 RX ADMIN — LIDOCAINE HYDROCHLORIDE,EPINEPHRINE BITARTRATE 10 ML: 20; .01 INJECTION, SOLUTION INFILTRATION; PERINEURAL at 02:05

## 2021-05-24 RX ADMIN — BACITRACIN, NEOMYCIN, POLYMYXIN B 1 EACH: 400; 3.5; 5 OINTMENT TOPICAL at 02:05

## 2021-05-25 ENCOUNTER — DOCUMENT SCAN (OUTPATIENT)
Dept: HOME HEALTH SERVICES | Facility: HOSPITAL | Age: 81
End: 2021-05-25
Payer: MEDICARE

## 2021-05-26 ENCOUNTER — OFFICE VISIT (OUTPATIENT)
Dept: FAMILY MEDICINE | Facility: CLINIC | Age: 81
End: 2021-05-26
Payer: MEDICARE

## 2021-05-26 VITALS
WEIGHT: 205 LBS | DIASTOLIC BLOOD PRESSURE: 62 MMHG | BODY MASS INDEX: 28.7 KG/M2 | TEMPERATURE: 99 F | SYSTOLIC BLOOD PRESSURE: 96 MMHG | RESPIRATION RATE: 16 BRPM | HEART RATE: 80 BPM | HEIGHT: 71 IN

## 2021-05-26 DIAGNOSIS — F32.A DEPRESSION, UNSPECIFIED DEPRESSION TYPE: ICD-10-CM
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
## 2021-05-26 PROCEDURE — 99999 PR PBB SHADOW E&M-EST. PATIENT-LVL IV: ICD-10-PCS | Mod: PBBFAC,,, | Performed by: STUDENT IN AN ORGANIZED HEALTH CARE EDUCATION/TRAINING PROGRAM

## 2021-05-26 PROCEDURE — 1126F PR PAIN SEVERITY QUANTIFIED, NO PAIN PRESENT: ICD-10-PCS | Mod: S$GLB,,, | Performed by: STUDENT IN AN ORGANIZED HEALTH CARE EDUCATION/TRAINING PROGRAM

## 2021-05-26 PROCEDURE — 1126F AMNT PAIN NOTED NONE PRSNT: CPT | Mod: S$GLB,,, | Performed by: STUDENT IN AN ORGANIZED HEALTH CARE EDUCATION/TRAINING PROGRAM

## 2021-05-26 PROCEDURE — 1159F MED LIST DOCD IN RCRD: CPT | Mod: S$GLB,,, | Performed by: STUDENT IN AN ORGANIZED HEALTH CARE EDUCATION/TRAINING PROGRAM

## 2021-05-26 PROCEDURE — 1157F ADVNC CARE PLAN IN RCRD: CPT | Mod: S$GLB,,, | Performed by: STUDENT IN AN ORGANIZED HEALTH CARE EDUCATION/TRAINING PROGRAM

## 2021-05-26 PROCEDURE — 99213 OFFICE O/P EST LOW 20 MIN: CPT | Mod: S$GLB,,, | Performed by: STUDENT IN AN ORGANIZED HEALTH CARE EDUCATION/TRAINING PROGRAM

## 2021-05-26 PROCEDURE — 3288F FALL RISK ASSESSMENT DOCD: CPT | Mod: CPTII,S$GLB,, | Performed by: STUDENT IN AN ORGANIZED HEALTH CARE EDUCATION/TRAINING PROGRAM

## 2021-05-26 PROCEDURE — 1100F PR PT FALLS ASSESS DOC 2+ FALLS/FALL W/INJURY/YR: ICD-10-PCS | Mod: CPTII,S$GLB,, | Performed by: STUDENT IN AN ORGANIZED HEALTH CARE EDUCATION/TRAINING PROGRAM

## 2021-05-26 PROCEDURE — 99999 PR PBB SHADOW E&M-EST. PATIENT-LVL IV: CPT | Mod: PBBFAC,,, | Performed by: STUDENT IN AN ORGANIZED HEALTH CARE EDUCATION/TRAINING PROGRAM

## 2021-05-26 PROCEDURE — 1100F PTFALLS ASSESS-DOCD GE2>/YR: CPT | Mod: CPTII,S$GLB,, | Performed by: STUDENT IN AN ORGANIZED HEALTH CARE EDUCATION/TRAINING PROGRAM

## 2021-05-26 PROCEDURE — 3288F PR FALLS RISK ASSESSMENT DOCUMENTED: ICD-10-PCS | Mod: CPTII,S$GLB,, | Performed by: STUDENT IN AN ORGANIZED HEALTH CARE EDUCATION/TRAINING PROGRAM

## 2021-05-26 PROCEDURE — 1157F PR ADVANCE CARE PLAN OR EQUIV PRESENT IN MEDICAL RECORD: ICD-10-PCS | Mod: S$GLB,,, | Performed by: STUDENT IN AN ORGANIZED HEALTH CARE EDUCATION/TRAINING PROGRAM

## 2021-05-26 PROCEDURE — 1159F PR MEDICATION LIST DOCUMENTED IN MEDICAL RECORD: ICD-10-PCS | Mod: S$GLB,,, | Performed by: STUDENT IN AN ORGANIZED HEALTH CARE EDUCATION/TRAINING PROGRAM

## 2021-05-26 PROCEDURE — 99213 PR OFFICE/OUTPT VISIT, EST, LEVL III, 20-29 MIN: ICD-10-PCS | Mod: S$GLB,,, | Performed by: STUDENT IN AN ORGANIZED HEALTH CARE EDUCATION/TRAINING PROGRAM

## 2021-05-26 RX ORDER — VENLAFAXINE HYDROCHLORIDE 37.5 MG/1
37.5 CAPSULE, EXTENDED RELEASE ORAL DAILY
Qty: 30 CAPSULE | Refills: 0 | Status: SHIPPED | OUTPATIENT
Start: 2021-05-26 | End: 2021-11-23

## 2021-05-27 ENCOUNTER — DOCUMENT SCAN (OUTPATIENT)
Dept: HOME HEALTH SERVICES | Facility: HOSPITAL | Age: 81
End: 2021-05-27
Payer: MEDICARE

## 2021-05-31 ENCOUNTER — DOCUMENT SCAN (OUTPATIENT)
Dept: HOME HEALTH SERVICES | Facility: HOSPITAL | Age: 81
End: 2021-05-31
Payer: MEDICARE

## 2021-05-31 PROBLEM — N20.9 UROLITHIASIS: Status: ACTIVE | Noted: 2021-03-23

## 2021-05-31 PROBLEM — Z01.818 PREOP EXAMINATION: Status: ACTIVE | Noted: 2021-05-31

## 2021-05-31 PROBLEM — N20.0 NEPHROLITHIASIS: Status: RESOLVED | Noted: 2021-03-23 | Resolved: 2021-05-31

## 2021-06-02 PROBLEM — R50.9 FEVER: Status: ACTIVE | Noted: 2021-06-02

## 2021-06-08 DIAGNOSIS — E78.5 HYPERLIPIDEMIA, UNSPECIFIED HYPERLIPIDEMIA TYPE: ICD-10-CM

## 2021-06-08 DIAGNOSIS — E03.4 HYPOTHYROIDISM DUE TO ACQUIRED ATROPHY OF THYROID: ICD-10-CM

## 2021-06-08 RX ORDER — LEVOTHYROXINE SODIUM 100 UG/1
TABLET ORAL
Qty: 90 TABLET | Refills: 1 | Status: SHIPPED | OUTPATIENT
Start: 2021-06-08 | End: 2022-02-23 | Stop reason: SDUPTHER

## 2021-06-08 RX ORDER — SIMVASTATIN 40 MG/1
40 TABLET, FILM COATED ORAL NIGHTLY
Qty: 90 TABLET | Refills: 1 | Status: SHIPPED | OUTPATIENT
Start: 2021-06-08 | End: 2022-02-23 | Stop reason: SDUPTHER

## 2021-06-11 ENCOUNTER — DOCUMENT SCAN (OUTPATIENT)
Dept: HOME HEALTH SERVICES | Facility: HOSPITAL | Age: 81
End: 2021-06-11
Payer: MEDICARE

## 2021-06-11 DIAGNOSIS — R41.3 MEMORY LOSS: ICD-10-CM

## 2021-06-13 RX ORDER — MEMANTINE HYDROCHLORIDE 10 MG/1
10 TABLET ORAL 2 TIMES DAILY
Qty: 60 TABLET | Refills: 5 | Status: SHIPPED | OUTPATIENT
Start: 2021-06-13 | End: 2021-11-23 | Stop reason: SDUPTHER

## 2021-06-16 ENCOUNTER — DOCUMENT SCAN (OUTPATIENT)
Dept: HOME HEALTH SERVICES | Facility: HOSPITAL | Age: 81
End: 2021-06-16
Payer: MEDICARE

## 2021-06-23 ENCOUNTER — DOCUMENT SCAN (OUTPATIENT)
Dept: HOME HEALTH SERVICES | Facility: HOSPITAL | Age: 81
End: 2021-06-23
Payer: MEDICARE

## 2021-07-06 ENCOUNTER — DOCUMENT SCAN (OUTPATIENT)
Dept: HOME HEALTH SERVICES | Facility: HOSPITAL | Age: 81
End: 2021-07-06
Payer: MEDICARE

## 2021-07-08 ENCOUNTER — DOCUMENT SCAN (OUTPATIENT)
Dept: HOME HEALTH SERVICES | Facility: HOSPITAL | Age: 81
End: 2021-07-08
Payer: MEDICARE

## 2021-07-12 ENCOUNTER — TELEPHONE (OUTPATIENT)
Dept: FAMILY MEDICINE | Facility: CLINIC | Age: 81
End: 2021-07-12

## 2021-07-12 ENCOUNTER — APPOINTMENT (OUTPATIENT)
Dept: RADIOLOGY | Facility: CLINIC | Age: 81
End: 2021-07-12
Attending: FAMILY MEDICINE
Payer: MEDICARE

## 2021-07-12 ENCOUNTER — OFFICE VISIT (OUTPATIENT)
Dept: FAMILY MEDICINE | Facility: CLINIC | Age: 81
End: 2021-07-12
Payer: MEDICARE

## 2021-07-12 VITALS
BODY MASS INDEX: 31.91 KG/M2 | HEIGHT: 68 IN | HEART RATE: 82 BPM | RESPIRATION RATE: 18 BRPM | WEIGHT: 210.56 LBS | SYSTOLIC BLOOD PRESSURE: 122 MMHG | DIASTOLIC BLOOD PRESSURE: 84 MMHG

## 2021-07-12 DIAGNOSIS — J44.1 ACUTE EXACERBATION OF CHRONIC OBSTRUCTIVE PULMONARY DISEASE (COPD): ICD-10-CM

## 2021-07-12 DIAGNOSIS — J44.1 ACUTE EXACERBATION OF CHRONIC OBSTRUCTIVE PULMONARY DISEASE (COPD): Primary | ICD-10-CM

## 2021-07-12 PROCEDURE — 1159F PR MEDICATION LIST DOCUMENTED IN MEDICAL RECORD: ICD-10-PCS | Mod: S$GLB,,, | Performed by: FAMILY MEDICINE

## 2021-07-12 PROCEDURE — 1126F AMNT PAIN NOTED NONE PRSNT: CPT | Mod: S$GLB,,, | Performed by: FAMILY MEDICINE

## 2021-07-12 PROCEDURE — 96372 PR INJECTION,THERAP/PROPH/DIAG2ST, IM OR SUBCUT: ICD-10-PCS | Mod: S$GLB,,, | Performed by: FAMILY MEDICINE

## 2021-07-12 PROCEDURE — 1101F PT FALLS ASSESS-DOCD LE1/YR: CPT | Mod: CPTII,S$GLB,, | Performed by: FAMILY MEDICINE

## 2021-07-12 PROCEDURE — 3288F FALL RISK ASSESSMENT DOCD: CPT | Mod: CPTII,S$GLB,, | Performed by: FAMILY MEDICINE

## 2021-07-12 PROCEDURE — 99999 PR PBB SHADOW E&M-EST. PATIENT-LVL V: ICD-10-PCS | Mod: PBBFAC,,, | Performed by: FAMILY MEDICINE

## 2021-07-12 PROCEDURE — 1157F ADVNC CARE PLAN IN RCRD: CPT | Mod: S$GLB,,, | Performed by: FAMILY MEDICINE

## 2021-07-12 PROCEDURE — 71046 X-RAY EXAM CHEST 2 VIEWS: CPT | Mod: TC,PO

## 2021-07-12 PROCEDURE — 1101F PR PT FALLS ASSESS DOC 0-1 FALLS W/OUT INJ PAST YR: ICD-10-PCS | Mod: CPTII,S$GLB,, | Performed by: FAMILY MEDICINE

## 2021-07-12 PROCEDURE — 99499 RISK ADDL DX/OHS AUDIT: ICD-10-PCS | Mod: S$GLB,,, | Performed by: FAMILY MEDICINE

## 2021-07-12 PROCEDURE — 3288F PR FALLS RISK ASSESSMENT DOCUMENTED: ICD-10-PCS | Mod: CPTII,S$GLB,, | Performed by: FAMILY MEDICINE

## 2021-07-12 PROCEDURE — 1157F PR ADVANCE CARE PLAN OR EQUIV PRESENT IN MEDICAL RECORD: ICD-10-PCS | Mod: S$GLB,,, | Performed by: FAMILY MEDICINE

## 2021-07-12 PROCEDURE — 71046 XR CHEST PA AND LATERAL: ICD-10-PCS | Mod: 26,,, | Performed by: RADIOLOGY

## 2021-07-12 PROCEDURE — 99214 PR OFFICE/OUTPT VISIT, EST, LEVL IV, 30-39 MIN: ICD-10-PCS | Mod: 25,S$GLB,, | Performed by: FAMILY MEDICINE

## 2021-07-12 PROCEDURE — 99999 PR PBB SHADOW E&M-EST. PATIENT-LVL V: CPT | Mod: PBBFAC,,, | Performed by: FAMILY MEDICINE

## 2021-07-12 PROCEDURE — 99499 UNLISTED E&M SERVICE: CPT | Mod: S$GLB,,, | Performed by: FAMILY MEDICINE

## 2021-07-12 PROCEDURE — 1126F PR PAIN SEVERITY QUANTIFIED, NO PAIN PRESENT: ICD-10-PCS | Mod: S$GLB,,, | Performed by: FAMILY MEDICINE

## 2021-07-12 PROCEDURE — 71046 X-RAY EXAM CHEST 2 VIEWS: CPT | Mod: 26,,, | Performed by: RADIOLOGY

## 2021-07-12 PROCEDURE — 99214 OFFICE O/P EST MOD 30 MIN: CPT | Mod: 25,S$GLB,, | Performed by: FAMILY MEDICINE

## 2021-07-12 PROCEDURE — 1159F MED LIST DOCD IN RCRD: CPT | Mod: S$GLB,,, | Performed by: FAMILY MEDICINE

## 2021-07-12 PROCEDURE — 96372 THER/PROPH/DIAG INJ SC/IM: CPT | Mod: S$GLB,,, | Performed by: FAMILY MEDICINE

## 2021-07-12 RX ORDER — ALBUTEROL SULFATE 2.5 MG/.5ML
2.5 SOLUTION RESPIRATORY (INHALATION) EVERY 6 HOURS PRN
Qty: 120 EACH | Refills: 2 | Status: SHIPPED | OUTPATIENT
Start: 2021-07-12 | End: 2021-07-14 | Stop reason: SDUPTHER

## 2021-07-12 RX ORDER — METHYLPREDNISOLONE ACETATE 40 MG/ML
40 INJECTION, SUSPENSION INTRA-ARTICULAR; INTRALESIONAL; INTRAMUSCULAR; SOFT TISSUE
Status: COMPLETED | OUTPATIENT
Start: 2021-07-12 | End: 2021-07-12

## 2021-07-12 RX ORDER — TIOTROPIUM BROMIDE AND OLODATEROL 3.124; 2.736 UG/1; UG/1
2 SPRAY, METERED RESPIRATORY (INHALATION) DAILY
Qty: 4 G | Refills: 5 | Status: SHIPPED | OUTPATIENT
Start: 2021-07-12 | End: 2022-01-07

## 2021-07-12 RX ORDER — LEVOFLOXACIN 500 MG/1
500 TABLET, FILM COATED ORAL DAILY
Qty: 5 TABLET | Refills: 0 | Status: SHIPPED | OUTPATIENT
Start: 2021-07-12 | End: 2021-07-21 | Stop reason: ALTCHOICE

## 2021-07-12 RX ADMIN — METHYLPREDNISOLONE ACETATE 40 MG: 40 INJECTION, SUSPENSION INTRA-ARTICULAR; INTRALESIONAL; INTRAMUSCULAR; SOFT TISSUE at 02:07

## 2021-07-14 ENCOUNTER — DOCUMENT SCAN (OUTPATIENT)
Dept: HOME HEALTH SERVICES | Facility: HOSPITAL | Age: 81
End: 2021-07-14
Payer: MEDICARE

## 2021-07-14 ENCOUNTER — OFFICE VISIT (OUTPATIENT)
Dept: INTERNAL MEDICINE | Facility: CLINIC | Age: 81
End: 2021-07-14
Payer: MEDICARE

## 2021-07-14 VITALS
HEIGHT: 67 IN | RESPIRATION RATE: 18 BRPM | HEART RATE: 93 BPM | OXYGEN SATURATION: 95 % | DIASTOLIC BLOOD PRESSURE: 82 MMHG | BODY MASS INDEX: 32.53 KG/M2 | SYSTOLIC BLOOD PRESSURE: 124 MMHG | WEIGHT: 207.25 LBS

## 2021-07-14 DIAGNOSIS — J44.1 ACUTE EXACERBATION OF CHRONIC OBSTRUCTIVE PULMONARY DISEASE (COPD): ICD-10-CM

## 2021-07-14 PROCEDURE — 99213 OFFICE O/P EST LOW 20 MIN: CPT | Mod: 25,S$GLB,, | Performed by: FAMILY MEDICINE

## 2021-07-14 PROCEDURE — 1101F PR PT FALLS ASSESS DOC 0-1 FALLS W/OUT INJ PAST YR: ICD-10-PCS | Mod: CPTII,S$GLB,, | Performed by: FAMILY MEDICINE

## 2021-07-14 PROCEDURE — 3288F PR FALLS RISK ASSESSMENT DOCUMENTED: ICD-10-PCS | Mod: CPTII,S$GLB,, | Performed by: FAMILY MEDICINE

## 2021-07-14 PROCEDURE — 99999 PR PBB SHADOW E&M-EST. PATIENT-LVL V: ICD-10-PCS | Mod: PBBFAC,,, | Performed by: FAMILY MEDICINE

## 2021-07-14 PROCEDURE — 1157F PR ADVANCE CARE PLAN OR EQUIV PRESENT IN MEDICAL RECORD: ICD-10-PCS | Mod: S$GLB,,, | Performed by: FAMILY MEDICINE

## 2021-07-14 PROCEDURE — 1101F PT FALLS ASSESS-DOCD LE1/YR: CPT | Mod: CPTII,S$GLB,, | Performed by: FAMILY MEDICINE

## 2021-07-14 PROCEDURE — 99213 PR OFFICE/OUTPT VISIT, EST, LEVL III, 20-29 MIN: ICD-10-PCS | Mod: 25,S$GLB,, | Performed by: FAMILY MEDICINE

## 2021-07-14 PROCEDURE — 1159F PR MEDICATION LIST DOCUMENTED IN MEDICAL RECORD: ICD-10-PCS | Mod: S$GLB,,, | Performed by: FAMILY MEDICINE

## 2021-07-14 PROCEDURE — 1157F ADVNC CARE PLAN IN RCRD: CPT | Mod: S$GLB,,, | Performed by: FAMILY MEDICINE

## 2021-07-14 PROCEDURE — 96372 THER/PROPH/DIAG INJ SC/IM: CPT | Mod: S$GLB,,, | Performed by: FAMILY MEDICINE

## 2021-07-14 PROCEDURE — 99999 PR PBB SHADOW E&M-EST. PATIENT-LVL V: CPT | Mod: PBBFAC,,, | Performed by: FAMILY MEDICINE

## 2021-07-14 PROCEDURE — 1159F MED LIST DOCD IN RCRD: CPT | Mod: S$GLB,,, | Performed by: FAMILY MEDICINE

## 2021-07-14 PROCEDURE — 1126F PR PAIN SEVERITY QUANTIFIED, NO PAIN PRESENT: ICD-10-PCS | Mod: S$GLB,,, | Performed by: FAMILY MEDICINE

## 2021-07-14 PROCEDURE — 1126F AMNT PAIN NOTED NONE PRSNT: CPT | Mod: S$GLB,,, | Performed by: FAMILY MEDICINE

## 2021-07-14 PROCEDURE — 96372 PR INJECTION,THERAP/PROPH/DIAG2ST, IM OR SUBCUT: ICD-10-PCS | Mod: S$GLB,,, | Performed by: FAMILY MEDICINE

## 2021-07-14 PROCEDURE — 3288F FALL RISK ASSESSMENT DOCD: CPT | Mod: CPTII,S$GLB,, | Performed by: FAMILY MEDICINE

## 2021-07-14 RX ORDER — METHYLPREDNISOLONE ACETATE 40 MG/ML
40 INJECTION, SUSPENSION INTRA-ARTICULAR; INTRALESIONAL; INTRAMUSCULAR; SOFT TISSUE
Status: COMPLETED | OUTPATIENT
Start: 2021-07-14 | End: 2021-07-14

## 2021-07-14 RX ORDER — ALBUTEROL SULFATE 2.5 MG/.5ML
2.5 SOLUTION RESPIRATORY (INHALATION) EVERY 6 HOURS PRN
Qty: 120 EACH | Refills: 2 | Status: SHIPPED | OUTPATIENT
Start: 2021-07-14 | End: 2022-02-23

## 2021-07-14 RX ADMIN — METHYLPREDNISOLONE ACETATE 40 MG: 40 INJECTION, SUSPENSION INTRA-ARTICULAR; INTRALESIONAL; INTRAMUSCULAR; SOFT TISSUE at 01:07

## 2021-07-18 ENCOUNTER — DOCUMENT SCAN (OUTPATIENT)
Dept: HOME HEALTH SERVICES | Facility: HOSPITAL | Age: 81
End: 2021-07-18
Payer: MEDICARE

## 2021-07-20 ENCOUNTER — DOCUMENT SCAN (OUTPATIENT)
Dept: HOME HEALTH SERVICES | Facility: HOSPITAL | Age: 81
End: 2021-07-20
Payer: MEDICARE

## 2021-07-21 ENCOUNTER — OFFICE VISIT (OUTPATIENT)
Dept: INTERNAL MEDICINE | Facility: CLINIC | Age: 81
End: 2021-07-21
Payer: MEDICARE

## 2021-07-21 VITALS
SYSTOLIC BLOOD PRESSURE: 132 MMHG | RESPIRATION RATE: 18 BRPM | BODY MASS INDEX: 31.35 KG/M2 | WEIGHT: 199.75 LBS | HEIGHT: 67 IN | HEART RATE: 92 BPM | OXYGEN SATURATION: 97 % | DIASTOLIC BLOOD PRESSURE: 80 MMHG

## 2021-07-21 DIAGNOSIS — J42 CHRONIC BRONCHITIS, UNSPECIFIED CHRONIC BRONCHITIS TYPE: ICD-10-CM

## 2021-07-21 DIAGNOSIS — J44.1 ACUTE EXACERBATION OF CHRONIC OBSTRUCTIVE PULMONARY DISEASE (COPD): Primary | ICD-10-CM

## 2021-07-21 PROCEDURE — 99999 PR PBB SHADOW E&M-EST. PATIENT-LVL IV: ICD-10-PCS | Mod: PBBFAC,,, | Performed by: FAMILY MEDICINE

## 2021-07-21 PROCEDURE — 1159F MED LIST DOCD IN RCRD: CPT | Mod: CPTII,S$GLB,, | Performed by: FAMILY MEDICINE

## 2021-07-21 PROCEDURE — 3075F SYST BP GE 130 - 139MM HG: CPT | Mod: CPTII,S$GLB,, | Performed by: FAMILY MEDICINE

## 2021-07-21 PROCEDURE — 99213 PR OFFICE/OUTPT VISIT, EST, LEVL III, 20-29 MIN: ICD-10-PCS | Mod: S$GLB,,, | Performed by: FAMILY MEDICINE

## 2021-07-21 PROCEDURE — 1159F PR MEDICATION LIST DOCUMENTED IN MEDICAL RECORD: ICD-10-PCS | Mod: CPTII,S$GLB,, | Performed by: FAMILY MEDICINE

## 2021-07-21 PROCEDURE — 99999 PR PBB SHADOW E&M-EST. PATIENT-LVL IV: CPT | Mod: PBBFAC,,, | Performed by: FAMILY MEDICINE

## 2021-07-21 PROCEDURE — 3079F DIAST BP 80-89 MM HG: CPT | Mod: CPTII,S$GLB,, | Performed by: FAMILY MEDICINE

## 2021-07-21 PROCEDURE — 1101F PT FALLS ASSESS-DOCD LE1/YR: CPT | Mod: CPTII,S$GLB,, | Performed by: FAMILY MEDICINE

## 2021-07-21 PROCEDURE — 1101F PR PT FALLS ASSESS DOC 0-1 FALLS W/OUT INJ PAST YR: ICD-10-PCS | Mod: CPTII,S$GLB,, | Performed by: FAMILY MEDICINE

## 2021-07-21 PROCEDURE — 3288F PR FALLS RISK ASSESSMENT DOCUMENTED: ICD-10-PCS | Mod: CPTII,S$GLB,, | Performed by: FAMILY MEDICINE

## 2021-07-21 PROCEDURE — 1126F PR PAIN SEVERITY QUANTIFIED, NO PAIN PRESENT: ICD-10-PCS | Mod: CPTII,S$GLB,, | Performed by: FAMILY MEDICINE

## 2021-07-21 PROCEDURE — 1157F PR ADVANCE CARE PLAN OR EQUIV PRESENT IN MEDICAL RECORD: ICD-10-PCS | Mod: CPTII,S$GLB,, | Performed by: FAMILY MEDICINE

## 2021-07-21 PROCEDURE — 1126F AMNT PAIN NOTED NONE PRSNT: CPT | Mod: CPTII,S$GLB,, | Performed by: FAMILY MEDICINE

## 2021-07-21 PROCEDURE — 3075F PR MOST RECENT SYSTOLIC BLOOD PRESS GE 130-139MM HG: ICD-10-PCS | Mod: CPTII,S$GLB,, | Performed by: FAMILY MEDICINE

## 2021-07-21 PROCEDURE — 1157F ADVNC CARE PLAN IN RCRD: CPT | Mod: CPTII,S$GLB,, | Performed by: FAMILY MEDICINE

## 2021-07-21 PROCEDURE — 3288F FALL RISK ASSESSMENT DOCD: CPT | Mod: CPTII,S$GLB,, | Performed by: FAMILY MEDICINE

## 2021-07-21 PROCEDURE — 99213 OFFICE O/P EST LOW 20 MIN: CPT | Mod: S$GLB,,, | Performed by: FAMILY MEDICINE

## 2021-07-21 PROCEDURE — 3079F PR MOST RECENT DIASTOLIC BLOOD PRESSURE 80-89 MM HG: ICD-10-PCS | Mod: CPTII,S$GLB,, | Performed by: FAMILY MEDICINE

## 2021-07-21 RX ORDER — BUDESONIDE 1 MG/2ML
1 INHALANT ORAL DAILY
Qty: 60 ML | Refills: 5 | Status: SHIPPED | OUTPATIENT
Start: 2021-07-21 | End: 2022-02-23

## 2021-08-06 ENCOUNTER — DOCUMENT SCAN (OUTPATIENT)
Dept: HOME HEALTH SERVICES | Facility: HOSPITAL | Age: 81
End: 2021-08-06
Payer: MEDICARE

## 2021-08-09 ENCOUNTER — DOCUMENT SCAN (OUTPATIENT)
Dept: HOME HEALTH SERVICES | Facility: HOSPITAL | Age: 81
End: 2021-08-09
Payer: MEDICARE

## 2021-10-13 DIAGNOSIS — M1A.9XX0 CHRONIC GOUT WITHOUT TOPHUS, UNSPECIFIED CAUSE, UNSPECIFIED SITE: ICD-10-CM

## 2021-10-13 RX ORDER — ALLOPURINOL 300 MG/1
TABLET ORAL
Qty: 90 TABLET | Refills: 1 | Status: SHIPPED | OUTPATIENT
Start: 2021-10-13 | End: 2021-11-23

## 2021-11-22 ENCOUNTER — PATIENT OUTREACH (OUTPATIENT)
Dept: ADMINISTRATIVE | Facility: OTHER | Age: 81
End: 2021-11-22
Payer: MEDICARE

## 2021-11-23 ENCOUNTER — OFFICE VISIT (OUTPATIENT)
Dept: NEUROLOGY | Facility: CLINIC | Age: 81
End: 2021-11-23
Payer: MEDICARE

## 2021-11-23 VITALS
BODY MASS INDEX: 34.57 KG/M2 | HEIGHT: 67 IN | SYSTOLIC BLOOD PRESSURE: 128 MMHG | DIASTOLIC BLOOD PRESSURE: 76 MMHG | WEIGHT: 220.25 LBS | HEART RATE: 76 BPM | RESPIRATION RATE: 18 BRPM

## 2021-11-23 DIAGNOSIS — R41.3 MEMORY LOSS: ICD-10-CM

## 2021-11-23 DIAGNOSIS — G31.84 MILD COGNITIVE IMPAIRMENT: Primary | ICD-10-CM

## 2021-11-23 DIAGNOSIS — I10 PRIMARY HYPERTENSION: ICD-10-CM

## 2021-11-23 DIAGNOSIS — G47.33 OSA (OBSTRUCTIVE SLEEP APNEA): ICD-10-CM

## 2021-11-23 DIAGNOSIS — E03.4 HYPOTHYROIDISM DUE TO ACQUIRED ATROPHY OF THYROID: ICD-10-CM

## 2021-11-23 DIAGNOSIS — E78.2 MIXED HYPERLIPIDEMIA: ICD-10-CM

## 2021-11-23 PROBLEM — N28.9 ACUTE RENAL INSUFFICIENCY: Status: RESOLVED | Noted: 2018-04-18 | Resolved: 2021-11-23

## 2021-11-23 PROBLEM — N30.01 ACUTE CYSTITIS WITH HEMATURIA: Status: RESOLVED | Noted: 2021-05-08 | Resolved: 2021-11-23

## 2021-11-23 PROBLEM — Z01.818 PREOP EXAMINATION: Status: RESOLVED | Noted: 2021-05-31 | Resolved: 2021-11-23

## 2021-11-23 PROCEDURE — 1157F PR ADVANCE CARE PLAN OR EQUIV PRESENT IN MEDICAL RECORD: ICD-10-PCS | Mod: CPTII,S$GLB,, | Performed by: NURSE PRACTITIONER

## 2021-11-23 PROCEDURE — 99499 RISK ADDL DX/OHS AUDIT: ICD-10-PCS | Mod: S$GLB,,, | Performed by: NURSE PRACTITIONER

## 2021-11-23 PROCEDURE — 99214 PR OFFICE/OUTPT VISIT, EST, LEVL IV, 30-39 MIN: ICD-10-PCS | Mod: S$GLB,,, | Performed by: NURSE PRACTITIONER

## 2021-11-23 PROCEDURE — 99999 PR PBB SHADOW E&M-EST. PATIENT-LVL IV: ICD-10-PCS | Mod: PBBFAC,,, | Performed by: NURSE PRACTITIONER

## 2021-11-23 PROCEDURE — 99214 OFFICE O/P EST MOD 30 MIN: CPT | Mod: S$GLB,,, | Performed by: NURSE PRACTITIONER

## 2021-11-23 PROCEDURE — 99999 PR PBB SHADOW E&M-EST. PATIENT-LVL IV: CPT | Mod: PBBFAC,,, | Performed by: NURSE PRACTITIONER

## 2021-11-23 PROCEDURE — 1157F ADVNC CARE PLAN IN RCRD: CPT | Mod: CPTII,S$GLB,, | Performed by: NURSE PRACTITIONER

## 2021-11-23 PROCEDURE — 99499 UNLISTED E&M SERVICE: CPT | Mod: S$GLB,,, | Performed by: NURSE PRACTITIONER

## 2021-11-23 RX ORDER — MEMANTINE HYDROCHLORIDE 10 MG/1
10 TABLET ORAL 2 TIMES DAILY
Qty: 180 TABLET | Refills: 3 | Status: SHIPPED | OUTPATIENT
Start: 2021-11-23 | End: 2022-02-23 | Stop reason: SDUPTHER

## 2021-11-23 RX ORDER — ALLOPURINOL 300 MG/1
300 TABLET ORAL DAILY
COMMUNITY
End: 2022-02-23 | Stop reason: SDUPTHER

## 2021-12-06 ENCOUNTER — TELEPHONE (OUTPATIENT)
Dept: FAMILY MEDICINE | Facility: CLINIC | Age: 81
End: 2021-12-06
Payer: MEDICARE

## 2021-12-07 ENCOUNTER — HOSPITAL ENCOUNTER (INPATIENT)
Facility: HOSPITAL | Age: 81
LOS: 16 days | Discharge: HOME | DRG: 336 | End: 2021-12-23
Attending: STUDENT IN AN ORGANIZED HEALTH CARE EDUCATION/TRAINING PROGRAM | Admitting: INTERNAL MEDICINE
Payer: COMMERCIAL

## 2021-12-07 DIAGNOSIS — Z01.89 ENCOUNTER FOR IMAGING STUDY TO CONFIRM NASOGASTRIC (NG) TUBE PLACEMENT: ICD-10-CM

## 2021-12-07 DIAGNOSIS — Z00.8 EVALUATION BY PSYCHIATRIC SERVICE REQUIRED: ICD-10-CM

## 2021-12-07 DIAGNOSIS — F41.9 ANXIETY: ICD-10-CM

## 2021-12-07 DIAGNOSIS — Z60.9 POOR SOCIAL SITUATION: ICD-10-CM

## 2021-12-07 DIAGNOSIS — K56.609 SBO (SMALL BOWEL OBSTRUCTION): Primary | ICD-10-CM

## 2021-12-07 PROBLEM — N17.9 ACUTE RENAL FAILURE: Status: ACTIVE | Noted: 2021-12-07

## 2021-12-07 PROBLEM — R65.10 SIRS (SYSTEMIC INFLAMMATORY RESPONSE SYNDROME): Status: ACTIVE | Noted: 2021-12-07

## 2021-12-07 PROBLEM — R82.71 ASYMPTOMATIC BACTERIURIA: Status: ACTIVE | Noted: 2021-12-07

## 2021-12-07 PROBLEM — I48.0 PAROXYSMAL ATRIAL FIBRILLATION: Status: ACTIVE | Noted: 2021-12-07

## 2021-12-07 LAB
ALBUMIN SERPL BCP-MCNC: 3.4 G/DL (ref 3.5–5.2)
ALP SERPL-CCNC: 85 U/L (ref 55–135)
ALT SERPL W/O P-5'-P-CCNC: 10 U/L (ref 10–44)
ANION GAP SERPL CALC-SCNC: 20 MMOL/L (ref 8–16)
AST SERPL-CCNC: 12 U/L (ref 10–40)
BACTERIA #/AREA URNS HPF: ABNORMAL /HPF
BASOPHILS # BLD AUTO: 0.02 K/UL (ref 0–0.2)
BASOPHILS NFR BLD: 0.2 % (ref 0–1.9)
BILIRUB SERPL-MCNC: 1 MG/DL (ref 0.1–1)
BILIRUB UR QL STRIP: ABNORMAL
BUN SERPL-MCNC: 79 MG/DL (ref 8–23)
CALCIUM SERPL-MCNC: 10.9 MG/DL (ref 8.7–10.5)
CHLORIDE SERPL-SCNC: 95 MMOL/L (ref 95–110)
CLARITY UR: CLEAR
CO2 SERPL-SCNC: 20 MMOL/L (ref 23–29)
COLOR UR: YELLOW
CREAT SERPL-MCNC: 3.6 MG/DL (ref 0.5–1.4)
DIFFERENTIAL METHOD: ABNORMAL
EOSINOPHIL # BLD AUTO: 0 K/UL (ref 0–0.5)
EOSINOPHIL NFR BLD: 0 % (ref 0–8)
ERYTHROCYTE [DISTWIDTH] IN BLOOD BY AUTOMATED COUNT: 17.1 % (ref 11.5–14.5)
EST. GFR  (AFRICAN AMERICAN): 18 ML/MIN/1.73 M^2
EST. GFR  (NON AFRICAN AMERICAN): 15 ML/MIN/1.73 M^2
GLUCOSE SERPL-MCNC: 127 MG/DL (ref 70–110)
GLUCOSE UR QL STRIP: NEGATIVE
HCT VFR BLD AUTO: 47 % (ref 40–54)
HGB BLD-MCNC: 15.6 G/DL (ref 14–18)
HGB UR QL STRIP: ABNORMAL
HYALINE CASTS #/AREA URNS LPF: 0 /LPF
IMM GRANULOCYTES # BLD AUTO: 0.03 K/UL (ref 0–0.04)
IMM GRANULOCYTES NFR BLD AUTO: 0.3 % (ref 0–0.5)
KETONES UR QL STRIP: ABNORMAL
LACTATE SERPL-SCNC: 1.9 MMOL/L (ref 0.5–2.2)
LACTATE SERPL-SCNC: 2.7 MMOL/L (ref 0.5–2.2)
LEUKOCYTE ESTERASE UR QL STRIP: ABNORMAL
LIPASE SERPL-CCNC: 27 U/L (ref 4–60)
LYMPHOCYTES # BLD AUTO: 1.3 K/UL (ref 1–4.8)
LYMPHOCYTES NFR BLD: 11.4 % (ref 18–48)
MCH RBC QN AUTO: 29.2 PG (ref 27–31)
MCHC RBC AUTO-ENTMCNC: 33.2 G/DL (ref 32–36)
MCV RBC AUTO: 88 FL (ref 82–98)
MICROSCOPIC COMMENT: ABNORMAL
MONOCYTES # BLD AUTO: 1.9 K/UL (ref 0.3–1)
MONOCYTES NFR BLD: 16.6 % (ref 4–15)
NEUTROPHILS # BLD AUTO: 8.1 K/UL (ref 1.8–7.7)
NEUTROPHILS NFR BLD: 71.5 % (ref 38–73)
NITRITE UR QL STRIP: NEGATIVE
NRBC BLD-RTO: 0 /100 WBC
PH UR STRIP: 5 [PH] (ref 5–8)
PLATELET # BLD AUTO: 276 K/UL (ref 150–450)
PMV BLD AUTO: 12.1 FL (ref 9.2–12.9)
POCT GLUCOSE: 102 MG/DL (ref 70–110)
POTASSIUM SERPL-SCNC: 5 MMOL/L (ref 3.5–5.1)
PROT SERPL-MCNC: 6.9 G/DL (ref 6–8.4)
PROT UR QL STRIP: ABNORMAL
RBC # BLD AUTO: 5.34 M/UL (ref 4.6–6.2)
RBC #/AREA URNS HPF: 40 /HPF (ref 0–4)
SARS-COV-2 RDRP RESP QL NAA+PROBE: NEGATIVE
SODIUM SERPL-SCNC: 135 MMOL/L (ref 136–145)
SP GR UR STRIP: >=1.03 (ref 1–1.03)
URN SPEC COLLECT METH UR: ABNORMAL
UROBILINOGEN UR STRIP-ACNC: NEGATIVE EU/DL
WBC # BLD AUTO: 11.37 K/UL (ref 3.9–12.7)
WBC #/AREA URNS HPF: >100 /HPF (ref 0–5)

## 2021-12-07 PROCEDURE — 83690 ASSAY OF LIPASE: CPT | Performed by: STUDENT IN AN ORGANIZED HEALTH CARE EDUCATION/TRAINING PROGRAM

## 2021-12-07 PROCEDURE — 85025 COMPLETE CBC W/AUTO DIFF WBC: CPT | Performed by: STUDENT IN AN ORGANIZED HEALTH CARE EDUCATION/TRAINING PROGRAM

## 2021-12-07 PROCEDURE — 99223 1ST HOSP IP/OBS HIGH 75: CPT | Mod: ,,, | Performed by: INTERNAL MEDICINE

## 2021-12-07 PROCEDURE — 36415 COLL VENOUS BLD VENIPUNCTURE: CPT | Performed by: STUDENT IN AN ORGANIZED HEALTH CARE EDUCATION/TRAINING PROGRAM

## 2021-12-07 PROCEDURE — 87086 URINE CULTURE/COLONY COUNT: CPT | Performed by: STUDENT IN AN ORGANIZED HEALTH CARE EDUCATION/TRAINING PROGRAM

## 2021-12-07 PROCEDURE — 25000003 PHARM REV CODE 250: Performed by: INTERNAL MEDICINE

## 2021-12-07 PROCEDURE — 99223 PR INITIAL HOSPITAL CARE,LEVL III: ICD-10-PCS | Mod: ,,, | Performed by: INTERNAL MEDICINE

## 2021-12-07 PROCEDURE — 63600175 PHARM REV CODE 636 W HCPCS: Performed by: NURSE PRACTITIONER

## 2021-12-07 PROCEDURE — 83605 ASSAY OF LACTIC ACID: CPT | Performed by: STUDENT IN AN ORGANIZED HEALTH CARE EDUCATION/TRAINING PROGRAM

## 2021-12-07 PROCEDURE — 25000003 PHARM REV CODE 250: Performed by: NURSE PRACTITIONER

## 2021-12-07 PROCEDURE — U0002 COVID-19 LAB TEST NON-CDC: HCPCS | Performed by: STUDENT IN AN ORGANIZED HEALTH CARE EDUCATION/TRAINING PROGRAM

## 2021-12-07 PROCEDURE — 25000003 PHARM REV CODE 250: Performed by: STUDENT IN AN ORGANIZED HEALTH CARE EDUCATION/TRAINING PROGRAM

## 2021-12-07 PROCEDURE — 81000 URINALYSIS NONAUTO W/SCOPE: CPT | Performed by: STUDENT IN AN ORGANIZED HEALTH CARE EDUCATION/TRAINING PROGRAM

## 2021-12-07 PROCEDURE — 99285 EMERGENCY DEPT VISIT HI MDM: CPT | Mod: 25

## 2021-12-07 PROCEDURE — 94761 N-INVAS EAR/PLS OXIMETRY MLT: CPT

## 2021-12-07 PROCEDURE — 99223 PR INITIAL HOSPITAL CARE,LEVL III: ICD-10-PCS | Mod: ,,, | Performed by: SURGERY

## 2021-12-07 PROCEDURE — 80053 COMPREHEN METABOLIC PANEL: CPT | Performed by: STUDENT IN AN ORGANIZED HEALTH CARE EDUCATION/TRAINING PROGRAM

## 2021-12-07 PROCEDURE — 99223 1ST HOSP IP/OBS HIGH 75: CPT | Mod: ,,, | Performed by: SURGERY

## 2021-12-07 PROCEDURE — 96361 HYDRATE IV INFUSION ADD-ON: CPT

## 2021-12-07 PROCEDURE — 11000001 HC ACUTE MED/SURG PRIVATE ROOM

## 2021-12-07 PROCEDURE — 63600175 PHARM REV CODE 636 W HCPCS: Performed by: STUDENT IN AN ORGANIZED HEALTH CARE EDUCATION/TRAINING PROGRAM

## 2021-12-07 PROCEDURE — 96374 THER/PROPH/DIAG INJ IV PUSH: CPT

## 2021-12-07 RX ORDER — ENOXAPARIN SODIUM 100 MG/ML
1 INJECTION SUBCUTANEOUS
Status: DISCONTINUED | OUTPATIENT
Start: 2021-12-07 | End: 2021-12-10

## 2021-12-07 RX ORDER — FAMOTIDINE 10 MG/ML
20 INJECTION INTRAVENOUS
Status: COMPLETED | OUTPATIENT
Start: 2021-12-07 | End: 2021-12-07

## 2021-12-07 RX ORDER — MUPIROCIN 20 MG/G
OINTMENT TOPICAL 2 TIMES DAILY
Status: COMPLETED | OUTPATIENT
Start: 2021-12-07 | End: 2021-12-11

## 2021-12-07 RX ORDER — SODIUM CHLORIDE 0.9 % (FLUSH) 0.9 %
10 SYRINGE (ML) INJECTION
Status: DISCONTINUED | OUTPATIENT
Start: 2021-12-07 | End: 2021-12-23 | Stop reason: HOSPADM

## 2021-12-07 RX ORDER — LABETALOL HYDROCHLORIDE 5 MG/ML
5 INJECTION, SOLUTION INTRAVENOUS EVERY 6 HOURS
Status: DISCONTINUED | OUTPATIENT
Start: 2021-12-07 | End: 2021-12-10

## 2021-12-07 RX ORDER — TALC
6 POWDER (GRAM) TOPICAL NIGHTLY PRN
Status: DISCONTINUED | OUTPATIENT
Start: 2021-12-07 | End: 2021-12-23 | Stop reason: HOSPADM

## 2021-12-07 RX ORDER — SODIUM CHLORIDE 9 MG/ML
INJECTION, SOLUTION INTRAVENOUS CONTINUOUS
Status: DISCONTINUED | OUTPATIENT
Start: 2021-12-07 | End: 2021-12-16

## 2021-12-07 RX ADMIN — SODIUM CHLORIDE 1000 ML: 0.9 INJECTION, SOLUTION INTRAVENOUS at 09:12

## 2021-12-07 RX ADMIN — LABETALOL HYDROCHLORIDE 5 MG: 5 INJECTION, SOLUTION INTRAVENOUS at 11:12

## 2021-12-07 RX ADMIN — PIPERACILLIN AND TAZOBACTAM 4.5 G: 4; .5 INJECTION, POWDER, LYOPHILIZED, FOR SOLUTION INTRAVENOUS; PARENTERAL at 05:12

## 2021-12-07 RX ADMIN — CEFTRIAXONE 1 G: 1 INJECTION, SOLUTION INTRAVENOUS at 10:12

## 2021-12-07 RX ADMIN — MUPIROCIN: 20 OINTMENT TOPICAL at 08:12

## 2021-12-07 RX ADMIN — LABETALOL HYDROCHLORIDE 5 MG: 5 INJECTION, SOLUTION INTRAVENOUS at 06:12

## 2021-12-07 RX ADMIN — ENOXAPARIN SODIUM 100 MG: 100 INJECTION SUBCUTANEOUS at 12:12

## 2021-12-07 RX ADMIN — LIDOCAINE HYDROCHLORIDE 50 ML: 20 SOLUTION ORAL; TOPICAL at 03:12

## 2021-12-07 RX ADMIN — SODIUM CHLORIDE: 0.9 INJECTION, SOLUTION INTRAVENOUS at 10:12

## 2021-12-07 RX ADMIN — MUPIROCIN: 20 OINTMENT TOPICAL at 10:12

## 2021-12-07 RX ADMIN — FAMOTIDINE 20 MG: 10 INJECTION, SOLUTION INTRAVENOUS at 03:12

## 2021-12-07 RX ADMIN — LABETALOL HYDROCHLORIDE 5 MG: 5 INJECTION, SOLUTION INTRAVENOUS at 12:12

## 2021-12-07 RX ADMIN — SODIUM CHLORIDE, SODIUM LACTATE, POTASSIUM CHLORIDE, AND CALCIUM CHLORIDE 1000 ML: .6; .31; .03; .02 INJECTION, SOLUTION INTRAVENOUS at 03:12

## 2021-12-07 SDOH — SOCIAL DETERMINANTS OF HEALTH (SDOH): PROBLEM RELATED TO SOCIAL ENVIRONMENT, UNSPECIFIED: Z60.9

## 2021-12-07 NOTE — NURSING
Hand off report received from ALEXX Stephen. NG tube in place, waiting for x ray results to confirm placement and start suction. No signs of distress noted. Will continue to monitor.

## 2021-12-07 NOTE — ASSESSMENT & PLAN NOTE
Noted in Dr Fair notes  On eliquis and BB at home. NPO here. Will give lovenox- renally dose and labetalol IV for now

## 2021-12-07 NOTE — PLAN OF CARE
Mr Mary is here with a SBO and is stable at this time. He plans to discharge home with his son when criteria is met. He states he has no home health and his son helps him with all his needs.

## 2021-12-07 NOTE — ASSESSMENT & PLAN NOTE
He meets SIRS criteria but I think better explained by SBO presentation that infection.  UA > 100 WBC but he has no urinary tract symptoms. He has recurrent UTI and will start Rocephin now but my clinical impression is the abnormal vitals and elevated lactic acid are due to SBO, dehydration and not UTI sepsis

## 2021-12-07 NOTE — ASSESSMENT & PLAN NOTE
He has no UTI sx on interview day of admit  Culture urine  Start rocephin empirically as h/o of UTI and not a great historian but as noted above I think vitals/lactic acid precipitated by SBO and not UTI sepsis

## 2021-12-07 NOTE — ASSESSMENT & PLAN NOTE
NPO  NGT to wall suction  KUB daily  CXR today  IVF started    He meets SIRS criteria but I think better explained by SBO presentation that infection.  UA > 100 WBC but he has no urinary tract symptoms. He has recurrent UTI and will start Rocephin now but my clinical impression is the abnormal vitals and elevated lactic acid are due to SBO, dehydration and not UTI sepsis

## 2021-12-07 NOTE — ED TRIAGE NOTES
Patient arrived with EMS for n/v onset yesterday. Son report n/v onset Friday after eating crab nataliia. EMS gave Zofran 4 mg IV. Patient provided medical history.

## 2021-12-07 NOTE — PLAN OF CARE
[facility] Surg (3rd Fl)  Initial Discharge Assessment       Primary Care Provider: Emiliano Cam MD    Admission Diagnosis: SBO (small bowel obstruction) [K56.609]  Encounter for imaging study to confirm nasogastric (NG) tube placement [Z01.89]    Admission Date: 12/7/2021  Expected Discharge Date:     Discharge Barriers Identified: None    Payor: [employer]ARE / Plan: [facility] CHOICES 65 / Product Type: Medicare Advantage /     Extended Emergency Contact Information  Primary Emergency Contact: nisa weiss  Mobile Phone: [phone]  Relation: Son  Secondary Emergency Contact: Sejal Mendenhall  Address: [address] [address] [facility] [place]  Mobile Phone: [phone]  Relation: Healthcare Power of     Discharge Plan A: Home with family  Discharge Plan B: Home with family,Home Health      [facility]  [address] [place] [address]  Phone: [phone] Fax: [phone]    [facility]/pharmacy #[id number] - [place] - [address]  [address]  [place] [address]  Phone: [phone] Fax: [phone]      Initial Assessment (most recent)     Adult Discharge Assessment - 12/07/21 1149        Discharge Assessment    Assessment Type Discharge Planning Assessment     Confirmed/corrected address, phone number and insurance Yes     Confirmed Demographics Correct on Facesheet     Source of Information family     Communicated STEPHANIE with patient/caregiver Date not available/Unable to determine     Reason For Admission SBO     Lives With child(josie), adult   Son, Eliu, lives with the patient.    Facility Arrived From: Home     Do you expect to return to your current living situation? Yes     Do you have help at home or someone to help you manage your care at home? Yes     Who are your caregiver(s) and their phone number(s)? Eliu Weiss (Son) [phone]     Walking or Climbing Stairs Difficulty ambulation difficulty, requires equipment      Mobility Management Patient utilizes a rolling walker for ambulation.     Equipment Currently Used at Home walker, rolling     Readmission within 30 days? No     Patient currently being followed by outpatient case management? No     Do you currently have service(s) that help you manage your care at home? No     Do you take prescription medications? Yes     Do you have prescription coverage? Yes     Coverage [employer]     Do you have any problems affording any of your prescribed medications? No     How do you get to doctors appointments? family or friend will provide     Are you on dialysis? No     Do you take coumadin? No     Discharge Plan A Home with family     Discharge Plan B Home with family;Home Health     DME Needed Upon Discharge  none     Discharge Plan discussed with: Adult children     Discharge Barriers Identified None                    Discharge assessment completed with patient's son, Eliu. Denies any post-acute care needs at this time. SW to remain available.

## 2021-12-07 NOTE — SUBJECTIVE & OBJECTIVE
Past Medical History:   Diagnosis Date    Anxiety     Arthritis     Back pain     Chronic bronchitis     Chronic gout     COPD (chronic obstructive pulmonary disease)     Depression     Emphysema of lung     Generalized headaches     GERD (gastroesophageal reflux disease)     Hyperlipidemia     Hypertension     Hypothyroidism     Obesity     Peripheral vascular disease     Sleep apnea     On CPAP    Stage 3 chronic kidney disease 4/18/2018    Thyroid disease        Past Surgical History:   Procedure Laterality Date    Bilateral mastoidectomies Bilateral 1984    for ear infection    COLONOSCOPY  2010    COLONOSCOPY N/A 1/31/2019    Procedure: COLONOSCOPY;  Surgeon: Joaquín Crawford MD;  Location: [facility];  Service: Colon and Rectal;  Laterality: N/A;    COLONOSCOPY N/A 8/13/2019    Procedure: COLONOSCOPY;  Surgeon: Joaquín Crawford MD;  Location: [facility] ENDO (4TH FLR);  Service: Colon and Rectal;  Laterality: N/A;  Patient had inadequate prep with his last colonoscopy, was unable to finish the large volume GoLYTELY prep.  Please give him a smaller volume split dose prep, such as Movi-Prep or SuPrep.  He should do a light diet of easily digested food 2 days prior to the procedure     CYSTOSCOPIC LITHOLAPAXY N/A 3/24/2021    Procedure: CYSTOLITHOLAPAXY;  Surgeon: Avtar Latham II, MD;  Location: [facility];  Service: Urology;  Laterality: N/A;    CYSTOURETEROSCOPY WITH RETROGRADE PYELOGRAPHY AND INSERTION OF STENT INTO URETER Bilateral 5/31/2021    Procedure: CYSTOURETEROSCOPY, WITH RETROGRADE PYELOGRAM AND URETERAL STENT INSERTION;  Surgeon: Avtar Latham II, MD;  Location: [facility];  Service: Urology;  Laterality: Bilateral;    Eardrum repair  1984    Not sure which side    ESOPHAGOGASTRODUODENOSCOPY W/ PEG N/A 2/1/2021    Procedure: EGD, WITH PEG TUBE INSERTION;  Surgeon: Yousuf Villa MD;  Location: [facility];  Service: Endoscopy;  Laterality: N/A;    HERNIA REPAIR  1984     Umbilical-screen    KNEE ARTHROSCOPY Left 1989    LASER LITHOTRIPSY Bilateral 5/31/2021    Procedure: LITHOTRIPSY, USING LASER;  Surgeon: Avtar Latham II, MD;  Location: [facility];  Service: Urology;  Laterality: Bilateral;    RETROGRADE PYELOGRAPHY Right 3/24/2021    Procedure: PYELOGRAM, RETROGRADE;  Surgeon: Avtar Latham II, MD;  Location: [facility];  Service: Urology;  Laterality: Right;    ROTATOR CUFF REPAIR Left 2002    Left    TOTAL KNEE ARTHROPLASTY Left 12/21/2020    Procedure: ARTHROPLASTY, KNEE, TOTAL;  Surgeon: Shin Chang MD;  Location: [facility];  Service: Orthopedics;  Laterality: Left;    TRANSURETHRAL SURGICAL REMOVAL OF PROSTATE (TURP) USING GREEN LIGHT LASER N/A 5/31/2021    Procedure: TURP, USING GREEN LIGHT LASER;  Surgeon: Avtar Latham II, MD;  Location: [facility];  Service: Urology;  Laterality: N/A;    URETEROSCOPIC REMOVAL OF URETERIC CALCULUS Right 3/24/2021    Procedure: REMOVAL, CALCULUS, URETER, URETEROSCOPIC;  Surgeon: Avtar Latham II, MD;  Location: [facility];  Service: Urology;  Laterality: Right;    URETEROSCOPIC REMOVAL OF URETERIC CALCULUS Left 5/31/2021    Procedure: REMOVAL, CALCULUS, URETER, URETEROSCOPIC;  Surgeon: Avtar Latham II, MD;  Location: [facility];  Service: Urology;  Laterality: Left;       Review of patient's allergies indicates:   Allergen Reactions    Percocet [oxycodone-acetaminophen] Other (See Comments)     hyperactivity    Percodan [oxycodone hcl-oxycodone-asa] Other (See Comments)     hyperactivity       No current facility-administered medications on file prior to encounter.     Current Outpatient Medications on File Prior to Encounter   Medication Sig    albuterol sulfate 2.5 mg/0.5 mL Nebu Take 2.5 mg  (one vial)  by nebulization every 6 (six) hours as needed (wheezing). Rescue    allopurinoL (ZYLOPRIM) 300 MG tablet Take 300 mg by mouth once daily.    aspirin 81 MG Chew Take 81 mg by mouth once daily.    budesonide 1 mg/2 mL NbSp  Inhale 1 mg into the lungs once daily. Controller    levothyroxine (SYNTHROID) 100 MCG tablet TAKE 1 TABLET BY MOUTH ONCE DAILY (Patient taking differently: TAKE 1 TABLET BY MOUTH ONCE DAILY)    memantine (NAMENDA) 10 MG Tab Take 1 tablet (10 mg total) by mouth 2 (two) times daily.    metoprolol succinate (TOPROL-XL) 50 MG 24 hr tablet Take 1 tablet orally 2 times a day.    multivitamin (THERAGRAN) per tablet Take 1 tablet by mouth once daily. Centrum Silver for Men    omega 3-dha-epa-fish oil 1,000 mg (120 mg-180 mg) Cap Take 2 capsules orally once a day.    simvastatin (ZOCOR) 40 MG tablet Take 1 tablet (40 mg total) by mouth every evening.    tiotropium-olodateroL (STIOLTO RESPIMAT) 2.5-2.5 mcg/actuation Mist Inhale 2 puffs into the lungs once daily.    apixaban (ELIQUIS) 5 mg Tab Take one tablet by mouth twice a day    nitroGLYCERIN (NITROSTAT) 0.4 MG SL tablet Take 1 tablet (sublingual) under tongue as needed for chest pain, no more than 3 tablets in 15 mins, 5 mins apart.    [DISCONTINUED] amiodarone (PACERONE) 200 MG Tab Take 2 tablets by mouth once daily for 2 weeks then take one tablet by mouth once daily thereafter    [DISCONTINUED] amLODIPine (NORVASC) 5 MG tablet Take 1 tablet (5 mg total) by mouth once daily.    [DISCONTINUED] valsartan (DIOVAN) 80 MG tablet Take 1 tablet (80 mg total) by mouth once daily.     Family History     Problem Relation (Age of Onset)    Atrial fibrillation Son    Cancer Mother (65), Father    Kidney disease Brother    No Known Problems Sister    Stroke Son, Son        Tobacco Use    Smoking status: Never Smoker    Smokeless tobacco: Never Used   Substance and Sexual Activity    Alcohol use: No    Drug use: No    Sexual activity: Not Currently     Partners: Female     Review of Systems   Constitutional: Positive for fatigue. Negative for activity change, fever and unexpected weight change.   HENT: Negative for congestion, ear pain, hearing loss, rhinorrhea,  "sore throat and tinnitus.    Eyes: Negative for pain, redness and visual disturbance.   Respiratory: Negative for cough, shortness of breath and wheezing.    Cardiovascular: Negative for chest pain, palpitations and leg swelling.   Gastrointestinal: Positive for abdominal distention, abdominal pain and vomiting ("regurgitating" food after eating). Negative for blood in stool, constipation, diarrhea and nausea.   Genitourinary: Negative for decreased urine volume, dysuria, frequency and urgency.   Musculoskeletal: Negative for back pain, joint swelling and neck pain.   Skin: Negative for color change, rash and wound.   Neurological: Negative for dizziness, tremors, weakness, light-headedness and headaches.     Objective:     Vital Signs (Most Recent):  Temp: 96.6 °F (35.9 °C) (12/07/21 0735)  Pulse: 95 (12/07/21 0735)  Resp: 20 (12/07/21 0735)  BP: (!) 140/86 (12/07/21 0735)  SpO2: (!) 94 % (12/07/21 0735) Vital Signs (24h Range):  Temp:  [96.6 °F (35.9 °C)-98.2 °F (36.8 °C)] 96.6 °F (35.9 °C)  Pulse:  [] 95  Resp:  [20-22] 20  SpO2:  [93 %-96 %] 94 %  BP: (110-148)/(55-91) 140/86     Weight: 100 kg (220 lb 7.4 oz)  Body mass index is 33.52 kg/m².    Physical Exam  Vitals and nursing note reviewed.   Constitutional:       General: He is not in acute distress.     Appearance: He is well-developed and well-nourished.   HENT:      Head: Normocephalic and atraumatic.      Right Ear: External ear normal.      Left Ear: External ear normal.   Eyes:      General:         Right eye: No discharge.         Left eye: No discharge.      Extraocular Movements: EOM normal.   Neck:      Thyroid: No thyromegaly.   Cardiovascular:      Rate and Rhythm: Normal rate and regular rhythm.      Heart sounds: No murmur heard.      Pulmonary:      Effort: Pulmonary effort is normal. No respiratory distress.      Breath sounds: Normal breath sounds. No wheezing or rales.   Abdominal:      General: There is distension.      Palpations: " Abdomen is soft.      Tenderness: There is no abdominal tenderness.      Comments: Absent BS   Musculoskeletal:      Cervical back: Neck supple.   Lymphadenopathy:      Cervical: No cervical adenopathy.   Skin:     General: Skin is warm and dry.   Neurological:      Mental Status: He is alert and oriented to person, place, and time.      Cranial Nerves: No cranial nerve deficit.   Psychiatric:         Mood and Affect: Mood and affect normal.         Behavior: Behavior normal.         Thought Content: Thought content normal.           CRANIAL NERVES     CN III, IV, VI   Extraocular motions are normal.        Significant Labs:   All pertinent labs within the past 24 hours have been reviewed.  CBC:   Recent Labs   Lab 12/07/21  0127   WBC 11.37   HGB 15.6   HCT 47.0        CMP:   Recent Labs   Lab 12/07/21  0127   *   K 5.0   CL 95   CO2 20*   *   BUN 79*   CREATININE 3.6*   CALCIUM 10.9*   PROT 6.9   ALBUMIN 3.4*   BILITOT 1.0   ALKPHOS 85   AST 12   ALT 10   ANIONGAP 20*   EGFRNONAA 15*     Lactic Acid:   Recent Labs   Lab 12/07/21  0359   LACTATE 2.7*     Lipase:   Recent Labs   Lab 12/07/21  0127   LIPASE 27     Urine Studies:   Recent Labs   Lab 12/07/21  0328   COLORU Yellow   APPEARANCEUA Clear   PHUR 5.0   SPECGRAV >=1.030*   PROTEINUA 2+*   GLUCUA Negative   KETONESU Trace*   BILIRUBINUA 1+*   OCCULTUA 2+*   NITRITE Negative   UROBILINOGEN Negative   LEUKOCYTESUR 1+*   RBCUA 40*   WBCUA >100*   BACTERIA Moderate*   HYALINECASTS 0   urine culture in process  covid screen negative   Significant Imaging:     CT abd and pelvis Imaging findings in keeping with a high-grade small bowel obstruction with a point of transition seen in the right mid abdomen, likely related to adhesions.  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  Additionally, there is a small amount of free fluid about the liver  and extending into the pelvic cul-de-sac, with slightly high density of the fluid in the pelvic cul-de-sac suggesting hyperemia issues content fluid.    KUB The enteric tube has been appropriately retracted and now terminates in the region of the distal gastric body.Slightly prominent loops of small bowel seen in the upper abdomen.  No definite free peritoneal air is seen.     Lung parenchyma cannot be adequately evaluated secondary to overexposure of the central aspect of the lungs.Consider dedicated radiograph of the chest evaluate for parenchymal injury.

## 2021-12-07 NOTE — ED NOTES
NG tube placed and connect to suction on Med Surg floor. Patient tolerated procedure well. No active bleeding from right nostril. Report given to ALEXX Lara.

## 2021-12-07 NOTE — ASSESSMENT & PLAN NOTE
Baseline creat 1.3-1.4- on admission 3.6. 1L NS given in ER. Repeat 1L Ns bolus this am and then cont NS at 150ml/hr. Repeating lactate this am better 1.9

## 2021-12-07 NOTE — PLAN OF CARE
Problem: Adult Inpatient Plan of Care  Goal: Plan of Care Review  Outcome: Ongoing, Progressing     Problem: Adult Inpatient Plan of Care  Goal: Patient-Specific Goal (Individualization)  Outcome: Ongoing, Progressing     Problem: Adult Inpatient Plan of Care  Goal: Absence of Hospital-Acquired Illness or Injury  Outcome: Ongoing, Progressing     Problem: Adult Inpatient Plan of Care  Goal: Optimal Comfort and Wellbeing  Outcome: Ongoing, Progressing     Pt admitted with SBO. NG tube in place to low intermittent suction. 1400 output so far this shift. Pt denies any abdominal discomfort. Bowel sounds hypoactive. Last BM 12/5/21. Pt denies passing gas. NS infusing at 150cc/hr. Surgery consulted, Repeat abdominal xray in am to evaluate. Pt with positive UA in ER, receiving IV rocephin with culture pending. Pt and son updated on POC. All questions answered.

## 2021-12-07 NOTE — CONSULTS
[facility] Surg (3rd Fl)  General Surgery  Consult Note    Consults  Subjective:     Chief Complaint/Reason for Admission:     History of Present Illness:  81-year-old male admitted for small-bowel obstruction.  Patient poor historian.  Apparently he was having some abdominal pain.  He underwent CT scan in the emergency room which showed a small-bowel obstruction.  Patient is not really sure of all of his surgeries but according to the chart he seems to have had a PEG tube placement in February which is no longer present and it looks like he has had a laparoscopic umbilical hernia repair with mesh.  Patient was seen and examined.  He denies any abdominal pain at this point.  His abdomen is soft flat.  He does have some chronic kidney disease.  I believe he also has some dehydration.  His creatinine is 3.6 this morning.  Previously was 1.9.  Patient should get IV fluid resuscitation.  Continue nasogastric tube until flatus.  Follow-up abdominal films.    No current facility-administered medications on file prior to encounter.     Current Outpatient Medications on File Prior to Encounter   Medication Sig    albuterol sulfate 2.5 mg/0.5 mL Nebu Take 2.5 mg  (one vial)  by nebulization every 6 (six) hours as needed (wheezing). Rescue    allopurinoL (ZYLOPRIM) 300 MG tablet Take 300 mg by mouth once daily.    aspirin 81 MG Chew Take 81 mg by mouth once daily.    budesonide 1 mg/2 mL NbSp Inhale 1 mg into the lungs once daily. Controller    levothyroxine (SYNTHROID) 100 MCG tablet TAKE 1 TABLET BY MOUTH ONCE DAILY (Patient taking differently: TAKE 1 TABLET BY MOUTH ONCE DAILY)    memantine (NAMENDA) 10 MG Tab Take 1 tablet (10 mg total) by mouth 2 (two) times daily.    metoprolol succinate (TOPROL-XL) 50 MG 24 hr tablet Take 1 tablet orally 2 times a day.    multivitamin (THERAGRAN) per tablet Take 1 tablet by mouth once daily. Centrum Silver for Men    omega 3-dha-epa-fish oil 1,000 mg (120 mg-180 mg) Cap  Take 2 capsules orally once a day.    simvastatin (ZOCOR) 40 MG tablet Take 1 tablet (40 mg total) by mouth every evening.    tiotropium-olodateroL (STIOLTO RESPIMAT) 2.5-2.5 mcg/actuation Mist Inhale 2 puffs into the lungs once daily.    apixaban (ELIQUIS) 5 mg Tab Take one tablet by mouth twice a day    nitroGLYCERIN (NITROSTAT) 0.4 MG SL tablet Take 1 tablet (sublingual) under tongue as needed for chest pain, no more than 3 tablets in 15 mins, 5 mins apart.    [DISCONTINUED] amiodarone (PACERONE) 200 MG Tab Take 2 tablets by mouth once daily for 2 weeks then take one tablet by mouth once daily thereafter    [DISCONTINUED] amLODIPine (NORVASC) 5 MG tablet Take 1 tablet (5 mg total) by mouth once daily.    [DISCONTINUED] valsartan (DIOVAN) 80 MG tablet Take 1 tablet (80 mg total) by mouth once daily.       Review of patient's allergies indicates:   Allergen Reactions    Percocet [oxycodone-acetaminophen] Other (See Comments)     hyperactivity    Percodan [oxycodone hcl-oxycodone-asa] Other (See Comments)     hyperactivity       Past Medical History:   Diagnosis Date    Anxiety     Arthritis     Back pain     Chronic bronchitis     Chronic gout     COPD (chronic obstructive pulmonary disease)     Depression     Emphysema of lung     Generalized headaches     GERD (gastroesophageal reflux disease)     Hyperlipidemia     Hypertension     Hypothyroidism     Obesity     Peripheral vascular disease     Sleep apnea     On CPAP    Stage 3 chronic kidney disease 4/18/2018    Thyroid disease      Past Surgical History:   Procedure Laterality Date    Bilateral mastoidectomies Bilateral 1984    for ear infection    COLONOSCOPY  2010    COLONOSCOPY N/A 1/31/2019    Procedure: COLONOSCOPY;  Surgeon: Joaquín Crawford MD;  Location: [facility];  Service: Colon and Rectal;  Laterality: N/A;    COLONOSCOPY N/A 8/13/2019    Procedure: COLONOSCOPY;  Surgeon: Joaquín Crawford MD;  Location: [address]  [facility]);  Service: Colon and Rectal;  Laterality: N/A;  Patient had inadequate prep with his last colonoscopy, was unable to finish the large volume GoLYTELY prep.  Please give him a smaller volume split dose prep, such as Movi-Prep or SuPrep.  He should do a light diet of easily digested food 2 days prior to the procedure     CYSTOSCOPIC LITHOLAPAXY N/A 3/24/2021    Procedure: CYSTOLITHOLAPAXY;  Surgeon: Avtar Latham II, MD;  Location: [facility];  Service: Urology;  Laterality: N/A;    CYSTOURETEROSCOPY WITH RETROGRADE PYELOGRAPHY AND INSERTION OF STENT INTO URETER Bilateral 5/31/2021    Procedure: CYSTOURETEROSCOPY, WITH RETROGRADE PYELOGRAM AND URETERAL STENT INSERTION;  Surgeon: Avtar Latham II, MD;  Location: [facility];  Service: Urology;  Laterality: Bilateral;    Eardrum repair  1984    Not sure which side    ESOPHAGOGASTRODUODENOSCOPY W/ PEG N/A 2/1/2021    Procedure: EGD, WITH PEG TUBE INSERTION;  Surgeon: Yousuf Villa MD;  Location: [facility];  Service: Endoscopy;  Laterality: N/A;    HERNIA REPAIR  1984    Umbilical-screen    KNEE ARTHROSCOPY Left 1989    LASER LITHOTRIPSY Bilateral 5/31/2021    Procedure: LITHOTRIPSY, USING LASER;  Surgeon: Avtar Latham II, MD;  Location: [facility];  Service: Urology;  Laterality: Bilateral;    RETROGRADE PYELOGRAPHY Right 3/24/2021    Procedure: PYELOGRAM, RETROGRADE;  Surgeon: Avtar Latham II, MD;  Location: [facility];  Service: Urology;  Laterality: Right;    ROTATOR CUFF REPAIR Left 2002    Left    TOTAL KNEE ARTHROPLASTY Left 12/21/2020    Procedure: ARTHROPLASTY, KNEE, TOTAL;  Surgeon: Shin Chang MD;  Location: [facility];  Service: Orthopedics;  Laterality: Left;    TRANSURETHRAL SURGICAL REMOVAL OF PROSTATE (TURP) USING GREEN LIGHT LASER N/A 5/31/2021    Procedure: TURP, USING GREEN LIGHT LASER;  Surgeon: Avtar Latham II, MD;  Location: [facility];  Service: Urology;  Laterality: N/A;    URETEROSCOPIC REMOVAL OF URETERIC CALCULUS Right  3/24/2021    Procedure: REMOVAL, CALCULUS, URETER, URETEROSCOPIC;  Surgeon: Avtar Latham II, MD;  [facility];  Service: Urology;  Laterality: Right;    URETEROSCOPIC REMOVAL OF URETERIC CALCULUS Left 5/31/2021    Procedure: REMOVAL, CALCULUS, URETER, URETEROSCOPIC;  Surgeon: Avtar Latham II, MD;  Location: [facility];  Service: Urology;  Laterality: Left;     Family History     Problem Relation (Age of Onset)    Atrial fibrillation Son    Cancer Mother (65), Father    Kidney disease Brother    No Known Problems Sister    Stroke Son, Son        Tobacco Use    Smoking status: Never Smoker    Smokeless tobacco: Never Used   Substance and Sexual Activity    Alcohol use: No    Drug use: No    Sexual activity: Not Currently     Partners: Female     Review of Systems   Gastrointestinal: Positive for abdominal pain.   All other systems reviewed and are negative.    Objective:     Vital Signs (Most Recent):  Temp: 96.6 °F (35.9 °C) (12/07/21 0735)  Pulse: 95 (12/07/21 0735)  Resp: 20 (12/07/21 0735)  BP: (!) 140/86 (12/07/21 0735)  SpO2: (!) 94 % (12/07/21 0735) Vital Signs (24h Range):  Temp:  [96.6 °F (35.9 °C)-98.2 °F (36.8 °C)] 96.6 °F (35.9 °C)  Pulse:  [] 95  Resp:  [20-22] 20  SpO2:  [93 %-96 %] 94 %  BP: (110-148)/(55-91) 140/86     Weight: 100 kg (220 lb 7.4 oz)  Body mass index is 33.52 kg/m².      Intake/Output Summary (Last 24 hours) at 12/7/2021 0905  Last data filed at 12/7/2021 0700  Gross per 24 hour   Intake 100.12 ml   Output 1130 ml   Net -1029.88 ml       Physical Exam  Vitals and nursing note reviewed.   Constitutional:       Appearance: He is well-developed and well-nourished.   HENT:      Head: Normocephalic.   Eyes:      Pupils: Pupils are equal, round, and reactive to light.   Pulmonary:      Effort: Pulmonary effort is normal.   Abdominal:      General: There is no distension.      Palpations: Abdomen is soft.      Tenderness: There is no abdominal tenderness.    Musculoskeletal:         General: Normal range of motion.      Cervical back: Normal range of motion.   Skin:     General: Skin is warm and dry.   Neurological:      Mental Status: He is alert and oriented to person, place, and time.   Psychiatric:         Mood and Affect: Mood and affect normal.         Significant Labs:  CBC:   Recent Labs   Lab 12/07/21  0127   WBC 11.37   RBC 5.34   HGB 15.6   HCT 47.0      MCV 88   MCH 29.2   MCHC 33.2     CMP:   Recent Labs   Lab 12/07/21 0127   *   CALCIUM 10.9*   ALBUMIN 3.4*   PROT 6.9   *   K 5.0   CO2 20*   CL 95   BUN 79*   CREATININE 3.6*   ALKPHOS 85   ALT 10   AST 12   BILITOT 1.0       Significant Diagnostics:  I have reviewed all pertinent imaging results/findings within the past 24 hours.    Assessment/Plan:     There are no hospital problems to display for this patient.      Thank you for your consult. I will follow-up with patient. Please contact us if you have any additional questions.    Horacio Bettencourt Jr, MD  General Surgery  [facility] - Med Surg (3rd Fl)

## 2021-12-07 NOTE — NURSING
Patient admitted for SBO. Bedside report received from ALEXX Aggarwal. Patient removed NGT during transport. New NGT placed and xray taken for verification. Waiting on results to turn suction on. SX consult called to Dr. Mccord, was told he would inform Dr. Bettencourt since he'll be rounding later this shift. Patient tolerating well.

## 2021-12-07 NOTE — ED PROVIDER NOTES
[facility]                                                  Chief Complaint     Chief Complaint   Patient presents with    Nausea    Emesis     n/v onset last night       History of Present Illness  81 y.o. male with anxiety, arthritis, chronic gout, COPD, depression, GERD, hyperlipidemia, hypertension, hypothyroidism peripheral vascular disease, KARL on CPAP year and CKD 3 who presents with abdominal pain x2 days.  Patient states that the pain came on suddenly.  His pain has been constant since then.  His pain is described as aching, diffuse and non-radiating.  He has not passed bowel movement or flatus within the past 24 hours.  He has had approximately 2 episodes of nonbloody, nonbilious vomiting within the past 24 hours.  Denies constipation, diarrhea, bloody stools, dysuria, hematuria..      History obtained from:  Patient    Review of patient's allergies indicates:   Allergen Reactions    Percocet [oxycodone-acetaminophen] Other (See Comments)     hyperactivity    Percodan [oxycodone hcl-oxycodone-asa] Other (See Comments)     hyperactivity     Past Medical History:   Diagnosis Date    Anxiety     Arthritis     Back pain     Chronic bronchitis     Chronic gout     COPD (chronic obstructive pulmonary disease)     Depression     Emphysema of lung     Generalized headaches     GERD (gastroesophageal reflux disease)     Hyperlipidemia     Hypertension     Hypothyroidism     Obesity     Peripheral vascular disease     Sleep apnea     On CPAP    Stage 3 chronic kidney disease 4/18/2018    Thyroid disease      Past Surgical History:   Procedure Laterality Date    Bilateral mastoidectomies Bilateral 1984    for ear infection    COLONOSCOPY  2010    COLONOSCOPY N/A 1/31/2019    Procedure: COLONOSCOPY;  Surgeon: Joaquín Crawford MD;  Location: [facility];  Service: Colon and Rectal;  Laterality: N/A;    COLONOSCOPY N/A 8/13/2019    Procedure: COLONOSCOPY;  Surgeon: Joaquín Crawford MD;   Location: [facility] (4TH FLR);  Service: Colon and Rectal;  Laterality: N/A;  Patient had inadequate prep with his last colonoscopy, was unable to finish the large volume GoLYTELY prep.  Please give him a smaller volume split dose prep, such as Movi-Prep or SuPrep.  He should do a light diet of easily digested food 2 days prior to the procedure     CYSTOSCOPIC LITHOLAPAXY N/A 3/24/2021    Procedure: CYSTOLITHOLAPAXY;  Surgeon: Avtar Latham II, MD;  Location: [facility], [facility];  Service: Urology;  Laterality: N/A;    CYSTOURETEROSCOPY WITH RETROGRADE PYELOGRAPHY AND INSERTION OF STENT INTO URETER Bilateral 5/31/2021    Procedure: CYSTOURETEROSCOPY, WITH RETROGRADE PYELOGRAM AND URETERAL STENT INSERTION;  Surgeon: Avtar Latham II, MD;  Location: [facility], [facility];  Service: Urology;  Laterality: Bilateral;    Eardrum repair  1984    Not sure which side    ESOPHAGOGASTRODUODENOSCOPY W/ PEG N/A 2/1/2021    Procedure: EGD, WITH PEG TUBE INSERTION;  Surgeon: Yousuf Villa MD;  Location: [facility];  Service: Endoscopy;  Laterality: N/A;    HERNIA REPAIR  1984    Umbilical-screen    KNEE ARTHROSCOPY Left 1989    LASER LITHOTRIPSY Bilateral 5/31/2021    Procedure: LITHOTRIPSY, USING LASER;  Surgeon: Avtar Latham II, MD;  Location: [facility];  Service: Urology;  Laterality: Bilateral;    RETROGRADE PYELOGRAPHY Right 3/24/2021    Procedure: PYELOGRAM, RETROGRADE;  Surgeon: Avtar Latham II, MD;  Location: [facility];  Service: Urology;  Laterality: Right;    ROTATOR CUFF REPAIR Left 2002    Left    TOTAL KNEE ARTHROPLASTY Left 12/21/2020    Procedure: ARTHROPLASTY, KNEE, TOTAL;  Surgeon: Shin Chang MD;  Location: [facility];  Service: Orthopedics;  Laterality: Left;    TRANSURETHRAL SURGICAL REMOVAL OF PROSTATE (TURP) USING GREEN LIGHT LASER N/A 5/31/2021    Procedure: TURP, USING GREEN LIGHT LASER;  Surgeon: Avtar Latham II, MD;  Location: [facility];  Service: Urology;  Laterality: N/A;    URETEROSCOPIC REMOVAL OF  "URETERIC CALCULUS Right 3/24/2021    Procedure: REMOVAL, CALCULUS, URETER, URETEROSCOPIC;  Surgeon: Avtar Latham II, MD;  Location: [facility];  Service: Urology;  Laterality: Right;    URETEROSCOPIC REMOVAL OF URETERIC CALCULUS Left 5/31/2021    Procedure: REMOVAL, CALCULUS, URETER, URETEROSCOPIC;  Surgeon: Avtar Latham II, MD;  Location: [facility];  Service: Urology;  Laterality: Left;      Family History   Problem Relation Age of Onset    Cancer Mother 65        rectal    Cancer Father     No Known Problems Sister     Kidney disease Brother     Stroke Son     Stroke Son     Atrial fibrillation Son     Melanoma Neg Hx      Social History     Tobacco Use    Smoking status: Never Smoker    Smokeless tobacco: Never Used   Substance Use Topics    Alcohol use: No    Drug use: No          Review of Systems and Physical Exam     Review of Systems  --Constitutional - Denies fever, appetite change, chills  --Eyes - Denies eye discharge, eye pain, eye redness or visual disturbance  --HENT- Denies congestion, sore throat, drooling, ear discharge, rhinorrhea or trouble swallowing  --Respiratory - Denies cough, shortness or breath, wheezing  --Cardiovascular - Denies chest pain, leg swelling, palpitations  --Gastrointestinal - +abdominal pain, nausea and vomiting.  Denies he the he the he he he he all the he he he he he the the he abdominal distension, constipation, diarrhea.  --Genitourinary - Denies difficulty urinating, dysuria, hematuria, urgency  --Musculoskeletal - Denies arthralgias, myalgias  --Neurological - Denies dizziness, headaches, lightheadedness, weakness  --Hematologic - Denies easy bruising or easy bleeding  --Skin - Denies rash, wound or pallor  --Psychiatric- Denies dysphoric mood, nervousness/anxiety    Vital Signs   height is 5' 8" (1.727 m) and weight is 100 kg (220 lb 7.4 oz). His oral temperature is 97.9 °F (36.6 °C). His blood pressure is 129/64 (abnormal) and his pulse is 87. His " respiration is 20 and oxygen saturation is 98%.      Physical Exam   Nursing note and vitals reviewed  --Constitutional:  Well developed, well nourished  --HENT: Normocephalic, atraumatic. No rhinorrhea. Moist oral mucosa. No oropharyngeal edema, erythema or exudates.   --Eyes: PERRL. Extraocular movements intact. No periorbital swelling. Normal conjunctiva.  --Neck: Normal range of motion. Neck supple. No adenopathy  --Cardiac: Regular rhythm, normal S1, normal S2, no murmur, normal rate, intact distal pulses  --Pulmonary: Normal respiratory effort, breath sounds normal and equal bilaterally, no accessory muscle use, no respiratory distress  --Abdominal:  Diffuse abdominal tenderness.  No Katz sign.   Soft, normal bowel sounds,no guarding, no rebound  --Musculoskeletal: Normal range of motion. No deformity, tenderness or edema.  --Neurological:  Alert and oriented x 4. Follows commands appropriately.    --Skin: Warm and dry. No rash, pallor, cyanosis or jaundice.  --Psych: Normal mood    ED Course   Lab Results (reviewed by the physician)  Labs Reviewed   CBC W/ AUTO DIFFERENTIAL - Abnormal; Notable for the following components:       Result Value    RDW 17.1 (*)     Gran # (ANC) 8.1 (*)     Mono # 1.9 (*)     Lymph % 11.4 (*)     Mono % 16.6 (*)     All other components within normal limits   COMPREHENSIVE METABOLIC PANEL - Abnormal; Notable for the following components:    Sodium 135 (*)     CO2 20 (*)     Glucose 127 (*)     BUN 79 (*)     Creatinine 3.6 (*)     Calcium 10.9 (*)     Albumin 3.4 (*)     Anion Gap 20 (*)     eGFR if  18 (*)     eGFR if non  15 (*)     All other components within normal limits   URINALYSIS, REFLEX TO URINE CULTURE - Abnormal; Notable for the following components:    Specific Gravity, UA >=1.030 (*)     Protein, UA 2+ (*)     Ketones, UA Trace (*)     Bilirubin (UA) 1+ (*)     Occult Blood UA 2+ (*)     Leukocytes, UA 1+ (*)     All other components  within normal limits    Narrative:     Specimen Source->Urine   URINALYSIS MICROSCOPIC - Abnormal; Notable for the following components:    RBC, UA 40 (*)     WBC, UA >100 (*)     Bacteria Moderate (*)     All other components within normal limits    Narrative:     Specimen Source->Urine   LACTIC ACID, PLASMA - Abnormal; Notable for the following components:    Lactate (Lactic Acid) 2.7 (*)     All other components within normal limits   LIPASE   SARS-COV-2 RNA AMPLIFICATION, QUAL     Radiology (images visualized & reports reviewed by the physician)  X-Ray Abdomen Flat And Erect   Final Result      As above.         Electronically signed by: Sanam Perez MD   Date:    12/09/2021   Time:    10:29      US Retroperitoneal Complete   Final Result      Normal size bilateral kidneys each containing a couple of benign anechoic cyst identified the midpole and upper pole regions largest single index cyst about the lateral cortex of the right kidney measuring 2.3 x 2.1 x 2.2 cm in size.         Electronically signed by: Horacio Clemons MD   Date:    12/08/2021   Time:    13:55      X-Ray Abdomen Flat And Erect   Final Result      1. Multiple air-fluid levels reflecting either mechanical obstruction or adynamic ileus.  The overall degree of gaseous distention has not reduced in volume.   2. NG tube projected over the region of the gastric outlet.   3. Postop changes of previous herniorrhaphy repair.         Electronically signed by: Horacio Clemons MD   Date:    12/08/2021   Time:    10:38      X-Ray Chest PA And Lateral   Final Result      As above.         Electronically signed by: Sanam Perez MD   Date:    12/07/2021   Time:    11:20      X-Ray Abdomen AP 1 View   Final Result      Nasogastric tube has been appropriately adjusted and now terminates in the region of the distal esophagus.      Consider dedicated radiograph of the chest evaluate for parenchymal injury.         Electronically signed by: Sanam Perez MD    Date:    12/07/2021   Time:    07:32      X-Ray Abdomen AP 1 View (KUB)   Final Result      As above.         Electronically signed by: Sanam Perez MD   Date:    12/07/2021   Time:    07:30      CT Abdomen Pelvis  Without Contrast   Final Result      Imaging findings in keeping with a high-grade small bowel obstruction with a point of transition seen in the right mid abdomen, likely related to adhesions.  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  Additionally, there is a small amount of free fluid about the liver and extending into the pelvic cul-de-sac, with slightly high density of the fluid in the pelvic cul-de-sac suggesting hyperemia issues content fluid.      Additional nonemergent findings as above.         Electronically signed by: Sanam Perez MD   Date:    12/07/2021   Time:    07:57      RADIOLOGY REPORT   Final Result      X-Ray Abdomen Flat And Erect    (Results Pending)   X-Ray Abdomen Flat And Erect    (Results Pending)             Medications Given  Medications   sodium chloride 0.9% flush 10 mL (has no administration in time range)   melatonin tablet 6 mg (has no administration in time range)   0.9%  NaCl infusion ( Intravenous New Bag 12/9/21 1810)   mupirocin 2 % ointment ( Nasal Given 12/9/21 0918)   cefTRIAXone (ROCEPHIN) 1 g/50 mL D5W IVPB (0 g Intravenous Stopped 12/9/21 1200)   enoxaparin injection 100 mg (100 mg Subcutaneous Given 12/9/21 1130)   labetaloL injection 5 mg (5 mg Intravenous Given 12/9/21 1811)   pantoprazole injection 40 mg (40 mg Intravenous Given 12/9/21 1219)   GI cocktail (mylanta 30 mL, LIDOcaine 2 % viscous 10 mL, dicyclomine 10 mL) 50 mL (50 mLs Oral Given 12/7/21 0318)   famotidine (PF) injection 20 mg (20 mg Intravenous Given 12/7/21 0318)   lactated ringers bolus 1,000 mL (0 mLs Intravenous Stopped 12/7/21 0550)   piperacillin-tazobactam 4.5 g in dextrose 5  % 100 mL IVPB (ready to mix system) (0 g Intravenous Stopped 12/7/21 8240)   sodium chloride 0.9% bolus 1,000 mL (1,000 mLs Intravenous New Bag 12/7/21 4556)       Differential Diagnosis:  SBO, GERD, PUD, Gastritis, cystis, pancreatitis, cholecystitis, aortic dissection, acute hepatitis, pyelonephritis      Clinical Tests:  Lab Tests: Ordered and reviewed  Radiological Study: Ordered and reviewed  Medical Tests: Ordered and reviewed    ED Management  Vitals stable.  Considering severe abdominal tenderness, along with inability to pass flatus the with in the past day, CT abdomen pelvis was obtained.  CT revealed closed loop bowel obstruction the.  Lab workup remarkable for UTI, ERENDIRA on CKD and elevated lactic acid.  Zosyn given, along with IV fluids and analgesia.  General surgery was consulted and he recommended transfer to an outside facility considering that we do not have Nephrology here.  Transfer center could not accommodate this request.  Therefore, patient was admitted to hospital medicine with General surgery following along as a consultant.  NG tube was placed without difficulty.      Diagnosis  The primary encounter diagnosis was SBO (small bowel obstruction). Diagnoses of Encounter for imaging study to confirm nasogastric (NG) tube placement, Poor social situation, and Anxiety were also pertinent to this visit.    Disposition and Plan  Condition: Stable  Disposition: Admit          Elliott Duffy MD  12/09/21 3815

## 2021-12-07 NOTE — HPI
82yo male patient with hx of anxiety, arthritis, back pain, bronchitis/COPD, depression, GERD, HLD, hypothyroid, PVD, sleep apnea on CPAP, and CKD. Pt came to ER with c/o regurgitation for 3 days. Decrease intake. No fever. No UTI s/s. CT on admission shows SBO. Significantly dehydrated. Creat 3.6-- baseline 1.4. Given 1L NS in ER U/a dirty one dose of zosyn given. No fever. No elevated WBC. Lactate 2.3. He had NGT place and put to suction. He is in bed this am. Reports feeling better. General surgery consulted.

## 2021-12-07 NOTE — H&P
[facility] ([address] [place] [address] Medicine  History & Physical    Patient Name: Domonique Hernandez Jr.  MRN: [id number]  Patient Class: IP- Inpatient  Admission Date: 12/7/2021  Attending Physician: Gemma Rubio MD   Primary Care Provider: Emiliano Cam MD         Patient information was obtained from patient and ER records.     Subjective:     Principal Problem:SBO (small bowel obstruction)    Chief Complaint:   Chief Complaint   Patient presents with    Nausea    Emesis     n/v onset last night        HPI: 80yo male patient with hx of anxiety, arthritis, back pain, bronchitis/COPD, depression, GERD, HLD, hypothyroid, PVD, sleep apnea on CPAP, and CKD. Pt came to ER with c/o regurgitation for 3 days. Decrease intake. No fever. No UTI s/s. CT on admission shows SBO. Significantly dehydrated. Creat 3.6-- baseline 1.4. Given 1L NS in ER U/a dirty one dose of zosyn given. No fever. No elevated WBC. Lactate 2.3. He had NGT place and put to suction. He is in bed this am. Reports feeling better. General surgery consulted.       Past Medical History:   Diagnosis Date    Anxiety     Arthritis     Back pain     Chronic bronchitis     Chronic gout     COPD (chronic obstructive pulmonary disease)     Depression     Emphysema of lung     Generalized headaches     GERD (gastroesophageal reflux disease)     Hyperlipidemia     Hypertension     Hypothyroidism     Obesity     Peripheral vascular disease     Sleep apnea     On CPAP    Stage 3 chronic kidney disease 4/18/2018    Thyroid disease        Past Surgical History:   Procedure Laterality Date    Bilateral mastoidectomies Bilateral 1984    for ear infection    COLONOSCOPY  2010    COLONOSCOPY N/A 1/31/2019    Procedure: COLONOSCOPY;  Surgeon: Joaquín Crawford MD;  Location: [facility];  Service: Colon and Rectal;  Laterality: N/A;    COLONOSCOPY N/A 8/13/2019    Procedure: COLONOSCOPY;  Surgeon: Joaquín Crawford MD;  Location: [facility] ([address] [place] [address]);   Service: Colon and Rectal;  Laterality: N/A;  Patient had inadequate prep with his last colonoscopy, was unable to finish the large volume GoLYTELY prep.  Please give him a smaller volume split dose prep, such as Movi-Prep or SuPrep.  He should do a light diet of easily digested food 2 days prior to the procedure     CYSTOSCOPIC LITHOLAPAXY N/A 3/24/2021    Procedure: CYSTOLITHOLAPAXY;  Surgeon: Avtar Latham II, MD;  Location: [facility];  Service: Urology;  Laterality: N/A;    CYSTOURETEROSCOPY WITH RETROGRADE PYELOGRAPHY AND INSERTION OF STENT INTO URETER Bilateral 5/31/2021    Procedure: CYSTOURETEROSCOPY, WITH RETROGRADE PYELOGRAM AND URETERAL STENT INSERTION;  Surgeon: Avtar Latham II, MD;  Location: [facility];  Service: Urology;  Laterality: Bilateral;    Eardrum repair  1984    Not sure which side    ESOPHAGOGASTRODUODENOSCOPY W/ PEG N/A 2/1/2021    Procedure: EGD, WITH PEG TUBE INSERTION;  Surgeon: Yousuf Villa MD;  Location: [facility];  Service: Endoscopy;  Laterality: N/A;    HERNIA REPAIR  1984    Umbilical-screen    KNEE ARTHROSCOPY Left 1989    LASER LITHOTRIPSY Bilateral 5/31/2021    Procedure: LITHOTRIPSY, USING LASER;  Surgeon: Avtar Latham II, MD;  Location: [facility];  Service: Urology;  Laterality: Bilateral;    RETROGRADE PYELOGRAPHY Right 3/24/2021    Procedure: PYELOGRAM, RETROGRADE;  Surgeon: Avtar Latham II, MD;  Location: [facility];  Service: Urology;  Laterality: Right;    ROTATOR CUFF REPAIR Left 2002    Left    TOTAL KNEE ARTHROPLASTY Left 12/21/2020    Procedure: ARTHROPLASTY, KNEE, TOTAL;  Surgeon: Shin Chang MD;  Location: [facility];  Service: Orthopedics;  Laterality: Left;    TRANSURETHRAL SURGICAL REMOVAL OF PROSTATE (TURP) USING GREEN LIGHT LASER N/A 5/31/2021    Procedure: TURP, USING GREEN LIGHT LASER;  Surgeon: Avtar Latham II, MD;  Location: [facility];  Service: Urology;  Laterality: N/A;    URETEROSCOPIC REMOVAL OF URETERIC CALCULUS Right 3/24/2021     Procedure: REMOVAL, CALCULUS, URETER, URETEROSCOPIC;  Surgeon: Avtar Latham II, MD;  Location: [facility];  Service: Urology;  Laterality: Right;    URETEROSCOPIC REMOVAL OF URETERIC CALCULUS Left 5/31/2021    Procedure: REMOVAL, CALCULUS, URETER, URETEROSCOPIC;  Surgeon: Avtar Latham II, MD;  Location: [facility];  Service: Urology;  Laterality: Left;       Review of patient's allergies indicates:   Allergen Reactions    Percocet [oxycodone-acetaminophen] Other (See Comments)     hyperactivity    Percodan [oxycodone hcl-oxycodone-asa] Other (See Comments)     hyperactivity       No current facility-administered medications on file prior to encounter.     Current Outpatient Medications on File Prior to Encounter   Medication Sig    albuterol sulfate 2.5 mg/0.5 mL Nebu Take 2.5 mg  (one vial)  by nebulization every 6 (six) hours as needed (wheezing). Rescue    allopurinoL (ZYLOPRIM) 300 MG tablet Take 300 mg by mouth once daily.    aspirin 81 MG Chew Take 81 mg by mouth once daily.    budesonide 1 mg/2 mL NbSp Inhale 1 mg into the lungs once daily. Controller    levothyroxine (SYNTHROID) 100 MCG tablet TAKE 1 TABLET BY MOUTH ONCE DAILY (Patient taking differently: TAKE 1 TABLET BY MOUTH ONCE DAILY)    memantine (NAMENDA) 10 MG Tab Take 1 tablet (10 mg total) by mouth 2 (two) times daily.    metoprolol succinate (TOPROL-XL) 50 MG 24 hr tablet Take 1 tablet orally 2 times a day.    multivitamin (THERAGRAN) per tablet Take 1 tablet by mouth once daily. Centrum [place] for Men    omega 3-dha-epa-fish oil 1,000 mg (120 mg-180 mg) Cap Take 2 capsules orally once a day.    simvastatin (ZOCOR) 40 MG tablet Take 1 tablet (40 mg total) by mouth every evening.    tiotropium-olodateroL (STIOLTO RESPIMAT) 2.5-2.5 mcg/actuation Mist Inhale 2 puffs into the lungs once daily.    apixaban (ELIQUIS) 5 mg Tab Take one tablet by mouth twice a day    nitroGLYCERIN (NITROSTAT) 0.4 MG SL tablet Take 1 tablet (sublingual)  "under tongue as needed for chest pain, no more than 3 tablets in 15 mins, 5 mins apart.    [DISCONTINUED] amiodarone (PACERONE) 200 MG Tab Take 2 tablets by mouth once daily for 2 weeks then take one tablet by mouth once daily thereafter    [DISCONTINUED] amLODIPine (NORVASC) 5 MG tablet Take 1 tablet (5 mg total) by mouth once daily.    [DISCONTINUED] valsartan (DIOVAN) 80 MG tablet Take 1 tablet (80 mg total) by mouth once daily.     Family History     Problem Relation (Age of Onset)    Atrial fibrillation Son    Cancer Mother (65), Father    Kidney disease Brother    No Known Problems Sister    Stroke Son, Son        Tobacco Use    Smoking status: Never Smoker    Smokeless tobacco: Never Used   Substance and Sexual Activity    Alcohol use: No    Drug use: No    Sexual activity: Not Currently     Partners: Female     Review of Systems   Constitutional: Positive for fatigue. Negative for activity change, fever and unexpected weight change.   HENT: Negative for congestion, ear pain, hearing loss, rhinorrhea, sore throat and tinnitus.    Eyes: Negative for pain, redness and visual disturbance.   Respiratory: Negative for cough, shortness of breath and wheezing.    Cardiovascular: Negative for chest pain, palpitations and leg swelling.   Gastrointestinal: Positive for abdominal distention, abdominal pain and vomiting ("regurgitating" food after eating). Negative for blood in stool, constipation, diarrhea and nausea.   Genitourinary: Negative for decreased urine volume, dysuria, frequency and urgency.   Musculoskeletal: Negative for back pain, joint swelling and neck pain.   Skin: Negative for color change, rash and wound.   Neurological: Negative for dizziness, tremors, weakness, light-headedness and headaches.     Objective:     Vital Signs (Most Recent):  Temp: 96.6 °F (35.9 °C) (12/07/21 0735)  Pulse: 95 (12/07/21 0735)  Resp: 20 (12/07/21 0735)  BP: (!) 140/86 (12/07/21 0735)  SpO2: (!) 94 % (12/07/21 " 0735) Vital Signs (24h Range):  Temp:  [96.6 °F (35.9 °C)-98.2 °F (36.8 °C)] 96.6 °F (35.9 °C)  Pulse:  [] 95  Resp:  [20-22] 20  SpO2:  [93 %-96 %] 94 %  BP: (110-148)/(55-91) 140/86     Weight: 100 kg (220 lb 7.4 oz)  Body mass index is 33.52 kg/m².    Physical Exam  Vitals and nursing note reviewed.   Constitutional:       General: He is not in acute distress.     Appearance: He is well-developed and well-nourished.   HENT:      Head: Normocephalic and atraumatic.      Right Ear: External ear normal.      Left Ear: External ear normal.   Eyes:      General:         Right eye: No discharge.         Left eye: No discharge.      Extraocular Movements: EOM normal.   Neck:      Thyroid: No thyromegaly.   Cardiovascular:      Rate and Rhythm: Normal rate and regular rhythm.      Heart sounds: No murmur heard.      Pulmonary:      Effort: Pulmonary effort is normal. No respiratory distress.      Breath sounds: Normal breath sounds. No wheezing or rales.   Abdominal:      General: There is distension.      Palpations: Abdomen is soft.      Tenderness: There is no abdominal tenderness.      Comments: Absent BS   Musculoskeletal:      Cervical back: Neck supple.   Lymphadenopathy:      Cervical: No cervical adenopathy.   Skin:     General: Skin is warm and dry.   Neurological:      Mental Status: He is alert and oriented to person, place, and time.      Cranial Nerves: No cranial nerve deficit.   Psychiatric:         Mood and Affect: Mood and affect normal.         Behavior: Behavior normal.         Thought Content: Thought content normal.           CRANIAL NERVES     CN III, IV, VI   Extraocular motions are normal.        Significant Labs:   All pertinent labs within the past 24 hours have been reviewed.  CBC:   Recent Labs   Lab 12/07/21  0127   WBC 11.37   HGB 15.6   HCT 47.0        CMP:   Recent Labs   Lab 12/07/21  0127   *   K 5.0   CL 95   CO2 20*   *   BUN 79*   CREATININE 3.6*   CALCIUM  10.9*   PROT 6.9   ALBUMIN 3.4*   BILITOT 1.0   ALKPHOS 85   AST 12   ALT 10   ANIONGAP 20*   EGFRNONAA 15*     Lactic Acid:   Recent Labs   Lab 12/07/21  0359   LACTATE 2.7*     Lipase:   Recent Labs   Lab 12/07/21  0127   LIPASE 27     Urine Studies:   Recent Labs   Lab 12/07/21  0328   COLORU Yellow   APPEARANCEUA Clear   PHUR 5.0   SPECGRAV >=1.030*   PROTEINUA 2+*   GLUCUA Negative   KETONESU Trace*   BILIRUBINUA 1+*   OCCULTUA 2+*   NITRITE Negative   UROBILINOGEN Negative   LEUKOCYTESUR 1+*   RBCUA 40*   WBCUA >100*   BACTERIA Moderate*   HYALINECASTS 0   urine culture in process  covid screen negative   Significant Imaging:     CT abd and pelvis Imaging findings in keeping with a high-grade small bowel obstruction with a point of transition seen in the right mid abdomen, likely related to adhesions.  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  Additionally, there is a small amount of free fluid about the liver and extending into the pelvic cul-de-sac, with slightly high density of the fluid in the pelvic cul-de-sac suggesting hyperemia issues content fluid.    KUB The enteric tube has been appropriately retracted and now terminates in the region of the distal gastric body.Slightly prominent loops of small bowel seen in the upper abdomen.  No definite free peritoneal air is seen.     Lung parenchyma cannot be adequately evaluated secondary to overexposure of the central aspect of the lungs.Consider dedicated radiograph of the chest evaluate for parenchymal injury.        Assessment/Plan:     * SBO (small bowel obstruction)  NPO  NGT to wall suction  KUB daily  CXR today  IVF started    He meets SIRS criteria but I think better explained by SBO presentation that infection.  UA > 100 WBC but he has no urinary tract symptoms. He has recurrent UTI and will start Rocephin now but my clinical impression is the  abnormal vitals and elevated lactic acid are due to SBO, dehydration and not UTI sepsis      Paroxysmal atrial fibrillation  Noted in Dr Fair notes  On eliquis and BB at home. NPO here. Will give lovenox- renally dose and labetalol IV for now       Acute renal failure  Baseline creat 1.3-1.4- on admission 3.6. 1L NS given in ER. Repeat 1L Ns bolus this am and then cont NS at 150ml/hr. Repeating lactate this am better 1.9      Asymptomatic bacteriuria  He has no UTI sx on interview day of admit  Culture urine  Start rocephin empirically as h/o of UTI and not a great historian but as noted above I think vitals/lactic acid precipitated by SBO and not UTI sepsis      SIRS (systemic inflammatory response syndrome)  He meets SIRS criteria but I think better explained by SBO presentation that infection.  UA > 100 WBC but he has no urinary tract symptoms. He has recurrent UTI and will start Rocephin now but my clinical impression is the abnormal vitals and elevated lactic acid are due to SBO, dehydration and not UTI sepsis      Hypothyroidism  Hold synthroid while NPO  If remains NPO > 3 days can start IV      GERD (gastroesophageal reflux disease)  protonix IV      Hyperlipidemia  Holding zocor while NPO        VTE Risk Mitigation (From admission, onward)         Ordered     enoxaparin injection 100 mg  Every 24 hours (non-standard times)         12/07/21 1032     IP VTE HIGH RISK PATIENT  Once         12/07/21 0620     Place sequential compression device  Until discontinued         12/07/21 0620                   Estrellita Barrera MD  Department of Hospital Medicine   [place] [facility] (3rd Fl)

## 2021-12-08 LAB
ALBUMIN SERPL BCP-MCNC: 2.7 G/DL (ref 3.5–5.2)
ALP SERPL-CCNC: 80 U/L (ref 55–135)
ALT SERPL W/O P-5'-P-CCNC: 10 U/L (ref 10–44)
ANION GAP SERPL CALC-SCNC: 14 MMOL/L (ref 8–16)
AST SERPL-CCNC: 13 U/L (ref 10–40)
BACTERIA UR CULT: NORMAL
BACTERIA UR CULT: NORMAL
BASOPHILS # BLD AUTO: 0.02 K/UL (ref 0–0.2)
BASOPHILS NFR BLD: 0.4 % (ref 0–1.9)
BILIRUB SERPL-MCNC: 0.6 MG/DL (ref 0.1–1)
BUN SERPL-MCNC: 87 MG/DL (ref 8–23)
CALCIUM SERPL-MCNC: 9.2 MG/DL (ref 8.7–10.5)
CHLORIDE SERPL-SCNC: 104 MMOL/L (ref 95–110)
CO2 SERPL-SCNC: 21 MMOL/L (ref 23–29)
CREAT SERPL-MCNC: 3 MG/DL (ref 0.5–1.4)
DIFFERENTIAL METHOD: ABNORMAL
EOSINOPHIL # BLD AUTO: 0.1 K/UL (ref 0–0.5)
EOSINOPHIL NFR BLD: 1.4 % (ref 0–8)
ERYTHROCYTE [DISTWIDTH] IN BLOOD BY AUTOMATED COUNT: 16.8 % (ref 11.5–14.5)
EST. GFR  (AFRICAN AMERICAN): 22 ML/MIN/1.73 M^2
EST. GFR  (NON AFRICAN AMERICAN): 19 ML/MIN/1.73 M^2
GLUCOSE SERPL-MCNC: 88 MG/DL (ref 70–110)
HCT VFR BLD AUTO: 41.9 % (ref 40–54)
HGB BLD-MCNC: 13.5 G/DL (ref 14–18)
IMM GRANULOCYTES # BLD AUTO: 0.02 K/UL (ref 0–0.04)
IMM GRANULOCYTES NFR BLD AUTO: 0.4 % (ref 0–0.5)
LYMPHOCYTES # BLD AUTO: 1 K/UL (ref 1–4.8)
LYMPHOCYTES NFR BLD: 17 % (ref 18–48)
MCH RBC QN AUTO: 29.1 PG (ref 27–31)
MCHC RBC AUTO-ENTMCNC: 32.2 G/DL (ref 32–36)
MCV RBC AUTO: 90 FL (ref 82–98)
MONOCYTES # BLD AUTO: 1.1 K/UL (ref 0.3–1)
MONOCYTES NFR BLD: 18.7 % (ref 4–15)
NEUTROPHILS # BLD AUTO: 3.6 K/UL (ref 1.8–7.7)
NEUTROPHILS NFR BLD: 62.1 % (ref 38–73)
NRBC BLD-RTO: 0 /100 WBC
PLATELET # BLD AUTO: 189 K/UL (ref 150–450)
PMV BLD AUTO: 11.9 FL (ref 9.2–12.9)
POTASSIUM SERPL-SCNC: 4.3 MMOL/L (ref 3.5–5.1)
PROT SERPL-MCNC: 5.8 G/DL (ref 6–8.4)
RBC # BLD AUTO: 4.64 M/UL (ref 4.6–6.2)
SODIUM SERPL-SCNC: 139 MMOL/L (ref 136–145)
WBC # BLD AUTO: 5.71 K/UL (ref 3.9–12.7)

## 2021-12-08 PROCEDURE — 99233 PR SUBSEQUENT HOSPITAL CARE,LEVL III: ICD-10-PCS | Mod: ,,, | Performed by: FAMILY MEDICINE

## 2021-12-08 PROCEDURE — 85025 COMPLETE CBC W/AUTO DIFF WBC: CPT | Performed by: NURSE PRACTITIONER

## 2021-12-08 PROCEDURE — 11000001 HC ACUTE MED/SURG PRIVATE ROOM

## 2021-12-08 PROCEDURE — 99233 SBSQ HOSP IP/OBS HIGH 50: CPT | Mod: ,,, | Performed by: FAMILY MEDICINE

## 2021-12-08 PROCEDURE — 94761 N-INVAS EAR/PLS OXIMETRY MLT: CPT

## 2021-12-08 PROCEDURE — 25000003 PHARM REV CODE 250: Performed by: NURSE PRACTITIONER

## 2021-12-08 PROCEDURE — 36415 COLL VENOUS BLD VENIPUNCTURE: CPT | Performed by: NURSE PRACTITIONER

## 2021-12-08 PROCEDURE — 63600175 PHARM REV CODE 636 W HCPCS: Performed by: NURSE PRACTITIONER

## 2021-12-08 PROCEDURE — 99232 SBSQ HOSP IP/OBS MODERATE 35: CPT | Mod: ,,, | Performed by: SURGERY

## 2021-12-08 PROCEDURE — 80053 COMPREHEN METABOLIC PANEL: CPT | Performed by: NURSE PRACTITIONER

## 2021-12-08 PROCEDURE — 99232 PR SUBSEQUENT HOSPITAL CARE,LEVL II: ICD-10-PCS | Mod: ,,, | Performed by: SURGERY

## 2021-12-08 PROCEDURE — 25000003 PHARM REV CODE 250: Performed by: INTERNAL MEDICINE

## 2021-12-08 RX ADMIN — LABETALOL HYDROCHLORIDE 5 MG: 5 INJECTION, SOLUTION INTRAVENOUS at 11:12

## 2021-12-08 RX ADMIN — SODIUM CHLORIDE: 0.9 INJECTION, SOLUTION INTRAVENOUS at 11:12

## 2021-12-08 RX ADMIN — SODIUM CHLORIDE: 0.9 INJECTION, SOLUTION INTRAVENOUS at 07:12

## 2021-12-08 RX ADMIN — MUPIROCIN: 20 OINTMENT TOPICAL at 07:12

## 2021-12-08 RX ADMIN — LABETALOL HYDROCHLORIDE 5 MG: 5 INJECTION, SOLUTION INTRAVENOUS at 05:12

## 2021-12-08 RX ADMIN — MUPIROCIN 1 G: 20 OINTMENT TOPICAL at 09:12

## 2021-12-08 RX ADMIN — ENOXAPARIN SODIUM 100 MG: 100 INJECTION SUBCUTANEOUS at 11:12

## 2021-12-08 RX ADMIN — CEFTRIAXONE 1 G: 1 INJECTION, SOLUTION INTRAVENOUS at 11:12

## 2021-12-08 NOTE — SUBJECTIVE & OBJECTIVE
Past Medical History:   Diagnosis Date    Anxiety     Arthritis     Back pain     Chronic bronchitis     Chronic gout     COPD (chronic obstructive pulmonary disease)     Depression     Emphysema of lung     Generalized headaches     GERD (gastroesophageal reflux disease)     Hyperlipidemia     Hypertension     Hypothyroidism     Obesity     Peripheral vascular disease     Sleep apnea     On CPAP    Stage 3 chronic kidney disease 4/18/2018    Thyroid disease        Past Surgical History:   Procedure Laterality Date    Bilateral mastoidectomies Bilateral 1984    for ear infection    COLONOSCOPY  2010    COLONOSCOPY N/A 1/31/2019    Procedure: COLONOSCOPY;  Surgeon: Joaquín Crawford MD;  Location: [facility];  Service: Colon and Rectal;  Laterality: N/A;    COLONOSCOPY N/A 8/13/2019    Procedure: COLONOSCOPY;  Surgeon: Joaquín Crawford MD;  Location: [facility] (4TH FLR);  Service: Colon and Rectal;  Laterality: N/A;  Patient had inadequate prep with his last colonoscopy, was unable to finish the large volume GoLYTELY prep.  Please give him a smaller volume split dose prep, such as Movi-Prep or SuPrep.  He should do a light diet of easily digested food 2 days prior to the procedure     CYSTOSCOPIC LITHOLAPAXY N/A 3/24/2021    Procedure: CYSTOLITHOLAPAXY;  Surgeon: Avtar Latham II, MD;  Location: [facility];  Service: Urology;  Laterality: N/A;    CYSTOURETEROSCOPY WITH RETROGRADE PYELOGRAPHY AND INSERTION OF STENT INTO URETER Bilateral 5/31/2021    Procedure: CYSTOURETEROSCOPY, WITH RETROGRADE PYELOGRAM AND URETERAL STENT INSERTION;  Surgeon: Avtar Latham II, MD;  Location: [facility];  Service: Urology;  Laterality: Bilateral;    Eardrum repair  1984    Not sure which side    ESOPHAGOGASTRODUODENOSCOPY W/ PEG N/A 2/1/2021    Procedure: EGD, WITH PEG TUBE INSERTION;  Surgeon: Yousuf Villa MD;  Location: [facility];  Service: Endoscopy;  Laterality: N/A;    HERNIA REPAIR  1984     Umbilical-screen    KNEE ARTHROSCOPY Left 1989    LASER LITHOTRIPSY Bilateral 5/31/2021    Procedure: LITHOTRIPSY, USING LASER;  Surgeon: Avtar Latham II, MD;  Location: [facility];  Service: Urology;  Laterality: Bilateral;    RETROGRADE PYELOGRAPHY Right 3/24/2021    Procedure: PYELOGRAM, RETROGRADE;  Surgeon: Avtar Latham II, MD;  Location: [facility];  Service: Urology;  Laterality: Right;    ROTATOR CUFF REPAIR Left 2002    Left    TOTAL KNEE ARTHROPLASTY Left 12/21/2020    Procedure: ARTHROPLASTY, KNEE, TOTAL;  Surgeon: Shin Chang MD;  Location: [facility];  Service: Orthopedics;  Laterality: Left;    TRANSURETHRAL SURGICAL REMOVAL OF PROSTATE (TURP) USING GREEN LIGHT LASER N/A 5/31/2021    Procedure: TURP, USING GREEN LIGHT LASER;  Surgeon: Avtar Latham II, MD;  Location: [facility];  Service: Urology;  Laterality: N/A;    URETEROSCOPIC REMOVAL OF URETERIC CALCULUS Right 3/24/2021    Procedure: REMOVAL, CALCULUS, URETER, URETEROSCOPIC;  Surgeon: Avtar Latham II, MD;  Location: [facility];  Service: Urology;  Laterality: Right;    URETEROSCOPIC REMOVAL OF URETERIC CALCULUS Left 5/31/2021    Procedure: REMOVAL, CALCULUS, URETER, URETEROSCOPIC;  Surgeon: Avtar Latham II, MD;  Location: [facility];  Service: Urology;  Laterality: Left;       Review of patient's allergies indicates:   Allergen Reactions    Percocet [oxycodone-acetaminophen] Other (See Comments)     hyperactivity    Percodan [oxycodone hcl-oxycodone-asa] Other (See Comments)     hyperactivity       No current facility-administered medications on file prior to encounter.     Current Outpatient Medications on File Prior to Encounter   Medication Sig    albuterol sulfate 2.5 mg/0.5 mL Nebu Take 2.5 mg  (one vial)  by nebulization every 6 (six) hours as needed (wheezing). Rescue    allopurinoL (ZYLOPRIM) 300 MG tablet Take 300 mg by mouth once daily.    aspirin 81 MG Chew Take 81 mg by mouth once daily.    budesonide 1 mg/2 mL NbSp  Inhale 1 mg into the lungs once daily. Controller    levothyroxine (SYNTHROID) 100 MCG tablet TAKE 1 TABLET BY MOUTH ONCE DAILY (Patient taking differently: TAKE 1 TABLET BY MOUTH ONCE DAILY)    memantine (NAMENDA) 10 MG Tab Take 1 tablet (10 mg total) by mouth 2 (two) times daily.    metoprolol succinate (TOPROL-XL) 50 MG 24 hr tablet Take 1 tablet orally 2 times a day.    multivitamin (THERAGRAN) per tablet Take 1 tablet by mouth once daily. Centrum Silver for Men    omega 3-dha-epa-fish oil 1,000 mg (120 mg-180 mg) Cap Take 2 capsules orally once a day.    simvastatin (ZOCOR) 40 MG tablet Take 1 tablet (40 mg total) by mouth every evening.    tiotropium-olodateroL (STIOLTO RESPIMAT) 2.5-2.5 mcg/actuation Mist Inhale 2 puffs into the lungs once daily.    apixaban (ELIQUIS) 5 mg Tab Take one tablet by mouth twice a day    nitroGLYCERIN (NITROSTAT) 0.4 MG SL tablet Take 1 tablet (sublingual) under tongue as needed for chest pain, no more than 3 tablets in 15 mins, 5 mins apart.    [DISCONTINUED] amiodarone (PACERONE) 200 MG Tab Take 2 tablets by mouth once daily for 2 weeks then take one tablet by mouth once daily thereafter    [DISCONTINUED] amLODIPine (NORVASC) 5 MG tablet Take 1 tablet (5 mg total) by mouth once daily.    [DISCONTINUED] valsartan (DIOVAN) 80 MG tablet Take 1 tablet (80 mg total) by mouth once daily.     Family History     Problem Relation (Age of Onset)    Atrial fibrillation Son    Cancer Mother (65), Father    Kidney disease Brother    No Known Problems Sister    Stroke Son, Son        Tobacco Use    Smoking status: Never Smoker    Smokeless tobacco: Never Used   Substance and Sexual Activity    Alcohol use: No    Drug use: No    Sexual activity: Not Currently     Partners: Female     Review of Systems   Constitutional: Positive for fatigue. Negative for activity change, fever and unexpected weight change.   HENT: Negative for congestion, ear pain, hearing loss, rhinorrhea,  "sore throat and tinnitus.    Eyes: Negative for pain, redness and visual disturbance.   Respiratory: Negative for cough, shortness of breath and wheezing.    Cardiovascular: Negative for chest pain, palpitations and leg swelling.   Gastrointestinal: Positive for abdominal distention, abdominal pain and vomiting ("regurgitating" food after eating). Negative for blood in stool, constipation, diarrhea and nausea.   Genitourinary: Negative for decreased urine volume, dysuria, frequency and urgency.   Musculoskeletal: Negative for back pain, joint swelling and neck pain.   Skin: Negative for color change, rash and wound.   Neurological: Negative for dizziness, tremors, weakness, light-headedness and headaches.     Objective:     Vital Signs (Most Recent):  Temp: 98.1 °F (36.7 °C) (12/08/21 0729)  Pulse: 89 (12/08/21 0800)  Resp: 20 (12/08/21 0729)  BP: 122/75 (12/08/21 0729)  SpO2: 95 % (12/08/21 0800) Vital Signs (24h Range):  Temp:  [97.7 °F (36.5 °C)-98.8 °F (37.1 °C)] 98.1 °F (36.7 °C)  Pulse:  [] 89  Resp:  [16-20] 20  SpO2:  [94 %-98 %] 95 %  BP: ()/(55-75) 122/75     Weight: 100 kg (220 lb 7.4 oz)  Body mass index is 33.52 kg/m².    Physical Exam  Vitals and nursing note reviewed.   Constitutional:       General: He is not in acute distress.     Appearance: He is well-developed.      Comments: Lying in hospital bed with NGT in place   HENT:      Head: Normocephalic and atraumatic.      Right Ear: External ear normal.      Left Ear: External ear normal.   Eyes:      General:         Right eye: No discharge.         Left eye: No discharge.      Extraocular Movements: EOM normal.   Neck:      Thyroid: No thyromegaly.   Cardiovascular:      Rate and Rhythm: Normal rate and regular rhythm.      Heart sounds: No murmur heard.      Pulmonary:      Effort: Pulmonary effort is normal. No respiratory distress.      Breath sounds: Normal breath sounds. No wheezing or rales.   Abdominal:      General: There is " distension.      Palpations: Abdomen is soft.      Tenderness: There is no abdominal tenderness.      Comments: Absent BS   Musculoskeletal:      Cervical back: Neck supple.      Right lower leg: No edema.      Left lower leg: No edema.   Lymphadenopathy:      Cervical: No cervical adenopathy.   Skin:     General: Skin is warm and dry.   Neurological:      General: No focal deficit present.      Mental Status: He is alert. Mental status is at baseline. He is disoriented.      Cranial Nerves: No cranial nerve deficit.   Psychiatric:         Behavior: Behavior normal.         Thought Content: Thought content normal.           CRANIAL NERVES     CN III, IV, VI   Extraocular motions are normal.        Significant Labs:   All pertinent labs within the past 24 hours have been reviewed.  CBC:   Recent Labs   Lab 12/07/21  0127 12/08/21  0610   WBC 11.37 5.71   HGB 15.6 13.5*   HCT 47.0 41.9    189     CMP:   Recent Labs   Lab 12/07/21  0127 12/08/21  0610   * 139   K 5.0 4.3   CL 95 104   CO2 20* 21*   * 88   BUN 79* 87*   CREATININE 3.6* 3.0*   CALCIUM 10.9* 9.2   PROT 6.9 5.8*   ALBUMIN 3.4* 2.7*   BILITOT 1.0 0.6   ALKPHOS 85 80   AST 12 13   ALT 10 10   ANIONGAP 20* 14   EGFRNONAA 15* 19*     Lactic Acid:   Recent Labs   Lab 12/07/21  0359 12/07/21  0954   LACTATE 2.7* 1.9     Lipase:   Recent Labs   Lab 12/07/21  0127   LIPASE 27     Urine Studies:   Recent Labs   Lab 12/07/21  0328   COLORU Yellow   APPEARANCEUA Clear   PHUR 5.0   SPECGRAV >=1.030*   PROTEINUA 2+*   GLUCUA Negative   KETONESU Trace*   BILIRUBINUA 1+*   OCCULTUA 2+*   NITRITE Negative   UROBILINOGEN Negative   LEUKOCYTESUR 1+*   RBCUA 40*   WBCUA >100*   BACTERIA Moderate*   HYALINECASTS 0   urine culture in process  covid screen negative   Significant Imaging:     CT abd and pelvis Imaging findings in keeping with a high-grade small bowel obstruction with a point of transition seen in the right mid abdomen, likely related to  adhesions.  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  Additionally, there is a small amount of free fluid about the liver and extending into the pelvic cul-de-sac, with slightly high density of the fluid in the pelvic cul-de-sac suggesting hyperemia issues content fluid.    KUB The enteric tube has been appropriately retracted and now terminates in the region of the distal gastric body.Slightly prominent loops of small bowel seen in the upper abdomen.  No definite free peritoneal air is seen.     Lung parenchyma cannot be adequately evaluated secondary to overexposure of the central aspect of the lungs.Consider dedicated radiograph of the chest evaluate for parenchymal injury.    CXR Enteric tube terminates in the gastric body.  There is bibasilar subsegmental atelectasis, with right basilar scarring.  No focal airspace consolidation or pleural effusions.  No pneumothorax.  The heart is enlarged.  Calcified atheromatous disease affects the aorta.  Median sternotomy wires are midline.  Age-appropriate degenerative changes affect the skeleton.

## 2021-12-08 NOTE — PLAN OF CARE
[place] - Med Surg (3rd Fl)  Discharge Reassessment    Primary Care Provider: Emiliano Cam MD    Expected Discharge Date:     Reassessment (most recent)     Discharge Reassessment - 12/08/21 1525        Discharge Reassessment    Assessment Type Discharge Planning Reassessment     Did the patient's condition or plan change since previous assessment? No     Discharge Plan discussed with: Adult children     Communicated STEPHANIE with patient/caregiver Date not available/Unable to determine     Discharge Plan A Skilled Nursing Facility     Discharge Plan B New Nursing Home placement - [facility] care facility     DME Needed Upon Discharge  other (see comments)   TBD    Discharge Barriers Identified None     Why the patient remains in the hospital Requires continued medical care                   Discharge reassessment completed with patient's son, Jason. SW completed discharge assessment with patient's son yesterday who indicated that patient would return home at discharge. Today he is stating to oz AMAYA that patient now requires too much care at home and can no longer handle having him at home and is seeking nursing home placement. ULISSES discussed with Jason as well. He indicated that [facility] would be his first choice for placement. Informed that [facility] may not accept due to patient not being vaccinated against COVID 19. Son stated understanding. He is also agreeable to placement at [facility]. SW to send patient referral to both locations. MD anticipates that patient will need SNF after this hospitalization. ULISSES requested patient's nurse to order PT and OT to assess for SNF.

## 2021-12-08 NOTE — HOSPITAL COURSE
12/8 Pt was admitted yesterday with SBO. He had general surgery consulted. NGT placed to suction. Still no flatus or BM. KUB pending for this am.     Also on rocephin for UTI-culture in process. Was given 1L NS in ER and repeated on floor then started NS at 150ml/hr. Creat on admit 3.6> now 3.0-- baseline creat 1.4. lactate did impropve on repeat.  Renal US shows cysts.  No hydronephrosis.     12/9 pt here with SBO. NGT to suction had 1000ml overnight. No flatus. No BM. +BS  Renal function improved 3.6>>2.1 on NS at 150ml/hr. Rocephin for UTI- urine culture shows multiple organisms. NO fever. NO elevated WBC    12/10/21    80YO WM admitted with SBO 4 days ago, NGT pulled yesterday, tolerating liquids,   Burping, t  KUB this am- NG tube removed, otherwise no significant change when compared with the prior study of December 9, 2021.     Creat much improved 3.6>1.6 ,  IV Rocephin day 4 for UTI, initial urine Cx- multiple organisms, repaeat U/A dirty, culture in process   7 months ago urine CX+ MRSA- sensitve to bactrim, tetracycline and Vanc ; will add oral doxy   WBC normal, afebrile   Per PT yesterday; Gait: Contact Gaurd Assistance x ~30 feet (steeping forward and back due to limited line of medical equipment - NG tube) using RW.    12/11/21  80YO WM admitted with SBO  NG was pulled 12/9   But yesterday started with nausea and later with vomiting  and NG has to be placed back again   KUB is pending .  On doxy for UTI recurrent ; last culture staph mrsa sensitive to tetracyclines     12/12  S/p surgery yesterday for SBO .  Reports no flatus . No BM   Issues with bleeding from incision   Lab Results   Component Value Date    WBC 9.47 12/12/2021    HGB 11.3 (L) 12/12/2021    HCT 37.2 (L) 12/12/2021    MCV 96 12/12/2021     12/12/2021 12/13 pt was taken to OR Saturday. He had NGT replaced after that. This am he is feeling better. Dr Hein saw him this am from general surgery and rec pulling NG and  starting clears. He is not tender to palp this am. Passed gas this am.   H/H 9/30- lovenox was held due to ozzing after surgery- hx of a fib  Urine culture shows candida     12/14/21  NGT pulled yesterday, pt tolerating liquid diet, Na 147, creat 1.2, + CKD ; getting NS @150ml/hr; decrease to 75ml/hr   H&H 9.1/29 from 9.6/30   Had a BM yesterday, will continue oral liquids until bowel sounds more active   WBC normal, afebrile, O2 sat stable on RA   Urine Cx with candida, doxy stopped after 3 days ; will start diflucan 100mg daily x 3d .    12/15 Pt admitted  with SBO - NGT pulled 2 days ago, tolerating liquids, passing flatus, had BM this am   Surgical incision evaluated per general sx yesterday, recommend advance diet and ^OOB   Contact Guard Assistance x ~30 feet with RW. Slow phase, slight dec foot/floor clearance and guarding stomach  VSS, afebrile, WBC 11.53   Day 2/3 of diflucan for candida in urine   Getting IV labetolol; transitioned to oral home metoprolol   Awaiting on state form 142 to approve nursing home admission due to psychiatric hx of anxiety/depression     12/16 Tolerating diet, VSS   Worked on out of bed activity, gait trng trng with RW x ~100 feet with standby Assistance; toilet(Bedside Commode) transfers/activity.    12/17 pt admitted with SBO reqired surgery for adhesions causing obstruction  12/11 he is stable from sx standpoint,   Tolerating diet , K+ 2.6 will supplement and check Mg+   Worked on out of bed activity, gait trng trng wioth RW x ~100 feet with standby Assistance; toilet(Bedside Commode) transfers/activity  Still awaiting 142- state approval for Nursing home   Check BMP , CBC in am     12/18 no acute events overnight. No new complaints. Awaiting NH placement.    12/19 no acute events overnight. No new complaints. Awaiting NH placement.     12/20 he has no events overnight or weekend,. Waiting on nursing home placement, placement pending [facility] care, refused for skill per  insurance. He is ambulating 150ft with PT using supervision only. Urinating and defecating. Tolerating po. VSS/afebrile.     12/21 pt remains stable. No acute issues overnight. Awaiting nursing home placement. Per case management >>142 received on Friday, 12/17/21 in the afternoon. SNF declined by payor last week. Referrals have been sent via [facility] to [facility], [facility], [facility], [facility] and [facility]. Patient declined by [facility], [facility] and [facility], and [facility]. Patient currently being reviewed by [facility] and [facility]. -Patient denied by [facility]. [facility] now reviewing the patient and contacting family.     12/22 pt remains stable, no new complaints, awaiting nursing home placement     12/23 Insurance is no longer paying for inpatient hosp stay, so son is now requesting that he go home with him. No new complaints.

## 2021-12-08 NOTE — PROGRESS NOTES
[place] - [facility] ([facility])  [facility] Hospital Medicine  Progress Note    Patient Name: Domonique Hernandez Jr.  MRN: [id number]  Patient Class: IP- Inpatient   Admission Date: 12/7/2021  Length of Stay: 1 days  Attending Physician: Gemma Rubio MD  Primary Care Provider: Emiliano Cam MD        Subjective:     Principal Problem:SBO (small bowel obstruction)        HPI:  82yo male patient with hx of anxiety, arthritis, back pain, bronchitis/COPD, depression, GERD, HLD, hypothyroid, PVD, sleep apnea on CPAP, and CKD. Pt came to ER with c/o regurgitation for 3 days. Decrease intake. No fever. No UTI s/s. CT on admission shows SBO. Significantly dehydrated. Creat 3.6-- baseline 1.4. Given 1L NS in ER U/a dirty one dose of zosyn given. No fever. No elevated WBC. Lactate 2.3. He had NGT place and put to suction. He is in bed this am. Reports feeling better. General surgery consulted.       Overview/Hospital Course:  Pt was admitted yesterday with SBO. He had general surgery consulted. NGT placed to suction. Still no flatus or BM. KUB pending for this am.     Also on rocephin for UTI-culture in process. Was given 1L NS in ER and repeated on floor then started NS at 150ml/hr. Creat on admit 3.6> now 3.0-- baseline creat 1.4. lactate did impropve on repeat.  Renal US shows cysts.  No hydronephrosis.       Past Medical History:   Diagnosis Date    Anxiety     Arthritis     Back pain     Chronic bronchitis     Chronic gout     COPD (chronic obstructive pulmonary disease)     Depression     Emphysema of lung     Generalized headaches     GERD (gastroesophageal reflux disease)     Hyperlipidemia     Hypertension     Hypothyroidism     Obesity     Peripheral vascular disease     Sleep apnea     On CPAP    Stage 3 chronic kidney disease 4/18/2018    Thyroid disease        Past Surgical History:   Procedure Laterality Date    Bilateral mastoidectomies Bilateral 1984    for ear infection    COLONOSCOPY  2010     COLONOSCOPY N/A 1/31/2019    Procedure: COLONOSCOPY;  Surgeon: Joaquín Crawford MD;  Location: [facility];  Service: Colon and Rectal;  Laterality: N/A;    COLONOSCOPY N/A 8/13/2019    Procedure: COLONOSCOPY;  Surgeon: Joaquín Crawford MD;  Location: [facility] ENDO (4TH FLR);  Service: Colon and Rectal;  Laterality: N/A;  Patient had inadequate prep with his last colonoscopy, was unable to finish the large volume GoLYTELY prep.  Please give him a smaller volume split dose prep, such as Movi-Prep or SuPrep.  He should do a light diet of easily digested food 2 days prior to the procedure     CYSTOSCOPIC LITHOLAPAXY N/A 3/24/2021    Procedure: CYSTOLITHOLAPAXY;  Surgeon: Avtar Latham II, MD;  Location: [facility];  Service: Urology;  Laterality: N/A;    CYSTOURETEROSCOPY WITH RETROGRADE PYELOGRAPHY AND INSERTION OF STENT INTO URETER Bilateral 5/31/2021    Procedure: CYSTOURETEROSCOPY, WITH RETROGRADE PYELOGRAM AND URETERAL STENT INSERTION;  Surgeon: Avtar Latham II, MD;  Location: [facility];  Service: Urology;  Laterality: Bilateral;    Eardrum repair  1984    Not sure which side    ESOPHAGOGASTRODUODENOSCOPY W/ PEG N/A 2/1/2021    Procedure: EGD, WITH PEG TUBE INSERTION;  Surgeon: Yousuf Villa MD;  Location: [facility];  Service: Endoscopy;  Laterality: N/A;    HERNIA REPAIR  1984    Umbilical-screen    KNEE ARTHROSCOPY Left 1989    LASER LITHOTRIPSY Bilateral 5/31/2021    Procedure: LITHOTRIPSY, USING LASER;  Surgeon: Avtar Latham II, MD;  Location: [facility];  Service: Urology;  Laterality: Bilateral;    RETROGRADE PYELOGRAPHY Right 3/24/2021    Procedure: PYELOGRAM, RETROGRADE;  Surgeon: Avtar Latham II, MD;  Location: [facility];  Service: Urology;  Laterality: Right;    ROTATOR CUFF REPAIR Left 2002    Left    TOTAL KNEE ARTHROPLASTY Left 12/21/2020    Procedure: ARTHROPLASTY, KNEE, TOTAL;  Surgeon: Shin Chang MD;  Location: [facility];  Service: Orthopedics;  Laterality: Left;    TRANSURETHRAL  SURGICAL REMOVAL OF PROSTATE (TURP) USING GREEN LIGHT LASER N/A 5/31/2021    Procedure: TURP, USING GREEN LIGHT LASER;  Surgeon: Avtar Latham II, MD;  Location: [facility];  Service: Urology;  Laterality: N/A;    URETEROSCOPIC REMOVAL OF URETERIC CALCULUS Right 3/24/2021    Procedure: REMOVAL, CALCULUS, URETER, URETEROSCOPIC;  Surgeon: Avtar Latham II, MD;  Location: [facility];  Service: Urology;  Laterality: Right;    URETEROSCOPIC REMOVAL OF URETERIC CALCULUS Left 5/31/2021    Procedure: REMOVAL, CALCULUS, URETER, URETEROSCOPIC;  Surgeon: Avtar Latham II, MD;  Location: [facility];  Service: Urology;  Laterality: Left;       Review of patient's allergies indicates:   Allergen Reactions    Percocet [oxycodone-acetaminophen] Other (See Comments)     hyperactivity    Percodan [oxycodone hcl-oxycodone-asa] Other (See Comments)     hyperactivity       No current facility-administered medications on file prior to encounter.     Current Outpatient Medications on File Prior to Encounter   Medication Sig    albuterol sulfate 2.5 mg/0.5 mL Nebu Take 2.5 mg  (one vial)  by nebulization every 6 (six) hours as needed (wheezing). Rescue    allopurinoL (ZYLOPRIM) 300 MG tablet Take 300 mg by mouth once daily.    aspirin 81 MG Chew Take 81 mg by mouth once daily.    budesonide 1 mg/2 mL NbSp Inhale 1 mg into the lungs once daily. Controller    levothyroxine (SYNTHROID) 100 MCG tablet TAKE 1 TABLET BY MOUTH ONCE DAILY (Patient taking differently: TAKE 1 TABLET BY MOUTH ONCE DAILY)    memantine (NAMENDA) 10 MG Tab Take 1 tablet (10 mg total) by mouth 2 (two) times daily.    metoprolol succinate (TOPROL-XL) 50 MG 24 hr tablet Take 1 tablet orally 2 times a day.    multivitamin (THERAGRAN) per tablet Take 1 tablet by mouth once daily. Centrum Silver for Men    omega 3-dha-epa-fish oil 1,000 mg (120 mg-180 mg) Cap Take 2 capsules orally once a day.    simvastatin (ZOCOR) 40 MG tablet Take 1 tablet (40 mg total) by mouth  "every evening.    tiotropium-olodateroL (STIOLTO RESPIMAT) 2.5-2.5 mcg/actuation Mist Inhale 2 puffs into the lungs once daily.    apixaban (ELIQUIS) 5 mg Tab Take one tablet by mouth twice a day    nitroGLYCERIN (NITROSTAT) 0.4 MG SL tablet Take 1 tablet (sublingual) under tongue as needed for chest pain, no more than 3 tablets in 15 mins, 5 mins apart.    [DISCONTINUED] amiodarone (PACERONE) 200 MG Tab Take 2 tablets by mouth once daily for 2 weeks then take one tablet by mouth once daily thereafter    [DISCONTINUED] amLODIPine (NORVASC) 5 MG tablet Take 1 tablet (5 mg total) by mouth once daily.    [DISCONTINUED] valsartan (DIOVAN) 80 MG tablet Take 1 tablet (80 mg total) by mouth once daily.     Family History     Problem Relation (Age of Onset)    Atrial fibrillation Son    Cancer Mother (65), Father    Kidney disease Brother    No Known Problems Sister    Stroke Son, Son        Tobacco Use    Smoking status: Never Smoker    Smokeless tobacco: Never Used   Substance and Sexual Activity    Alcohol use: No    Drug use: No    Sexual activity: Not Currently     Partners: Female     Review of Systems   Constitutional: Positive for fatigue. Negative for activity change, fever and unexpected weight change.   HENT: Negative for congestion, ear pain, hearing loss, rhinorrhea, sore throat and tinnitus.    Eyes: Negative for pain, redness and visual disturbance.   Respiratory: Negative for cough, shortness of breath and wheezing.    Cardiovascular: Negative for chest pain, palpitations and leg swelling.   Gastrointestinal: Positive for abdominal distention, abdominal pain and vomiting ("regurgitating" food after eating). Negative for blood in stool, constipation, diarrhea and nausea.   Genitourinary: Negative for decreased urine volume, dysuria, frequency and urgency.   Musculoskeletal: Negative for back pain, joint swelling and neck pain.   Skin: Negative for color change, rash and wound.   Neurological: " Negative for dizziness, tremors, weakness, light-headedness and headaches.     Objective:     Vital Signs (Most Recent):  Temp: 98.1 °F (36.7 °C) (12/08/21 0729)  Pulse: 89 (12/08/21 0800)  Resp: 20 (12/08/21 0729)  BP: 122/75 (12/08/21 0729)  SpO2: 95 % (12/08/21 0800) Vital Signs (24h Range):  Temp:  [97.7 °F (36.5 °C)-98.8 °F (37.1 °C)] 98.1 °F (36.7 °C)  Pulse:  [] 89  Resp:  [16-20] 20  SpO2:  [94 %-98 %] 95 %  BP: ()/(55-75) 122/75     Weight: 100 kg (220 lb 7.4 oz)  Body mass index is 33.52 kg/m².    Physical Exam  Vitals and nursing note reviewed.   Constitutional:       General: He is not in acute distress.     Appearance: He is well-developed.      Comments: Lying in hospital bed with NGT in place   HENT:      Head: Normocephalic and atraumatic.      Right Ear: External ear normal.      Left Ear: External ear normal.   Eyes:      General:         Right eye: No discharge.         Left eye: No discharge.      Extraocular Movements: EOM normal.   Neck:      Thyroid: No thyromegaly.   Cardiovascular:      Rate and Rhythm: Normal rate and regular rhythm.      Heart sounds: No murmur heard.      Pulmonary:      Effort: Pulmonary effort is normal. No respiratory distress.      Breath sounds: Normal breath sounds. No wheezing or rales.   Abdominal:      General: There is distension.      Palpations: Abdomen is soft.      Tenderness: There is no abdominal tenderness.      Comments: Absent BS   Musculoskeletal:      Cervical back: Neck supple.      Right lower leg: No edema.      Left lower leg: No edema.   Lymphadenopathy:      Cervical: No cervical adenopathy.   Skin:     General: Skin is warm and dry.   Neurological:      General: No focal deficit present.      Mental Status: He is alert. Mental status is at baseline. He is disoriented.      Cranial Nerves: No cranial nerve deficit.   Psychiatric:         Behavior: Behavior normal.         Thought Content: Thought content normal.           CRANIAL  NERVES     CN III, IV, VI   Extraocular motions are normal.        Significant Labs:   All pertinent labs within the past 24 hours have been reviewed.  CBC:   Recent Labs   Lab 12/07/21  0127 12/08/21  0610   WBC 11.37 5.71   HGB 15.6 13.5*   HCT 47.0 41.9    189     CMP:   Recent Labs   Lab 12/07/21  0127 12/08/21  0610   * 139   K 5.0 4.3   CL 95 104   CO2 20* 21*   * 88   BUN 79* 87*   CREATININE 3.6* 3.0*   CALCIUM 10.9* 9.2   PROT 6.9 5.8*   ALBUMIN 3.4* 2.7*   BILITOT 1.0 0.6   ALKPHOS 85 80   AST 12 13   ALT 10 10   ANIONGAP 20* 14   EGFRNONAA 15* 19*     Lactic Acid:   Recent Labs   Lab 12/07/21  0359 12/07/21  0954   LACTATE 2.7* 1.9     Lipase:   Recent Labs   Lab 12/07/21  0127   LIPASE 27     Urine Studies:   Recent Labs   Lab 12/07/21  0328   COLORU Yellow   APPEARANCEUA Clear   PHUR 5.0   SPECGRAV >=1.030*   PROTEINUA 2+*   GLUCUA Negative   KETONESU Trace*   BILIRUBINUA 1+*   OCCULTUA 2+*   NITRITE Negative   UROBILINOGEN Negative   LEUKOCYTESUR 1+*   RBCUA 40*   WBCUA >100*   BACTERIA Moderate*   HYALINECASTS 0   urine culture in process  covid screen negative   Significant Imaging:     CT abd and pelvis Imaging findings in keeping with a high-grade small bowel obstruction with a point of transition seen in the right mid abdomen, likely related to adhesions.  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  Additionally, there is a small amount of free fluid about the liver and extending into the pelvic cul-de-sac, with slightly high density of the fluid in the pelvic cul-de-sac suggesting hyperemia issues content fluid.    KUB The enteric tube has been appropriately retracted and now terminates in the region of the distal gastric body.Slightly prominent loops of small bowel seen in the upper abdomen.  No definite free peritoneal air is seen.     Lung parenchyma cannot be adequately  evaluated secondary to overexposure of the central aspect of the lungs.Consider dedicated radiograph of the chest evaluate for parenchymal injury.    CXR Enteric tube terminates in the gastric body.  There is bibasilar subsegmental atelectasis, with right basilar scarring.  No focal airspace consolidation or pleural effusions.  No pneumothorax.  The heart is enlarged.  Calcified atheromatous disease affects the aorta.  Median sternotomy wires are midline.  Age-appropriate degenerative changes affect the skeleton.      Assessment/Plan:      * SBO (small bowel obstruction)  NPO  NGT to wall suction  KUB daily  CXR today nothing acute  IVF started NS at 150ml/hr    He meets SIRS criteria but I think better explained by SBO presentation that infection.  UA > 100 WBC but he has no urinary tract symptoms. He has recurrent UTI and will start Rocephin now but my clinical impression is the abnormal vitals and elevated lactic acid are due to SBO, dehydration and not UTI sepsis      Paroxysmal atrial fibrillation  Noted in Dr Fair notes  On eliquis and BB at home. NPO here. Will give lovenox- renally dose and labetalol IV for now       Acute renal failure  Baseline creat 1.3-1.4- on admission 3.6. 1L NS given in ER. Repeat 1L Ns bolus 12/7 and then cont NS at 150ml/hr. Repeat lactate better 1.9  Creat now 3.0  Son reports that he has no issues urinating at home  Will check renal U/s and post void residual  Has had urinary re tension in past. Consider salcedo if retaining due ti UTI    Asymptomatic bacteriuria  He has no UTI sx on interview day of admit  Culture urine  Start rocephin empirically as h/o of UTI and not a great historian but as noted above I think vitals/lactic acid precipitated by SBO and not UTI sepsis      SIRS (systemic inflammatory response syndrome)  He meets SIRS criteria but I think better explained by SBO presentation that infection.  UA > 100 WBC but he has no urinary tract symptoms. He has recurrent UTI  and will start Rocephin now but my clinical impression is the abnormal vitals and elevated lactic acid are due to SBO, dehydration and not UTI sepsis      Hypothyroidism  Hold synthroid while NPO  If remains NPO > 3 days can start IV      GERD (gastroesophageal reflux disease)  protonix IV      Hyperlipidemia  Holding zocor while NPO        VTE Risk Mitigation (From admission, onward)         Ordered     enoxaparin injection 100 mg  Every 24 hours (non-standard times)         12/07/21 1032     IP VTE HIGH RISK PATIENT  Once         12/07/21 0620     Place sequential compression device  Until discontinued         12/07/21 0620                Discharge Planning   STEPHANIE:      Code Status: Full Code   Is the patient medically ready for discharge?:     Reason for patient still in hospital (select all that apply): Patient trending condition and Treatment  Discharge Plan A: Home with family                  Emiliano Cam MD  Department of Hospital Medicine   [place] - [facility] Surg (3rd Fl)

## 2021-12-08 NOTE — ASSESSMENT & PLAN NOTE
Baseline creat 1.3-1.4- on admission 3.6. 1L NS given in ER. Repeat 1L Ns bolus 12/7 and then cont NS at 150ml/hr. Repeat lactate better 1.9  Creat now 3.0  Son reports that he has no issues urinating at home  Will check renal U/s and post void residual  Has had urinary re tension in past. Consider salcedo if retaining due ti UTI

## 2021-12-09 LAB
ALBUMIN SERPL BCP-MCNC: 2.5 G/DL (ref 3.5–5.2)
ALP SERPL-CCNC: 74 U/L (ref 55–135)
ALT SERPL W/O P-5'-P-CCNC: 13 U/L (ref 10–44)
ANION GAP SERPL CALC-SCNC: 11 MMOL/L (ref 8–16)
AST SERPL-CCNC: 15 U/L (ref 10–40)
BACTERIA #/AREA URNS HPF: ABNORMAL /HPF
BASOPHILS # BLD AUTO: 0.02 K/UL (ref 0–0.2)
BASOPHILS NFR BLD: 0.4 % (ref 0–1.9)
BILIRUB SERPL-MCNC: 0.5 MG/DL (ref 0.1–1)
BILIRUB UR QL STRIP: NEGATIVE
BUN SERPL-MCNC: 69 MG/DL (ref 8–23)
CALCIUM SERPL-MCNC: 9.1 MG/DL (ref 8.7–10.5)
CHLORIDE SERPL-SCNC: 109 MMOL/L (ref 95–110)
CLARITY UR: ABNORMAL
CO2 SERPL-SCNC: 24 MMOL/L (ref 23–29)
COLOR UR: YELLOW
CREAT SERPL-MCNC: 2.1 MG/DL (ref 0.5–1.4)
DIFFERENTIAL METHOD: ABNORMAL
EOSINOPHIL # BLD AUTO: 0.2 K/UL (ref 0–0.5)
EOSINOPHIL NFR BLD: 3 % (ref 0–8)
ERYTHROCYTE [DISTWIDTH] IN BLOOD BY AUTOMATED COUNT: 16.8 % (ref 11.5–14.5)
EST. GFR  (AFRICAN AMERICAN): 34 ML/MIN/1.73 M^2
EST. GFR  (NON AFRICAN AMERICAN): 29 ML/MIN/1.73 M^2
GLUCOSE SERPL-MCNC: 85 MG/DL (ref 70–110)
GLUCOSE UR QL STRIP: NEGATIVE
HCT VFR BLD AUTO: 40.1 % (ref 40–54)
HGB BLD-MCNC: 12.7 G/DL (ref 14–18)
HGB UR QL STRIP: ABNORMAL
HYALINE CASTS #/AREA URNS LPF: 0 /LPF
IMM GRANULOCYTES # BLD AUTO: 0.03 K/UL (ref 0–0.04)
IMM GRANULOCYTES NFR BLD AUTO: 0.6 % (ref 0–0.5)
KETONES UR QL STRIP: ABNORMAL
LEUKOCYTE ESTERASE UR QL STRIP: ABNORMAL
LYMPHOCYTES # BLD AUTO: 0.7 K/UL (ref 1–4.8)
LYMPHOCYTES NFR BLD: 12.5 % (ref 18–48)
MCH RBC QN AUTO: 29.1 PG (ref 27–31)
MCHC RBC AUTO-ENTMCNC: 31.7 G/DL (ref 32–36)
MCV RBC AUTO: 92 FL (ref 82–98)
MICROSCOPIC COMMENT: ABNORMAL
MONOCYTES # BLD AUTO: 1 K/UL (ref 0.3–1)
MONOCYTES NFR BLD: 18.8 % (ref 4–15)
NEUTROPHILS # BLD AUTO: 3.5 K/UL (ref 1.8–7.7)
NEUTROPHILS NFR BLD: 64.7 % (ref 38–73)
NITRITE UR QL STRIP: NEGATIVE
NRBC BLD-RTO: 0 /100 WBC
PH UR STRIP: 6 [PH] (ref 5–8)
PLATELET # BLD AUTO: 182 K/UL (ref 150–450)
PMV BLD AUTO: 12.3 FL (ref 9.2–12.9)
POTASSIUM SERPL-SCNC: 4.2 MMOL/L (ref 3.5–5.1)
PROT SERPL-MCNC: 5.6 G/DL (ref 6–8.4)
PROT UR QL STRIP: ABNORMAL
RBC # BLD AUTO: 4.36 M/UL (ref 4.6–6.2)
RBC #/AREA URNS HPF: 1 /HPF (ref 0–4)
SODIUM SERPL-SCNC: 144 MMOL/L (ref 136–145)
SP GR UR STRIP: 1.02 (ref 1–1.03)
URN SPEC COLLECT METH UR: ABNORMAL
UROBILINOGEN UR STRIP-ACNC: NEGATIVE EU/DL
WBC # BLD AUTO: 5.38 K/UL (ref 3.9–12.7)
WBC #/AREA URNS HPF: 80 /HPF (ref 0–5)
WBC CLUMPS URNS QL MICRO: ABNORMAL

## 2021-12-09 PROCEDURE — 36415 COLL VENOUS BLD VENIPUNCTURE: CPT | Performed by: NURSE PRACTITIONER

## 2021-12-09 PROCEDURE — 25000003 PHARM REV CODE 250: Performed by: NURSE PRACTITIONER

## 2021-12-09 PROCEDURE — 81000 URINALYSIS NONAUTO W/SCOPE: CPT | Performed by: NURSE PRACTITIONER

## 2021-12-09 PROCEDURE — 11000001 HC ACUTE MED/SURG PRIVATE ROOM

## 2021-12-09 PROCEDURE — 80053 COMPREHEN METABOLIC PANEL: CPT | Performed by: NURSE PRACTITIONER

## 2021-12-09 PROCEDURE — 63600175 PHARM REV CODE 636 W HCPCS: Performed by: NURSE PRACTITIONER

## 2021-12-09 PROCEDURE — 87086 URINE CULTURE/COLONY COUNT: CPT | Performed by: NURSE PRACTITIONER

## 2021-12-09 PROCEDURE — 97162 PT EVAL MOD COMPLEX 30 MIN: CPT

## 2021-12-09 PROCEDURE — 99233 SBSQ HOSP IP/OBS HIGH 50: CPT | Mod: 95,,, | Performed by: SURGERY

## 2021-12-09 PROCEDURE — 85025 COMPLETE CBC W/AUTO DIFF WBC: CPT | Performed by: NURSE PRACTITIONER

## 2021-12-09 PROCEDURE — 94761 N-INVAS EAR/PLS OXIMETRY MLT: CPT

## 2021-12-09 PROCEDURE — C9113 INJ PANTOPRAZOLE SODIUM, VIA: HCPCS | Performed by: NURSE PRACTITIONER

## 2021-12-09 PROCEDURE — 97165 OT EVAL LOW COMPLEX 30 MIN: CPT

## 2021-12-09 PROCEDURE — 99233 PR SUBSEQUENT HOSPITAL CARE,LEVL III: ICD-10-PCS | Mod: 95,,, | Performed by: SURGERY

## 2021-12-09 PROCEDURE — 99232 SBSQ HOSP IP/OBS MODERATE 35: CPT | Mod: ,,, | Performed by: STUDENT IN AN ORGANIZED HEALTH CARE EDUCATION/TRAINING PROGRAM

## 2021-12-09 PROCEDURE — 99232 PR SUBSEQUENT HOSPITAL CARE,LEVL II: ICD-10-PCS | Mod: ,,, | Performed by: STUDENT IN AN ORGANIZED HEALTH CARE EDUCATION/TRAINING PROGRAM

## 2021-12-09 RX ORDER — PANTOPRAZOLE SODIUM 40 MG/10ML
40 INJECTION, POWDER, LYOPHILIZED, FOR SOLUTION INTRAVENOUS DAILY
Status: DISCONTINUED | OUTPATIENT
Start: 2021-12-09 | End: 2021-12-10

## 2021-12-09 RX ADMIN — PANTOPRAZOLE SODIUM 40 MG: 40 INJECTION, POWDER, LYOPHILIZED, FOR SOLUTION INTRAVENOUS at 12:12

## 2021-12-09 RX ADMIN — LABETALOL HYDROCHLORIDE 5 MG: 5 INJECTION, SOLUTION INTRAVENOUS at 05:12

## 2021-12-09 RX ADMIN — LABETALOL HYDROCHLORIDE 5 MG: 5 INJECTION, SOLUTION INTRAVENOUS at 11:12

## 2021-12-09 RX ADMIN — SODIUM CHLORIDE: 0.9 INJECTION, SOLUTION INTRAVENOUS at 02:12

## 2021-12-09 RX ADMIN — LABETALOL HYDROCHLORIDE 5 MG: 5 INJECTION, SOLUTION INTRAVENOUS at 06:12

## 2021-12-09 RX ADMIN — MUPIROCIN: 20 OINTMENT TOPICAL at 09:12

## 2021-12-09 RX ADMIN — LABETALOL HYDROCHLORIDE 5 MG: 5 INJECTION, SOLUTION INTRAVENOUS at 12:12

## 2021-12-09 RX ADMIN — MUPIROCIN: 20 OINTMENT TOPICAL at 08:12

## 2021-12-09 RX ADMIN — ENOXAPARIN SODIUM 100 MG: 100 INJECTION SUBCUTANEOUS at 11:12

## 2021-12-09 RX ADMIN — CEFTRIAXONE 1 G: 1 INJECTION, SOLUTION INTRAVENOUS at 11:12

## 2021-12-09 RX ADMIN — SODIUM CHLORIDE: 0.9 INJECTION, SOLUTION INTRAVENOUS at 06:12

## 2021-12-09 RX ADMIN — SODIUM CHLORIDE: 0.9 INJECTION, SOLUTION INTRAVENOUS at 09:12

## 2021-12-09 NOTE — PROGRESS NOTES
[facility] Surg (3rd Fl)  General Surgery  Progress Note    Subjective:     Interval History:  Patient seen and examined this morning.  He denies any pain.  No nausea or vomiting.  Still has significant amount from his NG tube.  His x-ray today did not show any dilated small bowel or air-fluid levels.  He has air throughout his colon.  Patient poor historian and hard to read but denies any flatus.  States he is starting to get hungry.  I am going to start him on a trial of liquids and remove his NG tube.    Post-Op Info:  * No surgery found *          Medications:  Continuous Infusions:   sodium chloride 0.9% 150 mL/hr at 12/09/21 0915     Scheduled Meds:   cefTRIAXone (ROCEPHIN) IVPB  1 g Intravenous Q24H    enoxaparin  1 mg/kg Subcutaneous Q24H    labetaloL  5 mg Intravenous Q6H    mupirocin   Nasal BID    pantoprazole  40 mg Intravenous Daily     PRN Meds:melatonin, sodium chloride 0.9%     Objective:     Vital Signs (Most Recent):  Temp: 97.6 °F (36.4 °C) (12/09/21 1135)  Pulse: 97 (12/09/21 1135)  Resp: 18 (12/09/21 0751)  BP: (!) 190/75 (12/09/21 1135)  SpO2: (!) 94 % (12/09/21 1135) Vital Signs (24h Range):  Temp:  [97.4 °F (36.3 °C)-98 °F (36.7 °C)] 97.6 °F (36.4 °C)  Pulse:  [60-98] 97  Resp:  [18-20] 18  SpO2:  [93 %-97 %] 94 %  BP: (118-190)/(67-80) 190/75       Intake/Output Summary (Last 24 hours) at 12/9/2021 1149  Last data filed at 12/9/2021 0600  Gross per 24 hour   Intake 1650 ml   Output 1225 ml   Net 425 ml       Physical Exam  Vitals and nursing note reviewed.   Constitutional:       Appearance: He is well-developed and well-nourished.   HENT:      Head: Normocephalic.   Eyes:      Pupils: Pupils are equal, round, and reactive to light.   Pulmonary:      Effort: Pulmonary effort is normal.   Abdominal:      General: There is no distension.      Palpations: Abdomen is soft.      Tenderness: There is no abdominal tenderness.   Musculoskeletal:         General: Normal range of motion.       Cervical back: Normal range of motion.   Skin:     General: Skin is warm and dry.   Neurological:      Mental Status: He is alert and oriented to person, place, and time.   Psychiatric:         Mood and Affect: Mood and affect normal.         Significant Labs:  CBC:   Recent Labs   Lab 12/09/21  0619   WBC 5.38   RBC 4.36*   HGB 12.7*   HCT 40.1      MCV 92   MCH 29.1   MCHC 31.7*     CMP:   Recent Labs   Lab 12/09/21  0619   GLU 85   CALCIUM 9.1   ALBUMIN 2.5*   PROT 5.6*      K 4.2   CO2 24      BUN 69*   CREATININE 2.1*   ALKPHOS 74   ALT 13   AST 15   BILITOT 0.5       Significant Diagnostics:  I have reviewed all pertinent imaging results/findings within the past 24 hours.    Assessment/Plan:     Active Diagnoses:    Diagnosis Date Noted POA    PRINCIPAL PROBLEM:  SBO (small bowel obstruction) [K56.609]  Yes    SIRS (systemic inflammatory response syndrome) [R65.10] 12/07/2021 Yes    Asymptomatic bacteriuria [R82.71] 12/07/2021 Yes    Acute renal failure [N17.9] 12/07/2021 Yes    Paroxysmal atrial fibrillation [I48.0] 12/07/2021 Yes    Hypothyroidism [E03.9] 10/30/2012 Yes    Hyperlipidemia [E78.5]  Yes    GERD (gastroesophageal reflux disease) [K21.9]  Yes      Problems Resolved During this Admission:     Remove NG tube start clear liquids.    Horacio Bettencourt Jr, MD  General Surgery  [place] - [facility] Surg ([facility])

## 2021-12-09 NOTE — PLAN OF CARE
Patient Agrees and compliant with Plan of Care:  Monitor Vital Signs; Vital signs stable this shift  Maintain Pain Regimen; No c/o pain noted this shift.  Monitor Breathing Pattern; No c/o shortness of breath noted this shift; Oxygen saturation of 93-96% on room air. Bilateral Breath sounds auscultated posteriorly this shift with fine crackles LLL.  Monitor NG ; NG to low intermittent suction draining a dark green drainage at this time. Drained 1000cc thus far this shift. NG secured to nose.  Administer IV Fluids as ordered; Patient receiving Normal Saline at 150cc/hr.  Maintain I/O's; Note Patient incontinent of urine; Diaper changes X 2 this shift.  Monitor Daily Labs; Last BUN 87 and creatine 3.0. Awaiting am labs.  Administer IV Antibiotic as ordered; Patient receiving Rocephin daily on day shift.  Administer Enoxaparin as ordered; Patient received on day shift.  Monitor Cardiac Rhythm; Patient in NSR .  Fall Precautions; Maintain Patient Safety; Bed Alarm in use. No falls or injury noted this shift.

## 2021-12-09 NOTE — PT/OT/SLP EVAL
Occupational Therapy   Evaluation and Discharge Note    Name: Domonique Hernandez Jr.  MRN: [id number]  Admitting Diagnosis:  SBO (small bowel obstruction)   Recent Surgery: * No surgery found *      Recommendations:     Discharge Recommendations: nursing facility, basic,other (see comments) (or home with 24 hour supervision due to cognition)  Discharge Equipment Recommendations:  none  Barriers to discharge:  Decreased caregiver support    Assessment:     Domonique Hernandez Jr. is a 81 y.o. male with a medical diagnosis of SBO (small bowel obstruction). At this time, patient is functioning at their prior level of function and does not require further acute OT services.     Plan:     During this hospitalization, patient does not require further acute OT services.  Please re-consult if situation changes.    · Plan of Care Reviewed with: patient,son    Subjective     Chief Complaint: hunger  Patient/Family Comments/goals: son reports he lives with patient and provides 24 hour supervision, patient previously stated he lives alone before son present, patient poor historian, unsure of accuracy of occupational profile per patient report    Occupational Profile:  Living Environment: with son in [facility] no SEEMA, tub shower, standard toilet   Previous level of function: required 24 hour supervision, able to complete ADLs  Roles and Routines: does not work   Equipment Used at home:  walker, rolling,wheelchair, manual,hospital bed,tub bench,other (see comments) (rollator, per patient report, unsure of accuracy of equipment at home, refer to social servcies)  Assistance upon Discharge: recommend 24 hour supervision or basic nursing facility     Pain/Comfort:  · Pain Rating 1: 0/10    Patients cultural, spiritual, Buddhist conflicts given the current situation: no    Objective:     Communicated with: Nursing prior to session.  Patient found HOB elevated with NG tube,telemetry,peripheral IV (Simultaneous filing. User may not have seen previous  data.) upon OT entry to room.    General Precautions: Standard, NPO,fall   Orthopedic Precautions:N/A   Braces: N/A  Respiratory Status:   · O2 Device (Oxygen Therapy): room air     Occupational Performance:    Bed Mobility:    · Patient completed Rolling/Turning to Left with  supervision  · Patient completed Rolling/Turning to Right with supervision  · Patient completed Scooting/Bridging with supervision  · Patient completed Supine to Sit with supervision  · Patient completed Sit to Supine with supervision    Functional Mobility/Transfers:  · Patient completed Sit <> Stand Transfer with supervision  with  no assistive device   · Patient completed Toilet Transfer Step Transfer technique with supervision with  bedside commode (due to NG tube)      Activities of Daily Living:  · Grooming: supervision    · Lower Body Dressing: supervision to don/doff socks sitting EOB  · Toileting: supervision from [facility] (due to NG tube)    Cognitive/Visual Perceptual:  Cognitive/Psychosocial Skills:     -       Oriented to: Person and Place   -       Follows Commands/attention:Follows one-step commands  -       Communication: clear/fluent  -       Memory: impaired  -       Safety awareness/insight to disability: impaired     Physical Exam:  Upper Extremity Range of Motion:     -       Right Upper Extremity: WFL  -       Left Upper Extremity: WFL  Upper Extremity Strength:    -       Right Upper Extremity: WFL  -       Left Upper Extremity: WFL   Strength:    -       Right Upper Extremity: WFL  -       Left Upper Extremity: WFL    AMPAC 6 Click ADL:  AMPAC Total Score: 24    Treatment & Education:  Therapist educated patient and son on role of Occupational Therapy (OT). Patient and son with no concerns for OT, reporting he is at his PLOF. Patient to be discharged from OT services at this time.    Education:    Patient left HOB elevated with all lines intact, call button in reach and nursing notified    GOALS:   Multidisciplinary  Problems     Occupational Therapy Goals     Not on file                History:     Past Medical History:   Diagnosis Date    Anxiety     Arthritis     Back pain     Chronic bronchitis     Chronic gout     COPD (chronic obstructive pulmonary disease)     Depression     Emphysema of lung     Generalized headaches     GERD (gastroesophageal reflux disease)     Hyperlipidemia     Hypertension     Hypothyroidism     Obesity     Peripheral vascular disease     Sleep apnea     On CPAP    Stage 3 chronic kidney disease 4/18/2018    Thyroid disease        Past Surgical History:   Procedure Laterality Date    Bilateral mastoidectomies Bilateral 1984    for ear infection    COLONOSCOPY  2010    COLONOSCOPY N/A 1/31/2019    Procedure: COLONOSCOPY;  Surgeon: Joaquín Crawford MD;  Location: [facility];  Service: Colon and Rectal;  Laterality: N/A;    COLONOSCOPY N/A 8/13/2019    Procedure: COLONOSCOPY;  Surgeon: Joaquín Crawford MD;  Location: [facility] ENDO ([facility] FLR);  Service: Colon and Rectal;  Laterality: N/A;  Patient had inadequate prep with his last colonoscopy, was unable to finish the large volume GoLYTELY prep.  Please give him a smaller volume split dose prep, such as Movi-Prep or SuPrep.  He should do a light diet of easily digested food 2 days prior to the procedure     CYSTOSCOPIC LITHOLAPAXY N/A 3/24/2021    Procedure: CYSTOLITHOLAPAXY;  Surgeon: Avtar Latham II, MD;  Location: [facility];  Service: Urology;  Laterality: N/A;    CYSTOURETEROSCOPY WITH RETROGRADE PYELOGRAPHY AND INSERTION OF STENT INTO URETER Bilateral 5/31/2021    Procedure: CYSTOURETEROSCOPY, WITH RETROGRADE PYELOGRAM AND URETERAL STENT INSERTION;  Surgeon: Avtar Latham II, MD;  Location: [facility];  Service: Urology;  Laterality: Bilateral;    Eardrum repair  1984    Not sure which side    ESOPHAGOGASTRODUODENOSCOPY W/ PEG N/A 2/1/2021    Procedure: EGD, WITH PEG TUBE INSERTION;  Surgeon: Yousuf Villa MD;  Location: [facility]  ENDO;  Service: Endoscopy;  Laterality: N/A;    HERNIA REPAIR  1984    Umbilical-screen    KNEE ARTHROSCOPY Left 1989    LASER LITHOTRIPSY Bilateral 5/31/2021    Procedure: LITHOTRIPSY, USING LASER;  Surgeon: Avtar Latham II, MD;  Location: [facility];  Service: Urology;  Laterality: Bilateral;    RETROGRADE PYELOGRAPHY Right 3/24/2021    Procedure: PYELOGRAM, RETROGRADE;  Surgeon: Avtar Latham II, MD;  Location: [facility];  Service: Urology;  Laterality: Right;    ROTATOR CUFF REPAIR Left 2002    Left    TOTAL KNEE ARTHROPLASTY Left 12/21/2020    Procedure: ARTHROPLASTY, KNEE, TOTAL;  Surgeon: Shin Chang MD;  Location: [facility];  Service: Orthopedics;  Laterality: Left;    TRANSURETHRAL SURGICAL REMOVAL OF PROSTATE (TURP) USING GREEN LIGHT LASER N/A 5/31/2021    Procedure: TURP, USING GREEN LIGHT LASER;  Surgeon: Avtar Latham II, MD;  Location: [facility];  Service: Urology;  Laterality: N/A;    URETEROSCOPIC REMOVAL OF URETERIC CALCULUS Right 3/24/2021    Procedure: REMOVAL, CALCULUS, URETER, URETEROSCOPIC;  Surgeon: Avtar Latham II, MD;  Location: [facility];  Service: Urology;  Laterality: Right;    URETEROSCOPIC REMOVAL OF URETERIC CALCULUS Left 5/31/2021    Procedure: REMOVAL, CALCULUS, URETER, URETEROSCOPIC;  Surgeon: Avtar Latham II, MD;  Location: [facility];  Service: Urology;  Laterality: Left;       Time Tracking:     OT Date of Treatment: 12/09/21  OT Start Time: 0842  OT Stop Time: 0900  OT Total Time (min): 18 min    Billable Minutes:Evaluation Low Complexity 18 minutes    12/9/2021

## 2021-12-09 NOTE — ASSESSMENT & PLAN NOTE
Abdominal x-ray from today shows persistent air-fluid levels and dilated small bowel.  There has been no major change.  Patient denies bowel movement or flatus.  Continue NG tube.  Follow-up x-ray tomorrow.

## 2021-12-09 NOTE — HPI
81-year-old male admitted for small-bowel obstruction.  Patient poor historian.  Apparently he was having some abdominal pain.  He underwent CT scan in the emergency room which showed a small-bowel obstruction.  Patient is not really sure of all of his surgeries but according to the chart he seems to have had a PEG tube placement in February which is no longer present and it looks like he has had a laparoscopic umbilical hernia repair with mesh.  Patient was seen and examined.  He denies any abdominal pain at this point.

## 2021-12-09 NOTE — ASSESSMENT & PLAN NOTE
He has no UTI sx on interview day of admit  Culture urine  Start rocephin empirically as h/o of UTI and not a great historian but as noted above I think vitals/lactic acid precipitated by SBO and not UTI sepsis    Cont rocephin and repeat u.a

## 2021-12-09 NOTE — PROGRESS NOTES
[facility] Surg ([facility])  General Surgery  Progress Note    Subjective:     History of Present Illness:  81-year-old male admitted for small-bowel obstruction.  Patient poor historian.  Apparently he was having some abdominal pain.  He underwent CT scan in the emergency room which showed a small-bowel obstruction.  Patient is not really sure of all of his surgeries but according to the chart he seems to have had a PEG tube placement in February which is no longer present and it looks like he has had a laparoscopic umbilical hernia repair with mesh.  Patient was seen and examined.  He denies any abdominal pain at this point.        Post-Op Info:  * No surgery found *         Interval History:  Patient denies flattest or bowel movement today.  He has a limited memory.  He has a very poor historian as a consequence.  He has not really aware of any past surgery.  He denies any abdominal pain.    Medications:  Continuous Infusions:   sodium chloride 0.9% 150 mL/hr at 12/08/21 1923     Scheduled Meds:   cefTRIAXone (ROCEPHIN) IVPB  1 g Intravenous Q24H    enoxaparin  1 mg/kg Subcutaneous Q24H    labetaloL  5 mg Intravenous Q6H    mupirocin   Nasal BID     PRN Meds:melatonin, sodium chloride 0.9%     Review of patient's allergies indicates:   Allergen Reactions    Percocet [oxycodone-acetaminophen] Other (See Comments)     hyperactivity    Percodan [oxycodone hcl-oxycodone-asa] Other (See Comments)     hyperactivity     Objective:     Vital Signs (Most Recent):  Temp: 97.9 °F (36.6 °C) (12/08/21 1622)  Pulse: 95 (12/08/21 1800)  Resp: 19 (12/08/21 1622)  BP: 120/70 (12/08/21 1622)  SpO2: (!) 94 % (12/08/21 1622) Vital Signs (24h Range):  Temp:  [97.7 °F (36.5 °C)-98.8 °F (37.1 °C)] 97.9 °F (36.6 °C)  Pulse:  [] 95  Resp:  [16-20] 19  SpO2:  [94 %-98 %] 94 %  BP: (111-140)/(58-75) 120/70     Weight: 100 kg (220 lb 7.4 oz)  Body mass index is 33.52 kg/m².    Intake/Output - Last 3 Shifts       12/06  0700  12/07 0659 12/07 0700 12/08 0659 12/08 0700 12/09 0659    NG/GT  30     IV Piggyback 100.1      Total Intake(mL/kg) 100.1 (1) 30 (0.3)     Urine (mL/kg/hr) 30 0 (0) 0 (0)    Drains  2500 1400    Total Output 30 2500 1400    Net +70.1 -2470 -1400           Urine Occurrence  3 x 1 x          Physical Exam  Constitutional:       General: He is not in acute distress.     Appearance: He is obese. He is not ill-appearing.   HENT:      Nose:      Comments: NG with bilious output  Abdominal:      General: There is no distension.      Palpations: Abdomen is soft.      Tenderness: There is abdominal tenderness (Very mild in the left lower quadrant). There is no guarding or rebound.      Comments: Obese   Skin:     Capillary Refill: Capillary refill takes less than 2 seconds.   Neurological:      Mental Status: He is alert.         Significant Labs:  I have reviewed all pertinent lab results within the past 24 hours.  CBC:   Recent Labs   Lab 12/08/21  0610   WBC 5.71   RBC 4.64   HGB 13.5*   HCT 41.9      MCV 90   MCH 29.1   MCHC 32.2     BMP:   Recent Labs   Lab 12/08/21  0610   GLU 88      K 4.3      CO2 21*   BUN 87*   CREATININE 3.0*   CALCIUM 9.2       Significant Diagnostics:  I have reviewed all pertinent imaging results/findings within the past 24 hours.    Assessment/Plan:     * SBO (small bowel obstruction)  Abdominal x-ray from today shows persistent air-fluid levels and dilated small bowel.  There has been no major change.  Patient denies bowel movement or flatus.  Continue NG tube.  Follow-up x-ray tomorrow.        Mtaias Mccord MD  General Surgery  [place] [facility] Surg (3rd Fl)

## 2021-12-09 NOTE — SUBJECTIVE & OBJECTIVE
Past Medical History:   Diagnosis Date    Anxiety     Arthritis     Back pain     Chronic bronchitis     Chronic gout     COPD (chronic obstructive pulmonary disease)     Depression     Emphysema of lung     Generalized headaches     GERD (gastroesophageal reflux disease)     Hyperlipidemia     Hypertension     Hypothyroidism     Obesity     Peripheral vascular disease     Sleep apnea     On CPAP    Stage 3 chronic kidney disease 4/18/2018    Thyroid disease        Past Surgical History:   Procedure Laterality Date    Bilateral mastoidectomies Bilateral 1984    for ear infection    COLONOSCOPY  2010    COLONOSCOPY N/A 1/31/2019    Procedure: COLONOSCOPY;  Surgeon: Joaquín Crawford MD;  [facility];  Service: Colon and Rectal;  Laterality: N/A;    COLONOSCOPY N/A 8/13/2019    Procedure: COLONOSCOPY;  Surgeon: Joaquín Crawford MD;  Location: [facility] ENDO (4TH FLR);  Service: Colon and Rectal;  Laterality: N/A;  Patient had inadequate prep with his last colonoscopy, was unable to finish the large volume GoLYTELY prep.  Please give him a smaller volume split dose prep, such as Movi-Prep or SuPrep.  He should do a light diet of easily digested food 2 days prior to the procedure     CYSTOSCOPIC LITHOLAPAXY N/A 3/24/2021    Procedure: CYSTOLITHOLAPAXY;  Surgeon: Avtar Latham II, MD;  [facility];  Service: Urology;  Laterality: N/A;    CYSTOURETEROSCOPY WITH RETROGRADE PYELOGRAPHY AND INSERTION OF STENT INTO URETER Bilateral 5/31/2021    Procedure: CYSTOURETEROSCOPY, WITH RETROGRADE PYELOGRAM AND URETERAL STENT INSERTION;  Surgeon: Avtar Latham II, MD;  [facility];  Service: Urology;  Laterality: Bilateral;    Eardrum repair  1984    Not sure which side    ESOPHAGOGASTRODUODENOSCOPY W/ PEG N/A 2/1/2021    Procedure: EGD, WITH PEG TUBE INSERTION;  Surgeon: Yousuf Villa MD;  Location: [facility];  Service: Endoscopy;  Laterality: N/A;    HERNIA REPAIR  1984     Umbilical-screen    KNEE ARTHROSCOPY Left 1989    LASER LITHOTRIPSY Bilateral 5/31/2021    Procedure: LITHOTRIPSY, USING LASER;  Surgeon: Avtar Latham II, MD;  Location: [facility];  Service: Urology;  Laterality: Bilateral;    RETROGRADE PYELOGRAPHY Right 3/24/2021    Procedure: PYELOGRAM, RETROGRADE;  Surgeon: Avtar Latham II, MD;  Location: [facility];  Service: Urology;  Laterality: Right;    ROTATOR CUFF REPAIR Left 2002    Left    TOTAL KNEE ARTHROPLASTY Left 12/21/2020    Procedure: ARTHROPLASTY, KNEE, TOTAL;  Surgeon: Shin Chang MD;  Location: [facility];  Service: Orthopedics;  Laterality: Left;    TRANSURETHRAL SURGICAL REMOVAL OF PROSTATE (TURP) USING GREEN LIGHT LASER N/A 5/31/2021    Procedure: TURP, USING GREEN LIGHT LASER;  Surgeon: Avtar Latham II, MD;  Location: [facility];  Service: Urology;  Laterality: N/A;    URETEROSCOPIC REMOVAL OF URETERIC CALCULUS Right 3/24/2021    Procedure: REMOVAL, CALCULUS, URETER, URETEROSCOPIC;  Surgeon: Avtar Latham II, MD;  Location: [facility];  Service: Urology;  Laterality: Right;    URETEROSCOPIC REMOVAL OF URETERIC CALCULUS Left 5/31/2021    Procedure: REMOVAL, CALCULUS, URETER, URETEROSCOPIC;  Surgeon: Avtar Latham II, MD;  Location: [facility];  Service: Urology;  Laterality: Left;       Review of patient's allergies indicates:   Allergen Reactions    Percocet [oxycodone-acetaminophen] Other (See Comments)     hyperactivity    Percodan [oxycodone hcl-oxycodone-asa] Other (See Comments)     hyperactivity       No current facility-administered medications on file prior to encounter.     Current Outpatient Medications on File Prior to Encounter   Medication Sig    albuterol sulfate 2.5 mg/0.5 mL Nebu Take 2.5 mg  (one vial)  by nebulization every 6 (six) hours as needed (wheezing). Rescue    allopurinoL (ZYLOPRIM) 300 MG tablet Take 300 mg by mouth once daily.    aspirin 81 MG Chew Take 81 mg by mouth once daily.    budesonide 1 mg/2 mL NbSp  Inhale 1 mg into the lungs once daily. Controller    levothyroxine (SYNTHROID) 100 MCG tablet TAKE 1 TABLET BY MOUTH ONCE DAILY (Patient taking differently: TAKE 1 TABLET BY MOUTH ONCE DAILY)    memantine (NAMENDA) 10 MG Tab Take 1 tablet (10 mg total) by mouth 2 (two) times daily.    metoprolol succinate (TOPROL-XL) 50 MG 24 hr tablet Take 1 tablet orally 2 times a day.    multivitamin (THERAGRAN) per tablet Take 1 tablet by mouth once daily. Centrum Silver for Men    omega 3-dha-epa-fish oil 1,000 mg (120 mg-180 mg) Cap Take 2 capsules orally once a day.    simvastatin (ZOCOR) 40 MG tablet Take 1 tablet (40 mg total) by mouth every evening.    tiotropium-olodateroL (STIOLTO RESPIMAT) 2.5-2.5 mcg/actuation Mist Inhale 2 puffs into the lungs once daily.    apixaban (ELIQUIS) 5 mg Tab Take one tablet by mouth twice a day    nitroGLYCERIN (NITROSTAT) 0.4 MG SL tablet Take 1 tablet (sublingual) under tongue as needed for chest pain, no more than 3 tablets in 15 mins, 5 mins apart.    [DISCONTINUED] amiodarone (PACERONE) 200 MG Tab Take 2 tablets by mouth once daily for 2 weeks then take one tablet by mouth once daily thereafter    [DISCONTINUED] amLODIPine (NORVASC) 5 MG tablet Take 1 tablet (5 mg total) by mouth once daily.    [DISCONTINUED] valsartan (DIOVAN) 80 MG tablet Take 1 tablet (80 mg total) by mouth once daily.     Family History     Problem Relation (Age of Onset)    Atrial fibrillation Son    Cancer Mother (65), Father    Kidney disease Brother    No Known Problems Sister    Stroke Son, Son        Tobacco Use    Smoking status: Never Smoker    Smokeless tobacco: Never Used   Substance and Sexual Activity    Alcohol use: No    Drug use: No    Sexual activity: Not Currently     Partners: Female     Review of Systems   Constitutional: Positive for fatigue. Negative for activity change, fever and unexpected weight change.   HENT: Negative for congestion and sore throat.    Eyes: Negative for  visual disturbance.   Respiratory: Negative for cough and shortness of breath.    Cardiovascular: Negative for chest pain.   Gastrointestinal: Positive for abdominal distention and abdominal pain (improving). Negative for blood in stool, constipation, diarrhea and nausea.   Genitourinary: Negative for dysuria and hematuria.   Neurological: Negative for dizziness and light-headedness.     Objective:     Vital Signs (Most Recent):  Temp: 97.7 °F (36.5 °C) (12/09/21 0751)  Pulse: 90 (12/09/21 1000)  Resp: 18 (12/09/21 0751)  BP: 124/76 (12/09/21 0751)  SpO2: 97 % (12/09/21 0751) Vital Signs (24h Range):  Temp:  [97.4 °F (36.3 °C)-98 °F (36.7 °C)] 97.7 °F (36.5 °C)  Pulse:  [60-99] 90  Resp:  [18-20] 18  SpO2:  [93 %-97 %] 97 %  BP: (118-140)/(67-80) 124/76     Weight: 100 kg (220 lb 7.4 oz)  Body mass index is 33.52 kg/m².    Physical Exam  Vitals and nursing note reviewed.   Constitutional:       General: He is not in acute distress.     Appearance: He is well-developed.      Comments: Lying in hospital bed with NGT in place   HENT:      Head: Normocephalic and atraumatic.      Right Ear: External ear normal.      Left Ear: External ear normal.   Eyes:      General:         Right eye: No discharge.         Left eye: No discharge.      Extraocular Movements: EOM normal.      Conjunctiva/sclera: Conjunctivae normal.   Neck:      Thyroid: No thyromegaly.   Cardiovascular:      Rate and Rhythm: Normal rate and regular rhythm.      Heart sounds: Normal heart sounds. No murmur heard.      Pulmonary:      Effort: Pulmonary effort is normal. No respiratory distress.      Breath sounds: Normal breath sounds.   Abdominal:      General: There is distension.      Palpations: Abdomen is soft.      Tenderness: There is no abdominal tenderness.      Comments: hypoactive BS   Musculoskeletal:      Cervical back: Neck supple.      Right lower leg: No edema.      Left lower leg: No edema.   Skin:     General: Skin is warm and dry.    Neurological:      General: No focal deficit present.      Mental Status: He is alert. Mental status is at baseline.   Psychiatric:         Mood and Affect: Mood normal.         Behavior: Behavior normal.           CRANIAL NERVES     CN III, IV, VI   Extraocular motions are normal.        Significant Labs:   All pertinent labs within the past 24 hours have been reviewed.  CBC:   Recent Labs   Lab 12/08/21  0610 12/09/21 0619   WBC 5.71 5.38   HGB 13.5* 12.7*   HCT 41.9 40.1    182     CMP:   Recent Labs   Lab 12/08/21  0610 12/09/21 0619    144   K 4.3 4.2    109   CO2 21* 24   GLU 88 85   BUN 87* 69*   CREATININE 3.0* 2.1*   CALCIUM 9.2 9.1   PROT 5.8* 5.6*   ALBUMIN 2.7* 2.5*   BILITOT 0.6 0.5   ALKPHOS 80 74   AST 13 15   ALT 10 13   ANIONGAP 14 11   EGFRNONAA 19* 29*   lactic acid 2.7>1.9  Lipase 27  urine culture multiple organisms   covid screen negative   Significant Imaging:     US renal Normal size bilateral kidneys each containing a couple of benign anechoic cyst identified the midpole and upper pole regions largest single index cyst about the lateral cortex of the right kidney measuring 2.3 x 2.1 x 2.2 cm in size.    KUB 12/9 Enteric tube terminates in the gastric body.  The degree of distention of the small bowel loops is decreased as compared to prior examinations suggesting a resolving/revolved small-bowel obstruction.  No free air is identified.  Age-appropriate degenerative changes affect the skeleton.    CT abd and pelvis Imaging findings in keeping with a high-grade small bowel obstruction with a point of transition seen in the right mid abdomen, likely related to adhesions.  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  Additionally, there is a small amount of free fluid about the liver and extending into the pelvic cul-de-sac, with slightly high density of the fluid in the  pelvic cul-de-sac suggesting hyperemia issues content fluid.    KUB The enteric tube has been appropriately retracted and now terminates in the region of the distal gastric body.Slightly prominent loops of small bowel seen in the upper abdomen.  No definite free peritoneal air is seen.     Lung parenchyma cannot be adequately evaluated secondary to overexposure of the central aspect of the lungs.Consider dedicated radiograph of the chest evaluate for parenchymal injury.    CXR Enteric tube terminates in the gastric body.  There is bibasilar subsegmental atelectasis, with right basilar scarring.  No focal airspace consolidation or pleural effusions.  No pneumothorax.  The heart is enlarged.  Calcified atheromatous disease affects the aorta.  Median sternotomy wires are midline.  Age-appropriate degenerative changes affect the skeleton.

## 2021-12-09 NOTE — ASSESSMENT & PLAN NOTE
Baseline creat 1.3-1.4- on admission 3.6. 1L NS given in ER. Repeat 1L Ns bolus 12/7 and then cont NS at 150ml/hr. Repeat lactate better 1.9  Creat now 3.0  Son reports that he has no issues urinating at home  Will check renal U/s and post void residual  Has had urinary re tension in past. Consider salcedo if retaining due ti UTI    Repeat u.a today cont rocephin

## 2021-12-09 NOTE — PROGRESS NOTES
[facility] ([facility])  [facility]  Progress Note    Patient Name: Domonique Hernandez Jr.  MRN: [id number]  Patient Class: IP- Inpatient   Admission Date: 12/7/2021  Length of Stay: 2 days  Attending Physician: Emiliano Cam MD  Primary Care Provider: Emiliano Cam MD        Subjective:     Principal Problem:SBO (small bowel obstruction)        HPI:  80yo male patient with hx of anxiety, arthritis, back pain, bronchitis/COPD, depression, GERD, HLD, hypothyroid, PVD, sleep apnea on CPAP, and CKD. Pt came to ER with c/o regurgitation for 3 days. Decrease intake. No fever. No UTI s/s. CT on admission shows SBO. Significantly dehydrated. Creat 3.6-- baseline 1.4. Given 1L NS in ER U/a dirty one dose of zosyn given. No fever. No elevated WBC. Lactate 2.3. He had NGT place and put to suction. He is in bed this am. Reports feeling better. General surgery consulted.       Overview/Hospital Course:  12/8 Pt was admitted yesterday with SBO. He had general surgery consulted. NGT placed to suction. Still no flatus or BM. KUB pending for this am.     Also on rocephin for UTI-culture in process. Was given 1L NS in ER and repeated on floor then started NS at 150ml/hr. Creat on admit 3.6> now 3.0-- baseline creat 1.4. lactate did impropve on repeat.  Renal US shows cysts.  No hydronephrosis.     12/9 pt here with SBO. NGT to suction had 1000ml overnight. No flatus. No BM. +BS    Renal function improved 3.6>>2.1 on NS at 150ml/hr. Rocephin for UTI- urine culture shows multiple organisms. NO fever. NO elevated WBC      Past Medical History:   Diagnosis Date    Anxiety     Arthritis     Back pain     Chronic bronchitis     Chronic gout     COPD (chronic obstructive pulmonary disease)     Depression     Emphysema of lung     Generalized headaches     GERD (gastroesophageal reflux disease)     Hyperlipidemia     Hypertension     Hypothyroidism     Obesity     Peripheral vascular disease     Sleep apnea      On CPAP    Stage 3 chronic kidney disease 4/18/2018    Thyroid disease        Past Surgical History:   Procedure Laterality Date    Bilateral mastoidectomies Bilateral 1984    for ear infection    COLONOSCOPY  2010    COLONOSCOPY N/A 1/31/2019    Procedure: COLONOSCOPY;  Surgeon: Joaquín Crawford MD;  [facility]: [facility];  Service: Colon and Rectal;  Laterality: N/A;    COLONOSCOPY N/A 8/13/2019    Procedure: COLONOSCOPY;  Surgeon: Joaquín Crawford MD;  Location: [facility] (4TH FLR);  Service: Colon and Rectal;  Laterality: N/A;  Patient had inadequate prep with his last colonoscopy, was unable to finish the large volume GoLYTELY prep.  Please give him a smaller volume split dose prep, such as Movi-Prep or SuPrep.  He should do a light diet of easily digested food 2 days prior to the procedure     CYSTOSCOPIC LITHOLAPAXY N/A 3/24/2021    Procedure: CYSTOLITHOLAPAXY;  Surgeon: Avtar Latham II, MD;  Location: [facility];  Service: Urology;  Laterality: N/A;    CYSTOURETEROSCOPY WITH RETROGRADE PYELOGRAPHY AND INSERTION OF STENT INTO URETER Bilateral 5/31/2021    Procedure: CYSTOURETEROSCOPY, WITH RETROGRADE PYELOGRAM AND URETERAL STENT INSERTION;  Surgeon: Avtar Latham II, MD;  Location: [facility];  Service: Urology;  Laterality: Bilateral;    Eardrum repair  1984    Not sure which side    ESOPHAGOGASTRODUODENOSCOPY W/ PEG N/A 2/1/2021    Procedure: EGD, WITH PEG TUBE INSERTION;  Surgeon: Yousuf Villa MD;  Location: [facility];  Service: Endoscopy;  Laterality: N/A;    HERNIA REPAIR  1984    Umbilical-screen    KNEE ARTHROSCOPY Left 1989    LASER LITHOTRIPSY Bilateral 5/31/2021    Procedure: LITHOTRIPSY, USING LASER;  Surgeon: Avtar Latham II, MD;  Location: [facility];  Service: Urology;  Laterality: Bilateral;    RETROGRADE PYELOGRAPHY Right 3/24/2021    Procedure: PYELOGRAM, RETROGRADE;  Surgeon: Avtar Latham II, MD;  Location: [facility];  Service: Urology;  Laterality: Right;    ROTATOR CUFF  REPAIR Left 2002    Left    TOTAL KNEE ARTHROPLASTY Left 12/21/2020    Procedure: ARTHROPLASTY, KNEE, TOTAL;  Surgeon: Shin Chang MD;  Location: [facility];  Service: Orthopedics;  Laterality: Left;    TRANSURETHRAL SURGICAL REMOVAL OF PROSTATE (TURP) USING GREEN LIGHT LASER N/A 5/31/2021    Procedure: TURP, USING GREEN LIGHT LASER;  Surgeon: Avtar Latham II, MD;  Location: [facility];  Service: Urology;  Laterality: N/A;    URETEROSCOPIC REMOVAL OF URETERIC CALCULUS Right 3/24/2021    Procedure: REMOVAL, CALCULUS, URETER, URETEROSCOPIC;  Surgeon: Avtar Latham II, MD;  Location: [facility];  Service: Urology;  Laterality: Right;    URETEROSCOPIC REMOVAL OF URETERIC CALCULUS Left 5/31/2021    Procedure: REMOVAL, CALCULUS, URETER, URETEROSCOPIC;  Surgeon: Avtar Latham II, MD;  Location: [facility];  Service: Urology;  Laterality: Left;       Review of patient's allergies indicates:   Allergen Reactions    Percocet [oxycodone-acetaminophen] Other (See Comments)     hyperactivity    Percodan [oxycodone hcl-oxycodone-asa] Other (See Comments)     hyperactivity       No current facility-administered medications on file prior to encounter.     Current Outpatient Medications on File Prior to Encounter   Medication Sig    albuterol sulfate 2.5 mg/0.5 mL Nebu Take 2.5 mg  (one vial)  by nebulization every 6 (six) hours as needed (wheezing). Rescue    allopurinoL (ZYLOPRIM) 300 MG tablet Take 300 mg by mouth once daily.    aspirin 81 MG Chew Take 81 mg by mouth once daily.    budesonide 1 mg/2 mL NbSp Inhale 1 mg into the lungs once daily. Controller    levothyroxine (SYNTHROID) 100 MCG tablet TAKE 1 TABLET BY MOUTH ONCE DAILY (Patient taking differently: TAKE 1 TABLET BY MOUTH ONCE DAILY)    memantine (NAMENDA) 10 MG Tab Take 1 tablet (10 mg total) by mouth 2 (two) times daily.    metoprolol succinate (TOPROL-XL) 50 MG 24 hr tablet Take 1 tablet orally 2 times a day.    multivitamin (THERAGRAN) per tablet  Take 1 tablet by mouth once daily. Centrum Silver for Men    omega 3-dha-epa-fish oil 1,000 mg (120 mg-180 mg) Cap Take 2 capsules orally once a day.    simvastatin (ZOCOR) 40 MG tablet Take 1 tablet (40 mg total) by mouth every evening.    tiotropium-olodateroL (STIOLTO RESPIMAT) 2.5-2.5 mcg/actuation Mist Inhale 2 puffs into the lungs once daily.    apixaban (ELIQUIS) 5 mg Tab Take one tablet by mouth twice a day    nitroGLYCERIN (NITROSTAT) 0.4 MG SL tablet Take 1 tablet (sublingual) under tongue as needed for chest pain, no more than 3 tablets in 15 mins, 5 mins apart.    [DISCONTINUED] amiodarone (PACERONE) 200 MG Tab Take 2 tablets by mouth once daily for 2 weeks then take one tablet by mouth once daily thereafter    [DISCONTINUED] amLODIPine (NORVASC) 5 MG tablet Take 1 tablet (5 mg total) by mouth once daily.    [DISCONTINUED] valsartan (DIOVAN) 80 MG tablet Take 1 tablet (80 mg total) by mouth once daily.     Family History     Problem Relation (Age of Onset)    Atrial fibrillation Son    Cancer Mother (65), Father    Kidney disease Brother    No Known Problems Sister    Stroke Son, Son        Tobacco Use    Smoking status: Never Smoker    Smokeless tobacco: Never Used   Substance and Sexual Activity    Alcohol use: No    Drug use: No    Sexual activity: Not Currently     Partners: Female     Review of Systems   Constitutional: Positive for fatigue. Negative for activity change, fever and unexpected weight change.   HENT: Negative for congestion and sore throat.    Eyes: Negative for visual disturbance.   Respiratory: Negative for cough and shortness of breath.    Cardiovascular: Negative for chest pain.   Gastrointestinal: Positive for abdominal distention and abdominal pain (improving). Negative for blood in stool, constipation, diarrhea and nausea.   Genitourinary: Negative for dysuria and hematuria.   Neurological: Negative for dizziness and light-headedness.     Objective:     Vital Signs  (Most Recent):  Temp: 97.7 °F (36.5 °C) (12/09/21 0751)  Pulse: 90 (12/09/21 1000)  Resp: 18 (12/09/21 0751)  BP: 124/76 (12/09/21 0751)  SpO2: 97 % (12/09/21 0751) Vital Signs (24h Range):  Temp:  [97.4 °F (36.3 °C)-98 °F (36.7 °C)] 97.7 °F (36.5 °C)  Pulse:  [60-99] 90  Resp:  [18-20] 18  SpO2:  [93 %-97 %] 97 %  BP: (118-140)/(67-80) 124/76     Weight: 100 kg (220 lb 7.4 oz)  Body mass index is 33.52 kg/m².    Physical Exam  Vitals and nursing note reviewed.   Constitutional:       General: He is not in acute distress.     Appearance: He is well-developed.      Comments: Lying in hospital bed with NGT in place   HENT:      Head: Normocephalic and atraumatic.      Right Ear: External ear normal.      Left Ear: External ear normal.   Eyes:      General:         Right eye: No discharge.         Left eye: No discharge.      Extraocular Movements: EOM normal.      Conjunctiva/sclera: Conjunctivae normal.   Neck:      Thyroid: No thyromegaly.   Cardiovascular:      Rate and Rhythm: Normal rate and regular rhythm.      Heart sounds: Normal heart sounds. No murmur heard.      Pulmonary:      Effort: Pulmonary effort is normal. No respiratory distress.      Breath sounds: Normal breath sounds.   Abdominal:      General: There is distension.      Palpations: Abdomen is soft.      Tenderness: There is no abdominal tenderness.      Comments: hypoactive BS   Musculoskeletal:      Cervical back: Neck supple.      Right lower leg: No edema.      Left lower leg: No edema.   Skin:     General: Skin is warm and dry.   Neurological:      General: No focal deficit present.      Mental Status: He is alert. Mental status is at baseline.   Psychiatric:         Mood and Affect: Mood normal.         Behavior: Behavior normal.           CRANIAL NERVES     CN III, IV, VI   Extraocular motions are normal.        Significant Labs:   All pertinent labs within the past 24 hours have been reviewed.  CBC:   Recent Labs   Lab 12/08/21  0610  12/09/21  0619   WBC 5.71 5.38   HGB 13.5* 12.7*   HCT 41.9 40.1    182     CMP:   Recent Labs   Lab 12/08/21  0610 12/09/21 0619    144   K 4.3 4.2    109   CO2 21* 24   GLU 88 85   BUN 87* 69*   CREATININE 3.0* 2.1*   CALCIUM 9.2 9.1   PROT 5.8* 5.6*   ALBUMIN 2.7* 2.5*   BILITOT 0.6 0.5   ALKPHOS 80 74   AST 13 15   ALT 10 13   ANIONGAP 14 11   EGFRNONAA 19* 29*   lactic acid 2.7>1.9  Lipase 27  urine culture multiple organisms   covid screen negative   Significant Imaging:     US renal Normal size bilateral kidneys each containing a couple of benign anechoic cyst identified the midpole and upper pole regions largest single index cyst about the lateral cortex of the right kidney measuring 2.3 x 2.1 x 2.2 cm in size.    KUB 12/9 Enteric tube terminates in the gastric body.  The degree of distention of the small bowel loops is decreased as compared to prior examinations suggesting a resolving/revolved small-bowel obstruction.  No free air is identified.  Age-appropriate degenerative changes affect the skeleton.    CT abd and pelvis Imaging findings in keeping with a high-grade small bowel obstruction with a point of transition seen in the right mid abdomen, likely related to adhesions.  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  Additionally, there is a small amount of free fluid about the liver and extending into the pelvic cul-de-sac, with slightly high density of the fluid in the pelvic cul-de-sac suggesting hyperemia issues content fluid.    KUB The enteric tube has been appropriately retracted and now terminates in the region of the distal gastric body.Slightly prominent loops of small bowel seen in the upper abdomen.  No definite free peritoneal air is seen.     Lung parenchyma cannot be adequately evaluated secondary to overexposure of the central aspect of the lungs.Consider dedicated  radiograph of the chest evaluate for parenchymal injury.    CXR Enteric tube terminates in the gastric body.  There is bibasilar subsegmental atelectasis, with right basilar scarring.  No focal airspace consolidation or pleural effusions.  No pneumothorax.  The heart is enlarged.  Calcified atheromatous disease affects the aorta.  Median sternotomy wires are midline.  Age-appropriate degenerative changes affect the skeleton.      Assessment/Plan:      * SBO (small bowel obstruction)  NPO  NGT to wall suction  KUB daily  CXR nothing acute  IVF started NS at 150ml/hr    He meets SIRS criteria but I think better explained by SBO presentation that infection.  UA > 100 WBC but he has no urinary tract symptoms. He has recurrent UTI and will start Rocephin now but my clinical impression is the abnormal vitals and elevated lactic acid are due to SBO, dehydration and not UTI sepsis      Paroxysmal atrial fibrillation  Noted in Dr Fair notes  On eliquis and BB at home. NPO here. Will give lovenox- renally dose and labetalol IV for now       Acute renal failure  Baseline creat 1.3-1.4- on admission 3.6. 1L NS given in ER. Repeat 1L Ns bolus 12/7 and then cont NS at 150ml/hr. Repeat lactate better 1.9  Creat now 3.0  Son reports that he has no issues urinating at home  Will check renal U/s and post void residual  Has had urinary re tension in past. Consider salcedo if retaining due ti UTI    Repeat u.a today cont rocephin     Asymptomatic bacteriuria  He has no UTI sx on interview day of admit  Culture urine  Start rocephin empirically as h/o of UTI and not a great historian but as noted above I think vitals/lactic acid precipitated by SBO and not UTI sepsis    Cont rocephin and repeat u.a     SIRS (systemic inflammatory response syndrome)  He meets SIRS criteria but I think better explained by SBO presentation that infection.  UA > 100 WBC but he has no urinary tract symptoms. He has recurrent UTI and will start Rocephin now but  my clinical impression is the abnormal vitals and elevated lactic acid are due to SBO, dehydration and not UTI sepsis      Hypothyroidism  Hold synthroid while NPO  If remains NPO > 3 days can start IV      GERD (gastroesophageal reflux disease)  protonix IV      Hyperlipidemia  Holding zocor while NPO        VTE Risk Mitigation (From admission, onward)         Ordered     enoxaparin injection 100 mg  Every 24 hours (non-standard times)         12/07/21 1032     IP VTE HIGH RISK PATIENT  Once         12/07/21 0620     Place sequential compression device  Until discontinued         12/07/21 0620                Discharge Planning   STEPHANIE:      Code Status: Full Code   Is the patient medically ready for discharge?:     Reason for patient still in hospital (select all that apply): Patient trending condition, Treatment and Consult recommendations  Discharge Plan A: Skilled Nursing Facility                  Refugio Leonard MD  Department of Hospital Medicine   [place] [facility] (3rd Fl)

## 2021-12-09 NOTE — SUBJECTIVE & OBJECTIVE
Interval History:  Patient denies flattest or bowel movement today.  He has a limited memory.  He has a very poor historian as a consequence.  He has not really aware of any past surgery.  He denies any abdominal pain.    Medications:  Continuous Infusions:   sodium chloride 0.9% 150 mL/hr at 12/08/21 1923     Scheduled Meds:   cefTRIAXone (ROCEPHIN) IVPB  1 g Intravenous Q24H    enoxaparin  1 mg/kg Subcutaneous Q24H    labetaloL  5 mg Intravenous Q6H    mupirocin   Nasal BID     PRN Meds:melatonin, sodium chloride 0.9%     Review of patient's allergies indicates:   Allergen Reactions    Percocet [oxycodone-acetaminophen] Other (See Comments)     hyperactivity    Percodan [oxycodone hcl-oxycodone-asa] Other (See Comments)     hyperactivity     Objective:     Vital Signs (Most Recent):  Temp: 97.9 °F (36.6 °C) (12/08/21 1622)  Pulse: 95 (12/08/21 1800)  Resp: 19 (12/08/21 1622)  BP: 120/70 (12/08/21 1622)  SpO2: (!) 94 % (12/08/21 1622) Vital Signs (24h Range):  Temp:  [97.7 °F (36.5 °C)-98.8 °F (37.1 °C)] 97.9 °F (36.6 °C)  Pulse:  [] 95  Resp:  [16-20] 19  SpO2:  [94 %-98 %] 94 %  BP: (111-140)/(58-75) 120/70     Weight: 100 kg (220 lb 7.4 oz)  Body mass index is 33.52 kg/m².    Intake/Output - Last 3 Shifts       12/06 0700  12/07 0659 12/07 0700  12/08 0659 12/08 0700  12/09 0659    NG/GT  30     IV Piggyback 100.1      Total Intake(mL/kg) 100.1 (1) 30 (0.3)     Urine (mL/kg/hr) 30 0 (0) 0 (0)    Drains  2500 1400    Total Output 30 2500 1400    Net +70.1 -2470 -1400           Urine Occurrence  3 x 1 x          Physical Exam  Constitutional:       General: He is not in acute distress.     Appearance: He is obese. He is not ill-appearing.   HENT:      Nose:      Comments: NG with bilious output  Abdominal:      General: There is no distension.      Palpations: Abdomen is soft.      Tenderness: There is abdominal tenderness (Very mild in the left lower quadrant). There is no guarding or rebound.       Comments: Obese   Skin:     Capillary Refill: Capillary refill takes less than 2 seconds.   Neurological:      Mental Status: He is alert.         Significant Labs:  I have reviewed all pertinent lab results within the past 24 hours.  CBC:   Recent Labs   Lab 12/08/21  0610   WBC 5.71   RBC 4.64   HGB 13.5*   HCT 41.9      MCV 90   MCH 29.1   MCHC 32.2     BMP:   Recent Labs   Lab 12/08/21  0610   GLU 88      K 4.3      CO2 21*   BUN 87*   CREATININE 3.0*   CALCIUM 9.2       Significant Diagnostics:  I have reviewed all pertinent imaging results/findings within the past 24 hours.

## 2021-12-09 NOTE — ASSESSMENT & PLAN NOTE
NPO  NGT to wall suction  KUB daily  CXR nothing acute  IVF started NS at 150ml/hr    He meets SIRS criteria but I think better explained by SBO presentation that infection.  UA > 100 WBC but he has no urinary tract symptoms. He has recurrent UTI and will start Rocephin now but my clinical impression is the abnormal vitals and elevated lactic acid are due to SBO, dehydration and not UTI sepsis

## 2021-12-09 NOTE — PLAN OF CARE
12/09/21 1342   Post-Acute Status   Post-Acute Authorization Placement   Post-Acute Placement Status Referrals Sent   Discharge Delays None known at this time       Patient's son informed MD yesterday that he is interested in nursing home placement for the patient due to increasing need for care at home. First choice would be [facility]. Second choice is [facility]. ULISSES sent patient referral to both facilities via [employer]. Awaiting response.     ULISSES called in [facility] and completed PASRR for nursing home placement. Awaiting 142.

## 2021-12-09 NOTE — PT/OT/SLP EVAL
"Physical Therapy Evaluation    Patient Name:  Domonique Hernandez Jr.   MRN:  [id number]    Recommendations:     Discharge Recommendations:  nursing facility, basic,other (see comments) (home health)   Discharge Equipment Recommendations: none   Barriers to discharge: Decreased caregiver support    Assessment:     Domonique Hernandez Jr. is a 81 y.o. male admitted with a medical diagnosis of SBO (small bowel obstruction).  He presents with the following impairments/functional limitations:  weakness,impaired self care skills,impaired functional mobilty,gait instability,impaired endurance,decreased safety awareness,impaired cognition,impaired balance. Patient has decrease safety awareness and requires contact guard/ close supervision in most functioanl mobility for increase safety.    Rehab Prognosis: Fair; patient would benefit from acute skilled PT services to address these deficits and reach maximum level of function.    Recent Surgery: * No surgery found *      Plan:     During this hospitalization, patient to be seen daily to address the identified rehab impairments via gait training,therapeutic activities,therapeutic exercises and progress toward the following goals:    · Plan of Care Expires:  12/16/21    Subjective     Chief Complaint: Thirsty asking for water.  Explained to patient cannot have it due to NPO.  Patient/Family Comments/goals: "To get better".  Pain/Comfort:  Pain Rating 1: 0/10    Patients cultural, spiritual, Latter-day conflicts given the current situation: no    Living Environment:  Patient lives with son in a [facility] no [facility].  Prior to admission, patients level of function was Supervision in most functioanl mobility, ambulates with no assisitive device around the house and uses RW when goes out to the store..  Equipment used at home: walker, rolling,other (see comments),wheelchair, manual,hospital bed,tub bench,commode chair (rollator).  DME owned (not currently used): none.  Upon discharge, patient will have " assistance from son.    Objective:     Communicated with patient prior to session.  Patient found HOB elevated with bed alarm,peripheral IV,NG tube,telemetry  upon PT entry to room.    General Precautions: Standard, NPO,fall   Orthopedic Precautions:N/A   Braces: N/A  Respiratory Status:  O2 Device (Oxygen Therapy): room air    Exams:  · Cognitive Exam:  Patient is oriented to Person, Place and some confusion  · Fine Motor Coordination:    · -       Intact  · Gross Motor Coordination:  WFL  · Postural Exam:  Patient presented with the following abnormalities:    · -       Rounded shoulders  · -       Forward head  · Skin Integrity/Edema:      · -       Skin integrity: Visible skin intact  · RLE ROM: WFL  · RLE Strength: WFL  · LLE ROM: WFL  · LLE Strength: WFL    Functional Mobility:  · Bed Mobility:     · Rolling Left:  contact guard assistance  · Rolling Right: contact guard assistance  · Scooting: contact guard assistance  · Supine to Sit: contact guard assistance  · Sit to Supine: contact guard assistance  · Transfers:     · Sit to Stand:  contact guard assistance with rolling walker  · Gait: Contact Gaurd Assistance x ~30 feet (steeping forward and back due to limited line of medical equipment - NG tube) using RW    Therapeutic Activities and Exercises:   Educated patient the safety measures on out of bed activity and initiated gait trng with RW.    AM-PAC 6 CLICK MOBILITY  Total Score:18     Patient left HOB elevated with all lines intact, call button in reach, bed alarm on, nursing  notified and avasys camera present.    GOALS:   Multidisciplinary Problems     Physical Therapy Goals        Problem: Physical Therapy Goal    Goal Priority Disciplines Outcome Goal Variances Interventions   Physical Therapy Goal     PT, PT/OT      Description: Goals to be met by: 21    Patient will increase functional independence with mobility by performin. Supine to sit with Modified Independent  2. Sit to supine  with Modified Independent  3. Bed to chair transfer with Supervision or Set-up Assistancewith or without rolling walker using Step Transfer TECHNIQUE  4. Gait  x ~100  feet with Supervision or Set-up Assistance with or without rolling walker  5. Lower extremity exercise program x10 reps per handout, with assistance as needed                      History:     Past Medical History:   Diagnosis Date    Anxiety     Arthritis     Back pain     Chronic bronchitis     Chronic gout     COPD (chronic obstructive pulmonary disease)     Depression     Emphysema of lung     Generalized headaches     GERD (gastroesophageal reflux disease)     Hyperlipidemia     Hypertension     Hypothyroidism     Obesity     Peripheral vascular disease     Sleep apnea     On CPAP    Stage 3 chronic kidney disease 4/18/2018    Thyroid disease        Past Surgical History:   Procedure Laterality Date    Bilateral mastoidectomies Bilateral 1984    for ear infection    COLONOSCOPY  2010    COLONOSCOPY N/A 1/31/2019    Procedure: COLONOSCOPY;  Surgeon: Joaquín Crawford MD;  Location: [facility];  Service: Colon and Rectal;  Laterality: N/A;    COLONOSCOPY N/A 8/13/2019    Procedure: COLONOSCOPY;  Surgeon: Joaquín Crawfrod MD;  Location: [facility] ([address] [place] [address]);  Service: Colon and Rectal;  Laterality: N/A;  Patient had inadequate prep with his last colonoscopy, was unable to finish the large volume GoLYTELY prep.  Please give him a smaller volume split dose prep, such as Movi-Prep or SuPrep.  He should do a light diet of easily digested food 2 days prior to the procedure     CYSTOSCOPIC LITHOLAPAXY N/A 3/24/2021    Procedure: CYSTOLITHOLAPAXY;  Surgeon: Avtar Latham II, MD;  [facility];  Service: Urology;  Laterality: N/A;    CYSTOURETEROSCOPY WITH RETROGRADE PYELOGRAPHY AND INSERTION OF STENT INTO URETER Bilateral 5/31/2021    Procedure: CYSTOURETEROSCOPY, WITH RETROGRADE PYELOGRAM AND URETERAL STENT INSERTION;  Surgeon:  Avtar Latham II, MD;  [facility];  Service: Urology;  Laterality: Bilateral;    Eardrum repair  1984    Not sure which side    ESOPHAGOGASTRODUODENOSCOPY W/ PEG N/A 2/1/2021    Procedure: EGD, WITH PEG TUBE INSERTION;  Surgeon: Yousuf Villa MD;  Location: [facility];  Service: Endoscopy;  Laterality: N/A;    HERNIA REPAIR  1984    Umbilical-screen    KNEE ARTHROSCOPY Left 1989    LASER LITHOTRIPSY Bilateral 5/31/2021    Procedure: LITHOTRIPSY, USING LASER;  Surgeon: Avtar Latham II, MD;  [facility];  Service: Urology;  Laterality: Bilateral;    RETROGRADE PYELOGRAPHY Right 3/24/2021    Procedure: PYELOGRAM, RETROGRADE;  Surgeon: Avtar Latham II, MD;  [facility];  Service: Urology;  Laterality: Right;    ROTATOR CUFF REPAIR Left 2002    Left    TOTAL KNEE ARTHROPLASTY Left 12/21/2020    Procedure: ARTHROPLASTY, KNEE, TOTAL;  Surgeon: Shin Chang MD;  Location: [facility];  Service: Orthopedics;  Laterality: Left;    TRANSURETHRAL SURGICAL REMOVAL OF PROSTATE (TURP) USING GREEN LIGHT LASER N/A 5/31/2021    Procedure: TURP, USING GREEN LIGHT LASER;  Surgeon: Avtar Latham II, MD;  [facility];  Service: Urology;  Laterality: N/A;    URETEROSCOPIC REMOVAL OF URETERIC CALCULUS Right 3/24/2021    Procedure: REMOVAL, CALCULUS, URETER, URETEROSCOPIC;  Surgeon: Avtar Latham II, MD;  [facility];  Service: Urology;  Laterality: Right;    URETEROSCOPIC REMOVAL OF URETERIC CALCULUS Left 5/31/2021    Procedure: REMOVAL, CALCULUS, URETER, URETEROSCOPIC;  Surgeon: Avtar Latham II, MD;  [facility];  Service: Urology;  Laterality: Left;       Time Tracking:     PT Received On: 12/09/21  PT Start Time: 0752     PT Stop Time: 0815  PT Total Time (min): 23 min     Billable Minutes: Evaluation 23 mins      12/09/2021

## 2021-12-10 LAB
ALBUMIN SERPL BCP-MCNC: 2.4 G/DL (ref 3.5–5.2)
ALP SERPL-CCNC: 79 U/L (ref 55–135)
ALT SERPL W/O P-5'-P-CCNC: 12 U/L (ref 10–44)
ANION GAP SERPL CALC-SCNC: 9 MMOL/L (ref 8–16)
AST SERPL-CCNC: 15 U/L (ref 10–40)
BASOPHILS # BLD AUTO: 0.03 K/UL (ref 0–0.2)
BASOPHILS NFR BLD: 0.5 % (ref 0–1.9)
BILIRUB SERPL-MCNC: 0.5 MG/DL (ref 0.1–1)
BUN SERPL-MCNC: 45 MG/DL (ref 8–23)
CALCIUM SERPL-MCNC: 9 MG/DL (ref 8.7–10.5)
CHLORIDE SERPL-SCNC: 115 MMOL/L (ref 95–110)
CO2 SERPL-SCNC: 22 MMOL/L (ref 23–29)
CREAT SERPL-MCNC: 1.6 MG/DL (ref 0.5–1.4)
DIFFERENTIAL METHOD: ABNORMAL
EOSINOPHIL # BLD AUTO: 0.3 K/UL (ref 0–0.5)
EOSINOPHIL NFR BLD: 4.5 % (ref 0–8)
ERYTHROCYTE [DISTWIDTH] IN BLOOD BY AUTOMATED COUNT: 16.9 % (ref 11.5–14.5)
EST. GFR  (AFRICAN AMERICAN): 47 ML/MIN/1.73 M^2
EST. GFR  (NON AFRICAN AMERICAN): 40 ML/MIN/1.73 M^2
GLUCOSE SERPL-MCNC: 101 MG/DL (ref 70–110)
HCT VFR BLD AUTO: 38.5 % (ref 40–54)
HGB BLD-MCNC: 12.4 G/DL (ref 14–18)
IMM GRANULOCYTES # BLD AUTO: 0.07 K/UL (ref 0–0.04)
IMM GRANULOCYTES NFR BLD AUTO: 1.3 % (ref 0–0.5)
LYMPHOCYTES # BLD AUTO: 0.8 K/UL (ref 1–4.8)
LYMPHOCYTES NFR BLD: 14.7 % (ref 18–48)
MCH RBC QN AUTO: 29.3 PG (ref 27–31)
MCHC RBC AUTO-ENTMCNC: 32.2 G/DL (ref 32–36)
MCV RBC AUTO: 91 FL (ref 82–98)
MONOCYTES # BLD AUTO: 1 K/UL (ref 0.3–1)
MONOCYTES NFR BLD: 17.4 % (ref 4–15)
NEUTROPHILS # BLD AUTO: 3.4 K/UL (ref 1.8–7.7)
NEUTROPHILS NFR BLD: 61.6 % (ref 38–73)
NRBC BLD-RTO: 0 /100 WBC
PLATELET # BLD AUTO: 214 K/UL (ref 150–450)
PMV BLD AUTO: 12 FL (ref 9.2–12.9)
POTASSIUM SERPL-SCNC: 3.7 MMOL/L (ref 3.5–5.1)
PROT SERPL-MCNC: 5.5 G/DL (ref 6–8.4)
RBC # BLD AUTO: 4.23 M/UL (ref 4.6–6.2)
SODIUM SERPL-SCNC: 146 MMOL/L (ref 136–145)
WBC # BLD AUTO: 5.57 K/UL (ref 3.9–12.7)

## 2021-12-10 PROCEDURE — 99233 SBSQ HOSP IP/OBS HIGH 50: CPT | Mod: ,,, | Performed by: INTERNAL MEDICINE

## 2021-12-10 PROCEDURE — 99221 1ST HOSP IP/OBS SF/LOW 40: CPT | Mod: ,,, | Performed by: STUDENT IN AN ORGANIZED HEALTH CARE EDUCATION/TRAINING PROGRAM

## 2021-12-10 PROCEDURE — 25000003 PHARM REV CODE 250: Performed by: NURSE PRACTITIONER

## 2021-12-10 PROCEDURE — 99231 PR SUBSEQUENT HOSPITAL CARE,LEVL I: ICD-10-PCS | Mod: ,,, | Performed by: SURGERY

## 2021-12-10 PROCEDURE — 11000001 HC ACUTE MED/SURG PRIVATE ROOM

## 2021-12-10 PROCEDURE — 63600175 PHARM REV CODE 636 W HCPCS: Performed by: INTERNAL MEDICINE

## 2021-12-10 PROCEDURE — 99221 PR INITIAL HOSPITAL CARE,LEVL I: ICD-10-PCS | Mod: ,,, | Performed by: STUDENT IN AN ORGANIZED HEALTH CARE EDUCATION/TRAINING PROGRAM

## 2021-12-10 PROCEDURE — C9113 INJ PANTOPRAZOLE SODIUM, VIA: HCPCS | Performed by: NURSE PRACTITIONER

## 2021-12-10 PROCEDURE — 97116 GAIT TRAINING THERAPY: CPT

## 2021-12-10 PROCEDURE — 63600175 PHARM REV CODE 636 W HCPCS: Performed by: NURSE PRACTITIONER

## 2021-12-10 PROCEDURE — 97530 THERAPEUTIC ACTIVITIES: CPT

## 2021-12-10 PROCEDURE — 85025 COMPLETE CBC W/AUTO DIFF WBC: CPT | Performed by: NURSE PRACTITIONER

## 2021-12-10 PROCEDURE — 94761 N-INVAS EAR/PLS OXIMETRY MLT: CPT

## 2021-12-10 PROCEDURE — 99231 SBSQ HOSP IP/OBS SF/LOW 25: CPT | Mod: ,,, | Performed by: SURGERY

## 2021-12-10 PROCEDURE — 99233 PR SUBSEQUENT HOSPITAL CARE,LEVL III: ICD-10-PCS | Mod: ,,, | Performed by: INTERNAL MEDICINE

## 2021-12-10 PROCEDURE — 36415 COLL VENOUS BLD VENIPUNCTURE: CPT | Performed by: NURSE PRACTITIONER

## 2021-12-10 PROCEDURE — 80053 COMPREHEN METABOLIC PANEL: CPT | Performed by: NURSE PRACTITIONER

## 2021-12-10 PROCEDURE — 25000003 PHARM REV CODE 250: Performed by: INTERNAL MEDICINE

## 2021-12-10 RX ORDER — PANTOPRAZOLE SODIUM 40 MG/1
40 TABLET, DELAYED RELEASE ORAL DAILY
Status: DISCONTINUED | OUTPATIENT
Start: 2021-12-11 | End: 2021-12-10

## 2021-12-10 RX ORDER — ENOXAPARIN SODIUM 100 MG/ML
1 INJECTION SUBCUTANEOUS
Status: DISCONTINUED | OUTPATIENT
Start: 2021-12-11 | End: 2021-12-17

## 2021-12-10 RX ORDER — ATORVASTATIN CALCIUM 10 MG/1
10 TABLET, FILM COATED ORAL NIGHTLY
Status: DISCONTINUED | OUTPATIENT
Start: 2021-12-10 | End: 2021-12-10

## 2021-12-10 RX ORDER — MEMANTINE HYDROCHLORIDE 10 MG/1
10 TABLET ORAL 2 TIMES DAILY
Status: DISCONTINUED | OUTPATIENT
Start: 2021-12-10 | End: 2021-12-10

## 2021-12-10 RX ORDER — ONDANSETRON 4 MG/1
4 TABLET, ORALLY DISINTEGRATING ORAL EVERY 6 HOURS PRN
Status: DISCONTINUED | OUTPATIENT
Start: 2021-12-10 | End: 2021-12-23 | Stop reason: HOSPADM

## 2021-12-10 RX ORDER — DOXYCYCLINE HYCLATE 100 MG
100 TABLET ORAL EVERY 12 HOURS
Status: DISCONTINUED | OUTPATIENT
Start: 2021-12-10 | End: 2021-12-10

## 2021-12-10 RX ORDER — METOPROLOL SUCCINATE 50 MG/1
50 TABLET, EXTENDED RELEASE ORAL DAILY
Status: DISCONTINUED | OUTPATIENT
Start: 2021-12-10 | End: 2021-12-10

## 2021-12-10 RX ORDER — LEVOTHYROXINE SODIUM 100 UG/1
100 TABLET ORAL
Status: DISCONTINUED | OUTPATIENT
Start: 2021-12-10 | End: 2021-12-10

## 2021-12-10 RX ORDER — LABETALOL HYDROCHLORIDE 5 MG/ML
5 INJECTION, SOLUTION INTRAVENOUS EVERY 6 HOURS
Status: DISCONTINUED | OUTPATIENT
Start: 2021-12-10 | End: 2021-12-15

## 2021-12-10 RX ORDER — ENOXAPARIN SODIUM 100 MG/ML
100 INJECTION SUBCUTANEOUS
Status: DISCONTINUED | OUTPATIENT
Start: 2021-12-11 | End: 2021-12-10

## 2021-12-10 RX ORDER — ENOXAPARIN SODIUM 100 MG/ML
100 INJECTION SUBCUTANEOUS
Status: DISCONTINUED | OUTPATIENT
Start: 2021-12-10 | End: 2021-12-10

## 2021-12-10 RX ORDER — PANTOPRAZOLE SODIUM 40 MG/10ML
40 INJECTION, POWDER, LYOPHILIZED, FOR SOLUTION INTRAVENOUS DAILY
Status: DISCONTINUED | OUTPATIENT
Start: 2021-12-11 | End: 2021-12-19

## 2021-12-10 RX ADMIN — DOXYCYCLINE HYCLATE 100 MG: 100 TABLET, COATED ORAL at 11:12

## 2021-12-10 RX ADMIN — LEVOTHYROXINE SODIUM 100 MCG: 100 TABLET ORAL at 11:12

## 2021-12-10 RX ADMIN — PROMETHAZINE HYDROCHLORIDE 12.5 MG: 25 INJECTION INTRAMUSCULAR; INTRAVENOUS at 01:12

## 2021-12-10 RX ADMIN — SODIUM CHLORIDE: 0.9 INJECTION, SOLUTION INTRAVENOUS at 07:12

## 2021-12-10 RX ADMIN — DOXYCYCLINE 100 MG: 100 INJECTION, POWDER, LYOPHILIZED, FOR SOLUTION INTRAVENOUS at 06:12

## 2021-12-10 RX ADMIN — SODIUM CHLORIDE: 0.9 INJECTION, SOLUTION INTRAVENOUS at 02:12

## 2021-12-10 RX ADMIN — MUPIROCIN: 20 OINTMENT TOPICAL at 08:12

## 2021-12-10 RX ADMIN — LABETALOL HYDROCHLORIDE 5 MG: 5 INJECTION, SOLUTION INTRAVENOUS at 06:12

## 2021-12-10 RX ADMIN — METOPROLOL SUCCINATE 50 MG: 50 TABLET, EXTENDED RELEASE ORAL at 11:12

## 2021-12-10 RX ADMIN — MUPIROCIN: 20 OINTMENT TOPICAL at 09:12

## 2021-12-10 RX ADMIN — PANTOPRAZOLE SODIUM 40 MG: 40 INJECTION, POWDER, LYOPHILIZED, FOR SOLUTION INTRAVENOUS at 08:12

## 2021-12-10 RX ADMIN — LABETALOL HYDROCHLORIDE 5 MG: 5 INJECTION, SOLUTION INTRAVENOUS at 05:12

## 2021-12-10 RX ADMIN — MEMANTINE 10 MG: 10 TABLET ORAL at 11:12

## 2021-12-10 RX ADMIN — APIXABAN 5 MG: 5 TABLET, FILM COATED ORAL at 11:12

## 2021-12-10 NOTE — ASSESSMENT & PLAN NOTE
NPO  NGT to wall suction  KUB daily  CXR nothing acute  IVF started NS at 150ml/hr    He meets SIRS criteria but I think better explained by SBO presentation that infection.  UA > 100 WBC but he has no urinary tract symptoms. He has recurrent UTI and will start Rocephin now but my clinical impression is the abnormal vitals and elevated lactic acid are due to SBO, dehydration and not UTI sepsis    12/10 tolerating orals, advance diet .

## 2021-12-10 NOTE — SUBJECTIVE & OBJECTIVE
Past Medical History:   Diagnosis Date    Anxiety     Arthritis     Back pain     Chronic bronchitis     Chronic gout     COPD (chronic obstructive pulmonary disease)     Depression     Emphysema of lung     Generalized headaches     GERD (gastroesophageal reflux disease)     Hyperlipidemia     Hypertension     Hypothyroidism     Obesity     Peripheral vascular disease     Sleep apnea     On CPAP    Stage 3 chronic kidney disease 4/18/2018    Thyroid disease        Past Surgical History:   Procedure Laterality Date    Bilateral mastoidectomies Bilateral 1984    for ear infection    COLONOSCOPY  2010    COLONOSCOPY N/A 1/31/2019    Procedure: COLONOSCOPY;  Surgeon: Joaquín Crawford MD;  Location: [facility];  Service: Colon and Rectal;  Laterality: N/A;    COLONOSCOPY N/A 8/13/2019    Procedure: COLONOSCOPY;  Surgeon: Joaquín Crawford MD;  Location: [facility] (4TH FLR);  Service: Colon and Rectal;  Laterality: N/A;  Patient had inadequate prep with his last colonoscopy, was unable to finish the large volume GoLYTELY prep.  Please give him a smaller volume split dose prep, such as Movi-Prep or SuPrep.  He should do a light diet of easily digested food 2 days prior to the procedure     CYSTOSCOPIC LITHOLAPAXY N/A 3/24/2021    Procedure: CYSTOLITHOLAPAXY;  Surgeon: Avtar Latham II, MD;  Location: [facility];  Service: Urology;  Laterality: N/A;    CYSTOURETEROSCOPY WITH RETROGRADE PYELOGRAPHY AND INSERTION OF STENT INTO URETER Bilateral 5/31/2021    Procedure: CYSTOURETEROSCOPY, WITH RETROGRADE PYELOGRAM AND URETERAL STENT INSERTION;  Surgeon: Avtar Latham II, MD;  Location: [facility];  Service: Urology;  Laterality: Bilateral;    Eardrum repair  1984    Not sure which side    ESOPHAGOGASTRODUODENOSCOPY W/ PEG N/A 2/1/2021    Procedure: EGD, WITH PEG TUBE INSERTION;  Surgeon: Yousuf Villa MD;  Location: [facility];  Service: Endoscopy;  Laterality: N/A;    HERNIA REPAIR  1984     Umbilical-screen    KNEE ARTHROSCOPY Left 1989    LASER LITHOTRIPSY Bilateral 5/31/2021    Procedure: LITHOTRIPSY, USING LASER;  Surgeon: Avtar Latham II, MD;  Location[facility];  Service: Urology;  Laterality: Bilateral;    RETROGRADE PYELOGRAPHY Right 3/24/2021    Procedure: PYELOGRAM, RETROGRADE;  Surgeon: Avtar Latham II, MD;  Location: [facility];  Service: Urology;  Laterality: Right;    ROTATOR CUFF REPAIR Left 2002    Left    TOTAL KNEE ARTHROPLASTY Left 12/21/2020    Procedure: ARTHROPLASTY, KNEE, TOTAL;  Surgeon: Shin Chang MD;  Location: [facility];  Service: Orthopedics;  Laterality: Left;    TRANSURETHRAL SURGICAL REMOVAL OF PROSTATE (TURP) USING GREEN LIGHT LASER N/A 5/31/2021    Procedure: TURP, USING GREEN LIGHT LASER;  Surgeon: Avtar Latham II, MD;  Location: [facility];  Service: Urology;  Laterality: N/A;    URETEROSCOPIC REMOVAL OF URETERIC CALCULUS Right 3/24/2021    Procedure: REMOVAL, CALCULUS, URETER, URETEROSCOPIC;  Surgeon: Avtar Latham II, MD;  Location: [facility];  Service: Urology;  Laterality: Right;    URETEROSCOPIC REMOVAL OF URETERIC CALCULUS Left 5/31/2021    Procedure: REMOVAL, CALCULUS, URETER, URETEROSCOPIC;  Surgeon: Avtar Latham II, MD;  Location: [facility];  Service: Urology;  Laterality: Left;       Review of patient's allergies indicates:   Allergen Reactions    Percocet [oxycodone-acetaminophen] Other (See Comments)     hyperactivity    Percodan [oxycodone hcl-oxycodone-asa] Other (See Comments)     hyperactivity       No current facility-administered medications on file prior to encounter.     Current Outpatient Medications on File Prior to Encounter   Medication Sig    albuterol sulfate 2.5 mg/0.5 mL Nebu Take 2.5 mg  (one vial)  by nebulization every 6 (six) hours as needed (wheezing). Rescue    allopurinoL (ZYLOPRIM) 300 MG tablet Take 300 mg by mouth once daily.    aspirin 81 MG Chew Take 81 mg by mouth once daily.    budesonide 1 mg/2 mL NbSp  Inhale 1 mg into the lungs once daily. Controller    levothyroxine (SYNTHROID) 100 MCG tablet TAKE 1 TABLET BY MOUTH ONCE DAILY (Patient taking differently: TAKE 1 TABLET BY MOUTH ONCE DAILY)    memantine (NAMENDA) 10 MG Tab Take 1 tablet (10 mg total) by mouth 2 (two) times daily.    metoprolol succinate (TOPROL-XL) 50 MG 24 hr tablet Take 1 tablet orally 2 times a day.    multivitamin (THERAGRAN) per tablet Take 1 tablet by mouth once daily. Centrum Silver for Men    omega 3-dha-epa-fish oil 1,000 mg (120 mg-180 mg) Cap Take 2 capsules orally once a day.    simvastatin (ZOCOR) 40 MG tablet Take 1 tablet (40 mg total) by mouth every evening.    tiotropium-olodateroL (STIOLTO RESPIMAT) 2.5-2.5 mcg/actuation Mist Inhale 2 puffs into the lungs once daily.    apixaban (ELIQUIS) 5 mg Tab Take one tablet by mouth twice a day    nitroGLYCERIN (NITROSTAT) 0.4 MG SL tablet Take 1 tablet (sublingual) under tongue as needed for chest pain, no more than 3 tablets in 15 mins, 5 mins apart.    [DISCONTINUED] amiodarone (PACERONE) 200 MG Tab Take 2 tablets by mouth once daily for 2 weeks then take one tablet by mouth once daily thereafter    [DISCONTINUED] amLODIPine (NORVASC) 5 MG tablet Take 1 tablet (5 mg total) by mouth once daily.    [DISCONTINUED] valsartan (DIOVAN) 80 MG tablet Take 1 tablet (80 mg total) by mouth once daily.     Family History     Problem Relation (Age of Onset)    Atrial fibrillation Son    Cancer Mother (65), Father    Kidney disease Brother    No Known Problems Sister    Stroke Son, Son        Tobacco Use    Smoking status: Never Smoker    Smokeless tobacco: Never Used   Substance and Sexual Activity    Alcohol use: No    Drug use: No    Sexual activity: Not Currently     Partners: Female     Review of Systems   Constitutional: Positive for fatigue. Negative for activity change, fever and unexpected weight change.   HENT: Negative for congestion and sore throat.    Eyes: Negative for  visual disturbance.   Respiratory: Negative for cough and shortness of breath.    Cardiovascular: Negative for chest pain.   Gastrointestinal: Negative for blood in stool, constipation, diarrhea and nausea. Abdominal pain: improving.   Genitourinary: Negative for dysuria and hematuria.   Neurological: Negative for dizziness and light-headedness.     Objective:     Vital Signs (Most Recent):  Temp: 97.6 °F (36.4 °C) (12/10/21 0747)  Pulse: 91 (12/10/21 0747)  Resp: 20 (12/10/21 0418)  BP: (!) 149/70 (12/10/21 0747)  SpO2: 95 % (12/10/21 0747) Vital Signs (24h Range):  Temp:  [97.5 °F (36.4 °C)-97.9 °F (36.6 °C)] 97.6 °F (36.4 °C)  Pulse:  [] 91  Resp:  [17-20] 20  SpO2:  [94 %-98 %] 95 %  BP: ()/(42-94) 149/70     Weight: 100 kg (220 lb 7.4 oz)  Body mass index is 33.52 kg/m².    Physical Exam  Vitals and nursing note reviewed.   Constitutional:       General: He is not in acute distress.     Appearance: He is well-developed.      Comments: Lying in hospital bed with NGT in place   HENT:      Head: Normocephalic and atraumatic.      Right Ear: External ear normal.      Left Ear: External ear normal.   Eyes:      General:         Right eye: No discharge.         Left eye: No discharge.      Extraocular Movements: EOM normal.      Conjunctiva/sclera: Conjunctivae normal.   Neck:      Thyroid: No thyromegaly.   Cardiovascular:      Rate and Rhythm: Normal rate and regular rhythm.      Heart sounds: Normal heart sounds. No murmur heard.      Pulmonary:      Effort: Pulmonary effort is normal. No respiratory distress.      Breath sounds: Normal breath sounds.   Abdominal:      General: There is distension.      Palpations: Abdomen is soft.      Tenderness: There is no abdominal tenderness.      Comments: hypoactive BS.   Musculoskeletal:      Cervical back: Neck supple.      Right lower leg: No edema.      Left lower leg: No edema.   Skin:     General: Skin is warm and dry.   Neurological:      General: No  focal deficit present.      Mental Status: He is alert. Mental status is at baseline.   Psychiatric:         Mood and Affect: Mood normal.         Behavior: Behavior normal.           CRANIAL NERVES     CN III, IV, VI   Extraocular motions are normal.        Significant Labs:   All pertinent labs within the past 24 hours have been reviewed.  CBC:   Recent Labs   Lab 12/09/21  0619 12/10/21  0635   WBC 5.38 5.57   HGB 12.7* 12.4*   HCT 40.1 38.5*    214     CMP:   Recent Labs   Lab 12/09/21  0619 12/10/21  0635    146*   K 4.2 3.7    115*   CO2 24 22*   GLU 85 101   BUN 69* 45*   CREATININE 2.1* 1.6*   CALCIUM 9.1 9.0   PROT 5.6* 5.5*   ALBUMIN 2.5* 2.4*   BILITOT 0.5 0.5   ALKPHOS 74 79   AST 15 15   ALT 13 12   ANIONGAP 11 9   EGFRNONAA 29* 40*   lactic acid 2.7>1.9  Lipase 27  urine culture multiple organisms   covid screen negative   Significant Imaging:     US renal Normal size bilateral kidneys each containing a couple of benign anechoic cyst identified the midpole and upper pole regions largest single index cyst about the lateral cortex of the right kidney measuring 2.3 x 2.1 x 2.2 cm in size.    KUB 12/9 Enteric tube terminates in the gastric body.  The degree of distention of the small bowel loops is decreased as compared to prior examinations suggesting a resolving/revolved small-bowel obstruction.  No free air is identified.  Age-appropriate degenerative changes affect the skeleton.    CT abd and pelvis Imaging findings in keeping with a high-grade small bowel obstruction with a point of transition seen in the right mid abdomen, likely related to adhesions.  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  Additionally, there is a small amount of free fluid about the liver and extending into the pelvic cul-de-sac, with slightly high density of the fluid in the pelvic cul-de-sac suggesting  hyperemia issues content fluid.    KUB The enteric tube has been appropriately retracted and now terminates in the region of the distal gastric body.Slightly prominent loops of small bowel seen in the upper abdomen.  No definite free peritoneal air is seen.     Lung parenchyma cannot be adequately evaluated secondary to overexposure of the central aspect of the lungs.Consider dedicated radiograph of the chest evaluate for parenchymal injury.    CXR Enteric tube terminates in the gastric body.  There is bibasilar subsegmental atelectasis, with right basilar scarring.  No focal airspace consolidation or pleural effusions.  No pneumothorax.  The heart is enlarged.  Calcified atheromatous disease affects the aorta.  Median sternotomy wires are midline.  Age-appropriate degenerative changes affect the skeleton.

## 2021-12-10 NOTE — ASSESSMENT & PLAN NOTE
Noted in Dr Fair notes:    On eliquis and BB at home. NPO here. Will give lovenox- renally dose and labetalol IV for now   12/10 tolerating liquids, advance diet and resume oral BB, stop IV labetolol,   Resume eliquis;

## 2021-12-10 NOTE — PLAN OF CARE
Problem: Physical Therapy Goal  Goal: Physical Therapy Goal  Description: Goals to be met by: 21    Patient will increase functional independence with mobility by performin. Supine to sit with Modified Independent  2. Sit to supine with Modified Independent  3. Bed to chair transfer with Supervision or Set-up Assistancewith or without rolling walker using Step Transfer TECHNIQUE  4. Gait  x ~100  feet with Supervision or Set-up Assistance with or without rolling walker  5. Lower extremity exercise program x10 reps per handout, with assistance as needed     Outcome: Ongoing, Progressing

## 2021-12-10 NOTE — ASSESSMENT & PLAN NOTE
He has no UTI sx on interview day of admit  Culture urine  Start rocephin empirically as h/o of UTI and not a great historian but as noted above I think vitals/lactic acid precipitated by SBO and not UTI sepsis    Cont rocephin and repeat u.a   Pt is s/p lithotripsy, stent removal, and cystoureteroscopy with retrograde pyelogram w/ stent insertion on 5/31/21, tx with Bactrim for MRSA UTI  Will add oral doxy  To cover for staph MRSA UTI

## 2021-12-10 NOTE — CONSULTS
PSYCHIATRY CONSULT NOTE     12/10/2021 4:04 PM   Domonique Hernandez JrTiffanie   1940   [id number]         DATE OF ADMISSION: 12/7/2021 12:27 AM    SITE: [facility]        Chief Complaint:        Chief Complaint   Patient presents with    Nausea    Emesis       n/v onset last night         HPI: 80yo male patient with hx of anxiety, arthritis, back pain, bronchitis/COPD, depression, GERD, HLD, hypothyroid, PVD, sleep apnea on CPAP, and CKD. Pt came to ER with c/o regurgitation for 3 days. Decrease intake. No fever. No UTI s/s. CT on admission shows SBO. Significantly dehydrated. Creat 3.6-- baseline 1.4. Given 1L NS in ER U/a dirty one dose of zosyn given. No fever. No elevated WBC. Lactate 2.3. He had NGT place and put to suction. He is in bed this am. Reports feeling better. General surgery consulted.     Psychiatry consulted for evaluation.    Psychiatric interview:  Patient states he feels nauseated. He otherwise had not complaints. He states he is agreeable with NH placement.    Psychiatric ROS:  Symptoms of Depression: diminished mood - No, loss of interest/anhedonia - No;  recurrent - No, >14 days - No, diminished energy - No, change in sleep - No, change in appetite - No, diminished concentration or cognition or indecisiveness - No, PMA/R -  No, excessive guilt or hopelessness or worthlessness - No, suicidal ideations - No    Changes in Sleep: trouble with initiation- No, maintenance, - No early morning awakening with inability to return to sleep - No, hypersomnolence - No    Suicidal- active/passive ideations - No, organized plans, future intentions - No    Homicidal ideations: active/passive ideations - No, organized plans, future intentions - No    Symptoms of psychosis: hallucinations - No, delusions - No, disorganized speech - No, disorganized behavior or abnormal motor behavior - No, or negative symptoms (diminshed emotional expression, avolition, anhedonia, alogia, asociality) - No, active phase symptoms  ">1 month - No, continuous signs of illness > 6 months - No, since onset of illness decreased level of functioning present - No    Symptoms of marquez or hypomania: elevated, expansive, or irritable mood with increased energy or activity - No; > 4 days - No,  >7 days - No; with inflated self-esteem or grandiosity - No, decreased need for sleep - No, increased rate of speech - No, FOI or racing thoughts - No, distractibility - No, increased goal directed activity or PMA - No, risky/disinhibited behavior - No    Symptoms of FABIOLA: excessive anxiety/worry/fear, more days than not, about numerous issues - No, ongoing for >6 months - No, difficult to control - No, with restlessness - No, fatigue - No, poor concentration - No, irritability - No, muscle tension - No, sleep disturbance - No; causes functionally impairing distress - No    Symptoms of Panic Disorder: recurrent panic attacks (palpitations/heart racing, sweating, shakiness, dyspnea, choking, chest pain/discomfort, Gi symptoms, dizzy/lightheadedness, hot/col flashes, paresthesias, derealization, fear of losing control or fear of dying or fear of "going crazy") - No, precipitated - No, un-precipitated - No, source of worry and/or behavioral changes secondary for 1 month or longer- No, agoraphobia - No    Symptoms of PTSD: h/o trauma exposure - No; re-experiencing/intrusive symptoms - No, avoidant behavior - No, 2 or more negative alterations in cognition or mood - No, 2 or more hyperarousal symptoms - No; with dissociative symptoms - No, ongoing for 1 or more  months - No    Symptoms of OCD: obsessions (recurrent thoughts/urges/images; intrusive and/or unwanted; uses other thoughts/actions to suppress) - No; compulsions (repetitive behaviors used to lower distress/anxiety/obsessions) - No, time-consuming (over 1 hour per day) or cause significant distress/impairment - - No    Symptoms of Anorexia: restriction of caloric intake leading to significantly low body weight " - No, intense fear of gaining weight or persistent behavior that interferes with weight gain even thought at a significantly low weight - No, disturbance in the way in which one's body weight or shape is experienced, undue influence of body weight or shape on self evaluation, or persistent lack of recognition of the seriousness of the current low body weight - No    Symptoms of Bulimia: recurrent episodes of binge eating (definitely larger amount  than what others would eat and lack of a sense of control over eating during episode) - No, recurrent inappropriate compensatory behaviors in order to prevent weight gain (fasting, medications, exercise, vomiting) - No, binges and compensatory behaviors both occur on average at least once a week for 3 months - No, self evaluations is unduly influenced by body shape/weight- - No    Symptoms of Binge eating: recurrent episodes of binge eating (definitely larger amount than what others would eat and lack of a sense of control over eating during episode) - No, 3 or more of following (eating much more rapidly, eating until uncomfortably full, large amounts when not hungry, eating alone because of embarrassed by how much,  feeling disgusted with oneself, depressed or very guilty afterward) - No, distress regarding binges - No, binges occur on average at least once a week for 3 months - No        PAST PSYCHIATRIC HISTORY  Previous Psychiatric Hospitalizations: No  Previous SI/HI: No,  Previous Suicide Attempts: No,   Previous Medication Trials: No,  Psychiatric Care (current & past): No,  History of Psychotherapy: No,  History of Violence: No,  History of sexual/physical abuse: No,    PAST MEDICAL & SURGICAL HISTORY   Past Medical History:   Diagnosis Date    Anxiety     Arthritis     Back pain     Chronic bronchitis     Chronic gout     COPD (chronic obstructive pulmonary disease)     Depression     Emphysema of lung     Generalized headaches     GERD (gastroesophageal  reflux disease)     Hyperlipidemia     Hypertension     Hypothyroidism     Obesity     Peripheral vascular disease     Sleep apnea     On CPAP    Stage 3 chronic kidney disease 4/18/2018    Thyroid disease      Past Surgical History:   Procedure Laterality Date    Bilateral mastoidectomies Bilateral 1984    for ear infection    COLONOSCOPY  2010    COLONOSCOPY N/A 1/31/2019    Procedure: COLONOSCOPY;  Surgeon: Joaquín Crawford MD;  Location: [facility];  Service: Colon and Rectal;  Laterality: N/A;    COLONOSCOPY N/A 8/13/2019    Procedure: COLONOSCOPY;  Surgeon: Joaquín Crawford MD;  Location: [facility] ([facility]);  Service: Colon and Rectal;  Laterality: N/A;  Patient had inadequate prep with his last colonoscopy, was unable to finish the large volume GoLYTELY prep.  Please give him a smaller volume split dose prep, such as Movi-Prep or SuPrep.  He should do a light diet of easily digested food 2 days prior to the procedure     CYSTOSCOPIC LITHOLAPAXY N/A 3/24/2021    Procedure: CYSTOLITHOLAPAXY;  Surgeon: Avtar Latham II, MD;  Location: [facility];  Service: Urology;  Laterality: N/A;    CYSTOURETEROSCOPY WITH RETROGRADE PYELOGRAPHY AND INSERTION OF STENT INTO URETER Bilateral 5/31/2021    Procedure: CYSTOURETEROSCOPY, WITH RETROGRADE PYELOGRAM AND URETERAL STENT INSERTION;  Surgeon: Avtar Latham II, MD;  Location: [facility];  Service: Urology;  Laterality: Bilateral;    Eardrum repair  1984    Not sure which side    ESOPHAGOGASTRODUODENOSCOPY W/ PEG N/A 2/1/2021    Procedure: EGD, WITH PEG TUBE INSERTION;  Surgeon: Yousuf Villa MD;  Location: [facility];  Service: Endoscopy;  Laterality: N/A;    HERNIA REPAIR  1984    Umbilical-screen    KNEE ARTHROSCOPY Left 1989    LASER LITHOTRIPSY Bilateral 5/31/2021    Procedure: LITHOTRIPSY, USING LASER;  Surgeon: Avtar Latham II, MD;  Location: [facility];  Service: Urology;  Laterality: Bilateral;    RETROGRADE PYELOGRAPHY Right 3/24/2021    Procedure:  PYELOGRAM, RETROGRADE;  Surgeon: Avtar Latham II, MD;  [facility];  Service: Urology;  Laterality: Right;    ROTATOR CUFF REPAIR Left 2002    Left    TOTAL KNEE ARTHROPLASTY Left 12/21/2020    Procedure: ARTHROPLASTY, KNEE, TOTAL;  Surgeon: Shin Chang MD;  Location: [facility];  Service: Orthopedics;  Laterality: Left;    TRANSURETHRAL SURGICAL REMOVAL OF PROSTATE (TURP) USING GREEN LIGHT LASER N/A 5/31/2021    Procedure: TURP, USING GREEN LIGHT LASER;  Surgeon: Avtar Latham II, MD;  [facility];  Service: Urology;  Laterality: N/A;    URETEROSCOPIC REMOVAL OF URETERIC CALCULUS Right 3/24/2021    Procedure: REMOVAL, CALCULUS, URETER, URETEROSCOPIC;  Surgeon: Avtar Latham II, MD;  [facility];  Service: Urology;  Laterality: Right;    URETEROSCOPIC REMOVAL OF URETERIC CALCULUS Left 5/31/2021    Procedure: REMOVAL, CALCULUS, URETER, URETEROSCOPIC;  Surgeon: Avtar Latham II, MD;  [facility];  Service: Urology;  Laterality: Left;       Home Meds:   Prior to Admission medications    Medication Sig Start Date End Date Taking? Authorizing Provider   albuterol sulfate 2.5 mg/0.5 mL Nebu Take 2.5 mg  (one vial)  by nebulization every 6 (six) hours as needed (wheezing). Rescue 7/14/21 7/14/22 Yes Emiliano Cam MD   allopurinoL (ZYLOPRIM) 300 MG tablet Take 300 mg by mouth once daily.   Yes Historical Provider   aspirin 81 MG Chew Take 81 mg by mouth once daily.   Yes Historical Provider   budesonide 1 mg/2 mL NbSp Inhale 1 mg into the lungs once daily. Controller 7/21/21 7/21/22 Yes Emiliano Cam MD   levothyroxine (SYNTHROID) 100 MCG tablet TAKE 1 TABLET BY MOUTH ONCE DAILY  Patient taking differently: TAKE 1 TABLET BY MOUTH ONCE DAILY 6/8/21  Yes Emiliano Cam MD   memantine (NAMENDA) 10 MG Tab Take 1 tablet (10 mg total) by mouth 2 (two) times daily. 11/23/21 11/23/22 Yes Lindsey Polanco, DANIEL   metoprolol succinate (TOPROL-XL) 50 MG 24 hr tablet Take 1  tablet orally 2 times a day. 7/7/21  Yes    multivitamin (THERAGRAN) per tablet Take 1 tablet by mouth once daily. Centrum Silver for Men   Yes Historical Provider   omega 3-dha-epa-fish oil 1,000 mg (120 mg-180 mg) Cap Take 2 capsules orally once a day. 4/30/20  Yes    simvastatin (ZOCOR) 40 MG tablet Take 1 tablet (40 mg total) by mouth every evening. 6/8/21  Yes Emiliano Cam MD   tiotropium-olodateroL (STIOLTO RESPIMAT) 2.5-2.5 mcg/actuation Mist Inhale 2 puffs into the lungs once daily. 7/12/21  Yes Emiliano Cam MD   apixaban (ELIQUIS) 5 mg Tab Take one tablet by mouth twice a day 10/20/21   Emiliano Cam MD   nitroGLYCERIN (NITROSTAT) 0.4 MG SL tablet Take 1 tablet (sublingual) under tongue as needed for chest pain, no more than 3 tablets in 15 mins, 5 mins apart. 5/27/20      amiodarone (PACERONE) 200 MG Tab Take 2 tablets by mouth once daily for 2 weeks then take one tablet by mouth once daily thereafter 6/16/20 7/16/20     amLODIPine (NORVASC) 5 MG tablet Take 1 tablet (5 mg total) by mouth once daily. 5/26/20 9/11/20  Emiliano Cam MD   valsartan (DIOVAN) 80 MG tablet Take 1 tablet (80 mg total) by mouth once daily. 5/26/20 7/12/21  Emiliano Cam MD           Scheduled Meds:    apixaban  5 mg Oral BID    atorvastatin  10 mg Oral QHS    doxycycline  100 mg Oral Q12H    GI cocktail (mylanta 30 mL, LIDOcaine 2 % viscous 10 mL, dicyclomine 10 mL) 50 mL   Oral Once    levothyroxine  100 mcg Oral Before breakfast    memantine  10 mg Oral BID    metoprolol succinate  50 mg Oral Daily    mupirocin   Nasal BID    [START ON 12/11/2021] pantoprazole  40 mg Oral Daily      PRN Meds: melatonin, ondansetron, promethazine (PHENERGAN) IVPB, sodium chloride 0.9%   Psychotherapeutics (From admission, onward)            None          ALLERGIES   Review of patient's allergies indicates:   Allergen Reactions    Percocet [oxycodone-acetaminophen] Other (See Comments)      hyperactivity    Percodan [oxycodone hcl-oxycodone-asa] Other (See Comments)     hyperactivity       NEUROLOGIC HISTORY  Seizures: No  Head trauma: No    SOCIAL HISTORY:  Developmental/Childhood:Achieved all developmental milestones timely  Education:High School Diploma  Employment Status/Finances:Retired   Relationship Status/Sexual Orientation:   Children: 4  Housing Status: Home    history:  NO  Access to Firearms: YES: at home   ;  Locked up? n/a  Zoroastrianism:Actively participates in organized Sikh  Recreational activities:Time with family    SUBSTANCE ABUSE HISTORY   Recreational Drugs: denies   Use of Alcohol: denied  Rehab History:no   Tobacco Use:no    LEGAL HISTORY:   Past charges/incarcerations: No   Pending charges:No     FAMILY PSYCHIATRIC HISTORY   Family History   Problem Relation Age of Onset    Cancer Mother 65        rectal    Cancer Father     No Known Problems Sister     Kidney disease Brother     Stroke Son     Stroke Son     Atrial fibrillation Son     Melanoma Neg Hx      Denies       ROS  Review of Systems   Constitutional: Negative for chills and fever.   HENT: Negative for hearing loss.    Eyes: Negative for blurred vision and double vision.   Respiratory: Negative for shortness of breath.    Cardiovascular: Negative for chest pain and palpitations.   Gastrointestinal: Negative for constipation, diarrhea, nausea and vomiting.   Genitourinary: Negative for dysuria.   Musculoskeletal: Negative for back pain and neck pain.   Skin: Negative for rash.   Neurological: Negative for dizziness and headaches.   Endo/Heme/Allergies: Negative for environmental allergies.         EXAMINATION    PHYSICAL EXAM  Reviewed note/exam by Dr. Cevallos from 12/10/2021 @11:12 am    VITALS   Vitals:    12/10/21 1400   BP:    Pulse: 96   Resp:    Temp:         Body mass index is 33.52 kg/m².        PAIN  0/10  Subjective report of pain matches objective signs and symptoms: Yes    LABORATORY  DATA   Recent Results (from the past 72 hour(s))   POCT glucose    Collection Time: 12/07/21  4:50 PM   Result Value Ref Range    POCT Glucose 102 70 - 110 mg/dL   Comprehensive Metabolic Panel    Collection Time: 12/08/21  6:10 AM   Result Value Ref Range    Sodium 139 136 - 145 mmol/L    Potassium 4.3 3.5 - 5.1 mmol/L    Chloride 104 95 - 110 mmol/L    CO2 21 (L) 23 - 29 mmol/L    Glucose 88 70 - 110 mg/dL    BUN 87 (H) 8 - 23 mg/dL    Creatinine 3.0 (H) 0.5 - 1.4 mg/dL    Calcium 9.2 8.7 - 10.5 mg/dL    Total Protein 5.8 (L) 6.0 - 8.4 g/dL    Albumin 2.7 (L) 3.5 - 5.2 g/dL    Total Bilirubin 0.6 0.1 - 1.0 mg/dL    Alkaline Phosphatase 80 55 - 135 U/L    AST 13 10 - 40 U/L    ALT 10 10 - 44 U/L    Anion Gap 14 8 - 16 mmol/L    eGFR if African American 22 (A) >60 mL/min/1.73 m^2    eGFR if non African American 19 (A) >60 mL/min/1.73 m^2   CBC Auto Differential    Collection Time: 12/08/21  6:10 AM   Result Value Ref Range    WBC 5.71 3.90 - 12.70 K/uL    RBC 4.64 4.60 - 6.20 M/uL    Hemoglobin 13.5 (L) 14.0 - 18.0 g/dL    Hematocrit 41.9 40.0 - 54.0 %    MCV 90 82 - 98 fL    MCH 29.1 27.0 - 31.0 pg    MCHC 32.2 32.0 - 36.0 g/dL    RDW 16.8 (H) 11.5 - 14.5 %    Platelets 189 150 - 450 K/uL    MPV 11.9 9.2 - 12.9 fL    Immature Granulocytes 0.4 0.0 - 0.5 %    Gran # (ANC) 3.6 1.8 - 7.7 K/uL    Immature Grans (Abs) 0.02 0.00 - 0.04 K/uL    Lymph # 1.0 1.0 - 4.8 K/uL    Mono # 1.1 (H) 0.3 - 1.0 K/uL    Eos # 0.1 0.0 - 0.5 K/uL    Baso # 0.02 0.00 - 0.20 K/uL    nRBC 0 0 /100 WBC    Gran % 62.1 38.0 - 73.0 %    Lymph % 17.0 (L) 18.0 - 48.0 %    Mono % 18.7 (H) 4.0 - 15.0 %    Eosinophil % 1.4 0.0 - 8.0 %    Basophil % 0.4 0.0 - 1.9 %    Differential Method Automated    Comprehensive Metabolic Panel    Collection Time: 12/09/21  6:19 AM   Result Value Ref Range    Sodium 144 136 - 145 mmol/L    Potassium 4.2 3.5 - 5.1 mmol/L    Chloride 109 95 - 110 mmol/L    CO2 24 23 - 29 mmol/L    Glucose 85 70 - 110 mg/dL    BUN 69  (H) 8 - 23 mg/dL    Creatinine 2.1 (H) 0.5 - 1.4 mg/dL    Calcium 9.1 8.7 - 10.5 mg/dL    Total Protein 5.6 (L) 6.0 - 8.4 g/dL    Albumin 2.5 (L) 3.5 - 5.2 g/dL    Total Bilirubin 0.5 0.1 - 1.0 mg/dL    Alkaline Phosphatase 74 55 - 135 U/L    AST 15 10 - 40 U/L    ALT 13 10 - 44 U/L    Anion Gap 11 8 - 16 mmol/L    eGFR if African American 34 (A) >60 mL/min/1.73 m^2    eGFR if non African American 29 (A) >60 mL/min/1.73 m^2   CBC Auto Differential    Collection Time: 12/09/21  6:19 AM   Result Value Ref Range    WBC 5.38 3.90 - 12.70 K/uL    RBC 4.36 (L) 4.60 - 6.20 M/uL    Hemoglobin 12.7 (L) 14.0 - 18.0 g/dL    Hematocrit 40.1 40.0 - 54.0 %    MCV 92 82 - 98 fL    MCH 29.1 27.0 - 31.0 pg    MCHC 31.7 (L) 32.0 - 36.0 g/dL    RDW 16.8 (H) 11.5 - 14.5 %    Platelets 182 150 - 450 K/uL    MPV 12.3 9.2 - 12.9 fL    Immature Granulocytes 0.6 (H) 0.0 - 0.5 %    Gran # (ANC) 3.5 1.8 - 7.7 K/uL    Immature Grans (Abs) 0.03 0.00 - 0.04 K/uL    Lymph # 0.7 (L) 1.0 - 4.8 K/uL    Mono # 1.0 0.3 - 1.0 K/uL    Eos # 0.2 0.0 - 0.5 K/uL    Baso # 0.02 0.00 - 0.20 K/uL    nRBC 0 0 /100 WBC    Gran % 64.7 38.0 - 73.0 %    Lymph % 12.5 (L) 18.0 - 48.0 %    Mono % 18.8 (H) 4.0 - 15.0 %    Eosinophil % 3.0 0.0 - 8.0 %    Basophil % 0.4 0.0 - 1.9 %    Differential Method Automated    Urinalysis, Reflex to Urine Culture Urine, Clean Catch    Collection Time: 12/09/21  2:27 PM    Specimen: Urine   Result Value Ref Range    Specimen UA Urine, Clean Catch     Color, UA Yellow Yellow, Straw, Hiral    Appearance, UA Hazy (A) Clear    pH, UA 6.0 5.0 - 8.0    Specific Gravity, UA 1.020 1.005 - 1.030    Protein, UA 1+ (A) Negative    Glucose, UA Negative Negative    Ketones, UA 1+ (A) Negative    Bilirubin (UA) Negative Negative    Occult Blood UA 1+ (A) Negative    Nitrite, UA Negative Negative    Urobilinogen, UA Negative <2.0 EU/dL    Leukocytes, UA 2+ (A) Negative   Urinalysis Microscopic    Collection Time: 12/09/21  2:27 PM   Result Value  Ref Range    RBC, UA 1 0 - 4 /hpf    WBC, UA 80 (H) 0 - 5 /hpf    WBC Clumps, UA Occasional (A) None-Rare    Bacteria Few (A) None-Occ /hpf    Hyaline Casts, UA 0 0-1/lpf /lpf    Microscopic Comment SEE COMMENT    Comprehensive Metabolic Panel    Collection Time: 12/10/21  6:35 AM   Result Value Ref Range    Sodium 146 (H) 136 - 145 mmol/L    Potassium 3.7 3.5 - 5.1 mmol/L    Chloride 115 (H) 95 - 110 mmol/L    CO2 22 (L) 23 - 29 mmol/L    Glucose 101 70 - 110 mg/dL    BUN 45 (H) 8 - 23 mg/dL    Creatinine 1.6 (H) 0.5 - 1.4 mg/dL    Calcium 9.0 8.7 - 10.5 mg/dL    Total Protein 5.5 (L) 6.0 - 8.4 g/dL    Albumin 2.4 (L) 3.5 - 5.2 g/dL    Total Bilirubin 0.5 0.1 - 1.0 mg/dL    Alkaline Phosphatase 79 55 - 135 U/L    AST 15 10 - 40 U/L    ALT 12 10 - 44 U/L    Anion Gap 9 8 - 16 mmol/L    eGFR if African American 47 (A) >60 mL/min/1.73 m^2    eGFR if non African American 40 (A) >60 mL/min/1.73 m^2   CBC Auto Differential    Collection Time: 12/10/21  6:35 AM   Result Value Ref Range    WBC 5.57 3.90 - 12.70 K/uL    RBC 4.23 (L) 4.60 - 6.20 M/uL    Hemoglobin 12.4 (L) 14.0 - 18.0 g/dL    Hematocrit 38.5 (L) 40.0 - 54.0 %    MCV 91 82 - 98 fL    MCH 29.3 27.0 - 31.0 pg    MCHC 32.2 32.0 - 36.0 g/dL    RDW 16.9 (H) 11.5 - 14.5 %    Platelets 214 150 - 450 K/uL    MPV 12.0 9.2 - 12.9 fL    Immature Granulocytes 1.3 (H) 0.0 - 0.5 %    Gran # (ANC) 3.4 1.8 - 7.7 K/uL    Immature Grans (Abs) 0.07 (H) 0.00 - 0.04 K/uL    Lymph # 0.8 (L) 1.0 - 4.8 K/uL    Mono # 1.0 0.3 - 1.0 K/uL    Eos # 0.3 0.0 - 0.5 K/uL    Baso # 0.03 0.00 - 0.20 K/uL    nRBC 0 0 /100 WBC    Gran % 61.6 38.0 - 73.0 %    Lymph % 14.7 (L) 18.0 - 48.0 %    Mono % 17.4 (H) 4.0 - 15.0 %    Eosinophil % 4.5 0.0 - 8.0 %    Basophil % 0.5 0.0 - 1.9 %    Differential Method Automated       No results found for: PHENYTOIN, PHENOBARB, VALPROATE, CBMZ        CONSTITUTIONAL  General Appearance: unremarkable, age appropriate    MUSCULOSKELETAL  Muscle Strength and  Tone:no tremor, no tic  Abnormal Involuntary Movements: No  Gait and Station: non-ataxic    PSYCHIATRIC   Level of Consciousness: awake and alert   Orientation: person, place and situation  Grooming: Casually dressed and Well groomed  Psychomotor Behavior: normal, cooperative  Speech: normal tone, normal rate, normal pitch, normal volume  Language: grossly intact  Mood: fine  Affect: Consistent with mood  Thought Process: linear, logical  Associations: intact   Thought Content: DENIES suicidal ideation, DENIES homicidal ideation and no delusions  Perceptions: denies hallucinations  Memory: Able to recall past events, Remote intact and Recent intact  Attention:Attends to interview without distraction  Fund of Knowledge: Aware of current events and Vocabulary appropriate   Estimate if Intelligence:  Average based on work/education history, vocabulary and mental status exam  Insight: has awareness of illness  Judgment: behavior is adequate to circumstances      PSYCHOSOCIAL    PSYCHOSOCIAL STRESSORS   health    FUNCTIONING RELATIONSHIPS   good support system    STRENGTHS AND LIABILITIES   Strength: Patient accepts guidance/feedback, Strength: Patient is expressive/articulate., Liability: Patient is dependent., Liability: Patient has poor health.    Is the patient aware of the biomedical complications associated with substance abuse and mental illness? yes    Does the patient have an Advance Directive for Mental Health treatment? no  (If yes, inform patient to bring copy.)        MEDICAL DECISION MAKING    Impression:    Patient is 80 yo male currently admitted for SBO. Upon interview he is calm, cooperative and pleasant. AOx3. He has no acute psychiatric complaints at this time. No objective signs of psychosis, marquez, or delirium on MSE. He exhibited no behavioral disturbances. He is agreeable to NH placement.    Plan:    - seek NH placement per primary team  - no acute psychiatric intervention needed at this  time      Gm Ames III, MD  Psychiatry

## 2021-12-10 NOTE — NURSING
Pt c/o nausea. Pt vomiting dark brown emesis. Call placed to Dr. Hein for orders, Awaiting call back. IV phenergan given.

## 2021-12-10 NOTE — PLAN OF CARE
12/10/21 1521   Post-Acute Status   Post-Acute Authorization Placement   Post-Acute Placement Status Patient List Provided   Discharge Delays None known at this time       Patient awaiting nursing home placement per son's request. Referral previously sent to [facility] and [facility] as family's preferred nursing homes. [facility] declined patient as they state that they have no beds available at this time. The referral to [facility] is still pending. SW did message the facility to inquire about acceptance.     SW contacted patient's son to inquire about interest in other facilities in the area. Patient's son requesting a list of local nursing homes. SW utilized [employer] to generate a list of nursing home's in the area. Son not available at this time to receive list. SW left list in the patient's room for when son returns.     Still awaiting 142 for nursing home placement. Psych Consult entered yesterday. Once completed, SW to send to OB for Level II Screen.     SW to remain available.

## 2021-12-10 NOTE — ASSESSMENT & PLAN NOTE
Baseline creat 1.3-1.4- on admission 3.6. 1L NS given in ER. Repeat 1L Ns bolus 12/7 and then cont NS at 150ml/hr. Repeat lactate better 1.9  Creat now 3.0  Son reports that he has no issues urinating at home  Will check renal U/s and post void residual  Has had urinary re tension in past. Consider salcedo if retaining due ti UTI    Repeat u.a today cont rocephin     12/10 creat improved - 3.6>1.6; renal US stable

## 2021-12-10 NOTE — PROGRESS NOTES
[facility] ([address] [place] [address] Medicine  Progress Note    Patient Name: Domonique Hernandez Jr.  MRN: [id number]  Patient Class: IP- Inpatient   Admission Date: 12/7/2021  Length of Stay: 3 days  Attending Physician: Emiliano Cam MD  Primary Care Provider: Emiliano Cam MD        Subjective:     Principal Problem:SBO (small bowel obstruction)        HPI:  82yo male patient with hx of anxiety, arthritis, back pain, bronchitis/COPD, depression, GERD, HLD, hypothyroid, PVD, sleep apnea on CPAP, and CKD. Pt came to ER with c/o regurgitation for 3 days. Decrease intake. No fever. No UTI s/s. CT on admission shows SBO. Significantly dehydrated. Creat 3.6-- baseline 1.4. Given 1L NS in ER U/a dirty one dose of zosyn given. No fever. No elevated WBC. Lactate 2.3. He had NGT place and put to suction. He is in bed this am. Reports feeling better. General surgery consulted.       Overview/Hospital Course:  12/8 Pt was admitted yesterday with SBO. He had general surgery consulted. NGT placed to suction. Still no flatus or BM. KUB pending for this am.     Also on rocephin for UTI-culture in process. Was given 1L NS in ER and repeated on floor then started NS at 150ml/hr. Creat on admit 3.6> now 3.0-- baseline creat 1.4. lactate did impropve on repeat.  Renal US shows cysts.  No hydronephrosis.     12/9 pt here with SBO. NGT to suction had 1000ml overnight. No flatus. No BM. +BS  Renal function improved 3.6>>2.1 on NS at 150ml/hr. Rocephin for UTI- urine culture shows multiple organisms. NO fever. NO elevated WBC    12/10/21    80YO WM admitted with SBO 4 days ago, NGT pulled yesterday, tolerating liquids,   Burping, t  KUB this am- NG tube removed, otherwise no significant change when compared with the prior study of December 9, 2021.     Creat much improved 3.6>1.6 ,  IV Rocephin day 4 for UTI, initial urine Cx- multiple organisms, repaeat U/A dirty, culture in process   7 months ago urine CX+ MRSA- sensitve to  bactrim, tetracycline and Vanc ; will add oral doxy   WBC normal, afebrile   Per PT yesterday; Gait: Contact Gaurd Assistance x ~30 feet (steeping forward and back due to limited line of medical equipment - NG tube) using RW.      Past Medical History:   Diagnosis Date    Anxiety     Arthritis     Back pain     Chronic bronchitis     Chronic gout     COPD (chronic obstructive pulmonary disease)     Depression     Emphysema of lung     Generalized headaches     GERD (gastroesophageal reflux disease)     Hyperlipidemia     Hypertension     Hypothyroidism     Obesity     Peripheral vascular disease     Sleep apnea     On CPAP    Stage 3 chronic kidney disease 4/18/2018    Thyroid disease        Past Surgical History:   Procedure Laterality Date    Bilateral mastoidectomies Bilateral 1984    for ear infection    COLONOSCOPY  2010    COLONOSCOPY N/A 1/31/2019    Procedure: COLONOSCOPY;  Surgeon: Joaquín Crawford MD;  Location: [facility];  Service: Colon and Rectal;  Laterality: N/A;    COLONOSCOPY N/A 8/13/2019    Procedure: COLONOSCOPY;  Surgeon: Joaquín Crawford MD;  Location: [facility] (4TH FLR);  Service: Colon and Rectal;  Laterality: N/A;  Patient had inadequate prep with his last colonoscopy, was unable to finish the large volume GoLYTELY prep.  Please give him a smaller volume split dose prep, such as Movi-Prep or SuPrep.  He should do a light diet of easily digested food 2 days prior to the procedure     CYSTOSCOPIC LITHOLAPAXY N/A 3/24/2021    Procedure: CYSTOLITHOLAPAXY;  Surgeon: Avtar Latham II, MD;  Location: [facility];  Service: Urology;  Laterality: N/A;    CYSTOURETEROSCOPY WITH RETROGRADE PYELOGRAPHY AND INSERTION OF STENT INTO URETER Bilateral 5/31/2021    Procedure: CYSTOURETEROSCOPY, WITH RETROGRADE PYELOGRAM AND URETERAL STENT INSERTION;  Surgeon: Avtar Latham II, MD;  Location: [facility];  Service: Urology;  Laterality: Bilateral;    Eardrum repair  1984    Not sure which  side    ESOPHAGOGASTRODUODENOSCOPY W/ PEG N/A 2/1/2021    Procedure: EGD, WITH PEG TUBE INSERTION;  Surgeon: Yousuf Villa MD;  Location: [facility];  Service: Endoscopy;  Laterality: N/A;    HERNIA REPAIR  1984    Umbilical-screen    KNEE ARTHROSCOPY Left 1989    LASER LITHOTRIPSY Bilateral 5/31/2021    Procedure: LITHOTRIPSY, USING LASER;  Surgeon: Avtar Latham II, MD;  Location: [facility];  Service: Urology;  Laterality: Bilateral;    RETROGRADE PYELOGRAPHY Right 3/24/2021    Procedure: PYELOGRAM, RETROGRADE;  Surgeon: Avtar Latham II, MD;  Location: [facility];  Service: Urology;  Laterality: Right;    ROTATOR CUFF REPAIR Left 2002    Left    TOTAL KNEE ARTHROPLASTY Left 12/21/2020    Procedure: ARTHROPLASTY, KNEE, TOTAL;  Surgeon: Shin Chang MD;  Location: [facility];  Service: Orthopedics;  Laterality: Left;    TRANSURETHRAL SURGICAL REMOVAL OF PROSTATE (TURP) USING GREEN LIGHT LASER N/A 5/31/2021    Procedure: TURP, USING GREEN LIGHT LASER;  Surgeon: Avtar Latham II, MD;  Location: [facility];  Service: Urology;  Laterality: N/A;    URETEROSCOPIC REMOVAL OF URETERIC CALCULUS Right 3/24/2021    Procedure: REMOVAL, CALCULUS, URETER, URETEROSCOPIC;  Surgeon: Avtar Latham II, MD;  Location: [facility];  Service: Urology;  Laterality: Right;    URETEROSCOPIC REMOVAL OF URETERIC CALCULUS Left 5/31/2021    Procedure: REMOVAL, CALCULUS, URETER, URETEROSCOPIC;  Surgeon: Avtar Latham II, MD;  Location: [facility];  Service: Urology;  Laterality: Left;       Review of patient's allergies indicates:   Allergen Reactions    Percocet [oxycodone-acetaminophen] Other (See Comments)     hyperactivity    Percodan [oxycodone hcl-oxycodone-asa] Other (See Comments)     hyperactivity       No current facility-administered medications on file prior to encounter.     Current Outpatient Medications on File Prior to Encounter   Medication Sig    albuterol sulfate 2.5 mg/0.5 mL Nebu Take 2.5 mg  (one vial)  by  nebulization every 6 (six) hours as needed (wheezing). Rescue    allopurinoL (ZYLOPRIM) 300 MG tablet Take 300 mg by mouth once daily.    aspirin 81 MG Chew Take 81 mg by mouth once daily.    budesonide 1 mg/2 mL NbSp Inhale 1 mg into the lungs once daily. Controller    levothyroxine (SYNTHROID) 100 MCG tablet TAKE 1 TABLET BY MOUTH ONCE DAILY (Patient taking differently: TAKE 1 TABLET BY MOUTH ONCE DAILY)    memantine (NAMENDA) 10 MG Tab Take 1 tablet (10 mg total) by mouth 2 (two) times daily.    metoprolol succinate (TOPROL-XL) 50 MG 24 hr tablet Take 1 tablet orally 2 times a day.    multivitamin (THERAGRAN) per tablet Take 1 tablet by mouth once daily. Centrum Silver for Men    omega 3-dha-epa-fish oil 1,000 mg (120 mg-180 mg) Cap Take 2 capsules orally once a day.    simvastatin (ZOCOR) 40 MG tablet Take 1 tablet (40 mg total) by mouth every evening.    tiotropium-olodateroL (STIOLTO RESPIMAT) 2.5-2.5 mcg/actuation Mist Inhale 2 puffs into the lungs once daily.    apixaban (ELIQUIS) 5 mg Tab Take one tablet by mouth twice a day    nitroGLYCERIN (NITROSTAT) 0.4 MG SL tablet Take 1 tablet (sublingual) under tongue as needed for chest pain, no more than 3 tablets in 15 mins, 5 mins apart.    [DISCONTINUED] amiodarone (PACERONE) 200 MG Tab Take 2 tablets by mouth once daily for 2 weeks then take one tablet by mouth once daily thereafter    [DISCONTINUED] amLODIPine (NORVASC) 5 MG tablet Take 1 tablet (5 mg total) by mouth once daily.    [DISCONTINUED] valsartan (DIOVAN) 80 MG tablet Take 1 tablet (80 mg total) by mouth once daily.     Family History     Problem Relation (Age of Onset)    Atrial fibrillation Son    Cancer Mother (65), Father    Kidney disease Brother    No Known Problems Sister    Stroke Son, Son        Tobacco Use    Smoking status: Never Smoker    Smokeless tobacco: Never Used   Substance and Sexual Activity    Alcohol use: No    Drug use: No    Sexual activity: Not Currently      Partners: Female     Review of Systems   Constitutional: Positive for fatigue. Negative for activity change, fever and unexpected weight change.   HENT: Negative for congestion and sore throat.    Eyes: Negative for visual disturbance.   Respiratory: Negative for cough and shortness of breath.    Cardiovascular: Negative for chest pain.   Gastrointestinal: Negative for blood in stool, constipation, diarrhea and nausea. Abdominal pain: improving.   Genitourinary: Negative for dysuria and hematuria.   Neurological: Negative for dizziness and light-headedness.     Objective:     Vital Signs (Most Recent):  Temp: 97.6 °F (36.4 °C) (12/10/21 0747)  Pulse: 91 (12/10/21 0747)  Resp: 20 (12/10/21 0418)  BP: (!) 149/70 (12/10/21 0747)  SpO2: 95 % (12/10/21 0747) Vital Signs (24h Range):  Temp:  [97.5 °F (36.4 °C)-97.9 °F (36.6 °C)] 97.6 °F (36.4 °C)  Pulse:  [] 91  Resp:  [17-20] 20  SpO2:  [94 %-98 %] 95 %  BP: ()/(42-94) 149/70     Weight: 100 kg (220 lb 7.4 oz)  Body mass index is 33.52 kg/m².    Physical Exam  Vitals and nursing note reviewed.   Constitutional:       General: He is not in acute distress.     Appearance: He is well-developed.      Comments: Lying in hospital bed with NGT in place   HENT:      Head: Normocephalic and atraumatic.      Right Ear: External ear normal.      Left Ear: External ear normal.   Eyes:      General:         Right eye: No discharge.         Left eye: No discharge.      Extraocular Movements: EOM normal.      Conjunctiva/sclera: Conjunctivae normal.   Neck:      Thyroid: No thyromegaly.   Cardiovascular:      Rate and Rhythm: Normal rate and regular rhythm.      Heart sounds: Normal heart sounds. No murmur heard.      Pulmonary:      Effort: Pulmonary effort is normal. No respiratory distress.      Breath sounds: Normal breath sounds.   Abdominal:      General: There is distension.      Palpations: Abdomen is soft.      Tenderness: There is no abdominal tenderness.       Comments: hypoactive BS.   Musculoskeletal:      Cervical back: Neck supple.      Right lower leg: No edema.      Left lower leg: No edema.   Skin:     General: Skin is warm and dry.   Neurological:      General: No focal deficit present.      Mental Status: He is alert. Mental status is at baseline.   Psychiatric:         Mood and Affect: Mood normal.         Behavior: Behavior normal.           CRANIAL NERVES     CN III, IV, VI   Extraocular motions are normal.        Significant Labs:   All pertinent labs within the past 24 hours have been reviewed.  CBC:   Recent Labs   Lab 12/09/21  0619 12/10/21  0635   WBC 5.38 5.57   HGB 12.7* 12.4*   HCT 40.1 38.5*    214     CMP:   Recent Labs   Lab 12/09/21  0619 12/10/21  0635    146*   K 4.2 3.7    115*   CO2 24 22*   GLU 85 101   BUN 69* 45*   CREATININE 2.1* 1.6*   CALCIUM 9.1 9.0   PROT 5.6* 5.5*   ALBUMIN 2.5* 2.4*   BILITOT 0.5 0.5   ALKPHOS 74 79   AST 15 15   ALT 13 12   ANIONGAP 11 9   EGFRNONAA 29* 40*   lactic acid 2.7>1.9  Lipase 27  urine culture multiple organisms   covid screen negative   Significant Imaging:     US renal Normal size bilateral kidneys each containing a couple of benign anechoic cyst identified the midpole and upper pole regions largest single index cyst about the lateral cortex of the right kidney measuring 2.3 x 2.1 x 2.2 cm in size.    KUB 12/9 Enteric tube terminates in the gastric body.  The degree of distention of the small bowel loops is decreased as compared to prior examinations suggesting a resolving/revolved small-bowel obstruction.  No free air is identified.  Age-appropriate degenerative changes affect the skeleton.    CT abd and pelvis Imaging findings in keeping with a high-grade small bowel obstruction with a point of transition seen in the right mid abdomen, likely related to adhesions.  There is significant inflammatory changes about the dilated small bowel loops in the mid abdomen likely related to  engorgement of the Vasa recta.  Additionally, there is a small amount of free fluid about the liver and extending into the pelvic cul-de-sac, with slightly high density of the fluid in the pelvic cul-de-sac suggesting hyperemia issues content fluid.    KUB The enteric tube has been appropriately retracted and now terminates in the region of the distal gastric body.Slightly prominent loops of small bowel seen in the upper abdomen.  No definite free peritoneal air is seen.     Lung parenchyma cannot be adequately evaluated secondary to overexposure of the central aspect of the lungs.Consider dedicated radiograph of the chest evaluate for parenchymal injury.    CXR Enteric tube terminates in the gastric body.  There is bibasilar subsegmental atelectasis, with right basilar scarring.  No focal airspace consolidation or pleural effusions.  No pneumothorax.  The heart is enlarged.  Calcified atheromatous disease affects the aorta.  Median sternotomy wires are midline.  Age-appropriate degenerative changes affect the skeleton.      Assessment/Plan:      * SBO (small bowel obstruction)  NPO  NGT to wall suction  KUB daily  CXR nothing acute  IVF started NS at 150ml/hr    He meets SIRS criteria but I think better explained by SBO presentation that infection.  UA > 100 WBC but he has no urinary tract symptoms. He has recurrent UTI and will start Rocephin now but my clinical impression is the abnormal vitals and elevated lactic acid are due to SBO, dehydration and not UTI sepsis    12/10 tolerating orals, advance diet .    Paroxysmal atrial fibrillation  Noted in Dr Fair notes:    On eliquis and BB at home. NPO here. Will give lovenox- renally dose and labetalol IV for now   12/10 tolerating liquids, advance diet and resume oral BB, stop IV labetolol,   Resume eliquis;    Acute renal failure  Baseline creat 1.3-1.4- on admission 3.6. 1L NS given in ER. Repeat 1L Ns bolus 12/7 and then cont NS at 150ml/hr. Repeat lactate better  1.9  Creat now 3.0  Son reports that he has no issues urinating at home  Will check renal U/s and post void residual  Has had urinary re tension in past. Consider salcedo if retaining due ti UTI    Repeat u.a today cont rocephin     12/10 creat improved - 3.6>1.6; renal US stable     Asymptomatic bacteriuria  He has no UTI sx on interview day of admit  Culture urine  Start rocephin empirically as h/o of UTI and not a great historian but as noted above I think vitals/lactic acid precipitated by SBO and not UTI sepsis    Cont rocephin and repeat u.a   Pt is s/p lithotripsy, stent removal, and cystoureteroscopy with retrograde pyelogram w/ stent insertion on 5/31/21, tx with Bactrim for MRSA UTI  Will add oral doxy  To cover for staph MRSA UTI     SIRS (systemic inflammatory response syndrome)  He meets SIRS criteria but I think better explained by SBO presentation than infection.  UA > 100 WBC but he has no urinary tract symptoms. He has recurrent UTI and will start Rocephin now but my clinical impression is the abnormal vitals and elevated lactic acid are due to SBO, dehydration and not UTI sepsis.      Hypothyroidism  Hold synthroid while NPO  If remains NPO > 3 days can start IV  Resume oral thyroid meds .      GERD (gastroesophageal reflux disease)  protonix IV  tolerating oral - change to po .    Hyperlipidemia  Holding zocor while NPO  10/10 resume - zocor non formulary- atorvastatin while here .        VTE Risk Mitigation (From admission, onward)         Ordered     apixaban tablet 5 mg  2 times daily         12/10/21 1016     IP VTE HIGH RISK PATIENT  Once         12/07/21 0620     Place sequential compression device  Until discontinued         12/07/21 0620                Discharge Planning   STEPHANIE:      Code Status: Full Code   Is the patient medically ready for discharge?:     Reason for patient still in hospital (select all that apply): Treatment  Discharge Plan A: Skilled Nursing Facility   Discharge Delays:  None known at this time              Gemma Rubio MD  Department of Hospital Medicine   [place] - [facility] (3rd Fl)

## 2021-12-10 NOTE — PROGRESS NOTES
[facility] ([facility])  General Surgery  Progress Note    Subjective:     Interval History:   Patient denied abdominal pain.  Patient denying nausea or vomiting.  No issues reported per nursing staff.  NG tube DCD yesterday and patient on liquids.  Patient reports bowel movement and flatus but no documented bowel movement by nursing staff    Post-Op Info:  * No surgery found *          Medications:  Continuous Infusions:   sodium chloride 0.9% 150 mL/hr at 12/10/21 0746     Scheduled Meds:   apixaban  5 mg Oral BID    atorvastatin  10 mg Oral QHS    doxycycline  100 mg Oral Q12H    levothyroxine  100 mcg Oral Before breakfast    memantine  10 mg Oral BID    metoprolol succinate  50 mg Oral Daily    mupirocin   Nasal BID    [START ON 12/11/2021] pantoprazole  40 mg Oral Daily     PRN Meds:melatonin, sodium chloride 0.9%     Objective:     Vital Signs (Most Recent):  Temp: 97.6 °F (36.4 °C) (12/10/21 0747)  Pulse: 88 (12/10/21 1000)  Resp: 19 (12/10/21 0747)  BP: (!) 149/70 (12/10/21 0747)  SpO2: 95 % (12/10/21 0747) Vital Signs (24h Range):  Temp:  [97.5 °F (36.4 °C)-97.9 °F (36.6 °C)] 97.6 °F (36.4 °C)  Pulse:  [] 88  Resp:  [17-20] 19  SpO2:  [94 %-98 %] 95 %  BP: ()/(42-94) 149/70       Intake/Output Summary (Last 24 hours) at 12/10/2021 1139  Last data filed at 12/10/2021 0700  Gross per 24 hour   Intake 9616.86 ml   Output 525 ml   Net 9091.86 ml       Physical Exam  Patient resting comfortably in bed.  Abdomen moderately distended but no tenderness  Significant Labs:  All pertinent labs from the last 24 hours have been reviewed.    Significant Diagnostics:  I have reviewed all pertinent imaging results/findings within the past 24 hours.    Assessment/Plan:     Active Diagnoses:    Diagnosis Date Noted POA    PRINCIPAL PROBLEM:  SBO (small bowel obstruction) [K56.609]  Yes    SIRS (systemic inflammatory response syndrome) [R65.10] 12/07/2021 Yes    Asymptomatic bacteriuria [R82.71]  12/07/2021 Yes    Acute renal failure [N17.9] 12/07/2021 Yes    Paroxysmal atrial fibrillation [I48.0] 12/07/2021 Yes    Hypothyroidism [E03.9] 10/30/2012 Yes    Hyperlipidemia [E78.5]  Yes    GERD (gastroesophageal reflux disease) [K21.9]  Yes      Problems Resolved During this Admission:     Tolerating liquids.  Advanced diet as tolerated    Joaquín Hein MD  General Surgery  [place] - [facility] (3rd Fl)

## 2021-12-10 NOTE — NURSING
Hand off report received from ALEXX Stephen.    DISPLAY PLAN FREE TEXT DISPLAY PLAN FREE TEXT DISPLAY PLAN FREE TEXT DISPLAY PLAN FREE TEXT DISPLAY PLAN FREE TEXT DISPLAY PLAN FREE TEXT DISPLAY PLAN FREE TEXT DISPLAY PLAN FREE TEXT DISPLAY PLAN FREE TEXT

## 2021-12-10 NOTE — PT/OT/SLP PROGRESS
Physical Therapy Treatment    Patient Name:  Domonique Hernandez Jr.   MRN:  [id number]    Recommendations:     Discharge Recommendations:  nursing facility, basic,other (see comments) (or home with 24 hour supervision due to cognition)   Discharge Equipment Recommendations: none   Barriers to discharge: Decreased caregiver support    Assessment:     Domonique Hernandez Jr. is a 81 y.o. male admitted with a medical diagnosis of SBO (small bowel obstruction).  He presents with the following impairments/functional limitations:  weakness,impaired endurance,impaired functional mobilty,impaired self care skills,gait instability,impaired cognition,decreased safety awareness,pain. Patient tolerated PT session today. Noted gets tired at the end distance of gait trng. Maintained vital signs during PT session.     Rehab Prognosis: Fair; patient would benefit from acute skilled PT services to address these deficits and reach maximum level of function.    Recent Surgery: * No surgery found *      Plan:     During this hospitalization, patient to be seen daily to address the identified rehab impairments via gait training,therapeutic activities,therapeutic exercises and progress toward the following goals:    · Plan of Care Expires:  12/16/21    Subjective     Chief Complaint: abdominal pain   Patient/Family Comments/goals: none stated  Pain/Comfort:  · Pain Rating 1: 6/10  · Location - Orientation 1: generalized  · Location 1: abdomen  · Pain Addressed 1: Reposition,Cessation of Activity  · Pain Rating Post-Intervention 1: 3/10      Objective:     Communicated with patient  prior to session.  Patient found HOB elevated with telemetry,peripheral IV upon PT entry to room.     General Precautions: Standard, fall   Orthopedic Precautions:N/A   Braces: N/A  Respiratory Status:  · O2 Device (Oxygen Therapy): room air     Functional Mobility:  · Bed Mobility:     · Rolling Left:  supervision  · Rolling Right: supervision  · Scooting: stand by  assistance  · Supine to Sit: stand by assistance  · Sit to Supine: stand by assistance  · Transfers:     · Sit to Stand:  contact guard assistance with rolling walker  · Gait: Contact Guard Assistance x ~60 feet mojgan RW. Fatigue easily       AM-PAC 6 CLICK MOBILITY  Turning over in bed (including adjusting bedclothes, sheets and blankets)?: 3  Sitting down on and standing up from a chair with arms (e.g., wheelchair, bedside commode, etc.): 3  Moving from lying on back to sitting on the side of the bed?: 3  Moving to and from a bed to a chair (including a wheelchair)?: 3  Need to walk in hospital room?: 3  Climbing 3-5 steps with a railing?: 3  Basic Mobility Total Score: 18       Therapeutic Activities and Exercises:   Worked on safety measuress on out of bed activity, standing actiivty and gait trng with RW    Patient left HOB elevated with all lines intact, call button in reach, bed alarm on and nursing notified..    GOALS:   Multidisciplinary Problems     Physical Therapy Goals        Problem: Physical Therapy Goal    Goal Priority Disciplines Outcome Goal Variances Interventions   Physical Therapy Goal     PT, PT/OT Ongoing, Progressing     Description: Goals to be met by: 21    Patient will increase functional independence with mobility by performin. Supine to sit with Modified Independent  2. Sit to supine with Modified Independent  3. Bed to chair transfer with Supervision or Set-up Assistancewith or without rolling walker using Step Transfer TECHNIQUE  4. Gait  x ~100  feet with Supervision or Set-up Assistance with or without rolling walker  5. Lower extremity exercise program x10 reps per handout, with assistance as needed                      Time Tracking:     PT Received On: 12/10/21  PT Start Time: 820     PT Stop Time: 845  PT Total Time (min): 25 min     Billable Minutes: Gait Training 13 mins  and Therapeutic Activity 12 mins    Treatment Type: Treatment  PT/PTA: PT            12/10/2021

## 2021-12-10 NOTE — ASSESSMENT & PLAN NOTE
He meets SIRS criteria but I think better explained by SBO presentation than infection.  UA > 100 WBC but he has no urinary tract symptoms. He has recurrent UTI and will start Rocephin now but my clinical impression is the abnormal vitals and elevated lactic acid are due to SBO, dehydration and not UTI sepsis.

## 2021-12-11 ENCOUNTER — ANESTHESIA (OUTPATIENT)
Dept: SURGERY | Facility: HOSPITAL | Age: 81
DRG: 336 | End: 2021-12-11
Payer: MEDICARE

## 2021-12-11 ENCOUNTER — ANESTHESIA EVENT (OUTPATIENT)
Dept: SURGERY | Facility: HOSPITAL | Age: 81
DRG: 336 | End: 2021-12-11
Payer: MEDICARE

## 2021-12-11 LAB
ALBUMIN SERPL BCP-MCNC: 2.4 G/DL (ref 3.5–5.2)
ALP SERPL-CCNC: 82 U/L (ref 55–135)
ALT SERPL W/O P-5'-P-CCNC: 19 U/L (ref 10–44)
ANION GAP SERPL CALC-SCNC: 9 MMOL/L (ref 8–16)
AST SERPL-CCNC: 19 U/L (ref 10–40)
BACTERIA UR CULT: ABNORMAL
BASOPHILS # BLD AUTO: 0.04 K/UL (ref 0–0.2)
BASOPHILS # BLD AUTO: 0.06 K/UL (ref 0–0.2)
BASOPHILS NFR BLD: 0.5 % (ref 0–1.9)
BASOPHILS NFR BLD: 0.6 % (ref 0–1.9)
BILIRUB SERPL-MCNC: 0.6 MG/DL (ref 0.1–1)
BUN SERPL-MCNC: 35 MG/DL (ref 8–23)
CALCIUM SERPL-MCNC: 9.1 MG/DL (ref 8.7–10.5)
CHLORIDE SERPL-SCNC: 117 MMOL/L (ref 95–110)
CO2 SERPL-SCNC: 17 MMOL/L (ref 23–29)
CREAT SERPL-MCNC: 1.4 MG/DL (ref 0.5–1.4)
DIFFERENTIAL METHOD: ABNORMAL
DIFFERENTIAL METHOD: ABNORMAL
EOSINOPHIL # BLD AUTO: 0.4 K/UL (ref 0–0.5)
EOSINOPHIL # BLD AUTO: 0.4 K/UL (ref 0–0.5)
EOSINOPHIL NFR BLD: 3.8 % (ref 0–8)
EOSINOPHIL NFR BLD: 5.3 % (ref 0–8)
ERYTHROCYTE [DISTWIDTH] IN BLOOD BY AUTOMATED COUNT: 16.6 % (ref 11.5–14.5)
ERYTHROCYTE [DISTWIDTH] IN BLOOD BY AUTOMATED COUNT: 17 % (ref 11.5–14.5)
EST. GFR  (AFRICAN AMERICAN): 55 ML/MIN/1.73 M^2
EST. GFR  (NON AFRICAN AMERICAN): 47 ML/MIN/1.73 M^2
GLUCOSE SERPL-MCNC: 93 MG/DL (ref 70–110)
HCT VFR BLD AUTO: 40.8 % (ref 40–54)
HCT VFR BLD AUTO: 45.5 % (ref 40–54)
HGB BLD-MCNC: 13 G/DL (ref 14–18)
HGB BLD-MCNC: 14 G/DL (ref 14–18)
IMM GRANULOCYTES # BLD AUTO: 0.09 K/UL (ref 0–0.04)
IMM GRANULOCYTES # BLD AUTO: 0.16 K/UL (ref 0–0.04)
IMM GRANULOCYTES NFR BLD AUTO: 1.2 % (ref 0–0.5)
IMM GRANULOCYTES NFR BLD AUTO: 1.5 % (ref 0–0.5)
LYMPHOCYTES # BLD AUTO: 0.8 K/UL (ref 1–4.8)
LYMPHOCYTES # BLD AUTO: 1.2 K/UL (ref 1–4.8)
LYMPHOCYTES NFR BLD: 10.9 % (ref 18–48)
LYMPHOCYTES NFR BLD: 11.8 % (ref 18–48)
MCH RBC QN AUTO: 29.3 PG (ref 27–31)
MCH RBC QN AUTO: 29.7 PG (ref 27–31)
MCHC RBC AUTO-ENTMCNC: 30.8 G/DL (ref 32–36)
MCHC RBC AUTO-ENTMCNC: 31.9 G/DL (ref 32–36)
MCV RBC AUTO: 93 FL (ref 82–98)
MCV RBC AUTO: 95 FL (ref 82–98)
MONOCYTES # BLD AUTO: 1.3 K/UL (ref 0.3–1)
MONOCYTES # BLD AUTO: 1.4 K/UL (ref 0.3–1)
MONOCYTES NFR BLD: 12.9 % (ref 4–15)
MONOCYTES NFR BLD: 16.7 % (ref 4–15)
NEUTROPHILS # BLD AUTO: 4.9 K/UL (ref 1.8–7.7)
NEUTROPHILS # BLD AUTO: 7.3 K/UL (ref 1.8–7.7)
NEUTROPHILS NFR BLD: 65.4 % (ref 38–73)
NEUTROPHILS NFR BLD: 69.4 % (ref 38–73)
NRBC BLD-RTO: 0 /100 WBC
NRBC BLD-RTO: 0 /100 WBC
PLATELET # BLD AUTO: 221 K/UL (ref 150–450)
PLATELET # BLD AUTO: 242 K/UL (ref 150–450)
PMV BLD AUTO: 11.3 FL (ref 9.2–12.9)
PMV BLD AUTO: 11.6 FL (ref 9.2–12.9)
POTASSIUM SERPL-SCNC: 3.9 MMOL/L (ref 3.5–5.1)
PROT SERPL-MCNC: 5.4 G/DL (ref 6–8.4)
RBC # BLD AUTO: 4.37 M/UL (ref 4.6–6.2)
RBC # BLD AUTO: 4.78 M/UL (ref 4.6–6.2)
SODIUM SERPL-SCNC: 143 MMOL/L (ref 136–145)
WBC # BLD AUTO: 10.5 K/UL (ref 3.9–12.7)
WBC # BLD AUTO: 7.55 K/UL (ref 3.9–12.7)

## 2021-12-11 PROCEDURE — 63600175 PHARM REV CODE 636 W HCPCS: Performed by: INTERNAL MEDICINE

## 2021-12-11 PROCEDURE — 36415 COLL VENOUS BLD VENIPUNCTURE: CPT | Performed by: FAMILY MEDICINE

## 2021-12-11 PROCEDURE — 85025 COMPLETE CBC W/AUTO DIFF WBC: CPT | Performed by: NURSE PRACTITIONER

## 2021-12-11 PROCEDURE — 37000009 HC ANESTHESIA EA ADD 15 MINS: Performed by: SURGERY

## 2021-12-11 PROCEDURE — 99233 PR SUBSEQUENT HOSPITAL CARE,LEVL III: ICD-10-PCS | Mod: ,,, | Performed by: INTERNAL MEDICINE

## 2021-12-11 PROCEDURE — 85025 COMPLETE CBC W/AUTO DIFF WBC: CPT | Mod: 91 | Performed by: FAMILY MEDICINE

## 2021-12-11 PROCEDURE — 36000706: Performed by: SURGERY

## 2021-12-11 PROCEDURE — 36000707: Performed by: SURGERY

## 2021-12-11 PROCEDURE — 99231 SBSQ HOSP IP/OBS SF/LOW 25: CPT | Mod: 57,,, | Performed by: SURGERY

## 2021-12-11 PROCEDURE — 99900031 HC PATIENT EDUCATION (STAT)

## 2021-12-11 PROCEDURE — 11000001 HC ACUTE MED/SURG PRIVATE ROOM

## 2021-12-11 PROCEDURE — 44005 FREEING OF BOWEL ADHESION: CPT | Mod: ,,, | Performed by: SURGERY

## 2021-12-11 PROCEDURE — 25000003 PHARM REV CODE 250: Performed by: NURSE PRACTITIONER

## 2021-12-11 PROCEDURE — 63600175 PHARM REV CODE 636 W HCPCS: Performed by: SURGERY

## 2021-12-11 PROCEDURE — 44005 PR FREEING BOWEL ADHESION,ENTEROLYSIS: ICD-10-PCS | Mod: ,,, | Performed by: SURGERY

## 2021-12-11 PROCEDURE — 25000003 PHARM REV CODE 250: Performed by: INTERNAL MEDICINE

## 2021-12-11 PROCEDURE — 36415 COLL VENOUS BLD VENIPUNCTURE: CPT | Performed by: NURSE PRACTITIONER

## 2021-12-11 PROCEDURE — 25000003 PHARM REV CODE 250: Performed by: SURGERY

## 2021-12-11 PROCEDURE — 80053 COMPREHEN METABOLIC PANEL: CPT | Performed by: NURSE PRACTITIONER

## 2021-12-11 PROCEDURE — 99233 SBSQ HOSP IP/OBS HIGH 50: CPT | Mod: ,,, | Performed by: INTERNAL MEDICINE

## 2021-12-11 PROCEDURE — 94761 N-INVAS EAR/PLS OXIMETRY MLT: CPT

## 2021-12-11 PROCEDURE — 00790 ANES IPER UPR ABD NOS: CPT | Mod: QZ | Performed by: NURSE ANESTHETIST, CERTIFIED REGISTERED

## 2021-12-11 PROCEDURE — 99231 PR SUBSEQUENT HOSPITAL CARE,LEVL I: ICD-10-PCS | Mod: 57,,, | Performed by: SURGERY

## 2021-12-11 PROCEDURE — C9113 INJ PANTOPRAZOLE SODIUM, VIA: HCPCS | Performed by: INTERNAL MEDICINE

## 2021-12-11 PROCEDURE — 99900035 HC TECH TIME PER 15 MIN (STAT)

## 2021-12-11 PROCEDURE — 25000003 PHARM REV CODE 250: Performed by: NURSE ANESTHETIST, CERTIFIED REGISTERED

## 2021-12-11 PROCEDURE — 63600175 PHARM REV CODE 636 W HCPCS: Performed by: NURSE ANESTHETIST, CERTIFIED REGISTERED

## 2021-12-11 PROCEDURE — 71000033 HC RECOVERY, INTIAL HOUR: Performed by: SURGERY

## 2021-12-11 PROCEDURE — 37000008 HC ANESTHESIA 1ST 15 MINUTES: Performed by: SURGERY

## 2021-12-11 RX ORDER — ONDANSETRON HYDROCHLORIDE 2 MG/ML
INJECTION, SOLUTION INTRAMUSCULAR; INTRAVENOUS
Status: DISCONTINUED | OUTPATIENT
Start: 2021-12-11 | End: 2021-12-11

## 2021-12-11 RX ORDER — NEOSTIGMINE METHYLSULFATE 1 MG/ML
INJECTION, SOLUTION INTRAVENOUS
Status: DISCONTINUED | OUTPATIENT
Start: 2021-12-11 | End: 2021-12-11

## 2021-12-11 RX ORDER — PHENYLEPHRINE HYDROCHLORIDE 10 MG/ML
INJECTION INTRAVENOUS
Status: DISCONTINUED | OUTPATIENT
Start: 2021-12-11 | End: 2021-12-11

## 2021-12-11 RX ORDER — SUCCINYLCHOLINE CHLORIDE 20 MG/ML
INJECTION INTRAMUSCULAR; INTRAVENOUS
Status: DISCONTINUED | OUTPATIENT
Start: 2021-12-11 | End: 2021-12-11

## 2021-12-11 RX ORDER — HYDROMORPHONE HYDROCHLORIDE 1 MG/ML
0.5 INJECTION, SOLUTION INTRAMUSCULAR; INTRAVENOUS; SUBCUTANEOUS EVERY 4 HOURS PRN
Status: DISCONTINUED | OUTPATIENT
Start: 2021-12-11 | End: 2021-12-22

## 2021-12-11 RX ORDER — FENTANYL CITRATE 50 UG/ML
INJECTION, SOLUTION INTRAMUSCULAR; INTRAVENOUS
Status: DISCONTINUED | OUTPATIENT
Start: 2021-12-11 | End: 2021-12-11

## 2021-12-11 RX ORDER — PROPOFOL 10 MG/ML
VIAL (ML) INTRAVENOUS
Status: DISCONTINUED | OUTPATIENT
Start: 2021-12-11 | End: 2021-12-11

## 2021-12-11 RX ORDER — BUPIVACAINE HYDROCHLORIDE AND EPINEPHRINE 5; 5 MG/ML; UG/ML
INJECTION, SOLUTION EPIDURAL; INTRACAUDAL; PERINEURAL
Status: DISCONTINUED | OUTPATIENT
Start: 2021-12-11 | End: 2021-12-11 | Stop reason: HOSPADM

## 2021-12-11 RX ORDER — BUPIVACAINE HYDROCHLORIDE AND EPINEPHRINE 5; 5 MG/ML; UG/ML
INJECTION, SOLUTION EPIDURAL; INTRACAUDAL; PERINEURAL
Status: DISPENSED
Start: 2021-12-11 | End: 2021-12-11

## 2021-12-11 RX ORDER — LIDOCAINE HYDROCHLORIDE 20 MG/ML
INJECTION, SOLUTION EPIDURAL; INFILTRATION; INTRACAUDAL; PERINEURAL
Status: DISCONTINUED | OUTPATIENT
Start: 2021-12-11 | End: 2021-12-11

## 2021-12-11 RX ORDER — ETOMIDATE 2 MG/ML
INJECTION INTRAVENOUS
Status: DISCONTINUED | OUTPATIENT
Start: 2021-12-11 | End: 2021-12-11

## 2021-12-11 RX ORDER — ROCURONIUM BROMIDE 10 MG/ML
INJECTION, SOLUTION INTRAVENOUS
Status: DISCONTINUED | OUTPATIENT
Start: 2021-12-11 | End: 2021-12-11

## 2021-12-11 RX ADMIN — NEOSTIGMINE METHYLSULFATE 4 MG: 1 INJECTION, SOLUTION INTRAVENOUS at 11:12

## 2021-12-11 RX ADMIN — PROPOFOL 50 MG: 10 INJECTION, EMULSION INTRAVENOUS at 11:12

## 2021-12-11 RX ADMIN — SUCCINYLCHOLINE CHLORIDE 120 MG: 20 INJECTION, SOLUTION INTRAMUSCULAR; INTRAVENOUS at 11:12

## 2021-12-11 RX ADMIN — LABETALOL HYDROCHLORIDE 5 MG: 5 INJECTION, SOLUTION INTRAVENOUS at 12:12

## 2021-12-11 RX ADMIN — ROCURONIUM BROMIDE 25 MG: 10 INJECTION, SOLUTION INTRAVENOUS at 11:12

## 2021-12-11 RX ADMIN — MUPIROCIN: 20 OINTMENT TOPICAL at 08:12

## 2021-12-11 RX ADMIN — ENOXAPARIN SODIUM 100 MG: 100 INJECTION SUBCUTANEOUS at 09:12

## 2021-12-11 RX ADMIN — LABETALOL HYDROCHLORIDE 5 MG: 5 INJECTION, SOLUTION INTRAVENOUS at 05:12

## 2021-12-11 RX ADMIN — GLYCOPYRROLATE 0.4 MG: 0.2 INJECTION, SOLUTION INTRAMUSCULAR; INTRAVENOUS at 11:12

## 2021-12-11 RX ADMIN — SODIUM CHLORIDE: 0.9 INJECTION, SOLUTION INTRAVENOUS at 01:12

## 2021-12-11 RX ADMIN — PHENYLEPHRINE HYDROCHLORIDE 100 MCG: 10 INJECTION INTRAVENOUS at 11:12

## 2021-12-11 RX ADMIN — ETOMIDATE 12 MG: 2 INJECTION, SOLUTION INTRAVENOUS at 11:12

## 2021-12-11 RX ADMIN — HYDROMORPHONE HYDROCHLORIDE 0.5 MG: 1 INJECTION, SOLUTION INTRAMUSCULAR; INTRAVENOUS; SUBCUTANEOUS at 02:12

## 2021-12-11 RX ADMIN — MUPIROCIN: 20 OINTMENT TOPICAL at 09:12

## 2021-12-11 RX ADMIN — PANTOPRAZOLE SODIUM 40 MG: 40 INJECTION, POWDER, LYOPHILIZED, FOR SOLUTION INTRAVENOUS at 09:12

## 2021-12-11 RX ADMIN — DOXYCYCLINE 100 MG: 100 INJECTION, POWDER, LYOPHILIZED, FOR SOLUTION INTRAVENOUS at 06:12

## 2021-12-11 RX ADMIN — FENTANYL CITRATE 50 MCG: 50 INJECTION, SOLUTION INTRAMUSCULAR; INTRAVENOUS at 11:12

## 2021-12-11 RX ADMIN — LIDOCAINE HYDROCHLORIDE 50 MG: 20 INJECTION, SOLUTION EPIDURAL; INFILTRATION; INTRACAUDAL; PERINEURAL at 11:12

## 2021-12-11 RX ADMIN — ROCURONIUM BROMIDE 5 MG: 10 INJECTION, SOLUTION INTRAVENOUS at 11:12

## 2021-12-11 RX ADMIN — ONDANSETRON 4 MG: 2 INJECTION, SOLUTION INTRAMUSCULAR; INTRAVENOUS at 11:12

## 2021-12-11 NOTE — SUBJECTIVE & OBJECTIVE
Review of Systems   Constitutional: Positive for activity change and fatigue. Negative for fever and unexpected weight change.   HENT: Negative for congestion and sore throat.    Eyes: Negative for visual disturbance.   Respiratory: Negative for cough and shortness of breath.    Cardiovascular: Negative for chest pain.   Gastrointestinal: Positive for abdominal pain (improving), nausea and vomiting. Negative for blood in stool, constipation and diarrhea.   Genitourinary: Negative for dysuria and hematuria.   Neurological: Negative for dizziness and light-headedness.     Objective:     Vital Signs (Most Recent):  Temp: 97.4 °F (36.3 °C) (12/11/21 0839)  Pulse: 92 (12/11/21 0839)  Resp: 20 (12/11/21 0839)  BP: 133/73 (12/11/21 0839)  SpO2: 96 % (12/11/21 0839) Vital Signs (24h Range):  Temp:  [97.4 °F (36.3 °C)-99.4 °F (37.4 °C)] 97.4 °F (36.3 °C)  Pulse:  [] 92  Resp:  [16-24] 20  SpO2:  [94 %-99 %] 96 %  BP: (111-142)/(58-92) 133/73     Weight: 100 kg (220 lb 7.4 oz)  Body mass index is 33.52 kg/m².    Intake/Output Summary (Last 24 hours) at 12/11/2021 0955  Last data filed at 12/11/2021 0700  Gross per 24 hour   Intake --   Output 3900 ml   Net -3900 ml      Physical Exam  Vitals and nursing note reviewed.   Constitutional:       General: He is not in acute distress.     Appearance: He is well-developed.      Comments: Lying in hospital bed with NGT in place   HENT:      Head: Normocephalic and atraumatic.      Right Ear: External ear normal.      Left Ear: External ear normal.   Eyes:      General:         Right eye: No discharge.         Left eye: No discharge.      Conjunctiva/sclera: Conjunctivae normal.   Neck:      Thyroid: No thyromegaly.   Cardiovascular:      Rate and Rhythm: Normal rate and regular rhythm.      Heart sounds: Normal heart sounds. No murmur heard.      Pulmonary:      Effort: Pulmonary effort is normal. No respiratory distress.      Breath sounds: Normal breath sounds.    Abdominal:      General: There is distension.      Palpations: Abdomen is soft.      Tenderness: There is no abdominal tenderness.      Comments: S/p NG tube   Musculoskeletal:      Cervical back: Neck supple.      Right lower leg: No edema.      Left lower leg: No edema.   Skin:     General: Skin is warm and dry.   Neurological:      General: No focal deficit present.      Mental Status: He is alert. Mental status is at baseline.   Psychiatric:         Mood and Affect: Mood normal.         Behavior: Behavior normal.         Significant Labs:   All pertinent labs within the past 24 hours have been reviewed.  CBC:   Recent Labs   Lab 12/10/21  0635 12/11/21  0619   WBC 5.57 7.55   HGB 12.4* 13.0*   HCT 38.5* 40.8    221     CMP:   Recent Labs   Lab 12/10/21  0635 12/11/21 0619   * 143   K 3.7 3.9   * 117*   CO2 22* 17*    93   BUN 45* 35*   CREATININE 1.6* 1.4   CALCIUM 9.0 9.1   PROT 5.5* 5.4*   ALBUMIN 2.4* 2.4*   BILITOT 0.5 0.6   ALKPHOS 79 82   AST 15 19   ALT 12 19   ANIONGAP 9 9   EGFRNONAA 40* 47*     KUB pending

## 2021-12-11 NOTE — OP NOTE
[facility] Surg ([facility])  General Surgery  Operative Note    SUMMARY     Date of Procedure: 12/11/2021     Procedure: Procedure(s) (LRB):  LAPAROTOMY, EXPLORATORY (N/A)       Surgeon(s) and Role:     * Joaquín Hein MD - Primary    Assisting Surgeon: None    Pre-Operative Diagnosis: Small bowel obstruction [K56.609]    Post-Operative Diagnosis: Post-Op Diagnosis Codes:     * Small bowel obstruction [K56.609]    Anesthesia: * No anesthesia type entered *    Operative Findings (including complications, if any):  Patient had previous umbilical hernia repair with intra-abdominal [place] mesh.  Patient had adhesions of omentum to the posterior aspect of the mesh.  Patient had an adhesion of the epiploica off of the colon down to the root of the small bowel mesentery causing a complete small bowel obstruction.  There was a clear transition point.  After taking down the adhesions the obstruction was relieved.  The the bowel was viable with no concern for ischemia.    Description of Technical Procedures:  Patient brought up room present table supine position adequate anesthesia administered patient prepped and draped in the usual sterile fashion.  Midline incision was made dissections carried down to skin subcutaneous tissue to the fascia.  The mesh was encountered.  The mesh was then opened with the [place] scissors.  The abdominal cavity was entered and explored with the above findings.  The adhesions were taken down with sharp dissection and the bowel obstruction was relieved.  The bowel was run several feet proximal distal to the obstruction and no other abnormalities identified.  The bowel was placed back into the abdominal cavity and covered with the omentum.  The mesh was then closed with running Prolene suture.  The fascia was then closed over the mesh with a running PDS suture.  The skin was then closed with staples.  Patient however seizure transferred recovery stable condition.    Significant Surgical Tasks  Conducted by the Assistant(s), if Applicable:     Estimated Blood Loss (EBL): * No values recorded between 12/11/2021 11:22 AM and 12/11/2021 11:56 AM *           Implants: * No implants in log *    Specimens:   Specimen (24h ago, onward)            None                  Condition: Good    Disposition: PACU - hemodynamically stable.    Attestation: I was present and scrubbed for the entire procedure.

## 2021-12-11 NOTE — ASSESSMENT & PLAN NOTE
He has no UTI sx on interview day of admit  Culture urine  Start rocephin empirically as h/o of UTI and not a great historian but as noted above I think vitals/lactic acid precipitated by SBO and not UTI sepsis    Cont rocephin and repeat u.a   Pt is s/p lithotripsy, stent removal, and cystoureteroscopy with retrograde pyelogram w/ stent insertion on 5/31/21, tx with Bactrim for MRSA UTI  Will add oral doxy  To cover for staph MRSA UTI .    12/11  Doxy day 2

## 2021-12-11 NOTE — NURSING
Orders received from Dr. Heni to reinforce pts dressing. Dressing reinforced. VSS. Will continue to monitor.

## 2021-12-11 NOTE — NURSING
Blood noted to be seeping through patients gown around abdominal area. I lifted patients gown and noted blood pooling and leaking from under surgical dressing. No signs of acute distress noted.     Call placed to Dr. Hein to notify and house supervisor called to come to bedside to assess.

## 2021-12-11 NOTE — PROGRESS NOTES
[facility] Surg (3rd Fl)  General Surgery  Progress Note    Subjective:     Interval History:   Patient started with vomiting yesterday afternoon.  NG tube reinserted and significant amount of bilious drainage obtained.  NG has been to low intermittent suction all night.  No recorded bowel movement.  Patient denying abdominal pain.    Post-Op Info:  Procedure(s) (LRB):  COLECTOMY, LAPAROSCOPIC (N/A)   Day of Surgery      Medications:  Continuous Infusions:   sodium chloride 0.9% 150 mL/hr at 12/10/21 1448     Scheduled Meds:   BUPivacaine-EPINEPHrine (PF) 0.5%-1:200,000        doxycycline (VIBRAMYCIN) IVPB  100 mg Intravenous Daily    enoxaparin  1 mg/kg Subcutaneous Q12H    labetaloL  5 mg Intravenous Q6H    mupirocin   Nasal BID    pantoprazole  40 mg Intravenous Daily     PRN Meds:melatonin, ondansetron, promethazine (PHENERGAN) IVPB, sodium chloride 0.9%     Objective:     Vital Signs (Most Recent):  Temp: 97.4 °F (36.3 °C) (12/11/21 0839)  Pulse: 88 (12/11/21 1000)  Resp: 20 (12/11/21 0839)  BP: 133/73 (12/11/21 0839)  SpO2: 96 % (12/11/21 0839) Vital Signs (24h Range):  Temp:  [97.4 °F (36.3 °C)-99.4 °F (37.4 °C)] 97.4 °F (36.3 °C)  Pulse:  [] 88  Resp:  [16-24] 20  SpO2:  [94 %-99 %] 96 %  BP: (111-142)/(58-92) 133/73       Intake/Output Summary (Last 24 hours) at 12/11/2021 1058  Last data filed at 12/11/2021 0700  Gross per 24 hour   Intake --   Output 3900 ml   Net -3900 ml       Physical Exam  Sitting up in bed NG tube in place.  Abdomen moderately distended.  No pain.  Or tenderness  Significant Labs:  All pertinent labs from the last 24 hours have been reviewed.    Significant Diagnostics:  I have reviewed all pertinent imaging results/findings within the past 24 hours.    Assessment/Plan:     Active Diagnoses:    Diagnosis Date Noted POA    PRINCIPAL PROBLEM:  SBO (small bowel obstruction) [K56.609]  Yes    Evaluation by psychiatric service required [Z00.8]  Not Applicable    SIRS  (systemic inflammatory response syndrome) [R65.10] 12/07/2021 Yes    Asymptomatic bacteriuria [R82.71] 12/07/2021 Yes    Acute renal failure [N17.9] 12/07/2021 Yes    Paroxysmal atrial fibrillation [I48.0] 12/07/2021 Yes    Hypothyroidism [E03.9] 10/30/2012 Yes    Hyperlipidemia [E78.5]  Yes    GERD (gastroesophageal reflux disease) [K21.9]  Yes      Problems Resolved During this Admission:   Patient has failed conservative management for bowel obstruction.  Recurrent nausea vomiting yesterday with replacement NG tube.  Surgery is recommended.  Long discussion with patient's son.  Discussed indications for surgery.  Discussed goals and risks of the procedure.  All questions answered satisfactorily.  Son understands wishes to proceed.  Will proceed today with laparotomy for bowel obstruction      Joaquín Hein MD  General Surgery  [place] - Med Surg (3rd Fl)

## 2021-12-11 NOTE — NURSING
Pt up to room 310 from surgery. Sleepy, but arousable. AAOX4. Pts son at the bedside. No subjective complaints noted. VSS. Sats WNL on RA. IV fluids re-started per MD order and NG tube connected back to suction. Will continue to monitor.     SCDs on.

## 2021-12-11 NOTE — NURSING
Pt starting to bleed through reinforced dressing.     Dr. Hein notified. No new orders received at this time. Will continue to monitor.     Pt with no complaints at this time. No acute signs of distress noted. VSS.

## 2021-12-11 NOTE — ASSESSMENT & PLAN NOTE
Baseline creat 1.3-1.4- on admission 3.6. 1L NS given in ER. Repeat 1L Ns bolus 12/7 and then cont NS at 150ml/hr. Repeat lactate better 1.9  Creat now 3.0  Son reports that he has no issues urinating at home  Will check renal U/s and post void residual  Has had urinary re tension in past. Consider salcedo if retaining due ti UTI    Repeat u.a today cont rocephin     12/10 creat improved - 3.6>1.6; renal US stable     12/11  BMP  Lab Results   Component Value Date     12/11/2021    K 3.9 12/11/2021     (H) 12/11/2021    CO2 17 (L) 12/11/2021    BUN 35 (H) 12/11/2021    CREATININE 1.4 12/11/2021    CALCIUM 9.1 12/11/2021    ANIONGAP 9 12/11/2021    ESTGFRAFRICA 55 (A) 12/11/2021    EGFRNONAA 47 (A) 12/11/2021

## 2021-12-11 NOTE — NURSING
Pt appears asleep and resting comfortably in bed. HOB elevated. NADN. NG tube to low intermittent suction. Iv fluids infusing per MD order. SCDs on. Avasys in room for safety. Bed in lowest position with call light in reach. Bed alarm on. Will continue to monitor.

## 2021-12-11 NOTE — ASSESSMENT & PLAN NOTE
Hold synthroid while NPO  If remains NPO > 3 days can start IV  Resume oral thyroid meds .    12/11  Hold po meds

## 2021-12-11 NOTE — PROGRESS NOTES
[facility] Surg ([address] [place] [address] Medicine  Progress Note    Patient Name: Domonique Hernandez Jr.  MRN: [id number]  Patient Class: IP- Inpatient   Admission Date: 12/7/2021  Length of Stay: 4 days  Attending Physician: Emiliano Cam MD  Primary Care Provider: Emiliano Cam MD        Subjective:     Principal Problem:SBO (small bowel obstruction)        HPI:  80yo male patient with hx of anxiety, arthritis, back pain, bronchitis/COPD, depression, GERD, HLD, hypothyroid, PVD, sleep apnea on CPAP, and CKD. Pt came to ER with c/o regurgitation for 3 days. Decrease intake. No fever. No UTI s/s. CT on admission shows SBO. Significantly dehydrated. Creat 3.6-- baseline 1.4. Given 1L NS in ER U/a dirty one dose of zosyn given. No fever. No elevated WBC. Lactate 2.3. He had NGT place and put to suction. He is in bed this am. Reports feeling better. General surgery consulted.       Overview/Hospital Course:  12/8 Pt was admitted yesterday with SBO. He had general surgery consulted. NGT placed to suction. Still no flatus or BM. KUB pending for this am.     Also on rocephin for UTI-culture in process. Was given 1L NS in ER and repeated on floor then started NS at 150ml/hr. Creat on admit 3.6> now 3.0-- baseline creat 1.4. lactate did impropve on repeat.  Renal US shows cysts.  No hydronephrosis.     12/9 pt here with SBO. NGT to suction had 1000ml overnight. No flatus. No BM. +BS  Renal function improved 3.6>>2.1 on NS at 150ml/hr. Rocephin for UTI- urine culture shows multiple organisms. NO fever. NO elevated WBC    12/10/21    82YO WM admitted with SBO 4 days ago, NGT pulled yesterday, tolerating liquids,   Burping, t  KUB this am- NG tube removed, otherwise no significant change when compared with the prior study of December 9, 2021.     Creat much improved 3.6>1.6 ,  IV Rocephin day 4 for UTI, initial urine Cx- multiple organisms, repaeat U/A dirty, culture in process   7 months ago urine CX+ MRSA- sensitve to  bactrim, tetracycline and Vanc ; will add oral doxy   WBC normal, afebrile   Per PT yesterday; Gait: Contact Gaurd Assistance x ~30 feet (steeping forward and back due to limited line of medical equipment - NG tube) using RW.    12/11/21  80YO WM admitted with SBO  NG was pulled 12/9   But yesterday started with nausea and later with vomiting  and NG has to be placed back again   KUB is pending .  On doxy for UTI recurrent ; last culture staph mrsa sensitive to tetracyclines               Review of Systems   Constitutional: Positive for activity change and fatigue. Negative for fever and unexpected weight change.   HENT: Negative for congestion and sore throat.    Eyes: Negative for visual disturbance.   Respiratory: Negative for cough and shortness of breath.    Cardiovascular: Negative for chest pain.   Gastrointestinal: Positive for abdominal pain (improving), nausea and vomiting. Negative for blood in stool, constipation and diarrhea.   Genitourinary: Negative for dysuria and hematuria.   Neurological: Negative for dizziness and light-headedness.     Objective:     Vital Signs (Most Recent):  Temp: 97.4 °F (36.3 °C) (12/11/21 0839)  Pulse: 92 (12/11/21 0839)  Resp: 20 (12/11/21 0839)  BP: 133/73 (12/11/21 0839)  SpO2: 96 % (12/11/21 0839) Vital Signs (24h Range):  Temp:  [97.4 °F (36.3 °C)-99.4 °F (37.4 °C)] 97.4 °F (36.3 °C)  Pulse:  [] 92  Resp:  [16-24] 20  SpO2:  [94 %-99 %] 96 %  BP: (111-142)/(58-92) 133/73     Weight: 100 kg (220 lb 7.4 oz)  Body mass index is 33.52 kg/m².    Intake/Output Summary (Last 24 hours) at 12/11/2021 0955  Last data filed at 12/11/2021 0700  Gross per 24 hour   Intake --   Output 3900 ml   Net -3900 ml      Physical Exam  Vitals and nursing note reviewed.   Constitutional:       General: He is not in acute distress.     Appearance: He is well-developed.      Comments: Lying in hospital bed with NGT in place   HENT:      Head: Normocephalic and atraumatic.      Right Ear:  External ear normal.      Left Ear: External ear normal.   Eyes:      General:         Right eye: No discharge.         Left eye: No discharge.      Conjunctiva/sclera: Conjunctivae normal.   Neck:      Thyroid: No thyromegaly.   Cardiovascular:      Rate and Rhythm: Normal rate and regular rhythm.      Heart sounds: Normal heart sounds. No murmur heard.      Pulmonary:      Effort: Pulmonary effort is normal. No respiratory distress.      Breath sounds: Normal breath sounds.   Abdominal:      General: There is distension.      Palpations: Abdomen is soft.      Tenderness: There is no abdominal tenderness.      Comments: S/p NG tube   Musculoskeletal:      Cervical back: Neck supple.      Right lower leg: No edema.      Left lower leg: No edema.   Skin:     General: Skin is warm and dry.   Neurological:      General: No focal deficit present.      Mental Status: He is alert. Mental status is at baseline.   Psychiatric:         Mood and Affect: Mood normal.         Behavior: Behavior normal.         Significant Labs:   All pertinent labs within the past 24 hours have been reviewed.  CBC:   Recent Labs   Lab 12/10/21  0635 12/11/21  0619   WBC 5.57 7.55   HGB 12.4* 13.0*   HCT 38.5* 40.8    221     CMP:   Recent Labs   Lab 12/10/21  0635 12/11/21  0619   * 143   K 3.7 3.9   * 117*   CO2 22* 17*    93   BUN 45* 35*   CREATININE 1.6* 1.4   CALCIUM 9.0 9.1   PROT 5.5* 5.4*   ALBUMIN 2.4* 2.4*   BILITOT 0.5 0.6   ALKPHOS 79 82   AST 15 19   ALT 12 19   ANIONGAP 9 9   EGFRNONAA 40* 47*     KUB pending         Assessment/Plan:      * SBO (small bowel obstruction)  NPO  NGT to wall suction  KUB daily  CXR nothing acute  IVF started NS at 150ml/hr    He meets SIRS criteria but I think better explained by SBO presentation that infection.  UA > 100 WBC but he has no urinary tract symptoms. He has recurrent UTI and will start Rocephin now but my clinical impression is the abnormal vitals and elevated  lactic acid are due to SBO, dehydration and not UTI sepsis    12/10 tolerating orals, advance diet .    12/11  Placed NG back again   KUB pending     Evaluation by psychiatric service required  Reviewed  consult       Paroxysmal atrial fibrillation  Noted in Dr Fair notes:    On eliquis and BB at home. NPO here. Will give lovenox- renally dose and labetalol IV for now   12/10 tolerating liquids, advance diet and resume oral BB, stop IV labetolol,   Resume eliquis;      12/11  DC eliquis   Resume lovenox       Acute renal failure  Baseline creat 1.3-1.4- on admission 3.6. 1L NS given in ER. Repeat 1L Ns bolus 12/7 and then cont NS at 150ml/hr. Repeat lactate better 1.9  Creat now 3.0  Son reports that he has no issues urinating at home  Will check renal U/s and post void residual  Has had urinary re tension in past. Consider salcedo if retaining due ti UTI    Repeat u.a today cont rocephin     12/10 creat improved - 3.6>1.6; renal US stable     12/11  BMP  Lab Results   Component Value Date     12/11/2021    K 3.9 12/11/2021     (H) 12/11/2021    CO2 17 (L) 12/11/2021    BUN 35 (H) 12/11/2021    CREATININE 1.4 12/11/2021    CALCIUM 9.1 12/11/2021    ANIONGAP 9 12/11/2021    ESTGFRAFRICA 55 (A) 12/11/2021    EGFRNONAA 47 (A) 12/11/2021         Asymptomatic bacteriuria  He has no UTI sx on interview day of admit  Culture urine  Start rocephin empirically as h/o of UTI and not a great historian but as noted above I think vitals/lactic acid precipitated by SBO and not UTI sepsis    Cont rocephin and repeat u.a   Pt is s/p lithotripsy, stent removal, and cystoureteroscopy with retrograde pyelogram w/ stent insertion on 5/31/21, tx with Bactrim for MRSA UTI  Will add oral doxy  To cover for staph MRSA UTI .    12/11  Doxy day 2    SIRS (systemic inflammatory response syndrome)  He meets SIRS criteria but I think better explained by SBO presentation than infection.  UA > 100 WBC but he has no urinary tract  symptoms. He has recurrent UTI and will start Rocephin now but my clinical impression is the abnormal vitals and elevated lactic acid are due to SBO, dehydration and not UTI sepsis.      Hypothyroidism  Hold synthroid while NPO  If remains NPO > 3 days can start IV  Resume oral thyroid meds .    12/11  Hold po meds        GERD (gastroesophageal reflux disease)  protonix IV  tolerating oral - change to po .    12/11  Back to IV     Hyperlipidemia  Holding zocor while NPO  10/10 resume - zocor non formulary- atorvastatin while here .        VTE Risk Mitigation (From admission, onward)         Ordered     enoxaparin injection 100 mg  Every 12 hours (non-standard times)         12/10/21 1846     IP VTE HIGH RISK PATIENT  Once         12/07/21 0620     Place sequential compression device  Until discontinued         12/07/21 0620                Discharge Planning   STEPHANIE:      Code Status: Full Code   Is the patient medically ready for discharge?:     Reason for patient still in hospital (select all that apply): Treatment  Discharge Plan A: Skilled Nursing Facility   Discharge Delays: None known at this time              Gemma Rubio MD  Department of Hospital Medicine   [facility] Surg (3rd Fl)

## 2021-12-11 NOTE — ASSESSMENT & PLAN NOTE
Noted in Dr Fair notes:    On eliquis and BB at home. NPO here. Will give lovenox- renally dose and labetalol IV for now   12/10 tolerating liquids, advance diet and resume oral BB, stop IV labetolol,   Resume eliquis;      12/11  DC eliquis   Resume lovenox

## 2021-12-11 NOTE — ASSESSMENT & PLAN NOTE
NPO  NGT to wall suction  KUB daily  CXR nothing acute  IVF started NS at 150ml/hr    He meets SIRS criteria but I think better explained by SBO presentation that infection.  UA > 100 WBC but he has no urinary tract symptoms. He has recurrent UTI and will start Rocephin now but my clinical impression is the abnormal vitals and elevated lactic acid are due to SBO, dehydration and not UTI sepsis    12/10 tolerating orals, advance diet .    12/11  Placed NG back again   KUB pending

## 2021-12-11 NOTE — PT/OT/SLP PROGRESS
Physical Therapy      Patient Name:  Domonique Hernandez Jr.   MRN:  [id number]    Patient not seen today secondary to Unavailable (Comment) (Patient is out for Abdominal surgery.). Will follow-up tomorrow when appropriate.

## 2021-12-11 NOTE — NURSING
Patient refusing brief and condom cath. Patient states he is continent but does not call when he needs to go and then will refuse staff to clean him. NG tube turned to moderate intermitten suction.

## 2021-12-12 LAB
ALBUMIN SERPL BCP-MCNC: 2.2 G/DL (ref 3.5–5.2)
ALP SERPL-CCNC: 69 U/L (ref 55–135)
ALT SERPL W/O P-5'-P-CCNC: 16 U/L (ref 10–44)
ANION GAP SERPL CALC-SCNC: 8 MMOL/L (ref 8–16)
ANISOCYTOSIS BLD QL SMEAR: SLIGHT
AST SERPL-CCNC: 16 U/L (ref 10–40)
BASOPHILS # BLD AUTO: ABNORMAL K/UL (ref 0–0.2)
BASOPHILS NFR BLD: 0 % (ref 0–1.9)
BILIRUB SERPL-MCNC: 0.7 MG/DL (ref 0.1–1)
BUN SERPL-MCNC: 31 MG/DL (ref 8–23)
CALCIUM SERPL-MCNC: 8.5 MG/DL (ref 8.7–10.5)
CHLORIDE SERPL-SCNC: 120 MMOL/L (ref 95–110)
CO2 SERPL-SCNC: 18 MMOL/L (ref 23–29)
CREAT SERPL-MCNC: 1.5 MG/DL (ref 0.5–1.4)
DACRYOCYTES BLD QL SMEAR: ABNORMAL
DIFFERENTIAL METHOD: ABNORMAL
EOSINOPHIL # BLD AUTO: ABNORMAL K/UL (ref 0–0.5)
EOSINOPHIL NFR BLD: 2 % (ref 0–8)
ERYTHROCYTE [DISTWIDTH] IN BLOOD BY AUTOMATED COUNT: 16.9 % (ref 11.5–14.5)
EST. GFR  (AFRICAN AMERICAN): 50 ML/MIN/1.73 M^2
EST. GFR  (NON AFRICAN AMERICAN): 44 ML/MIN/1.73 M^2
GLUCOSE SERPL-MCNC: 101 MG/DL (ref 70–110)
HCT VFR BLD AUTO: 37.2 % (ref 40–54)
HGB BLD-MCNC: 11.3 G/DL (ref 14–18)
IMM GRANULOCYTES # BLD AUTO: ABNORMAL K/UL (ref 0–0.04)
IMM GRANULOCYTES NFR BLD AUTO: ABNORMAL % (ref 0–0.5)
LYMPHOCYTES # BLD AUTO: ABNORMAL K/UL (ref 1–4.8)
LYMPHOCYTES NFR BLD: 9 % (ref 18–48)
MCH RBC QN AUTO: 29.2 PG (ref 27–31)
MCHC RBC AUTO-ENTMCNC: 30.4 G/DL (ref 32–36)
MCV RBC AUTO: 96 FL (ref 82–98)
MONOCYTES # BLD AUTO: ABNORMAL K/UL (ref 0.3–1)
MONOCYTES NFR BLD: 13 % (ref 4–15)
NEUTROPHILS NFR BLD: 64 % (ref 38–73)
NEUTS BAND NFR BLD MANUAL: 12 %
NRBC BLD-RTO: 0 /100 WBC
OVALOCYTES BLD QL SMEAR: ABNORMAL
PLATELET # BLD AUTO: 206 K/UL (ref 150–450)
PLATELET BLD QL SMEAR: ABNORMAL
PMV BLD AUTO: 11.5 FL (ref 9.2–12.9)
POIKILOCYTOSIS BLD QL SMEAR: SLIGHT
POTASSIUM SERPL-SCNC: 4.2 MMOL/L (ref 3.5–5.1)
PROT SERPL-MCNC: 4.5 G/DL (ref 6–8.4)
RBC # BLD AUTO: 3.87 M/UL (ref 4.6–6.2)
SODIUM SERPL-SCNC: 146 MMOL/L (ref 136–145)
WBC # BLD AUTO: 9.47 K/UL (ref 3.9–12.7)

## 2021-12-12 PROCEDURE — 25000003 PHARM REV CODE 250: Performed by: SURGERY

## 2021-12-12 PROCEDURE — 97530 THERAPEUTIC ACTIVITIES: CPT

## 2021-12-12 PROCEDURE — 94761 N-INVAS EAR/PLS OXIMETRY MLT: CPT

## 2021-12-12 PROCEDURE — 11000001 HC ACUTE MED/SURG PRIVATE ROOM

## 2021-12-12 PROCEDURE — 63600175 PHARM REV CODE 636 W HCPCS: Performed by: SURGERY

## 2021-12-12 PROCEDURE — 99233 SBSQ HOSP IP/OBS HIGH 50: CPT | Mod: ,,, | Performed by: INTERNAL MEDICINE

## 2021-12-12 PROCEDURE — C9113 INJ PANTOPRAZOLE SODIUM, VIA: HCPCS | Performed by: SURGERY

## 2021-12-12 PROCEDURE — 36415 COLL VENOUS BLD VENIPUNCTURE: CPT | Performed by: FAMILY MEDICINE

## 2021-12-12 PROCEDURE — 85027 COMPLETE CBC AUTOMATED: CPT | Performed by: FAMILY MEDICINE

## 2021-12-12 PROCEDURE — 80053 COMPREHEN METABOLIC PANEL: CPT | Performed by: FAMILY MEDICINE

## 2021-12-12 PROCEDURE — 85007 BL SMEAR W/DIFF WBC COUNT: CPT | Performed by: FAMILY MEDICINE

## 2021-12-12 PROCEDURE — 99233 PR SUBSEQUENT HOSPITAL CARE,LEVL III: ICD-10-PCS | Mod: ,,, | Performed by: INTERNAL MEDICINE

## 2021-12-12 RX ADMIN — LABETALOL HYDROCHLORIDE 5 MG: 5 INJECTION, SOLUTION INTRAVENOUS at 05:12

## 2021-12-12 RX ADMIN — LABETALOL HYDROCHLORIDE 5 MG: 5 INJECTION, SOLUTION INTRAVENOUS at 11:12

## 2021-12-12 RX ADMIN — HYDROMORPHONE HYDROCHLORIDE 0.5 MG: 1 INJECTION, SOLUTION INTRAMUSCULAR; INTRAVENOUS; SUBCUTANEOUS at 07:12

## 2021-12-12 RX ADMIN — SODIUM CHLORIDE: 0.9 INJECTION, SOLUTION INTRAVENOUS at 05:12

## 2021-12-12 RX ADMIN — PANTOPRAZOLE SODIUM 40 MG: 40 INJECTION, POWDER, LYOPHILIZED, FOR SOLUTION INTRAVENOUS at 07:12

## 2021-12-12 RX ADMIN — DOXYCYCLINE 100 MG: 100 INJECTION, POWDER, LYOPHILIZED, FOR SOLUTION INTRAVENOUS at 05:12

## 2021-12-12 RX ADMIN — SODIUM CHLORIDE: 0.9 INJECTION, SOLUTION INTRAVENOUS at 10:12

## 2021-12-12 NOTE — NURSING
Pts surgical dressing continuing to bleed, now bleeding onto patients blanket. Call placed to Dr. Damico in ER to come and assess the patient and Dr. Hein notified that the patient is continuing to bleed through the reinforced dressing.

## 2021-12-12 NOTE — ASSESSMENT & PLAN NOTE
NPO  NGT to wall suction  KUB daily  CXR nothing acute  IVF started NS at 150ml/hr    He meets SIRS criteria but I think better explained by SBO presentation that infection.  UA > 100 WBC but he has no urinary tract symptoms. He has recurrent UTI and will start Rocephin now but my clinical impression is the abnormal vitals and elevated lactic acid are due to SBO, dehydration and not UTI sepsis    12/10 tolerating orals, advance diet .    12/11  Placed NG back again   KUB pending     12/12  S/p surgery ; POD 1   NG to suction   Bleeding stopped

## 2021-12-12 NOTE — PROGRESS NOTES
[facility]urg ([address] [place] [address] Medicine  Progress Note    Patient Name: Domonique Hernandez Jr.  MRN: [id number]  Patient Class: IP- Inpatient   Admission Date: 12/7/2021  Length of Stay: 5 days  Attending Physician: Emiliano Cam MD  Primary Care Provider: Emiliano Cam MD        Subjective:     Principal Problem:SBO (small bowel obstruction)        HPI:  80yo male patient with hx of anxiety, arthritis, back pain, bronchitis/COPD, depression, GERD, HLD, hypothyroid, PVD, sleep apnea on CPAP, and CKD. Pt came to ER with c/o regurgitation for 3 days. Decrease intake. No fever. No UTI s/s. CT on admission shows SBO. Significantly dehydrated. Creat 3.6-- baseline 1.4. Given 1L NS in ER U/a dirty one dose of zosyn given. No fever. No elevated WBC. Lactate 2.3. He had NGT place and put to suction. He is in bed this am. Reports feeling better. General surgery consulted.       Overview/Hospital Course:  12/8 Pt was admitted yesterday with SBO. He had general surgery consulted. NGT placed to suction. Still no flatus or BM. KUB pending for this am.     Also on rocephin for UTI-culture in process. Was given 1L NS in ER and repeated on floor then started NS at 150ml/hr. Creat on admit 3.6> now 3.0-- baseline creat 1.4. lactate did impropve on repeat.  Renal US shows cysts.  No hydronephrosis.     12/9 pt here with SBO. NGT to suction had 1000ml overnight. No flatus. No BM. +BS  Renal function improved 3.6>>2.1 on NS at 150ml/hr. Rocephin for UTI- urine culture shows multiple organisms. NO fever. NO elevated WBC    12/10/21    80YO WM admitted with SBO 4 days ago, NGT pulled yesterday, tolerating liquids,   Burping, t  KUB this am- NG tube removed, otherwise no significant change when compared with the prior study of December 9, 2021.     Creat much improved 3.6>1.6 ,  IV Rocephin day 4 for UTI, initial urine Cx- multiple organisms, repaeat U/A dirty, culture in process   7 months ago urine CX+ MRSA- sensitve to  bactrim, tetracycline and Vanc ; will add oral doxy   WBC normal, afebrile   Per PT yesterday; Gait: Contact Gaurd Assistance x ~30 feet (steeping forward and back due to limited line of medical equipment - NG tube) using RW.    12/11/21  80YO WM admitted with SBO  NG was pulled 12/9   But yesterday started with nausea and later with vomiting  and NG has to be placed back again   KUB is pending .  On doxy for UTI recurrent ; last culture staph mrsa sensitive to tetracyclines     12/12  S/p surgery yesterday for SBO .  Reports no flatus . No BM   Issues with bleeding from incision   Lab Results   Component Value Date    WBC 9.47 12/12/2021    HGB 11.3 (L) 12/12/2021    HCT 37.2 (L) 12/12/2021    MCV 96 12/12/2021     12/12/2021                 Review of Systems   Constitutional: Positive for activity change and fatigue. Negative for fever and unexpected weight change.   HENT: Negative for congestion and sore throat.    Eyes: Negative for visual disturbance.   Respiratory: Negative for cough and shortness of breath.    Cardiovascular: Negative for chest pain.   Gastrointestinal: Positive for abdominal pain (improving). Negative for blood in stool, constipation and diarrhea.   Genitourinary: Negative for dysuria and hematuria.   Neurological: Negative for dizziness and light-headedness.     Objective:     Vital Signs (Most Recent):  Temp: 97.1 °F (36.2 °C) (12/12/21 0724)  Pulse: 100 (12/12/21 1000)  Resp: 19 (12/12/21 0734)  BP: 125/71 (12/12/21 0724)  SpO2: 98 % (12/12/21 0733) Vital Signs (24h Range):  Temp:  [97 °F (36.1 °C)-98.8 °F (37.1 °C)] 97.1 °F (36.2 °C)  Pulse:  [] 100  Resp:  [16-24] 19  SpO2:  [94 %-99 %] 98 %  BP: (101-162)/(53-83) 125/71     Weight: 100 kg (220 lb 7.4 oz)  Body mass index is 33.52 kg/m².  No intake or output data in the 24 hours ending 12/12/21 1132   Physical Exam  Vitals and nursing note reviewed.   Constitutional:       General: He is not in acute distress.      Appearance: He is well-developed.      Comments: Lying in hospital bed with NGT in place   HENT:      Head: Normocephalic and atraumatic.      Right Ear: External ear normal.      Left Ear: External ear normal.   Eyes:      General:         Right eye: No discharge.         Left eye: No discharge.      Conjunctiva/sclera: Conjunctivae normal.   Neck:      Thyroid: No thyromegaly.   Cardiovascular:      Rate and Rhythm: Normal rate and regular rhythm.      Heart sounds: Normal heart sounds. No murmur heard.      Pulmonary:      Effort: Pulmonary effort is normal. No respiratory distress.      Breath sounds: Normal breath sounds.   Abdominal:      General: There is distension.      Palpations: Abdomen is soft.      Tenderness: There is no abdominal tenderness.      Comments: S/p NG tube   Musculoskeletal:      Cervical back: Neck supple.      Right lower leg: No edema.      Left lower leg: No edema.   Skin:     General: Skin is warm and dry.   Neurological:      General: No focal deficit present.      Mental Status: He is alert. Mental status is at baseline.   Psychiatric:         Mood and Affect: Mood normal.         Behavior: Behavior normal.         Significant Labs:   All pertinent labs within the past 24 hours have been reviewed.  CBC:   Recent Labs   Lab 12/11/21  0619 12/11/21  1848 12/12/21  0623   WBC 7.55 10.50 9.47   HGB 13.0* 14.0 11.3*   HCT 40.8 45.5 37.2*    242 206     CMP:   Recent Labs   Lab 12/11/21  0619 12/12/21  0623    146*   K 3.9 4.2   * 120*   CO2 17* 18*   GLU 93 101   BUN 35* 31*   CREATININE 1.4 1.5*   CALCIUM 9.1 8.5*   PROT 5.4* 4.5*   ALBUMIN 2.4* 2.2*   BILITOT 0.6 0.7   ALKPHOS 82 69   AST 19 16   ALT 19 16   ANIONGAP 9 8   EGFRNONAA 47* 44*           Assessment/Plan:      * SBO (small bowel obstruction)  NPO  NGT to wall suction  KUB daily  CXR nothing acute  IVF started NS at 150ml/hr    He meets SIRS criteria but I think better explained by SBO presentation that  infection.  UA > 100 WBC but he has no urinary tract symptoms. He has recurrent UTI and will start Rocephin now but my clinical impression is the abnormal vitals and elevated lactic acid are due to SBO, dehydration and not UTI sepsis    12/10 tolerating orals, advance diet .    12/11  Placed NG back again   KUB pending     12/12  S/p surgery ; POD 1   NG to suction   Bleeding stopped    Evaluation by psychiatric service required  Reviewed  consult .      Paroxysmal atrial fibrillation  Noted in Dr Fair notes:    On eliquis and BB at home. NPO here. Will give lovenox- renally dose and labetalol IV for now   12/10 tolerating liquids, advance diet and resume oral BB, stop IV labetolol,   Resume eliquis;      12/11  DC eliquis   Resume lovenox .    12/12  HOLD LOVENOX   HB dropped 2 points   Follow CBC   Resume lovenox tomorrow       Acute renal failure  Baseline creat 1.3-1.4- on admission 3.6. 1L NS given in ER. Repeat 1L Ns bolus 12/7 and then cont NS at 150ml/hr. Repeat lactate better 1.9  Creat now 3.0  Son reports that he has no issues urinating at home  Will check renal U/s and post void residual  Has had urinary re tension in past. Consider salcedo if retaining due ti UTI    Repeat u.a today cont rocephin     12/10 creat improved - 3.6>1.6; renal US stable     12/11  BMP  Lab Results   Component Value Date     (H) 12/12/2021    K 4.2 12/12/2021     (H) 12/12/2021    CO2 18 (L) 12/12/2021    BUN 31 (H) 12/12/2021    CREATININE 1.5 (H) 12/12/2021    CALCIUM 8.5 (L) 12/12/2021    ANIONGAP 8 12/12/2021    ESTGFRAFRICA 50 (A) 12/12/2021    EGFRNONAA 44 (A) 12/12/2021         Asymptomatic bacteriuria  He has no UTI sx on interview day of admit  Culture urine  Start rocephin empirically as h/o of UTI and not a great historian but as noted above I think vitals/lactic acid precipitated by SBO and not UTI sepsis    Cont rocephin and repeat u.a   Pt is s/p lithotripsy, stent removal, and cystoureteroscopy with  retrograde pyelogram w/ stent insertion on 5/31/21, tx with Bactrim for MRSA UTI  Will add oral doxy  To cover for staph MRSA UTI .    12/12  Doxy day 3    SIRS (systemic inflammatory response syndrome)  He meets SIRS criteria but I think better explained by SBO presentation than infection.  UA > 100 WBC but he has no urinary tract symptoms. He has recurrent UTI and will start Rocephin now but my clinical impression is the abnormal vitals and elevated lactic acid are due to SBO, dehydration and not UTI sepsis.      Hypothyroidism  Hold synthroid while NPO  If remains NPO > 3 days can start IV  Resume oral thyroid meds .    12/11  Hold po meds.        GERD (gastroesophageal reflux disease)  protonix IV  tolerating oral - change to po .    12/11  Back to IV     Hyperlipidemia  Holding zocor while NPO  10/10 resume - zocor non formulary- atorvastatin while here .        VTE Risk Mitigation (From admission, onward)         Ordered     enoxaparin injection 100 mg  Every 12 hours (non-standard times)         12/10/21 1846     IP VTE HIGH RISK PATIENT  Once         12/07/21 0620     Place sequential compression device  Until discontinued         12/07/21 0620                Discharge Planning   STEPHANIE:      Code Status: Full Code   Is the patient medically ready for discharge?:     Reason for patient still in hospital (select all that apply): Treatment  Discharge Plan A: Skilled Nursing Facility   Discharge Delays: None known at this time              Gemma Rubio MD  Department of Hospital Medicine   [place] [facility] (3rd Fl)

## 2021-12-12 NOTE — ASSESSMENT & PLAN NOTE
Baseline creat 1.3-1.4- on admission 3.6. 1L NS given in ER. Repeat 1L Ns bolus 12/7 and then cont NS at 150ml/hr. Repeat lactate better 1.9  Creat now 3.0  Son reports that he has no issues urinating at home  Will check renal U/s and post void residual  Has had urinary re tension in past. Consider salcedo if retaining due ti UTI    Repeat u.a today cont rocephin     12/10 creat improved - 3.6>1.6; renal US stable     12/11  BMP  Lab Results   Component Value Date     (H) 12/12/2021    K 4.2 12/12/2021     (H) 12/12/2021    CO2 18 (L) 12/12/2021    BUN 31 (H) 12/12/2021    CREATININE 1.5 (H) 12/12/2021    CALCIUM 8.5 (L) 12/12/2021    ANIONGAP 8 12/12/2021    ESTGFRAFRICA 50 (A) 12/12/2021    EGFRNONAA 44 (A) 12/12/2021

## 2021-12-12 NOTE — NURSING
Spoke with Dr. Hein on the phone to clarify NG removal orders. Notified MD that pt put out 1000cc this am from NG tube. Orders received to continue NG tube to low intermittent suction and NPO status, then re-assess in am.     Will continue to monitor.

## 2021-12-12 NOTE — ASSESSMENT & PLAN NOTE
Hold synthroid while NPO  If remains NPO > 3 days can start IV  Resume oral thyroid meds .    12/11  Hold po meds.

## 2021-12-12 NOTE — ASSESSMENT & PLAN NOTE
Noted in Dr Fair notes:    On eliquis and BB at home. NPO here. Will give lovenox- renally dose and labetalol IV for now   12/10 tolerating liquids, advance diet and resume oral BB, stop IV labetolol,   Resume eliquis;      12/11  DC eliquis   Resume lovenox .    12/12  HOLD LOVENOX   HB dropped 2 points   Follow CBC   Resume lovenox tomorrow

## 2021-12-12 NOTE — NURSING
Dr. Damico at the bedside. MD removed the reinfored dressing that I applied. Blood slowly trickling out from the left side of patients original surgical dressing. Dr. Damico re-applied a new pressure dressing on top of surgical dressing. VSS. No signs of acute distress noted. Will continue to monitor.    no

## 2021-12-12 NOTE — PT/OT/SLP PROGRESS
"Physical Therapy Treatment    Patient Name:  Domonique Hernandez Jr.   MRN:  [id number]    Recommendations:     Discharge Recommendations:  nursing facility, basic,nursing facility, skilled   Discharge Equipment Recommendations: none   Barriers to discharge: Decreased caregiver support    Assessment:     Domonique Hernandez Jr. is a 81 y.o. male admitted with a medical diagnosis of SBO (small bowel obstruction).  He presents with the following impairments/functional limitations:  weakness,impaired endurance,impaired self care skills,impaired functional mobilty,gait instability,impaired balance,impaired cognition,decreased safety awareness,pain. Patient just had abdominal surgery yesterday. Checked with nursing then spoke with patient and agreed with PT tx today. Patient tolerated sitting up at side of the bed followed by standing activity and side steps gait trng with RW at bed side. No sign of bleeding at the abdominal dressing area.    Rehab Prognosis: Fair; patient would benefit from acute skilled PT services to address these deficits and reach maximum level of function.    Recent Surgery: Procedure(s) (LRB):  LAPAROTOMY, EXPLORATORY (N/A) 1 Day Post-Op    Plan:     During this hospitalization, patient to be seen daily to address the identified rehab impairments via gait training,therapeutic activities,therapeutic exercises and progress toward the following goals:    · Plan of Care Expires:  12/16/21    Subjective     Chief Complaint: abdominal pain, Patient had abdominal surgical procedure yesterday.  Patient/Family Comments/goals: "To get better"  Pain/Comfort:  · Pain Rating 1: 6/10  · Location - Orientation 1: generalized  · Location 1: abdomen  · Pain Addressed 1: Reposition,Cessation of Activity  · Pain Rating Post-Intervention 1: 6/10      Objective:     Communicated with nursing and patient  prior to session.  Patient found HOB elevated with telemetry,peripheral IV,SCD,NG tube upon PT entry to room.     General " Precautions: Standard, fall   Orthopedic Precautions:N/A   Braces: N/A  Respiratory Status:  · O2 Device (Oxygen Therapy): room air     Functional Mobility:  · Bed Mobility:     · Rolling Left:  contact guard assistance  · Rolling Right: contact guard assistance  · Scooting: contact guard assistance  · Supine to Sit: contact guard assistance  · Sit to Supine: contact guard assistance and minimum assistance  · Transfers:     · Sit to Stand:  minimum assistance with rolling walker  · Gait: Moderate Assistance x 4 side steps and 2 steps forward and back with RW. Decrease foot/floor clearance and dec stride length.  Fatgue easily.       AM-PAC 6 CLICK MOBILITY  Turning over in bed (including adjusting bedclothes, sheets and blankets)?: 3  Sitting down on and standing up from a chair with arms (e.g., wheelchair, bedside commode, etc.): 3  Moving from lying on back to sitting on the side of the bed?: 3  Moving to and from a bed to a chair (including a wheelchair)?: 3  Need to walk in hospital room?: 2  Climbing 3-5 steps with a railing?: 2  Basic Mobility Total Score: 16       Therapeutic Activities and Exercises:   Worked on body sequence on bed mobility, sitting and standing activity and gait trng with RW at bedside     Patient left HOB elevated with all lines intact, call button in reach, bed alarm on, nursing  notified and avasys camera present present..    GOALS:   Multidisciplinary Problems     Physical Therapy Goals        Problem: Physical Therapy Goal    Goal Priority Disciplines Outcome Goal Variances Interventions   Physical Therapy Goal     PT, PT/OT Ongoing, Progressing     Description: Goals to be met by: 21    Patient will increase functional independence with mobility by performin. Supine to sit with Modified Independent  2. Sit to supine with Modified Independent  3. Bed to chair transfer with Supervision or Set-up Assistancewith or without rolling walker using Step Transfer TECHNIQUE  4.  Gait  x ~100  feet with Supervision or Set-up Assistance with or without rolling walker  5. Lower extremity exercise program x10 reps per handout, with assistance as needed                      Time Tracking:     PT Received On: 12/12/21  PT Start Time: 1132     PT Stop Time: 1148  PT Total Time (min): 16 min     Billable Minutes: Therapeutic Activity 16 mins    Treatment Type: Treatment  PT/PTA: PT           12/12/2021

## 2021-12-12 NOTE — ASSESSMENT & PLAN NOTE
He has no UTI sx on interview day of admit  Culture urine  Start rocephin empirically as h/o of UTI and not a great historian but as noted above I think vitals/lactic acid precipitated by SBO and not UTI sepsis    Cont rocephin and repeat u.a   Pt is s/p lithotripsy, stent removal, and cystoureteroscopy with retrograde pyelogram w/ stent insertion on 5/31/21, tx with Bactrim for MRSA UTI  Will add oral doxy  To cover for staph MRSA UTI .    12/12  Doxy day 3

## 2021-12-12 NOTE — SUBJECTIVE & OBJECTIVE
Review of Systems   Constitutional: Positive for activity change and fatigue. Negative for fever and unexpected weight change.   HENT: Negative for congestion and sore throat.    Eyes: Negative for visual disturbance.   Respiratory: Negative for cough and shortness of breath.    Cardiovascular: Negative for chest pain.   Gastrointestinal: Positive for abdominal pain (improving). Negative for blood in stool, constipation and diarrhea.   Genitourinary: Negative for dysuria and hematuria.   Neurological: Negative for dizziness and light-headedness.     Objective:     Vital Signs (Most Recent):  Temp: 97.1 °F (36.2 °C) (12/12/21 0724)  Pulse: 100 (12/12/21 1000)  Resp: 19 (12/12/21 0734)  BP: 125/71 (12/12/21 0724)  SpO2: 98 % (12/12/21 0733) Vital Signs (24h Range):  Temp:  [97 °F (36.1 °C)-98.8 °F (37.1 °C)] 97.1 °F (36.2 °C)  Pulse:  [] 100  Resp:  [16-24] 19  SpO2:  [94 %-99 %] 98 %  BP: (101-162)/(53-83) 125/71     Weight: 100 kg (220 lb 7.4 oz)  Body mass index is 33.52 kg/m².  No intake or output data in the 24 hours ending 12/12/21 1132   Physical Exam  Vitals and nursing note reviewed.   Constitutional:       General: He is not in acute distress.     Appearance: He is well-developed.      Comments: Lying in hospital bed with NGT in place   HENT:      Head: Normocephalic and atraumatic.      Right Ear: External ear normal.      Left Ear: External ear normal.   Eyes:      General:         Right eye: No discharge.         Left eye: No discharge.      Conjunctiva/sclera: Conjunctivae normal.   Neck:      Thyroid: No thyromegaly.   Cardiovascular:      Rate and Rhythm: Normal rate and regular rhythm.      Heart sounds: Normal heart sounds. No murmur heard.      Pulmonary:      Effort: Pulmonary effort is normal. No respiratory distress.      Breath sounds: Normal breath sounds.   Abdominal:      General: There is distension.      Palpations: Abdomen is soft.      Tenderness: There is no abdominal tenderness.       Comments: S/p NG tube   Musculoskeletal:      Cervical back: Neck supple.      Right lower leg: No edema.      Left lower leg: No edema.   Skin:     General: Skin is warm and dry.   Neurological:      General: No focal deficit present.      Mental Status: He is alert. Mental status is at baseline.   Psychiatric:         Mood and Affect: Mood normal.         Behavior: Behavior normal.         Significant Labs:   All pertinent labs within the past 24 hours have been reviewed.  CBC:   Recent Labs   Lab 12/11/21  0619 12/11/21  1848 12/12/21  0623   WBC 7.55 10.50 9.47   HGB 13.0* 14.0 11.3*   HCT 40.8 45.5 37.2*    242 206     CMP:   Recent Labs   Lab 12/11/21  0619 12/12/21  0623    146*   K 3.9 4.2   * 120*   CO2 17* 18*   GLU 93 101   BUN 35* 31*   CREATININE 1.4 1.5*   CALCIUM 9.1 8.5*   PROT 5.4* 4.5*   ALBUMIN 2.4* 2.2*   BILITOT 0.6 0.7   ALKPHOS 82 69   AST 19 16   ALT 19 16   ANIONGAP 9 8   EGFRNONAA 47* 44*

## 2021-12-12 NOTE — PROGRESS NOTES
[facility] Surg (3rd Fl)  General Surgery  Progress Note    Subjective:     Interval History:  Status post exploratory laparotomy for small bowel obstruction.  Patient with a he should in leading to complete small bowel obstruction.  Patient reports pain at this time.  Patient denies bowel movement or flatus.    Post-Op Info:  Procedure(s) (LRB):  LAPAROTOMY, EXPLORATORY (N/A)   1 Day Post-Op      Medications:  Continuous Infusions:   sodium chloride 0.9% 150 mL/hr at 12/12/21 1043     Scheduled Meds:   doxycycline (VIBRAMYCIN) IVPB  100 mg Intravenous Daily    enoxaparin  1 mg/kg Subcutaneous Q12H    labetaloL  5 mg Intravenous Q6H    pantoprazole  40 mg Intravenous Daily     PRN Meds:HYDROmorphone, melatonin, ondansetron, promethazine (PHENERGAN) IVPB, sodium chloride 0.9%     Objective:     Vital Signs (Most Recent):  Temp: 96.4 °F (35.8 °C) (12/12/21 1145)  Pulse: 94 (12/12/21 1201)  Resp: 19 (12/12/21 0734)  BP: (!) 140/77 (12/12/21 1145)  SpO2: 97 % (12/12/21 1145) Vital Signs (24h Range):  Temp:  [96.4 °F (35.8 °C)-98.8 °F (37.1 °C)] 96.4 °F (35.8 °C)  Pulse:  [] 94  Resp:  [17-24] 19  SpO2:  [95 %-99 %] 97 %  BP: (101-147)/(53-83) 140/77       Intake/Output Summary (Last 24 hours) at 12/12/2021 1300  Last data filed at 12/12/2021 1200  Gross per 24 hour   Intake 100 ml   Output 900 ml   Net -800 ml       Physical Exam  Patient resting comfortably with NG tube in place.  Dressing reinforced with no drainage.  Abdomen moderate distention but fairly soft.  Appropriate tenderness  Significant Labs:  All pertinent labs from the last 24 hours have been reviewed.    Significant Diagnostics:  I have reviewed all pertinent imaging results/findings within the past 24 hours.    Assessment/Plan:     Active Diagnoses:    Diagnosis Date Noted POA    PRINCIPAL PROBLEM:  SBO (small bowel obstruction) [K56.609]  Yes    Evaluation by psychiatric service required [Z00.8]  Not Applicable    SIRS (systemic  inflammatory response syndrome) [R65.10] 12/07/2021 Yes    Asymptomatic bacteriuria [R82.71] 12/07/2021 Yes    Acute renal failure [N17.9] 12/07/2021 Yes    Paroxysmal atrial fibrillation [I48.0] 12/07/2021 Yes    Hypothyroidism [E03.9] 10/30/2012 Yes    Hyperlipidemia [E78.5]  Yes    GERD (gastroesophageal reflux disease) [K21.9]  Yes      Problems Resolved During this Admission:     DC NG tube and try liquids.  Out of bed to chair.    Joaquín Hein MD  General Surgery  [place] [facility] (3rd Fl)

## 2021-12-12 NOTE — NURSING
Patient lying in bed. No signs of acute distress noted. VSS. Pt with c/o 7/10 abdominal/ incisional pain. Will give prn pain medication per MD order. Abdominal dressing dry, clean, and intact. NG tube to R nare to low intermittent suction. No further output noted in canister from yesterday evening.     NG tube flushed with roughly 30cc water and dark brown contents began to flow into canister. No c/o nausea.     Iv fluids infusing per MD order and SCDs on.     Will continue to monitor.

## 2021-12-12 NOTE — NURSING
Dr. Rubio gave orders to hold lovenox today due to bleeding yesterday and drop in patients H&H.     Re-address lovenox in am.     SCDs on for DVT prophylaxis.

## 2021-12-12 NOTE — NURSING
Dressing changed at midnight. Old saturated dressings removed to expose a blood clot inbetween the skin and SX dressing. Clot removed and seal created between dressing and skin. Bleeding appears to be controled. Will continue to monitor.

## 2021-12-12 NOTE — NURSING
Patient requiring multiple dressing changes over incision this shift and previous shift. Made MD aware of continuing bleeding. See new orders. Will continue to monitor.

## 2021-12-13 LAB
ALBUMIN SERPL BCP-MCNC: 2 G/DL (ref 3.5–5.2)
ALP SERPL-CCNC: 70 U/L (ref 55–135)
ALT SERPL W/O P-5'-P-CCNC: 15 U/L (ref 10–44)
ANION GAP SERPL CALC-SCNC: 10 MMOL/L (ref 8–16)
ANISOCYTOSIS BLD QL SMEAR: SLIGHT
AST SERPL-CCNC: 19 U/L (ref 10–40)
BASOPHILS # BLD AUTO: ABNORMAL K/UL (ref 0–0.2)
BASOPHILS NFR BLD: 0 % (ref 0–1.9)
BILIRUB SERPL-MCNC: 0.6 MG/DL (ref 0.1–1)
BUN SERPL-MCNC: 27 MG/DL (ref 8–23)
CALCIUM SERPL-MCNC: 8.2 MG/DL (ref 8.7–10.5)
CHLORIDE SERPL-SCNC: 125 MMOL/L (ref 95–110)
CO2 SERPL-SCNC: 12 MMOL/L (ref 23–29)
CREAT SERPL-MCNC: 1.2 MG/DL (ref 0.5–1.4)
DACRYOCYTES BLD QL SMEAR: ABNORMAL
DIFFERENTIAL METHOD: ABNORMAL
EOSINOPHIL # BLD AUTO: ABNORMAL K/UL (ref 0–0.5)
EOSINOPHIL NFR BLD: 8 % (ref 0–8)
ERYTHROCYTE [DISTWIDTH] IN BLOOD BY AUTOMATED COUNT: 17 % (ref 11.5–14.5)
EST. GFR  (AFRICAN AMERICAN): >60 ML/MIN/1.73 M^2
EST. GFR  (NON AFRICAN AMERICAN): 57 ML/MIN/1.73 M^2
GLUCOSE SERPL-MCNC: 86 MG/DL (ref 70–110)
HCT VFR BLD AUTO: 30.6 % (ref 40–54)
HGB BLD-MCNC: 9.6 G/DL (ref 14–18)
IMM GRANULOCYTES # BLD AUTO: ABNORMAL K/UL (ref 0–0.04)
IMM GRANULOCYTES NFR BLD AUTO: ABNORMAL % (ref 0–0.5)
LYMPHOCYTES # BLD AUTO: ABNORMAL K/UL (ref 1–4.8)
LYMPHOCYTES NFR BLD: 6 % (ref 18–48)
MCH RBC QN AUTO: 29 PG (ref 27–31)
MCHC RBC AUTO-ENTMCNC: 31.4 G/DL (ref 32–36)
MCV RBC AUTO: 92 FL (ref 82–98)
METAMYELOCYTES NFR BLD MANUAL: 1 %
MONOCYTES # BLD AUTO: ABNORMAL K/UL (ref 0.3–1)
MONOCYTES NFR BLD: 6 % (ref 4–15)
NEUTROPHILS NFR BLD: 66 % (ref 38–73)
NEUTS BAND NFR BLD MANUAL: 13 %
NRBC BLD-RTO: 0 /100 WBC
OVALOCYTES BLD QL SMEAR: ABNORMAL
PLATELET # BLD AUTO: 188 K/UL (ref 150–450)
PLATELET BLD QL SMEAR: ABNORMAL
PMV BLD AUTO: 11.2 FL (ref 9.2–12.9)
POIKILOCYTOSIS BLD QL SMEAR: SLIGHT
POTASSIUM SERPL-SCNC: 3.9 MMOL/L (ref 3.5–5.1)
PROT SERPL-MCNC: 4.6 G/DL (ref 6–8.4)
RBC # BLD AUTO: 3.31 M/UL (ref 4.6–6.2)
SODIUM SERPL-SCNC: 147 MMOL/L (ref 136–145)
WBC # BLD AUTO: 7.83 K/UL (ref 3.9–12.7)

## 2021-12-13 PROCEDURE — 94761 N-INVAS EAR/PLS OXIMETRY MLT: CPT

## 2021-12-13 PROCEDURE — 80053 COMPREHEN METABOLIC PANEL: CPT | Performed by: NURSE PRACTITIONER

## 2021-12-13 PROCEDURE — 63600175 PHARM REV CODE 636 W HCPCS: Performed by: SURGERY

## 2021-12-13 PROCEDURE — 99233 SBSQ HOSP IP/OBS HIGH 50: CPT | Mod: ,,, | Performed by: INTERNAL MEDICINE

## 2021-12-13 PROCEDURE — 97530 THERAPEUTIC ACTIVITIES: CPT

## 2021-12-13 PROCEDURE — 25000003 PHARM REV CODE 250: Performed by: SURGERY

## 2021-12-13 PROCEDURE — 85027 COMPLETE CBC AUTOMATED: CPT | Performed by: NURSE PRACTITIONER

## 2021-12-13 PROCEDURE — C9113 INJ PANTOPRAZOLE SODIUM, VIA: HCPCS | Performed by: SURGERY

## 2021-12-13 PROCEDURE — 25000003 PHARM REV CODE 250: Performed by: INTERNAL MEDICINE

## 2021-12-13 PROCEDURE — 99233 PR SUBSEQUENT HOSPITAL CARE,LEVL III: ICD-10-PCS | Mod: ,,, | Performed by: INTERNAL MEDICINE

## 2021-12-13 PROCEDURE — 97116 GAIT TRAINING THERAPY: CPT

## 2021-12-13 PROCEDURE — 11000001 HC ACUTE MED/SURG PRIVATE ROOM

## 2021-12-13 PROCEDURE — 36415 COLL VENOUS BLD VENIPUNCTURE: CPT | Performed by: NURSE PRACTITIONER

## 2021-12-13 PROCEDURE — 85007 BL SMEAR W/DIFF WBC COUNT: CPT | Performed by: NURSE PRACTITIONER

## 2021-12-13 RX ORDER — LEVOTHYROXINE SODIUM 100 UG/1
100 TABLET ORAL
Status: DISCONTINUED | OUTPATIENT
Start: 2021-12-13 | End: 2021-12-23 | Stop reason: HOSPADM

## 2021-12-13 RX ADMIN — LABETALOL HYDROCHLORIDE 5 MG: 5 INJECTION, SOLUTION INTRAVENOUS at 06:12

## 2021-12-13 RX ADMIN — PANTOPRAZOLE SODIUM 40 MG: 40 INJECTION, POWDER, LYOPHILIZED, FOR SOLUTION INTRAVENOUS at 07:12

## 2021-12-13 RX ADMIN — LABETALOL HYDROCHLORIDE 5 MG: 5 INJECTION, SOLUTION INTRAVENOUS at 01:12

## 2021-12-13 RX ADMIN — LEVOTHYROXINE SODIUM 100 MCG: 100 TABLET ORAL at 12:12

## 2021-12-13 RX ADMIN — LABETALOL HYDROCHLORIDE 5 MG: 5 INJECTION, SOLUTION INTRAVENOUS at 05:12

## 2021-12-13 RX ADMIN — SODIUM CHLORIDE: 0.9 INJECTION, SOLUTION INTRAVENOUS at 09:12

## 2021-12-13 RX ADMIN — SODIUM CHLORIDE: 0.9 INJECTION, SOLUTION INTRAVENOUS at 07:12

## 2021-12-13 RX ADMIN — LABETALOL HYDROCHLORIDE 5 MG: 5 INJECTION, SOLUTION INTRAVENOUS at 12:12

## 2021-12-13 RX ADMIN — ENOXAPARIN SODIUM 100 MG: 100 INJECTION SUBCUTANEOUS at 09:12

## 2021-12-13 RX ADMIN — ENOXAPARIN SODIUM 100 MG: 100 INJECTION SUBCUTANEOUS at 10:12

## 2021-12-13 NOTE — NURSING
Patient lying in bed. NG tube connected to low intermittent suction draining green, bile like contents. Pt put out 240cc overnight. Pt denies any pain at this time. Abdominal dressing clean, dry, and intact. SCDs on.     Oral care provided and pt turned per protocol.     Will continue to monitor.     Will hold am Lovenox until MD sees pt and CBC results.

## 2021-12-13 NOTE — PROGRESS NOTES
[facility] ([address] [place] [address] Medicine  Progress Note    Patient Name: Domonique Hernandez Jr.  MRN: [id number]  Patient Class: IP- Inpatient   Admission Date: 12/7/2021  Length of Stay: 6 days  Attending Physician: Emiliano Cam MD  Primary Care Provider: Emiliano Cam MD        Subjective:     Principal Problem:SBO (small bowel obstruction)        HPI:  82yo male patient with hx of anxiety, arthritis, back pain, bronchitis/COPD, depression, GERD, HLD, hypothyroid, PVD, sleep apnea on CPAP, and CKD. Pt came to ER with c/o regurgitation for 3 days. Decrease intake. No fever. No UTI s/s. CT on admission shows SBO. Significantly dehydrated. Creat 3.6-- baseline 1.4. Given 1L NS in ER U/a dirty one dose of zosyn given. No fever. No elevated WBC. Lactate 2.3. He had NGT place and put to suction. He is in bed this am. Reports feeling better. General surgery consulted.       Overview/Hospital Course:  12/8 Pt was admitted yesterday with SBO. He had general surgery consulted. NGT placed to suction. Still no flatus or BM. KUB pending for this am.     Also on rocephin for UTI-culture in process. Was given 1L NS in ER and repeated on floor then started NS at 150ml/hr. Creat on admit 3.6> now 3.0-- baseline creat 1.4. lactate did impropve on repeat.  Renal US shows cysts.  No hydronephrosis.     12/9 pt here with SBO. NGT to suction had 1000ml overnight. No flatus. No BM. +BS  Renal function improved 3.6>>2.1 on NS at 150ml/hr. Rocephin for UTI- urine culture shows multiple organisms. NO fever. NO elevated WBC    12/10/21    82YO WM admitted with SBO 4 days ago, NGT pulled yesterday, tolerating liquids,   Burping, t  KUB this am- NG tube removed, otherwise no significant change when compared with the prior study of December 9, 2021.     Creat much improved 3.6>1.6 ,  IV Rocephin day 4 for UTI, initial urine Cx- multiple organisms, repaeat U/A dirty, culture in process   7 months ago urine CX+ MRSA- sensitve to  bactrim, tetracycline and Vanc ; will add oral doxy   WBC normal, afebrile   Per PT yesterday; Gait: Contact Gaurd Assistance x ~30 feet (steeping forward and back due to limited line of medical equipment - NG tube) using RW.    12/11/21  82YO WM admitted with SBO  NG was pulled 12/9   But yesterday started with nausea and later with vomiting  and NG has to be placed back again   KUB is pending .  On doxy for UTI recurrent ; last culture staph mrsa sensitive to tetracyclines     12/12  S/p surgery yesterday for SBO .  Reports no flatus . No BM   Issues with bleeding from incision   Lab Results   Component Value Date    WBC 9.47 12/12/2021    HGB 11.3 (L) 12/12/2021    HCT 37.2 (L) 12/12/2021    MCV 96 12/12/2021     12/12/2021 12/13 pt was taken to OR Saturday. He had NGT replaced after that. This am he is feeling better. Dr Hein saw him this am from general surgery and rec pulling NG and starting clears. He is not tender to palp this am. Passed gas this am.   H/H 9/30- lovenox was held due to ozzing after surgery- hx of a fib  Urine culture shows candida         Review of Systems   Constitutional: Positive for fatigue. Negative for activity change, fever and unexpected weight change.   HENT: Negative for congestion and sore throat.    Eyes: Negative for visual disturbance.   Respiratory: Negative for cough and shortness of breath.    Cardiovascular: Negative for chest pain.   Gastrointestinal: Positive for abdominal pain (around incision only now). Negative for blood in stool, constipation and diarrhea.   Genitourinary: Negative for dysuria and hematuria.   Neurological: Negative for dizziness and light-headedness.     Objective:     Vital Signs (Most Recent):  Temp: 96.5 °F (35.8 °C) (12/13/21 0727)  Pulse: 103 (12/13/21 1000)  Resp: 20 (12/13/21 0727)  BP: (!) 158/77 (12/13/21 0727)  SpO2: 97 % (12/13/21 0739) Vital Signs (24h Range):  Temp:  [96.4 °F (35.8 °C)-98.8 °F (37.1 °C)] 96.5 °F (35.8  °C)  Pulse:  [] 103  Resp:  [18-20] 20  SpO2:  [95 %-98 %] 97 %  BP: (116-170)/(60-79) 158/77     Weight: 100 kg (220 lb 7.4 oz)  Body mass index is 33.52 kg/m².    Intake/Output Summary (Last 24 hours) at 12/13/2021 1021  Last data filed at 12/13/2021 0625  Gross per 24 hour   Intake 6439.21 ml   Output 1500 ml   Net 4939.21 ml      Physical Exam  Vitals and nursing note reviewed.   Constitutional:       General: He is not in acute distress.     Appearance: He is well-developed.   HENT:      Head: Normocephalic and atraumatic.      Right Ear: External ear normal.      Left Ear: External ear normal.      Nose:      Comments: Pressure sore on right nare from NGT  Eyes:      General:         Right eye: No discharge.         Left eye: No discharge.      Conjunctiva/sclera: Conjunctivae normal.   Neck:      Thyroid: No thyromegaly.   Cardiovascular:      Rate and Rhythm: Normal rate and regular rhythm.      Heart sounds: Normal heart sounds. No murmur heard.      Pulmonary:      Effort: Pulmonary effort is normal. No respiratory distress.      Breath sounds: Normal breath sounds.   Abdominal:      General: There is no distension.      Palpations: Abdomen is soft.      Tenderness: There is abdominal tenderness (around incision).   Musculoskeletal:      Cervical back: Neck supple.      Right lower leg: No edema.      Left lower leg: No edema.   Skin:     General: Skin is warm and dry.   Neurological:      General: No focal deficit present.      Mental Status: He is alert. Mental status is at baseline.   Psychiatric:         Behavior: Behavior normal.         Thought Content: Thought content normal.         Significant Labs:   .  CBC:   Recent Labs   Lab 12/11/21  1848 12/12/21 0623 12/13/21  0757   WBC 10.50 9.47 7.83   HGB 14.0 11.3* 9.6*   HCT 45.5 37.2* 30.6*    206 188     CMP:   Recent Labs   Lab 12/12/21  0623 12/13/21  0757   * 147*   K 4.2 3.9   * 125*   CO2 18* 12*    86   BUN 31*  27*   CREATININE 1.5* 1.2   CALCIUM 8.5* 8.2*   PROT 4.5* 4.6*   ALBUMIN 2.2* 2.0*   BILITOT 0.7 0.6   ALKPHOS 69 70   AST 16 19   ALT 16 15   ANIONGAP 8 10   EGFRNONAA 44* 57*     lactic acid 2.7>1.9  Lipase 27  urine culture multiple organisms   covid screen negative   Significant Imagin/11 KUB NG tube in position.  Prior midline abdominal wall hernia repair.  Otherwise negative abdomen x-ray    12/10 KUB NG tube again noted in place.  No significant change in the bowel gas pattern when compared with the prior study.     US renal Normal size bilateral kidneys each containing a couple of benign anechoic cyst identified the midpole and upper pole regions largest single index cyst about the lateral cortex of the right kidney measuring 2.3 x 2.1 x 2.2 cm in size.     KUB  Enteric tube terminates in the gastric body.  The degree of distention of the small bowel loops is decreased as compared to prior examinations suggesting a resolving/revolved small-bowel obstruction.  No free air is identified.  Age-appropriate degenerative changes affect the skeleton.     CT abd and pelvis Imaging findings in keeping with a high-grade small bowel obstruction with a point of transition seen in the right mid abdomen, likely related to adhesions.  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  Additionally, there is a small amount of free fluid about the liver and extending into the pelvic cul-de-sac, with slightly high density of the fluid in the pelvic cul-de-sac suggesting hyperemia issues content fluid.     KUB The enteric tube has been appropriately retracted and now terminates in the region of the distal gastric body.Slightly prominent loops of small bowel seen in the upper abdomen.  No definite free peritoneal air is seen.     Lung parenchyma cannot be adequately evaluated secondary to overexposure of the central  aspect of the lungs.Consider dedicated radiograph of the chest evaluate for parenchymal injury.     CXR Enteric tube terminates in the gastric body.  There is bibasilar subsegmental atelectasis, with right basilar scarring.  No focal airspace consolidation or pleural effusions.  No pneumothorax.  The heart is enlarged.  Calcified atheromatous disease affects the aorta.  Median sternotomy wires are midline.  Age-appropriate degenerative changes affect the skeleton.         Assessment/Plan:      * SBO (small bowel obstruction)  NPO  NGT to wall suction  KUB daily  CXR nothing acute  IVF started NS at 150ml/hr    He meets SIRS criteria but I think better explained by SBO presentation that infection.  UA > 100 WBC but he has no urinary tract symptoms. He has recurrent UTI and will start Rocephin now but my clinical impression is the abnormal vitals and elevated lactic acid are due to SBO, dehydration and not UTI sepsis    12/10 tolerating orals, advance diet .    12/11  Placed NG back again   KUB pending     12/12  S/p surgery ; POD 1   NG to suction   Bleeding stopped    12/13   NG pulled from general surgery will try clears this am. + flatus    Evaluation by psychiatric service required  Reviewed  consult .      Paroxysmal atrial fibrillation  Noted in Dr Fair notes:    On eliquis and BB at home. NPO here. Will give lovenox- renally dose and labetalol IV for now   12/10 tolerating liquids, advance diet and resume oral BB, stop IV labetolol,   Resume eliquis;      12/11  DC eliquis   Resume lovenox .    12/12  HOLD LOVENOX   HB dropped 2 points   Follow CBC   Resume lovenox tomorrow     12/13 resume lovenox and watch for bleeding     Acute renal failure  Baseline creat 1.3-1.4- on admission 3.6. 1L NS given in ER. Repeat 1L Ns bolus 12/7 and then cont NS at 150ml/hr. Repeat lactate better 1.9  Creat now 3.0  Son reports that he has no issues urinating at home  Will check renal U/s and post void residual  Has had  urinary re tension in past. Consider salcedo if retaining due ti UTI    Repeat u.a today cont rocephin     12/10 creat improved - 3.6>1.6; renal US stable     12/11  BMP  Lab Results   Component Value Date     (H) 12/13/2021    K 3.9 12/13/2021     (H) 12/13/2021    CO2 12 (L) 12/13/2021    BUN 27 (H) 12/13/2021    CREATININE 1.2 12/13/2021    CALCIUM 8.2 (L) 12/13/2021    ANIONGAP 10 12/13/2021    ESTGFRAFRICA >60 12/13/2021    EGFRNONAA 57 (A) 12/13/2021 12/13 Back to baseline   BMP  Lab Results   Component Value Date     (H) 12/13/2021    K 3.9 12/13/2021     (H) 12/13/2021    CO2 12 (L) 12/13/2021    BUN 27 (H) 12/13/2021    CREATININE 1.2 12/13/2021    CALCIUM 8.2 (L) 12/13/2021    ANIONGAP 10 12/13/2021    ESTGFRAFRICA >60 12/13/2021    EGFRNONAA 57 (A) 12/13/2021         Asymptomatic bacteriuria  He has no UTI sx on interview day of admit  Culture urine  Start rocephin empirically as h/o of UTI and not a great historian but as noted above I think vitals/lactic acid precipitated by SBO and not UTI sepsis    Cont rocephin and repeat u.a   Pt is s/p lithotripsy, stent removal, and cystoureteroscopy with retrograde pyelogram w/ stent insertion on 5/31/21, tx with Bactrim for MRSA UTI  Will add oral doxy  To cover for staph MRSA UTI .    12/12  Doxy day 3    12/13 d/c abx, grew candida> low colony count and asymptomatic. No further treatment needed    SIRS (systemic inflammatory response syndrome)  He meets SIRS criteria but I think better explained by SBO presentation than infection.  UA > 100 WBC but he has no urinary tract symptoms. He has recurrent UTI and will start Rocephin now but my clinical impression is the abnormal vitals and elevated lactic acid are due to SBO, dehydration and not UTI sepsis.      Hypothyroidism  Hold synthroid while NPO  If remains NPO > 3 days can start IV  Resume oral thyroid meds .    12/11  Hold po meds.    12/13 resume today    GERD (gastroesophageal  reflux disease)  protonix IV  tolerating oral - change to po .    12/11  Back to IV     Cont PPI    Hyperlipidemia  Holding zocor while NPO  12/10 resume - zocor non formulary- atorvastatin while here - NPO for now will resume once taking po    12/13: simvastatin not formulary. Can resume as outpatient. Hopefully d/c soon        VTE Risk Mitigation (From admission, onward)         Ordered     enoxaparin injection 100 mg  Every 12 hours (non-standard times)         12/10/21 1846     IP VTE HIGH RISK PATIENT  Once         12/07/21 0620     Place sequential compression device  Until discontinued         12/07/21 0620                Discharge Planning   STEPHANIE:      Code Status: Full Code   Is the patient medically ready for discharge?:     Reason for patient still in hospital (select all that apply): Treatment  Discharge Plan A: Skilled Nursing Facility   Discharge Delays: None known at this time              Estrellita Barrera MD  Department of Hospital Medicine   [place] [facility] (3rd Fl)

## 2021-12-13 NOTE — ASSESSMENT & PLAN NOTE
Baseline creat 1.3-1.4- on admission 3.6. 1L NS given in ER. Repeat 1L Ns bolus 12/7 and then cont NS at 150ml/hr. Repeat lactate better 1.9  Creat now 3.0  Son reports that he has no issues urinating at home  Will check renal U/s and post void residual  Has had urinary re tension in past. Consider salcedo if retaining due ti UTI    Repeat u.a today cont rocephin     12/10 creat improved - 3.6>1.6; renal US stable     12/11  BMP  Lab Results   Component Value Date     (H) 12/13/2021    K 3.9 12/13/2021     (H) 12/13/2021    CO2 12 (L) 12/13/2021    BUN 27 (H) 12/13/2021    CREATININE 1.2 12/13/2021    CALCIUM 8.2 (L) 12/13/2021    ANIONGAP 10 12/13/2021    ESTGFRAFRICA >60 12/13/2021    EGFRNONAA 57 (A) 12/13/2021 12/13 Back to baseline   BMP  Lab Results   Component Value Date     (H) 12/13/2021    K 3.9 12/13/2021     (H) 12/13/2021    CO2 12 (L) 12/13/2021    BUN 27 (H) 12/13/2021    CREATININE 1.2 12/13/2021    CALCIUM 8.2 (L) 12/13/2021    ANIONGAP 10 12/13/2021    ESTGFRAFRICA >60 12/13/2021    EGFRNONAA 57 (A) 12/13/2021

## 2021-12-13 NOTE — PLAN OF CARE
12/13/21 1407   Post-Acute Status   Post-Acute Authorization Placement   Post-Acute Placement Status Referrals Sent   Discharge Delays None known at this time       ULISSES contacted patient's son, Eliu, to discuss placement. ULISSES provided Eliu with a list of local nursing homes generated by [facility] on Friday. Eliu states that he would like the patient's referral sent to [facility] or [facility].     Upon [facility]ISSES opening the [facility] system, [facility] is now inquiring if patient is still in need of placement. Last week when referral was sent, they declined as there were no beds available. [facility]ISSES sent back a message to inform that the patient does still need placement at this time. Awaiting response from [facility] before proceeding with more referrals.

## 2021-12-13 NOTE — ASSESSMENT & PLAN NOTE
Noted in Dr Fair notes:    On eliquis and BB at home. NPO here. Will give lovenox- renally dose and labetalol IV for now   12/10 tolerating liquids, advance diet and resume oral BB, stop IV labetolol,   Resume eliquis;      12/11  DC eliquis   Resume lovenox .    12/12  HOLD LOVENOX   HB dropped 2 points   Follow CBC   Resume lovenox tomorrow     12/13 resume lovenox and watch for bleeding

## 2021-12-13 NOTE — ASSESSMENT & PLAN NOTE
Hold synthroid while NPO  If remains NPO > 3 days can start IV  Resume oral thyroid meds .    12/11  Hold po meds.    12/13 resume today

## 2021-12-13 NOTE — ASSESSMENT & PLAN NOTE
NPO  NGT to wall suction  KUB daily  CXR nothing acute  IVF started NS at 150ml/hr    He meets SIRS criteria but I think better explained by SBO presentation that infection.  UA > 100 WBC but he has no urinary tract symptoms. He has recurrent UTI and will start Rocephin now but my clinical impression is the abnormal vitals and elevated lactic acid are due to SBO, dehydration and not UTI sepsis    12/10 tolerating orals, advance diet .    12/11  Placed NG back again   KUB pending     12/12  S/p surgery ; POD 1   NG to suction   Bleeding stopped    12/13   NG pulled from general surgery will try clears this am. + flatus

## 2021-12-13 NOTE — SUBJECTIVE & OBJECTIVE
Review of Systems   Constitutional: Positive for fatigue. Negative for activity change, fever and unexpected weight change.   HENT: Negative for congestion and sore throat.    Eyes: Negative for visual disturbance.   Respiratory: Negative for cough and shortness of breath.    Cardiovascular: Negative for chest pain.   Gastrointestinal: Positive for abdominal pain (around incision only now). Negative for blood in stool, constipation and diarrhea.   Genitourinary: Negative for dysuria and hematuria.   Neurological: Negative for dizziness and light-headedness.     Objective:     Vital Signs (Most Recent):  Temp: 96.5 °F (35.8 °C) (12/13/21 0727)  Pulse: 103 (12/13/21 1000)  Resp: 20 (12/13/21 0727)  BP: (!) 158/77 (12/13/21 0727)  SpO2: 97 % (12/13/21 0739) Vital Signs (24h Range):  Temp:  [96.4 °F (35.8 °C)-98.8 °F (37.1 °C)] 96.5 °F (35.8 °C)  Pulse:  [] 103  Resp:  [18-20] 20  SpO2:  [95 %-98 %] 97 %  BP: (116-170)/(60-79) 158/77     Weight: 100 kg (220 lb 7.4 oz)  Body mass index is 33.52 kg/m².    Intake/Output Summary (Last 24 hours) at 12/13/2021 1021  Last data filed at 12/13/2021 0625  Gross per 24 hour   Intake 6439.21 ml   Output 1500 ml   Net 4939.21 ml      Physical Exam  Vitals and nursing note reviewed.   Constitutional:       General: He is not in acute distress.     Appearance: He is well-developed.   HENT:      Head: Normocephalic and atraumatic.      Right Ear: External ear normal.      Left Ear: External ear normal.      Nose:      Comments: Pressure sore on right nare from NGT  Eyes:      General:         Right eye: No discharge.         Left eye: No discharge.      Conjunctiva/sclera: Conjunctivae normal.   Neck:      Thyroid: No thyromegaly.   Cardiovascular:      Rate and Rhythm: Normal rate and regular rhythm.      Heart sounds: Normal heart sounds. No murmur heard.      Pulmonary:      Effort: Pulmonary effort is normal. No respiratory distress.      Breath sounds: Normal breath  sounds.   Abdominal:      General: There is no distension.      Palpations: Abdomen is soft.      Tenderness: There is abdominal tenderness (around incision).   Musculoskeletal:      Cervical back: Neck supple.      Right lower leg: No edema.      Left lower leg: No edema.   Skin:     General: Skin is warm and dry.   Neurological:      General: No focal deficit present.      Mental Status: He is alert. Mental status is at baseline.   Psychiatric:         Behavior: Behavior normal.         Thought Content: Thought content normal.         Significant Labs:   .  CBC:   Recent Labs   Lab 21  1848 21  0623 21  0757   WBC 10.50 9.47 7.83   HGB 14.0 11.3* 9.6*   HCT 45.5 37.2* 30.6*    206 188     CMP:   Recent Labs   Lab 21  0623 21  0757   * 147*   K 4.2 3.9   * 125*   CO2 18* 12*    86   BUN 31* 27*   CREATININE 1.5* 1.2   CALCIUM 8.5* 8.2*   PROT 4.5* 4.6*   ALBUMIN 2.2* 2.0*   BILITOT 0.7 0.6   ALKPHOS 69 70   AST 16 19   ALT 16 15   ANIONGAP 8 10   EGFRNONAA 44* 57*     lactic acid 2.7>1.9  Lipase 27  urine culture multiple organisms   covid screen negative   Significant Imagin/11 KUB NG tube in position.  Prior midline abdominal wall hernia repair.  Otherwise negative abdomen x-ray    12/10 KUB NG tube again noted in place.  No significant change in the bowel gas pattern when compared with the prior study.     US renal Normal size bilateral kidneys each containing a couple of benign anechoic cyst identified the midpole and upper pole regions largest single index cyst about the lateral cortex of the right kidney measuring 2.3 x 2.1 x 2.2 cm in size.     KUB  Enteric tube terminates in the gastric body.  The degree of distention of the small bowel loops is decreased as compared to prior examinations suggesting a resolving/revolved small-bowel obstruction.  No free air is identified.  Age-appropriate degenerative changes affect the skeleton.     CT  abd and pelvis Imaging findings in keeping with a high-grade small bowel obstruction with a point of transition seen in the right mid abdomen, likely related to adhesions.  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  Additionally, there is a small amount of free fluid about the liver and extending into the pelvic cul-de-sac, with slightly high density of the fluid in the pelvic cul-de-sac suggesting hyperemia issues content fluid.     KUB The enteric tube has been appropriately retracted and now terminates in the region of the distal gastric body.Slightly prominent loops of small bowel seen in the upper abdomen.  No definite free peritoneal air is seen.     Lung parenchyma cannot be adequately evaluated secondary to overexposure of the central aspect of the lungs.Consider dedicated radiograph of the chest evaluate for parenchymal injury.     CXR Enteric tube terminates in the gastric body.  There is bibasilar subsegmental atelectasis, with right basilar scarring.  No focal airspace consolidation or pleural effusions.  No pneumothorax.  The heart is enlarged.  Calcified atheromatous disease affects the aorta.  Median sternotomy wires are midline.  Age-appropriate degenerative changes affect the skeleton.

## 2021-12-13 NOTE — NURSING
Spoke with Dr. Barrera. She is ok with patient receiving lovenox today due to hx of afib.     Will give and monitor for any s/s of bleeding.

## 2021-12-13 NOTE — PT/OT/SLP PROGRESS
"Physical Therapy Treatment    Patient Name:  Domonique Hernandez Jr.   MRN:  [id number]    Recommendations:     Discharge Recommendations:  nursing facility, basic,nursing facility, skilled   Discharge Equipment Recommendations: none   Barriers to discharge: Decreased caregiver support    Assessment:     Domonique Hernandez Jr. is a 81 y.o. male admitted with a medical diagnosis of SBO (small bowel obstruction).  He presents with the following impairments/functional limitations:  weakness,impaired endurance,impaired self care skills,impaired functional mobilty,gait instability,decreased safety awareness,impaired balance. Patient tolerated PT session today. Gradually increased gait distances with RW and tolerated sitting at bedside wheelchair for ~30 minutes and patient asking to get back to bed due to feeling tired and leaning to left side.    Rehab Prognosis: Fair; patient would benefit from acute skilled PT services to address these deficits and reach maximum level of function.    Recent Surgery: Procedure(s) (LRB):  LAPAROTOMY, EXPLORATORY (N/A) 2 Days Post-Op    Plan:     During this hospitalization, patient to be seen daily to address the identified rehab impairments via gait training,therapeutic activities,therapeutic exercises and progress toward the following goals:    · Plan of Care Expires:  12/16/21    Subjective     Chief Complaint: Patient complain thirsty. Explained to patient he is under NPO and can only have a sponge taste of water.   Patient/Family Comments/goals: "To get better".  Pain/Comfort:  · Pain Rating 1: 0/10      Objective:     Communicated with nursing , patient and son  prior to session.  Patient found HOB elevated with NG tube,SCD,peripheral IV,telemetry upon PT entry to room.     General Precautions: Standard, fall   Orthopedic Precautions:N/A   Braces: N/A  Respiratory Status:  · O2 Device (Oxygen Therapy): room air     Functional Mobility:  · Bed Mobility:     · Rolling Left:  contact guard " assistance  · Rolling Right: contact guard assistance  · Scooting: contact guard assistance  · Supine to Sit: contact guard assistance  · Sit to Supine: contact guard assistance  · Transfers:     · Sit to Stand:  contact guard assistance with rolling walker  · Bed to Chair: contact guard assistance with  rolling walker  using  Step Transfer  · Gait: Contact Guard Assistance x ~30 feet(stepping forward/back) with RW. Dec floor clearance and gets fatigue easily.      AM-PAC 6 CLICK MOBILITY  Turning over in bed (including adjusting bedclothes, sheets and blankets)?: 3  Sitting down on and standing up from a chair with arms (e.g., wheelchair, bedside commode, etc.): 3  Moving from lying on back to sitting on the side of the bed?: 3  Moving to and from a bed to a chair (including a wheelchair)?: 3  Need to walk in hospital room?: 3  Climbing 3-5 steps with a railing?: 2  Basic Mobility Total Score: 17       Therapeutic Activities and Exercises:   Worked on safety measures on out of bed actiivty, sitting activity in the wheelchair x ~30 mins tolerance, gait tnrg with RW x ~30 feet with contact guard assistance.    Patient left HOB elevated with all lines intact, call button in reach, bed alarm on, nursing  notified and son present..    GOALS:   Multidisciplinary Problems     Physical Therapy Goals        Problem: Physical Therapy Goal    Goal Priority Disciplines Outcome Goal Variances Interventions   Physical Therapy Goal     PT, PT/OT Ongoing, Progressing     Description: Goals to be met by: 21    Patient will increase functional independence with mobility by performin. Supine to sit with Modified Independent  2. Sit to supine with Modified Independent  3. Bed to chair transfer with Supervision or Set-up Assistancewith or without rolling walker using Step Transfer TECHNIQUE  4. Gait  x ~100  feet with Supervision or Set-up Assistance with or without rolling walker  5. Lower extremity exercise program x10  reps per handout, with assistance as needed                      Time Tracking:     PT Received On: 12/13/21  PT Start Time: 0812     PT Stop Time: 0840  PT Total Time (min): 28 min     Billable Minutes: Gait Training 13 mins and Therapeutic Activity 15    Treatment Type: Treatment  PT/PTA: PT           12/13/2021

## 2021-12-13 NOTE — ASSESSMENT & PLAN NOTE
He has no UTI sx on interview day of admit  Culture urine  Start rocephin empirically as h/o of UTI and not a great historian but as noted above I think vitals/lactic acid precipitated by SBO and not UTI sepsis    Cont rocephin and repeat u.a   Pt is s/p lithotripsy, stent removal, and cystoureteroscopy with retrograde pyelogram w/ stent insertion on 5/31/21, tx with Bactrim for MRSA UTI  Will add oral doxy  To cover for staph MRSA UTI .    12/12  Doxy day 3    12/13 d/c abx, grew candida> low colony count and asymptomatic. No further treatment needed

## 2021-12-13 NOTE — PROGRESS NOTES
[facility] ([facility])  General Surgery  Progress Note    Subjective:     Interval History:  Patient denying abdominal pain.  Patient denies nausea.  Patient reports flatus but no bowel movement.    Post-Op Info:  Procedure(s) (LRB):  LAPAROTOMY, EXPLORATORY (N/A)   2 Days Post-Op      Medications:  Continuous Infusions:   sodium chloride 0.9% 150 mL/hr at 12/13/21 0735     Scheduled Meds:   doxycycline (VIBRAMYCIN) IVPB  100 mg Intravenous Daily    enoxaparin  1 mg/kg Subcutaneous Q12H    labetaloL  5 mg Intravenous Q6H    pantoprazole  40 mg Intravenous Daily     PRN Meds:HYDROmorphone, melatonin, ondansetron, promethazine (PHENERGAN) IVPB, sodium chloride 0.9%     Objective:     Vital Signs (Most Recent):  Temp: 96.5 °F (35.8 °C) (12/13/21 0727)  Pulse: 95 (12/13/21 0727)  Resp: 20 (12/13/21 0727)  BP: (!) 158/77 (12/13/21 0727)  SpO2: 97 % (12/13/21 0739) Vital Signs (24h Range):  Temp:  [96.4 °F (35.8 °C)-98.8 °F (37.1 °C)] 96.5 °F (35.8 °C)  Pulse:  [] 95  Resp:  [18-20] 20  SpO2:  [95 %-98 %] 97 %  BP: (116-170)/(60-79) 158/77       Intake/Output Summary (Last 24 hours) at 12/13/2021 0855  Last data filed at 12/13/2021 0625  Gross per 24 hour   Intake 6439.21 ml   Output 1500 ml   Net 4939.21 ml       Physical Exam  Patient is sitting up in a chair.  Patient appears comfortable in no distress.  NG tube remains in place.  Abdomen less distended.  Dressing intact with no drainage   Significant Labs:  All pertinent labs from the last 24 hours have been reviewed.    Significant Diagnostics:  None    Assessment/Plan:     Active Diagnoses:    Diagnosis Date Noted POA    PRINCIPAL PROBLEM:  SBO (small bowel obstruction) [K56.609]  Yes    Evaluation by psychiatric service required [Z00.8]  Not Applicable    SIRS (systemic inflammatory response syndrome) [R65.10] 12/07/2021 Yes    Asymptomatic bacteriuria [R82.71] 12/07/2021 Yes    Acute renal failure [N17.9] 12/07/2021 Yes    Paroxysmal atrial  fibrillation [I48.0] 12/07/2021 Yes    Hypothyroidism [E03.9] 10/30/2012 Yes    Hyperlipidemia [E78.5]  Yes    GERD (gastroesophageal reflux disease) [K21.9]  Yes      Problems Resolved During this Admission:     DC NG tube and try diet    Joaquín Hein MD  General Surgery  [place] [facility] (3rd Fl)

## 2021-12-13 NOTE — NURSING
Pt tolerating clear liquids so far, passing gas. No nausea or vomiting. No c/o pain at this time. Abdominal dressing remains intact with no s/s bleeding.

## 2021-12-13 NOTE — PLAN OF CARE
12/13/21 0822   Post-Acute Status   Post-Acute Authorization Placement   Post-Acute Placement Status Pending state direction/certification   Discharge Delays None known at this time       Psych Consult completed and faxed to the Office of Behavioral Health to complete Level II Screen. Awaiting 142.

## 2021-12-13 NOTE — ASSESSMENT & PLAN NOTE
Holding zocor while NPO  12/10 resume - zocor non formulary- atorvastatin while here - NPO for now will resume once taking po    12/13: simvastatin not formulary. Can resume as outpatient. Hopefully d/c soon

## 2021-12-14 LAB
BASOPHILS # BLD AUTO: 0.03 K/UL (ref 0–0.2)
BASOPHILS NFR BLD: 0.3 % (ref 0–1.9)
DIFFERENTIAL METHOD: ABNORMAL
EOSINOPHIL # BLD AUTO: 0.3 K/UL (ref 0–0.5)
EOSINOPHIL NFR BLD: 3.2 % (ref 0–8)
ERYTHROCYTE [DISTWIDTH] IN BLOOD BY AUTOMATED COUNT: 17 % (ref 11.5–14.5)
HCT VFR BLD AUTO: 29.1 % (ref 40–54)
HGB BLD-MCNC: 9.1 G/DL (ref 14–18)
IMM GRANULOCYTES # BLD AUTO: 0.12 K/UL (ref 0–0.04)
IMM GRANULOCYTES NFR BLD AUTO: 1.2 % (ref 0–0.5)
LYMPHOCYTES # BLD AUTO: 1.1 K/UL (ref 1–4.8)
LYMPHOCYTES NFR BLD: 11.7 % (ref 18–48)
MCH RBC QN AUTO: 29.4 PG (ref 27–31)
MCHC RBC AUTO-ENTMCNC: 31.3 G/DL (ref 32–36)
MCV RBC AUTO: 94 FL (ref 82–98)
MONOCYTES # BLD AUTO: 0.7 K/UL (ref 0.3–1)
MONOCYTES NFR BLD: 6.8 % (ref 4–15)
NEUTROPHILS # BLD AUTO: 7.4 K/UL (ref 1.8–7.7)
NEUTROPHILS NFR BLD: 76.8 % (ref 38–73)
NRBC BLD-RTO: 0 /100 WBC
PLATELET # BLD AUTO: 194 K/UL (ref 150–450)
PMV BLD AUTO: 11.1 FL (ref 9.2–12.9)
POCT GLUCOSE: 102 MG/DL (ref 70–110)
RBC # BLD AUTO: 3.09 M/UL (ref 4.6–6.2)
WBC # BLD AUTO: 9.62 K/UL (ref 3.9–12.7)

## 2021-12-14 PROCEDURE — 11000001 HC ACUTE MED/SURG PRIVATE ROOM

## 2021-12-14 PROCEDURE — 97116 GAIT TRAINING THERAPY: CPT

## 2021-12-14 PROCEDURE — 36415 COLL VENOUS BLD VENIPUNCTURE: CPT | Performed by: FAMILY MEDICINE

## 2021-12-14 PROCEDURE — 99233 PR SUBSEQUENT HOSPITAL CARE,LEVL III: ICD-10-PCS | Mod: ,,, | Performed by: INTERNAL MEDICINE

## 2021-12-14 PROCEDURE — 63600175 PHARM REV CODE 636 W HCPCS: Performed by: SURGERY

## 2021-12-14 PROCEDURE — 25000003 PHARM REV CODE 250: Performed by: SURGERY

## 2021-12-14 PROCEDURE — 85025 COMPLETE CBC W/AUTO DIFF WBC: CPT | Performed by: FAMILY MEDICINE

## 2021-12-14 PROCEDURE — 99233 SBSQ HOSP IP/OBS HIGH 50: CPT | Mod: ,,, | Performed by: INTERNAL MEDICINE

## 2021-12-14 PROCEDURE — 25000003 PHARM REV CODE 250: Performed by: INTERNAL MEDICINE

## 2021-12-14 PROCEDURE — 94761 N-INVAS EAR/PLS OXIMETRY MLT: CPT

## 2021-12-14 PROCEDURE — 63700000 PHARM REV CODE 250 ALT 637 W/O HCPCS: Performed by: NURSE PRACTITIONER

## 2021-12-14 PROCEDURE — C9113 INJ PANTOPRAZOLE SODIUM, VIA: HCPCS | Performed by: SURGERY

## 2021-12-14 RX ORDER — FLUCONAZOLE 100 MG/1
100 TABLET ORAL DAILY
Status: COMPLETED | OUTPATIENT
Start: 2021-12-14 | End: 2021-12-16

## 2021-12-14 RX ADMIN — ENOXAPARIN SODIUM 100 MG: 100 INJECTION SUBCUTANEOUS at 08:12

## 2021-12-14 RX ADMIN — LEVOTHYROXINE SODIUM 100 MCG: 100 TABLET ORAL at 07:12

## 2021-12-14 RX ADMIN — FLUCONAZOLE 100 MG: 100 TABLET ORAL at 12:12

## 2021-12-14 RX ADMIN — LABETALOL HYDROCHLORIDE 5 MG: 5 INJECTION, SOLUTION INTRAVENOUS at 07:12

## 2021-12-14 RX ADMIN — LABETALOL HYDROCHLORIDE 5 MG: 5 INJECTION, SOLUTION INTRAVENOUS at 05:12

## 2021-12-14 RX ADMIN — ENOXAPARIN SODIUM 100 MG: 100 INJECTION SUBCUTANEOUS at 09:12

## 2021-12-14 RX ADMIN — SODIUM CHLORIDE: 0.9 INJECTION, SOLUTION INTRAVENOUS at 04:12

## 2021-12-14 RX ADMIN — LABETALOL HYDROCHLORIDE 5 MG: 5 INJECTION, SOLUTION INTRAVENOUS at 12:12

## 2021-12-14 RX ADMIN — PANTOPRAZOLE SODIUM 40 MG: 40 INJECTION, POWDER, LYOPHILIZED, FOR SOLUTION INTRAVENOUS at 09:12

## 2021-12-14 RX ADMIN — LABETALOL HYDROCHLORIDE 5 MG: 5 INJECTION, SOLUTION INTRAVENOUS at 11:12

## 2021-12-14 NOTE — ASSESSMENT & PLAN NOTE
Holding zocor while NPO  12/10 resume - zocor non formulary- atorvastatin while here - NPO for now will resume once taking po    12/13: simvastatin not formulary. Can resume as outpatient. Hopefully d/c soon.

## 2021-12-14 NOTE — ASSESSMENT & PLAN NOTE
Noted in Dr Fair notes:    On eliquis and BB at home. NPO here. Will give lovenox- renally dose and labetalol IV for now   12/10 tolerating liquids, advance diet and resume oral BB, stop IV labetolol,   Resume eliquis;      12/11  DC eliquis   Resume lovenox .    12/12  HOLD LOVENOX   HB dropped 2 points   Follow CBC   Resume lovenox tomorrow     12/14 resume lovenox and watch for bleeding

## 2021-12-14 NOTE — ASSESSMENT & PLAN NOTE
He meets SIRS criteria but I think better explained by SBO presentation than infection.  UA > 100 WBC but he has no urinary tract symptoms. He has recurrent UTI and will start Rocephin now but my clinical impression is the abnormal vitals and elevated lactic acid are due to SBO, dehydration and not UTI sepsis.  RESOLVED .

## 2021-12-14 NOTE — ASSESSMENT & PLAN NOTE
NPO  NGT to wall suction  KUB daily  CXR nothing acute  IVF started NS at 150ml/hr    He meets SIRS criteria but I think better explained by SBO presentation that infection.  UA > 100 WBC but he has no urinary tract symptoms. He has recurrent UTI and will start Rocephin now but my clinical impression is the abnormal vitals and elevated lactic acid are due to SBO, dehydration and not UTI sepsis    12/10 tolerating orals, advance diet .    12/11  Placed NG back again   KUB pending     12/12  S/p surgery ; POD 1   NG to suction   Bleeding stopped    12/13   NG pulled from general surgery will try clears this am. + flatus.    12/14 tolerating orals . No nausea ; no vomiting

## 2021-12-14 NOTE — PROGRESS NOTES
Patient seen and examined this morning.  No complaints.  His abdominal dressing was removed as it was saturated with some old blood and clot.  Staples are intact.  No active bleeding or drainage.  Instructed the nurse to clean the incision and covered with gauze ABD and tape.  There is 1 recorded bowel movement.  Patient states he is passing flatus.  Appears to be tolerating liquids.  Patient's diet can be advanced as tolerated.  He should be up and ambulating.  No further surgical recommendations.

## 2021-12-14 NOTE — ASSESSMENT & PLAN NOTE
He has no UTI sx on interview day of admit  Culture urine  Start rocephin empirically as h/o of UTI and not a great historian but as noted above I think vitals/lactic acid precipitated by SBO and not UTI sepsis    Cont rocephin and repeat u.a   Pt is s/p lithotripsy, stent removal, and cystoureteroscopy with retrograde pyelogram w/ stent insertion on 5/31/21, tx with Bactrim for MRSA UTI  Will add oral doxy  To cover for staph MRSA UTI .    12/12  Doxy day 3    12/14   d/c abx, grew candida> low colony count and asymptomatic. No further treatment needed.

## 2021-12-14 NOTE — ASSESSMENT & PLAN NOTE
Baseline creat 1.3-1.4- on admission 3.6. 1L NS given in ER. Repeat 1L Ns bolus 12/7 and then cont NS at 150ml/hr. Repeat lactate better 1.9  Creat now 3.0  Son reports that he has no issues urinating at home  Will check renal U/s and post void residual  Has had urinary re tension in past. Consider salcedo if retaining due ti UTI    Repeat u.a today cont rocephin     12/10 creat improved - 3.6>1.6; renal US stable     12/11  BMP  Lab Results   Component Value Date     (H) 12/13/2021    K 3.9 12/13/2021     (H) 12/13/2021    CO2 12 (L) 12/13/2021    BUN 27 (H) 12/13/2021    CREATININE 1.2 12/13/2021    CALCIUM 8.2 (L) 12/13/2021    ANIONGAP 10 12/13/2021    ESTGFRAFRICA >60 12/13/2021    EGFRNONAA 57 (A) 12/13/2021 12/13 Back to baseline   BMP  Lab Results   Component Value Date     (H) 12/13/2021    K 3.9 12/13/2021     (H) 12/13/2021    CO2 12 (L) 12/13/2021    BUN 27 (H) 12/13/2021    CREATININE 1.2 12/13/2021    CALCIUM 8.2 (L) 12/13/2021    ANIONGAP 10 12/13/2021    ESTGFRAFRICA >60 12/13/2021    EGFRNONAA 57 (A) 12/13/2021        done

## 2021-12-14 NOTE — PROGRESS NOTES
[facility] ([address] [place] [address] Medicine  Progress Note    Patient Name: Domonique Hernandez Jr.  MRN: [id number]  Patient Class: IP- Inpatient   Admission Date: 12/7/2021  Length of Stay: 7 days  Attending Physician: Emiliano Cam MD  Primary Care Provider: Emiliano Cam MD        Subjective:     Principal Problem:SBO (small bowel obstruction)        HPI:  82yo male patient with hx of anxiety, arthritis, back pain, bronchitis/COPD, depression, GERD, HLD, hypothyroid, PVD, sleep apnea on CPAP, and CKD. Pt came to ER with c/o regurgitation for 3 days. Decrease intake. No fever. No UTI s/s. CT on admission shows SBO. Significantly dehydrated. Creat 3.6-- baseline 1.4. Given 1L NS in ER U/a dirty one dose of zosyn given. No fever. No elevated WBC. Lactate 2.3. He had NGT place and put to suction. He is in bed this am. Reports feeling better. General surgery consulted.       Overview/Hospital Course:  12/8 Pt was admitted yesterday with SBO. He had general surgery consulted. NGT placed to suction. Still no flatus or BM. KUB pending for this am.     Also on rocephin for UTI-culture in process. Was given 1L NS in ER and repeated on floor then started NS at 150ml/hr. Creat on admit 3.6> now 3.0-- baseline creat 1.4. lactate did impropve on repeat.  Renal US shows cysts.  No hydronephrosis.     12/9 pt here with SBO. NGT to suction had 1000ml overnight. No flatus. No BM. +BS  Renal function improved 3.6>>2.1 on NS at 150ml/hr. Rocephin for UTI- urine culture shows multiple organisms. NO fever. NO elevated WBC    12/10/21    82YO WM admitted with SBO 4 days ago, NGT pulled yesterday, tolerating liquids,   Burping, t  KUB this am- NG tube removed, otherwise no significant change when compared with the prior study of December 9, 2021.     Creat much improved 3.6>1.6 ,  IV Rocephin day 4 for UTI, initial urine Cx- multiple organisms, repaeat U/A dirty, culture in process   7 months ago urine CX+ MRSA- sensitve to  bactrim, tetracycline and Vanc ; will add oral doxy   WBC normal, afebrile   Per PT yesterday; Gait: Contact Gaurd Assistance x ~30 feet (steeping forward and back due to limited line of medical equipment - NG tube) using RW.    12/11/21  82YO WM admitted with SBO  NG was pulled 12/9   But yesterday started with nausea and later with vomiting  and NG has to be placed back again   KUB is pending .  On doxy for UTI recurrent ; last culture staph mrsa sensitive to tetracyclines     12/12  S/p surgery yesterday for SBO .  Reports no flatus . No BM   Issues with bleeding from incision   Lab Results   Component Value Date    WBC 9.47 12/12/2021    HGB 11.3 (L) 12/12/2021    HCT 37.2 (L) 12/12/2021    MCV 96 12/12/2021     12/12/2021 12/13 pt was taken to OR Saturday. He had NGT replaced after that. This am he is feeling better. Dr Hein saw him this am from general surgery and rec pulling NG and starting clears. He is not tender to palp this am. Passed gas this am.   H/H 9/30- lovenox was held due to ozzing after surgery- hx of a fib  Urine culture shows candida     12/14/21  NGT pulled yesterday, pt tolerating liquid diet, Na 147, creat 1.2, + CKD ; getting NS @150ml/hr; decrease to 75ml/hr   H&H 9.1/29 from 9.6/30   Had a BM yesterday, will continue oral liquids until bowel sounds more active   WBC normal, afebrile, O2 sat stable on RA   Urine Cx with candida, doxy stopped after 3 days ; will start diflucan 100mg daily x 3d .          Review of Systems   Constitutional: Positive for fatigue. Negative for activity change, fever and unexpected weight change.   HENT: Negative for congestion and sore throat.    Eyes: Negative for visual disturbance.   Respiratory: Negative for cough and shortness of breath.    Cardiovascular: Negative for chest pain.   Gastrointestinal: Positive for abdominal pain (around incision only now). Negative for blood in stool, constipation and diarrhea.   Genitourinary: Negative  for dysuria and hematuria.   Neurological: Negative for dizziness and light-headedness.     Objective:     Vital Signs (Most Recent):  Temp: 98.2 °F (36.8 °C) (12/14/21 0751)  Pulse: 80 (12/14/21 0800)  Resp: 14 (12/14/21 0751)  BP: (!) 151/73 (12/14/21 0751)  SpO2: 97 % (12/14/21 0751) Vital Signs (24h Range):  Temp:  [96.3 °F (35.7 °C)-98.5 °F (36.9 °C)] 98.2 °F (36.8 °C)  Pulse:  [] 80  Resp:  [14-20] 14  SpO2:  [96 %-99 %] 97 %  BP: (128-152)/(65-83) 151/73     Weight: 100 kg (220 lb 7.4 oz)  Body mass index is 33.52 kg/m².    Intake/Output Summary (Last 24 hours) at 12/14/2021 0915  Last data filed at 12/14/2021 0800  Gross per 24 hour   Intake 2500 ml   Output --   Net 2500 ml      Physical Exam  Vitals and nursing note reviewed.   Constitutional:       General: He is not in acute distress.     Appearance: He is well-developed.   HENT:      Head: Normocephalic and atraumatic.      Right Ear: External ear normal.      Left Ear: External ear normal.      Nose:      Comments: Pressure sore on right nare from NGT  Eyes:      General:         Right eye: No discharge.         Left eye: No discharge.      Conjunctiva/sclera: Conjunctivae normal.   Neck:      Thyroid: No thyromegaly.   Cardiovascular:      Rate and Rhythm: Normal rate and regular rhythm.      Heart sounds: Normal heart sounds. No murmur heard.      Pulmonary:      Effort: Pulmonary effort is normal. No respiratory distress.      Breath sounds: Normal breath sounds.   Abdominal:      General: There is no distension.      Palpations: Abdomen is soft.      Tenderness: There is abdominal tenderness (around incision).   Musculoskeletal:      Cervical back: Neck supple.      Right lower leg: No edema.      Left lower leg: No edema.   Skin:     General: Skin is warm and dry.   Neurological:      General: No focal deficit present.      Mental Status: He is alert. Mental status is at baseline.   Psychiatric:         Behavior: Behavior normal.          Thought Content: Thought content normal.         Significant Labs:   .  CBC:   Recent Labs   Lab 21  0757 21  0556   WBC 7.83 9.62   HGB 9.6* 9.1*   HCT 30.6* 29.1*    194     CMP:   Recent Labs   Lab 21  0757   *   K 3.9   *   CO2 12*   GLU 86   BUN 27*   CREATININE 1.2   CALCIUM 8.2*   PROT 4.6*   ALBUMIN 2.0*   BILITOT 0.6   ALKPHOS 70   AST 19   ALT 15   ANIONGAP 10   EGFRNONAA 57*     lactic acid 2.7>1.9  Lipase 27  urine culture multiple organisms   covid screen negative   Significant Imagin/11 KUB NG tube in position.  Prior midline abdominal wall hernia repair.  Otherwise negative abdomen x-ray    12/10 KUB NG tube again noted in place.  No significant change in the bowel gas pattern when compared with the prior study.     US renal Normal size bilateral kidneys each containing a couple of benign anechoic cyst identified the midpole and upper pole regions largest single index cyst about the lateral cortex of the right kidney measuring 2.3 x 2.1 x 2.2 cm in size.     KUB  Enteric tube terminates in the gastric body.  The degree of distention of the small bowel loops is decreased as compared to prior examinations suggesting a resolving/revolved small-bowel obstruction.  No free air is identified.  Age-appropriate degenerative changes affect the skeleton.     CT abd and pelvis Imaging findings in keeping with a high-grade small bowel obstruction with a point of transition seen in the right mid abdomen, likely related to adhesions.  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  Additionally, there is a small amount of free fluid about the liver and extending into the pelvic cul-de-sac, with slightly high density of the fluid in the pelvic cul-de-sac suggesting hyperemia issues content fluid.     KUB The enteric tube has been appropriately retracted and now  terminates in the region of the distal gastric body.Slightly prominent loops of small bowel seen in the upper abdomen.  No definite free peritoneal air is seen.     Lung parenchyma cannot be adequately evaluated secondary to overexposure of the central aspect of the lungs.Consider dedicated radiograph of the chest evaluate for parenchymal injury.     CXR Enteric tube terminates in the gastric body.  There is bibasilar subsegmental atelectasis, with right basilar scarring.  No focal airspace consolidation or pleural effusions.  No pneumothorax.  The heart is enlarged.  Calcified atheromatous disease affects the aorta.  Median sternotomy wires are midline.  Age-appropriate degenerative changes affect the skeleton.         Assessment/Plan:      * SBO (small bowel obstruction)  NPO  NGT to wall suction  KUB daily  CXR nothing acute  IVF started NS at 150ml/hr    He meets SIRS criteria but I think better explained by SBO presentation that infection.  UA > 100 WBC but he has no urinary tract symptoms. He has recurrent UTI and will start Rocephin now but my clinical impression is the abnormal vitals and elevated lactic acid are due to SBO, dehydration and not UTI sepsis    12/10 tolerating orals, advance diet .    12/11  Placed NG back again   KUB pending     12/12  S/p surgery ; POD 1   NG to suction   Bleeding stopped    12/13   NG pulled from general surgery will try clears this am. + flatus.    12/14 tolerating orals . No nausea ; no vomiting     Evaluation by psychiatric service required  Reviewed  consult .      Paroxysmal atrial fibrillation  Noted in Dr Fair notes:    On eliquis and BB at home. NPO here. Will give lovenox- renally dose and labetalol IV for now   12/10 tolerating liquids, advance diet and resume oral BB, stop IV labetolol,   Resume eliquis;      12/11  DC eliquis   Resume lovenox .    12/12  HOLD LOVENOX   HB dropped 2 points   Follow CBC   Resume lovenox tomorrow     12/14 resume lovenox and watch  for bleeding     Acute renal failure  Baseline creat 1.3-1.4- on admission 3.6. 1L NS given in ER. Repeat 1L Ns bolus 12/7 and then cont NS at 150ml/hr. Repeat lactate better 1.9  Creat now 3.0  Son reports that he has no issues urinating at home  Will check renal U/s and post void residual  Has had urinary re tension in past. Consider salcedo if retaining due ti UTI    Repeat u.a today cont rocephin     12/10 creat improved - 3.6>1.6; renal US stable     12/11  [facility]  Lab Results   Component Value Date     (H) 12/13/2021    K 3.9 12/13/2021     (H) 12/13/2021    CO2 12 (L) 12/13/2021    BUN 27 (H) 12/13/2021    CREATININE 1.2 12/13/2021    CALCIUM 8.2 (L) 12/13/2021    ANIONGAP 10 12/13/2021    ESTGFRAFRICA >60 12/13/2021    EGFRNONAA 57 (A) 12/13/2021 12/13 Back to baseline   [facility]  Lab Results   Component Value Date     (H) 12/13/2021    K 3.9 12/13/2021     (H) 12/13/2021    CO2 12 (L) 12/13/2021    BUN 27 (H) 12/13/2021    CREATININE 1.2 12/13/2021    CALCIUM 8.2 (L) 12/13/2021    ANIONGAP 10 12/13/2021    ESTGFRAFRICA >60 12/13/2021    EGFRNONAA 57 (A) 12/13/2021         Asymptomatic bacteriuria  He has no UTI sx on interview day of admit  Culture urine  Start rocephin empirically as h/o of UTI and not a great historian but as noted above I think vitals/lactic acid precipitated by SBO and not UTI sepsis    Cont rocephin and repeat u.a   Pt is s/p lithotripsy, stent removal, and cystoureteroscopy with retrograde pyelogram w/ stent insertion on 5/31/21, tx with Bactrim for MRSA UTI  Will add oral doxy  To cover for staph MRSA UTI .    12/12  Doxy day 3    12/14   d/c abx, grew candida> low colony count and asymptomatic. No further treatment needed.    SIRS (systemic inflammatory response syndrome)  He meets SIRS criteria but I think better explained by SBO presentation than infection.  UA > 100 WBC but he has no urinary tract symptoms. He has recurrent UTI and will start Rocephin now but my  clinical impression is the abnormal vitals and elevated lactic acid are due to SBO, dehydration and not UTI sepsis.  RESOLVED .      Hypothyroidism  Hold synthroid while NPO  If remains NPO > 3 days can start IV  Resume oral thyroid meds .    12/11  Hold po meds.    12/14  Continue levothyroxine     GERD (gastroesophageal reflux disease)  protonix IV  tolerating oral - change to po .    12/11  Back to IV   12/14  Cont PPI.    Hyperlipidemia  Holding zocor while NPO  12/10 resume - zocor non formulary- atorvastatin while here - NPO for now will resume once taking po    12/13: simvastatin not formulary. Can resume as outpatient. Hopefully d/c soon.        VTE Risk Mitigation (From admission, onward)         Ordered     enoxaparin injection 100 mg  Every 12 hours (non-standard times)         12/10/21 1846     IP VTE HIGH RISK PATIENT  Once         12/07/21 0620     Place sequential compression device  Until discontinued         12/07/21 0620                Discharge Planning   STEPHANIE:      Code Status: Full Code   Is the patient medically ready for discharge?:     Reason for patient still in hospital (select all that apply): Treatment  Discharge Plan A: Skilled Nursing Facility   Discharge Delays: None known at this time              Gemma Rubio MD  Department of Hospital Medicine   [facility] - Med Surg (3rd Fl)

## 2021-12-14 NOTE — ASSESSMENT & PLAN NOTE
Hold synthroid while NPO  If remains NPO > 3 days can start IV  Resume oral thyroid meds .    12/11  Hold po meds.    12/14  Continue levothyroxine

## 2021-12-14 NOTE — PLAN OF CARE
Problem: Adult Inpatient Plan of Care  Goal: Plan of Care Review  Outcome: Ongoing, Progressing  Goal: Patient-Specific Goal (Individualization)  Outcome: Ongoing, Progressing  Goal: Absence of Hospital-Acquired Illness or Injury  Outcome: Ongoing, Progressing  Goal: Optimal Comfort and Wellbeing  Outcome: Ongoing, Progressing  Goal: Readiness for Transition of Care  Outcome: Ongoing, Progressing  Goal: Rounds/Family Conference  Outcome: Ongoing, Progressing     Problem: Wound  Goal: Optimal Wound Healing  Outcome: Ongoing, Progressing     Problem: Electrolyte Imbalance (Acute Kidney Injury/Impairment)  Goal: Serum Electrolyte Balance  Outcome: Ongoing, Progressing     Problem: Fluid Imbalance (Acute Kidney Injury/Impairment)  Goal: Optimal Fluid Balance  Outcome: Ongoing, Progressing     Problem: Hematologic Alteration (Acute Kidney Injury/Impairment)  Goal: Hemoglobin, Hematocrit and Platelets Within Normal Range  Outcome: Ongoing, Progressing     Problem: Oral Intake Inadequate (Acute Kidney Injury/Impairment)  Goal: Optimal Nutrition Intake  Outcome: Ongoing, Progressing     Problem: Renal Function Impairment (Acute Kidney Injury/Impairment)  Goal: Effective Renal Function  Outcome: Ongoing, Progressing     Problem: Skin Injury Risk Increased  Goal: Skin Health and Integrity  Outcome: Ongoing, Progressing     Problem: Fall Injury Risk  Goal: Absence of Fall and Fall-Related Injury  Outcome: Ongoing, Progressing

## 2021-12-14 NOTE — SUBJECTIVE & OBJECTIVE
Review of Systems   Constitutional: Positive for fatigue. Negative for activity change, fever and unexpected weight change.   HENT: Negative for congestion and sore throat.    Eyes: Negative for visual disturbance.   Respiratory: Negative for cough and shortness of breath.    Cardiovascular: Negative for chest pain.   Gastrointestinal: Positive for abdominal pain (around incision only now). Negative for blood in stool, constipation and diarrhea.   Genitourinary: Negative for dysuria and hematuria.   Neurological: Negative for dizziness and light-headedness.     Objective:     Vital Signs (Most Recent):  Temp: 98.2 °F (36.8 °C) (12/14/21 0751)  Pulse: 80 (12/14/21 0800)  Resp: 14 (12/14/21 0751)  BP: (!) 151/73 (12/14/21 0751)  SpO2: 97 % (12/14/21 0751) Vital Signs (24h Range):  Temp:  [96.3 °F (35.7 °C)-98.5 °F (36.9 °C)] 98.2 °F (36.8 °C)  Pulse:  [] 80  Resp:  [14-20] 14  SpO2:  [96 %-99 %] 97 %  BP: (128-152)/(65-83) 151/73     Weight: 100 kg (220 lb 7.4 oz)  Body mass index is 33.52 kg/m².    Intake/Output Summary (Last 24 hours) at 12/14/2021 0915  Last data filed at 12/14/2021 0800  Gross per 24 hour   Intake 2500 ml   Output --   Net 2500 ml      Physical Exam  Vitals and nursing note reviewed.   Constitutional:       General: He is not in acute distress.     Appearance: He is well-developed.   HENT:      Head: Normocephalic and atraumatic.      Right Ear: External ear normal.      Left Ear: External ear normal.      Nose:      Comments: Pressure sore on right nare from NGT  Eyes:      General:         Right eye: No discharge.         Left eye: No discharge.      Conjunctiva/sclera: Conjunctivae normal.   Neck:      Thyroid: No thyromegaly.   Cardiovascular:      Rate and Rhythm: Normal rate and regular rhythm.      Heart sounds: Normal heart sounds. No murmur heard.      Pulmonary:      Effort: Pulmonary effort is normal. No respiratory distress.      Breath sounds: Normal breath sounds.    Abdominal:      General: There is no distension.      Palpations: Abdomen is soft.      Tenderness: There is abdominal tenderness (around incision).   Musculoskeletal:      Cervical back: Neck supple.      Right lower leg: No edema.      Left lower leg: No edema.   Skin:     General: Skin is warm and dry.   Neurological:      General: No focal deficit present.      Mental Status: He is alert. Mental status is at baseline.   Psychiatric:         Behavior: Behavior normal.         Thought Content: Thought content normal.         Significant Labs:   .  CBC:   Recent Labs   Lab 21  0757 21  0556   WBC 7.83 9.62   HGB 9.6* 9.1*   HCT 30.6* 29.1*    194     CMP:   Recent Labs   Lab 21  0757   *   K 3.9   *   CO2 12*   GLU 86   BUN 27*   CREATININE 1.2   CALCIUM 8.2*   PROT 4.6*   ALBUMIN 2.0*   BILITOT 0.6   ALKPHOS 70   AST 19   ALT 15   ANIONGAP 10   EGFRNONAA 57*     lactic acid 2.7>1.9  Lipase 27  urine culture multiple organisms   covid screen negative   Significant Imagin/11 KUB NG tube in position.  Prior midline abdominal wall hernia repair.  Otherwise negative abdomen x-ray    12/10 KUB NG tube again noted in place.  No significant change in the bowel gas pattern when compared with the prior study.     US renal Normal size bilateral kidneys each containing a couple of benign anechoic cyst identified the midpole and upper pole regions largest single index cyst about the lateral cortex of the right kidney measuring 2.3 x 2.1 x 2.2 cm in size.     KUB  Enteric tube terminates in the gastric body.  The degree of distention of the small bowel loops is decreased as compared to prior examinations suggesting a resolving/revolved small-bowel obstruction.  No free air is identified.  Age-appropriate degenerative changes affect the skeleton.     CT abd and pelvis Imaging findings in keeping with a high-grade small bowel obstruction with a point of transition seen in the  right mid abdomen, likely related to adhesions.  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  Additionally, there is a small amount of free fluid about the liver and extending into the pelvic cul-de-sac, with slightly high density of the fluid in the pelvic cul-de-sac suggesting hyperemia issues content fluid.     KUB The enteric tube has been appropriately retracted and now terminates in the region of the distal gastric body.Slightly prominent loops of small bowel seen in the upper abdomen.  No definite free peritoneal air is seen.     Lung parenchyma cannot be adequately evaluated secondary to overexposure of the central aspect of the lungs.Consider dedicated radiograph of the chest evaluate for parenchymal injury.     CXR Enteric tube terminates in the gastric body.  There is bibasilar subsegmental atelectasis, with right basilar scarring.  No focal airspace consolidation or pleural effusions.  No pneumothorax.  The heart is enlarged.  Calcified atheromatous disease affects the aorta.  Median sternotomy wires are midline.  Age-appropriate degenerative changes affect the skeleton.

## 2021-12-14 NOTE — PLAN OF CARE
"Patient Agrees and Compliant with Plan of Care:  Monitor Vital Signs; Patient Afebrile this shift; Most recent B/P 130/66.  Maintain Pain Regimen; No c/o pain noted this shift.   Monitor Breathing Pattern; No c/o shortness of breath noted; Oxygen saturation of 96-99% on room air. Bilateral Breath sounds auscultated posteriorly  With diminished sounds RLL LLL.  Monitor Abdominal Dressing; Abdominal Dressing D/I; No bleeding noted. Bowel sounds Hypoactive. Patient states "Bowel Movement Monday."  Administer IV Fluids as ordered; Patient receiving Normal Saline at 150cc/hr.  Maintain I/O's; Note patient incontinent in Diapers. Diaper changed X 2 this shift and saturated.  Maintain Clear Liquid Diet.  Administer IV Labetalol Q 6 hrs as ordered; Patient received 2 doses this shift.  Administer Enoxaparin as ordered; Patient received Enoxaparin 100mg tonight.  Assist patient with repositioning; Patient repositioned Q 2 hrs .  Monitor Lab Values; Most recent HGB. 9.6 , HCT, 30.6, Sodium 147, Chloride 125, BUN 27 and Creatine 1.2. Awaiting AM Labs.  Monitor Cardiac Rhythm; Patient on Tele in Normal Sinus Rhythm with short bouts of Sinus Tachycardia.  Fall Precautions; Maintain Patient Safety; Bed Alarm in use . MARYLOU System in progress. No falls or injury noted this shift.        "

## 2021-12-14 NOTE — PT/OT/SLP PROGRESS
"Physical Therapy Treatment    Patient Name:  Domonique Hernandez Jr.   MRN:  [id number]    Recommendations:     Discharge Recommendations:  nursing facility, basic,nursing facility, skilled   Discharge Equipment Recommendations: none   Barriers to discharge: Decreased caregiver support    Assessment:     Domonique Hernandez Jr. is a 81 y.o. male admitted with a medical diagnosis of SBO (small bowel obstruction).  He presents with the following impairments/functional limitations:  weakness,impaired endurance,impaired balance,gait instability,impaired self care skills,impaired functional mobilty,decreased safety awareness. Patient tolerated PT session. Gradually increased gait distances with RW. Fatigue easily     Rehab Prognosis: Fair; patient would benefit from acute skilled PT services to address these deficits and reach maximum level of function.    Recent Surgery: Procedure(s) (LRB):  LAPAROTOMY, EXPLORATORY (N/A) 3 Days Post-Op    Plan:     During this hospitalization, patient to be seen daily to address the identified rehab impairments via gait training,therapeutic activities,therapeutic exercises and progress toward the following goals:    · Plan of Care Expires:  12/16/21    Subjective     Chief Complaint: Abdominal pain upon transitional movement  Patient/Family Comments/goals: "To go to a nursing home".  Pain/Comfort:  · Pain Rating 1: 6/10 (Abdominal pain upon transitional movement)  · Location 1: abdomen  · Pain Addressed 1: Cessation of Activity,Reposition  · Pain Rating Post-Intervention 1: 4/10      Objective:     Communicated with patient  prior to session.  Patient found HOB elevated with SCD,peripheral IV,telemetry upon PT entry to room.     General Precautions: Standard, fall   Orthopedic Precautions:N/A   Braces: N/A  Respiratory Status: Room air     Functional Mobility:  · Bed Mobility:     · Rolling Left:  contact guard assistance  · Rolling Right: contact guard assistance  · Scooting: contact guard " assistance  · Supine to Sit: contact guard assistance  · Sit to Supine: contact guard assistance  · Transfers:     · Sit to Stand:  contact guard assistance with rolling walker  · Bed to Chair: contact guard assistance with  rolling walker  using  Step Transfer  · Gait: Contact Guard Assistance x ~30 feet with RW. Slow phase, slight dec foot/floor clearance and guarding stomach.       AM-PAC 6 CLICK MOBILITY  Turning over in bed (including adjusting bedclothes, sheets and blankets)?: 3  Sitting down on and standing up from a chair with arms (e.g., wheelchair, bedside commode, etc.): 3  Moving from lying on back to sitting on the side of the bed?: 3  Moving to and from a bed to a chair (including a wheelchair)?: 3  Need to walk in hospital room?: 3  Climbing 3-5 steps with a railing?: 2  Basic Mobility Total Score: 17       Therapeutic Activities and Exercises:   [place] body sequence on out of bed activity and gait trng RW x ~30 feet with CG Assistance.    Patient left HOB elevated with all lines intact, call button in reach, bed alarm on and nursing present..    GOALS:   Multidisciplinary Problems     Physical Therapy Goals        Problem: Physical Therapy Goal    Goal Priority Disciplines Outcome Goal Variances Interventions   Physical Therapy Goal     PT, PT/OT Ongoing, Progressing     Description: Goals to be met by: 21    Patient will increase functional independence with mobility by performin. Supine to sit with Modified Independent  2. Sit to supine with Modified Independent  3. Bed to chair transfer with Supervision or Set-up Assistancewith or without rolling walker using Step Transfer TECHNIQUE  4. Gait  x ~100  feet with Supervision or Set-up Assistance with or without rolling walker  5. Lower extremity exercise program x10 reps per handout, with assistance as needed                      Time Tracking:     PT Received On: 21  PT Start Time: 902     PT Stop Time: 917  PT Total Time  (min): 15 min     Billable Minutes: Gait Training 15 mins    Treatment Type: Treatment  PT/PTA: PT           12/14/2021

## 2021-12-15 LAB
BASOPHILS # BLD AUTO: 0.04 K/UL (ref 0–0.2)
BASOPHILS NFR BLD: 0.3 % (ref 0–1.9)
DIFFERENTIAL METHOD: ABNORMAL
EOSINOPHIL # BLD AUTO: 0.3 K/UL (ref 0–0.5)
EOSINOPHIL NFR BLD: 2.8 % (ref 0–8)
ERYTHROCYTE [DISTWIDTH] IN BLOOD BY AUTOMATED COUNT: 17 % (ref 11.5–14.5)
HCT VFR BLD AUTO: 31.3 % (ref 40–54)
HGB BLD-MCNC: 9.9 G/DL (ref 14–18)
IMM GRANULOCYTES # BLD AUTO: 0.11 K/UL (ref 0–0.04)
IMM GRANULOCYTES NFR BLD AUTO: 1 % (ref 0–0.5)
LYMPHOCYTES # BLD AUTO: 1.4 K/UL (ref 1–4.8)
LYMPHOCYTES NFR BLD: 12.1 % (ref 18–48)
MCH RBC QN AUTO: 29.1 PG (ref 27–31)
MCHC RBC AUTO-ENTMCNC: 31.6 G/DL (ref 32–36)
MCV RBC AUTO: 92 FL (ref 82–98)
MONOCYTES # BLD AUTO: 0.6 K/UL (ref 0.3–1)
MONOCYTES NFR BLD: 5.4 % (ref 4–15)
NEUTROPHILS # BLD AUTO: 9.1 K/UL (ref 1.8–7.7)
NEUTROPHILS NFR BLD: 78.4 % (ref 38–73)
NRBC BLD-RTO: 0 /100 WBC
PLATELET # BLD AUTO: 196 K/UL (ref 150–450)
PMV BLD AUTO: 10.9 FL (ref 9.2–12.9)
POCT GLUCOSE: 181 MG/DL (ref 70–110)
RBC # BLD AUTO: 3.4 M/UL (ref 4.6–6.2)
WBC # BLD AUTO: 11.53 K/UL (ref 3.9–12.7)

## 2021-12-15 PROCEDURE — 99233 SBSQ HOSP IP/OBS HIGH 50: CPT | Mod: ,,, | Performed by: FAMILY MEDICINE

## 2021-12-15 PROCEDURE — 63600175 PHARM REV CODE 636 W HCPCS: Performed by: SURGERY

## 2021-12-15 PROCEDURE — 99233 PR SUBSEQUENT HOSPITAL CARE,LEVL III: ICD-10-PCS | Mod: ,,, | Performed by: FAMILY MEDICINE

## 2021-12-15 PROCEDURE — C9113 INJ PANTOPRAZOLE SODIUM, VIA: HCPCS | Performed by: SURGERY

## 2021-12-15 PROCEDURE — 63700000 PHARM REV CODE 250 ALT 637 W/O HCPCS: Performed by: NURSE PRACTITIONER

## 2021-12-15 PROCEDURE — 25000003 PHARM REV CODE 250: Performed by: NURSE PRACTITIONER

## 2021-12-15 PROCEDURE — 11000001 HC ACUTE MED/SURG PRIVATE ROOM

## 2021-12-15 PROCEDURE — 25000003 PHARM REV CODE 250: Performed by: INTERNAL MEDICINE

## 2021-12-15 PROCEDURE — 25000003 PHARM REV CODE 250: Performed by: SURGERY

## 2021-12-15 PROCEDURE — 85025 COMPLETE CBC W/AUTO DIFF WBC: CPT | Performed by: FAMILY MEDICINE

## 2021-12-15 PROCEDURE — 97116 GAIT TRAINING THERAPY: CPT

## 2021-12-15 PROCEDURE — 94761 N-INVAS EAR/PLS OXIMETRY MLT: CPT

## 2021-12-15 PROCEDURE — 36415 COLL VENOUS BLD VENIPUNCTURE: CPT | Performed by: FAMILY MEDICINE

## 2021-12-15 RX ORDER — METOPROLOL SUCCINATE 50 MG/1
50 TABLET, EXTENDED RELEASE ORAL DAILY
Status: DISCONTINUED | OUTPATIENT
Start: 2021-12-15 | End: 2021-12-23 | Stop reason: HOSPADM

## 2021-12-15 RX ADMIN — LEVOTHYROXINE SODIUM 100 MCG: 100 TABLET ORAL at 06:12

## 2021-12-15 RX ADMIN — LABETALOL HYDROCHLORIDE 5 MG: 5 INJECTION, SOLUTION INTRAVENOUS at 06:12

## 2021-12-15 RX ADMIN — FLUCONAZOLE 100 MG: 100 TABLET ORAL at 08:12

## 2021-12-15 RX ADMIN — ENOXAPARIN SODIUM 100 MG: 100 INJECTION SUBCUTANEOUS at 08:12

## 2021-12-15 RX ADMIN — SODIUM CHLORIDE: 0.9 INJECTION, SOLUTION INTRAVENOUS at 03:12

## 2021-12-15 RX ADMIN — METOPROLOL SUCCINATE 50 MG: 50 TABLET, EXTENDED RELEASE ORAL at 12:12

## 2021-12-15 RX ADMIN — ENOXAPARIN SODIUM 100 MG: 100 INJECTION SUBCUTANEOUS at 09:12

## 2021-12-15 RX ADMIN — PANTOPRAZOLE SODIUM 40 MG: 40 INJECTION, POWDER, LYOPHILIZED, FOR SOLUTION INTRAVENOUS at 08:12

## 2021-12-15 NOTE — PT/OT/SLP PROGRESS
"Physical Therapy Treatment    Patient Name:  Domonique Hernandez Jr.   MRN:  [id number]    Recommendations:     Discharge Recommendations:  nursing facility, basic,nursing facility, skilled   Discharge Equipment Recommendations: none   Barriers to discharge: Decreased caregiver support    Assessment:     Domonique Hernandez Jr. is a 81 y.o. male admitted with a medical diagnosis of SBO (small bowel obstruction).  He presents with the following impairments/functional limitations:  weakness,impaired endurance,impaired balance,gait instability,impaired self care skills,impaired functional mobilty,decreased safety awareness. Patient tolerated PT session today. Performed gait functions using RW and gets fatigue at the end distance.    Rehab Prognosis: Fair; patient would benefit from acute skilled PT services to address these deficits and reach maximum level of function.    Recent Surgery: Procedure(s) (LRB):  LAPAROTOMY, EXPLORATORY (N/A) 4 Days Post-Op    Plan:     During this hospitalization, patient to be seen daily to address the identified rehab impairments via gait training,therapeutic activities,therapeutic exercises and progress toward the following goals:    · Plan of Care Expires:  12/16/21    Subjective     Chief Complaint: No abdominal pain today.   Patient/Family Comments/goals: "To get better".  Pain/Comfort:  · Pain Rating 1: 0/10      Objective:     Communicated with patient  prior to session.  Patient found HOB elevated with SCD,peripheral IV,telemetry upon PT entry to room.     General Precautions: Standard, fall   Orthopedic Precautions:N/A   Braces: N/A  Respiratory Status: Room air     Functional Mobility:  · Bed Mobility:     · Rolling Left:  supervision  · Rolling Right: supervision  · Scooting: supervision  · Supine to Sit: supervision  · Sit to Supine: contact guard assistance  · Transfers:     · Sit to Stand:  contact guard assistance with rolling walker  · Gait: Contact Guard Assistance x ~30 feet (stepping " forward and back)  with RW, Decrease floor clearance  and dec weigyht shifting. Fatigue easily.      AM-PAC 6 CLICK MOBILITY  Turning over in bed (including adjusting bedclothes, sheets and blankets)?: 3  Sitting down on and standing up from a chair with arms (e.g., wheelchair, bedside commode, etc.): 3  Moving from lying on back to sitting on the side of the bed?: 3  Moving to and from a bed to a chair (including a wheelchair)?: 3  Need to walk in hospital room?: 3  Climbing 3-5 steps with a railing?: 2  Basic Mobility Total Score: 17       Therapeutic Activities and Exercises:   Worked on safety measures with out of bed activity and gait trng with RW    Patient left HOB elevated with all lines intact, call button in reach, bed alarm on and nursing notified..    GOALS:   Multidisciplinary Problems     Physical Therapy Goals        Problem: Physical Therapy Goal    Goal Priority Disciplines Outcome Goal Variances Interventions   Physical Therapy Goal     PT, PT/OT Ongoing, Progressing     Description: Goals to be met by: 21    Patient will increase functional independence with mobility by performin. Supine to sit with Modified Independent  2. Sit to supine with Modified Independent  3. Bed to chair transfer with Supervision or Set-up Assistancewith or without rolling walker using Step Transfer TECHNIQUE  4. Gait  x ~100  feet with Supervision or Set-up Assistance with or without rolling walker  5. Lower extremity exercise program x10 reps per handout, with assistance as needed                      Time Tracking:     PT Received On: 12/15/21  PT Start Time: 1110     PT Stop Time: 1130  PT Total Time (min): 20 min     Billable Minutes: Gait Training 20 mins    Treatment Type: Treatment  PT/PTA: PT           12/15/2021

## 2021-12-15 NOTE — SUBJECTIVE & OBJECTIVE
Interval History:  Patient feels good.  He has had no significant abdominal pain.  He has been able to tolerate liquids.  He did have a bowel movement.  He has been slow to get out of bed without help.    Medications:  Continuous Infusions:   sodium chloride 0.9% 75 mL/hr at 12/15/21 0328     Scheduled Meds:   enoxaparin  1 mg/kg Subcutaneous Q12H    fluconazole  100 mg Oral Daily    levothyroxine  100 mcg Oral Before breakfast    metoprolol succinate  50 mg Oral Daily    pantoprazole  40 mg Intravenous Daily     PRN Meds:HYDROmorphone, melatonin, ondansetron, promethazine (PHENERGAN) IVPB, sodium chloride 0.9%     Review of patient's allergies indicates:   Allergen Reactions    Percocet [oxycodone-acetaminophen] Other (See Comments)     hyperactivity    Percodan [oxycodone hcl-oxycodone-asa] Other (See Comments)     hyperactivity     Objective:     Vital Signs (Most Recent):  Temp: 98.5 °F (36.9 °C) (12/15/21 1138)  Pulse: 89 (12/15/21 1202)  Resp: 12 (12/15/21 0714)  BP: (!) 180/89 (12/15/21 1138)  SpO2: 98 % (12/15/21 1138) Vital Signs (24h Range):  Temp:  [96.5 °F (35.8 °C)-98.5 °F (36.9 °C)] 98.5 °F (36.9 °C)  Pulse:  [80-94] 89  Resp:  [12-20] 12  SpO2:  [97 %-99 %] 98 %  BP: (116-180)/(61-89) 180/89     Weight: 100 kg (220 lb 7.4 oz)  Body mass index is 33.52 kg/m².    Intake/Output - Last 3 Shifts       12/13 0700 12/14 0659 12/14 0700  12/15 0659 12/15 0700 12/16 0659    P.O. 300 1310     I.V. (mL/kg)  2475 (24.8)     NG/GT       IV Piggyback       Total Intake(mL/kg) 300 (3) 3785 (37.9)     Urine (mL/kg/hr)  400 (0.2)     Drains       Total Output  400     Net +300 +3385            Urine Occurrence  3 x     Stool Occurrence 1 x            Physical Exam  Constitutional:       General: He is not in acute distress.     Appearance: He is obese. He is not ill-appearing.   Abdominal:      General: Abdomen is flat. Bowel sounds are normal. There is no distension.      Comments: Obese, incision is clean  and dry   Neurological:      Mental Status: He is alert.         Significant Labs:  I have reviewed all pertinent lab results within the past 24 hours.  CBC:   Recent Labs   Lab 12/15/21  0552   WBC 11.53   RBC 3.40*   HGB 9.9*   HCT 31.3*      MCV 92   MCH 29.1   MCHC 31.6*     BMP:   Recent Labs   Lab 12/13/21  0757   GLU 86   *   K 3.9   *   CO2 12*   BUN 27*   CREATININE 1.2   CALCIUM 8.2*       Significant Diagnostics:

## 2021-12-15 NOTE — SUBJECTIVE & OBJECTIVE
Review of Systems   Constitutional: Positive for fatigue. Negative for activity change, fever and unexpected weight change.   HENT: Negative for congestion and sore throat.    Eyes: Negative for visual disturbance.   Respiratory: Negative for cough and shortness of breath.    Cardiovascular: Negative for chest pain.   Gastrointestinal: Negative for abdominal pain, blood in stool, constipation and diarrhea.   Genitourinary: Negative for dysuria and hematuria.   Neurological: Negative for dizziness and light-headedness.     Objective:     Vital Signs (Most Recent):  Temp: 97.8 °F (36.6 °C) (12/15/21 0714)  Pulse: 82 (12/15/21 0714)  Resp: 12 (12/15/21 0714)  BP: (!) 141/76 (12/15/21 0714)  SpO2: 99 % (12/15/21 0751) Vital Signs (24h Range):  Temp:  [96.5 °F (35.8 °C)-98.7 °F (37.1 °C)] 97.8 °F (36.6 °C)  Pulse:  [58-94] 82  Resp:  [12-20] 12  SpO2:  [97 %-99 %] 99 %  BP: (116-146)/(61-88) 141/76     Weight: 100 kg (220 lb 7.4 oz)  Body mass index is 33.52 kg/m².    Intake/Output Summary (Last 24 hours) at 12/15/2021 0819  Last data filed at 12/15/2021 0600  Gross per 24 hour   Intake 1585 ml   Output 400 ml   Net 1185 ml      Physical Exam  Vitals and nursing note reviewed.   Constitutional:       General: He is not in acute distress.     Appearance: He is well-developed.   HENT:      Head: Normocephalic and atraumatic.      Right Ear: External ear normal.      Left Ear: External ear normal.      Nose:      Comments: Pressure sore on right nare from NGT  Eyes:      General:         Right eye: No discharge.         Left eye: No discharge.      Conjunctiva/sclera: Conjunctivae normal.   Neck:      Thyroid: No thyromegaly.   Cardiovascular:      Rate and Rhythm: Normal rate and regular rhythm.      Heart sounds: Normal heart sounds. No murmur heard.      Pulmonary:      Effort: Pulmonary effort is normal. No respiratory distress.      Breath sounds: Normal breath sounds.   Abdominal:      General: Abdomen is flat.  Bowel sounds are normal. There is no distension.      Palpations: Abdomen is soft.      Tenderness: There is abdominal tenderness (around incision).      Comments: Appropriately tender   Dressing c/d/i   Musculoskeletal:      Cervical back: Neck supple.      Right lower leg: No edema.      Left lower leg: No edema.   Skin:     General: Skin is warm and dry.   Neurological:      General: No focal deficit present.      Mental Status: He is alert. Mental status is at baseline.   Psychiatric:         Behavior: Behavior normal.         Thought Content: Thought content normal.         Significant Labs:   .  CBC:   Recent Labs   Lab 21  0556 12/15/21  0552   WBC 9.62 11.53   HGB 9.1* 9.9*   HCT 29.1* 31.3*    196     CMP:   No results for input(s): NA, K, CL, CO2, GLU, BUN, CREATININE, CALCIUM, PROT, ALBUMIN, BILITOT, ALKPHOS, AST, ALT, ANIONGAP, EGFRNONAA in the last 48 hours.    Invalid input(s): ESTGFAFRICA  lactic acid 2.7>1.9  Lipase 27  urine culture multiple organisms   covid screen negative   Significant Imagin/11 KUB NG tube in position.  Prior midline abdominal wall hernia repair.  Otherwise negative abdomen x-ray    12/10 KUB NG tube again noted in place.  No significant change in the bowel gas pattern when compared with the prior study.     US renal Normal size bilateral kidneys each containing a couple of benign anechoic cyst identified the midpole and upper pole regions largest single index cyst about the lateral cortex of the right kidney measuring 2.3 x 2.1 x 2.2 cm in size.     KUB  Enteric tube terminates in the gastric body.  The degree of distention of the small bowel loops is decreased as compared to prior examinations suggesting a resolving/revolved small-bowel obstruction.  No free air is identified.  Age-appropriate degenerative changes affect the skeleton.     CT abd and pelvis Imaging findings in keeping with a high-grade small bowel obstruction with a point of transition  seen in the right mid abdomen, likely related to adhesions.  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  Additionally, there is a small amount of free fluid about the liver and extending into the pelvic cul-de-sac, with slightly high density of the fluid in the pelvic cul-de-sac suggesting hyperemia issues content fluid.     KUB The enteric tube has been appropriately retracted and now terminates in the region of the distal gastric body.Slightly prominent loops of small bowel seen in the upper abdomen.  No definite free peritoneal air is seen.     Lung parenchyma cannot be adequately evaluated secondary to overexposure of the central aspect of the lungs.Consider dedicated radiograph of the chest evaluate for parenchymal injury.     CXR Enteric tube terminates in the gastric body.  There is bibasilar subsegmental atelectasis, with right basilar scarring.  No focal airspace consolidation or pleural effusions.  No pneumothorax.  The heart is enlarged.  Calcified atheromatous disease affects the aorta.  Median sternotomy wires are midline.  Age-appropriate degenerative changes affect the skeleton.

## 2021-12-15 NOTE — ASSESSMENT & PLAN NOTE
Noted in Dr Fair notes:    On eliquis and BB at home. NPO here. Will give lovenox- renally dose and labetalol IV for now   12/10 tolerating liquids, advance diet and resume oral BB, stop IV labetolol,   Resume eliquis;      12/11  DC eliquis   Resume lovenox .    12/12  HOLD LOVENOX   HB dropped 2 points   Follow CBC   Resume lovenox tomorrow     12/14 resume lovenox and watch for bleeding ;   12/15 transition back  to ELiquis 2.5mg bid  , getting labetolol IV q 6 ;t ambrose metoprolol XL 50mg po daily- resume

## 2021-12-15 NOTE — PROGRESS NOTES
[facility] ([facility])  General Surgery  Progress Note    Subjective:     History of Present Illness:  81-year-old male admitted for small-bowel obstruction.  Patient poor historian.  Apparently he was having some abdominal pain.  He underwent CT scan in the emergency room which showed a small-bowel obstruction.  Patient is not really sure of all of his surgeries but according to the chart he seems to have had a PEG tube placement in February which is no longer present and it looks like he has had a laparoscopic umbilical hernia repair with mesh.  Patient was seen and examined.  He denies any abdominal pain at this point.        Post-Op Info:  Procedure(s) (LRB):  LAPAROTOMY, EXPLORATORY (N/A)   4 Days Post-Op     Interval History:  Patient feels good.  He has had no significant abdominal pain.  He has been able to tolerate liquids.  He did have a bowel movement.  He has been slow to get out of bed without help.    Medications:  Continuous Infusions:   sodium chloride 0.9% 75 mL/hr at 12/15/21 0328     Scheduled Meds:   enoxaparin  1 mg/kg Subcutaneous Q12H    fluconazole  100 mg Oral Daily    levothyroxine  100 mcg Oral Before breakfast    metoprolol succinate  50 mg Oral Daily    pantoprazole  40 mg Intravenous Daily     PRN Meds:HYDROmorphone, melatonin, ondansetron, promethazine (PHENERGAN) IVPB, sodium chloride 0.9%     Review of patient's allergies indicates:   Allergen Reactions    Percocet [oxycodone-acetaminophen] Other (See Comments)     hyperactivity    Percodan [oxycodone hcl-oxycodone-asa] Other (See Comments)     hyperactivity     Objective:     Vital Signs (Most Recent):  Temp: 98.5 °F (36.9 °C) (12/15/21 1138)  Pulse: 89 (12/15/21 1202)  Resp: 12 (12/15/21 0714)  BP: (!) 180/89 (12/15/21 1138)  SpO2: 98 % (12/15/21 1138) Vital Signs (24h Range):  Temp:  [96.5 °F (35.8 °C)-98.5 °F (36.9 °C)] 98.5 °F (36.9 °C)  Pulse:  [80-94] 89  Resp:  [12-20] 12  SpO2:  [97 %-99 %] 98 %  BP:  (116-180)/(61-89) 180/89     Weight: 100 kg (220 lb 7.4 oz)  Body mass index is 33.52 kg/m².    Intake/Output - Last 3 Shifts       12/13 0700  12/14 0659 12/14 0700  12/15 0659 12/15 0700  12/16 0659    P.O. 300 1310     I.V. (mL/kg)  2475 (24.8)     NG/GT       IV Piggyback       Total Intake(mL/kg) 300 (3) 3785 (37.9)     Urine (mL/kg/hr)  400 (0.2)     Drains       Total Output  400     Net +300 +3385            Urine Occurrence  3 x     Stool Occurrence 1 x            Physical Exam  Constitutional:       General: He is not in acute distress.     Appearance: He is obese. He is not ill-appearing.   Abdominal:      General: Abdomen is flat. Bowel sounds are normal. There is no distension.      Comments: Obese, incision is clean and dry   Neurological:      Mental Status: He is alert.         Significant Labs:  I have reviewed all pertinent lab results within the past 24 hours.  CBC:   Recent Labs   Lab 12/15/21  0552   WBC 11.53   RBC 3.40*   HGB 9.9*   HCT 31.3*      MCV 92   MCH 29.1   MCHC 31.6*     BMP:   Recent Labs   Lab 12/13/21  0757   GLU 86   *   K 3.9   *   CO2 12*   BUN 27*   CREATININE 1.2   CALCIUM 8.2*       Significant Diagnostics:      Assessment/Plan:     * SBO (small bowel obstruction)  Status post laparotomy with lysis of adhesions.  Patient is improving and his bowel obstruction has resolved.  Increase mobility.  Advance diet to regular diet.  Discussed with primary hospital service, plan is for discharge to nursing home.  Patient will need to return to the office in 7-10 days for suture removal.        Matias Mccord MD  General Surgery  [place] [facility] (3rd Fl)

## 2021-12-15 NOTE — ASSESSMENT & PLAN NOTE
Status post laparotomy with lysis of adhesions.  Patient is improving and his bowel obstruction has resolved.  Increase mobility.  Advance diet to regular diet.  Discussed with primary hospital service, plan is for discharge to nursing home.  Patient will need to return to the office in 7-10 days for suture removal.

## 2021-12-16 PROCEDURE — C9113 INJ PANTOPRAZOLE SODIUM, VIA: HCPCS | Performed by: SURGERY

## 2021-12-16 PROCEDURE — 63600175 PHARM REV CODE 636 W HCPCS: Performed by: SURGERY

## 2021-12-16 PROCEDURE — 94761 N-INVAS EAR/PLS OXIMETRY MLT: CPT

## 2021-12-16 PROCEDURE — 11000001 HC ACUTE MED/SURG PRIVATE ROOM

## 2021-12-16 PROCEDURE — 99900035 HC TECH TIME PER 15 MIN (STAT)

## 2021-12-16 PROCEDURE — 63700000 PHARM REV CODE 250 ALT 637 W/O HCPCS: Performed by: NURSE PRACTITIONER

## 2021-12-16 PROCEDURE — 99231 PR SUBSEQUENT HOSPITAL CARE,LEVL I: ICD-10-PCS | Mod: ,,, | Performed by: FAMILY MEDICINE

## 2021-12-16 PROCEDURE — 97116 GAIT TRAINING THERAPY: CPT

## 2021-12-16 PROCEDURE — 99231 SBSQ HOSP IP/OBS SF/LOW 25: CPT | Mod: ,,, | Performed by: FAMILY MEDICINE

## 2021-12-16 PROCEDURE — 25000003 PHARM REV CODE 250: Performed by: NURSE PRACTITIONER

## 2021-12-16 PROCEDURE — 25000003 PHARM REV CODE 250: Performed by: INTERNAL MEDICINE

## 2021-12-16 RX ADMIN — ENOXAPARIN SODIUM 100 MG: 100 INJECTION SUBCUTANEOUS at 09:12

## 2021-12-16 RX ADMIN — ENOXAPARIN SODIUM 100 MG: 100 INJECTION SUBCUTANEOUS at 08:12

## 2021-12-16 RX ADMIN — METOPROLOL SUCCINATE 50 MG: 50 TABLET, EXTENDED RELEASE ORAL at 08:12

## 2021-12-16 RX ADMIN — FLUCONAZOLE 100 MG: 100 TABLET ORAL at 08:12

## 2021-12-16 RX ADMIN — PANTOPRAZOLE SODIUM 40 MG: 40 INJECTION, POWDER, LYOPHILIZED, FOR SOLUTION INTRAVENOUS at 08:12

## 2021-12-16 RX ADMIN — LEVOTHYROXINE SODIUM 100 MCG: 100 TABLET ORAL at 05:12

## 2021-12-16 NOTE — ASSESSMENT & PLAN NOTE
Baseline creat 1.3-1.4- on admission 3.6. 1L NS given in ER. Repeat 1L Ns bolus 12/7 and then cont NS at 150ml/hr. Repeat lactate better 1.9  Creat now 3.0  Son reports that he has no issues urinating at home  Will check renal U/s and post void residual  Has had urinary re tension in past. Consider salcedo if retaining due ti UTI    Repeat u.a today cont rocephin     12/10 creat improved - 3.6>1.6; renal US stable     12/11  BMP  Lab Results   Component Value Date     (H) 12/13/2021    K 3.9 12/13/2021     (H) 12/13/2021    CO2 12 (L) 12/13/2021    BUN 27 (H) 12/13/2021    CREATININE 1.2 12/13/2021    CALCIUM 8.2 (L) 12/13/2021    ANIONGAP 10 12/13/2021    ESTGFRAFRICA >60 12/13/2021    EGFRNONAA 57 (A) 12/13/2021 12/13 Back to baseline   BMP  Lab Results   Component Value Date     (H) 12/13/2021    K 3.9 12/13/2021     (H) 12/13/2021    CO2 12 (L) 12/13/2021    BUN 27 (H) 12/13/2021    CREATININE 1.2 12/13/2021    CALCIUM 8.2 (L) 12/13/2021    ANIONGAP 10 12/13/2021    ESTGFRAFRICA >60 12/13/2021    EGFRNONAA 57 (A) 12/13/2021     Resolved

## 2021-12-16 NOTE — PT/OT/SLP PROGRESS
"Physical Therapy Treatment    Patient Name:  Domonique Hernandez Jr.   MRN:  [id number]    Recommendations:     Discharge Recommendations:  nursing facility, basic,nursing facility, skilled   Discharge Equipment Recommendations: none   Barriers to discharge: Decreased caregiver support    Assessment:     Domonique Hernandez Jr. is a 81 y.o. male admitted with a medical diagnosis of SBO (small bowel obstruction).  He presents with the following impairments/functional limitations:  weakness,impaired endurance,impaired balance,gait instability,impaired functional mobilty,decreased safety awareness,impaired self care skills. Patient tolerated well PT session today. Increased gait distance with RW. During gait trng patient asked to use the toilet. Transferred patent to bedside commode from walking and exhibits bowel movement at the diaper and finish toilet activity at bedside commode. Called for PCT for assistance for hygiene care. No sign of distress and fatigue today.    Rehab Prognosis: Fair; patient would benefit from acute skilled PT services to address these deficits and reach maximum level of function.    Recent Surgery: Procedure(s) (LRB):  LAPAROTOMY, EXPLORATORY (N/A) 5 Days Post-Op    Plan:     During this hospitalization, patient to be seen daily to address the identified rehab impairments via gait training,therapeutic activities,therapeutic exercises and progress toward the following goals:    · Plan of Care Expires:  12/16/21    Subjective     Chief Complaint: no pain complain  Patient/Family Comments/goals: "To get better"  Pain/Comfort:  · Pain Rating 1: 0/10      Objective:     Communicated with patient  prior to session.  Patient found HOB elevated with SCD,peripheral IV,telemetry upon PT entry to room.     General Precautions: Standard, fall   Orthopedic Precautions:N/A   Braces: N/A  Respiratory Status: Room air     Functional Mobility:  · Bed Mobility:     · Rolling Left:  supervision  · Rolling Right: " supervision  · Scooting: supervision  · Supine to Sit: supervision  · Transfers:     · Sit to Stand:  stand by assistance with rolling walker  · Toilet Transfer: stand by assistance with  rolling walker  using  Step Transfer  · Gait: Standby Assistance x ~100 feet with RW. Slight dec weight shifting and slight difficulty in turning around.      AM-PAC 6 CLICK MOBILITY  Turning over in bed (including adjusting bedclothes, sheets and blankets)?: 3  Sitting down on and standing up from a chair with arms (e.g., wheelchair, bedside commode, etc.): 3  Moving from lying on back to sitting on the side of the bed?: 3  Moving to and from a bed to a chair (including a wheelchair)?: 3  Need to walk in hospital room?: 3  Climbing 3-5 steps with a railing?: 2  Basic Mobility Total Score: 17       Therapeutic Activities and Exercises:   Worked on out of bed activity, gait trng trng wioth RW x ~100 feet with standby Assistance; toilet(Bedside Commode) transfers/activity.    Patient left sitting up at the commode for toileting with all lines intact, call button in reach and PCT present..    GOALS:   Multidisciplinary Problems     Physical Therapy Goals        Problem: Physical Therapy Goal    Goal Priority Disciplines Outcome Goal Variances Interventions   Physical Therapy Goal     PT, PT/OT Ongoing, Progressing     Description: Goals to be met by: 21    Patient will increase functional independence with mobility by performin. Supine to sit with Modified Independent  2. Sit to supine with Modified Independent  3. Bed to chair transfer with Supervision or Set-up Assistancewith or without rolling walker using Step Transfer TECHNIQUE  4. Gait  x ~100  feet with Supervision or Set-up Assistance with or without rolling walker  5. Lower extremity exercise program x10 reps per handout, with assistance as needed                      Time Tracking:     PT Received On: 21  PT Start Time: 845     PT Stop Time: 905  PT  Total Time (min): 20 min     Billable Minutes: Gait Training 20 mins    Treatment Type: Treatment  PT/PTA: PT           12/16/2021

## 2021-12-16 NOTE — ASSESSMENT & PLAN NOTE
Noted in Dr Fair notes:    On eliquis and BB at home. NPO here. Will give lovenox- renally dose and labetalol IV for now   12/10 tolerating liquids, advance diet and resume oral BB, stop IV labetolol,   Resume eliquis;      12/11  DC eliquis   Resume lovenox .    12/12  HOLD LOVENOX   HB dropped 2 points   Follow CBC   Resume lovenox tomorrow     12/14 resume lovenox and watch for bleeding ;   12/15 transition back  to ELiquis 2.5mg bid  , getting labetolol IV q 6 ;takes metoprolol XL 50mg po daily- resume  12/16 stable with oral metoprolol

## 2021-12-16 NOTE — PROGRESS NOTES
Patient seen and examined.  Tolerating liquids.  No nausea or vomiting.  He is passing gas and having bowel movements.  Patient's diet can be advanced as tolerated.  Patient can be discharged home from a surgical standpoint when okay with primary physician.  He should follow up with Dr. Hein 1 week after discharge for his staple removal.

## 2021-12-16 NOTE — PLAN OF CARE
12/16/21 1131   Post-Acute Status   Post-Acute Authorization Placement   Post-Acute Placement Status Pending payor review/awaiting authorization (if required)   Discharge Delays None known at this time       SW received call from Jaclyn at [facility] stating that patient's request for SNF has been sent to a 3rd party for review.     Still awaiting 142.

## 2021-12-16 NOTE — SUBJECTIVE & OBJECTIVE
Review of Systems   Constitutional: Positive for fatigue. Negative for activity change, fever and unexpected weight change.   HENT: Negative for congestion and sore throat.    Eyes: Negative for visual disturbance.   Respiratory: Negative for cough and shortness of breath.    Cardiovascular: Negative for chest pain.   Gastrointestinal: Negative for abdominal pain, blood in stool, constipation and diarrhea.   Genitourinary: Negative for dysuria and hematuria.   Neurological: Negative for dizziness and light-headedness.     Objective:     Vital Signs (Most Recent):  Temp: 97.4 °F (36.3 °C) (12/16/21 1111)  Pulse: 78 (12/16/21 1111)  Resp: 12 (12/16/21 0828)  BP: (!) 143/70 (12/16/21 1111)  SpO2: 99 % (12/16/21 1111) Vital Signs (24h Range):  Temp:  [97 °F (36.1 °C)-98.7 °F (37.1 °C)] 97.4 °F (36.3 °C)  Pulse:  [74-87] 78  Resp:  [12-20] 12  SpO2:  [98 %-99 %] 99 %  BP: (143-182)/(69-87) 143/70     Weight: 100 kg (220 lb 7.4 oz)  Body mass index is 33.52 kg/m².  No intake or output data in the 24 hours ending 12/16/21 1224   Physical Exam  Vitals and nursing note reviewed.   Constitutional:       General: He is not in acute distress.     Appearance: He is well-developed.   HENT:      Head: Normocephalic and atraumatic.      Right Ear: External ear normal.      Left Ear: External ear normal.      Nose:      Comments: Pressure sore on right nare from NGT  Eyes:      General:         Right eye: No discharge.         Left eye: No discharge.      Conjunctiva/sclera: Conjunctivae normal.   Neck:      Thyroid: No thyromegaly.   Cardiovascular:      Rate and Rhythm: Normal rate and regular rhythm.      Heart sounds: Normal heart sounds. No murmur heard.      Pulmonary:      Effort: Pulmonary effort is normal. No respiratory distress.      Breath sounds: Normal breath sounds.   Abdominal:      General: Abdomen is flat. Bowel sounds are normal. There is no distension.      Palpations: Abdomen is soft.      Tenderness: There is  no abdominal tenderness.      Comments: Appropriately tender   Dressing c/d/i   Musculoskeletal:      Cervical back: Neck supple.      Right lower leg: No edema.      Left lower leg: No edema.   Skin:     General: Skin is warm and dry.      Comments: Erosion to right nare   Neurological:      General: No focal deficit present.      Mental Status: He is alert. Mental status is at baseline.   Psychiatric:         Behavior: Behavior normal.         Thought Content: Thought content normal.         Significant Labs:   .  CBC:   Recent Labs   Lab 12/15/21  0552   WBC 11.53   HGB 9.9*   HCT 31.3*        CMP:   No results for input(s): NA, K, CL, CO2, GLU, BUN, CREATININE, CALCIUM, PROT, ALBUMIN, BILITOT, ALKPHOS, AST, ALT, ANIONGAP, EGFRNONAA in the last 48 hours.    Invalid input(s): ESTGF[place]  lactic acid 2.7>1.9  Lipase 27  urine culture multiple organisms   covid screen negative   Significant Imagin/11 KUB NG tube in position.  Prior midline abdominal wall hernia repair.  Otherwise negative abdomen x-ray    12/10 KUB NG tube again noted in place.  No significant change in the bowel gas pattern when compared with the prior study.     US renal Normal size bilateral kidneys each containing a couple of benign anechoic cyst identified the midpole and upper pole regions largest single index cyst about the lateral cortex of the right kidney measuring 2.3 x 2.1 x 2.2 cm in size.     KUB  Enteric tube terminates in the gastric body.  The degree of distention of the small bowel loops is decreased as compared to prior examinations suggesting a resolving/revolved small-bowel obstruction.  No free air is identified.  Age-appropriate degenerative changes affect the skeleton.     CT abd and pelvis Imaging findings in keeping with a high-grade small bowel obstruction with a point of transition seen in the right mid abdomen, likely related to adhesions.  There is significant inflammatory changes about the dilated  small bowel loops in the mid abdomen likely related to engorgement of the Vasa recta.  Additionally, there is a small amount of free fluid about the liver and extending into the pelvic cul-de-sac, with slightly high density of the fluid in the pelvic cul-de-sac suggesting hyperemia issues content fluid.     KUB The enteric tube has been appropriately retracted and now terminates in the region of the distal gastric body.Slightly prominent loops of small bowel seen in the upper abdomen.  No definite free peritoneal air is seen.     Lung parenchyma cannot be adequately evaluated secondary to overexposure of the central aspect of the lungs.Consider dedicated radiograph of the chest evaluate for parenchymal injury.     CXR Enteric tube terminates in the gastric body.  There is bibasilar subsegmental atelectasis, with right basilar scarring.  No focal airspace consolidation or pleural effusions.  No pneumothorax.  The heart is enlarged.  Calcified atheromatous disease affects the aorta.  Median sternotomy wires are midline.  Age-appropriate degenerative changes affect the skeleton.

## 2021-12-16 NOTE — PROGRESS NOTES
[facility] ([address] [place] [address] Medicine  Progress Note    Patient Name: Domonique Hernandez Jr.  MRN: [id number]  Patient Class: IP- Inpatient   Admission Date: 12/7/2021  Length of Stay: 9 days  Attending Physician: Emiliano Cam MD  Primary Care Provider: Emiliano Cam MD        Subjective:     Principal Problem:SBO (small bowel obstruction)        HPI:  82yo male patient with hx of anxiety, arthritis, back pain, bronchitis/COPD, depression, GERD, HLD, hypothyroid, PVD, sleep apnea on CPAP, and CKD. Pt came to ER with c/o regurgitation for 3 days. Decrease intake. No fever. No UTI s/s. CT on admission shows SBO. Significantly dehydrated. Creat 3.6-- baseline 1.4. Given 1L NS in ER U/a dirty one dose of zosyn given. No fever. No elevated WBC. Lactate 2.3. He had NGT place and put to suction. He is in bed this am. Reports feeling better. General surgery consulted.       Overview/Hospital Course:  12/8 Pt was admitted yesterday with SBO. He had general surgery consulted. NGT placed to suction. Still no flatus or BM. KUB pending for this am.     Also on rocephin for UTI-culture in process. Was given 1L NS in ER and repeated on floor then started NS at 150ml/hr. Creat on admit 3.6> now 3.0-- baseline creat 1.4. lactate did impropve on repeat.  Renal US shows cysts.  No hydronephrosis.     12/9 pt here with SBO. NGT to suction had 1000ml overnight. No flatus. No BM. +BS  Renal function improved 3.6>>2.1 on NS at 150ml/hr. Rocephin for UTI- urine culture shows multiple organisms. NO fever. NO elevated WBC    12/10/21    82YO WM admitted with SBO 4 days ago, NGT pulled yesterday, tolerating liquids,   Burping, t  KUB this am- NG tube removed, otherwise no significant change when compared with the prior study of December 9, 2021.     Creat much improved 3.6>1.6 ,  IV Rocephin day 4 for UTI, initial urine Cx- multiple organisms, repaeat U/A dirty, culture in process   7 months ago urine CX+ MRSA- sensitve to  bactrim, tetracycline and Vanc ; will add oral doxy   WBC normal, afebrile   Per PT yesterday; Gait: Contact Gaurd Assistance x ~30 feet (steeping forward and back due to limited line of medical equipment - NG tube) using RW.    12/11/21  82YO WM admitted with SBO  NG was pulled 12/9   But yesterday started with nausea and later with vomiting  and NG has to be placed back again   KUB is pending .  On doxy for UTI recurrent ; last culture staph mrsa sensitive to tetracyclines     12/12  S/p surgery yesterday for SBO .  Reports no flatus . No BM   Issues with bleeding from incision   Lab Results   Component Value Date    WBC 9.47 12/12/2021    HGB 11.3 (L) 12/12/2021    HCT 37.2 (L) 12/12/2021    MCV 96 12/12/2021     12/12/2021 12/13 pt was taken to OR Saturday. He had NGT replaced after that. This am he is feeling better. Dr Hein saw him this am from general surgery and rec pulling NG and starting clears. He is not tender to palp this am. Passed gas this am.   H/H 9/30- lovenox was held due to ozzing after surgery- hx of a fib  Urine culture shows candida     12/14/21  NGT pulled yesterday, pt tolerating liquid diet, Na 147, creat 1.2, + CKD ; getting NS @150ml/hr; decrease to 75ml/hr   H&H 9.1/29 from 9.6/30   Had a BM yesterday, will continue oral liquids until bowel sounds more active   WBC normal, afebrile, O2 sat stable on RA   Urine Cx with candida, doxy stopped after 3 days ; will start diflucan 100mg daily x 3d .    12/15 Pt admitted  with SBO - NGT pulled 2 days ago, tolerating liquids, passing flatus, had BM this am   Surgical incision evaluated per general sx yesterday, recommend advance diet and ^OOB   Contact Guard Assistance x ~30 feet with RW. Slow phase, slight dec foot/floor clearance and guarding stomach  VSS, afebrile, WBC 11.53   Day 2/3 of diflucan for candida in urine   Getting IV labetolol; transitioned to oral home metoprolol   Awaiting on state form 142 to approve nursing  home admission due to psychiatric hx of anxiety/depression     12/16 Tolerating diet, VSS   Worked on out of bed activity, gait trng trng with RW x ~100 feet with standby Assistance; toilet(Bedside Commode) transfers/activity.        Review of Systems   Constitutional: Positive for fatigue. Negative for activity change, fever and unexpected weight change.   HENT: Negative for congestion and sore throat.    Eyes: Negative for visual disturbance.   Respiratory: Negative for cough and shortness of breath.    Cardiovascular: Negative for chest pain.   Gastrointestinal: Negative for abdominal pain, blood in stool, constipation and diarrhea.   Genitourinary: Negative for dysuria and hematuria.   Neurological: Negative for dizziness and light-headedness.     Objective:     Vital Signs (Most Recent):  Temp: 97.4 °F (36.3 °C) (12/16/21 1111)  Pulse: 78 (12/16/21 1111)  Resp: 12 (12/16/21 0828)  BP: (!) 143/70 (12/16/21 1111)  SpO2: 99 % (12/16/21 1111) Vital Signs (24h Range):  Temp:  [97 °F (36.1 °C)-98.7 °F (37.1 °C)] 97.4 °F (36.3 °C)  Pulse:  [74-87] 78  Resp:  [12-20] 12  SpO2:  [98 %-99 %] 99 %  BP: (143-182)/(69-87) 143/70     Weight: 100 kg (220 lb 7.4 oz)  Body mass index is 33.52 kg/m².  No intake or output data in the 24 hours ending 12/16/21 1224   Physical Exam  Vitals and nursing note reviewed.   Constitutional:       General: He is not in acute distress.     Appearance: He is well-developed.   HENT:      Head: Normocephalic and atraumatic.      Right Ear: External ear normal.      Left Ear: External ear normal.      Nose:      Comments: Pressure sore on right nare from NGT  Eyes:      General:         Right eye: No discharge.         Left eye: No discharge.      Conjunctiva/sclera: Conjunctivae normal.   Neck:      Thyroid: No thyromegaly.   Cardiovascular:      Rate and Rhythm: Normal rate and regular rhythm.      Heart sounds: Normal heart sounds. No murmur heard.      Pulmonary:      Effort: Pulmonary  effort is normal. No respiratory distress.      Breath sounds: Normal breath sounds.   Abdominal:      General: Abdomen is flat. Bowel sounds are normal. There is no distension.      Palpations: Abdomen is soft.      Tenderness: There is no abdominal tenderness.      Comments: Appropriately tender   Dressing c/d/i   Musculoskeletal:      Cervical back: Neck supple.      Right lower leg: No edema.      Left lower leg: No edema.   Skin:     General: Skin is warm and dry.      Comments: Erosion to right nare   Neurological:      General: No focal deficit present.      Mental Status: He is alert. Mental status is at baseline.   Psychiatric:         Behavior: Behavior normal.         Thought Content: Thought content normal.         Significant Labs:   .  CBC:   Recent Labs   Lab 12/15/21  0552   WBC 11.53   HGB 9.9*   HCT 31.3*        CMP:   No results for input(s): NA, K, CL, CO2, GLU, BUN, CREATININE, CALCIUM, PROT, ALBUMIN, BILITOT, ALKPHOS, AST, ALT, ANIONGAP, EGFRNONAA in the last 48 hours.    Invalid input(s): ESTGFAFRICA  lactic acid 2.7>1.9  Lipase 27  urine culture multiple organisms   covid screen negative   Significant Imagin/11 KUB NG tube in position.  Prior midline abdominal wall hernia repair.  Otherwise negative abdomen x-ray    12/10 KUB NG tube again noted in place.  No significant change in the bowel gas pattern when compared with the prior study.     US renal Normal size bilateral kidneys each containing a couple of benign anechoic cyst identified the midpole and upper pole regions largest single index cyst about the lateral cortex of the right kidney measuring 2.3 x 2.1 x 2.2 cm in size.     KUB  Enteric tube terminates in the gastric body.  The degree of distention of the small bowel loops is decreased as compared to prior examinations suggesting a resolving/revolved small-bowel obstruction.  No free air is identified.  Age-appropriate degenerative changes affect the  skeleton.     CT abd and pelvis Imaging findings in keeping with a high-grade small bowel obstruction with a point of transition seen in the right mid abdomen, likely related to adhesions.  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  Additionally, there is a small amount of free fluid about the liver and extending into the pelvic cul-de-sac, with slightly high density of the fluid in the pelvic cul-de-sac suggesting hyperemia issues content fluid.     KUB The enteric tube has been appropriately retracted and now terminates in the region of the distal gastric body.Slightly prominent loops of small bowel seen in the upper abdomen.  No definite free peritoneal air is seen.     Lung parenchyma cannot be adequately evaluated secondary to overexposure of the central aspect of the lungs.Consider dedicated radiograph of the chest evaluate for parenchymal injury.     CXR Enteric tube terminates in the gastric body.  There is bibasilar subsegmental atelectasis, with right basilar scarring.  No focal airspace consolidation or pleural effusions.  No pneumothorax.  The heart is enlarged.  Calcified atheromatous disease affects the aorta.  Median sternotomy wires are midline.  Age-appropriate degenerative changes affect the skeleton.         Assessment/Plan:      * SBO (small bowel obstruction)  NPO  NGT to wall suction  KUB daily  CXR nothing acute  IVF started NS at 150ml/hr    He meets SIRS criteria but I think better explained by SBO presentation that infection.  UA > 100 WBC but he has no urinary tract symptoms. He has recurrent UTI and will start Rocephin now but my clinical impression is the abnormal vitals and elevated lactic acid are due to SBO, dehydration and not UTI sepsis    12/10 tolerating orals, advance diet .    12/11  Placed NG back again   KUB pending     12/12  S/p surgery ; POD 1   NG to suction   Bleeding  stopped    12/13   NG pulled from general surgery will try clears this am. + flatus.    12/14 tolerating orals . No nausea ; no vomiting     Evaluation by psychiatric service required  Reviewed  consult .  12/16 awaiting state approval for nursing home     Paroxysmal atrial fibrillation  Noted in Dr Fair notes:    On eliquis and BB at home. NPO here. Will give lovenox- renally dose and labetalol IV for now   12/10 tolerating liquids, advance diet and resume oral BB, stop IV labetolol,   Resume eliquis;      12/11  DC eliquis   Resume lovenox .    12/12  HOLD LOVENOX   HB dropped 2 points   Follow CBC   Resume lovenox tomorrow     12/14 resume lovenox and watch for bleeding ;   12/15 transition back  to ELiquis 2.5mg bid  , getting labetolol IV q 6 ;takes metoprolol XL 50mg po daily- resume  12/16 stable with oral metoprolol     Acute renal failure  Baseline creat 1.3-1.4- on admission 3.6. 1L NS given in ER. Repeat 1L Ns bolus 12/7 and then cont NS at 150ml/hr. Repeat lactate better 1.9  Creat now 3.0  Son reports that he has no issues urinating at home  Will check renal U/s and post void residual  Has had urinary re tension in past. Consider salcedo if retaining due ti UTI    Repeat u.a today cont rocephin     12/10 creat improved - 3.6>1.6; renal US stable     12/11  [facility]  Lab Results   Component Value Date     (H) 12/13/2021    K 3.9 12/13/2021     (H) 12/13/2021    CO2 12 (L) 12/13/2021    BUN 27 (H) 12/13/2021    CREATININE 1.2 12/13/2021    CALCIUM 8.2 (L) 12/13/2021    ANIONGAP 10 12/13/2021    ESTGFRAFRICA >60 12/13/2021    EGFRNONAA 57 (A) 12/13/2021 12/13 Back to baseline   BMP  Lab Results   Component Value Date     (H) 12/13/2021    K 3.9 12/13/2021     (H) 12/13/2021    CO2 12 (L) 12/13/2021    BUN 27 (H) 12/13/2021    CREATININE 1.2 12/13/2021    CALCIUM 8.2 (L) 12/13/2021    ANIONGAP 10 12/13/2021    ESTGFRAFRICA >60 12/13/2021    EGFRNONAA 57 (A) 12/13/2021     Resolved      Asymptomatic bacteriuria  He has no UTI sx on interview day of admit  Culture urine  Start rocephin empirically as h/o of UTI and not a great historian but as noted above I think vitals/lactic acid precipitated by SBO and not UTI sepsis    Cont rocephin and repeat u.a   Pt is s/p lithotripsy, stent removal, and cystoureteroscopy with retrograde pyelogram w/ stent insertion on 5/31/21, tx with Bactrim for MRSA UTI  Will add oral doxy  To cover for staph MRSA UTI .    12/12  Doxy day 3    12/14   d/c abx, grew candida> low colony count and asymptomatic. No further treatment needed.    SIRS (systemic inflammatory response syndrome)  He meets SIRS criteria but I think better explained by SBO presentation than infection.  UA > 100 WBC but he has no urinary tract symptoms. He has recurrent UTI and will start Rocephin now but my clinical impression is the abnormal vitals and elevated lactic acid are due to SBO, dehydration and not UTI sepsis.  RESOLVED .      Hypothyroidism  Hold synthroid while NPO  If remains NPO > 3 days can start IV  Resume oral thyroid meds .    12/11  Hold po meds.    12/14  Continue levothyroxine     GERD (gastroesophageal reflux disease)  protonix IV  tolerating oral - change to po .    12/11  Back to IV   12/14  Cont PPI.    Hyperlipidemia  Holding zocor while NPO  12/10 resume - zocor non formulary- atorvastatin while here - NPO for now will resume once taking po    12/13: simvastatin not formulary. Can resume as outpatient. Hopefully d/c soon.        VTE Risk Mitigation (From admission, onward)         Ordered     enoxaparin injection 100 mg  Every 12 hours (non-standard times)         12/10/21 1846     IP VTE HIGH RISK PATIENT  Once         12/07/21 0620     Place sequential compression device  Until discontinued         12/07/21 0620                Discharge Planning   STEPHANIE:      Code Status: Full Code   Is the patient medically ready for discharge?:     Reason for patient still in hospital  (select all that apply): Pending disposition  Discharge Plan A: Skilled Nursing Facility   Discharge Delays: None known at this time              Lucia Leach MD  Department of Hospital Medicine   [place] [facility] (3rd Fl)

## 2021-12-17 PROBLEM — E83.42 HYPOMAGNESEMIA: Status: ACTIVE | Noted: 2021-12-17

## 2021-12-17 LAB
ANION GAP SERPL CALC-SCNC: 9 MMOL/L (ref 8–16)
BUN SERPL-MCNC: 11 MG/DL (ref 8–23)
CALCIUM SERPL-MCNC: 8.3 MG/DL (ref 8.7–10.5)
CHLORIDE SERPL-SCNC: 111 MMOL/L (ref 95–110)
CO2 SERPL-SCNC: 21 MMOL/L (ref 23–29)
CREAT SERPL-MCNC: 0.9 MG/DL (ref 0.5–1.4)
EST. GFR  (AFRICAN AMERICAN): >60 ML/MIN/1.73 M^2
EST. GFR  (NON AFRICAN AMERICAN): >60 ML/MIN/1.73 M^2
GLUCOSE SERPL-MCNC: 85 MG/DL (ref 70–110)
MAGNESIUM SERPL-MCNC: 1.4 MG/DL (ref 1.6–2.6)
POTASSIUM SERPL-SCNC: 2.6 MMOL/L (ref 3.5–5.1)
SODIUM SERPL-SCNC: 141 MMOL/L (ref 136–145)

## 2021-12-17 PROCEDURE — 25000003 PHARM REV CODE 250: Performed by: NURSE PRACTITIONER

## 2021-12-17 PROCEDURE — 99232 PR SUBSEQUENT HOSPITAL CARE,LEVL II: ICD-10-PCS | Mod: ,,, | Performed by: STUDENT IN AN ORGANIZED HEALTH CARE EDUCATION/TRAINING PROGRAM

## 2021-12-17 PROCEDURE — 83735 ASSAY OF MAGNESIUM: CPT | Performed by: NURSE PRACTITIONER

## 2021-12-17 PROCEDURE — 36415 COLL VENOUS BLD VENIPUNCTURE: CPT | Performed by: NURSE PRACTITIONER

## 2021-12-17 PROCEDURE — 25000003 PHARM REV CODE 250: Performed by: INTERNAL MEDICINE

## 2021-12-17 PROCEDURE — 11000001 HC ACUTE MED/SURG PRIVATE ROOM

## 2021-12-17 PROCEDURE — 94761 N-INVAS EAR/PLS OXIMETRY MLT: CPT

## 2021-12-17 PROCEDURE — C9113 INJ PANTOPRAZOLE SODIUM, VIA: HCPCS | Performed by: SURGERY

## 2021-12-17 PROCEDURE — 99232 SBSQ HOSP IP/OBS MODERATE 35: CPT | Mod: ,,, | Performed by: STUDENT IN AN ORGANIZED HEALTH CARE EDUCATION/TRAINING PROGRAM

## 2021-12-17 PROCEDURE — 63600175 PHARM REV CODE 636 W HCPCS: Performed by: SURGERY

## 2021-12-17 PROCEDURE — 63600175 PHARM REV CODE 636 W HCPCS: Performed by: STUDENT IN AN ORGANIZED HEALTH CARE EDUCATION/TRAINING PROGRAM

## 2021-12-17 PROCEDURE — 63600175 PHARM REV CODE 636 W HCPCS: Performed by: NURSE PRACTITIONER

## 2021-12-17 PROCEDURE — 80048 BASIC METABOLIC PNL TOTAL CA: CPT | Performed by: NURSE PRACTITIONER

## 2021-12-17 PROCEDURE — 97116 GAIT TRAINING THERAPY: CPT

## 2021-12-17 RX ORDER — POTASSIUM CHLORIDE 7.45 MG/ML
10 INJECTION INTRAVENOUS
Status: COMPLETED | OUTPATIENT
Start: 2021-12-17 | End: 2021-12-17

## 2021-12-17 RX ORDER — MAGNESIUM SULFATE HEPTAHYDRATE 40 MG/ML
2 INJECTION, SOLUTION INTRAVENOUS ONCE
Status: COMPLETED | OUTPATIENT
Start: 2021-12-17 | End: 2021-12-17

## 2021-12-17 RX ORDER — POTASSIUM CHLORIDE 20 MEQ/1
20 TABLET, EXTENDED RELEASE ORAL ONCE
Status: COMPLETED | OUTPATIENT
Start: 2021-12-17 | End: 2021-12-17

## 2021-12-17 RX ORDER — MAGNESIUM SULFATE 1 G/100ML
1 INJECTION INTRAVENOUS ONCE
Status: COMPLETED | OUTPATIENT
Start: 2021-12-17 | End: 2021-12-17

## 2021-12-17 RX ORDER — POTASSIUM CHLORIDE 20 MEQ/1
40 TABLET, EXTENDED RELEASE ORAL ONCE
Status: COMPLETED | OUTPATIENT
Start: 2021-12-17 | End: 2021-12-17

## 2021-12-17 RX ADMIN — LEVOTHYROXINE SODIUM 100 MCG: 100 TABLET ORAL at 05:12

## 2021-12-17 RX ADMIN — APIXABAN 2.5 MG: 2.5 TABLET, FILM COATED ORAL at 09:12

## 2021-12-17 RX ADMIN — POTASSIUM CHLORIDE 40 MEQ: 1500 TABLET, EXTENDED RELEASE ORAL at 07:12

## 2021-12-17 RX ADMIN — POTASSIUM CHLORIDE 10 MEQ: 7.46 INJECTION, SOLUTION INTRAVENOUS at 09:12

## 2021-12-17 RX ADMIN — POTASSIUM CHLORIDE 10 MEQ: 7.46 INJECTION, SOLUTION INTRAVENOUS at 02:12

## 2021-12-17 RX ADMIN — APIXABAN 2.5 MG: 2.5 TABLET, FILM COATED ORAL at 08:12

## 2021-12-17 RX ADMIN — POTASSIUM CHLORIDE 20 MEQ: 1500 TABLET, EXTENDED RELEASE ORAL at 04:12

## 2021-12-17 RX ADMIN — PANTOPRAZOLE SODIUM 40 MG: 40 INJECTION, POWDER, LYOPHILIZED, FOR SOLUTION INTRAVENOUS at 07:12

## 2021-12-17 RX ADMIN — MAGNESIUM SULFATE HEPTAHYDRATE 2 G: 40 INJECTION, SOLUTION INTRAVENOUS at 03:12

## 2021-12-17 RX ADMIN — POTASSIUM CHLORIDE 10 MEQ: 7.46 INJECTION, SOLUTION INTRAVENOUS at 11:12

## 2021-12-17 RX ADMIN — METOPROLOL SUCCINATE 50 MG: 50 TABLET, EXTENDED RELEASE ORAL at 07:12

## 2021-12-17 RX ADMIN — MAGNESIUM SULFATE 1 G: 1 INJECTION INTRAVENOUS at 01:12

## 2021-12-17 NOTE — ASSESSMENT & PLAN NOTE
Baseline creat 1.3-1.4- on admission 3.6. 1L NS given in ER. Repeat 1L Ns bolus 12/7 and then cont NS at 150ml/hr. Repeat lactate better 1.9  Creat now 3.0  Son reports that he has no issues urinating at home  Will check renal U/s and post void residual  Has had urinary re tension in past. Consider salcedo if retaining due ti UTI    Repeat u.a today cont rocephin     12/10 creat improved - 3.6>1.6; renal US stable     12/11  BMP  Lab Results   Component Value Date     12/17/2021    K 2.6 (LL) 12/17/2021     (H) 12/17/2021    CO2 21 (L) 12/17/2021    BUN 11 12/17/2021    CREATININE 0.9 12/17/2021    CALCIUM 8.3 (L) 12/17/2021    ANIONGAP 9 12/17/2021    ESTGFRAFRICA >60 12/17/2021    EGFRNONAA >60 12/17/2021 12/13 Back to baseline   BMP  Lab Results   Component Value Date     12/17/2021    K 2.6 (LL) 12/17/2021     (H) 12/17/2021    CO2 21 (L) 12/17/2021    BUN 11 12/17/2021    CREATININE 0.9 12/17/2021    CALCIUM 8.3 (L) 12/17/2021    ANIONGAP 9 12/17/2021    ESTGFRAFRICA >60 12/17/2021    EGFRNONAA >60 12/17/2021     Resolved

## 2021-12-17 NOTE — ASSESSMENT & PLAN NOTE
NPO  NGT to wall suction  KUB daily  CXR nothing acute  IVF started NS at 150ml/hr    He meets SIRS criteria but I think better explained by SBO presentation that infection.  UA > 100 WBC but he has no urinary tract symptoms. He has recurrent UTI and will start Rocephin now but my clinical impression is the abnormal vitals and elevated lactic acid are due to SBO, dehydration and not UTI sepsis    12/10 tolerating orals, advance diet .    12/11  Placed NG back again   KUB pending     12/12  S/p surgery ; POD 1   NG to suction   Bleeding stopped    12/13   NG pulled from general surgery will try clears this am. + flatus.    12/14 tolerating orals . No nausea ; no vomiting     12/17 s/p surgery ; 12/11 Patient had an adhesion of the  of the colon down to the root of the small bowel mesentery causing a complete small bowel obstruction.  After taking down the adhesions the obstruction was relieved.  The the bowel was viable with no concern for ischemia.  should follow up with Dr. Hein 1 week after discharge for his staple removal.

## 2021-12-17 NOTE — SUBJECTIVE & OBJECTIVE
Review of Systems   Constitutional: Positive for fatigue. Negative for activity change, fever and unexpected weight change.   HENT: Negative for congestion and sore throat.    Eyes: Negative for visual disturbance.   Respiratory: Negative for cough and shortness of breath.    Cardiovascular: Negative for chest pain.   Gastrointestinal: Negative for abdominal pain, blood in stool, constipation and diarrhea.   Genitourinary: Negative for dysuria and hematuria.   Neurological: Negative for dizziness and light-headedness.     Objective:     Vital Signs (Most Recent):  Temp: 98.8 °F (37.1 °C) (12/17/21 0410)  Pulse: 85 (12/17/21 0410)  Resp: 18 (12/17/21 0410)  BP: (!) 159/80 (12/17/21 0410)  SpO2: 98 % (12/17/21 0410) Vital Signs (24h Range):  Temp:  [97 °F (36.1 °C)-98.8 °F (37.1 °C)] 98.8 °F (37.1 °C)  Pulse:  [78-88] 85  Resp:  [12-20] 18  SpO2:  [98 %-99 %] 98 %  BP: (129-159)/(70-88) 159/80     Weight: 100 kg (220 lb 7.4 oz)  Body mass index is 33.52 kg/m².    Intake/Output Summary (Last 24 hours) at 12/17/2021 0721  Last data filed at 12/16/2021 1800  Gross per 24 hour   Intake 354 ml   Output --   Net 354 ml      Physical Exam  Vitals and nursing note reviewed.   Constitutional:       General: He is not in acute distress.     Appearance: He is well-developed.   HENT:      Head: Normocephalic and atraumatic.      Right Ear: External ear normal.      Left Ear: External ear normal.      Nose:      Comments: Pressure sore on right nare from NGT  Eyes:      General:         Right eye: No discharge.         Left eye: No discharge.      Conjunctiva/sclera: Conjunctivae normal.   Neck:      Thyroid: No thyromegaly.   Cardiovascular:      Rate and Rhythm: Normal rate and regular rhythm.      Heart sounds: Normal heart sounds. No murmur heard.      Pulmonary:      Effort: Pulmonary effort is normal. No respiratory distress.      Breath sounds: Normal breath sounds.   Abdominal:      General: Abdomen is flat. Bowel sounds  are normal. There is no distension.      Palpations: Abdomen is soft.      Tenderness: There is no abdominal tenderness.      Comments:   Dressing c/d/i   Musculoskeletal:      Cervical back: Neck supple.      Right lower leg: No edema.      Left lower leg: No edema.   Skin:     General: Skin is warm and dry.      Comments: Erosion to right nare   Neurological:      General: No focal deficit present.      Mental Status: He is alert. Mental status is at baseline.   Psychiatric:         Behavior: Behavior normal.         Thought Content: Thought content normal.         Significant Labs:   .  CBC:   No results for input(s): WBC, HGB, HCT, PLT in the last 48 hours.  CMP:   Recent Labs   Lab 21  0553      K 2.6*   *   CO2 21*   GLU 85   BUN 11   CREATININE 0.9   CALCIUM 8.3*   ANIONGAP 9   EGFRNONAA >60     lactic acid 2.7>1.9  Lipase 27  urine culture multiple organisms   covid screen negative   Significant Imagin/11 KUB NG tube in position.  Prior midline abdominal wall hernia repair.  Otherwise negative abdomen x-ray    12/10 KUB NG tube again noted in place.  No significant change in the bowel gas pattern when compared with the prior study.     US renal Normal size bilateral kidneys each containing a couple of benign anechoic cyst identified the midpole and upper pole regions largest single index cyst about the lateral cortex of the right kidney measuring 2.3 x 2.1 x 2.2 cm in size.     KUB  Enteric tube terminates in the gastric body.  The degree of distention of the small bowel loops is decreased as compared to prior examinations suggesting a resolving/revolved small-bowel obstruction.  No free air is identified.  Age-appropriate degenerative changes affect the skeleton.     CT abd and pelvis Imaging findings in keeping with a high-grade small bowel obstruction with a point of transition seen in the right mid abdomen, likely related to adhesions.  There is significant inflammatory  changes about the dilated small bowel loops in the mid abdomen likely related to engorgement of the Vasa recta.  Additionally, there is a small amount of free fluid about the liver and extending into the pelvic cul-de-sac, with slightly high density of the fluid in the pelvic cul-de-sac suggesting hyperemia issues content fluid.     KUB The enteric tube has been appropriately retracted and now terminates in the region of the distal gastric body.Slightly prominent loops of small bowel seen in the upper abdomen.  No definite free peritoneal air is seen.     Lung parenchyma cannot be adequately evaluated secondary to overexposure of the central aspect of the lungs.Consider dedicated radiograph of the chest evaluate for parenchymal injury.     CXR Enteric tube terminates in the gastric body.  There is bibasilar subsegmental atelectasis, with right basilar scarring.  No focal airspace consolidation or pleural effusions.  No pneumothorax.  The heart is enlarged.  Calcified atheromatous disease affects the aorta.  Median sternotomy wires are midline.  Age-appropriate degenerative changes affect the skeleton.

## 2021-12-17 NOTE — PROGRESS NOTES
Merged with [facility] ([address] [place] [address] Medicine  Progress Note    Patient Name: Domonique Hernandez Jr.  MRN: [id number]  Patient Class: IP- Inpatient   Admission Date: 12/7/2021  Length of Stay: 10 days  Attending Physician: Emiliano Cam MD  Primary Care Provider: Emiliano Cam MD        Subjective:     Principal Problem:SBO (small bowel obstruction)        HPI:  82yo male patient with hx of anxiety, arthritis, back pain, bronchitis/COPD, depression, GERD, HLD, hypothyroid, PVD, sleep apnea on CPAP, and CKD. Pt came to ER with c/o regurgitation for 3 days. Decrease intake. No fever. No UTI s/s. CT on admission shows SBO. Significantly dehydrated. Creat 3.6-- baseline 1.4. Given 1L NS in ER U/a dirty one dose of zosyn given. No fever. No elevated WBC. Lactate 2.3. He had NGT place and put to suction. He is in bed this am. Reports feeling better. General surgery consulted.       Overview/Hospital Course:  12/8 Pt was admitted yesterday with SBO. He had general surgery consulted. NGT placed to suction. Still no flatus or BM. KUB pending for this am.     Also on rocephin for UTI-culture in process. Was given 1L NS in ER and repeated on floor then started NS at 150ml/hr. Creat on admit 3.6> now 3.0-- baseline creat 1.4. lactate did impropve on repeat.  Renal US shows cysts.  No hydronephrosis.     12/9 pt here with SBO. NGT to suction had 1000ml overnight. No flatus. No BM. +BS  Renal function improved 3.6>>2.1 on NS at 150ml/hr. Rocephin for UTI- urine culture shows multiple organisms. NO fever. NO elevated WBC    12/10/21    82YO WM admitted with SBO 4 days ago, NGT pulled yesterday, tolerating liquids,   Burping, t  KUB this am- NG tube removed, otherwise no significant change when compared with the prior study of December 9, 2021.     Creat much improved 3.6>1.6 ,  IV Rocephin day 4 for UTI, initial urine Cx- multiple organisms, repaeat U/A dirty, culture in process   7 months ago urine CX+ MRSA- sensitve to  bactrim, tetracycline and Vanc ; will add oral doxy   WBC normal, afebrile   Per PT yesterday; Gait: Contact Gaurd Assistance x ~30 feet (steeping forward and back due to limited line of medical equipment - NG tube) using RW.    12/11/21  80YO WM admitted with SBO  NG was pulled 12/9   But yesterday started with nausea and later with vomiting  and NG has to be placed back again   KUB is pending .  On doxy for UTI recurrent ; last culture staph mrsa sensitive to tetracyclines     12/12  S/p surgery yesterday for SBO .  Reports no flatus . No BM   Issues with bleeding from incision   Lab Results   Component Value Date    WBC 9.47 12/12/2021    HGB 11.3 (L) 12/12/2021    HCT 37.2 (L) 12/12/2021    MCV 96 12/12/2021     12/12/2021 12/13 pt was taken to OR Saturday. He had NGT replaced after that. This am he is feeling better. Dr Hein saw him this am from general surgery and rec pulling NG and starting clears. He is not tender to palp this am. Passed gas this am.   H/H 9/30- lovenox was held due to ozzing after surgery- hx of a fib  Urine culture shows candida     12/14/21  NGT pulled yesterday, pt tolerating liquid diet, Na 147, creat 1.2, + CKD ; getting NS @150ml/hr; decrease to 75ml/hr   H&H 9.1/29 from 9.6/30   Had a BM yesterday, will continue oral liquids until bowel sounds more active   WBC normal, afebrile, O2 sat stable on RA   Urine Cx with candida, doxy stopped after 3 days ; will start diflucan 100mg daily x 3d .    12/15 Pt admitted  with SBO - NGT pulled 2 days ago, tolerating liquids, passing flatus, had BM this am   Surgical incision evaluated per general sx yesterday, recommend advance diet and ^OOB   Contact Guard Assistance x ~30 feet with RW. Slow phase, slight dec foot/floor clearance and guarding stomach  VSS, afebrile, WBC 11.53   Day 2/3 of diflucan for candida in urine   Getting IV labetolol; transitioned to oral home metoprolol   Awaiting on state form 142 to approve nursing  home admission due to psychiatric hx of anxiety/depression     12/16 Tolerating diet, VSS   Worked on out of bed activity, gait trng trng with RW x ~100 feet with standby Assistance; toilet(Bedside Commode) transfers/activity.    12/17 pt admitted with SBO reqired surgery for adhesions causing obstruction  12/11 he is stable from sx standpoint,   Tolerating diet , K+ 2.6 will supplement and check Mg+   Worked on out of bed activity, gait trng trng wioth RW x ~100 feet with standby Assistance; toilet(Bedside Commode) transfers/activity  Still awaiting [facility]- state approval for Nursing home   Check BMP , CBC in am           Review of Systems   Constitutional: Positive for fatigue. Negative for activity change, fever and unexpected weight change.   HENT: Negative for congestion and sore throat.    Eyes: Negative for visual disturbance.   Respiratory: Negative for cough and shortness of breath.    Cardiovascular: Negative for chest pain.   Gastrointestinal: Negative for abdominal pain, blood in stool, constipation and diarrhea.   Genitourinary: Negative for dysuria and hematuria.   Neurological: Negative for dizziness and light-headedness.     Objective:     Vital Signs (Most Recent):  Temp: 98.8 °F (37.1 °C) (12/17/21 0410)  Pulse: 85 (12/17/21 0410)  Resp: 18 (12/17/21 0410)  BP: (!) 159/80 (12/17/21 0410)  SpO2: 98 % (12/17/21 0410) Vital Signs (24h Range):  Temp:  [97 °F (36.1 °C)-98.8 °F (37.1 °C)] 98.8 °F (37.1 °C)  Pulse:  [78-88] 85  Resp:  [12-20] 18  SpO2:  [98 %-99 %] 98 %  BP: (129-159)/(70-88) 159/80     Weight: 100 kg (220 lb 7.4 oz)  Body mass index is 33.52 kg/m².    Intake/Output Summary (Last 24 hours) at 12/17/2021 0721  Last data filed at 12/16/2021 1800  Gross per 24 hour   Intake 354 ml   Output --   Net 354 ml      Physical Exam  Vitals and nursing note reviewed.   Constitutional:       General: He is not in acute distress.     Appearance: He is well-developed.   HENT:      Head: Normocephalic and  atraumatic.      Right Ear: External ear normal.      Left Ear: External ear normal.      Nose:      Comments: Pressure sore on right nare from NGT  Eyes:      General:         Right eye: No discharge.         Left eye: No discharge.      Conjunctiva/sclera: Conjunctivae normal.   Neck:      Thyroid: No thyromegaly.   Cardiovascular:      Rate and Rhythm: Normal rate and regular rhythm.      Heart sounds: Normal heart sounds. No murmur heard.      Pulmonary:      Effort: Pulmonary effort is normal. No respiratory distress.      Breath sounds: Normal breath sounds.   Abdominal:      General: Abdomen is flat. Bowel sounds are normal. There is no distension.      Palpations: Abdomen is soft.      Tenderness: There is no abdominal tenderness.      Comments:   Dressing c/d/i   Musculoskeletal:      Cervical back: Neck supple.      Right lower leg: No edema.      Left lower leg: No edema.   Skin:     General: Skin is warm and dry.      Comments: Erosion to right nare   Neurological:      General: No focal deficit present.      Mental Status: He is alert. Mental status is at baseline.   Psychiatric:         Behavior: Behavior normal.         Thought Content: Thought content normal.         Significant Labs:   .  CBC:   No results for input(s): WBC, HGB, HCT, PLT in the last 48 hours.  CMP:   Recent Labs   Lab 21  0553      K 2.6*   *   CO2 21*   GLU 85   BUN 11   CREATININE 0.9   CALCIUM 8.3*   ANIONGAP 9   EGFRNONAA >60     lactic acid 2.7>1.9  Lipase 27  urine culture multiple organisms   covid screen negative   Significant Imagin/11 KUB NG tube in position.  Prior midline abdominal wall hernia repair.  Otherwise negative abdomen x-ray    12/10 KUB NG tube again noted in place.  No significant change in the bowel gas pattern when compared with the prior study.     US renal Normal size bilateral kidneys each containing a couple of benign anechoic cyst identified the midpole and upper pole  regions largest single index cyst about the lateral cortex of the right kidney measuring 2.3 x 2.1 x 2.2 cm in size.     KUB 12/9 Enteric tube terminates in the gastric body.  The degree of distention of the small bowel loops is decreased as compared to prior examinations suggesting a resolving/revolved small-bowel obstruction.  No free air is identified.  Age-appropriate degenerative changes affect the skeleton.     CT abd and pelvis Imaging findings in keeping with a high-grade small bowel obstruction with a point of transition seen in the right mid abdomen, likely related to adhesions.  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  Additionally, there is a small amount of free fluid about the liver and extending into the pelvic cul-de-sac, with slightly high density of the fluid in the pelvic cul-de-sac suggesting hyperemia issues content fluid.     KUB The enteric tube has been appropriately retracted and now terminates in the region of the distal gastric body.Slightly prominent loops of small bowel seen in the upper abdomen.  No definite free peritoneal air is seen.     Lung parenchyma cannot be adequately evaluated secondary to overexposure of the central aspect of the lungs.Consider dedicated radiograph of the chest evaluate for parenchymal injury.     CXR Enteric tube terminates in the gastric body.  There is bibasilar subsegmental atelectasis, with right basilar scarring.  No focal airspace consolidation or pleural effusions.  No pneumothorax.  The heart is enlarged.  Calcified atheromatous disease affects the aorta.  Median sternotomy wires are midline.  Age-appropriate degenerative changes affect the skeleton.         Assessment/Plan:      * SBO (small bowel obstruction)  NPO  NGT to wall suction  KUB daily  CXR nothing acute  IVF started NS at 150ml/hr    He meets SIRS criteria but I think better  explained by SBO presentation that infection.  UA > 100 WBC but he has no urinary tract symptoms. He has recurrent UTI and will start Rocephin now but my clinical impression is the abnormal vitals and elevated lactic acid are due to SBO, dehydration and not UTI sepsis    12/10 tolerating orals, advance diet .    12/11  Placed NG back again   KUB pending     12/12  S/p surgery ; POD 1   NG to suction   Bleeding stopped    12/13   NG pulled from general surgery will try clears this am. + flatus.    12/14 tolerating orals . No nausea ; no vomiting     12/17 s/p surgery ; 12/11 Patient had an adhesion of the  of the colon down to the root of the small bowel mesentery causing a complete small bowel obstruction.  After taking down the adhesions the obstruction was relieved.  The the bowel was viable with no concern for ischemia.  should follow up with Dr. Hein 1 week after discharge for his staple removal.    Evaluation by psychiatric service required  Reviewed  consult .  12/16 awaiting state approval for nursing home     Paroxysmal atrial fibrillation  Noted in Dr Fair notes:    On eliquis and BB at home. NPO here. Will give lovenox- renally dose and labetalol IV for now   12/10 tolerating liquids, advance diet and resume oral BB, stop IV labetolol,   Resume eliquis;      12/11  DC eliquis   Resume lovenox .    12/12  HOLD LOVENOX   HB dropped 2 points   Follow CBC   Resume lovenox tomorrow     12/14 resume lovenox and watch for bleeding ;   12/15 transition back  to ELiquis 2.5mg bid  , getting labetolol IV q 6 ;takes metoprolol XL 50mg po daily- resume  12/16 stable with oral metoprolol   12/17 Stable with oral metoprolol, transition lovenox back to Eliquis 2.5mg home dose     Acute renal failure  Baseline creat 1.3-1.4- on admission 3.6. 1L NS given in ER. Repeat 1L Ns bolus 12/7 and then cont NS at 150ml/hr. Repeat lactate better 1.9  Creat now 3.0  Son reports that he has no issues urinating at home  Will check  renal U/s and post void residual  Has had urinary re tension in past. Consider salcedo if retaining due ti UTI    Repeat u.a today cont rocephin     12/10 creat improved - 3.6>1.6; renal US stable     12/11  [facility]  Lab Results   Component Value Date     12/17/2021    K 2.6 (LL) 12/17/2021     (H) 12/17/2021    CO2 21 (L) 12/17/2021    BUN 11 12/17/2021    CREATININE 0.9 12/17/2021    CALCIUM 8.3 (L) 12/17/2021    ANIONGAP 9 12/17/2021    ESTGFRAFRICA >60 12/17/2021    EGFRNONAA >60 12/17/2021 12/13 Back to baseline   BMP  Lab Results   Component Value Date     12/17/2021    K 2.6 (LL) 12/17/2021     (H) 12/17/2021    CO2 21 (L) 12/17/2021    BUN 11 12/17/2021    CREATININE 0.9 12/17/2021    CALCIUM 8.3 (L) 12/17/2021    ANIONGAP 9 12/17/2021    ESTGFRAFRICA >60 12/17/2021    EGFRNONAA >60 12/17/2021     Resolved     Asymptomatic bacteriuria  He has no UTI sx on interview day of admit  Culture urine  Start rocephin empirically as h/o of UTI and not a great historian but as noted above I think vitals/lactic acid precipitated by SBO and not UTI sepsis    Cont rocephin and repeat u.a   Pt is s/p lithotripsy, stent removal, and cystoureteroscopy with retrograde pyelogram w/ stent insertion on 5/31/21, tx with Bactrim for MRSA UTI  Will add oral doxy  To cover for staph MRSA UTI .  12/14   d/c abx, grew candida> low colony count and asymptomatic. No further treatment needed.  Given 3 days diflucan Rx     SIRS (systemic inflammatory response syndrome)  He meets SIRS criteria but I think better explained by SBO presentation than infection.  UA > 100 WBC but he has no urinary tract symptoms. He has recurrent UTI and will start Rocephin now but my clinical impression is the abnormal vitals and elevated lactic acid are due to SBO, dehydration and not UTI sepsis.  RESOLVED .      Hypokalemia  Supplement and monitor       Hypothyroidism  Hold synthroid while NPO  If remains NPO > 3 days can start  IV  Resume oral thyroid meds .    12/11  Hold po meds.    12/14  Continue levothyroxine     GERD (gastroesophageal reflux disease)  protonix IV  tolerating oral - change to po .    12/11  Back to IV   12/14  Cont PPI.    Hyperlipidemia  Holding zocor while NPO  12/10 resume - zocor non formulary- atorvastatin while here - NPO for now will resume once taking po    12/13: simvastatin not formulary. Can resume as outpatient. Hopefully d/c soon.        VTE Risk Mitigation (From admission, onward)         Ordered     apixaban tablet 2.5 mg  2 times daily         12/17/21 0724     IP VTE HIGH RISK PATIENT  Once         12/07/21 0620     Place sequential compression device  Until discontinued         12/07/21 0620                Discharge Planning   STEPHANIE:      Code Status: Full Code   Is the patient medically ready for discharge?:     Reason for patient still in hospital (select all that apply): Laboratory test, Treatment and Pending disposition  Discharge Plan A: Skilled Nursing Facility   Discharge Delays: None known at this time              Refugio Leonard MD  Department of Hospital Medicine   [place] - [facility] (3rd Fl)

## 2021-12-17 NOTE — ASSESSMENT & PLAN NOTE
He has no UTI sx on interview day of admit  Culture urine  Start rocephin empirically as h/o of UTI and not a great historian but as noted above I think vitals/lactic acid precipitated by SBO and not UTI sepsis    Cont rocephin and repeat u.a   Pt is s/p lithotripsy, stent removal, and cystoureteroscopy with retrograde pyelogram w/ stent insertion on 5/31/21, tx with Bactrim for MRSA UTI  Will add oral doxy  To cover for staph MRSA UTI .  12/14   d/c abx, grew candida> low colony count and asymptomatic. No further treatment needed.  Given 3 days diflucan Rx

## 2021-12-17 NOTE — PLAN OF CARE
Recommendation:   1. Continue cardiac diet as tolerated   -Encourage adeuate intake of meals   2. Consider ordering Boost Breeze or plus (pt preference) with meals for intake support    Goals: pt to consume >50% of meals by f/u  Nutrition Goal Status: new

## 2021-12-17 NOTE — PROGRESS NOTES
"[place] - Med Surg (3rd Fl)  Adult Nutrition  Progress Note    SUMMARY       Recommendations    Recommendation:   1. Continue cardiac diet as tolerated   -Encourage adeuate intake of meals   2. Consider ordering Boost Breeze or plus (pt preference) with meals for intake support    Goals: pt to consume >50% of meals by f/u  Nutrition Goal Status: new  Communication of RD Recs:  (POC)    Assessment and Plan  Nutrition Problem:  Inadequate energy intake    Related to (etiology):   SBO    Signs and Symptoms (as evidenced by):   Consuming 25% of meals     Interventions:  Sodium modified diet  Collaboration with other provider    Nutrition Diagnosis Status:   New     Reason for Assessment    Reason For Assessment: length of stay  Diagnosis: gastrointestinal disease  Relevant Medical History: anxiety, COPD, depression, emphysema of lung, GERD, HLD, HTN, obesity, hypothyroidism, CKD3    General Information Comments: RD working remotely today; NFPE not performed. Pt identified as LOS for SBO. He has been consuming 25% of meals. Stable wt in the last 6 months. +BM and flautus. No n/v.    Nutrition Discharge Planning: low sodium    Nutrition Risk Screen    Nutrition Risk Screen: no indicators present    Nutrition/Diet History    Spiritual, Cultural Beliefs, Alevism Practices, Values that Affect Care: no  Food Allergies: NKFA  Factors Affecting Nutritional Intake: altered gastrointestinal function    Anthropometrics    Temp: 98.4 °F (36.9 °C)  Height Method: Stated  Height: 5' 8" (172.7 cm)  Height (inches): 68 in  Weight Method: Bed Scale  Weight: 100 kg (220 lb 7.4 oz)  Weight (lb): 220.46 lb  Ideal Body Weight (IBW), Male: 154 lb  % Ideal Body Weight, Male (lb): 143.16 %  BMI (Calculated): 33.5  BMI Grade: 30 - 34.9- obesity - grade I       Lab/Procedures/Meds    Pertinent Labs Reviewed: reviewed  Pertinent Labs Comments: potassium 2.6, calcium 8.3, Mg 1.4  Pertinent Medications Reviewed: reviewed  Pertinent Medications " Comments: apixaban levothyroxine, Mg sulfate, metoprolol succinate, pantoprazole    Estimated/Assessed Needs    Weight Used For Calorie Calculations: 100 kg (220 lb 7.4 oz)  Energy Calorie Requirements (kcal): 1700  Energy Need Method: Parrott-St Jeor (no AF)  Protein Requirements:  g (.8-1 g/kg)  Weight Used For Protein Calculations: 100 kg (220 lb 7.4 oz)     Estimated Fluid Requirement Method: RDA Method  RDA Method (mL): 1700         Nutrition Prescription Ordered    Current Diet Order: Cardiac diet    Evaluation of Received Nutrient/Fluid Intake    Fluid Required: meeting needs  % Intake of Estimated Energy Needs: 25 - 50 %  % Meal Intake: 25 - 50 %    Nutrition Risk    Level of Risk/Frequency of Follow-up:  (1x/wk)     Monitor and Evaluation    Food and Nutrient Intake: energy intake,food and beverage intake  Food and Nutrient Adminstration: diet order  Anthropometric Measurements: weight,weight change  Biochemical Data, Medical Tests and Procedures: electrolyte and renal panel,glucose/endocrine profile,lipid profile  Nutrition-Focused Physical Findings: overall appearance     Nutrition Follow-Up    RD Follow-up?: Yes

## 2021-12-17 NOTE — ASSESSMENT & PLAN NOTE
Noted in Dr Fair notes:    On eliquis and BB at home. NPO here. Will give lovenox- renally dose and labetalol IV for now   12/10 tolerating liquids, advance diet and resume oral BB, stop IV labetolol,   Resume eliquis;      12/11  DC eliquis   Resume lovenox .    12/12  HOLD LOVENOX   HB dropped 2 points   Follow CBC   Resume lovenox tomorrow     12/14 resume lovenox and watch for bleeding ;   12/15 transition back  to ELiquis 2.5mg bid  , getting labetolol IV q 6 ;takes metoprolol XL 50mg po daily- resume  12/16 stable with oral metoprolol   12/17 Stable with oral metoprolol, transition lovenox back to Eliquis 2.5mg home dose

## 2021-12-17 NOTE — PLAN OF CARE
12/17/21 1531   Post-Acute Status   Post-Acute Authorization Placement   Post-Acute Placement Status Referrals Sent   Discharge Delays None known at this time       SW informed this morning that patient was denied SNF by [employer]. SW informed patient's son and discussed admitting to the nursing home as a long-term resident and need for financial information. Son stated understanding. Referrals sent through [facility] to [facility], [facility] and the [facility] per family's request.     142 received today for nursing home placement.

## 2021-12-18 LAB
ANION GAP SERPL CALC-SCNC: 10 MMOL/L (ref 8–16)
BASOPHILS # BLD AUTO: 0.03 K/UL (ref 0–0.2)
BASOPHILS NFR BLD: 0.3 % (ref 0–1.9)
BUN SERPL-MCNC: 10 MG/DL (ref 8–23)
CALCIUM SERPL-MCNC: 8.5 MG/DL (ref 8.7–10.5)
CHLORIDE SERPL-SCNC: 111 MMOL/L (ref 95–110)
CO2 SERPL-SCNC: 18 MMOL/L (ref 23–29)
CREAT SERPL-MCNC: 0.8 MG/DL (ref 0.5–1.4)
DIFFERENTIAL METHOD: ABNORMAL
EOSINOPHIL # BLD AUTO: 0.2 K/UL (ref 0–0.5)
EOSINOPHIL NFR BLD: 2.5 % (ref 0–8)
ERYTHROCYTE [DISTWIDTH] IN BLOOD BY AUTOMATED COUNT: 16.4 % (ref 11.5–14.5)
EST. GFR  (AFRICAN AMERICAN): >60 ML/MIN/1.73 M^2
EST. GFR  (NON AFRICAN AMERICAN): >60 ML/MIN/1.73 M^2
GLUCOSE SERPL-MCNC: 86 MG/DL (ref 70–110)
HCT VFR BLD AUTO: 30 % (ref 40–54)
HGB BLD-MCNC: 9.9 G/DL (ref 14–18)
IMM GRANULOCYTES # BLD AUTO: 0.12 K/UL (ref 0–0.04)
IMM GRANULOCYTES NFR BLD AUTO: 1.3 % (ref 0–0.5)
LYMPHOCYTES # BLD AUTO: 1.3 K/UL (ref 1–4.8)
LYMPHOCYTES NFR BLD: 14.7 % (ref 18–48)
MAGNESIUM SERPL-MCNC: 1.8 MG/DL (ref 1.6–2.6)
MCH RBC QN AUTO: 29.3 PG (ref 27–31)
MCHC RBC AUTO-ENTMCNC: 33 G/DL (ref 32–36)
MCV RBC AUTO: 89 FL (ref 82–98)
MONOCYTES # BLD AUTO: 0.5 K/UL (ref 0.3–1)
MONOCYTES NFR BLD: 5.6 % (ref 4–15)
NEUTROPHILS # BLD AUTO: 6.9 K/UL (ref 1.8–7.7)
NEUTROPHILS NFR BLD: 75.6 % (ref 38–73)
NRBC BLD-RTO: 0 /100 WBC
PLATELET # BLD AUTO: 204 K/UL (ref 150–450)
PMV BLD AUTO: 10.4 FL (ref 9.2–12.9)
POTASSIUM SERPL-SCNC: 3.3 MMOL/L (ref 3.5–5.1)
RBC # BLD AUTO: 3.38 M/UL (ref 4.6–6.2)
SODIUM SERPL-SCNC: 139 MMOL/L (ref 136–145)
WBC # BLD AUTO: 9.07 K/UL (ref 3.9–12.7)

## 2021-12-18 PROCEDURE — 83735 ASSAY OF MAGNESIUM: CPT | Performed by: FAMILY MEDICINE

## 2021-12-18 PROCEDURE — 99232 SBSQ HOSP IP/OBS MODERATE 35: CPT | Mod: ,,, | Performed by: STUDENT IN AN ORGANIZED HEALTH CARE EDUCATION/TRAINING PROGRAM

## 2021-12-18 PROCEDURE — 11000001 HC ACUTE MED/SURG PRIVATE ROOM

## 2021-12-18 PROCEDURE — 80048 BASIC METABOLIC PNL TOTAL CA: CPT | Performed by: NURSE PRACTITIONER

## 2021-12-18 PROCEDURE — C9113 INJ PANTOPRAZOLE SODIUM, VIA: HCPCS | Performed by: SURGERY

## 2021-12-18 PROCEDURE — 99232 PR SUBSEQUENT HOSPITAL CARE,LEVL II: ICD-10-PCS | Mod: ,,, | Performed by: STUDENT IN AN ORGANIZED HEALTH CARE EDUCATION/TRAINING PROGRAM

## 2021-12-18 PROCEDURE — 85025 COMPLETE CBC W/AUTO DIFF WBC: CPT | Performed by: FAMILY MEDICINE

## 2021-12-18 PROCEDURE — 94761 N-INVAS EAR/PLS OXIMETRY MLT: CPT

## 2021-12-18 PROCEDURE — 36415 COLL VENOUS BLD VENIPUNCTURE: CPT | Performed by: NURSE PRACTITIONER

## 2021-12-18 PROCEDURE — 97530 THERAPEUTIC ACTIVITIES: CPT

## 2021-12-18 PROCEDURE — 63600175 PHARM REV CODE 636 W HCPCS: Performed by: SURGERY

## 2021-12-18 PROCEDURE — 25000003 PHARM REV CODE 250: Performed by: NURSE PRACTITIONER

## 2021-12-18 PROCEDURE — 25000003 PHARM REV CODE 250: Performed by: INTERNAL MEDICINE

## 2021-12-18 PROCEDURE — 25000003 PHARM REV CODE 250: Performed by: STUDENT IN AN ORGANIZED HEALTH CARE EDUCATION/TRAINING PROGRAM

## 2021-12-18 RX ORDER — POTASSIUM CHLORIDE 750 MG/1
10 TABLET, EXTENDED RELEASE ORAL 2 TIMES DAILY
Status: DISCONTINUED | OUTPATIENT
Start: 2021-12-18 | End: 2021-12-23 | Stop reason: HOSPADM

## 2021-12-18 RX ADMIN — APIXABAN 2.5 MG: 2.5 TABLET, FILM COATED ORAL at 09:12

## 2021-12-18 RX ADMIN — LEVOTHYROXINE SODIUM 100 MCG: 100 TABLET ORAL at 06:12

## 2021-12-18 RX ADMIN — PANTOPRAZOLE SODIUM 40 MG: 40 INJECTION, POWDER, LYOPHILIZED, FOR SOLUTION INTRAVENOUS at 09:12

## 2021-12-18 RX ADMIN — POTASSIUM CHLORIDE 10 MEQ: 750 TABLET, FILM COATED, EXTENDED RELEASE ORAL at 12:12

## 2021-12-18 RX ADMIN — METOPROLOL SUCCINATE 50 MG: 50 TABLET, EXTENDED RELEASE ORAL at 09:12

## 2021-12-18 RX ADMIN — APIXABAN 2.5 MG: 2.5 TABLET, FILM COATED ORAL at 08:12

## 2021-12-18 RX ADMIN — POTASSIUM CHLORIDE 10 MEQ: 750 TABLET, FILM COATED, EXTENDED RELEASE ORAL at 08:12

## 2021-12-18 NOTE — PT/OT/SLP DISCHARGE
Physical Therapy Discharge Summary    Name: Domonique Hernandez Jr.  MRN: [id number]   Principal Problem: SBO (small bowel obstruction)     Patient Discharged from acute Physical Therapy on 2021.  Please refer to prior PT noted date on 2021 for functional status.     Assessment:     Patient has met all goals and is not appropriate for therapy.    Objective:     GOALS:   Multidisciplinary Problems     Physical Therapy Goals     Not on file          Multidisciplinary Problems (Resolved)        Problem: Physical Therapy Goal    Goal Priority Disciplines Outcome Goal Variances Interventions   Physical Therapy Goal   (Resolved)     PT, PT/OT Met     Description: Goals to be met by: 21    Patient will increase functional independence with mobility by performin. Supine to sit with Modified Independent  2. Sit to supine with Modified Independent  3. Bed to chair transfer with Supervision or Set-up Assistancewith or without rolling walker using Step Transfer TECHNIQUE  4. Gait  x ~100  feet with Supervision or Set-up Assistance with or without rolling walker  5. Lower extremity exercise program x10 reps per handout, with assistance as needed                      Reasons for Discontinuation of Therapy Services  Satisfactory goal achievement.      Plan:     Patient Discharged to: Skilled Nursing Facility.      2021

## 2021-12-18 NOTE — PROGRESS NOTES
[facility] ([address] [place] [address] Medicine  Progress Note    Patient Name: Domonique Hernandez Jr.  MRN: [id number]  Patient Class: IP- Inpatient   Admission Date: 12/7/2021  Length of Stay: 11 days  Attending Physician: Emiliano Cam MD  Primary Care Provider: Emiliano Cam MD        Subjective:     Principal Problem:SBO (small bowel obstruction)        HPI:  82yo male patient with hx of anxiety, arthritis, back pain, bronchitis/COPD, depression, GERD, HLD, hypothyroid, PVD, sleep apnea on CPAP, and CKD. Pt came to ER with c/o regurgitation for 3 days. Decrease intake. No fever. No UTI s/s. CT on admission shows SBO. Significantly dehydrated. Creat 3.6-- baseline 1.4. Given 1L NS in ER U/a dirty one dose of zosyn given. No fever. No elevated WBC. Lactate 2.3. He had NGT place and put to suction. He is in bed this am. Reports feeling better. General surgery consulted.       Overview/Hospital Course:  12/8 Pt was admitted yesterday with SBO. He had general surgery consulted. NGT placed to suction. Still no flatus or BM. KUB pending for this am.     Also on rocephin for UTI-culture in process. Was given 1L NS in ER and repeated on floor then started NS at 150ml/hr. Creat on admit 3.6> now 3.0-- baseline creat 1.4. lactate did impropve on repeat.  Renal US shows cysts.  No hydronephrosis.     12/9 pt here with SBO. NGT to suction had 1000ml overnight. No flatus. No BM. +BS  Renal function improved 3.6>>2.1 on NS at 150ml/hr. Rocephin for UTI- urine culture shows multiple organisms. NO fever. NO elevated WBC    12/10/21    82YO WM admitted with SBO 4 days ago, NGT pulled yesterday, tolerating liquids,   Burping, t  KUB this am- NG tube removed, otherwise no significant change when compared with the prior study of December 9, 2021.     Creat much improved 3.6>1.6 ,  IV Rocephin day 4 for UTI, initial urine Cx- multiple organisms, repaeat U/A dirty, culture in process   7 months ago urine CX+ MRSA- sensitve to  bactrim, tetracycline and Vanc ; will add oral doxy   WBC normal, afebrile   Per PT yesterday; Gait: Contact Gaurd Assistance x ~30 feet (steeping forward and back due to limited line of medical equipment - NG tube) using RW.    12/11/21  80YO WM admitted with SBO  NG was pulled 12/9   But yesterday started with nausea and later with vomiting  and NG has to be placed back again   KUB is pending .  On doxy for UTI recurrent ; last culture staph mrsa sensitive to tetracyclines     12/12  S/p surgery yesterday for SBO .  Reports no flatus . No BM   Issues with bleeding from incision   Lab Results   Component Value Date    WBC 9.47 12/12/2021    HGB 11.3 (L) 12/12/2021    HCT 37.2 (L) 12/12/2021    MCV 96 12/12/2021     12/12/2021 12/13 pt was taken to OR Saturday. He had NGT replaced after that. This am he is feeling better. Dr Hein saw him this am from general surgery and rec pulling NG and starting clears. He is not tender to palp this am. Passed gas this am.   H/H 9/30- lovenox was held due to ozzing after surgery- hx of a fib  Urine culture shows candida     12/14/21  NGT pulled yesterday, pt tolerating liquid diet, Na 147, creat 1.2, + CKD ; getting NS @150ml/hr; decrease to 75ml/hr   H&H 9.1/29 from 9.6/30   Had a BM yesterday, will continue oral liquids until bowel sounds more active   WBC normal, afebrile, O2 sat stable on RA   Urine Cx with candida, doxy stopped after 3 days ; will start diflucan 100mg daily x 3d .    12/15 Pt admitted  with SBO - NGT pulled 2 days ago, tolerating liquids, passing flatus, had BM this am   Surgical incision evaluated per general sx yesterday, recommend advance diet and ^OOB   Contact Guard Assistance x ~30 feet with RW. Slow phase, slight dec foot/floor clearance and guarding stomach  VSS, afebrile, WBC 11.53   Day 2/3 of diflucan for candida in urine   Getting IV labetolol; transitioned to oral home metoprolol   Awaiting on state form 142 to approve nursing  home admission due to psychiatric hx of anxiety/depression     12/16 Tolerating diet, VSS   Worked on out of bed activity, gait trng trng with RW x ~100 feet with standby Assistance; toilet(Bedside Commode) transfers/activity.    12/17 pt admitted with SBO reqired surgery for adhesions causing obstruction  12/11 he is stable from sx standpoint,   Tolerating diet , K+ 2.6 will supplement and check Mg+   Worked on out of bed activity, gait trng trng wioth RW x ~100 feet with standby Assistance; toilet(Bedside Commode) transfers/activity  Still awaiting [facility] approval for Nursing home   Check BMP , CBC in am     12/18 no acute events overnight. No new complaints. Awaiting NH placement.            Review of Systems   Constitutional: Negative for activity change, fatigue, fever and unexpected weight change.   HENT: Negative for congestion and sore throat.    Eyes: Negative for visual disturbance.   Respiratory: Negative for cough and shortness of breath.    Cardiovascular: Negative for chest pain.   Gastrointestinal: Negative for abdominal pain, blood in stool, constipation and diarrhea.   Genitourinary: Negative for dysuria and hematuria.   Neurological: Negative for dizziness and light-headedness.     Objective:     Vital Signs (Most Recent):  Temp: 98.8 °F (37.1 °C) (12/18/21 1203)  Pulse: 78 (12/18/21 1203)  Resp: 17 (12/18/21 1203)  BP: 127/79 (12/18/21 1203)  SpO2: 97 % (12/18/21 1203) Vital Signs (24h Range):  Temp:  [96.2 °F (35.7 °C)-98.8 °F (37.1 °C)] 98.8 °F (37.1 °C)  Pulse:  [74-92] 78  Resp:  [17-20] 17  SpO2:  [96 %-99 %] 97 %  BP: (116-172)/(60-88) 127/79     Weight: 100 kg (220 lb 7.4 oz)  Body mass index is 33.52 kg/m².    Intake/Output Summary (Last 24 hours) at 12/18/2021 1208  Last data filed at 12/17/2021 2200  Gross per 24 hour   Intake 584.11 ml   Output 100 ml   Net 484.11 ml      Physical Exam  Vitals and nursing note reviewed.   Constitutional:       General: He is not in acute distress.      Appearance: He is well-developed.   HENT:      Head: Normocephalic and atraumatic.      Right Ear: External ear normal.      Left Ear: External ear normal.      Nose:      Comments: Pressure sore on right nare from NGT  Eyes:      General:         Right eye: No discharge.         Left eye: No discharge.      Conjunctiva/sclera: Conjunctivae normal.   Neck:      Thyroid: No thyromegaly.   Cardiovascular:      Rate and Rhythm: Normal rate and regular rhythm.      Heart sounds: Normal heart sounds. No murmur heard.      Pulmonary:      Effort: Pulmonary effort is normal. No respiratory distress.      Breath sounds: Normal breath sounds.   Abdominal:      General: Abdomen is flat. Bowel sounds are normal. There is no distension.      Palpations: Abdomen is soft.      Tenderness: There is no abdominal tenderness.      Comments:   Dressing c/d/i   Musculoskeletal:      Cervical back: Neck supple.      Right lower leg: No edema.      Left lower leg: No edema.   Skin:     General: Skin is warm and dry.      Comments: Erosion to right nare   Neurological:      General: No focal deficit present.      Mental Status: He is alert. Mental status is at baseline.   Psychiatric:         Behavior: Behavior normal.         Thought Content: Thought content normal.         Significant Labs:   All pertinent labs within the past 24 hours have been reviewed.  Blood Culture: No results for input(s): LABBLOO in the last 48 hours.  BMP:   Recent Labs   Lab 12/17/21  0553 12/17/21  0553 12/18/21  0603   GLU 85   < > 86      < > 139   K 2.6*   < > 3.3*   *   < > 111*   CO2 21*   < > 18*   BUN 11   < > 10   CREATININE 0.9   < > 0.8   CALCIUM 8.3*   < > 8.5*   MG 1.4*  --   --     < > = values in this interval not displayed.     CBC:   Recent Labs   Lab 12/18/21  0603   WBC 9.07   HGB 9.9*   HCT 30.0*        CMP:   Recent Labs   Lab 12/17/21  0553 12/18/21  0603    139   K 2.6* 3.3*   * 111*   CO2 21* 18*   GLU 85  86   BUN 11 10   CREATININE 0.9 0.8   CALCIUM 8.3* 8.5*   ANIONGAP 9 10   EGFRNONAA >60 >60     Magnesium:   Recent Labs   Lab 12/17/21  0553   MG 1.4*       Significant Imaging: I have reviewed all pertinent imaging results/findings within the past 24 hours.       12/11 KUB NG tube in position.  Prior midline abdominal wall hernia repair.  Otherwise negative abdomen x-ray     12/10 KUB NG tube again noted in place.  No significant change in the bowel gas pattern when compared with the prior study.     US renal Normal size bilateral kidneys each containing a couple of benign anechoic cyst identified the midpole and upper pole regions largest single index cyst about the lateral cortex of the right kidney measuring 2.3 x 2.1 x 2.2 cm in size.     KUB 12/9 Enteric tube terminates in the gastric body.  The degree of distention of the small bowel loops is decreased as compared to prior examinations suggesting a resolving/revolved small-bowel obstruction.  No free air is identified.  Age-appropriate degenerative changes affect the skeleton.     CT abd and pelvis Imaging findings in keeping with a high-grade small bowel obstruction with a point of transition seen in the right mid abdomen, likely related to adhesions.  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  Additionally, there is a small amount of free fluid about the liver and extending into the pelvic cul-de-sac, with slightly high density of the fluid in the pelvic cul-de-sac suggesting hyperemia issues content fluid.     KUB The enteric tube has been appropriately retracted and now terminates in the region of the distal gastric body.Slightly prominent loops of small bowel seen in the upper abdomen.  No definite free peritoneal air is seen.     Lung parenchyma cannot be adequately evaluated secondary to overexposure of the central aspect of the lungs.Consider  dedicated radiograph of the chest evaluate for parenchymal injury.     CXR Enteric tube terminates in the gastric body.  There is bibasilar subsegmental atelectasis, with right basilar scarring.  No focal airspace consolidation or pleural effusions.  No pneumothorax.  The heart is enlarged.  Calcified atheromatous disease affects the aorta.  Median sternotomy wires are midline.  Age-appropriate degenerative changes affect the skeleton.      Assessment/Plan:      * SBO (small bowel obstruction)  NPO  NGT to wall suction  KUB daily  CXR nothing acute  IVF started NS at 150ml/hr    He meets SIRS criteria but I think better explained by SBO presentation that infection.  UA > 100 WBC but he has no urinary tract symptoms. He has recurrent UTI and will start Rocephin now but my clinical impression is the abnormal vitals and elevated lactic acid are due to SBO, dehydration and not UTI sepsis    12/10 tolerating orals, advance diet .    12/11  Placed NG back again   KUB pending     12/12  S/p surgery ; POD 1   NG to suction   Bleeding stopped    12/13   NG pulled from general surgery will try clears this am. + flatus.    12/14 tolerating orals . No nausea ; no vomiting     12/17 s/p surgery ; 12/11 Patient had an adhesion of the  of the colon down to the root of the small bowel mesentery causing a complete small bowel obstruction.  After taking down the adhesions the obstruction was relieved.  The the bowel was viable with no concern for ischemia.  should follow up with Dr. Hein 1 week after discharge for his staple removal.    Hypomagnesemia  Replete and monitor      Evaluation by psychiatric service required  Reviewed  consult .  12/16 awaiting state approval for nursing home     Paroxysmal atrial fibrillation  Noted in Dr Fair notes:    On eliquis and BB at home. NPO here. Will give lovenox- renally dose and labetalol IV for now   12/10 tolerating liquids, advance diet and resume oral BB, stop IV labetolol,   Resume  eliquis;      12/11  DC eliquis   Resume lovenox .    12/12  HOLD LOVENOX   HB dropped 2 points   Follow CBC   Resume lovenox tomorrow     12/14 resume lovenox and watch for bleeding ;   12/15 transition back  to ELiquis 2.5mg bid  , getting labetolol IV q 6 ;takes metoprolol XL 50mg po daily- resume  12/16 stable with oral metoprolol   12/17 Stable with oral metoprolol, transition lovenox back to Eliquis 2.5mg home dose     Acute renal failure  Baseline creat 1.3-1.4- on admission 3.6. 1L NS given in ER. Repeat 1L Ns bolus 12/7 and then cont NS at 150ml/hr. Repeat lactate better 1.9  Creat now 3.0  Son reports that he has no issues urinating at home  Will check renal U/s and post void residual  Has had urinary re tension in past. Consider salcedo if retaining due ti UTI    Repeat u.a today cont rocephin     12/10 creat improved - 3.6>1.6; renal US stable     12/11  BMP  Lab Results   Component Value Date     12/18/2021    K 3.3 (L) 12/18/2021     (H) 12/18/2021    CO2 18 (L) 12/18/2021    BUN 10 12/18/2021    CREATININE 0.8 12/18/2021    CALCIUM 8.5 (L) 12/18/2021    ANIONGAP 10 12/18/2021    ESTGFRAFRICA >60 12/18/2021    EGFRNONAA >60 12/18/2021 12/13 Back to baseline   BMP  Lab Results   Component Value Date     12/18/2021    K 3.3 (L) 12/18/2021     (H) 12/18/2021    CO2 18 (L) 12/18/2021    BUN 10 12/18/2021    CREATININE 0.8 12/18/2021    CALCIUM 8.5 (L) 12/18/2021    ANIONGAP 10 12/18/2021    ESTGFRAFRICA >60 12/18/2021    EGFRNONAA >60 12/18/2021     Resolved     Asymptomatic bacteriuria  He has no UTI sx on interview day of admit  Culture urine  Start rocephin empirically as h/o of UTI and not a great historian but as noted above I think vitals/lactic acid precipitated by SBO and not UTI sepsis    Cont rocephin and repeat u.a   Pt is s/p lithotripsy, stent removal, and cystoureteroscopy with retrograde pyelogram w/ stent insertion on 5/31/21, tx with Bactrim for MRSA UTI  Will add  oral doxy  To cover for staph MRSA UTI .  12/14   d/c abx, grew candida> low colony count and asymptomatic. No further treatment needed.  Given 3 days diflucan Rx     SIRS (systemic inflammatory response syndrome)  He meets SIRS criteria but I think better explained by SBO presentation than infection.  UA > 100 WBC but he has no urinary tract symptoms. He has recurrent UTI and will start Rocephin now but my clinical impression is the abnormal vitals and elevated lactic acid are due to SBO, dehydration and not UTI sepsis.  RESOLVED .      Hypokalemia  Supplement and monitor     12/18: start KCl 20meq BID and monitor      Hypothyroidism  Hold synthroid while NPO  If remains NPO > 3 days can start IV  Resume oral thyroid meds .    12/11  Hold po meds.    12/14  Continue levothyroxine     GERD (gastroesophageal reflux disease)  protonix IV  tolerating oral - change to po .    12/11  Back to IV   12/14  Cont PPI.    Hyperlipidemia  Holding zocor while NPO  12/10 resume - zocor non formulary- atorvastatin while here - NPO for now will resume once taking po    12/13: simvastatin not formulary. Can resume as outpatient. Hopefully d/c soon.        VTE Risk Mitigation (From admission, onward)         Ordered     apixaban tablet 2.5 mg  2 times daily         12/17/21 0724     IP VTE HIGH RISK PATIENT  Once         12/07/21 0620     Place sequential compression device  Until discontinued         12/07/21 0620                Discharge Planning   STEPHANIE:      Code Status: Full Code   Is the patient medically ready for discharge?:     Reason for patient still in hospital (select all that apply): Pending disposition  Discharge Plan A: Skilled Nursing Facility   Discharge Delays: None known at this time              Refugio Leonard MD  Department of Hospital Medicine   [facility] [facility] (3rd Fl)

## 2021-12-18 NOTE — SUBJECTIVE & OBJECTIVE
Review of Systems   Constitutional: Negative for activity change, fatigue, fever and unexpected weight change.   HENT: Negative for congestion and sore throat.    Eyes: Negative for visual disturbance.   Respiratory: Negative for cough and shortness of breath.    Cardiovascular: Negative for chest pain.   Gastrointestinal: Negative for abdominal pain, blood in stool, constipation and diarrhea.   Genitourinary: Negative for dysuria and hematuria.   Neurological: Negative for dizziness and light-headedness.     Objective:     Vital Signs (Most Recent):  Temp: 98.8 °F (37.1 °C) (12/18/21 1203)  Pulse: 78 (12/18/21 1203)  Resp: 17 (12/18/21 1203)  BP: 127/79 (12/18/21 1203)  SpO2: 97 % (12/18/21 1203) Vital Signs (24h Range):  Temp:  [96.2 °F (35.7 °C)-98.8 °F (37.1 °C)] 98.8 °F (37.1 °C)  Pulse:  [74-92] 78  Resp:  [17-20] 17  SpO2:  [96 %-99 %] 97 %  BP: (116-172)/(60-88) 127/79     Weight: 100 kg (220 lb 7.4 oz)  Body mass index is 33.52 kg/m².    Intake/Output Summary (Last 24 hours) at 12/18/2021 1208  Last data filed at 12/17/2021 2200  Gross per 24 hour   Intake 584.11 ml   Output 100 ml   Net 484.11 ml      Physical Exam  Vitals and nursing note reviewed.   Constitutional:       General: He is not in acute distress.     Appearance: He is well-developed.   HENT:      Head: Normocephalic and atraumatic.      Right Ear: External ear normal.      Left Ear: External ear normal.      Nose:      Comments: Pressure sore on right nare from NGT  Eyes:      General:         Right eye: No discharge.         Left eye: No discharge.      Conjunctiva/sclera: Conjunctivae normal.   Neck:      Thyroid: No thyromegaly.   Cardiovascular:      Rate and Rhythm: Normal rate and regular rhythm.      Heart sounds: Normal heart sounds. No murmur heard.      Pulmonary:      Effort: Pulmonary effort is normal. No respiratory distress.      Breath sounds: Normal breath sounds.   Abdominal:      General: Abdomen is flat. Bowel sounds  are normal. There is no distension.      Palpations: Abdomen is soft.      Tenderness: There is no abdominal tenderness.      Comments:   Dressing c/d/i   Musculoskeletal:      Cervical back: Neck supple.      Right lower leg: No edema.      Left lower leg: No edema.   Skin:     General: Skin is warm and dry.      Comments: Erosion to right nare   Neurological:      General: No focal deficit present.      Mental Status: He is alert. Mental status is at baseline.   Psychiatric:         Behavior: Behavior normal.         Thought Content: Thought content normal.         Significant Labs:   All pertinent labs within the past 24 hours have been reviewed.  Blood Culture: No results for input(s): LABBLOO in the last 48 hours.  BMP:   Recent Labs   Lab 12/17/21  0553 12/17/21  0553 12/18/21  0603   GLU 85   < > 86      < > 139   K 2.6*   < > 3.3*   *   < > 111*   CO2 21*   < > 18*   BUN 11   < > 10   CREATININE 0.9   < > 0.8   CALCIUM 8.3*   < > 8.5*   MG 1.4*  --   --     < > = values in this interval not displayed.     CBC:   Recent Labs   Lab 12/18/21  0603   WBC 9.07   HGB 9.9*   HCT 30.0*        CMP:   Recent Labs   Lab 12/17/21  0553 12/18/21  0603    139   K 2.6* 3.3*   * 111*   CO2 21* 18*   GLU 85 86   BUN 11 10   CREATININE 0.9 0.8   CALCIUM 8.3* 8.5*   ANIONGAP 9 10   EGFRNONAA >60 >60     Magnesium:   Recent Labs   Lab 12/17/21  0553   MG 1.4*       Significant Imaging: I have reviewed all pertinent imaging results/findings within the past 24 hours.       12/11 KUB NG tube in position.  Prior midline abdominal wall hernia repair.  Otherwise negative abdomen x-ray     12/10 KUB NG tube again noted in place.  No significant change in the bowel gas pattern when compared with the prior study.     US renal Normal size bilateral kidneys each containing a couple of benign anechoic cyst identified the midpole and upper pole regions largest single index cyst about the lateral cortex of the  right kidney measuring 2.3 x 2.1 x 2.2 cm in size.     KUB 12/9 Enteric tube terminates in the gastric body.  The degree of distention of the small bowel loops is decreased as compared to prior examinations suggesting a resolving/revolved small-bowel obstruction.  No free air is identified.  Age-appropriate degenerative changes affect the skeleton.     CT abd and pelvis Imaging findings in keeping with a high-grade small bowel obstruction with a point of transition seen in the right mid abdomen, likely related to adhesions.  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  Additionally, there is a small amount of free fluid about the liver and extending into the pelvic cul-de-sac, with slightly high density of the fluid in the pelvic cul-de-sac suggesting hyperemia issues content fluid.     KUB The enteric tube has been appropriately retracted and now terminates in the region of the distal gastric body.Slightly prominent loops of small bowel seen in the upper abdomen.  No definite free peritoneal air is seen.     Lung parenchyma cannot be adequately evaluated secondary to overexposure of the central aspect of the lungs.Consider dedicated radiograph of the chest evaluate for parenchymal injury.     CXR Enteric tube terminates in the gastric body.  There is bibasilar subsegmental atelectasis, with right basilar scarring.  No focal airspace consolidation or pleural effusions.  No pneumothorax.  The heart is enlarged.  Calcified atheromatous disease affects the aorta.  Median sternotomy wires are midline.  Age-appropriate degenerative changes affect the skeleton.

## 2021-12-18 NOTE — NURSING
tolerating liquids well but denies solid food at this time, states he doesn't have much of an appetite, ordered boost supplements, had large brown BM today.

## 2021-12-18 NOTE — ASSESSMENT & PLAN NOTE
Baseline creat 1.3-1.4- on admission 3.6. 1L NS given in ER. Repeat 1L Ns bolus 12/7 and then cont NS at 150ml/hr. Repeat lactate better 1.9  Creat now 3.0  Son reports that he has no issues urinating at home  Will check renal U/s and post void residual  Has had urinary re tension in past. Consider salcedo if retaining due ti UTI    Repeat u.a today cont rocephin     12/10 creat improved - 3.6>1.6; renal US stable     12/11  BMP  Lab Results   Component Value Date     12/18/2021    K 3.3 (L) 12/18/2021     (H) 12/18/2021    CO2 18 (L) 12/18/2021    BUN 10 12/18/2021    CREATININE 0.8 12/18/2021    CALCIUM 8.5 (L) 12/18/2021    ANIONGAP 10 12/18/2021    ESTGFRAFRICA >60 12/18/2021    EGFRNONAA >60 12/18/2021 12/13 Back to baseline   BMP  Lab Results   Component Value Date     12/18/2021    K 3.3 (L) 12/18/2021     (H) 12/18/2021    CO2 18 (L) 12/18/2021    BUN 10 12/18/2021    CREATININE 0.8 12/18/2021    CALCIUM 8.5 (L) 12/18/2021    ANIONGAP 10 12/18/2021    ESTGFRAFRICA >60 12/18/2021    EGFRNONAA >60 12/18/2021     Resolved

## 2021-12-18 NOTE — PT/OT/SLP PROGRESS
Physical Therapy Treatment    Patient Name:  Domonique Hernandez Jr.   MRN:  [id number]    Recommendations:     Discharge Recommendations:  nursing facility, basic   Discharge Equipment Recommendations: none   Barriers to discharge: Decreased caregiver support    Assessment:     Domonique Hernandez Jr. is a 81 y.o. male admitted with a medical diagnosis of SBO (small bowel obstruction).  He presents with the following impairments/functional limitations:  other (comment) (Pt is at previous level of function) and has completed current PT goal set.  Pt is to be discharged from acute PT services for satisfactory goal achievement.  If there is a decrease to pt's current functional status, PT is to be re-consulted.    Rehab Prognosis: Fair; patient would benefit from acute skilled PT services to address these deficits and reach maximum level of function.    Recent Surgery: Procedure(s) (LRB):  LAPAROTOMY, EXPLORATORY (N/A) 7 Days Post-Op    Plan:     During this hospitalization, patient to be seen daily to address the identified rehab impairments via gait training,therapeutic activities,therapeutic exercises and progress toward the following goals:    · Plan of Care Expires:  12/18/21    Subjective     Chief Complaint: none stated  Patient/Family Comments/goals: none stated  Pain/Comfort:  · Pain Rating 1: 0/10      Objective:     Communicated with pt prior to session.  Patient found supine with telemetry upon PT entry to room.     General Precautions: Standard, fall   Orthopedic Precautions:N/A   Braces: N/A  Respiratory Status: Room air     Functional Mobility:  · Bed Mobility:     · Rolling Left:  modified independence  · Rolling Right: modified independence  · Scooting: modified independence  · Supine to Sit: modified independence  · Sit to Supine: modified independence  · Transfers:     · Sit to Stand:  modified independence with rolling walker  · Bed to Chair: modified independence with  rolling walker  using  Step  Transfer  · Gait: Supervision 150' w/ RW      AM-PAC 6 CLICK MOBILITY  Turning over in bed (including adjusting bedclothes, sheets and blankets)?: 4  Sitting down on and standing up from a chair with arms (e.g., wheelchair, bedside commode, etc.): 4  Moving from lying on back to sitting on the side of the bed?: 4  Moving to and from a bed to a chair (including a wheelchair)?: 4  Need to walk in hospital room?: 4  Climbing 3-5 steps with a railing?: 3 (Clinical judgment)  Basic Mobility Total Score: 23       Therapeutic Activities and Exercises:   Supine to sit x 2  Sit to stand x 2  Gait 150' w/ RW      Patient left supine with all lines intact and call button in reach..    GOALS:   Multidisciplinary Problems     Physical Therapy Goals     Not on file          Multidisciplinary Problems (Resolved)        Problem: Physical Therapy Goal    Goal Priority Disciplines Outcome Goal Variances Interventions   Physical Therapy Goal   (Resolved)     PT, PT/OT Met     Description: Goals to be met by: 21    Patient will increase functional independence with mobility by performin. Supine to sit with Modified Independent  2. Sit to supine with Modified Independent  3. Bed to chair transfer with Supervision or Set-up Assistancewith or without rolling walker using Step Transfer TECHNIQUE  4. Gait  x ~100  feet with Supervision or Set-up Assistance with or without rolling walker  5. Lower extremity exercise program x10 reps per handout, with assistance as needed                      Time Tracking:     PT Received On: 21  PT Start Time: 0900     PT Stop Time: 0910  PT Total Time (min): 10 min     Billable Minutes: Therapeutic Activity 10    Treatment Type: Treatment  PT/PTA: PT           2021

## 2021-12-19 LAB
ANION GAP SERPL CALC-SCNC: 8 MMOL/L (ref 8–16)
BUN SERPL-MCNC: 11 MG/DL (ref 8–23)
CALCIUM SERPL-MCNC: 8.6 MG/DL (ref 8.7–10.5)
CHLORIDE SERPL-SCNC: 109 MMOL/L (ref 95–110)
CO2 SERPL-SCNC: 21 MMOL/L (ref 23–29)
CREAT SERPL-MCNC: 1 MG/DL (ref 0.5–1.4)
EST. GFR  (AFRICAN AMERICAN): >60 ML/MIN/1.73 M^2
EST. GFR  (NON AFRICAN AMERICAN): >60 ML/MIN/1.73 M^2
GLUCOSE SERPL-MCNC: 89 MG/DL (ref 70–110)
POTASSIUM SERPL-SCNC: 3.5 MMOL/L (ref 3.5–5.1)
SODIUM SERPL-SCNC: 138 MMOL/L (ref 136–145)

## 2021-12-19 PROCEDURE — 36415 COLL VENOUS BLD VENIPUNCTURE: CPT | Performed by: STUDENT IN AN ORGANIZED HEALTH CARE EDUCATION/TRAINING PROGRAM

## 2021-12-19 PROCEDURE — 25000003 PHARM REV CODE 250: Performed by: INTERNAL MEDICINE

## 2021-12-19 PROCEDURE — 63600175 PHARM REV CODE 636 W HCPCS: Performed by: SURGERY

## 2021-12-19 PROCEDURE — 99232 SBSQ HOSP IP/OBS MODERATE 35: CPT | Mod: ,,, | Performed by: STUDENT IN AN ORGANIZED HEALTH CARE EDUCATION/TRAINING PROGRAM

## 2021-12-19 PROCEDURE — 25000003 PHARM REV CODE 250: Performed by: STUDENT IN AN ORGANIZED HEALTH CARE EDUCATION/TRAINING PROGRAM

## 2021-12-19 PROCEDURE — 80048 BASIC METABOLIC PNL TOTAL CA: CPT | Performed by: STUDENT IN AN ORGANIZED HEALTH CARE EDUCATION/TRAINING PROGRAM

## 2021-12-19 PROCEDURE — 25000003 PHARM REV CODE 250: Performed by: NURSE PRACTITIONER

## 2021-12-19 PROCEDURE — C9113 INJ PANTOPRAZOLE SODIUM, VIA: HCPCS | Performed by: SURGERY

## 2021-12-19 PROCEDURE — 94761 N-INVAS EAR/PLS OXIMETRY MLT: CPT

## 2021-12-19 PROCEDURE — 99232 PR SUBSEQUENT HOSPITAL CARE,LEVL II: ICD-10-PCS | Mod: ,,, | Performed by: STUDENT IN AN ORGANIZED HEALTH CARE EDUCATION/TRAINING PROGRAM

## 2021-12-19 PROCEDURE — 11000001 HC ACUTE MED/SURG PRIVATE ROOM

## 2021-12-19 RX ORDER — PANTOPRAZOLE SODIUM 40 MG/1
40 TABLET, DELAYED RELEASE ORAL DAILY
Status: DISCONTINUED | OUTPATIENT
Start: 2021-12-20 | End: 2021-12-23 | Stop reason: HOSPADM

## 2021-12-19 RX ORDER — PANTOPRAZOLE SODIUM 40 MG/1
40 TABLET, DELAYED RELEASE ORAL DAILY
Status: DISCONTINUED | OUTPATIENT
Start: 2021-12-19 | End: 2021-12-19

## 2021-12-19 RX ADMIN — POTASSIUM CHLORIDE 10 MEQ: 750 TABLET, FILM COATED, EXTENDED RELEASE ORAL at 08:12

## 2021-12-19 RX ADMIN — APIXABAN 2.5 MG: 2.5 TABLET, FILM COATED ORAL at 08:12

## 2021-12-19 RX ADMIN — METOPROLOL SUCCINATE 50 MG: 50 TABLET, EXTENDED RELEASE ORAL at 08:12

## 2021-12-19 RX ADMIN — APIXABAN 2.5 MG: 2.5 TABLET, FILM COATED ORAL at 09:12

## 2021-12-19 RX ADMIN — POTASSIUM CHLORIDE 10 MEQ: 750 TABLET, FILM COATED, EXTENDED RELEASE ORAL at 09:12

## 2021-12-19 RX ADMIN — PANTOPRAZOLE SODIUM 40 MG: 40 INJECTION, POWDER, LYOPHILIZED, FOR SOLUTION INTRAVENOUS at 08:12

## 2021-12-19 RX ADMIN — LEVOTHYROXINE SODIUM 100 MCG: 100 TABLET ORAL at 05:12

## 2021-12-19 NOTE — ASSESSMENT & PLAN NOTE
Reviewed  consult .  12/16 awaiting state approval for nursing home   12/19 awaiting state approval for nursing home

## 2021-12-19 NOTE — PLAN OF CARE
assisted with shower tonight, tolerated well. incontinent to urine and stool placed depend on. encouraged solid food and given boost to supplement. abdominal incision is well approximated with staples intact, placed non-stick telfa to protect staples.

## 2021-12-19 NOTE — ASSESSMENT & PLAN NOTE
Supplement and monitor     12/18: start KCl 20meq BID and monitor    12/19 K 3.5; cont KCl 20meq BID

## 2021-12-19 NOTE — PLAN OF CARE
Problem: Adult Inpatient Plan of Care  Goal: Absence of Hospital-Acquired Illness or Injury  Outcome: Ongoing, Progressing     Problem: Adult Inpatient Plan of Care  Goal: Optimal Comfort and Wellbeing  Outcome: Ongoing, Progressing     Problem: Wound  Goal: Optimal Wound Healing  Outcome: Ongoing, Progressing

## 2021-12-19 NOTE — SUBJECTIVE & OBJECTIVE
Review of Systems   Constitutional: Negative for activity change, fatigue, fever and unexpected weight change.   HENT: Negative for congestion and sore throat.    Eyes: Negative for visual disturbance.   Respiratory: Negative for cough and shortness of breath.    Cardiovascular: Negative for chest pain.   Gastrointestinal: Negative for abdominal pain, blood in stool, constipation and diarrhea.   Genitourinary: Negative for dysuria and hematuria.   Neurological: Negative for dizziness and light-headedness.     Objective:     Vital Signs (Most Recent):  Temp: 98.3 °F (36.8 °C) (12/19/21 0713)  Pulse: 89 (12/19/21 0713)  Resp: 20 (12/19/21 0713)  BP: (!) 142/76 (12/19/21 0713)  SpO2: 98 % (12/19/21 0715) Vital Signs (24h Range):  Temp:  [96.7 °F (35.9 °C)-98.8 °F (37.1 °C)] 98.3 °F (36.8 °C)  Pulse:  [78-89] 89  Resp:  [17-20] 20  SpO2:  [96 %-98 %] 98 %  BP: (100-142)/(63-79) 142/76     Weight: 100 kg (220 lb 7.4 oz)  Body mass index is 33.52 kg/m².    Intake/Output Summary (Last 24 hours) at 12/19/2021 1113  Last data filed at 12/19/2021 1000  Gross per 24 hour   Intake 1450 ml   Output --   Net 1450 ml      Physical Exam  Vitals and nursing note reviewed.   Constitutional:       General: He is not in acute distress.     Appearance: He is well-developed.   HENT:      Head: Normocephalic and atraumatic.      Right Ear: External ear normal.      Left Ear: External ear normal.      Nose:      Comments: Pressure sore on right nare from NGT  Eyes:      General:         Right eye: No discharge.         Left eye: No discharge.      Conjunctiva/sclera: Conjunctivae normal.   Neck:      Thyroid: No thyromegaly.   Cardiovascular:      Rate and Rhythm: Normal rate and regular rhythm.      Heart sounds: Normal heart sounds. No murmur heard.      Pulmonary:      Effort: Pulmonary effort is normal. No respiratory distress.      Breath sounds: Normal breath sounds.   Abdominal:      General: Abdomen is flat. Bowel sounds are  normal. There is no distension.      Palpations: Abdomen is soft.      Tenderness: There is no abdominal tenderness.      Comments:   Dressing c/d/i   Musculoskeletal:      Cervical back: Neck supple.      Right lower leg: No edema.      Left lower leg: No edema.   Skin:     General: Skin is warm and dry.      Comments: Erosion to right nare   Neurological:      General: No focal deficit present.      Mental Status: He is alert. Mental status is at baseline.   Psychiatric:         Behavior: Behavior normal.         Thought Content: Thought content normal.         Significant Labs:   All pertinent labs within the past 24 hours have been reviewed.  BMP:   Recent Labs   Lab 12/18/21  0603 12/18/21  0603 12/19/21  0547   GLU 86   < > 89      < > 138   K 3.3*   < > 3.5   *   < > 109   CO2 18*   < > 21*   BUN 10   < > 11   CREATININE 0.8   < > 1.0   CALCIUM 8.5*   < > 8.6*   MG 1.8  --   --     < > = values in this interval not displayed.     CBC:   Recent Labs   Lab 12/18/21  0603   WBC 9.07   HGB 9.9*   HCT 30.0*        CMP:   Recent Labs   Lab 12/18/21  0603 12/19/21  0547    138   K 3.3* 3.5   * 109   CO2 18* 21*   GLU 86 89   BUN 10 11   CREATININE 0.8 1.0   CALCIUM 8.5* 8.6*   ANIONGAP 10 8   EGFRNONAA >60 >60       Significant Imaging: I have reviewed all pertinent imaging results/findings within the past 24 hours.     12/11 KUB NG tube in position.  Prior midline abdominal wall hernia repair.  Otherwise negative abdomen x-ray     12/10 KUB NG tube again noted in place.  No significant change in the bowel gas pattern when compared with the prior study.     US renal Normal size bilateral kidneys each containing a couple of benign anechoic cyst identified the midpole and upper pole regions largest single index cyst about the lateral cortex of the right kidney measuring 2.3 x 2.1 x 2.2 cm in size.     KUB 12/9 Enteric tube terminates in the gastric body.  The degree of distention of the  small bowel loops is decreased as compared to prior examinations suggesting a resolving/revolved small-bowel obstruction.  No free air is identified.  Age-appropriate degenerative changes affect the skeleton.     CT abd and pelvis Imaging findings in keeping with a high-grade small bowel obstruction with a point of transition seen in the right mid abdomen, likely related to adhesions.  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  Additionally, there is a small amount of free fluid about the liver and extending into the pelvic cul-de-sac, with slightly high density of the fluid in the pelvic cul-de-sac suggesting hyperemia issues content fluid.     KUB The enteric tube has been appropriately retracted and now terminates in the region of the distal gastric body.Slightly prominent loops of small bowel seen in the upper abdomen.  No definite free peritoneal air is seen.     Lung parenchyma cannot be adequately evaluated secondary to overexposure of the central aspect of the lungs.Consider dedicated radiograph of the chest evaluate for parenchymal injury.     CXR Enteric tube terminates in the gastric body.  There is bibasilar subsegmental atelectasis, with right basilar scarring.  No focal airspace consolidation or pleural effusions.  No pneumothorax.  The heart is enlarged.  Calcified atheromatous disease affects the aorta.  Median sternotomy wires are midline.  Age-appropriate degenerative changes affect the skeleton.

## 2021-12-19 NOTE — PROGRESS NOTES
[facility] ([address] [place] [address] Medicine  Progress Note    Patient Name: Domonique Hernandez Jr.  MRN: [id number]  Patient Class: IP- Inpatient   Admission Date: 12/7/2021  Length of Stay: 12 days  Attending Physician: Emiliano Cam MD  Primary Care Provider: Emiliano Cam MD        Subjective:     Principal Problem:SBO (small bowel obstruction)        HPI:  80yo male patient with hx of anxiety, arthritis, back pain, bronchitis/COPD, depression, GERD, HLD, hypothyroid, PVD, sleep apnea on CPAP, and CKD. Pt came to ER with c/o regurgitation for 3 days. Decrease intake. No fever. No UTI s/s. CT on admission shows SBO. Significantly dehydrated. Creat 3.6-- baseline 1.4. Given 1L NS in ER U/a dirty one dose of zosyn given. No fever. No elevated WBC. Lactate 2.3. He had NGT place and put to suction. He is in bed this am. Reports feeling better. General surgery consulted.       Overview/Hospital Course:  12/8 Pt was admitted yesterday with SBO. He had general surgery consulted. NGT placed to suction. Still no flatus or BM. KUB pending for this am.     Also on rocephin for UTI-culture in process. Was given 1L NS in ER and repeated on floor then started NS at 150ml/hr. Creat on admit 3.6> now 3.0-- baseline creat 1.4. lactate did impropve on repeat.  Renal US shows cysts.  No hydronephrosis.     12/9 pt here with SBO. NGT to suction had 1000ml overnight. No flatus. No BM. +BS  Renal function improved 3.6>>2.1 on NS at 150ml/hr. Rocephin for UTI- urine culture shows multiple organisms. NO fever. NO elevated WBC    12/10/21    80YO WM admitted with SBO 4 days ago, NGT pulled yesterday, tolerating liquids,   Burping, t  KUB this am- NG tube removed, otherwise no significant change when compared with the prior study of December 9, 2021.     Creat much improved 3.6>1.6 ,  IV Rocephin day 4 for UTI, initial urine Cx- multiple organisms, repaeat U/A dirty, culture in process   7 months ago urine CX+ MRSA- sensitve to  bactrim, tetracycline and Vanc ; will add oral doxy   WBC normal, afebrile   Per PT yesterday; Gait: Contact Gaurd Assistance x ~30 feet (steeping forward and back due to limited line of medical equipment - NG tube) using RW.    12/11/21  80YO WM admitted with SBO  NG was pulled 12/9   But yesterday started with nausea and later with vomiting  and NG has to be placed back again   KUB is pending .  On doxy for UTI recurrent ; last culture staph mrsa sensitive to tetracyclines     12/12  S/p surgery yesterday for SBO .  Reports no flatus . No BM   Issues with bleeding from incision   Lab Results   Component Value Date    WBC 9.47 12/12/2021    HGB 11.3 (L) 12/12/2021    HCT 37.2 (L) 12/12/2021    MCV 96 12/12/2021     12/12/2021 12/13 pt was taken to OR Saturday. He had NGT replaced after that. This am he is feeling better. Dr Hein saw him this am from general surgery and rec pulling NG and starting clears. He is not tender to palp this am. Passed gas this am.   H/H 9/30- lovenox was held due to ozzing after surgery- hx of a fib  Urine culture shows candida     12/14/21  NGT pulled yesterday, pt tolerating liquid diet, Na 147, creat 1.2, + CKD ; getting NS @150ml/hr; decrease to 75ml/hr   H&H 9.1/29 from 9.6/30   Had a BM yesterday, will continue oral liquids until bowel sounds more active   WBC normal, afebrile, O2 sat stable on RA   Urine Cx with candida, doxy stopped after 3 days ; will start diflucan 100mg daily x 3d .    12/15 Pt admitted  with SBO - NGT pulled 2 days ago, tolerating liquids, passing flatus, had BM this am   Surgical incision evaluated per general sx yesterday, recommend advance diet and ^OOB   Contact Guard Assistance x ~30 feet with RW. Slow phase, slight dec foot/floor clearance and guarding stomach  VSS, afebrile, WBC 11.53   Day 2/3 of diflucan for candida in urine   Getting IV labetolol; transitioned to oral home metoprolol   Awaiting on state form 142 to approve nursing  home admission due to psychiatric hx of anxiety/depression     12/16 Tolerating diet, VSS   Worked on out of bed activity, gait trng trng with RW x ~100 feet with standby Assistance; toilet(Bedside Commode) transfers/activity.    12/17 pt admitted with SBO reqired surgery for adhesions causing obstruction  12/11 he is stable from sx standpoint,   Tolerating diet , K+ 2.6 will supplement and check Mg+   Worked on out of bed activity, gait trng trng wioth RW x ~100 feet with standby Assistance; toilet(Bedside Commode) transfers/activity  Still awaiting [facility] approval for Nursing home   Check BMP , CBC in am     12/18 no acute events overnight. No new complaints. Awaiting NH placement.    12/19 no acute events overnight. No new complaints. Awaiting NH placement.             Review of Systems   Constitutional: Negative for activity change, fatigue, fever and unexpected weight change.   HENT: Negative for congestion and sore throat.    Eyes: Negative for visual disturbance.   Respiratory: Negative for cough and shortness of breath.    Cardiovascular: Negative for chest pain.   Gastrointestinal: Negative for abdominal pain, blood in stool, constipation and diarrhea.   Genitourinary: Negative for dysuria and hematuria.   Neurological: Negative for dizziness and light-headedness.     Objective:     Vital Signs (Most Recent):  Temp: 98.3 °F (36.8 °C) (12/19/21 0713)  Pulse: 89 (12/19/21 0713)  Resp: 20 (12/19/21 0713)  BP: (!) 142/76 (12/19/21 0713)  SpO2: 98 % (12/19/21 0715) Vital Signs (24h Range):  Temp:  [96.7 °F (35.9 °C)-98.8 °F (37.1 °C)] 98.3 °F (36.8 °C)  Pulse:  [78-89] 89  Resp:  [17-20] 20  SpO2:  [96 %-98 %] 98 %  BP: (100-142)/(63-79) 142/76     Weight: 100 kg (220 lb 7.4 oz)  Body mass index is 33.52 kg/m².    Intake/Output Summary (Last 24 hours) at 12/19/2021 1113  Last data filed at 12/19/2021 1000  Gross per 24 hour   Intake 1450 ml   Output --   Net 1450 ml      Physical Exam  Vitals and nursing  note reviewed.   Constitutional:       General: He is not in acute distress.     Appearance: He is well-developed.   HENT:      Head: Normocephalic and atraumatic.      Right Ear: External ear normal.      Left Ear: External ear normal.      Nose:      Comments: Pressure sore on right nare from NGT  Eyes:      General:         Right eye: No discharge.         Left eye: No discharge.      Conjunctiva/sclera: Conjunctivae normal.   Neck:      Thyroid: No thyromegaly.   Cardiovascular:      Rate and Rhythm: Normal rate and regular rhythm.      Heart sounds: Normal heart sounds. No murmur heard.      Pulmonary:      Effort: Pulmonary effort is normal. No respiratory distress.      Breath sounds: Normal breath sounds.   Abdominal:      General: Abdomen is flat. Bowel sounds are normal. There is no distension.      Palpations: Abdomen is soft.      Tenderness: There is no abdominal tenderness.      Comments:   Dressing c/d/i   Musculoskeletal:      Cervical back: Neck supple.      Right lower leg: No edema.      Left lower leg: No edema.   Skin:     General: Skin is warm and dry.      Comments: Erosion to right nare   Neurological:      General: No focal deficit present.      Mental Status: He is alert. Mental status is at baseline.   Psychiatric:         Behavior: Behavior normal.         Thought Content: Thought content normal.         Significant Labs:   All pertinent labs within the past 24 hours have been reviewed.  BMP:   Recent Labs   Lab 12/18/21  0603 12/18/21  0603 12/19/21  0547   GLU 86   < > 89      < > 138   K 3.3*   < > 3.5   *   < > 109   CO2 18*   < > 21*   BUN 10   < > 11   CREATININE 0.8   < > 1.0   CALCIUM 8.5*   < > 8.6*   MG 1.8  --   --     < > = values in this interval not displayed.     CBC:   Recent Labs   Lab 12/18/21  0603   WBC 9.07   HGB 9.9*   HCT 30.0*        CMP:   Recent Labs   Lab 12/18/21  0603 12/19/21  0547    138   K 3.3* 3.5   * 109   CO2 18* 21*   GLU  86 89   BUN 10 11   CREATININE 0.8 1.0   CALCIUM 8.5* 8.6*   ANIONGAP 10 8   EGFRNONAA >60 >60       Significant Imaging: I have reviewed all pertinent imaging results/findings within the past 24 hours.     12/11 KUB NG tube in position.  Prior midline abdominal wall hernia repair.  Otherwise negative abdomen x-ray     12/10 KUB NG tube again noted in place.  No significant change in the bowel gas pattern when compared with the prior study.     US renal Normal size bilateral kidneys each containing a couple of benign anechoic cyst identified the midpole and upper pole regions largest single index cyst about the lateral cortex of the right kidney measuring 2.3 x 2.1 x 2.2 cm in size.     KUB 12/9 Enteric tube terminates in the gastric body.  The degree of distention of the small bowel loops is decreased as compared to prior examinations suggesting a resolving/revolved small-bowel obstruction.  No free air is identified.  Age-appropriate degenerative changes affect the skeleton.     CT abd and pelvis Imaging findings in keeping with a high-grade small bowel obstruction with a point of transition seen in the right mid abdomen, likely related to adhesions.  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  Additionally, there is a small amount of free fluid about the liver and extending into the pelvic cul-de-sac, with slightly high density of the fluid in the pelvic cul-de-sac suggesting hyperemia issues content fluid.     KUB The enteric tube has been appropriately retracted and now terminates in the region of the distal gastric body.Slightly prominent loops of small bowel seen in the upper abdomen.  No definite free peritoneal air is seen.     Lung parenchyma cannot be adequately evaluated secondary to overexposure of the central aspect of the lungs.Consider dedicated radiograph of the chest evaluate for parenchymal  injury.     CXR Enteric tube terminates in the gastric body.  There is bibasilar subsegmental atelectasis, with right basilar scarring.  No focal airspace consolidation or pleural effusions.  No pneumothorax.  The heart is enlarged.  Calcified atheromatous disease affects the aorta.  Median sternotomy wires are midline.  Age-appropriate degenerative changes affect the skeleton.      Assessment/Plan:      * SBO (small bowel obstruction)  NPO  NGT to wall suction  KUB daily  CXR nothing acute  IVF started NS at 150ml/hr    He meets SIRS criteria but I think better explained by SBO presentation that infection.  UA > 100 WBC but he has no urinary tract symptoms. He has recurrent UTI and will start Rocephin now but my clinical impression is the abnormal vitals and elevated lactic acid are due to SBO, dehydration and not UTI sepsis    12/10 tolerating orals, advance diet .    12/11  Placed NG back again   KUB pending     12/12  S/p surgery ; POD 1   NG to suction   Bleeding stopped    12/13   NG pulled from general surgery will try clears this am. + flatus.    12/14 tolerating orals . No nausea ; no vomiting     12/17 s/p surgery ; 12/11 Patient had an adhesion of the  of the colon down to the root of the small bowel mesentery causing a complete small bowel obstruction.  After taking down the adhesions the obstruction was relieved.  The the bowel was viable with no concern for ischemia.  should follow up with Dr. Hein 1 week after discharge for his staple removal.    Hypomagnesemia  Replete and monitor    12/18 resolved      Evaluation by psychiatric service required  Reviewed  consult .  12/16 awaiting state approval for nursing home   12/19 awaiting state approval for nursing home    Paroxysmal atrial fibrillation  Noted in Dr Fair notes:    On eliquis and BB at home. NPO here. Will give lovenox- renally dose and labetalol IV for now   12/10 tolerating liquids, advance diet and resume oral BB, stop IV labetolol,    Resume eliquis;      12/11  DC eliquis   Resume lovenox .    12/12  HOLD LOVENOX   HB dropped 2 points   Follow CBC   Resume lovenox tomorrow     12/14 resume lovenox and watch for bleeding ;   12/15 transition back  to ELiquis 2.5mg bid  , getting labetolol IV q 6 ;takes metoprolol XL 50mg po daily- resume  12/16 stable with oral metoprolol   12/17 Stable with oral metoprolol, transition lovenox back to Eliquis 2.5mg home dose     Acute renal failure  Baseline creat 1.3-1.4- on admission 3.6. 1L NS given in ER. Repeat 1L Ns bolus 12/7 and then cont NS at 150ml/hr. Repeat lactate better 1.9  Creat now 3.0  Son reports that he has no issues urinating at home  Will check renal U/s and post void residual  Has had urinary re tension in past. Consider salcedo if retaining due ti UTI    Repeat u.a today cont rocephin     12/10 creat improved - 3.6>1.6; renal US stable     12/11  BMP  Lab Results   Component Value Date     12/19/2021    K 3.5 12/19/2021     12/19/2021    CO2 21 (L) 12/19/2021    BUN 11 12/19/2021    CREATININE 1.0 12/19/2021    CALCIUM 8.6 (L) 12/19/2021    ANIONGAP 8 12/19/2021    ESTGFRAFRICA >60 12/19/2021    EGFRNONAA >60 12/19/2021 12/13 Back to baseline   BMP  Lab Results   Component Value Date     12/19/2021    K 3.5 12/19/2021     12/19/2021    CO2 21 (L) 12/19/2021    BUN 11 12/19/2021    CREATININE 1.0 12/19/2021    CALCIUM 8.6 (L) 12/19/2021    ANIONGAP 8 12/19/2021    ESTGFRAFRICA >60 12/19/2021    EGFRNONAA >60 12/19/2021     Resolved     Asymptomatic bacteriuria  He has no UTI sx on interview day of admit  Culture urine  Start rocephin empirically as h/o of UTI and not a great historian but as noted above I think vitals/lactic acid precipitated by SBO and not UTI sepsis    Cont rocephin and repeat u.a   Pt is s/p lithotripsy, stent removal, and cystoureteroscopy with retrograde pyelogram w/ stent insertion on 5/31/21, tx with Bactrim for MRSA UTI  Will add oral  doxy  To cover for staph MRSA UTI .  12/14   d/c abx, grew candida> low colony count and asymptomatic. No further treatment needed.  Given 3 days diflucan Rx     SIRS (systemic inflammatory response syndrome)  He meets SIRS criteria but I think better explained by SBO presentation than infection.  UA > 100 WBC but he has no urinary tract symptoms. He has recurrent UTI and will start Rocephin now but my clinical impression is the abnormal vitals and elevated lactic acid are due to SBO, dehydration and not UTI sepsis.  RESOLVED .      Hypokalemia  Supplement and monitor     12/18: start KCl 20meq BID and monitor    12/19 K 3.5; cont KCl 20meq BID      Hypothyroidism  Hold synthroid while NPO  If remains NPO > 3 days can start IV  Resume oral thyroid meds .    12/11  Hold po meds.    12/14  Continue levothyroxine     GERD (gastroesophageal reflux disease)  protonix IV  tolerating oral - change to po .    12/11  Back to IV   12/14  Cont PPI.    Hyperlipidemia  Holding zocor while NPO  12/10 resume - zocor non formulary- atorvastatin while here - NPO for now will resume once taking po    12/13: simvastatin not formulary. Can resume as outpatient. Hopefully d/c soon.        VTE Risk Mitigation (From admission, onward)         Ordered     apixaban tablet 2.5 mg  2 times daily         12/17/21 0724     IP VTE HIGH RISK PATIENT  Once         12/07/21 0620     Place sequential compression device  Until discontinued         12/07/21 0620                Discharge Planning   STEPHANIE:      Code Status: Full Code   Is the patient medically ready for discharge?:     Reason for patient still in hospital (select all that apply): Pending disposition  Discharge Plan A: Skilled Nursing Facility   Discharge Delays: None known at this time              Refugio Leonard MD  Department of Hospital Medicine   [place] [facility] (3rd Fl)

## 2021-12-19 NOTE — ASSESSMENT & PLAN NOTE
Baseline creat 1.3-1.4- on admission 3.6. 1L NS given in ER. Repeat 1L Ns bolus 12/7 and then cont NS at 150ml/hr. Repeat lactate better 1.9  Creat now 3.0  Son reports that he has no issues urinating at home  Will check renal U/s and post void residual  Has had urinary re tension in past. Consider salcedo if retaining due ti UTI    Repeat u.a today cont rocephin     12/10 creat improved - 3.6>1.6; renal US stable     12/11  BMP  Lab Results   Component Value Date     12/19/2021    K 3.5 12/19/2021     12/19/2021    CO2 21 (L) 12/19/2021    BUN 11 12/19/2021    CREATININE 1.0 12/19/2021    CALCIUM 8.6 (L) 12/19/2021    ANIONGAP 8 12/19/2021    ESTGFRAFRICA >60 12/19/2021    EGFRNONAA >60 12/19/2021 12/13 Back to baseline   BMP  Lab Results   Component Value Date     12/19/2021    K 3.5 12/19/2021     12/19/2021    CO2 21 (L) 12/19/2021    BUN 11 12/19/2021    CREATININE 1.0 12/19/2021    CALCIUM 8.6 (L) 12/19/2021    ANIONGAP 8 12/19/2021    ESTGFRAFRICA >60 12/19/2021    EGFRNONAA >60 12/19/2021     Resolved

## 2021-12-20 PROCEDURE — 99232 PR SUBSEQUENT HOSPITAL CARE,LEVL II: ICD-10-PCS | Mod: ,,, | Performed by: INTERNAL MEDICINE

## 2021-12-20 PROCEDURE — 99900031 HC PATIENT EDUCATION (STAT)

## 2021-12-20 PROCEDURE — 94761 N-INVAS EAR/PLS OXIMETRY MLT: CPT

## 2021-12-20 PROCEDURE — 99232 SBSQ HOSP IP/OBS MODERATE 35: CPT | Mod: ,,, | Performed by: INTERNAL MEDICINE

## 2021-12-20 PROCEDURE — 25000003 PHARM REV CODE 250: Performed by: NURSE PRACTITIONER

## 2021-12-20 PROCEDURE — 25000003 PHARM REV CODE 250: Performed by: INTERNAL MEDICINE

## 2021-12-20 PROCEDURE — 11000001 HC ACUTE MED/SURG PRIVATE ROOM

## 2021-12-20 PROCEDURE — 94760 N-INVAS EAR/PLS OXIMETRY 1: CPT

## 2021-12-20 PROCEDURE — 25000003 PHARM REV CODE 250: Performed by: STUDENT IN AN ORGANIZED HEALTH CARE EDUCATION/TRAINING PROGRAM

## 2021-12-20 PROCEDURE — 99900035 HC TECH TIME PER 15 MIN (STAT)

## 2021-12-20 RX ADMIN — POTASSIUM CHLORIDE 10 MEQ: 750 TABLET, FILM COATED, EXTENDED RELEASE ORAL at 08:12

## 2021-12-20 RX ADMIN — METOPROLOL SUCCINATE 50 MG: 50 TABLET, EXTENDED RELEASE ORAL at 07:12

## 2021-12-20 RX ADMIN — PANTOPRAZOLE SODIUM 40 MG: 40 TABLET, DELAYED RELEASE ORAL at 10:12

## 2021-12-20 RX ADMIN — LEVOTHYROXINE SODIUM 100 MCG: 100 TABLET ORAL at 06:12

## 2021-12-20 RX ADMIN — APIXABAN 2.5 MG: 2.5 TABLET, FILM COATED ORAL at 07:12

## 2021-12-20 RX ADMIN — POTASSIUM CHLORIDE 10 MEQ: 750 TABLET, FILM COATED, EXTENDED RELEASE ORAL at 07:12

## 2021-12-20 RX ADMIN — APIXABAN 2.5 MG: 2.5 TABLET, FILM COATED ORAL at 08:12

## 2021-12-20 NOTE — SUBJECTIVE & OBJECTIVE
Review of Systems   Constitutional: Negative for activity change, fatigue, fever and unexpected weight change.   HENT: Negative for congestion and sore throat.    Eyes: Negative for visual disturbance.   Respiratory: Negative for cough and shortness of breath.    Cardiovascular: Negative for chest pain.   Gastrointestinal: Negative for abdominal pain, blood in stool, constipation and diarrhea.   Genitourinary: Negative for dysuria and hematuria.   Neurological: Negative for dizziness and light-headedness.     Objective:     Vital Signs (Most Recent):  Temp: 96 °F (35.6 °C) (12/20/21 0736)  Pulse: 95 (12/20/21 0736)  Resp: 18 (12/20/21 0736)  BP: (!) 142/65 (12/20/21 0736)  SpO2: 98 % (12/20/21 0736) Vital Signs (24h Range):  Temp:  [96 °F (35.6 °C)-98.3 °F (36.8 °C)] 96 °F (35.6 °C)  Pulse:  [83-95] 95  Resp:  [18-20] 18  SpO2:  [97 %-100 %] 98 %  BP: (137-169)/(65-93) 142/65     Weight: 100 kg (220 lb 7.4 oz)  Body mass index is 33.52 kg/m².  No intake or output data in the 24 hours ending 12/20/21 1118   Physical Exam  Vitals and nursing note reviewed.   Constitutional:       General: He is not in acute distress.     Appearance: He is well-developed.   HENT:      Head: Normocephalic and atraumatic.      Right Ear: External ear normal.      Left Ear: External ear normal.      Nose:      Comments: Pressure sore on right nare from NGT nearly resolved  Eyes:      General:         Right eye: No discharge.         Left eye: No discharge.      Conjunctiva/sclera: Conjunctivae normal.   Neck:      Thyroid: No thyromegaly.   Cardiovascular:      Rate and Rhythm: Normal rate and regular rhythm.      Heart sounds: Normal heart sounds. No murmur heard.      Pulmonary:      Effort: Pulmonary effort is normal. No respiratory distress.      Breath sounds: Normal breath sounds.   Abdominal:      General: Abdomen is flat. Bowel sounds are normal. There is no distension.      Palpations: Abdomen is soft.      Tenderness: There  is no abdominal tenderness.      Comments:   Dressing c/d/i   Musculoskeletal:      Cervical back: Neck supple.      Right lower leg: No edema.      Left lower leg: No edema.   Skin:     General: Skin is warm and dry.   Neurological:      General: No focal deficit present.      Mental Status: He is alert. Mental status is at baseline.   Psychiatric:         Behavior: Behavior normal.         Thought Content: Thought content normal.         Significant Labs:     BMP:   Recent Labs   Lab 21  0547   GLU 89      K 3.5      CO2 21*   BUN 11   CREATININE 1.0   CALCIUM 8.6*       CMP:   Recent Labs   Lab 21  0547      K 3.5      CO2 21*   GLU 89   BUN 11   CREATININE 1.0   CALCIUM 8.6*   ANIONGAP 8   EGFRNONAA >60     lactic acid 2.7>1.9  Lipase 27  urine culture multiple organisms   covid screen negative     Significant Imagin/11 KUB NG tube in position.  Prior midline abdominal wall hernia repair.  Otherwise negative abdomen x-ray     12/10 KUB NG tube again noted in place.  No significant change in the bowel gas pattern when compared with the prior study.     US renal Normal size bilateral kidneys each containing a couple of benign anechoic cyst identified the midpole and upper pole regions largest single index cyst about the lateral cortex of the right kidney measuring 2.3 x 2.1 x 2.2 cm in size.     KUB  Enteric tube terminates in the gastric body.  The degree of distention of the small bowel loops is decreased as compared to prior examinations suggesting a resolving/revolved small-bowel obstruction.  No free air is identified.  Age-appropriate degenerative changes affect the skeleton.     CT abd and pelvis Imaging findings in keeping with a high-grade small bowel obstruction with a point of transition seen in the right mid abdomen, likely related to adhesions.  There is significant inflammatory changes about the dilated small bowel loops in the mid abdomen likely  related to engorgement of the Vasa recta.  Additionally, there is a small amount of free fluid about the liver and extending into the pelvic cul-de-sac, with slightly high density of the fluid in the pelvic cul-de-sac suggesting hyperemia issues content fluid.     KUB The enteric tube has been appropriately retracted and now terminates in the region of the distal gastric body.Slightly prominent loops of small bowel seen in the upper abdomen.  No definite free peritoneal air is seen.     Lung parenchyma cannot be adequately evaluated secondary to overexposure of the central aspect of the lungs.Consider dedicated radiograph of the chest evaluate for parenchymal injury.     CXR Enteric tube terminates in the gastric body.  There is bibasilar subsegmental atelectasis, with right basilar scarring.  No focal airspace consolidation or pleural effusions.  No pneumothorax.  The heart is enlarged.  Calcified atheromatous disease affects the aorta.  Median sternotomy wires are midline.  Age-appropriate degenerative changes affect the skeleton.

## 2021-12-20 NOTE — PLAN OF CARE
[facility] - Med Surg (3rd Fl)  Discharge Reassessment    Primary Care Provider: Emiliano Cam MD    Expected Discharge Date:     Reassessment (most recent)     Discharge Reassessment - 12/20/21 1444        Discharge Reassessment    Assessment Type Discharge Planning Reassessment     Did the patient's condition or plan change since previous assessment? No     Discharge Plan discussed with: Adult children     Communicated STEPHANIE with patient/caregiver Date not available/Unable to determine     Discharge Plan A New Nursing Home placement - [facility] care facility     DME Needed Upon Discharge  none     Discharge Barriers Identified Nursing Home rejection     Why the patient remains in the hospital Placement issues                   Patient pending nursing home placement. 142 received on Friday, 12/17/21 in the afternoon. SNF declined by payor last week. Referrals have been sent via [facility] to [facility], [facility], [facility], [facility] and [facility]. Patient declined by [facility], [facility], and [facility]. Patient currently being reviewed by [facility] and [facility].     SW to remain available.     1668--Patient denied by [facility]. [facility] now reviewing the patient and contacting family.

## 2021-12-20 NOTE — PROGRESS NOTES
[facility] ([address] [place] [address] Medicine  Progress Note    Patient Name: Domonique Hernandez Jr.  MRN: [id number]  Patient Class: IP- Inpatient   Admission Date: 12/7/2021  Length of Stay: 13 days  Attending Physician: Emiliano Cam MD  Primary Care Provider: Emiliano Cam MD        Subjective:     Principal Problem:SBO (small bowel obstruction)        HPI:  80yo male patient with hx of anxiety, arthritis, back pain, bronchitis/COPD, depression, GERD, HLD, hypothyroid, PVD, sleep apnea on CPAP, and CKD. Pt came to ER with c/o regurgitation for 3 days. Decrease intake. No fever. No UTI s/s. CT on admission shows SBO. Significantly dehydrated. Creat 3.6-- baseline 1.4. Given 1L NS in ER U/a dirty one dose of zosyn given. No fever. No elevated WBC. Lactate 2.3. He had NGT place and put to suction. He is in bed this am. Reports feeling better. General surgery consulted.       Overview/Hospital Course:  12/8 Pt was admitted yesterday with SBO. He had general surgery consulted. NGT placed to suction. Still no flatus or BM. KUB pending for this am.     Also on rocephin for UTI-culture in process. Was given 1L NS in ER and repeated on floor then started NS at 150ml/hr. Creat on admit 3.6> now 3.0-- baseline creat 1.4. lactate did impropve on repeat.  Renal US shows cysts.  No hydronephrosis.     12/9 pt here with SBO. NGT to suction had 1000ml overnight. No flatus. No BM. +BS  Renal function improved 3.6>>2.1 on NS at 150ml/hr. Rocephin for UTI- urine culture shows multiple organisms. NO fever. NO elevated WBC    12/10/21    80YO WM admitted with SBO 4 days ago, NGT pulled yesterday, tolerating liquids,   Burping, t  KUB this am- NG tube removed, otherwise no significant change when compared with the prior study of December 9, 2021.     Creat much improved 3.6>1.6 ,  IV Rocephin day 4 for UTI, initial urine Cx- multiple organisms, repaeat U/A dirty, culture in process   7 months ago urine CX+ MRSA- sensitve to  bactrim, tetracycline and Vanc ; will add oral doxy   WBC normal, afebrile   Per PT yesterday; Gait: Contact Gaurd Assistance x ~30 feet (steeping forward and back due to limited line of medical equipment - NG tube) using RW.    12/11/21  82YO WM admitted with SBO  NG was pulled 12/9   But yesterday started with nausea and later with vomiting  and NG has to be placed back again   KUB is pending .  On doxy for UTI recurrent ; last culture staph mrsa sensitive to tetracyclines     12/12  S/p surgery yesterday for SBO .  Reports no flatus . No BM   Issues with bleeding from incision   Lab Results   Component Value Date    WBC 9.47 12/12/2021    HGB 11.3 (L) 12/12/2021    HCT 37.2 (L) 12/12/2021    MCV 96 12/12/2021     12/12/2021 12/13 pt was taken to OR Saturday. He had NGT replaced after that. This am he is feeling better. Dr Hein saw him this am from general surgery and rec pulling NG and starting clears. He is not tender to palp this am. Passed gas this am.   H/H 9/30- lovenox was held due to ozzing after surgery- hx of a fib  Urine culture shows candida     12/14/21  NGT pulled yesterday, pt tolerating liquid diet, Na 147, creat 1.2, + CKD ; getting NS @150ml/hr; decrease to 75ml/hr   H&H 9.1/29 from 9.6/30   Had a BM yesterday, will continue oral liquids until bowel sounds more active   WBC normal, afebrile, O2 sat stable on RA   Urine Cx with candida, doxy stopped after 3 days ; will start diflucan 100mg daily x 3d .    12/15 Pt admitted  with SBO - NGT pulled 2 days ago, tolerating liquids, passing flatus, had BM this am   Surgical incision evaluated per general sx yesterday, recommend advance diet and ^OOB   Contact Guard Assistance x ~30 feet with RW. Slow phase, slight dec foot/floor clearance and guarding stomach  VSS, afebrile, WBC 11.53   Day 2/3 of diflucan for candida in urine   Getting IV labetolol; transitioned to oral home metoprolol   Awaiting on state form 142 to approve nursing  home admission due to psychiatric hx of anxiety/depression     12/16 Tolerating diet, VSS   Worked on out of bed activity, gait trng trng with RW x ~100 feet with standby Assistance; toilet(Bedside Commode) transfers/activity.    12/17 pt admitted with SBO reqired surgery for adhesions causing obstruction  12/11 he is stable from sx standpoint,   Tolerating diet , K+ 2.6 will supplement and check Mg+   Worked on out of bed activity, gait trng trng wioth RW x ~100 feet with standby Assistance; toilet(Bedside Commode) transfers/activity  Still awaiting [address] [place] [address] approval for Nursing home   Check BMP , CBC in am     12/18 no acute events overnight. No new complaints. Awaiting NH placement.    12/19 no acute events overnight. No new complaints. Awaiting NH placement.     12/20 he has no events overnight or weekend,. Waiting on nursing home placement, placement pending [facility] care, refused for skill per insurance. He is ambulating 150ft with PT using supervision only. Urinating and defecating. Tolerating po. VSS/afebrile.             Review of Systems   Constitutional: Negative for activity change, fatigue, fever and unexpected weight change.   HENT: Negative for congestion and sore throat.    Eyes: Negative for visual disturbance.   Respiratory: Negative for cough and shortness of breath.    Cardiovascular: Negative for chest pain.   Gastrointestinal: Negative for abdominal pain, blood in stool, constipation and diarrhea.   Genitourinary: Negative for dysuria and hematuria.   Neurological: Negative for dizziness and light-headedness.     Objective:     Vital Signs (Most Recent):  Temp: 96 °F (35.6 °C) (12/20/21 0736)  Pulse: 95 (12/20/21 0736)  Resp: 18 (12/20/21 0736)  BP: (!) 142/65 (12/20/21 0736)  SpO2: 98 % (12/20/21 0736) Vital Signs (24h Range):  Temp:  [96 °F (35.6 °C)-98.3 °F (36.8 °C)] 96 °F (35.6 °C)  Pulse:  [83-95] 95  Resp:  [18-20] 18  SpO2:  [97 %-100 %] 98 %  BP: (137-169)/(65-93) 142/65     Weight:  100 kg (220 lb 7.4 oz)  Body mass index is 33.52 kg/m².  No intake or output data in the 24 hours ending 21 1118   Physical Exam  Vitals and nursing note reviewed.   Constitutional:       General: He is not in acute distress.     Appearance: He is well-developed.   HENT:      Head: Normocephalic and atraumatic.      Right Ear: External ear normal.      Left Ear: External ear normal.      Nose:      Comments: Pressure sore on right nare from NGT nearly resolved  Eyes:      General:         Right eye: No discharge.         Left eye: No discharge.      Conjunctiva/sclera: Conjunctivae normal.   Neck:      Thyroid: No thyromegaly.   Cardiovascular:      Rate and Rhythm: Normal rate and regular rhythm.      Heart sounds: Normal heart sounds. No murmur heard.      Pulmonary:      Effort: Pulmonary effort is normal. No respiratory distress.      Breath sounds: Normal breath sounds.   Abdominal:      General: Abdomen is flat. Bowel sounds are normal. There is no distension.      Palpations: Abdomen is soft.      Tenderness: There is no abdominal tenderness.      Comments:   Dressing c/d/i   Musculoskeletal:      Cervical back: Neck supple.      Right lower leg: No edema.      Left lower leg: No edema.   Skin:     General: Skin is warm and dry.   Neurological:      General: No focal deficit present.      Mental Status: He is alert. Mental status is at baseline.   Psychiatric:         Behavior: Behavior normal.         Thought Content: Thought content normal.         Significant Labs:     BMP:   Recent Labs   Lab 21  0547   GLU 89      K 3.5      CO2 21*   BUN 11   CREATININE 1.0   CALCIUM 8.6*       CMP:   Recent Labs   Lab 21  0547      K 3.5      CO2 21*   GLU 89   BUN 11   CREATININE 1.0   CALCIUM 8.6*   ANIONGAP 8   EGFRNONAA >60     lactic acid 2.7>1.9  Lipase 27  urine culture multiple organisms   covid screen negative     Significant Imagin/11 KUB NG tube in  position.  Prior midline abdominal wall hernia repair.  Otherwise negative abdomen x-ray     12/10 KUB NG tube again noted in place.  No significant change in the bowel gas pattern when compared with the prior study.     US renal Normal size bilateral kidneys each containing a couple of benign anechoic cyst identified the midpole and upper pole regions largest single index cyst about the lateral cortex of the right kidney measuring 2.3 x 2.1 x 2.2 cm in size.     KUB 12/9 Enteric tube terminates in the gastric body.  The degree of distention of the small bowel loops is decreased as compared to prior examinations suggesting a resolving/revolved small-bowel obstruction.  No free air is identified.  Age-appropriate degenerative changes affect the skeleton.     CT abd and pelvis Imaging findings in keeping with a high-grade small bowel obstruction with a point of transition seen in the right mid abdomen, likely related to adhesions.  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  Additionally, there is a small amount of free fluid about the liver and extending into the pelvic cul-de-sac, with slightly high density of the fluid in the pelvic cul-de-sac suggesting hyperemia issues content fluid.     KUB The enteric tube has been appropriately retracted and now terminates in the region of the distal gastric body.Slightly prominent loops of small bowel seen in the upper abdomen.  No definite free peritoneal air is seen.     Lung parenchyma cannot be adequately evaluated secondary to overexposure of the central aspect of the lungs.Consider dedicated radiograph of the chest evaluate for parenchymal injury.     CXR Enteric tube terminates in the gastric body.  There is bibasilar subsegmental atelectasis, with right basilar scarring.  No focal airspace consolidation or pleural effusions.  No pneumothorax.  The heart is enlarged.   Calcified atheromatous disease affects the aorta.  Median sternotomy wires are midline.  Age-appropriate degenerative changes affect the skeleton.      Assessment/Plan:      * SBO (small bowel obstruction)  NPO  NGT to wall suction  KUB daily  CXR nothing acute  IVF started NS at 150ml/hr    He meets SIRS criteria but I think better explained by SBO presentation that infection.  UA > 100 WBC but he has no urinary tract symptoms. He has recurrent UTI and will start Rocephin now but my clinical impression is the abnormal vitals and elevated lactic acid are due to SBO, dehydration and not UTI sepsis    12/10 tolerating orals, advance diet .    12/11  Placed NG back again   KUB pending     12/12  S/p surgery ; POD 1   NG to suction   Bleeding stopped    12/13   NG pulled from general surgery will try clears this am. + flatus.    12/14 tolerating orals . No nausea ; no vomiting     12/17 s/p surgery ; 12/11 Patient had an adhesion of the  of the colon down to the root of the small bowel mesentery causing a complete small bowel obstruction.  After taking down the adhesions the obstruction was relieved.  The the bowel was viable with no concern for ischemia.  should follow up with Dr. Hein 1 week after discharge for his staple removal.> maybe here later this week    Hypomagnesemia  Replete and monitor    12/18 resolved      Evaluation by psychiatric service required  Reviewed  consult .  12/16 awaiting state approval for nursing home   12/19 awaiting state approval for nursing home    Paroxysmal atrial fibrillation  Noted in Dr Fair notes:    On eliquis and BB at home. NPO here. Will give lovenox- renally dose and labetalol IV for now   12/10 tolerating liquids, advance diet and resume oral BB, stop IV labetolol,   Resume eliquis;      12/11  DC eliquis   Resume lovenox .    12/12  HOLD LOVENOX   HB dropped 2 points   Follow CBC   Resume lovenox tomorrow     12/14 resume lovenox and watch for bleeding ;   12/15  transition back  to ELiquis 2.5mg bid  , getting labetolol IV q 6 ;takes metoprolol XL 50mg po daily- resume  12/16 stable with oral metoprolol   12/17 Stable with oral metoprolol, transitioned lovenox back to Eliquis 2.5mg home dose     Acute renal failure  Baseline creat 1.3-1.4- on admission 3.6. 1L NS given in ER. Repeat 1L Ns bolus 12/7 and then cont NS at 150ml/hr. Repeat lactate better 1.9  Creat now 3.0  Son reports that he has no issues urinating at home  Will check renal U/s and post void residual  Has had urinary re tension in past. Consider salcedo if retaining due ti UTI    Repeat u.a today cont rocephin     12/10 creat improved - 3.6>1.6; renal US stable     12/11  BMP  Lab Results   Component Value Date     12/19/2021    K 3.5 12/19/2021     12/19/2021    CO2 21 (L) 12/19/2021    BUN 11 12/19/2021    CREATININE 1.0 12/19/2021    CALCIUM 8.6 (L) 12/19/2021    ANIONGAP 8 12/19/2021    ESTGFRAFRICA >60 12/19/2021    EGFRNONAA >60 12/19/2021 12/13 Back to baseline   BMP  Lab Results   Component Value Date     12/19/2021    K 3.5 12/19/2021     12/19/2021    CO2 21 (L) 12/19/2021    BUN 11 12/19/2021    CREATININE 1.0 12/19/2021    CALCIUM 8.6 (L) 12/19/2021    ANIONGAP 8 12/19/2021    ESTGFRAFRICA >60 12/19/2021    EGFRNONAA >60 12/19/2021     Resolved     Asymptomatic bacteriuria  He has no UTI sx on interview day of admit  Culture urine  Start rocephin empirically as h/o of UTI and not a great historian but as noted above I think vitals/lactic acid precipitated by SBO and not UTI sepsis    Cont rocephin and repeat u.a   Pt is s/p lithotripsy, stent removal, and cystoureteroscopy with retrograde pyelogram w/ stent insertion on 5/31/21, tx with Bactrim for MRSA UTI  Will add oral doxy  To cover for staph MRSA UTI .  12/14   d/c abx, grew candida> low colony count and asymptomatic. No further treatment needed.  Given 3 days diflucan Rx     SIRS (systemic inflammatory response  syndrome)  He meets SIRS criteria but I think better explained by SBO presentation than infection.  UA > 100 WBC but he has no urinary tract symptoms. He has recurrent UTI and will start Rocephin now but my clinical impression is the abnormal vitals and elevated lactic acid are due to SBO, dehydration and not UTI sepsis.  RESOLVED .      Hypokalemia  Supplement and monitor     12/18: start KCl 20meq BID and monitor    12/19 K 3.5; cont KCl 10meq BID      Hypothyroidism  Hold synthroid while NPO  If remains NPO > 3 days can start IV  Resume oral thyroid meds .    12/11  Hold po meds.    12/14  Continue levothyroxine     GERD (gastroesophageal reflux disease)  protonix IV  tolerating oral - change to po .    12/11  Back to IV   12/14  Cont PPI.    Hyperlipidemia  Holding zocor while NPO  12/10 resume - zocor non formulary- atorvastatin while here - NPO for now will resume once taking po    12/13: simvastatin not formulary. Can resume as outpatient. Hopefully d/c soon.        VTE Risk Mitigation (From admission, onward)         Ordered     apixaban tablet 2.5 mg  2 times daily         12/17/21 0724     IP VTE HIGH RISK PATIENT  Once         12/07/21 0620     Place sequential compression device  Until discontinued         12/07/21 0620                Discharge Planning   STEPHANIE:      Code Status: Full Code   Is the patient medically ready for discharge?:     Reason for patient still in hospital (select all that apply): Pending disposition  Discharge Plan A: Skilled Nursing Facility   Discharge Delays: None known at this time              Estrellita Barrera MD  Department of Hospital Medicine   [place] [facility] (3rd Fl)

## 2021-12-20 NOTE — ASSESSMENT & PLAN NOTE
Supplement and monitor     12/18: start KCl 20meq BID and monitor    12/19 K 3.5; cont KCl 10meq BID

## 2021-12-20 NOTE — ASSESSMENT & PLAN NOTE
NPO  NGT to wall suction  KUB daily  CXR nothing acute  IVF started NS at 150ml/hr    He meets SIRS criteria but I think better explained by SBO presentation that infection.  UA > 100 WBC but he has no urinary tract symptoms. He has recurrent UTI and will start Rocephin now but my clinical impression is the abnormal vitals and elevated lactic acid are due to SBO, dehydration and not UTI sepsis    12/10 tolerating orals, advance diet .    12/11  Placed NG back again   KUB pending     12/12  S/p surgery ; POD 1   NG to suction   Bleeding stopped    12/13   NG pulled from general surgery will try clears this am. + flatus.    12/14 tolerating orals . No nausea ; no vomiting     12/17 s/p surgery ; 12/11 Patient had an adhesion of the  of the colon down to the root of the small bowel mesentery causing a complete small bowel obstruction.  After taking down the adhesions the obstruction was relieved.  The the bowel was viable with no concern for ischemia.  should follow up with Dr. Hein 1 week after discharge for his staple removal.> maybe here later this week

## 2021-12-20 NOTE — ASSESSMENT & PLAN NOTE
Noted in Dr Fair notes:    On eliquis and BB at home. NPO here. Will give lovenox- renally dose and labetalol IV for now   12/10 tolerating liquids, advance diet and resume oral BB, stop IV labetolol,   Resume eliquis;      12/11  DC eliquis   Resume lovenox .    12/12  HOLD LOVENOX   HB dropped 2 points   Follow CBC   Resume lovenox tomorrow     12/14 resume lovenox and watch for bleeding ;   12/15 transition back  to ELiquis 2.5mg bid  , getting labetolol IV q 6 ;takes metoprolol XL 50mg po daily- resume  12/16 stable with oral metoprolol   12/17 Stable with oral metoprolol, transitioned lovenox back to Eliquis 2.5mg home dose

## 2021-12-21 PROCEDURE — 25000003 PHARM REV CODE 250: Performed by: INTERNAL MEDICINE

## 2021-12-21 PROCEDURE — 25000003 PHARM REV CODE 250: Performed by: NURSE PRACTITIONER

## 2021-12-21 PROCEDURE — 11000001 HC ACUTE MED/SURG PRIVATE ROOM

## 2021-12-21 PROCEDURE — 25000003 PHARM REV CODE 250: Performed by: STUDENT IN AN ORGANIZED HEALTH CARE EDUCATION/TRAINING PROGRAM

## 2021-12-21 PROCEDURE — 99232 SBSQ HOSP IP/OBS MODERATE 35: CPT | Mod: ,,, | Performed by: INTERNAL MEDICINE

## 2021-12-21 PROCEDURE — 94761 N-INVAS EAR/PLS OXIMETRY MLT: CPT

## 2021-12-21 PROCEDURE — 99232 PR SUBSEQUENT HOSPITAL CARE,LEVL II: ICD-10-PCS | Mod: ,,, | Performed by: INTERNAL MEDICINE

## 2021-12-21 RX ADMIN — LEVOTHYROXINE SODIUM 100 MCG: 100 TABLET ORAL at 05:12

## 2021-12-21 RX ADMIN — APIXABAN 2.5 MG: 2.5 TABLET, FILM COATED ORAL at 09:12

## 2021-12-21 RX ADMIN — POTASSIUM CHLORIDE 10 MEQ: 750 TABLET, FILM COATED, EXTENDED RELEASE ORAL at 09:12

## 2021-12-21 RX ADMIN — POTASSIUM CHLORIDE 10 MEQ: 750 TABLET, FILM COATED, EXTENDED RELEASE ORAL at 08:12

## 2021-12-21 RX ADMIN — PANTOPRAZOLE SODIUM 40 MG: 40 TABLET, DELAYED RELEASE ORAL at 09:12

## 2021-12-21 RX ADMIN — APIXABAN 2.5 MG: 2.5 TABLET, FILM COATED ORAL at 08:12

## 2021-12-21 RX ADMIN — METOPROLOL SUCCINATE 50 MG: 50 TABLET, EXTENDED RELEASE ORAL at 09:12

## 2021-12-21 NOTE — PLAN OF CARE
12/21/21 1343   Post-Acute Status   Post-Acute Authorization Placement   Post-Acute Placement Status Pending post-acute provider review/more information requested   Discharge Delays (!) Post-Acute Set-up       Patient visited by [facility] rep this morning. Medically accepted by [facility]; however, upon review of the patient's financial information with the family, it was discovered that patient has too many assets to be eligible for long-term Medicaid. Family must sell the patient's boat for 'fair market value' in order for the patient to qualify for placement. [facility] rep states that the family indicated to her that they are willing to do this and would 'work on it.'     This patient has had frequent hospital admissions between this facility and others this year with many stays with family requesting nursing home placement. On his last admission to [facility], there were many legal/POA/family dynamic issues at play which resulted in a long, unnecessary hospital stay for the patient. Patient ultimately went home with his son, Jason, who is now the caregiver for the patient full-time. He no longer can 'handle' his father at home and wants him placed in a nursing home. [employer] has been called on this patient and family on several occasions.     1515--Patient's case escalated to Case Management Director for the [facility]. Legal to be contacted for guidance. Awaiting direction.

## 2021-12-21 NOTE — SUBJECTIVE & OBJECTIVE
Review of Systems   Constitutional: Negative for activity change, fatigue, fever and unexpected weight change.   HENT: Negative for congestion and sore throat.    Eyes: Negative for visual disturbance.   Respiratory: Negative for cough and shortness of breath.    Cardiovascular: Negative for chest pain.   Gastrointestinal: Negative for abdominal pain, blood in stool, constipation and diarrhea.   Genitourinary: Negative for dysuria and hematuria.   Neurological: Negative for dizziness and light-headedness.     Objective:     Vital Signs (Most Recent):  Temp: 97.8 °F (36.6 °C) (12/21/21 0807)  Pulse: 83 (12/21/21 0807)  Resp: 20 (12/21/21 0807)  BP: (!) 165/84 (12/21/21 0807)  SpO2: 99 % (12/21/21 0807) Vital Signs (24h Range):  Temp:  [96 °F (35.6 °C)-98.8 °F (37.1 °C)] 97.8 °F (36.6 °C)  Pulse:  [78-89] 83  Resp:  [18-20] 20  SpO2:  [95 %-100 %] 99 %  BP: (118-165)/(70-89) 165/84     Weight: 100 kg (220 lb 7.4 oz)  Body mass index is 33.52 kg/m².  No intake or output data in the 24 hours ending 12/21/21 0907   Physical Exam  Vitals and nursing note reviewed.   Constitutional:       General: He is not in acute distress.     Appearance: He is well-developed.   HENT:      Head: Normocephalic and atraumatic.      Right Ear: External ear normal.      Left Ear: External ear normal.      Nose:      Comments: Pressure sore on right nare from NGT nearly resolved  Eyes:      General:         Right eye: No discharge.         Left eye: No discharge.      Conjunctiva/sclera: Conjunctivae normal.   Neck:      Thyroid: No thyromegaly.   Cardiovascular:      Rate and Rhythm: Normal rate and regular rhythm.      Heart sounds: Normal heart sounds. No murmur heard.      Pulmonary:      Effort: Pulmonary effort is normal. No respiratory distress.      Breath sounds: Normal breath sounds.   Abdominal:      General: Abdomen is flat. Bowel sounds are normal. There is no distension.      Palpations: Abdomen is soft.      Tenderness:  There is no abdominal tenderness.      Comments:   Dressing c/d/i   Musculoskeletal:      Cervical back: Neck supple.      Right lower leg: No edema.      Left lower leg: No edema.   Skin:     General: Skin is warm and dry.   Neurological:      General: No focal deficit present.      Mental Status: He is alert. Mental status is at baseline.   Psychiatric:         Behavior: Behavior normal.         Thought Content: Thought content normal.         Significant Labs:     Lab Results   Component Value Date    WBC 9.07 2021    HGB 9.9 (L) 2021    HCT 30.0 (L) 2021    MCV 89 2021     2021       BMP  Lab Results   Component Value Date     2021    K 3.5 2021     2021    CO2 21 (L) 2021    BUN 11 2021    CREATININE 1.0 2021    CALCIUM 8.6 (L) 2021    ANIONGAP 8 2021    ESTGFRAFRICA >60 2021    EGFRNONAA >60 2021     Lab Results   Component Value Date    ALT 15 2021    AST 19 2021    ALKPHOS 70 2021    BILITOT 0.6 2021     lactic acid 2.7>1.9  Lipase 27  urine culture multiple organisms   covid screen negative     Significant Imagin/11 KUB NG tube in position.  Prior midline abdominal wall hernia repair.  Otherwise negative abdomen x-ray     12/10 KUB NG tube again noted in place.  No significant change in the bowel gas pattern when compared with the prior study.     US renal Normal size bilateral kidneys each containing a couple of benign anechoic cyst identified the midpole and upper pole regions largest single index cyst about the lateral cortex of the right kidney measuring 2.3 x 2.1 x 2.2 cm in size.     KUB  Enteric tube terminates in the gastric body.  The degree of distention of the small bowel loops is decreased as compared to prior examinations suggesting a resolving/revolved small-bowel obstruction.  No free air is identified.  Age-appropriate degenerative changes affect  the skeleton.     CT abd and pelvis Imaging findings in keeping with a high-grade small bowel obstruction with a point of transition seen in the right mid abdomen, likely related to adhesions.  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  Additionally, there is a small amount of free fluid about the liver and extending into the pelvic cul-de-sac, with slightly high density of the fluid in the pelvic cul-de-sac suggesting hyperemia issues content fluid.     KUB The enteric tube has been appropriately retracted and now terminates in the region of the distal gastric body.Slightly prominent loops of small bowel seen in the upper abdomen.  No definite free peritoneal air is seen.     Lung parenchyma cannot be adequately evaluated secondary to overexposure of the central aspect of the lungs.Consider dedicated radiograph of the chest evaluate for parenchymal injury.     CXR Enteric tube terminates in the gastric body.  There is bibasilar subsegmental atelectasis, with right basilar scarring.  No focal airspace consolidation or pleural effusions.  No pneumothorax.  The heart is enlarged.  Calcified atheromatous disease affects the aorta.  Median sternotomy wires are midline.  Age-appropriate degenerative changes affect the skeleton.

## 2021-12-21 NOTE — ASSESSMENT & PLAN NOTE
NPO  NGT to wall suction  KUB daily  CXR nothing acute  IVF started NS at 150ml/hr    He meets SIRS criteria but I think better explained by SBO presentation that infection.  UA > 100 WBC but he has no urinary tract symptoms. He has recurrent UTI and will start Rocephin now but my clinical impression is the abnormal vitals and elevated lactic acid are due to SBO, dehydration and not UTI sepsis    12/10 tolerating orals, advance diet .    12/11  Placed NG back again   KUB pending     12/12  S/p surgery ; POD 1   NG to suction   Bleeding stopped    12/13   NG pulled from general surgery will try clears this am. + flatus.    12/14 tolerating orals . No nausea ; no vomiting     12/17 s/p surgery ; 12/11 Patient had an adhesion of the  of the colon down to the root of the small bowel mesentery causing a complete small bowel obstruction.  After taking down the adhesions the obstruction was relieved.  The the bowel was viable with no concern for ischemia.  should follow up with Dr. Hein 1 week after discharge for his staple removal.> maybe here later this week will reconsult as tomorrow will be 7 days from Angel Luis to not asking for RTC 7-10 days for post op f./u

## 2021-12-21 NOTE — PROGRESS NOTES
[facility] ([address] [place] [address] Medicine  Progress Note    Patient Name: Domonique Hernandez Jr.  MRN: [id number]  Patient Class: IP- Inpatient   Admission Date: 12/7/2021  Length of Stay: 14 days  Attending Physician: Emiliano Cam MD  Primary Care Provider: Emiliano Cam MD        Subjective:     Principal Problem:SBO (small bowel obstruction)        HPI:  80yo male patient with hx of anxiety, arthritis, back pain, bronchitis/COPD, depression, GERD, HLD, hypothyroid, PVD, sleep apnea on CPAP, and CKD. Pt came to ER with c/o regurgitation for 3 days. Decrease intake. No fever. No UTI s/s. CT on admission shows SBO. Significantly dehydrated. Creat 3.6-- baseline 1.4. Given 1L NS in ER U/a dirty one dose of zosyn given. No fever. No elevated WBC. Lactate 2.3. He had NGT place and put to suction. He is in bed this am. Reports feeling better. General surgery consulted.       Overview/Hospital Course:  12/8 Pt was admitted yesterday with SBO. He had general surgery consulted. NGT placed to suction. Still no flatus or BM. KUB pending for this am.     Also on rocephin for UTI-culture in process. Was given 1L NS in ER and repeated on floor then started NS at 150ml/hr. Creat on admit 3.6> now 3.0-- baseline creat 1.4. lactate did impropve on repeat.  Renal US shows cysts.  No hydronephrosis.     12/9 pt here with SBO. NGT to suction had 1000ml overnight. No flatus. No BM. +BS  Renal function improved 3.6>>2.1 on NS at 150ml/hr. Rocephin for UTI- urine culture shows multiple organisms. NO fever. NO elevated WBC    12/10/21    80YO WM admitted with SBO 4 days ago, NGT pulled yesterday, tolerating liquids,   Burping, t  KUB this am- NG tube removed, otherwise no significant change when compared with the prior study of December 9, 2021.     Creat much improved 3.6>1.6 ,  IV Rocephin day 4 for UTI, initial urine Cx- multiple organisms, repaeat U/A dirty, culture in process   7 months ago urine CX+ MRSA- sensitve to  bactrim, tetracycline and Vanc ; will add oral doxy   WBC normal, afebrile   Per PT yesterday; Gait: Contact Gaurd Assistance x ~30 feet (steeping forward and back due to limited line of medical equipment - NG tube) using RW.    12/11/21  80YO WM admitted with SBO  NG was pulled 12/9   But yesterday started with nausea and later with vomiting  and NG has to be placed back again   KUB is pending .  On doxy for UTI recurrent ; last culture staph mrsa sensitive to tetracyclines     12/12  S/p surgery yesterday for SBO .  Reports no flatus . No BM   Issues with bleeding from incision   Lab Results   Component Value Date    WBC 9.47 12/12/2021    HGB 11.3 (L) 12/12/2021    HCT 37.2 (L) 12/12/2021    MCV 96 12/12/2021     12/12/2021 12/13 pt was taken to OR Saturday. He had NGT replaced after that. This am he is feeling better. Dr Hein saw him this am from general surgery and rec pulling NG and starting clears. He is not tender to palp this am. Passed gas this am.   H/H 9/30- lovenox was held due to ozzing after surgery- hx of a fib  Urine culture shows candida     12/14/21  NGT pulled yesterday, pt tolerating liquid diet, Na 147, creat 1.2, + CKD ; getting NS @150ml/hr; decrease to 75ml/hr   H&H 9.1/29 from 9.6/30   Had a BM yesterday, will continue oral liquids until bowel sounds more active   WBC normal, afebrile, O2 sat stable on RA   Urine Cx with candida, doxy stopped after 3 days ; will start diflucan 100mg daily x 3d .    12/15 Pt admitted  with SBO - NGT pulled 2 days ago, tolerating liquids, passing flatus, had BM this am   Surgical incision evaluated per general sx yesterday, recommend advance diet and ^OOB   Contact Guard Assistance x ~30 feet with RW. Slow phase, slight dec foot/floor clearance and guarding stomach  VSS, afebrile, WBC 11.53   Day 2/3 of diflucan for candida in urine   Getting IV labetolol; transitioned to oral home metoprolol   Awaiting on state form 142 to approve nursing  home admission due to psychiatric hx of anxiety/depression     12/16 Tolerating diet, VSS   Worked on out of bed activity, gait trng trng with RW x ~100 feet with standby Assistance; toilet(Bedside Commode) transfers/activity.    12/17 pt admitted with SBO reqired surgery for adhesions causing obstruction  12/11 he is stable from sx standpoint,   Tolerating diet , K+ 2.6 will supplement and check Mg+   Worked on out of bed activity, gait trng trng wioth RW x ~100 feet with standby Assistance; toilet(Bedside Commode) transfers/activity  Still awaiting 142- state approval for Nursing home   Check BMP , CBC in am     12/18 no acute events overnight. No new complaints. Awaiting NH placement.    12/19 no acute events overnight. No new complaints. Awaiting NH placement.     12/20 he has no events overnight or weekend,. Waiting on nursing home placement, placement pending care home care, refused for skill per insurance. He is ambulating 150ft with PT using supervision only. Urinating and defecating. Tolerating po. VSS/afebrile.     12/21 pt remains stable. No acute issues overnight. Awaiting nursing home placement. Per case management >>142 received on Friday, 12/17/21 in the afternoon. SNF declined by payor last week. Referrals have been sent via [facility]Port to [facility], [facility] and [facility]. Patient declined by [facility], [facility], and [facility]. Patient currently being [facility] -Patient denied by [facility]. [facility] now reviewing the patient and contacting family.             Review of Systems   Constitutional: Negative for activity change, fatigue, fever and unexpected weight change.   HENT: Negative for congestion and sore throat.    Eyes: Negative for visual disturbance.   Respiratory: Negative for cough and shortness of breath.    Cardiovascular: Negative  for chest pain.   Gastrointestinal: Negative for abdominal pain, blood in stool, constipation and diarrhea.   Genitourinary: Negative for dysuria and hematuria.   Neurological: Negative for dizziness and light-headedness.     Objective:     Vital Signs (Most Recent):  Temp: 97.8 °F (36.6 °C) (12/21/21 0807)  Pulse: 83 (12/21/21 0807)  Resp: 20 (12/21/21 0807)  BP: (!) 165/84 (12/21/21 0807)  SpO2: 99 % (12/21/21 0807) Vital Signs (24h Range):  Temp:  [96 °F (35.6 °C)-98.8 °F (37.1 °C)] 97.8 °F (36.6 °C)  Pulse:  [78-89] 83  Resp:  [18-20] 20  SpO2:  [95 %-100 %] 99 %  BP: (118-165)/(70-89) 165/84     Weight: 100 kg (220 lb 7.4 oz)  Body mass index is 33.52 kg/m².  No intake or output data in the 24 hours ending 12/21/21 0907   Physical Exam  Vitals and nursing note reviewed.   Constitutional:       General: He is not in acute distress.     Appearance: He is well-developed.   HENT:      Head: Normocephalic and atraumatic.      Right Ear: External ear normal.      Left Ear: External ear normal.      Nose:      Comments: Pressure sore on right nare from NGT nearly resolved  Eyes:      General:         Right eye: No discharge.         Left eye: No discharge.      Conjunctiva/sclera: Conjunctivae normal.   Neck:      Thyroid: No thyromegaly.   Cardiovascular:      Rate and Rhythm: Normal rate and regular rhythm.      Heart sounds: Normal heart sounds. No murmur heard.      Pulmonary:      Effort: Pulmonary effort is normal. No respiratory distress.      Breath sounds: Normal breath sounds.   Abdominal:      General: Abdomen is flat. Bowel sounds are normal. There is no distension.      Palpations: Abdomen is soft.      Tenderness: There is no abdominal tenderness.      Comments:   Dressing c/d/i   Musculoskeletal:      Cervical back: Neck supple.      Right lower leg: No edema.      Left lower leg: No edema.   Skin:     General: Skin is warm and dry.   Neurological:      General: No focal deficit present.      Mental  Status: He is alert. Mental status is at baseline.   Psychiatric:         Behavior: Behavior normal.         Thought Content: Thought content normal.         Significant Labs:     Lab Results   Component Value Date    WBC 9.07 2021    HGB 9.9 (L) 2021    HCT 30.0 (L) 2021    MCV 89 2021     2021       BMP  Lab Results   Component Value Date     2021    K 3.5 2021     2021    CO2 21 (L) 2021    BUN 11 2021    CREATININE 1.0 2021    CALCIUM 8.6 (L) 2021    ANIONGAP 8 2021    ESTGFRAFRICA >60 2021    EGFRNONAA >60 2021     Lab Results   Component Value Date    ALT 15 2021    AST 19 2021    ALKPHOS 70 2021    BILITOT 0.6 2021     lactic acid 2.7>1.9  Lipase 27  urine culture multiple organisms   covid screen negative     Significant Imagin/11 KUB NG tube in position.  Prior midline abdominal wall hernia repair.  Otherwise negative abdomen x-ray     12/10 KUB NG tube again noted in place.  No significant change in the bowel gas pattern when compared with the prior study.     US renal Normal size bilateral kidneys each containing a couple of benign anechoic cyst identified the midpole and upper pole regions largest single index cyst about the lateral cortex of the right kidney measuring 2.3 x 2.1 x 2.2 cm in size.     KUB  Enteric tube terminates in the gastric body.  The degree of distention of the small bowel loops is decreased as compared to prior examinations suggesting a resolving/revolved small-bowel obstruction.  No free air is identified.  Age-appropriate degenerative changes affect the skeleton.     CT abd and pelvis Imaging findings in keeping with a high-grade small bowel obstruction with a point of transition seen in the right mid abdomen, likely related to adhesions.  There is significant inflammatory changes about the dilated small bowel loops in the mid abdomen  likely related to engorgement of the Vasa recta.  Additionally, there is a small amount of free fluid about the liver and extending into the pelvic cul-de-sac, with slightly high density of the fluid in the pelvic cul-de-sac suggesting hyperemia issues content fluid.     KUB The enteric tube has been appropriately retracted and now terminates in the region of the distal gastric body.Slightly prominent loops of small bowel seen in the upper abdomen.  No definite free peritoneal air is seen.     Lung parenchyma cannot be adequately evaluated secondary to overexposure of the central aspect of the lungs.Consider dedicated radiograph of the chest evaluate for parenchymal injury.     CXR Enteric tube terminates in the gastric body.  There is bibasilar subsegmental atelectasis, with right basilar scarring.  No focal airspace consolidation or pleural effusions.  No pneumothorax.  The heart is enlarged.  Calcified atheromatous disease affects the aorta.  Median sternotomy wires are midline.  Age-appropriate degenerative changes affect the skeleton.      Assessment/Plan:      * SBO (small bowel obstruction)  NPO  NGT to wall suction  KUB daily  CXR nothing acute  IVF started NS at 150ml/hr    He meets SIRS criteria but I think better explained by SBO presentation that infection.  UA > 100 WBC but he has no urinary tract symptoms. He has recurrent UTI and will start Rocephin now but my clinical impression is the abnormal vitals and elevated lactic acid are due to SBO, dehydration and not UTI sepsis    12/10 tolerating orals, advance diet .    12/11  Placed NG back again   KUB pending     12/12  S/p surgery ; POD 1   NG to suction   Bleeding stopped    12/13   NG pulled from general surgery will try clears this am. + flatus.    12/14 tolerating orals . No nausea ; no vomiting     12/17 s/p surgery ; 12/11 Patient had an adhesion of the  of the colon down to the root of the small bowel mesentery causing a complete small bowel  obstruction.  After taking down the adhesions the obstruction was relieved.  The the bowel was viable with no concern for ischemia.  should follow up with Dr. Hein 1 week after discharge for his staple removal.> maybe here later this week will reconsult as tomorrow will be 7 days from [facility] Luis to not asking for RTC 7-10 days for post op f./u     Hypomagnesemia  Replete and monitor    12/18 resolved      Evaluation by psychiatric service required  Reviewed  consult .  12/16 awaiting state approval for nursing home   12/19 awaiting state approval for nursing home  12/20 Patient pending nursing home placement. 142 received on Friday, 12/17/21 in the afternoon. SNF declined by payor last week. Referrals have been sent via [facility]Port to [facility], [facility], [facility] and [facility]. Patient declined by [facility], [facility], and [facility]. Patient currently being reviewed by [facility].      SW to remain available.      0368--Patient denied by [facility]. [facility] Toussaint now reviewing the patient and contacting family.     Paroxysmal atrial fibrillation  Noted in Dr Fair notes:    On eliquis and BB at home. NPO here. Will give lovenox- renally dose and labetalol IV for now   12/10 tolerating liquids, advance diet and resume oral BB, stop IV labetolol,   Resume eliquis;      12/11  DC eliquis   Resume lovenox .    12/12  HOLD LOVENOX   HB dropped 2 points   Follow CBC   Resume lovenox tomorrow     12/14 resume lovenox and watch for bleeding ;   12/15 transition back  to ELiquis 2.5mg bid  , getting labetolol IV q 6 ;takes metoprolol XL 50mg po daily- resume  12/16 stable with oral metoprolol   12/17 Stable with oral metoprolol, transitioned lovenox back to Eliquis 2.5mg home dose     Acute renal failure  Baseline creat 1.3-1.4- on admission 3.6. 1L NS given in ER. Repeat 1L Ns bolus 12/7 and then  cont NS at 150ml/hr. Repeat lactate better 1.9  Creat now 3.0  Son reports that he has no issues urinating at home  Will check renal U/s and post void residual  Has had urinary re tension in past. Consider salcedo if retaining due ti UTI    Repeat u.a today cont rocephin     12/10 creat improved - 3.6>1.6; renal US stable     12/11  [facility]  Lab Results   Component Value Date     12/19/2021    K 3.5 12/19/2021     12/19/2021    CO2 21 (L) 12/19/2021    BUN 11 12/19/2021    CREATININE 1.0 12/19/2021    CALCIUM 8.6 (L) 12/19/2021    ANIONGAP 8 12/19/2021    ESTGFRAFRICA >60 12/19/2021    EGFRNONAA >60 12/19/2021 12/13 Back to baseline   BMP  Lab Results   Component Value Date     12/19/2021    K 3.5 12/19/2021     12/19/2021    CO2 21 (L) 12/19/2021    BUN 11 12/19/2021    CREATININE 1.0 12/19/2021    CALCIUM 8.6 (L) 12/19/2021    ANIONGAP 8 12/19/2021    ESTGFRAFRICA >60 12/19/2021    EGFRNONAA >60 12/19/2021     Resolved     Asymptomatic bacteriuria  He has no UTI sx on interview day of admit  Culture urine  Start rocephin empirically as h/o of UTI and not a great historian but as noted above I think vitals/lactic acid precipitated by SBO and not UTI sepsis    Cont rocephin and repeat u.a   Pt is s/p lithotripsy, stent removal, and cystoureteroscopy with retrograde pyelogram w/ stent insertion on 5/31/21, tx with Bactrim for MRSA UTI  Will add oral doxy  To cover for staph MRSA UTI .  12/14   d/c abx, grew candida> low colony count and asymptomatic. No further treatment needed.  Given 3 days diflucan Rx     SIRS (systemic inflammatory response syndrome)  He meets SIRS criteria but I think better explained by SBO presentation than infection.  UA > 100 WBC but he has no urinary tract symptoms. He has recurrent UTI and will start Rocephin now but my clinical impression is the abnormal vitals and elevated lactic acid are due to SBO, dehydration and not UTI sepsis.  RESOLVED  .      Hypokalemia  Supplement and monitor     12/18: start KCl 20meq BID and monitor    12/19 K 3.5; cont KCl 10meq BID      Hypothyroidism  Hold synthroid while NPO  If remains NPO > 3 days can start IV  Resume oral thyroid meds .    12/11  Hold po meds.    12/14  Continue levothyroxine     GERD (gastroesophageal reflux disease)  protonix IV  tolerating oral - change to po .    12/11  Back to IV   12/14  Cont PPI.    Hyperlipidemia  Holding zocor while NPO  12/10 resume - zocor non formulary- atorvastatin while here - NPO for now will resume once taking po    12/13: simvastatin not formulary. Can resume as outpatient. Hopefully d/c soon.        VTE Risk Mitigation (From admission, onward)         Ordered     apixaban tablet 2.5 mg  2 times daily         12/17/21 0724     IP VTE HIGH RISK PATIENT  Once         12/07/21 0620     Place sequential compression device  Until discontinued         12/07/21 0620                Discharge Planning   STEPHANIE:      Code Status: Full Code   Is the patient medically ready for discharge?:     Reason for patient still in hospital (select all that apply): Pending disposition  Discharge Plan A: New Nursing Home placement - alf care facility   Discharge Delays: None known at this time              Estrellita Barrera MD  Department of Hospital Medicine   [facility] (3rd Fl)

## 2021-12-21 NOTE — PLAN OF CARE
Problem: Adult Inpatient Plan of Care  Goal: Absence of Hospital-Acquired Illness or Injury  Outcome: Ongoing, Progressing     Problem: Adult Inpatient Plan of Care  Goal: Optimal Comfort and Wellbeing  Outcome: Ongoing, Progressing     Problem: Wound  Goal: Optimal Wound Healing  Outcome: Ongoing, Progressing     Problem: Fall Injury Risk  Goal: Absence of Fall and Fall-Related Injury  Outcome: Ongoing, Progressing

## 2021-12-21 NOTE — ASSESSMENT & PLAN NOTE
Reviewed  consult .  12/16 awaiting state approval for nursing home   12/19 awaiting state approval for nursing home  12/20 Patient pending nursing home placement. 142 received on Friday, 12/17/21 in the afternoon. SNF declined by payor last week. Referrals have been sent via CarePort to [facility], [facility], [facility], [facility] and [facility]. Patient declined by [facility], [facility], and [facility]. Patient currently being reviewed by [facility].      SW to remain available.      [id number]--Patient denied by [facility]. [facility] now reviewing the patient and contacting family.

## 2021-12-22 PROCEDURE — 25000003 PHARM REV CODE 250: Performed by: SURGERY

## 2021-12-22 PROCEDURE — 25000003 PHARM REV CODE 250: Performed by: NURSE PRACTITIONER

## 2021-12-22 PROCEDURE — 25000003 PHARM REV CODE 250: Performed by: STUDENT IN AN ORGANIZED HEALTH CARE EDUCATION/TRAINING PROGRAM

## 2021-12-22 PROCEDURE — 94761 N-INVAS EAR/PLS OXIMETRY MLT: CPT

## 2021-12-22 PROCEDURE — 11000001 HC ACUTE MED/SURG PRIVATE ROOM

## 2021-12-22 PROCEDURE — 99231 SBSQ HOSP IP/OBS SF/LOW 25: CPT | Mod: ,,, | Performed by: FAMILY MEDICINE

## 2021-12-22 PROCEDURE — 99231 PR SUBSEQUENT HOSPITAL CARE,LEVL I: ICD-10-PCS | Mod: ,,, | Performed by: FAMILY MEDICINE

## 2021-12-22 PROCEDURE — 25000003 PHARM REV CODE 250: Performed by: INTERNAL MEDICINE

## 2021-12-22 RX ORDER — ACETAMINOPHEN 325 MG/1
650 TABLET ORAL EVERY 6 HOURS PRN
Status: DISCONTINUED | OUTPATIENT
Start: 2021-12-22 | End: 2021-12-23 | Stop reason: HOSPADM

## 2021-12-22 RX ADMIN — MELATONIN TAB 3 MG 6 MG: 3 TAB at 10:12

## 2021-12-22 RX ADMIN — POTASSIUM CHLORIDE 10 MEQ: 750 TABLET, FILM COATED, EXTENDED RELEASE ORAL at 10:12

## 2021-12-22 RX ADMIN — APIXABAN 2.5 MG: 2.5 TABLET, FILM COATED ORAL at 08:12

## 2021-12-22 RX ADMIN — APIXABAN 2.5 MG: 2.5 TABLET, FILM COATED ORAL at 10:12

## 2021-12-22 RX ADMIN — POTASSIUM CHLORIDE 10 MEQ: 750 TABLET, FILM COATED, EXTENDED RELEASE ORAL at 08:12

## 2021-12-22 RX ADMIN — METOPROLOL SUCCINATE 50 MG: 50 TABLET, EXTENDED RELEASE ORAL at 08:12

## 2021-12-22 RX ADMIN — LEVOTHYROXINE SODIUM 100 MCG: 100 TABLET ORAL at 05:12

## 2021-12-22 RX ADMIN — PANTOPRAZOLE SODIUM 40 MG: 40 TABLET, DELAYED RELEASE ORAL at 08:12

## 2021-12-22 NOTE — PLAN OF CARE
12/22/21 1505   Medicare Message   Important Message from Medicare regarding Discharge Appeal Rights Given to patient/caregiver;Explained to patient/caregiver;Signed/date by patient/caregiver   Date IMM was signed 12/22/21   Time IMM was signed 1441       Completed with Channing, Son.

## 2021-12-22 NOTE — ASSESSMENT & PLAN NOTE
Reviewed  consult .  12/16 awaiting state approval for nursing home   12/19 awaiting state approval for nursing home  12/20 Patient pending nursing home placement. 142 received on Friday, 12/17/21 in the afternoon. SNF declined by payor last week. Referrals have been sent via [facility]Port to [facility], [facility], [facility] and [facility]. Patient declined by [facility], [facility], and [facility]. Patient currently being reviewed by [facility].      SW to remain available.      0208--Patient denied by [facility]. [facility] T[facility] now reviewing the patient and contacting family.   This evening I was told that he no longer has insurance.  The family has been made aware of this.  They are willing to take him home and try to get the financial arrangements figured out.

## 2021-12-22 NOTE — SUBJECTIVE & OBJECTIVE
Review of Systems   Constitutional: Negative for activity change, fatigue, fever and unexpected weight change.   HENT: Negative for congestion and sore throat.    Eyes: Negative for visual disturbance.   Respiratory: Negative for cough and shortness of breath.    Cardiovascular: Negative for chest pain.   Gastrointestinal: Negative for abdominal pain, blood in stool, constipation and diarrhea.   Genitourinary: Negative for dysuria and hematuria.   Neurological: Negative for dizziness and light-headedness.     Objective:     Vital Signs (Most Recent):  Temp: 96.5 °F (35.8 °C) (12/22/21 0727)  Pulse: 75 (12/22/21 0727)  Resp: 20 (12/22/21 0727)  BP: (!) 154/80 (12/22/21 0727)  SpO2: 98 % (12/22/21 0730) Vital Signs (24h Range):  Temp:  [96.3 °F (35.7 °C)-98.2 °F (36.8 °C)] 96.5 °F (35.8 °C)  Pulse:  [75-91] 75  Resp:  [18-20] 20  SpO2:  [96 %-99 %] 98 %  BP: (136-161)/(72-93) 154/80     Weight: 100 kg (220 lb 7.4 oz)  Body mass index is 33.52 kg/m².    Intake/Output Summary (Last 24 hours) at 12/22/2021 1014  Last data filed at 12/22/2021 0200  Gross per 24 hour   Intake 222 ml   Output 350 ml   Net -128 ml      Physical Exam  Vitals and nursing note reviewed.   Constitutional:       General: He is not in acute distress.     Appearance: He is well-developed.   HENT:      Head: Normocephalic and atraumatic.      Right Ear: External ear normal.      Left Ear: External ear normal.      Nose:      Comments: Pressure sore on right nare from NGT nearly resolved  Eyes:      General:         Right eye: No discharge.         Left eye: No discharge.      Conjunctiva/sclera: Conjunctivae normal.   Neck:      Thyroid: No thyromegaly.   Cardiovascular:      Rate and Rhythm: Normal rate and regular rhythm.      Heart sounds: Normal heart sounds. No murmur heard.      Pulmonary:      Effort: Pulmonary effort is normal. No respiratory distress.      Breath sounds: Normal breath sounds.   Abdominal:      General: Abdomen is flat.  Bowel sounds are normal. There is no distension.      Palpations: Abdomen is soft.      Tenderness: There is no abdominal tenderness.      Comments:   Dressing c/d/i   Musculoskeletal:      Cervical back: Neck supple.      Right lower leg: No edema.      Left lower leg: No edema.   Skin:     General: Skin is warm and dry.   Neurological:      General: No focal deficit present.      Mental Status: He is alert. Mental status is at baseline.   Psychiatric:         Behavior: Behavior normal.         Thought Content: Thought content normal.         Significant Labs:     Lab Results   Component Value Date    WBC 9.07 2021    HGB 9.9 (L) 2021    HCT 30.0 (L) 2021    MCV 89 2021     2021       BMP  Lab Results   Component Value Date     2021    K 3.5 2021     2021    CO2 21 (L) 2021    BUN 11 2021    CREATININE 1.0 2021    CALCIUM 8.6 (L) 2021    ANIONGAP 8 2021    ESTGFRAFRICA >60 2021    EGFRNONAA >60 2021     Lab Results   Component Value Date    ALT 15 2021    AST 19 2021    ALKPHOS 70 2021    BILITOT 0.6 2021     lactic acid 2.7>1.9  Lipase 27  urine culture multiple organisms   covid screen negative     Significant Imagin/11 KUB NG tube in position.  Prior midline abdominal wall hernia repair.  Otherwise negative abdomen x-ray     12/10 KUB NG tube again noted in place.  No significant change in the bowel gas pattern when compared with the prior study.     US renal Normal size bilateral kidneys each containing a couple of benign anechoic cyst identified the midpole and upper pole regions largest single index cyst about the lateral cortex of the right kidney measuring 2.3 x 2.1 x 2.2 cm in size.     KUB  Enteric tube terminates in the gastric body.  The degree of distention of the small bowel loops is decreased as compared to prior examinations suggesting a  resolving/revolved small-bowel obstruction.  No free air is identified.  Age-appropriate degenerative changes affect the skeleton.     CT abd and pelvis Imaging findings in keeping with a high-grade small bowel obstruction with a point of transition seen in the right mid abdomen, likely related to adhesions.  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  Additionally, there is a small amount of free fluid about the liver and extending into the pelvic cul-de-sac, with slightly high density of the fluid in the pelvic cul-de-sac suggesting hyperemia issues content fluid.     KUB The enteric tube has been appropriately retracted and now terminates in the region of the distal gastric body.Slightly prominent loops of small bowel seen in the upper abdomen.  No definite free peritoneal air is seen.     Lung parenchyma cannot be adequately evaluated secondary to overexposure of the central aspect of the lungs.Consider dedicated radiograph of the chest evaluate for parenchymal injury.     CXR Enteric tube terminates in the gastric body.  There is bibasilar subsegmental atelectasis, with right basilar scarring.  No focal airspace consolidation or pleural effusions.  No pneumothorax.  The heart is enlarged.  Calcified atheromatous disease affects the aorta.  Median sternotomy wires are midline.  Age-appropriate degenerative changes affect the skeleton.

## 2021-12-22 NOTE — PLAN OF CARE
12/22/21 1446   Discharge Reassessment   Assessment Type Discharge Planning Reassessment   Did the patient's condition or plan change since previous assessment? No   Discharge Plan discussed with: Adult children   Communicated STEPHANIE with patient/caregiver Yes   Discharge Plan A Home with family   Discharge Plan B Home   DME Needed Upon Discharge  none   Discharge Barriers Identified None   Why the patient remains in the hospital Placement issues   Post-Acute Status   Post-Acute Authorization Placement;Home Health   Post-Acute Placement Status Discharge Plan Changed   Home Health Status   (Awaiting order)   Coverage [employer]   Patient choice form signed by patient/caregiver List from System Post-Acute Care   Discharge Delays (!) Post-Acute Set-up         CHANTELL received call from [employer] stating since patient has been medically cleared since 12/21/2021, they are not extending his authorization for inpatient admission. Denial for coverage starts today 12/22/2021. CHANTELL updated MD on this. CHANTELL spoke with Channing, patient son, about this. Channing has been caring for patient at home. When patient first arrived here, Channing had inquired about placing patient in nursing home, because he states it was becoming a bit too much to care for him.   CHANTELL spoke with Channing today, and let him know of the above, and that today forward patient may be responsible for this stay. Channing states even though it is hard to care for patient at home, he will take him home until he can get the financials straightened out for patient to go into the nursing home. Channing states he will care for patient at home, and is open to have home health for patient. Channing states he is ok with patient discharging tomorrow with home health.   CHANTELL will notify MD of this.

## 2021-12-22 NOTE — PROGRESS NOTES
[facility] ([address] [place] [address] Medicine  Progress Note    Patient Name: Domonique Hernandez Jr.  MRN: [id number]  Patient Class: IP- Inpatient   Admission Date: 12/7/2021  Length of Stay: 15 days  Attending Physician: Emiliano Cam MD  Primary Care Provider: Emiliano Cam MD        Subjective:     Principal Problem:SBO (small bowel obstruction)        HPI:  80yo male patient with hx of anxiety, arthritis, back pain, bronchitis/COPD, depression, GERD, HLD, hypothyroid, PVD, sleep apnea on CPAP, and CKD. Pt came to ER with c/o regurgitation for 3 days. Decrease intake. No fever. No UTI s/s. CT on admission shows SBO. Significantly dehydrated. Creat 3.6-- baseline 1.4. Given 1L NS in ER U/a dirty one dose of zosyn given. No fever. No elevated WBC. Lactate 2.3. He had NGT place and put to suction. He is in bed this am. Reports feeling better. General surgery consulted.       Overview/Hospital Course:  12/8 Pt was admitted yesterday with SBO. He had general surgery consulted. NGT placed to suction. Still no flatus or BM. KUB pending for this am.     Also on rocephin for UTI-culture in process. Was given 1L NS in ER and repeated on floor then started NS at 150ml/hr. Creat on admit 3.6> now 3.0-- baseline creat 1.4. lactate did impropve on repeat.  Renal US shows cysts.  No hydronephrosis.     12/9 pt here with SBO. NGT to suction had 1000ml overnight. No flatus. No BM. +BS  Renal function improved 3.6>>2.1 on NS at 150ml/hr. Rocephin for UTI- urine culture shows multiple organisms. NO fever. NO elevated WBC    12/10/21    80YO WM admitted with SBO 4 days ago, NGT pulled yesterday, tolerating liquids,   Burping, t  KUB this am- NG tube removed, otherwise no significant change when compared with the prior study of December 9, 2021.     Creat much improved 3.6>1.6 ,  IV Rocephin day 4 for UTI, initial urine Cx- multiple organisms, repaeat U/A dirty, culture in process   7 months ago urine CX+ MRSA- sensitve to  bactrim, tetracycline and Vanc ; will add oral doxy   WBC normal, afebrile   Per PT yesterday; Gait: Contact Gaurd Assistance x ~30 feet (steeping forward and back due to limited line of medical equipment - NG tube) using RW.    12/11/21  80YO WM admitted with SBO  NG was pulled 12/9   But yesterday started with nausea and later with vomiting  and NG has to be placed back again   KUB is pending .  On doxy for UTI recurrent ; last culture staph mrsa sensitive to tetracyclines     12/12  S/p surgery yesterday for SBO .  Reports no flatus . No BM   Issues with bleeding from incision   Lab Results   Component Value Date    WBC 9.47 12/12/2021    HGB 11.3 (L) 12/12/2021    HCT 37.2 (L) 12/12/2021    MCV 96 12/12/2021     12/12/2021 12/13 pt was taken to OR Saturday. He had NGT replaced after that. This am he is feeling better. Dr Hein saw him this am from general surgery and rec pulling NG and starting clears. He is not tender to palp this am. Passed gas this am.   H/H 9/30- lovenox was held due to ozzing after surgery- hx of a fib  Urine culture shows candida     12/14/21  NGT pulled yesterday, pt tolerating liquid diet, Na 147, creat 1.2, + CKD ; getting NS @150ml/hr; decrease to 75ml/hr   H&H 9.1/29 from 9.6/30   Had a BM yesterday, will continue oral liquids until bowel sounds more active   WBC normal, afebrile, O2 sat stable on RA   Urine Cx with candida, doxy stopped after 3 days ; will start diflucan 100mg daily x 3d .    12/15 Pt admitted  with SBO - NGT pulled 2 days ago, tolerating liquids, passing flatus, had BM this am   Surgical incision evaluated per general sx yesterday, recommend advance diet and ^OOB   Contact Guard Assistance x ~30 feet with RW. Slow phase, slight dec foot/floor clearance and guarding stomach  VSS, afebrile, WBC 11.53   Day 2/3 of diflucan for candida in urine   Getting IV labetolol; transitioned to oral home metoprolol   Awaiting on state form 142 to approve nursing  home admission due to psychiatric hx of anxiety/depression     12/16 Tolerating diet, VSS   Worked on out of bed activity, gait trng trng with RW x ~100 feet with standby Assistance; toilet(Bedside Commode) transfers/activity.    12/17 pt admitted with SBO reqired surgery for adhesions causing obstruction  12/11 he is stable from sx standpoint,   Tolerating diet , K+ 2.6 will supplement and check Mg+   Worked on out of bed activity, gait trng trng wioth RW x ~100 feet with standby Assistance; toilet(Bedside Commode) transfers/activity  Still awaiting 142- state approval for Nursing home   Check BMP , CBC in am     12/18 no acute events overnight. No new complaints. Awaiting NH placement.    12/19 no acute events overnight. No new complaints. Awaiting NH placement.     12/20 he has no events overnight or weekend,. Waiting on nursing home placement, placement pending long-term care, refused for skill per insurance. He is ambulating 150ft with PT using supervision only. Urinating and defecating. Tolerating po. VSS/afebrile.     12/21 pt remains stable. No acute issues overnight. Awaiting nursing home placement. Per case management >>142 received on Friday, 12/17/21 in the afternoon. SNF declined by payor last week. Referrals have been sent via [facility]Port to [facility], [facility]. [facility] by [facility], [facility], and [facility]. Patient currently being reviewed by [facility] and [facility]. -[facility] denied by [facility]. Chelle Garciaebonne now reviewing the patient and contacting family.     12/22 pt remains stable, no new complaints, awaiting nursing home placement             Review of Systems   Constitutional: Negative for activity change, fatigue, fever and unexpected weight change.   HENT: Negative for congestion and sore throat.    Eyes: Negative for visual disturbance.    Respiratory: Negative for cough and shortness of breath.    Cardiovascular: Negative for chest pain.   Gastrointestinal: Negative for abdominal pain, blood in stool, constipation and diarrhea.   Genitourinary: Negative for dysuria and hematuria.   Neurological: Negative for dizziness and light-headedness.     Objective:     Vital Signs (Most Recent):  Temp: 96.5 °F (35.8 °C) (12/22/21 0727)  Pulse: 75 (12/22/21 0727)  Resp: 20 (12/22/21 0727)  BP: (!) 154/80 (12/22/21 0727)  SpO2: 98 % (12/22/21 0730) Vital Signs (24h Range):  Temp:  [96.3 °F (35.7 °C)-98.2 °F (36.8 °C)] 96.5 °F (35.8 °C)  Pulse:  [75-91] 75  Resp:  [18-20] 20  SpO2:  [96 %-99 %] 98 %  BP: (136-161)/(72-93) 154/80     Weight: 100 kg (220 lb 7.4 oz)  Body mass index is 33.52 kg/m².    Intake/Output Summary (Last 24 hours) at 12/22/2021 1014  Last data filed at 12/22/2021 0200  Gross per 24 hour   Intake 222 ml   Output 350 ml   Net -128 ml      Physical Exam  Vitals and nursing note reviewed.   Constitutional:       General: He is not in acute distress.     Appearance: He is well-developed.   HENT:      Head: Normocephalic and atraumatic.      Right Ear: External ear normal.      Left Ear: External ear normal.      Nose:      Comments: Pressure sore on right nare from NGT nearly resolved  Eyes:      General:         Right eye: No discharge.         Left eye: No discharge.      Conjunctiva/sclera: Conjunctivae normal.   Neck:      Thyroid: No thyromegaly.   Cardiovascular:      Rate and Rhythm: Normal rate and regular rhythm.      Heart sounds: Normal heart sounds. No murmur heard.      Pulmonary:      Effort: Pulmonary effort is normal. No respiratory distress.      Breath sounds: Normal breath sounds.   Abdominal:      General: Abdomen is flat. Bowel sounds are normal. There is no distension.      Palpations: Abdomen is soft.      Tenderness: There is no abdominal tenderness.      Comments:   Dressing c/d/i   Musculoskeletal:      Cervical back:  Neck supple.      Right lower leg: No edema.      Left lower leg: No edema.   Skin:     General: Skin is warm and dry.   Neurological:      General: No focal deficit present.      Mental Status: He is alert. Mental status is at baseline.   Psychiatric:         Behavior: Behavior normal.         Thought Content: Thought content normal.         Significant Labs:     Lab Results   Component Value Date    WBC 9.07 2021    HGB 9.9 (L) 2021    HCT 30.0 (L) 2021    MCV 89 2021     2021       BMP  Lab Results   Component Value Date     2021    K 3.5 2021     2021    CO2 21 (L) 2021    BUN 11 2021    CREATININE 1.0 2021    CALCIUM 8.6 (L) 2021    ANIONGAP 8 2021    ESTGFRAFRICA >60 2021    EGFRNONAA >60 2021     Lab Results   Component Value Date    ALT 15 2021    AST 19 2021    ALKPHOS 70 2021    BILITOT 0.6 2021     lactic acid 2.7>1.9  Lipase 27  urine culture multiple organisms   covid screen negative     Significant Imagin/11 KUB NG tube in position.  Prior midline abdominal wall hernia repair.  Otherwise negative abdomen x-ray     12/10 KUB NG tube again noted in place.  No significant change in the bowel gas pattern when compared with the prior study.     US renal Normal size bilateral kidneys each containing a couple of benign anechoic cyst identified the midpole and upper pole regions largest single index cyst about the lateral cortex of the right kidney measuring 2.3 x 2.1 x 2.2 cm in size.     KUB  Enteric tube terminates in the gastric body.  The degree of distention of the small bowel loops is decreased as compared to prior examinations suggesting a resolving/revolved small-bowel obstruction.  No free air is identified.  Age-appropriate degenerative changes affect the skeleton.     CT abd and pelvis Imaging findings in keeping with a high-grade small bowel obstruction  with a point of transition seen in the right mid abdomen, likely related to adhesions.  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  Additionally, there is a small amount of free fluid about the liver and extending into the pelvic cul-de-sac, with slightly high density of the fluid in the pelvic cul-de-sac suggesting hyperemia issues content fluid.     KUB The enteric tube has been appropriately retracted and now terminates in the region of the distal gastric body.Slightly prominent loops of small bowel seen in the upper abdomen.  No definite free peritoneal air is seen.     Lung parenchyma cannot be adequately evaluated secondary to overexposure of the central aspect of the lungs.Consider dedicated radiograph of the chest evaluate for parenchymal injury.     CXR Enteric tube terminates in the gastric body.  There is bibasilar subsegmental atelectasis, with right basilar scarring.  No focal airspace consolidation or pleural effusions.  No pneumothorax.  The heart is enlarged.  Calcified atheromatous disease affects the aorta.  Median sternotomy wires are midline.  Age-appropriate degenerative changes affect the skeleton.      Assessment/Plan:      * SBO (small bowel obstruction)  NPO  NGT to wall suction  KUB daily  CXR nothing acute  IVF started NS at 150ml/hr    He meets SIRS criteria but I think better explained by SBO presentation that infection.  UA > 100 WBC but he has no urinary tract symptoms. He has recurrent UTI and will start Rocephin now but my clinical impression is the abnormal vitals and elevated lactic acid are due to SBO, dehydration and not UTI sepsis    12/10 tolerating orals, advance diet .    12/11  Placed NG back again   KUB pending     12/12  S/p surgery ; POD 1   NG to suction   Bleeding stopped    12/13   NG pulled from general surgery will try clears this am. + flatus.    12/14 tolerating  orals . No nausea ; no vomiting     12/17 s/p surgery ; 12/11 Patient had an adhesion of the  of the colon down to the root of the small bowel mesentery causing a complete small bowel obstruction.  After taking down the adhesions the obstruction was relieved.  The the bowel was viable with no concern for ischemia.  should follow up with Dr. Hein 1 week after discharge for his staple removal.> maybe here later this week will reconsult as tomorrow will be 7 days from [facility] Luis last not asking for RTC 7-10 days for post op f./u       This has resolved-- d/c tele and iv meds. Just here waiting on placement at d/c.  Son agrees to take him home this evening.  Will discharge him in the morning    Hypomagnesemia  Replete and monitor    12/18 resolved      Evaluation by psychiatric service required  Reviewed  consult .  12/16 awaiting state approval for nursing home   12/19 awaiting state approval for nursing home  12/20 Patient pending nursing home placement. 142 received on Friday, 12/17/21 in the afternoon. SNF declined by payor last week. Referrals have been sent via [facility]Port to [facility], [facility], [facility], [facility] and [facility]. Patient declined by [facility], [facility], and [facility]. Patient currently being reviewed by [facility] and [facility]oussaint.      SW to remain available.      1853--Patient denied by [facility]. Chelle Toussaint now reviewing the patient and contacting family.   This evening I was told that he no longer has insurance.  The family has been made aware of this.  They are willing to take him home and try to get the financial arrangements figured out.    Paroxysmal atrial fibrillation  Noted in Dr Fair notes:    On eliquis and BB at home. NPO here. Will give lovenox- renally dose and labetalol IV for now   12/10 tolerating liquids, advance diet and resume oral BB, stop IV labetolol,   Resume  eliquis;      12/11  DC eliquis   Resume lovenox .    12/12  HOLD LOVENOX   HB dropped 2 points   Follow CBC   Resume lovenox tomorrow     12/14 resume lovenox and watch for bleeding ;   12/15 transition back  to ELiquis 2.5mg bid  , getting labetolol IV q 6 ;takes metoprolol XL 50mg po daily- resume  12/16 stable with oral metoprolol   12/17 Stable with oral metoprolol, transitioned lovenox back to Eliquis 2.5mg home dose     Acute renal failure  Baseline creat 1.3-1.4- on admission 3.6. 1L NS given in ER. Repeat 1L Ns bolus 12/7 and then cont NS at 150ml/hr. Repeat lactate better 1.9  Creat now 3.0  Son reports that he has no issues urinating at home  Will check renal U/s and post void residual  Has had urinary re tension in past. Consider salcedo if retaining due ti UTI    Repeat u.a today cont rocephin     12/10 creat improved - 3.6>1.6; renal US stable     12/11  BMP  Lab Results   Component Value Date     12/19/2021    K 3.5 12/19/2021     12/19/2021    CO2 21 (L) 12/19/2021    BUN 11 12/19/2021    CREATININE 1.0 12/19/2021    CALCIUM 8.6 (L) 12/19/2021    ANIONGAP 8 12/19/2021    ESTGFRAFRICA >60 12/19/2021    EGFRNONAA >60 12/19/2021 12/13 Back to baseline   BMP  Lab Results   Component Value Date     12/19/2021    K 3.5 12/19/2021     12/19/2021    CO2 21 (L) 12/19/2021    BUN 11 12/19/2021    CREATININE 1.0 12/19/2021    CALCIUM 8.6 (L) 12/19/2021    ANIONGAP 8 12/19/2021    ESTGFRAFRICA >60 12/19/2021    EGFRNONAA >60 12/19/2021     Resolved     Asymptomatic bacteriuria  He has no UTI sx on interview day of admit  Culture urine  Start rocephin empirically as h/o of UTI and not a great historian but as noted above I think vitals/lactic acid precipitated by SBO and not UTI sepsis    Cont rocephin and repeat u.a   Pt is s/p lithotripsy, stent removal, and cystoureteroscopy with retrograde pyelogram w/ stent insertion on 5/31/21, tx with Bactrim for MRSA UTI  Will add oral doxy  To  cover for staph MRSA UTI .  12/14   d/c abx, grew candida> low colony count and asymptomatic. No further treatment needed.  Given 3 days diflucan Rx     SIRS (systemic inflammatory response syndrome)  He meets SIRS criteria but I think better explained by SBO presentation than infection.  UA > 100 WBC but he has no urinary tract symptoms. He has recurrent UTI and will start Rocephin now but my clinical impression is the abnormal vitals and elevated lactic acid are due to SBO, dehydration and not UTI sepsis.  RESOLVED .      Hypokalemia  Supplement and monitor     12/18: start KCl 20meq BID and monitor    12/19 K 3.5; cont KCl 10meq BID      Hypothyroidism  Hold synthroid while NPO  If remains NPO > 3 days can start IV  Resume oral thyroid meds .    12/11  Hold po meds.    12/14  Continue levothyroxine     GERD (gastroesophageal reflux disease)  protonix IV  tolerating oral - change to po .    12/11  Back to IV   12/14  Cont PPI.    Hyperlipidemia  Holding zocor while NPO  12/10 resume - zocor non formulary- atorvastatin while here - NPO for now will resume once taking po    12/13: simvastatin not formulary. Can resume as outpatient. Hopefully d/c soon.        VTE Risk Mitigation (From admission, onward)         Ordered     apixaban tablet 2.5 mg  2 times daily         12/17/21 0724     IP VTE HIGH RISK PATIENT  Once         12/07/21 0620     Place sequential compression device  Until discontinued         12/07/21 0620                Discharge Planning   STEPHANIE:      Code Status: Full Code   Is the patient medically ready for discharge?:     Reason for patient still in hospital (select all that apply): Patient trending condition and Pending disposition  Discharge Plan A: Home with family   Discharge Delays: (!) Post-Acute Set-up              Emiliano Cam MD  Department of Hospital Medicine   [place] [facility] (3rd Fl)

## 2021-12-22 NOTE — ASSESSMENT & PLAN NOTE
NPO  NGT to wall suction  KUB daily  CXR nothing acute  IVF started NS at 150ml/hr    He meets SIRS criteria but I think better explained by SBO presentation that infection.  UA > 100 WBC but he has no urinary tract symptoms. He has recurrent UTI and will start Rocephin now but my clinical impression is the abnormal vitals and elevated lactic acid are due to SBO, dehydration and not UTI sepsis    12/10 tolerating orals, advance diet .    12/11  Placed NG back again   KUB pending     12/12  S/p surgery ; POD 1   NG to suction   Bleeding stopped    12/13   NG pulled from general surgery will try clears this am. + flatus.    12/14 tolerating orals . No nausea ; no vomiting     12/17 s/p surgery ; 12/11 Patient had an adhesion of the  of the colon down to the root of the small bowel mesentery causing a complete small bowel obstruction.  After taking down the adhesions the obstruction was relieved.  The the bowel was viable with no concern for ischemia.  should follow up with Dr. Hein 1 week after discharge for his staple removal.> maybe here later this week will reconsult as tomorrow will be 7 days from [facility] to not asking for RTC 7-10 days for post op f./u       This has resolved-- d/c tele and iv meds. Just here waiting on placement at d/c.  Son agrees to take him home this evening.  Will discharge him in the morning

## 2021-12-23 VITALS
TEMPERATURE: 98 F | HEART RATE: 77 BPM | DIASTOLIC BLOOD PRESSURE: 77 MMHG | RESPIRATION RATE: 18 BRPM | WEIGHT: 220.44 LBS | HEIGHT: 68 IN | OXYGEN SATURATION: 99 % | BODY MASS INDEX: 33.41 KG/M2 | SYSTOLIC BLOOD PRESSURE: 167 MMHG

## 2021-12-23 PROCEDURE — 94760 N-INVAS EAR/PLS OXIMETRY 1: CPT

## 2021-12-23 PROCEDURE — 99238 HOSP IP/OBS DSCHRG MGMT 30/<: CPT | Mod: ,,, | Performed by: STUDENT IN AN ORGANIZED HEALTH CARE EDUCATION/TRAINING PROGRAM

## 2021-12-23 PROCEDURE — 25000003 PHARM REV CODE 250: Performed by: NURSE PRACTITIONER

## 2021-12-23 PROCEDURE — 25000003 PHARM REV CODE 250: Performed by: STUDENT IN AN ORGANIZED HEALTH CARE EDUCATION/TRAINING PROGRAM

## 2021-12-23 PROCEDURE — 0012A HC IMMUNIZ ADMIN, SARS-COV-2 COVID-19 VACC, 100MCG/0.5ML, 2ND DOSE: CPT | Performed by: STUDENT IN AN ORGANIZED HEALTH CARE EDUCATION/TRAINING PROGRAM

## 2021-12-23 PROCEDURE — 91301 PHARM REV CODE 636 W HCPCS: CPT | Performed by: STUDENT IN AN ORGANIZED HEALTH CARE EDUCATION/TRAINING PROGRAM

## 2021-12-23 PROCEDURE — 25000003 PHARM REV CODE 250: Performed by: INTERNAL MEDICINE

## 2021-12-23 PROCEDURE — 63600175 PHARM REV CODE 636 W HCPCS: Performed by: STUDENT IN AN ORGANIZED HEALTH CARE EDUCATION/TRAINING PROGRAM

## 2021-12-23 PROCEDURE — 99238 PR HOSPITAL DISCHARGE DAY,<30 MIN: ICD-10-PCS | Mod: ,,, | Performed by: STUDENT IN AN ORGANIZED HEALTH CARE EDUCATION/TRAINING PROGRAM

## 2021-12-23 RX ORDER — POTASSIUM CHLORIDE 750 MG/1
10 TABLET, EXTENDED RELEASE ORAL 2 TIMES DAILY
Qty: 60 TABLET | Refills: 1 | Status: SHIPPED | OUTPATIENT
Start: 2021-12-23 | End: 2022-01-08

## 2021-12-23 RX ORDER — PANTOPRAZOLE SODIUM 40 MG/1
40 TABLET, DELAYED RELEASE ORAL DAILY
Qty: 30 TABLET | Refills: 11 | Status: SHIPPED | OUTPATIENT
Start: 2021-12-24 | End: 2022-02-23 | Stop reason: SDUPTHER

## 2021-12-23 RX ADMIN — CX-024414 0.5 ML: 0.2 INJECTION, SUSPENSION INTRAMUSCULAR at 03:12

## 2021-12-23 RX ADMIN — APIXABAN 2.5 MG: 2.5 TABLET, FILM COATED ORAL at 08:12

## 2021-12-23 RX ADMIN — POTASSIUM CHLORIDE 10 MEQ: 750 TABLET, FILM COATED, EXTENDED RELEASE ORAL at 08:12

## 2021-12-23 RX ADMIN — METOPROLOL SUCCINATE 50 MG: 50 TABLET, EXTENDED RELEASE ORAL at 08:12

## 2021-12-23 RX ADMIN — PANTOPRAZOLE SODIUM 40 MG: 40 TABLET, DELAYED RELEASE ORAL at 08:12

## 2021-12-23 RX ADMIN — LEVOTHYROXINE SODIUM 100 MCG: 100 TABLET ORAL at 06:12

## 2021-12-23 NOTE — NURSING
Discharge education completed with pt and son at bedside. All questions answered.     Patient to  meds from [facility].

## 2021-12-23 NOTE — PLAN OF CARE
[place] - Med Surg (3rd Fl)  Discharge Final Note    Primary Care Provider: Emiliano Cam MD    Expected Discharge Date: 12/23/2021    Final Discharge Note (most recent)     Final Note - 12/23/21 1215        Final Note    Assessment Type Final Discharge Note     Anticipated Discharge Disposition [place] [facility]     What phone number can be called within the next 1-3 days to see how you are doing after discharge? [phone]     Hospital Resources/Appts/Education Provided Appointments scheduled and added to AVS        Post-Acute Status    Post-Acute Authorization Home Health;Placement     Post-Acute Placement Status Discharge Plan Changed     Home Health Status Pending Payor Review     Discharge Delays None known at this time                 Important Message from Medicare  Important Message from Medicare regarding Discharge Appeal Rights: Given to patient/caregiver,Explained to patient/caregiver,Signed/date by patient/caregiver     Date IMM was signed: 12/22/21  Time IMM was signed: 1441    Contact Info     Lucia Leach MD   Specialty: Family Medicine    [address] [facility] [address]   Phone: [phone]       Next Steps: Go on 1/7/2022    Instructions: Hospital Follow-up at 1pm     Matias Mccord MD   Specialty: General Surgery    [address]  [address]  [place] [address]   Phone: [phone]       Next Steps: Schedule an appointment as soon as possible for a visit in 1 week(s)          Patient discharging home with home health. See Case Management note from 12/22/21 for details. Home Health request sent to payor. Awaiting response.

## 2021-12-23 NOTE — PLAN OF CARE
Mr Hernandez will discharge home today. He will be under the care of Home Health services to be determined by [facility].

## 2021-12-23 NOTE — ASSESSMENT & PLAN NOTE
Reviewed  consult .  12/16 awaiting state approval for nursing home   12/19 awaiting state approval for nursing home  12/20 Patient pending nursing home placement. 142 received on Friday, 12/17/21 in the afternoon. SNF declined by payor last week. Referrals have been sent via CarePort to [facility], [facility], [facility] and [facility]. Patient declined by [facility], [facility], and [facility]. Patient currently being reviewed by [facility] and [facility]ouss[facility].      SW to remain available.      6828--Patient denied by [facility]. Chelle Toussaint now reviewing the patient and contacting family.   This evening I was told that he no longer has insurance.  The family has been made aware of this.  They are willing to take him home and try to get the financial arrangements figured out.

## 2021-12-23 NOTE — DISCHARGE SUMMARY
[facility] Surg ([facility])  [facility] Medicine  Discharge Summary      Patient Name: Domonique Hernandez Jr.  MRN: [id number]  Patient Class: IP- Inpatient  Admission Date: 12/7/2021  Hospital Length of Stay: 16 days  Discharge Date and Time:  12/23/2021 10:33 AM  Attending Physician: Emiliano Cam MD   Discharging Provider: Refugio Leonard MD  Primary Care Provider: Emiliano Cam MD      HPI:   80yo male patient with hx of anxiety, arthritis, back pain, bronchitis/COPD, depression, GERD, HLD, hypothyroid, PVD, sleep apnea on CPAP, and CKD. Pt came to ER with c/o regurgitation for 3 days. Decrease intake. No fever. No UTI s/s. CT on admission shows SBO. Significantly dehydrated. Creat 3.6-- baseline 1.4. Given 1L NS in ER U/a dirty one dose of zosyn given. No fever. No elevated WBC. Lactate 2.3. He had NGT place and put to suction. He is in bed this am. Reports feeling better. General surgery consulted.       Procedure(s) (LRB):  LAPAROTOMY, EXPLORATORY (N/A)      Hospital Course:   12/8 Pt was admitted yesterday with SBO. He had general surgery consulted. NGT placed to suction. Still no flatus or BM. KUB pending for this am.     Also on rocephin for UTI-culture in process. Was given 1L NS in ER and repeated on floor then started NS at 150ml/hr. Creat on admit 3.6> now 3.0-- baseline creat 1.4. lactate did impropve on repeat.  Renal US shows cysts.  No hydronephrosis.     12/9 pt here with SBO. NGT to suction had 1000ml overnight. No flatus. No BM. +BS  Renal function improved 3.6>>2.1 on NS at 150ml/hr. Rocephin for UTI- urine culture shows multiple organisms. NO fever. NO elevated WBC    12/10/21    82YO WM admitted with SBO 4 days ago, NGT pulled yesterday, tolerating liquids,   Burping, t  KUB this am- NG tube removed, otherwise no significant change when compared with the prior study of December 9, 2021.     Creat much improved 3.6>1.6 ,  IV Rocephin day 4 for UTI, initial urine Cx- multiple organisms,  repaeat U/A dirty, culture in process   7 months ago urine CX+ MRSA- sensitve to bactrim, tetracycline and Vanc ; will add oral doxy   WBC normal, afebrile   Per PT yesterday; Gait: Contact Gaurd Assistance x ~30 feet (steeping forward and back due to limited line of medical equipment - NG tube) using RW.    12/11/21  80YO WM admitted with SBO  NG was pulled 12/9   But yesterday started with nausea and later with vomiting  and NG has to be placed back again   KUB is pending .  On doxy for UTI recurrent ; last culture staph mrsa sensitive to tetracyclines     12/12  S/p surgery yesterday for SBO .  Reports no flatus . No BM   Issues with bleeding from incision   Lab Results   Component Value Date    WBC 9.47 12/12/2021    HGB 11.3 (L) 12/12/2021    HCT 37.2 (L) 12/12/2021    MCV 96 12/12/2021     12/12/2021 12/13 pt was taken to OR Saturday. He had NGT replaced after that. This am he is feeling better. Dr Hein saw him this am from general surgery and rec pulling NG and starting clears. He is not tender to palp this am. Passed gas this am.   H/H 9/30- lovenox was held due to ozzing after surgery- hx of a fib  Urine culture shows candida     12/14/21  NGT pulled yesterday, pt tolerating liquid diet, Na 147, creat 1.2, + CKD ; getting NS @150ml/hr; decrease to 75ml/hr   H&H 9.1/29 from 9.6/30   Had a BM yesterday, will continue oral liquids until bowel sounds more active   WBC normal, afebrile, O2 sat stable on RA   Urine Cx with candida, doxy stopped after 3 days ; will start diflucan 100mg daily x 3d .    12/15 Pt admitted  with SBO - NGT pulled 2 days ago, tolerating liquids, passing flatus, had BM this am   Surgical incision evaluated per general sx yesterday, recommend advance diet and ^OOB   Contact Guard Assistance x ~30 feet with RW. Slow phase, slight dec foot/floor clearance and guarding stomach  VSS, afebrile, WBC 11.53   Day 2/3 of diflucan for candida in urine   Getting IV labetolol;  transitioned to oral home metoprolol   Awaiting on state form 142 to approve nursing home admission due to psychiatric hx of anxiety/depression     12/16 Tolerating diet, VSS   Worked on out of bed activity, gait trng trng with RW x ~100 feet with standby Assistance; toilet(Bedside Commode) transfers/activity.    12/17 pt admitted with SBO reqired surgery for adhesions causing obstruction  12/11 he is stable from sx standpoint,   Tolerating diet , K+ 2.6 will supplement and check Mg+   Worked on out of bed activity, gait trng trng wioth RW x ~100 feet with standby Assistance; toilet(Bedside Commode) transfers/activity  Still awaiting 142- state approval for Nursing home   Check BMP , CBC in am     12/18 no acute events overnight. No new complaints. Awaiting NH placement.    12/19 no acute events overnight. No new complaints. Awaiting NH placement.     12/20 he has no events overnight or weekend,. Waiting on nursing home placement, placement pending retirement care, refused for skill per insurance. He is ambulating 150ft with PT using supervision only. Urinating and defecating. Tolerating po. VSS/afebrile.     12/21 pt remains stable. No acute issues overnight. Awaiting nursing home placement. Per case management >>142 received on Friday, 12/17/21 in the afternoon. SNF declined by payor last week. Referrals have been sent via [facility] to [facility], [facility], [facility], [facility] and [facility]. Patient declined by [facility], [facility], and [facility]. Patient currently being reviewed by [facility] and [facility]. -Patient denied by [facility]. [facility] now reviewing the patient and contacting family.     12/22 pt remains stable, no new complaints, awaiting nursing home placement     12/23 Insurance is no longer paying for inpatient hosp stay, so son is now requesting that he go home with him. No new complaints.        Goals of Care Treatment Preferences:  Code Status: Full Code    Health care agent: Sejal Mendenhall ([facility]/PO[facility])   Health care agent number: [phone]                   Consults:   Consults (From admission, onward)        Status Ordering Provider     Inpatient consult to Psychiatry  Once        Provider:  Gm Ames III, MD    Completed CHUCK DARBY     Inpatient consult to General Surgery  Once        Provider:  Horacio Bettencourt Jr., MD    Completed KAITLIN GALVEZ          No new Assessment & Plan notes have been filed under this hospital service since the last note was generated.  Service: Hospital Medicine    Final Active Diagnoses:    Diagnosis Date Noted POA    PRINCIPAL PROBLEM:  SBO (small bowel obstruction) [K56.609]  Yes    Hypomagnesemia [E83.42] 12/17/2021 No    Evaluation by psychiatric service required [Z00.8]  Not Applicable    SIRS (systemic inflammatory response syndrome) [R65.10] 12/07/2021 Yes    Asymptomatic bacteriuria [R82.71] 12/07/2021 Yes    Acute renal failure [N17.9] 12/07/2021 Yes    Paroxysmal atrial fibrillation [I48.0] 12/07/2021 Yes    Hypokalemia [E87.6] 03/23/2021 Yes    Hypothyroidism [E03.9] 10/30/2012 Yes    Hyperlipidemia [E78.5]  Yes    GERD (gastroesophageal reflux disease) [K21.9]  Yes      Problems Resolved During this Admission:       Discharged Condition: fair    Disposition:     Follow Up:    Patient Instructions:   No discharge procedures on file.    Significant Diagnostic Studies: Labs: BMP: No results for input(s): GLU, NA, K, CL, CO2, BUN, CREATININE, CALCIUM, MG in the last 48 hours., CMP No results for input(s): NA, K, CL, CO2, GLU, BUN, CREATININE, CALCIUM, PROT, ALBUMIN, BILITOT, ALKPHOS, AST, ALT, ANIONGAP, ESTGFRAFRICA, EGFRNONAA in the last 48 hours. and CBC No results for input(s): WBC, HGB, HCT, PLT in the last 48 hours.  Microbiology:   Blood Culture   Lab Results   Component Value Date    LABBLOO No growth after 5 days.  06/04/2021    and Urine Culture    Lab Results   Component Value Date    LABURIN (A) 12/09/2021     CANDIDA ALBICANS  10,000 - 49,999 cfu/ml  Treatment of asymptomatic candiduria is not recommended (except for   specific populations). Candida isolated in the urine typically   represents colonization. If an indwelling urinary catheter is present  it should be removed or replaced.       Radiology: X-Ray: CXR: X-Ray Chest 1 View (CXR): No results found for this visit on 12/07/21.    Pending Diagnostic Studies:     None         Medications:  Reconciled Home Medications:      Medication List      START taking these medications    pantoprazole 40 MG tablet  Commonly known as: PROTONIX  Take 1 tablet (40 mg total) by mouth once daily.  Start taking on: December 24, 2021     potassium chloride 10 MEQ Tbsr  Commonly known as: KLOR-CON  Take 1 tablet (10 mEq total) by mouth 2 (two) times daily.        CHANGE how you take these medications    apixaban 2.5 mg Tab  Commonly known as: ELIQUIS  Take 1 tablet (2.5 mg total) by mouth 2 (two) times daily.  What changed:   · medication strength  · how much to take  · how to take this  · when to take this        CONTINUE taking these medications    albuterol sulfate 2.5 mg/0.5 mL Nebu  Take 2.5 mg  (one vial)  by nebulization every 6 (six) hours as needed (wheezing). Rescue     allopurinoL 300 MG tablet  Commonly known as: ZYLOPRIM  Take 300 mg by mouth once daily.     aspirin 81 MG Chew  Take 81 mg by mouth once daily.     budesonide 1 mg/2 mL Nbsp  Inhale 1 mg into the lungs once daily. Controller     levothyroxine 100 MCG tablet  Commonly known as: SYNTHROID  TAKE 1 TABLET BY MOUTH ONCE DAILY     memantine 10 MG Tab  Commonly known as: NAMENDA  Take 1 tablet (10 mg total) by mouth 2 (two) times daily.     metoprolol succinate 50 MG 24 hr tablet  Commonly known as: TOPROL-XL  Take 1 tablet orally 2 times a day.     multivitamin per tablet  Commonly known as: THERAGRAN  Take 1 tablet  by mouth once daily. Centrum Silver for Men     nitroGLYCERIN 0.4 MG SL tablet  Commonly known as: NITROSTAT  Take 1 tablet (sublingual) under tongue as needed for chest pain, no more than 3 tablets in 15 mins, 5 mins apart.     omega 3-dha-epa-fish oil 1,000 mg (120 mg-180 mg) Cap  Take 2 capsules orally once a day.     simvastatin 40 MG tablet  Commonly known as: ZOCOR  Take 1 tablet (40 mg total) by mouth every evening.     STIOLTO RESPIMAT 2.5-2.5 mcg/actuation Mist  Generic drug: tiotropium-olodateroL  Inhale 2 puffs into the lungs once daily.            Indwelling Lines/Drains at time of discharge:   Lines/Drains/Airways     None                 Time spent on the discharge of patient: 30 minutes         Refugio Leonard MD  Department of Hospital Medicine  [place] [facility] Surg (3rd Fl)

## 2021-12-23 NOTE — PROGRESS NOTES
[facility] ([address] [place] [address] Medicine  Progress Note    Patient Name: Domonique Hernandez Jr.  MRN: [id number]  Patient Class: IP- Inpatient   Admission Date: 12/7/2021  Length of Stay: 16 days  Attending Physician: Emiliano Cam MD  Primary Care Provider: Emiliano Cam MD        Subjective:     Principal Problem:SBO (small bowel obstruction)        HPI:  82yo male patient with hx of anxiety, arthritis, back pain, bronchitis/COPD, depression, GERD, HLD, hypothyroid, PVD, sleep apnea on CPAP, and CKD. Pt came to ER with c/o regurgitation for 3 days. Decrease intake. No fever. No UTI s/s. CT on admission shows SBO. Significantly dehydrated. Creat 3.6-- baseline 1.4. Given 1L NS in ER U/a dirty one dose of zosyn given. No fever. No elevated WBC. Lactate 2.3. He had NGT place and put to suction. He is in bed this am. Reports feeling better. General surgery consulted.       Overview/Hospital Course:  12/8 Pt was admitted yesterday with SBO. He had general surgery consulted. NGT placed to suction. Still no flatus or BM. KUB pending for this am.     Also on rocephin for UTI-culture in process. Was given 1L NS in ER and repeated on floor then started NS at 150ml/hr. Creat on admit 3.6> now 3.0-- baseline creat 1.4. lactate did impropve on repeat.  Renal US shows cysts.  No hydronephrosis.     12/9 pt here with SBO. NGT to suction had 1000ml overnight. No flatus. No BM. +BS  Renal function improved 3.6>>2.1 on NS at 150ml/hr. Rocephin for UTI- urine culture shows multiple organisms. NO fever. NO elevated WBC    12/10/21    80YO WM admitted with SBO 4 days ago, NGT pulled yesterday, tolerating liquids,   Burping, t  KUB this am- NG tube removed, otherwise no significant change when compared with the prior study of December 9, 2021.     Creat much improved 3.6>1.6 ,  IV Rocephin day 4 for UTI, initial urine Cx- multiple organisms, repaeat U/A dirty, culture in process   7 months ago urine CX+ MRSA- sensitve to  bactrim, tetracycline and Vanc ; will add oral doxy   WBC normal, afebrile   Per PT yesterday; Gait: Contact Gaurd Assistance x ~30 feet (steeping forward and back due to limited line of medical equipment - NG tube) using RW.    12/11/21  80YO WM admitted with SBO  NG was pulled 12/9   But yesterday started with nausea and later with vomiting  and NG has to be placed back again   KUB is pending .  On doxy for UTI recurrent ; last culture staph mrsa sensitive to tetracyclines     12/12  S/p surgery yesterday for SBO .  Reports no flatus . No BM   Issues with bleeding from incision   Lab Results   Component Value Date    WBC 9.47 12/12/2021    HGB 11.3 (L) 12/12/2021    HCT 37.2 (L) 12/12/2021    MCV 96 12/12/2021     12/12/2021 12/13 pt was taken to OR Saturday. He had NGT replaced after that. This am he is feeling better. Dr Hein saw him this am from general surgery and rec pulling NG and starting clears. He is not tender to palp this am. Passed gas this am.   H/H 9/30- lovenox was held due to ozzing after surgery- hx of a fib  Urine culture shows candida     12/14/21  NGT pulled yesterday, pt tolerating liquid diet, Na 147, creat 1.2, + CKD ; getting NS @150ml/hr; decrease to 75ml/hr   H&H 9.1/29 from 9.6/30   Had a BM yesterday, will continue oral liquids until bowel sounds more active   WBC normal, afebrile, O2 sat stable on RA   Urine Cx with candida, doxy stopped after 3 days ; will start diflucan 100mg daily x 3d .    12/15 Pt admitted  with SBO - NGT pulled 2 days ago, tolerating liquids, passing flatus, had BM this am   Surgical incision evaluated per general sx yesterday, recommend advance diet and ^OOB   Contact Guard Assistance x ~30 feet with RW. Slow phase, slight dec foot/floor clearance and guarding stomach  VSS, afebrile, WBC 11.53   Day 2/3 of diflucan for candida in urine   Getting IV labetolol; transitioned to oral home metoprolol   Awaiting on state form 142 to approve nursing  home admission due to psychiatric hx of anxiety/depression     12/16 Tolerating diet, VSS   Worked on out of bed activity, gait trng trng with RW x ~100 feet with standby Assistance; toilet(Bedside Commode) transfers/activity.    12/17 pt admitted with SBO reqired surgery for adhesions causing obstruction  12/11 he is stable from sx standpoint,   Tolerating diet , K+ 2.6 will supplement and check Mg+   Worked on out of bed activity, gait trng trng wioth RW x ~100 feet with standby Assistance; toilet(Bedside Commode) transfers/activity  Still awaiting 142- state approval for Nursing home   Check BMP , CBC in am     12/18 no acute events overnight. No new complaints. Awaiting NH placement.    12/19 no acute events overnight. No new complaints. Awaiting NH placement.     12/20 he has no events overnight or weekend,. Waiting on nursing home placement, placement pending senior care care, refused for skill per insurance. He is ambulating 150ft with PT using supervision only. Urinating and defecating. Tolerating po. VSS/afebrile.     12/21 pt remains stable. No acute issues overnight. Awaiting nursing home placement. Per case management >>142 received on Friday, 12/17/21 in the afternoon. SNF declined by payor last week. Referrals have been sent via [facility] to [facility], [facility], [facility] and [facility]. Patient declined by [facility], [facility], and [facility]. Patient currently being reviewed by [facility] and [facility]. -Patient denied by [facility]. [facility] now reviewing the patient and contacting family.     12/22 pt remains stable, no new complaints, awaiting nursing home placement     12/23 Insurance is no longer paying for inpatient hosp stay, so son is now requesting that he go home with him. No new complaints.          Review of Systems   Constitutional: Negative for activity change, fatigue,  fever and unexpected weight change.   HENT: Negative for congestion and sore throat.    Eyes: Negative for visual disturbance.   Respiratory: Negative for cough and shortness of breath.    Cardiovascular: Negative for chest pain.   Gastrointestinal: Negative for abdominal pain, blood in stool, constipation and diarrhea.   Genitourinary: Negative for dysuria and hematuria.   Neurological: Negative for dizziness and light-headedness.     Objective:     Vital Signs (Most Recent):  Temp: 98 °F (36.7 °C) (12/23/21 0735)  Pulse: 89 (12/23/21 0735)  Resp: 18 (12/23/21 0735)  BP: 138/77 (12/23/21 0735)  SpO2: 97 % (12/23/21 0740) Vital Signs (24h Range):  Temp:  [97.1 °F (36.2 °C)-98 °F (36.7 °C)] 98 °F (36.7 °C)  Pulse:  [78-89] 89  Resp:  [18-20] 18  SpO2:  [97 %] 97 %  BP: (124-184)/(62-80) 138/77     Weight: 100 kg (220 lb 7.4 oz)  Body mass index is 33.52 kg/m².    Intake/Output Summary (Last 24 hours) at 12/23/2021 1026  Last data filed at 12/23/2021 0600  Gross per 24 hour   Intake 180 ml   Output --   Net 180 ml      Physical Exam  Vitals and nursing note reviewed.   Constitutional:       General: He is not in acute distress.     Appearance: He is well-developed.   HENT:      Head: Normocephalic and atraumatic.      Right Ear: External ear normal.      Left Ear: External ear normal.      Nose:      Comments: Pressure sore on right nare from NGT  Eyes:      General:         Right eye: No discharge.         Left eye: No discharge.      Conjunctiva/sclera: Conjunctivae normal.   Neck:      Thyroid: No thyromegaly.   Cardiovascular:      Rate and Rhythm: Normal rate and regular rhythm.      Heart sounds: Normal heart sounds. No murmur heard.      Pulmonary:      Effort: Pulmonary effort is normal. No respiratory distress.      Breath sounds: Normal breath sounds.   Abdominal:      General: Abdomen is flat. Bowel sounds are normal. There is no distension.      Palpations: Abdomen is soft.      Tenderness: There is no  abdominal tenderness.      Comments:   Dressing c/d/i   Musculoskeletal:      Cervical back: Neck supple.      Right lower leg: No edema.      Left lower leg: No edema.   Skin:     General: Skin is warm and dry.      Comments: Erosion to right nare   Neurological:      General: No focal deficit present.      Mental Status: He is alert. Mental status is at baseline.   Psychiatric:         Behavior: Behavior normal.         Thought Content: Thought content normal.         Significant Labs: All pertinent labs within the past 24 hours have been reviewed.  Blood Culture: No results for input(s): LABBLOO in the last 48 hours.  BMP: No results for input(s): GLU, NA, K, CL, CO2, BUN, CREATININE, CALCIUM, MG in the last 48 hours.  CBC: No results for input(s): WBC, HGB, HCT, PLT in the last 48 hours.  CMP: No results for input(s): NA, K, CL, CO2, GLU, BUN, CREATININE, CALCIUM, PROT, ALBUMIN, BILITOT, ALKPHOS, AST, ALT, ANIONGAP, EGFRNONAA in the last 48 hours.    Invalid input(s): ESTGFAFRICA  POCT Glucose: No results for input(s): POCTGLUCOSE in the last 48 hours.  Urine Studies: No results for input(s): COLORU, APPEARANCEUA, PHUR, SPECGRAV, PROTEINUA, GLUCUA, KETONESU, BILIRUBINUA, OCCULTUA, NITRITE, UROBILINOGEN, LEUKOCYTESUR, RBCUA, WBCUA, BACTERIA, SQUAMEPITHEL, HYALINECASTS in the last 48 hours.    Invalid input(s): WRIGHTSUR    Significant Imaging: I have reviewed all pertinent imaging results/findings within the past 24 hours.     12/11 KUB NG tube in position.  Prior midline abdominal wall hernia repair.  Otherwise negative abdomen x-ray     12/10 KUB NG tube again noted in place.  No significant change in the bowel gas pattern when compared with the prior study.     US renal Normal size bilateral kidneys each containing a couple of benign anechoic cyst identified the midpole and upper pole regions largest single index cyst about the lateral cortex of the right kidney measuring 2.3 x 2.1 x 2.2 cm in size.     KUB  12/9 Enteric tube terminates in the gastric body.  The degree of distention of the small bowel loops is decreased as compared to prior examinations suggesting a resolving/revolved small-bowel obstruction.  No free air is identified.  Age-appropriate degenerative changes affect the skeleton.     CT abd and pelvis Imaging findings in keeping with a high-grade small bowel obstruction with a point of transition seen in the right mid abdomen, likely related to adhesions.  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  Additionally, there is a small amount of free fluid about the liver and extending into the pelvic cul-de-sac, with slightly high density of the fluid in the pelvic cul-de-sac suggesting hyperemia issues content fluid.     KUB The enteric tube has been appropriately retracted and now terminates in the region of the distal gastric body.Slightly prominent loops of small bowel seen in the upper abdomen.  No definite free peritoneal air is seen.     Lung parenchyma cannot be adequately evaluated secondary to overexposure of the central aspect of the lungs.Consider dedicated radiograph of the chest evaluate for parenchymal injury.     CXR Enteric tube terminates in the gastric body.  There is bibasilar subsegmental atelectasis, with right basilar scarring.  No focal airspace consolidation or pleural effusions.  No pneumothorax.  The heart is enlarged.  Calcified atheromatous disease affects the aorta.  Median sternotomy wires are midline.  Age-appropriate degenerative changes affect the skeleton.         Assessment/Plan:      * SBO (small bowel obstruction)  NPO  NGT to wall suction  KUB daily  CXR nothing acute  IVF started NS at 150ml/hr    He meets SIRS criteria but I think better explained by SBO presentation that infection.  UA > 100 WBC but he has no urinary tract symptoms. He has recurrent UTI and will start  Rocephin now but my clinical impression is the abnormal vitals and elevated lactic acid are due to SBO, dehydration and not UTI sepsis    12/10 tolerating orals, advance diet .    12/11  Placed NG back again   KUB pending     12/12  S/p surgery ; POD 1   NG to suction   Bleeding stopped    12/13   NG pulled from general surgery will try clears this am. + flatus.    12/14 tolerating orals . No nausea ; no vomiting     12/17 s/p surgery ; 12/11 Patient had an adhesion of the  of the colon down to the root of the small bowel mesentery causing a complete small bowel obstruction.  After taking down the adhesions the obstruction was relieved.  The the bowel was viable with no concern for ischemia.  should follow up with Dr. Hein 1 week after discharge for his staple removal.> maybe here later this week will reconsult as tomorrow will be 7 days from [facility] to not asking for RTC 7-10 days for post op f./u       This has resolved-- d/c tele and iv meds. Just here waiting on placement at d/c.  Son agrees to take him home this evening.  Will discharge him in the morning    Hypomagnesemia  Replete and monitor    12/18 resolved      Evaluation by psychiatric service required  Reviewed  consult .  12/16 awaiting state approval for nursing home   12/19 awaiting state approval for nursing home  12/20 Patient pending nursing home placement. 142 received on Friday, 12/17/21 in the afternoon. SNF declined by payor last week. Referrals have been sent via [facility]Port to [facility], [facility], [facility] and [facility]. Patient declined by [facility], [facility], and [facility]. Patient currently being reviewed by [facility].      SW to remain available.      [id number]--Patient denied by [facility]. [facility] now reviewing the patient and contacting family.   This evening I was told that he no longer has insurance.   The family has been made aware of this.  They are willing to take him home and try to get the financial arrangements figured out.    Paroxysmal atrial fibrillation  Noted in Dr Fair notes:    On eliquis and BB at home. NPO here. Will give lovenox- renally dose and labetalol IV for now   12/10 tolerating liquids, advance diet and resume oral BB, stop IV labetolol,   Resume eliquis;      12/11  DC eliquis   Resume lovenox .    12/12  HOLD LOVENOX   HB dropped 2 points   Follow CBC   Resume lovenox tomorrow     12/14 resume lovenox and watch for bleeding ;   12/15 transition back  to ELiquis 2.5mg bid  , getting labetolol IV q 6 ;takes metoprolol XL 50mg po daily- resume  12/16 stable with oral metoprolol   12/17 Stable with oral metoprolol, transitioned lovenox back to Eliquis 2.5mg home dose     Acute renal failure  Baseline creat 1.3-1.4- on admission 3.6. 1L NS given in ER. Repeat 1L Ns bolus 12/7 and then cont NS at 150ml/hr. Repeat lactate better 1.9  Creat now 3.0  Son reports that he has no issues urinating at home  Will check renal U/s and post void residual  Has had urinary re tension in past. Consider salcedo if retaining due ti UTI    Repeat u.a today cont rocephin     12/10 creat improved - 3.6>1.6; renal US stable     12/11  BMP  Lab Results   Component Value Date     12/19/2021    K 3.5 12/19/2021     12/19/2021    CO2 21 (L) 12/19/2021    BUN 11 12/19/2021    CREATININE 1.0 12/19/2021    CALCIUM 8.6 (L) 12/19/2021    ANIONGAP 8 12/19/2021    ESTGFRAFRICA >60 12/19/2021    EGFRNONAA >60 12/19/2021 12/13 Back to baseline   BMP  Lab Results   Component Value Date     12/19/2021    K 3.5 12/19/2021     12/19/2021    CO2 21 (L) 12/19/2021    BUN 11 12/19/2021    CREATININE 1.0 12/19/2021    CALCIUM 8.6 (L) 12/19/2021    ANIONGAP 8 12/19/2021    ESTGFRAFRICA >60 12/19/2021    EGFRNONAA >60 12/19/2021     Resolved     Asymptomatic bacteriuria  He has no UTI sx on interview day of  admit  Culture urine  Start rocephin empirically as h/o of UTI and not a great historian but as noted above I think vitals/lactic acid precipitated by SBO and not UTI sepsis    Cont rocephin and repeat u.a   Pt is s/p lithotripsy, stent removal, and cystoureteroscopy with retrograde pyelogram w/ stent insertion on 5/31/21, tx with Bactrim for MRSA UTI  Will add oral doxy  To cover for staph MRSA UTI .  12/14   d/c abx, grew candida> low colony count and asymptomatic. No further treatment needed.  Given 3 days diflucan Rx     SIRS (systemic inflammatory response syndrome)  He meets SIRS criteria but I think better explained by SBO presentation than infection.  UA > 100 WBC but he has no urinary tract symptoms. He has recurrent UTI and will start Rocephin now but my clinical impression is the abnormal vitals and elevated lactic acid are due to SBO, dehydration and not UTI sepsis.  RESOLVED .      Hypokalemia  Supplement and monitor     12/18: start KCl 20meq BID and monitor    12/19 K 3.5; cont KCl 10meq BID    12/23 stable on KCl 10meq BID      Hypothyroidism  Hold synthroid while NPO  If remains NPO > 3 days can start IV  Resume oral thyroid meds .    12/11  Hold po meds.    12/14  Continue levothyroxine     GERD (gastroesophageal reflux disease)  protonix IV  tolerating oral - change to po .    12/11  Back to IV   12/14  Cont PPI.    Hyperlipidemia  Holding zocor while NPO  12/10 resume - zocor non formulary- atorvastatin while here - NPO for now will resume once taking po    12/13: simvastatin not formulary. Can resume as outpatient. Hopefully d/c soon.        VTE Risk Mitigation (From admission, onward)         Ordered     apixaban tablet 2.5 mg  2 times daily         12/17/21 0724     IP VTE HIGH RISK PATIENT  Once         12/07/21 0620     Place sequential compression device  Until discontinued         12/07/21 0620                Discharge Planning   STEPHANIE:      Code Status: Full Code   Is the patient medically  ready for discharge?:     yes  Discharge Plan A: Home with family   Discharge Delays: (!) Post-Acute Set-up              Refugio Leonard MD  Department of Hospital Medicine   [facility] (3rd Fl)

## 2021-12-23 NOTE — DISCHARGE INSTRUCTIONS
Patient Education       Small Bowel Obstruction Discharge Instructions   About this topic   A small bowel obstruction is when the small bowel is blocked. Doctors may order tests to diagnose a blocked small bowel. The small bowel may be partly blocked or totally blocked. This obstruction may cause a buildup of stool and other digested contents inside the bowel. It may cause serious problems if not treated right away.       What care is needed at home?   · Ask your doctor what you need to do when you go home. Make sure you ask questions if you do not understand what the doctor says. This way you will know what you need to do.  · You may have a tube in your nose in the hospital to remove digestive fluids. If you still have a tube in your nose when you go home, your doctor will tell you how to clean and care for it.  · Take your drugs as ordered by your doctor.  If you had surgery, ask your doctor about how to care for your cut site:  · When you should change your bandages  · When you may take a bath or shower  · If you need to be careful with lifting things over 10 pounds (4.5 kg)  · When you may go back to your normal activities like work and driving  What follow-up care is needed?   · Your doctor may ask you to make visits to the office to check on your progress. Be sure to keep these visits.  · If you have stitches or staples, you will need to have them taken out. Your doctor will often want to do this in 1 to 2 weeks. If the doctor used skin glue, the glue will fall off on its own.  · Your doctor may order extra tests to see how well you are doing.  · If this condition was caused by cancer, your doctor may tell you to have other treatments for the cancer.  What drugs may be needed?   The doctor may order drugs to:  · Help with pain  · Fight an infection  · Keep your stool soft  Will physical activity be limited?   Physical activities may be limited if you are suffering from pain. Talk to your doctor about the right  amount of activity for you. If your doctor says it is okay, take short walks every day to help prevent blood clots.  What changes to diet are needed?   Ask your doctor or dietitian for a diet plan. Your doctor may suggest a liquid or soft diet until your bowel is stable and ready for regular food. Drink lots of clear liquids. Clear liquids include water, fruit juices, soup broth, gelatin, popsicles that do not have fruit or fruit pulp, tea or coffee with no added milk, and sports drinks. Avoid beer, wine, and mixed drinks (alcohol) and limit caffeine.  What problems could happen?   · Hard stools  · Dehydration  · Infection  · Tear or hole in the small bowel  · Tissue death  · Bowel function does not return to normal  When do I need to call the doctor?   · Signs of infection. These include a fever of 100.4°F (38°C) or higher, chills, pain with passing urine or not able to pass urine, wound that will not heal.  · Signs of wound infection. These include swelling, redness, warmth around the wound; too much pain when touched; yellowish, greenish, or bloody discharge; foul smell coming from the cut site; cut site opens up.  · Unable to pass stool or gas  · Very upset stomach or throwing up  · Very bad belly pain  Teach Back: Helping You Understand   The Teach Back Method helps you understand the information we are giving you. After you talk with the staff, tell them in your own words what you learned. This helps to make sure the staff has described each thing clearly. It also helps to explain things that may have been confusing. Before going home, make sure you can do these:  · I can tell you about my condition.  · I can tell you how to care for my cut site, if I have one.  · I can tell you what I will do if I have an upset stomach, throwing up, or am not able to pass stool or gas.  Where can I learn more?   [employer]  [place]  http://www.cancer.gov/cancertopics/pdq/supportivecare/gastrointestinalcomplications/Patient/page4   Last Reviewed Date   2021-05-19  Consumer Information Use and Disclaimer   This information is not specific medical advice and does not replace information you receive from your health care provider. This is only a brief summary of general information. It does NOT include all information about conditions, illnesses, injuries, tests, procedures, treatments, therapies, discharge instructions or life-style choices that may apply to you. You must talk with your health care provider for complete information about your health and treatment options. This information should not be used to decide whether or not to accept your health care providers advice, instructions or recommendations. Only your health care provider has the knowledge and training to provide advice that is right for you.  Copyright   Copyright © 2021 [employer] and its affiliates and/or licensors. All rights reserved.

## 2021-12-23 NOTE — SUBJECTIVE & OBJECTIVE
Review of Systems   Constitutional: Negative for activity change, fatigue, fever and unexpected weight change.   HENT: Negative for congestion and sore throat.    Eyes: Negative for visual disturbance.   Respiratory: Negative for cough and shortness of breath.    Cardiovascular: Negative for chest pain.   Gastrointestinal: Negative for abdominal pain, blood in stool, constipation and diarrhea.   Genitourinary: Negative for dysuria and hematuria.   Neurological: Negative for dizziness and light-headedness.     Objective:     Vital Signs (Most Recent):  Temp: 98 °F (36.7 °C) (12/23/21 0735)  Pulse: 89 (12/23/21 0735)  Resp: 18 (12/23/21 0735)  BP: 138/77 (12/23/21 0735)  SpO2: 97 % (12/23/21 0740) Vital Signs (24h Range):  Temp:  [97.1 °F (36.2 °C)-98 °F (36.7 °C)] 98 °F (36.7 °C)  Pulse:  [78-89] 89  Resp:  [18-20] 18  SpO2:  [97 %] 97 %  BP: (124-184)/(62-80) 138/77     Weight: 100 kg (220 lb 7.4 oz)  Body mass index is 33.52 kg/m².    Intake/Output Summary (Last 24 hours) at 12/23/2021 1026  Last data filed at 12/23/2021 0600  Gross per 24 hour   Intake 180 ml   Output --   Net 180 ml      Physical Exam  Vitals and nursing note reviewed.   Constitutional:       General: He is not in acute distress.     Appearance: He is well-developed.   HENT:      Head: Normocephalic and atraumatic.      Right Ear: External ear normal.      Left Ear: External ear normal.      Nose:      Comments: Pressure sore on right nare from NGT  Eyes:      General:         Right eye: No discharge.         Left eye: No discharge.      Conjunctiva/sclera: Conjunctivae normal.   Neck:      Thyroid: No thyromegaly.   Cardiovascular:      Rate and Rhythm: Normal rate and regular rhythm.      Heart sounds: Normal heart sounds. No murmur heard.      Pulmonary:      Effort: Pulmonary effort is normal. No respiratory distress.      Breath sounds: Normal breath sounds.   Abdominal:      General: Abdomen is flat. Bowel sounds are normal. There is no  distension.      Palpations: Abdomen is soft.      Tenderness: There is no abdominal tenderness.      Comments:   Dressing c/d/i   Musculoskeletal:      Cervical back: Neck supple.      Right lower leg: No edema.      Left lower leg: No edema.   Skin:     General: Skin is warm and dry.      Comments: Erosion to right nare   Neurological:      General: No focal deficit present.      Mental Status: He is alert. Mental status is at baseline.   Psychiatric:         Behavior: Behavior normal.         Thought Content: Thought content normal.         Significant Labs: All pertinent labs within the past 24 hours have been reviewed.  Blood Culture: No results for input(s): LABBLOO in the last 48 hours.  BMP: No results for input(s): GLU, NA, K, CL, CO2, BUN, CREATININE, CALCIUM, MG in the last 48 hours.  CBC: No results for input(s): WBC, HGB, HCT, PLT in the last 48 hours.  CMP: No results for input(s): NA, K, CL, CO2, GLU, BUN, CREATININE, CALCIUM, PROT, ALBUMIN, BILITOT, ALKPHOS, AST, ALT, ANIONGAP, EGFRNONAA in the last 48 hours.    Invalid input(s): ESTGFAFRICA  POCT Glucose: No results for input(s): POCTGLUCOSE in the last 48 hours.  Urine Studies: No results for input(s): COLORU, APPEARANCEUA, PHUR, SPECGRAV, PROTEINUA, GLUCUA, KETONESU, BILIRUBINUA, OCCULTUA, NITRITE, UROBILINOGEN, LEUKOCYTESUR, RBCUA, WBCUA, BACTERIA, SQUAMEPITHEL, HYALINECASTS in the last 48 hours.    Invalid input(s): WRIGHTSUR    Significant Imaging: I have reviewed all pertinent imaging results/findings within the past 24 hours.     12/11 KUB NG tube in position.  Prior midline abdominal wall hernia repair.  Otherwise negative abdomen x-ray     12/10 KUB NG tube again noted in place.  No significant change in the bowel gas pattern when compared with the prior study.     US renal Normal size bilateral kidneys each containing a couple of benign anechoic cyst identified the midpole and upper pole regions largest single index cyst about the lateral  cortex of the right kidney measuring 2.3 x 2.1 x 2.2 cm in size.     KUB 12/9 Enteric tube terminates in the gastric body.  The degree of distention of the small bowel loops is decreased as compared to prior examinations suggesting a resolving/revolved small-bowel obstruction.  No free air is identified.  Age-appropriate degenerative changes affect the skeleton.     CT abd and pelvis Imaging findings in keeping with a high-grade small bowel obstruction with a point of transition seen in the right mid abdomen, likely related to adhesions.  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  Additionally, there is a small amount of free fluid about the liver and extending into the pelvic cul-de-sac, with slightly high density of the fluid in the pelvic cul-de-sac suggesting hyperemia issues content fluid.     KUB The enteric tube has been appropriately retracted and now terminates in the region of the distal gastric body.Slightly prominent loops of small bowel seen in the upper abdomen.  No definite free peritoneal air is seen.     Lung parenchyma cannot be adequately evaluated secondary to overexposure of the central aspect of the lungs.Consider dedicated radiograph of the chest evaluate for parenchymal injury.     CXR Enteric tube terminates in the gastric body.  There is bibasilar subsegmental atelectasis, with right basilar scarring.  No focal airspace consolidation or pleural effusions.  No pneumothorax.  The heart is enlarged.  Calcified atheromatous disease affects the aorta.  Median sternotomy wires are midline.  Age-appropriate degenerative changes affect the skeleton.

## 2021-12-23 NOTE — ASSESSMENT & PLAN NOTE
Supplement and monitor     12/18: start KCl 20meq BID and monitor    12/19 K 3.5; cont KCl 10meq BID    12/23 stable on KCl 10meq BID

## 2021-12-23 NOTE — PLAN OF CARE
Problem: Adult Inpatient Plan of Care  Goal: Plan of Care Review  Outcome: Ongoing, Progressing     Problem: Hematologic Alteration (Acute Kidney Injury/Impairment)  Goal: Hemoglobin, Hematocrit and Platelets Within Normal Range  Outcome: Ongoing, Progressing     Problem: Renal Function Impairment (Acute Kidney Injury/Impairment)  Goal: Effective Renal Function  Outcome: Ongoing, Progressing     Problem: Skin Injury Risk Increased  Goal: Skin Health and Integrity  Outcome: Ongoing, Progressing     Problem: Fall Injury Risk  Goal: Absence of Fall and Fall-Related Injury  Outcome: Ongoing, Progressing     Problem: Surgical Site Infection  Goal: Absence of Infection Signs and Symptoms  Outcome: Ongoing, Progressing

## 2021-12-24 PROCEDURE — G0180 MD CERTIFICATION HHA PATIENT: HCPCS | Mod: ,,, | Performed by: FAMILY MEDICINE

## 2021-12-24 PROCEDURE — G0180 PR HOME HEALTH MD CERTIFICATION: ICD-10-PCS | Mod: ,,, | Performed by: FAMILY MEDICINE

## 2022-01-07 ENCOUNTER — EXTERNAL HOME HEALTH (OUTPATIENT)
Dept: HOME HEALTH SERVICES | Facility: HOSPITAL | Age: 82
End: 2022-01-07
Payer: MEDICARE

## 2022-01-07 ENCOUNTER — CLINICAL SUPPORT (OUTPATIENT)
Dept: FAMILY MEDICINE | Facility: CLINIC | Age: 82
End: 2022-01-07
Payer: MEDICARE

## 2022-01-07 ENCOUNTER — OFFICE VISIT (OUTPATIENT)
Dept: FAMILY MEDICINE | Facility: CLINIC | Age: 82
End: 2022-01-07
Payer: MEDICARE

## 2022-01-07 VITALS
SYSTOLIC BLOOD PRESSURE: 106 MMHG | BODY MASS INDEX: 30.69 KG/M2 | HEART RATE: 68 BPM | DIASTOLIC BLOOD PRESSURE: 71 MMHG | RESPIRATION RATE: 14 BRPM | HEIGHT: 68 IN | WEIGHT: 202.5 LBS

## 2022-01-07 DIAGNOSIS — N13.8 BPH WITH URINARY OBSTRUCTION: ICD-10-CM

## 2022-01-07 DIAGNOSIS — N18.31 TYPE 2 DIABETES MELLITUS WITH STAGE 3A CHRONIC KIDNEY DISEASE, WITHOUT LONG-TERM CURRENT USE OF INSULIN: ICD-10-CM

## 2022-01-07 DIAGNOSIS — N40.1 BPH WITH URINARY OBSTRUCTION: ICD-10-CM

## 2022-01-07 DIAGNOSIS — G31.84 MILD COGNITIVE IMPAIRMENT: ICD-10-CM

## 2022-01-07 DIAGNOSIS — N18.31 STAGE 3A CHRONIC KIDNEY DISEASE: ICD-10-CM

## 2022-01-07 DIAGNOSIS — E11.22 TYPE 2 DIABETES MELLITUS WITH STAGE 3A CHRONIC KIDNEY DISEASE, WITHOUT LONG-TERM CURRENT USE OF INSULIN: ICD-10-CM

## 2022-01-07 DIAGNOSIS — R53.81 PHYSICAL DECONDITIONING: ICD-10-CM

## 2022-01-07 DIAGNOSIS — R53.81 DEBILITY: ICD-10-CM

## 2022-01-07 DIAGNOSIS — I70.0 THORACIC AORTIC ATHEROSCLEROSIS: ICD-10-CM

## 2022-01-07 DIAGNOSIS — E87.6 HYPOKALEMIA: Primary | ICD-10-CM

## 2022-01-07 DIAGNOSIS — I48.0 PAROXYSMAL ATRIAL FIBRILLATION: ICD-10-CM

## 2022-01-07 DIAGNOSIS — D64.9 ANEMIA, UNSPECIFIED TYPE: ICD-10-CM

## 2022-01-07 DIAGNOSIS — F03.90 DEMENTIA WITHOUT BEHAVIORAL DISTURBANCE, UNSPECIFIED DEMENTIA TYPE: ICD-10-CM

## 2022-01-07 DIAGNOSIS — Z93.1 GASTROSTOMY STATUS: ICD-10-CM

## 2022-01-07 DIAGNOSIS — Z60.9 POOR SOCIAL SITUATION: ICD-10-CM

## 2022-01-07 DIAGNOSIS — E03.4 HYPOTHYROIDISM DUE TO ACQUIRED ATROPHY OF THYROID: ICD-10-CM

## 2022-01-07 DIAGNOSIS — I65.23 CAROTID ARTERY CALCIFICATION, BILATERAL: ICD-10-CM

## 2022-01-07 DIAGNOSIS — I47.10 SVT (SUPRAVENTRICULAR TACHYCARDIA): ICD-10-CM

## 2022-01-07 DIAGNOSIS — J42 CHRONIC BRONCHITIS, UNSPECIFIED CHRONIC BRONCHITIS TYPE: ICD-10-CM

## 2022-01-07 DIAGNOSIS — E87.6 HYPOKALEMIA: ICD-10-CM

## 2022-01-07 DIAGNOSIS — E66.2 CLASS 2 OBESITY WITH ALVEOLAR HYPOVENTILATION, SERIOUS COMORBIDITY, AND BODY MASS INDEX (BMI) OF 39.0 TO 39.9 IN ADULT: ICD-10-CM

## 2022-01-07 DIAGNOSIS — G47.33 OSA (OBSTRUCTIVE SLEEP APNEA): ICD-10-CM

## 2022-01-07 DIAGNOSIS — F32.0 MAJOR DEPRESSIVE DISORDER, SINGLE EPISODE, MILD: ICD-10-CM

## 2022-01-07 DIAGNOSIS — K56.609 SBO (SMALL BOWEL OBSTRUCTION): ICD-10-CM

## 2022-01-07 LAB
ANION GAP SERPL CALC-SCNC: 10 MMOL/L (ref 8–16)
BASOPHILS # BLD AUTO: 0.05 K/UL (ref 0–0.2)
BASOPHILS NFR BLD: 0.5 % (ref 0–1.9)
BUN SERPL-MCNC: 21 MG/DL (ref 8–23)
CALCIUM SERPL-MCNC: 9.9 MG/DL (ref 8.7–10.5)
CHLORIDE SERPL-SCNC: 105 MMOL/L (ref 95–110)
CO2 SERPL-SCNC: 23 MMOL/L (ref 23–29)
CREAT SERPL-MCNC: 1.5 MG/DL (ref 0.5–1.4)
DIFFERENTIAL METHOD: ABNORMAL
EOSINOPHIL # BLD AUTO: 0.2 K/UL (ref 0–0.5)
EOSINOPHIL NFR BLD: 2.2 % (ref 0–8)
ERYTHROCYTE [DISTWIDTH] IN BLOOD BY AUTOMATED COUNT: 15.7 % (ref 11.5–14.5)
EST. GFR  (AFRICAN AMERICAN): 50 ML/MIN/1.73 M^2
EST. GFR  (NON AFRICAN AMERICAN): 44 ML/MIN/1.73 M^2
GLUCOSE SERPL-MCNC: 91 MG/DL (ref 70–110)
HCT VFR BLD AUTO: 39.7 % (ref 40–54)
HGB BLD-MCNC: 12.1 G/DL (ref 14–18)
IMM GRANULOCYTES # BLD AUTO: 0.04 K/UL (ref 0–0.04)
IMM GRANULOCYTES NFR BLD AUTO: 0.4 % (ref 0–0.5)
LYMPHOCYTES # BLD AUTO: 2.4 K/UL (ref 1–4.8)
LYMPHOCYTES NFR BLD: 24.7 % (ref 18–48)
MCH RBC QN AUTO: 28.5 PG (ref 27–31)
MCHC RBC AUTO-ENTMCNC: 30.5 G/DL (ref 32–36)
MCV RBC AUTO: 93 FL (ref 82–98)
MONOCYTES # BLD AUTO: 0.8 K/UL (ref 0.3–1)
MONOCYTES NFR BLD: 8.6 % (ref 4–15)
NEUTROPHILS # BLD AUTO: 6.2 K/UL (ref 1.8–7.7)
NEUTROPHILS NFR BLD: 63.6 % (ref 38–73)
NRBC BLD-RTO: 0 /100 WBC
PLATELET # BLD AUTO: 256 K/UL (ref 150–450)
PMV BLD AUTO: 11.6 FL (ref 9.2–12.9)
POTASSIUM SERPL-SCNC: 5.3 MMOL/L (ref 3.5–5.1)
RBC # BLD AUTO: 4.25 M/UL (ref 4.6–6.2)
SODIUM SERPL-SCNC: 138 MMOL/L (ref 136–145)
WBC # BLD AUTO: 9.78 K/UL (ref 3.9–12.7)

## 2022-01-07 PROCEDURE — 3074F SYST BP LT 130 MM HG: CPT | Mod: CPTII,S$GLB,, | Performed by: FAMILY MEDICINE

## 2022-01-07 PROCEDURE — 99214 PR OFFICE/OUTPT VISIT, EST, LEVL IV, 30-39 MIN: ICD-10-PCS | Mod: S$GLB,,, | Performed by: FAMILY MEDICINE

## 2022-01-07 PROCEDURE — 3078F PR MOST RECENT DIASTOLIC BLOOD PRESSURE < 80 MM HG: ICD-10-PCS | Mod: CPTII,S$GLB,, | Performed by: FAMILY MEDICINE

## 2022-01-07 PROCEDURE — 3078F DIAST BP <80 MM HG: CPT | Mod: CPTII,S$GLB,, | Performed by: FAMILY MEDICINE

## 2022-01-07 PROCEDURE — 80048 BASIC METABOLIC PNL TOTAL CA: CPT | Performed by: FAMILY MEDICINE

## 2022-01-07 PROCEDURE — 1126F PR PAIN SEVERITY QUANTIFIED, NO PAIN PRESENT: ICD-10-PCS | Mod: CPTII,S$GLB,, | Performed by: FAMILY MEDICINE

## 2022-01-07 PROCEDURE — 1126F AMNT PAIN NOTED NONE PRSNT: CPT | Mod: CPTII,S$GLB,, | Performed by: FAMILY MEDICINE

## 2022-01-07 PROCEDURE — 99214 OFFICE O/P EST MOD 30 MIN: CPT | Mod: S$GLB,,, | Performed by: FAMILY MEDICINE

## 2022-01-07 PROCEDURE — 36415 PR COLLECTION VENOUS BLOOD,VENIPUNCTURE: ICD-10-PCS | Mod: S$GLB,,, | Performed by: FAMILY MEDICINE

## 2022-01-07 PROCEDURE — 85025 COMPLETE CBC W/AUTO DIFF WBC: CPT | Performed by: FAMILY MEDICINE

## 2022-01-07 PROCEDURE — 99499 UNLISTED E&M SERVICE: CPT | Mod: S$GLB,,, | Performed by: FAMILY MEDICINE

## 2022-01-07 PROCEDURE — 99999 PR PBB SHADOW E&M-EST. PATIENT-LVL III: CPT | Mod: PBBFAC,,, | Performed by: FAMILY MEDICINE

## 2022-01-07 PROCEDURE — 1157F ADVNC CARE PLAN IN RCRD: CPT | Mod: CPTII,S$GLB,, | Performed by: FAMILY MEDICINE

## 2022-01-07 PROCEDURE — 3288F FALL RISK ASSESSMENT DOCD: CPT | Mod: CPTII,S$GLB,, | Performed by: FAMILY MEDICINE

## 2022-01-07 PROCEDURE — 1101F PT FALLS ASSESS-DOCD LE1/YR: CPT | Mod: CPTII,S$GLB,, | Performed by: FAMILY MEDICINE

## 2022-01-07 PROCEDURE — 1157F PR ADVANCE CARE PLAN OR EQUIV PRESENT IN MEDICAL RECORD: ICD-10-PCS | Mod: CPTII,S$GLB,, | Performed by: FAMILY MEDICINE

## 2022-01-07 PROCEDURE — 1159F PR MEDICATION LIST DOCUMENTED IN MEDICAL RECORD: ICD-10-PCS | Mod: CPTII,S$GLB,, | Performed by: FAMILY MEDICINE

## 2022-01-07 PROCEDURE — 3288F PR FALLS RISK ASSESSMENT DOCUMENTED: ICD-10-PCS | Mod: CPTII,S$GLB,, | Performed by: FAMILY MEDICINE

## 2022-01-07 PROCEDURE — 36415 COLL VENOUS BLD VENIPUNCTURE: CPT | Mod: S$GLB,,, | Performed by: FAMILY MEDICINE

## 2022-01-07 PROCEDURE — 1159F MED LIST DOCD IN RCRD: CPT | Mod: CPTII,S$GLB,, | Performed by: FAMILY MEDICINE

## 2022-01-07 PROCEDURE — 99999 PR PBB SHADOW E&M-EST. PATIENT-LVL III: ICD-10-PCS | Mod: PBBFAC,,, | Performed by: FAMILY MEDICINE

## 2022-01-07 PROCEDURE — 3074F PR MOST RECENT SYSTOLIC BLOOD PRESSURE < 130 MM HG: ICD-10-PCS | Mod: CPTII,S$GLB,, | Performed by: FAMILY MEDICINE

## 2022-01-07 PROCEDURE — 99499 RISK ADDL DX/OHS AUDIT: ICD-10-PCS | Mod: S$GLB,,, | Performed by: FAMILY MEDICINE

## 2022-01-07 PROCEDURE — 1101F PR PT FALLS ASSESS DOC 0-1 FALLS W/OUT INJ PAST YR: ICD-10-PCS | Mod: CPTII,S$GLB,, | Performed by: FAMILY MEDICINE

## 2022-01-07 RX ORDER — METOPROLOL SUCCINATE 50 MG/1
50 TABLET, EXTENDED RELEASE ORAL 2 TIMES DAILY
Qty: 60 TABLET | Refills: 12 | Status: SHIPPED | OUTPATIENT
Start: 2022-01-07 | End: 2022-02-23 | Stop reason: SDUPTHER

## 2022-01-07 SDOH — SOCIAL DETERMINANTS OF HEALTH (SDOH): PROBLEM RELATED TO SOCIAL ENVIRONMENT, UNSPECIFIED: Z60.9

## 2022-01-07 NOTE — PROGRESS NOTES
Transitional Care Note  Subjective:       Patient ID: Domonique Hernandez Jr. is a 81 y.o. male.  Chief Complaint: Follow-up (Hospital f/u )    Family and/or Caretaker present at visit?  Yes.  Diagnostic tests reviewed/disposition: No diagnosic tests pending after this hospitalization.  Disease/illness education: small bowel obstruction  Home health/community services discussion/referrals: Patient has home health established at the medical team.   Establishment or re-establishment of referral orders for community resources: No other necessary community resources.   Discussion with other health care providers: No discussion with other health care providers necessary.   81 year old male is here with his son for f/u of recnet hospitalization for sbo, uti, altered mental status and anemia. He has been doing okay at home. Home health continues to visit, but PT has stopped. He says he has no real complaints today except for depression. He unexpectedly lost his son to dementia and is going to be  Burying him. He has had loss of appetite beucase of this.    Review of Systems   Constitutional: Positive for unexpected weight change. Negative for chills and fever.   HENT: Negative for congestion, ear pain, postnasal drip, rhinorrhea, sore throat and trouble swallowing.    Eyes: Negative for redness and itching.   Respiratory: Negative for cough, shortness of breath and wheezing.    Cardiovascular: Negative for chest pain and palpitations.   Gastrointestinal: Negative for abdominal pain, diarrhea, nausea and vomiting.   Genitourinary: Negative for dysuria and frequency.   Skin: Negative for rash.   Neurological: Negative for weakness and headaches.   Psychiatric/Behavioral: Positive for decreased concentration and dysphoric mood.       Objective:      Physical Exam  Vitals and nursing note reviewed.   Constitutional:       General: He is not in acute distress.     Appearance: He is well-developed.   HENT:      Head: Normocephalic and  atraumatic.   Eyes:      Conjunctiva/sclera: Conjunctivae normal.      Pupils: Pupils are equal, round, and reactive to light.   Neck:      Thyroid: No thyromegaly.   Cardiovascular:      Rate and Rhythm: Normal rate and regular rhythm.      Pulses: Intact distal pulses.      Heart sounds: Normal heart sounds.   Pulmonary:      Effort: Pulmonary effort is normal. No respiratory distress.      Breath sounds: Normal breath sounds. No wheezing.   Abdominal:      General: Bowel sounds are normal.      Palpations: Abdomen is soft.      Tenderness: There is no abdominal tenderness.      Comments: Midline scar cdi   Musculoskeletal:         General: No edema. Normal range of motion.      Cervical back: Normal range of motion and neck supple.   Lymphadenopathy:      Cervical: No cervical adenopathy.   Skin:     General: Skin is warm and dry.      Findings: No rash.   Neurological:      Mental Status: He is alert and oriented to person, place, and time.   Psychiatric:         Mood and Affect: Mood and affect normal.         Behavior: Behavior normal.         Assessment:       1. Hypokalemia    2. Anemia, unspecified type    3. Mild cognitive impairment    4. Major depressive disorder, single episode, mild    5. Poor social situation    6. Chronic bronchitis, unspecified chronic bronchitis type    7. Carotid artery calcification, bilateral    8. Paroxysmal atrial fibrillation    9. SVT (supraventricular tachycardia)    10. Thoracic aortic atherosclerosis    11. BPH with urinary obstruction    12. Hypothyroidism due to acquired atrophy of thyroid    13. SBO (small bowel obstruction)    14. Class 2 obesity with alveolar hypoventilation, serious comorbidity, and body mass index (BMI) of 39.0 to 39.9 in adult    15. Debility    16. KARL (obstructive sleep apnea)    17. Physical deconditioning    18. Stage 3a chronic kidney disease    19. Dementia without behavioral disturbance, unspecified dementia type    20. Type 2 diabetes  mellitus with stage 3a chronic kidney disease, without long-term current use of insulin    21. Gastrostomy status        Plan:       Domonique was seen today for follow-up.    Diagnoses and all orders for this visit:    Hypokalemia  -     Basic Metabolic Panel; Future    Anemia, unspecified type  -     CBC Auto Differential; Future    Mild cognitive impairment    Major depressive disorder, single episode, mild    Poor social situation    Chronic bronchitis, unspecified chronic bronchitis type    Carotid artery calcification, bilateral    Paroxysmal atrial fibrillation    SVT (supraventricular tachycardia)    Thoracic aortic atherosclerosis    BPH with urinary obstruction    Hypothyroidism due to acquired atrophy of thyroid    SBO (small bowel obstruction)    Class 2 obesity with alveolar hypoventilation, serious comorbidity, and body mass index (BMI) of 39.0 to 39.9 in adult    Debility    KARL (obstructive sleep apnea)    Physical deconditioning    Stage 3a chronic kidney disease    Dementia without behavioral disturbance, unspecified dementia type    Type 2 diabetes mellitus with stage 3a chronic kidney disease, without long-term current use of insulin    Gastrostomy status    Other orders  -     metoprolol succinate (TOPROL-XL) 50 MG 24 hr tablet; Take 1 tablet (50 mg total) by mouth 2 (two) times a day.    on a statin and aspirin  Not currently using controller inhalers - has not really had issues with copd in some time - stable.   Continues with home health and will rec continued pt/ot.  F/u on anemia from hospitalization and recheck k.    RTC if condition acutely worsens or any other concerns, otherwise RTC as scheduled

## 2022-01-08 ENCOUNTER — TELEPHONE (OUTPATIENT)
Dept: FAMILY MEDICINE | Facility: CLINIC | Age: 82
End: 2022-01-08
Payer: MEDICARE

## 2022-01-08 PROBLEM — N17.9 ACUTE RENAL FAILURE: Status: RESOLVED | Noted: 2021-12-07 | Resolved: 2022-01-08

## 2022-01-08 PROBLEM — E11.22 TYPE 2 DIABETES MELLITUS WITH STAGE 3A CHRONIC KIDNEY DISEASE, WITHOUT LONG-TERM CURRENT USE OF INSULIN: Status: ACTIVE | Noted: 2022-01-08

## 2022-01-08 PROBLEM — R40.0 SOMNOLENCE: Status: RESOLVED | Noted: 2021-04-07 | Resolved: 2022-01-08

## 2022-01-08 PROBLEM — R82.71 ASYMPTOMATIC BACTERIURIA: Status: RESOLVED | Noted: 2021-12-07 | Resolved: 2022-01-08

## 2022-01-08 PROBLEM — N18.31 TYPE 2 DIABETES MELLITUS WITH STAGE 3A CHRONIC KIDNEY DISEASE, WITHOUT LONG-TERM CURRENT USE OF INSULIN: Status: ACTIVE | Noted: 2022-01-08

## 2022-01-08 PROBLEM — R65.10 SIRS (SYSTEMIC INFLAMMATORY RESPONSE SYNDROME): Status: RESOLVED | Noted: 2021-12-07 | Resolved: 2022-01-08

## 2022-01-08 PROBLEM — Z93.1 GASTROSTOMY STATUS: Status: ACTIVE | Noted: 2022-01-08

## 2022-01-08 PROBLEM — R50.9 FEVER: Status: RESOLVED | Noted: 2021-06-02 | Resolved: 2022-01-08

## 2022-01-08 PROBLEM — N39.0 URINARY TRACT INFECTION WITH HEMATURIA: Status: RESOLVED | Noted: 2021-01-07 | Resolved: 2022-01-08

## 2022-01-08 PROBLEM — R31.9 URINARY TRACT INFECTION WITH HEMATURIA: Status: RESOLVED | Noted: 2021-01-07 | Resolved: 2022-01-08

## 2022-01-08 NOTE — TELEPHONE ENCOUNTER
Potassium is creeping up.  Kidneys are a touch dry.  Increase fluid in take and stop potassium.  Please have home health draw another bmp in a week.  mh

## 2022-01-10 NOTE — TELEPHONE ENCOUNTER
Spoke with patients son Channing, gave him results and Dr. Marsh recommendations. Son stated understanding and Medical team contacted to draw BMP in 1 week

## 2022-01-18 ENCOUNTER — LAB VISIT (OUTPATIENT)
Dept: LAB | Facility: HOSPITAL | Age: 82
End: 2022-01-18
Attending: FAMILY MEDICINE
Payer: MEDICARE

## 2022-01-18 DIAGNOSIS — Z48.815 ENCOUNTER FOR SURGICAL AFTERCARE FOLLOWING SURGERY OF DIGESTIVE SYSTEM: ICD-10-CM

## 2022-01-18 DIAGNOSIS — J44.9 COPD (CHRONIC OBSTRUCTIVE PULMONARY DISEASE): Primary | ICD-10-CM

## 2022-01-18 LAB
ANION GAP SERPL CALC-SCNC: 11 MMOL/L (ref 8–16)
BUN SERPL-MCNC: 20 MG/DL (ref 8–23)
CALCIUM SERPL-MCNC: 10 MG/DL (ref 8.7–10.5)
CHLORIDE SERPL-SCNC: 107 MMOL/L (ref 95–110)
CO2 SERPL-SCNC: 23 MMOL/L (ref 23–29)
CREAT SERPL-MCNC: 1.2 MG/DL (ref 0.5–1.4)
EST. GFR  (AFRICAN AMERICAN): >60 ML/MIN/1.73 M^2
EST. GFR  (NON AFRICAN AMERICAN): 57 ML/MIN/1.73 M^2
GLUCOSE SERPL-MCNC: 84 MG/DL (ref 70–110)
POTASSIUM SERPL-SCNC: 4.4 MMOL/L (ref 3.5–5.1)
SODIUM SERPL-SCNC: 141 MMOL/L (ref 136–145)

## 2022-01-18 PROCEDURE — 80048 BASIC METABOLIC PNL TOTAL CA: CPT

## 2022-01-24 ENCOUNTER — DOCUMENT SCAN (OUTPATIENT)
Dept: HOME HEALTH SERVICES | Facility: HOSPITAL | Age: 82
End: 2022-01-24
Payer: MEDICARE

## 2022-01-28 DIAGNOSIS — M1A.9XX0 CHRONIC GOUT WITHOUT TOPHUS, UNSPECIFIED CAUSE, UNSPECIFIED SITE: ICD-10-CM

## 2022-01-28 RX ORDER — ALLOPURINOL 300 MG/1
TABLET ORAL
Qty: 90 TABLET | Refills: 1 | Status: SHIPPED | OUTPATIENT
Start: 2022-01-28 | End: 2022-02-23 | Stop reason: SDUPTHER

## 2022-01-28 NOTE — TELEPHONE ENCOUNTER
Care Due:                  Date            Visit Type   Department     Provider  --------------------------------------------------------------------------------                                [facility]  Last Visit: 01-      FOLLOW UP    MEDICINE       Lucia Leach                                           [facility]  Next Visit: 02-      None         MEDICINE       Lucia Leach                                                            Last  Test          Frequency    Reason                     Performed    Due Date  --------------------------------------------------------------------------------    Lipid Panel.  12 months..  simvastatin..............  Not Found    Overdue    Powered by [employer] by [employer]. Reference number: [id number].   1/28/2022 8:15:40 AM CST

## 2022-02-23 ENCOUNTER — OFFICE VISIT (OUTPATIENT)
Dept: FAMILY MEDICINE | Facility: CLINIC | Age: 82
End: 2022-02-23
Payer: MEDICARE

## 2022-02-23 ENCOUNTER — CLINICAL SUPPORT (OUTPATIENT)
Dept: FAMILY MEDICINE | Facility: CLINIC | Age: 82
End: 2022-02-23
Payer: MEDICARE

## 2022-02-23 VITALS
HEART RATE: 58 BPM | SYSTOLIC BLOOD PRESSURE: 110 MMHG | HEIGHT: 68 IN | DIASTOLIC BLOOD PRESSURE: 63 MMHG | WEIGHT: 217.5 LBS | RESPIRATION RATE: 18 BRPM | BODY MASS INDEX: 32.96 KG/M2

## 2022-02-23 DIAGNOSIS — E03.9 HYPOTHYROIDISM, UNSPECIFIED TYPE: ICD-10-CM

## 2022-02-23 DIAGNOSIS — D50.0 IRON DEFICIENCY ANEMIA DUE TO CHRONIC BLOOD LOSS: ICD-10-CM

## 2022-02-23 DIAGNOSIS — R41.3 MEMORY LOSS: ICD-10-CM

## 2022-02-23 DIAGNOSIS — E03.4 HYPOTHYROIDISM DUE TO ACQUIRED ATROPHY OF THYROID: ICD-10-CM

## 2022-02-23 DIAGNOSIS — D50.0 IRON DEFICIENCY ANEMIA DUE TO CHRONIC BLOOD LOSS: Primary | ICD-10-CM

## 2022-02-23 DIAGNOSIS — M1A.9XX0 CHRONIC GOUT WITHOUT TOPHUS, UNSPECIFIED CAUSE, UNSPECIFIED SITE: ICD-10-CM

## 2022-02-23 DIAGNOSIS — E78.5 HYPERLIPIDEMIA, UNSPECIFIED HYPERLIPIDEMIA TYPE: ICD-10-CM

## 2022-02-23 DIAGNOSIS — G31.84 MILD COGNITIVE IMPAIRMENT: ICD-10-CM

## 2022-02-23 LAB
BASOPHILS # BLD AUTO: 0.05 K/UL (ref 0–0.2)
BASOPHILS NFR BLD: 0.6 % (ref 0–1.9)
DIFFERENTIAL METHOD: ABNORMAL
EOSINOPHIL # BLD AUTO: 0.4 K/UL (ref 0–0.5)
EOSINOPHIL NFR BLD: 4.9 % (ref 0–8)
ERYTHROCYTE [DISTWIDTH] IN BLOOD BY AUTOMATED COUNT: 15.9 % (ref 11.5–14.5)
HCT VFR BLD AUTO: 36.3 % (ref 40–54)
HGB BLD-MCNC: 11.3 G/DL (ref 14–18)
IMM GRANULOCYTES # BLD AUTO: 0.02 K/UL (ref 0–0.04)
IMM GRANULOCYTES NFR BLD AUTO: 0.3 % (ref 0–0.5)
LYMPHOCYTES # BLD AUTO: 1.9 K/UL (ref 1–4.8)
LYMPHOCYTES NFR BLD: 23.9 % (ref 18–48)
MCH RBC QN AUTO: 28.5 PG (ref 27–31)
MCHC RBC AUTO-ENTMCNC: 31.1 G/DL (ref 32–36)
MCV RBC AUTO: 91 FL (ref 82–98)
MONOCYTES # BLD AUTO: 0.7 K/UL (ref 0.3–1)
MONOCYTES NFR BLD: 9.2 % (ref 4–15)
NEUTROPHILS # BLD AUTO: 4.9 K/UL (ref 1.8–7.7)
NEUTROPHILS NFR BLD: 61.1 % (ref 38–73)
NRBC BLD-RTO: 0 /100 WBC
PLATELET # BLD AUTO: 248 K/UL (ref 150–450)
PMV BLD AUTO: 11.9 FL (ref 9.2–12.9)
RBC # BLD AUTO: 3.97 M/UL (ref 4.6–6.2)
TSH SERPL DL<=0.005 MIU/L-ACNC: 0.47 UIU/ML (ref 0.4–4)
WBC # BLD AUTO: 7.94 K/UL (ref 3.9–12.7)

## 2022-02-23 PROCEDURE — 99214 PR OFFICE/OUTPT VISIT, EST, LEVL IV, 30-39 MIN: ICD-10-PCS | Mod: S$GLB,,, | Performed by: FAMILY MEDICINE

## 2022-02-23 PROCEDURE — 1101F PR PT FALLS ASSESS DOC 0-1 FALLS W/OUT INJ PAST YR: ICD-10-PCS | Mod: CPTII,S$GLB,, | Performed by: FAMILY MEDICINE

## 2022-02-23 PROCEDURE — 82728 ASSAY OF FERRITIN: CPT | Performed by: FAMILY MEDICINE

## 2022-02-23 PROCEDURE — 1157F ADVNC CARE PLAN IN RCRD: CPT | Mod: CPTII,S$GLB,, | Performed by: FAMILY MEDICINE

## 2022-02-23 PROCEDURE — 84443 ASSAY THYROID STIM HORMONE: CPT | Performed by: FAMILY MEDICINE

## 2022-02-23 PROCEDURE — 3288F PR FALLS RISK ASSESSMENT DOCUMENTED: ICD-10-PCS | Mod: CPTII,S$GLB,, | Performed by: FAMILY MEDICINE

## 2022-02-23 PROCEDURE — 3078F PR MOST RECENT DIASTOLIC BLOOD PRESSURE < 80 MM HG: ICD-10-PCS | Mod: CPTII,S$GLB,, | Performed by: FAMILY MEDICINE

## 2022-02-23 PROCEDURE — 3078F DIAST BP <80 MM HG: CPT | Mod: CPTII,S$GLB,, | Performed by: FAMILY MEDICINE

## 2022-02-23 PROCEDURE — 3074F SYST BP LT 130 MM HG: CPT | Mod: CPTII,S$GLB,, | Performed by: FAMILY MEDICINE

## 2022-02-23 PROCEDURE — 84466 ASSAY OF TRANSFERRIN: CPT | Performed by: FAMILY MEDICINE

## 2022-02-23 PROCEDURE — 3074F PR MOST RECENT SYSTOLIC BLOOD PRESSURE < 130 MM HG: ICD-10-PCS | Mod: CPTII,S$GLB,, | Performed by: FAMILY MEDICINE

## 2022-02-23 PROCEDURE — 85025 COMPLETE CBC W/AUTO DIFF WBC: CPT | Performed by: FAMILY MEDICINE

## 2022-02-23 PROCEDURE — 3288F FALL RISK ASSESSMENT DOCD: CPT | Mod: CPTII,S$GLB,, | Performed by: FAMILY MEDICINE

## 2022-02-23 PROCEDURE — 99999 PR PBB SHADOW E&M-EST. PATIENT-LVL III: CPT | Mod: PBBFAC,,, | Performed by: FAMILY MEDICINE

## 2022-02-23 PROCEDURE — 1101F PT FALLS ASSESS-DOCD LE1/YR: CPT | Mod: CPTII,S$GLB,, | Performed by: FAMILY MEDICINE

## 2022-02-23 PROCEDURE — 36415 COLL VENOUS BLD VENIPUNCTURE: CPT | Mod: S$GLB,,, | Performed by: FAMILY MEDICINE

## 2022-02-23 PROCEDURE — 1126F AMNT PAIN NOTED NONE PRSNT: CPT | Mod: CPTII,S$GLB,, | Performed by: FAMILY MEDICINE

## 2022-02-23 PROCEDURE — 1159F PR MEDICATION LIST DOCUMENTED IN MEDICAL RECORD: ICD-10-PCS | Mod: CPTII,S$GLB,, | Performed by: FAMILY MEDICINE

## 2022-02-23 PROCEDURE — 36415 PR COLLECTION VENOUS BLOOD,VENIPUNCTURE: ICD-10-PCS | Mod: S$GLB,,, | Performed by: FAMILY MEDICINE

## 2022-02-23 PROCEDURE — 1126F PR PAIN SEVERITY QUANTIFIED, NO PAIN PRESENT: ICD-10-PCS | Mod: CPTII,S$GLB,, | Performed by: FAMILY MEDICINE

## 2022-02-23 PROCEDURE — 1159F MED LIST DOCD IN RCRD: CPT | Mod: CPTII,S$GLB,, | Performed by: FAMILY MEDICINE

## 2022-02-23 PROCEDURE — 99999 PR PBB SHADOW E&M-EST. PATIENT-LVL III: ICD-10-PCS | Mod: PBBFAC,,, | Performed by: FAMILY MEDICINE

## 2022-02-23 PROCEDURE — 1157F PR ADVANCE CARE PLAN OR EQUIV PRESENT IN MEDICAL RECORD: ICD-10-PCS | Mod: CPTII,S$GLB,, | Performed by: FAMILY MEDICINE

## 2022-02-23 PROCEDURE — 99214 OFFICE O/P EST MOD 30 MIN: CPT | Mod: S$GLB,,, | Performed by: FAMILY MEDICINE

## 2022-02-23 RX ORDER — MEMANTINE HYDROCHLORIDE 10 MG/1
10 TABLET ORAL 2 TIMES DAILY
Qty: 180 TABLET | Refills: 3 | Status: SHIPPED | OUTPATIENT
Start: 2022-02-23 | End: 2023-03-09 | Stop reason: SDUPTHER

## 2022-02-23 RX ORDER — ALLOPURINOL 300 MG/1
300 TABLET ORAL DAILY
Qty: 90 TABLET | Refills: 1 | Status: SHIPPED | OUTPATIENT
Start: 2022-02-23 | End: 2023-05-04 | Stop reason: SDUPTHER

## 2022-02-23 RX ORDER — PANTOPRAZOLE SODIUM 40 MG/1
40 TABLET, DELAYED RELEASE ORAL DAILY
Qty: 90 TABLET | Refills: 3 | Status: SHIPPED | OUTPATIENT
Start: 2022-02-23 | End: 2023-03-01 | Stop reason: SDUPTHER

## 2022-02-23 RX ORDER — METOPROLOL SUCCINATE 50 MG/1
50 TABLET, EXTENDED RELEASE ORAL 2 TIMES DAILY
Qty: 180 TABLET | Refills: 3 | Status: SHIPPED | OUTPATIENT
Start: 2022-02-23 | End: 2023-03-01 | Stop reason: SDUPTHER

## 2022-02-23 RX ORDER — ALLOPURINOL 300 MG/1
TABLET ORAL
Qty: 90 TABLET | Refills: 1 | Status: SHIPPED | OUTPATIENT
Start: 2022-02-23 | End: 2022-09-22

## 2022-02-23 RX ORDER — LEVOTHYROXINE SODIUM 100 UG/1
TABLET ORAL
Qty: 90 TABLET | Refills: 1 | Status: SHIPPED | OUTPATIENT
Start: 2022-02-23 | End: 2022-09-14 | Stop reason: SDUPTHER

## 2022-02-23 RX ORDER — SIMVASTATIN 40 MG/1
40 TABLET, FILM COATED ORAL NIGHTLY
Qty: 90 TABLET | Refills: 1 | Status: SHIPPED | OUTPATIENT
Start: 2022-02-23 | End: 2022-09-22

## 2022-02-23 NOTE — PROGRESS NOTES
Subjective:       Patient ID: Domonique Hernandez Jr. is a 81 y.o. male.    Chief Complaint: No chief complaint on file.    HPI  81 year old male come sin for f/u. He was seen by urology and cardiology recently. He is doing well overall. He is really struggling with his memory though. His son is taking care of him and says that he is capable of doing his adls but is not allowed to drive or cook.  He is 'always cold' since his covid shot.  He is not seeing any bleeding, energy is good. Denies fatigue.    PMH, PSH, ALLERGIES, SH, FH reviewed in nurse's notes above  Medications reconciled in the nurse's notes    Review of Systems   Constitutional: Negative for chills and fever.   HENT: Negative for congestion, ear pain, postnasal drip, rhinorrhea, sore throat and trouble swallowing.    Eyes: Negative for redness and itching.   Respiratory: Negative for cough, shortness of breath and wheezing.    Cardiovascular: Negative for chest pain and palpitations.   Gastrointestinal: Negative for abdominal pain, diarrhea, nausea and vomiting.   Endocrine: Positive for heat intolerance.   Genitourinary: Negative for dysuria and frequency.   Skin: Negative for rash.   Neurological: Negative for weakness and headaches.       Objective:      Physical Exam  Vitals and nursing note reviewed.   Constitutional:       General: He is not in acute distress.     Appearance: He is well-developed.   HENT:      Head: Normocephalic and atraumatic.   Eyes:      Conjunctiva/sclera: Conjunctivae normal.      Pupils: Pupils are equal, round, and reactive to light.   Neck:      Thyroid: No thyromegaly.   Cardiovascular:      Rate and Rhythm: Normal rate and regular rhythm.      Heart sounds: Normal heart sounds.   Pulmonary:      Effort: Pulmonary effort is normal. No respiratory distress.      Breath sounds: Normal breath sounds. No wheezing.   Abdominal:      General: Bowel sounds are normal.      Palpations: Abdomen is soft.      Tenderness: There is no  abdominal tenderness.   Musculoskeletal:         General: Normal range of motion.      Cervical back: Normal range of motion and neck supple.   Lymphadenopathy:      Cervical: No cervical adenopathy.   Skin:     General: Skin is warm and dry.      Findings: No rash.   Neurological:      Mental Status: He is alert and oriented to person, place, and time.   Psychiatric:         Behavior: Behavior normal.          Assessment/Plan:       Problem List Items Addressed This Visit        Neuro    Mild cognitive impairment    Relevant Medications    memantine (NAMENDA) 10 MG Tab       Cardiac/Vascular    Hyperlipidemia    Relevant Medications    simvastatin (ZOCOR) 40 MG tablet       Endocrine    Hypothyroidism    Relevant Medications    levothyroxine (SYNTHROID) 100 MCG tablet    Other Relevant Orders    TSH       Orthopedic    Chronic gout without tophus    Relevant Medications    allopurinoL (ZYLOPRIM) 300 MG tablet      Other Visit Diagnoses     Iron deficiency anemia due to chronic blood loss    -  Primary    Relevant Orders    CBC Auto Differential    Ferritin    Iron and TIBC    Memory loss        Relevant Medications    memantine (NAMENDA) 10 MG Tab      RTC if condition acutely worsens or any other concerns, otherwise RTC as scheduled

## 2022-02-24 LAB
FERRITIN SERPL-MCNC: 21 NG/ML (ref 20–300)
IRON SERPL-MCNC: 39 UG/DL (ref 45–160)
SATURATED IRON: 9 % (ref 20–50)
TOTAL IRON BINDING CAPACITY: 413 UG/DL (ref 250–450)
TRANSFERRIN SERPL-MCNC: 279 MG/DL (ref 200–375)

## 2022-09-22 PROBLEM — R31.29 MICROSCOPIC HEMATURIA: Status: ACTIVE | Noted: 2022-09-22

## 2022-09-22 PROBLEM — N32.9 LESION OF BLADDER: Status: ACTIVE | Noted: 2022-09-22

## 2022-11-29 ENCOUNTER — OFFICE VISIT (OUTPATIENT)
Dept: NEUROLOGY | Facility: CLINIC | Age: 82
End: 2022-11-29
Payer: MEDICARE

## 2022-11-29 VITALS
RESPIRATION RATE: 16 BRPM | BODY MASS INDEX: 36.16 KG/M2 | HEART RATE: 68 BPM | HEIGHT: 68 IN | WEIGHT: 238.56 LBS | OXYGEN SATURATION: 95 % | DIASTOLIC BLOOD PRESSURE: 72 MMHG | SYSTOLIC BLOOD PRESSURE: 126 MMHG

## 2022-11-29 DIAGNOSIS — G47.33 OSA (OBSTRUCTIVE SLEEP APNEA): ICD-10-CM

## 2022-11-29 DIAGNOSIS — J42 CHRONIC BRONCHITIS, UNSPECIFIED CHRONIC BRONCHITIS TYPE: ICD-10-CM

## 2022-11-29 DIAGNOSIS — I10 PRIMARY HYPERTENSION: ICD-10-CM

## 2022-11-29 DIAGNOSIS — E78.2 MIXED HYPERLIPIDEMIA: ICD-10-CM

## 2022-11-29 DIAGNOSIS — G31.84 MILD COGNITIVE IMPAIRMENT: Primary | ICD-10-CM

## 2022-11-29 DIAGNOSIS — I48.0 PAROXYSMAL ATRIAL FIBRILLATION: ICD-10-CM

## 2022-11-29 PROCEDURE — 3074F PR MOST RECENT SYSTOLIC BLOOD PRESSURE < 130 MM HG: ICD-10-PCS | Mod: CPTII,S$GLB,, | Performed by: NURSE PRACTITIONER

## 2022-11-29 PROCEDURE — 99214 OFFICE O/P EST MOD 30 MIN: CPT | Mod: S$GLB,,, | Performed by: NURSE PRACTITIONER

## 2022-11-29 PROCEDURE — 1157F ADVNC CARE PLAN IN RCRD: CPT | Mod: CPTII,S$GLB,, | Performed by: NURSE PRACTITIONER

## 2022-11-29 PROCEDURE — 3078F PR MOST RECENT DIASTOLIC BLOOD PRESSURE < 80 MM HG: ICD-10-PCS | Mod: CPTII,S$GLB,, | Performed by: NURSE PRACTITIONER

## 2022-11-29 PROCEDURE — 1157F PR ADVANCE CARE PLAN OR EQUIV PRESENT IN MEDICAL RECORD: ICD-10-PCS | Mod: CPTII,S$GLB,, | Performed by: NURSE PRACTITIONER

## 2022-11-29 PROCEDURE — 1159F MED LIST DOCD IN RCRD: CPT | Mod: CPTII,S$GLB,, | Performed by: NURSE PRACTITIONER

## 2022-11-29 PROCEDURE — 3078F DIAST BP <80 MM HG: CPT | Mod: CPTII,S$GLB,, | Performed by: NURSE PRACTITIONER

## 2022-11-29 PROCEDURE — 99999 PR PBB SHADOW E&M-EST. PATIENT-LVL IV: CPT | Mod: PBBFAC,,, | Performed by: NURSE PRACTITIONER

## 2022-11-29 PROCEDURE — 1159F PR MEDICATION LIST DOCUMENTED IN MEDICAL RECORD: ICD-10-PCS | Mod: CPTII,S$GLB,, | Performed by: NURSE PRACTITIONER

## 2022-11-29 PROCEDURE — 99999 PR PBB SHADOW E&M-EST. PATIENT-LVL IV: ICD-10-PCS | Mod: PBBFAC,,, | Performed by: NURSE PRACTITIONER

## 2022-11-29 PROCEDURE — 99214 PR OFFICE/OUTPT VISIT, EST, LEVL IV, 30-39 MIN: ICD-10-PCS | Mod: S$GLB,,, | Performed by: NURSE PRACTITIONER

## 2022-11-29 PROCEDURE — 1126F AMNT PAIN NOTED NONE PRSNT: CPT | Mod: CPTII,S$GLB,, | Performed by: NURSE PRACTITIONER

## 2022-11-29 PROCEDURE — 1126F PR PAIN SEVERITY QUANTIFIED, NO PAIN PRESENT: ICD-10-PCS | Mod: CPTII,S$GLB,, | Performed by: NURSE PRACTITIONER

## 2022-11-29 PROCEDURE — 3074F SYST BP LT 130 MM HG: CPT | Mod: CPTII,S$GLB,, | Performed by: NURSE PRACTITIONER

## 2022-11-29 RX ORDER — FERROUS SULFATE 325(65) MG
325 TABLET, DELAYED RELEASE (ENTERIC COATED) ORAL 2 TIMES DAILY
COMMUNITY
End: 2023-11-30

## 2022-11-29 NOTE — PROGRESS NOTES
HPI: Domonique Hernandez Jr. is a 82 y.o. 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 Suspect mild cognitive impairment. He has HTN, HLD, COPD, hypothyroidism, CKD and KARL. History of A-fib.     He presents today for a follow up visit. Short term memory loss is stable since last year. No hallucinations. No falls. No longer using a walker for stability, unless he plans to walk long distances.     Patient lives independently now, but his son comes over to cook and manage his medications.     He goes to the [employer] 5 days per week.     He does not use his CPAP. His PCP advised that he likely didn't need this after significant weight loss. He is no longer snoring.     He takes Neuriva, a multivitamin with some herbal ingredients.     Mood is euthymic.     He sleeps well at night.     He does have hearing loss. He does not use hearing aids, as they are uncomfortable.     He is a , and no longer has a girlfriend.     Appetite is variable, but adequate.     Review of Systems   Constitutional:  Negative for fever.   Eyes:  Negative for double vision.   Respiratory:  Negative for hemoptysis.    Cardiovascular:  Negative for chest pain.   Gastrointestinal:  Negative for blood in stool.   Genitourinary:  Negative for hematuria.   Musculoskeletal:  Negative for falls and joint pain.   Skin:  Negative for rash.   Neurological:  Negative for seizures and headaches.   Psychiatric/Behavioral:  Positive for memory loss. The patient does not have insomnia.      I have reviewed all of this patient's past medical and surgical histories as well as family and social histories and active allergies and medications as documented in the electronic medical record.    Exam:  Gen Appearance, well developed/nourished in no apparent distress  CV: 2+ distal pulses with no edema or swelling  Neuro:  MS: Awake, alert, oriented to place, person, time, situation. Sustains  attention. Recent recall is mildly impaired/remote memory intact, Language is full to spontaneous speech/comprehension. Fund of Knowledge is full. Able to name current President easily.   Mood is euthymic  CN: Optic discs not visualized today, PERRL, Extraoccular movements and visual fields are full. Normal facial sensation and strength, Hearing symmetric, Tongue and Palate are midline and strong. Shoulder Shrug symmetric and strong.  Motor: Normal bulk, tone, no abnormal movements. 5/5 strength bilateral upper/lower extremities with 2+ reflexes and clonus  Sensory: symmetric to temp, and vibration.  Romberg negative  Cerebellar: Finger-nose,Heal-shin, Rapid alternating movements intact  Gait: arthralgic gait; ambulating with walker for stability.     Labs:  3/2017 TSH normal  8/2020 B12, TSH, CBC, and CMP reviewed-unremarkable, aside from mild CKD.     Imaging:   CT Head 8/2020:   No acute intracranial findings.     Age-appropriate cerebral volume loss with mild moderate patchy decreased attenuation supratentorial white matter while nonspecific suggestive for chronic ischemic change.     No evidence for acute intracranial hemorrhage.  Clinical correlation and further evaluation as warranted.     9/2016 MRI brain: Age-appropriate generalized volume loss with mild/moderate degree of T2/FLAIR signal abnormality within the supratentorial white matter  while nonspecific suggest chronic microvascular ischemic change.    No evidence for acute infarction or enhancing lesion.    Assessment/Plan: Domonique Hernandez Jr. is a 82 y.o. male with short term memory loss. Suspect mild cognitive impairment.     I recommend:   1. Cognitive impairment/worsening memory can occur after cardiac surgeries, particularly bypass, as discussed with patient.   -Additionally his hypothyroidism, hypogonadism, KARL, and CKD diagnosis increase risk of memory complaints.   -he takes Namenda per PCP, as well as Neuriva, which is a multivitamin with herbal  ingredients. No longer on Prevagen.     -note increased falls and incontinence on Effexor prior. Mood euthymic currently.     2. CT Head and labs unrevealing for cause of his memory complaints. No MRI, due to his loop recorder.     3. No longer on CPAP for KARL after weight loss.    4. Not driving since CABG.     5. He is on Eliquis for A-fib history. Fall precautions were discussed.     RTC 1 year or sooner with worsening

## 2023-03-09 DIAGNOSIS — R41.3 MEMORY LOSS: ICD-10-CM

## 2023-03-09 DIAGNOSIS — G31.84 MILD COGNITIVE IMPAIRMENT: ICD-10-CM

## 2023-03-10 RX ORDER — MEMANTINE HYDROCHLORIDE 10 MG/1
10 TABLET ORAL 2 TIMES DAILY
Qty: 180 TABLET | Refills: 1 | Status: SHIPPED | OUTPATIENT
Start: 2023-03-10 | End: 2023-09-01 | Stop reason: SDUPTHER

## 2023-03-16 DIAGNOSIS — E03.4 HYPOTHYROIDISM DUE TO ACQUIRED ATROPHY OF THYROID: ICD-10-CM

## 2023-03-17 NOTE — TELEPHONE ENCOUNTER
Care Due:                  Date            Visit Type   Department     Provider  --------------------------------------------------------------------------------                                EP -                              PRIMARY      [facility]  Last Visit: 02-      CARE ([facility])   MEDICINE       Lucia Leach  Next Visit: None Scheduled  None         None Found                                                            Last  Test          Frequency    Reason                     Performed    Due Date  --------------------------------------------------------------------------------    Office Visit  12 months..  allopurinoL..............  02- 02-    Uric Acid...  12 months..  allopurinoL..............  Not Found    Overdue    [employer] Embedded Care Gaps. Reference number: [id number]. 3/16/2023   7:12:16 PM CDT

## 2023-03-18 RX ORDER — LEVOTHYROXINE SODIUM 100 UG/1
TABLET ORAL
Qty: 90 TABLET | Refills: 1 | Status: SHIPPED | OUTPATIENT
Start: 2023-03-18 | End: 2023-09-21 | Stop reason: SDUPTHER

## 2023-03-29 ENCOUNTER — PES CALL (OUTPATIENT)
Dept: ADMINISTRATIVE | Facility: CLINIC | Age: 83
End: 2023-03-29
Payer: MEDICARE

## 2023-04-28 NOTE — TELEPHONE ENCOUNTER
No care due was identified.  [facility] Embedded Care Due Messages. Reference number: [id number].   4/28/2023 8:14:26 AM CDT

## 2023-05-01 RX ORDER — ALLOPURINOL 300 MG/1
300 TABLET ORAL DAILY
Qty: 90 TABLET | Refills: 1 | OUTPATIENT
Start: 2023-05-01

## 2023-05-04 RX ORDER — ALLOPURINOL 300 MG/1
300 TABLET ORAL DAILY
Qty: 90 TABLET | Refills: 1 | Status: SHIPPED | OUTPATIENT
Start: 2023-05-04 | End: 2023-10-26 | Stop reason: SDUPTHER

## 2023-05-04 NOTE — TELEPHONE ENCOUNTER
Refill Routing Note   Medication(s) are not appropriate for processing by [facility] for the following reason(s):      - Outside of protocol  - [facility] REQUIREMENT; PARTICIPATING PROVIDER ESTABLISHED AS PCP  - Unclear if patient follows you    [facility] action(s):  Route          Medication reconciliation completed: No     Appointments  past 12m or future 3m with PCP    Date Provider   Last Visit   2/23/2022 Lucia Leach MD   Next Visit   Visit date not found Lucia Leach MD   ED visits in past 90 days: 0        Note composed:10:04 AM 05/04/2023

## 2023-06-16 ENCOUNTER — PATIENT MESSAGE (OUTPATIENT)
Dept: PODIATRY | Facility: CLINIC | Age: 83
End: 2023-06-16
Payer: MEDICARE

## 2023-09-01 DIAGNOSIS — G31.84 MILD COGNITIVE IMPAIRMENT: ICD-10-CM

## 2023-09-01 DIAGNOSIS — R41.3 MEMORY LOSS: ICD-10-CM

## 2023-09-01 RX ORDER — MEMANTINE HYDROCHLORIDE 10 MG/1
10 TABLET ORAL 2 TIMES DAILY
Qty: 180 TABLET | Refills: 1 | Status: SHIPPED | OUTPATIENT
Start: 2023-09-01 | End: 2024-03-03 | Stop reason: SDUPTHER

## 2023-09-21 DIAGNOSIS — E03.4 HYPOTHYROIDISM DUE TO ACQUIRED ATROPHY OF THYROID: ICD-10-CM

## 2023-09-21 NOTE — TELEPHONE ENCOUNTER
Depo was given to patient today, using our supply. Patient left clinic ambulatory with no concerns.     Pregnancy test done and was negative.     Her next depo is due March 3 - March 17.    Refill Routing Note   Medication(s) are not appropriate for processing by [facility] for the following reason(s):      Responsible provider unclear    [facility] action(s):  Route Care Due:  None identified          Appointments  past 12m or future 3m with PCP    Date Provider   Last Visit   7/21/2021 Emiliano Cam MD   Next Visit   Visit date not found Emiliano Cam MD   ED visits in past 90 days: 1        Note composed:6:19 PM 09/21/2023

## 2023-09-22 RX ORDER — LEVOTHYROXINE SODIUM 100 UG/1
TABLET ORAL
Qty: 90 TABLET | Refills: 1 | Status: SHIPPED | OUTPATIENT
Start: 2023-09-22 | End: 2024-04-01 | Stop reason: SDUPTHER

## 2023-10-04 ENCOUNTER — OFFICE VISIT (OUTPATIENT)
Dept: FAMILY MEDICINE | Facility: CLINIC | Age: 83
End: 2023-10-04
Payer: MEDICARE

## 2023-10-04 VITALS
HEART RATE: 67 BPM | SYSTOLIC BLOOD PRESSURE: 131 MMHG | OXYGEN SATURATION: 95 % | HEIGHT: 68 IN | BODY MASS INDEX: 36.29 KG/M2 | WEIGHT: 239.44 LBS | DIASTOLIC BLOOD PRESSURE: 78 MMHG | RESPIRATION RATE: 15 BRPM

## 2023-10-04 DIAGNOSIS — I70.0 THORACIC AORTIC ATHEROSCLEROSIS: ICD-10-CM

## 2023-10-04 DIAGNOSIS — F03.B0 MODERATE DEMENTIA WITHOUT BEHAVIORAL DISTURBANCE, PSYCHOTIC DISTURBANCE, MOOD DISTURBANCE, OR ANXIETY, UNSPECIFIED DEMENTIA TYPE: ICD-10-CM

## 2023-10-04 DIAGNOSIS — I65.23 CAROTID ARTERY CALCIFICATION, BILATERAL: ICD-10-CM

## 2023-10-04 DIAGNOSIS — E03.4 HYPOTHYROIDISM DUE TO ACQUIRED ATROPHY OF THYROID: ICD-10-CM

## 2023-10-04 DIAGNOSIS — E78.2 MIXED HYPERLIPIDEMIA: ICD-10-CM

## 2023-10-04 DIAGNOSIS — E66.2 CLASS 2 OBESITY WITH ALVEOLAR HYPOVENTILATION, SERIOUS COMORBIDITY, AND BODY MASS INDEX (BMI) OF 39.0 TO 39.9 IN ADULT: ICD-10-CM

## 2023-10-04 DIAGNOSIS — G47.33 OSA (OBSTRUCTIVE SLEEP APNEA): ICD-10-CM

## 2023-10-04 DIAGNOSIS — F32.0 MAJOR DEPRESSIVE DISORDER, SINGLE EPISODE, MILD: ICD-10-CM

## 2023-10-04 DIAGNOSIS — I48.0 PAROXYSMAL ATRIAL FIBRILLATION: ICD-10-CM

## 2023-10-04 DIAGNOSIS — I10 PRIMARY HYPERTENSION: ICD-10-CM

## 2023-10-04 PROBLEM — Z93.1 GASTROSTOMY STATUS: Status: RESOLVED | Noted: 2022-01-08 | Resolved: 2023-10-04

## 2023-10-04 PROBLEM — E11.22 TYPE 2 DIABETES MELLITUS WITH STAGE 3A CHRONIC KIDNEY DISEASE, WITHOUT LONG-TERM CURRENT USE OF INSULIN: Status: RESOLVED | Noted: 2022-01-08 | Resolved: 2023-10-04

## 2023-10-04 PROBLEM — N18.31 TYPE 2 DIABETES MELLITUS WITH STAGE 3A CHRONIC KIDNEY DISEASE, WITHOUT LONG-TERM CURRENT USE OF INSULIN: Status: RESOLVED | Noted: 2022-01-08 | Resolved: 2023-10-04

## 2023-10-04 PROBLEM — Z60.9 POOR SOCIAL SITUATION: Status: RESOLVED | Noted: 2021-05-05 | Resolved: 2023-10-04

## 2023-10-04 PROCEDURE — 3078F PR MOST RECENT DIASTOLIC BLOOD PRESSURE < 80 MM HG: ICD-10-PCS | Mod: CPTII,S$GLB,, | Performed by: FAMILY MEDICINE

## 2023-10-04 PROCEDURE — 3288F FALL RISK ASSESSMENT DOCD: CPT | Mod: CPTII,S$GLB,, | Performed by: FAMILY MEDICINE

## 2023-10-04 PROCEDURE — 1101F PT FALLS ASSESS-DOCD LE1/YR: CPT | Mod: CPTII,S$GLB,, | Performed by: FAMILY MEDICINE

## 2023-10-04 PROCEDURE — 3075F SYST BP GE 130 - 139MM HG: CPT | Mod: CPTII,S$GLB,, | Performed by: FAMILY MEDICINE

## 2023-10-04 PROCEDURE — 1101F PR PT FALLS ASSESS DOC 0-1 FALLS W/OUT INJ PAST YR: ICD-10-PCS | Mod: CPTII,S$GLB,, | Performed by: FAMILY MEDICINE

## 2023-10-04 PROCEDURE — 99214 OFFICE O/P EST MOD 30 MIN: CPT | Mod: S$GLB,,, | Performed by: FAMILY MEDICINE

## 2023-10-04 PROCEDURE — 99999 PR PBB SHADOW E&M-EST. PATIENT-LVL IV: ICD-10-PCS | Mod: PBBFAC,,, | Performed by: FAMILY MEDICINE

## 2023-10-04 PROCEDURE — 1157F ADVNC CARE PLAN IN RCRD: CPT | Mod: CPTII,S$GLB,, | Performed by: FAMILY MEDICINE

## 2023-10-04 PROCEDURE — 99999 PR PBB SHADOW E&M-EST. PATIENT-LVL IV: CPT | Mod: PBBFAC,,, | Performed by: FAMILY MEDICINE

## 2023-10-04 PROCEDURE — 3288F PR FALLS RISK ASSESSMENT DOCUMENTED: ICD-10-PCS | Mod: CPTII,S$GLB,, | Performed by: FAMILY MEDICINE

## 2023-10-04 PROCEDURE — 1160F PR REVIEW ALL MEDS BY PRESCRIBER/CLIN PHARMACIST DOCUMENTED: ICD-10-PCS | Mod: CPTII,S$GLB,, | Performed by: FAMILY MEDICINE

## 2023-10-04 PROCEDURE — 3075F PR MOST RECENT SYSTOLIC BLOOD PRESS GE 130-139MM HG: ICD-10-PCS | Mod: CPTII,S$GLB,, | Performed by: FAMILY MEDICINE

## 2023-10-04 PROCEDURE — 1159F PR MEDICATION LIST DOCUMENTED IN MEDICAL RECORD: ICD-10-PCS | Mod: CPTII,S$GLB,, | Performed by: FAMILY MEDICINE

## 2023-10-04 PROCEDURE — 1159F MED LIST DOCD IN RCRD: CPT | Mod: CPTII,S$GLB,, | Performed by: FAMILY MEDICINE

## 2023-10-04 PROCEDURE — 99214 PR OFFICE/OUTPT VISIT, EST, LEVL IV, 30-39 MIN: ICD-10-PCS | Mod: S$GLB,,, | Performed by: FAMILY MEDICINE

## 2023-10-04 PROCEDURE — 1126F PR PAIN SEVERITY QUANTIFIED, NO PAIN PRESENT: ICD-10-PCS | Mod: CPTII,S$GLB,, | Performed by: FAMILY MEDICINE

## 2023-10-04 PROCEDURE — 1160F RVW MEDS BY RX/DR IN RCRD: CPT | Mod: CPTII,S$GLB,, | Performed by: FAMILY MEDICINE

## 2023-10-04 PROCEDURE — 1126F AMNT PAIN NOTED NONE PRSNT: CPT | Mod: CPTII,S$GLB,, | Performed by: FAMILY MEDICINE

## 2023-10-04 PROCEDURE — 3078F DIAST BP <80 MM HG: CPT | Mod: CPTII,S$GLB,, | Performed by: FAMILY MEDICINE

## 2023-10-04 PROCEDURE — 1157F PR ADVANCE CARE PLAN OR EQUIV PRESENT IN MEDICAL RECORD: ICD-10-PCS | Mod: CPTII,S$GLB,, | Performed by: FAMILY MEDICINE

## 2023-10-04 RX ORDER — GLUCOSAM/CHONDRO/HERB 149/HYAL 750-100 MG
TABLET ORAL
COMMUNITY
Start: 2023-03-01 | End: 2023-11-30

## 2023-10-04 RX ORDER — POTASSIUM CHLORIDE 750 MG/1
TABLET, EXTENDED RELEASE ORAL
COMMUNITY
Start: 2021-12-29 | End: 2023-11-30

## 2023-10-04 NOTE — PROGRESS NOTES
Subjective:       Patient ID: Domonique Hernandez Jr. is a 83 y.o. male.    Chief Complaint: Establish Care (No current complaints )    Pt is a 83 y.o. male who presents for evaluation and management of   Encounter Diagnoses   Name Primary?    Moderate dementia without behavioral disturbance, psychotic disturbance, mood disturbance, or anxiety, unspecified dementia type     Major depressive disorder, single episode, mild     KARL (obstructive sleep apnea)     Hypothyroidism due to acquired atrophy of thyroid     Primary hypertension     Mixed hyperlipidemia     Thoracic aortic atherosclerosis     Paroxysmal atrial fibrillation     Class 2 obesity with alveolar hypoventilation, serious comorbidity, and body mass index (BMI) of 39.0 to 39.9 in adult     Carotid artery calcification, bilateral    .  Doing well on current meds. Denies any side effects. Prevention is up to date.  Review of Systems   Constitutional:  Negative for fever.   Respiratory:  Negative for shortness of breath.    Cardiovascular:  Negative for chest pain.   Gastrointestinal:  Negative for anal bleeding and blood in stool.   Genitourinary:  Negative for dysuria.   Musculoskeletal:  Negative for gait problem.        No falls since 2020   Neurological:  Negative for dizziness and light-headedness.        Demented        Objective:      Physical Exam  Constitutional:       Appearance: Normal appearance. He is well-developed. He is not ill-appearing.   HENT:      Head: Normocephalic and atraumatic.      Right Ear: External ear normal.      Left Ear: External ear normal.      Nose: Nose normal.      Mouth/Throat:      Mouth: Mucous membranes are moist.      Pharynx: No oropharyngeal exudate or posterior oropharyngeal erythema.   Eyes:      General: No scleral icterus.        Right eye: No discharge.         Left eye: No discharge.      Conjunctiva/sclera: Conjunctivae normal.      Pupils: Pupils are equal, round, and reactive to light.   Neck:      Thyroid: No  thyromegaly.      Vascular: No JVD.      Trachea: No tracheal deviation.   Cardiovascular:      Rate and Rhythm: Normal rate and regular rhythm.      Heart sounds: Normal heart sounds. No murmur heard.  Pulmonary:      Effort: Pulmonary effort is normal. No respiratory distress.      Breath sounds: Normal breath sounds. No wheezing or rales.   Chest:      Chest wall: No tenderness.   Abdominal:      General: Bowel sounds are normal. There is no distension.      Palpations: Abdomen is soft. There is no mass.      Tenderness: There is no abdominal tenderness. There is no guarding or rebound.   Musculoskeletal:         General: Normal range of motion.      Cervical back: Normal range of motion and neck supple.      Right lower leg: No edema.      Left lower leg: No edema.   Lymphadenopathy:      Cervical: No cervical adenopathy.   Skin:     General: Skin is warm and dry.   Neurological:      General: No focal deficit present.      Mental Status: He is alert.      Cranial Nerves: No cranial nerve deficit.      Motor: No abnormal muscle tone.      Coordination: Coordination normal.      Deep Tendon Reflexes: Reflexes are normal and symmetric. Reflexes normal.      Comments: Oriented to person and place   Not time      Psychiatric:         Behavior: Behavior normal.         Thought Content: Thought content normal.         Judgment: Judgment normal.         Assessment:       1. Moderate dementia without behavioral disturbance, psychotic disturbance, mood disturbance, or anxiety, unspecified dementia type    2. Major depressive disorder, single episode, mild    3. KARL (obstructive sleep apnea)    4. Hypothyroidism due to acquired atrophy of thyroid    5. Primary hypertension    6. Mixed hyperlipidemia    7. Thoracic aortic atherosclerosis    8. Paroxysmal atrial fibrillation    9. Class 2 obesity with alveolar hypoventilation, serious comorbidity, and body mass index (BMI) of 39.0 to 39.9 in adult    10. Carotid artery  calcification, bilateral        Plan:   1. Moderate dementia without behavioral disturbance, psychotic disturbance, mood disturbance, or anxiety, unspecified dementia type  Overview:  He lives alone   Does not cook or drive.  His son lives a mile away and picks him up everyday to bring him to the senior center in [place].   No issues with sleep or mood   On namenda and his son gives him prevagen OTC         2. Major depressive disorder, single episode, mild  Overview:  Doing well off of effexor since 2021       3. KARL (obstructive sleep apnea)  Overview:  No longer on Cpap at home   No longer snores         4. Hypothyroidism due to acquired atrophy of thyroid  Overview:  Lab Results   Component Value Date    TSH 0.470 02/23/2022     Levothyroxine 100mcg       5. Primary hypertension  Overview:  Controlled on metoprolol only         6. Mixed hyperlipidemia  Overview:  Lab Results   Component Value Date    LDLCALC 59.4 (L) 10/09/2020     Takes fish oil and atorvastatin 20         7. Thoracic aortic atherosclerosis  Overview:  ASA 81   Lipitor 20         8. Paroxysmal atrial fibrillation  Overview:  Sees Dr. Fair at [facility]   Has labs tomorrow (1/5/23---fax results to me)  Eliquis   Metoprolol       9. Class 2 obesity with alveolar hypoventilation, serious comorbidity, and body mass index (BMI) of 39.0 to 39.9 in adult  Overview:  The patient is asked to make an attempt to improve diet and exercise patterns to aid in medical management of this problem.  Cut out white carbs: bread, rice, pasta, potatoes. Exercise/walk 5x/week for at least 30 minutes  .        10. Carotid artery calcification, bilateral  Overview:  ASA  lipitor 20         Refuses flu vaccine     Request [facility] labs that are to be done tomorrow     RTC 6 months       No follow-ups on file.

## 2023-10-05 LAB
CHOLEST SERPL-MSCNC: 134 MG/DL (ref 0–200)
CHOLEST/HDLC SERPL: 4.8 {RATIO}
EGFR: 58.1
HDLC SERPL-MCNC: 28 MG/DL (ref 35–70)
LDL CHOLESTEROL DIRECT: 94 MG/DL
NON HDL CHOL. (LDL+VLDL): 106
TRIGL SERPL-MCNC: 141 MG/DL (ref 40–160)
VLDL CHOLESTEROL: 28 MG/DL

## 2023-10-26 NOTE — TELEPHONE ENCOUNTER
Refill Routing Note   Medication(s) are not appropriate for processing by [facility] for the following reason(s):      Required labs outdated  No active prescription written by provider    [facility] action(s):  Defer Care Due:  Labs due              Appointments  past 12m or future 3m with PCP    Date Provider   Last Visit   10/4/2023 Andrew Major MD   Next Visit   4/1/2024 Andrew Major MD   ED visits in past 90 days: 1        Note composed:12:42 PM 10/26/2023

## 2023-10-26 NOTE — TELEPHONE ENCOUNTER
Care Due:                  Date            Visit Type   Department     Provider  --------------------------------------------------------------------------------                                             [facility]  Last Visit: 10-      [facility]          FRANCES Major                               -                              [facility]  Next Visit: 04-      CARE ([facility])   MEDICINE       Andrew Major                                                            Last  Test          Frequency    Reason                     Performed    Due Date  --------------------------------------------------------------------------------    Uric Acid...  12 months..  allopurinoL..............  Not Found    Overdue    [employer] Embedded Care Due Messages. Reference number: [id number].   10/26/2023 7:59:00 AM CDT

## 2023-10-27 RX ORDER — ALLOPURINOL 300 MG/1
300 TABLET ORAL DAILY
Qty: 90 TABLET | Refills: 0 | Status: SHIPPED | OUTPATIENT
Start: 2023-10-27 | End: 2024-01-29 | Stop reason: SDUPTHER

## 2023-11-02 ENCOUNTER — PATIENT OUTREACH (OUTPATIENT)
Dept: ADMINISTRATIVE | Facility: HOSPITAL | Age: 83
End: 2023-11-02
Payer: MEDICARE

## 2023-11-30 ENCOUNTER — OFFICE VISIT (OUTPATIENT)
Dept: NEUROLOGY | Facility: CLINIC | Age: 83
End: 2023-11-30
Payer: MEDICARE

## 2023-11-30 VITALS
HEART RATE: 70 BPM | DIASTOLIC BLOOD PRESSURE: 80 MMHG | RESPIRATION RATE: 16 BRPM | OXYGEN SATURATION: 97 % | HEIGHT: 68 IN | SYSTOLIC BLOOD PRESSURE: 126 MMHG | WEIGHT: 236.69 LBS | BODY MASS INDEX: 35.87 KG/M2

## 2023-11-30 DIAGNOSIS — F03.B0 MODERATE DEMENTIA WITHOUT BEHAVIORAL DISTURBANCE, PSYCHOTIC DISTURBANCE, MOOD DISTURBANCE, OR ANXIETY, UNSPECIFIED DEMENTIA TYPE: Primary | ICD-10-CM

## 2023-11-30 PROCEDURE — 3074F PR MOST RECENT SYSTOLIC BLOOD PRESSURE < 130 MM HG: ICD-10-PCS | Mod: CPTII,S$GLB,, | Performed by: NURSE PRACTITIONER

## 2023-11-30 PROCEDURE — 3079F PR MOST RECENT DIASTOLIC BLOOD PRESSURE 80-89 MM HG: ICD-10-PCS | Mod: CPTII,S$GLB,, | Performed by: NURSE PRACTITIONER

## 2023-11-30 PROCEDURE — 1126F AMNT PAIN NOTED NONE PRSNT: CPT | Mod: CPTII,S$GLB,, | Performed by: NURSE PRACTITIONER

## 2023-11-30 PROCEDURE — 1126F PR PAIN SEVERITY QUANTIFIED, NO PAIN PRESENT: ICD-10-PCS | Mod: CPTII,S$GLB,, | Performed by: NURSE PRACTITIONER

## 2023-11-30 PROCEDURE — 99999 PR PBB SHADOW E&M-EST. PATIENT-LVL IV: CPT | Mod: PBBFAC,,, | Performed by: NURSE PRACTITIONER

## 2023-11-30 PROCEDURE — 1157F PR ADVANCE CARE PLAN OR EQUIV PRESENT IN MEDICAL RECORD: ICD-10-PCS | Mod: CPTII,S$GLB,, | Performed by: NURSE PRACTITIONER

## 2023-11-30 PROCEDURE — 3074F SYST BP LT 130 MM HG: CPT | Mod: CPTII,S$GLB,, | Performed by: NURSE PRACTITIONER

## 2023-11-30 PROCEDURE — 99214 OFFICE O/P EST MOD 30 MIN: CPT | Mod: S$GLB,,, | Performed by: NURSE PRACTITIONER

## 2023-11-30 PROCEDURE — 1159F MED LIST DOCD IN RCRD: CPT | Mod: CPTII,S$GLB,, | Performed by: NURSE PRACTITIONER

## 2023-11-30 PROCEDURE — 3079F DIAST BP 80-89 MM HG: CPT | Mod: CPTII,S$GLB,, | Performed by: NURSE PRACTITIONER

## 2023-11-30 PROCEDURE — 99214 PR OFFICE/OUTPT VISIT, EST, LEVL IV, 30-39 MIN: ICD-10-PCS | Mod: S$GLB,,, | Performed by: NURSE PRACTITIONER

## 2023-11-30 PROCEDURE — 99999 PR PBB SHADOW E&M-EST. PATIENT-LVL IV: ICD-10-PCS | Mod: PBBFAC,,, | Performed by: NURSE PRACTITIONER

## 2023-11-30 PROCEDURE — 1159F PR MEDICATION LIST DOCUMENTED IN MEDICAL RECORD: ICD-10-PCS | Mod: CPTII,S$GLB,, | Performed by: NURSE PRACTITIONER

## 2023-11-30 PROCEDURE — 1157F ADVNC CARE PLAN IN RCRD: CPT | Mod: CPTII,S$GLB,, | Performed by: NURSE PRACTITIONER

## 2023-11-30 NOTE — PROGRESS NOTES
HPI: Domonique Hernandez Jr. is a 83 y.o. 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 Suspect mild cognitive impairment. He has HTN, HLD, COPD, hypothyroidism, CKD and KARL. History of A-fib.     He presents today for a follow up visit. Short term memory loss is worse over time. Son notes that he sometimes showers multiples times per day or forgets or goes down the carver and doesn't remember where he is going.     No falls. He sleeps well at night. No hallucinations.     He lives alone, but his son lives close-by and checks on him twice each day.     His son brings him his meals. He also eats at the [employer], which he goes to almost daily. He plays games like [employer] and exercises there.     He does not use his CPAP. His PCP advised that he likely didn't need this after significant weight loss. He is no longer snoring.     Mood is euthymic, aside from mild annoyance. No crying spells.     He sleeps well at night. If he does not sleep well, his memory is more affected the following day.     He does have hearing loss. He does not use hearing aids, as they are uncomfortable.     He is a , and no longer has a girlfriend.     Appetite is variable. Some days he has a great appetite, and other days, he eats very little.     He does not drive.    Review of Systems   Constitutional:  Negative for fever.   Eyes:  Negative for double vision.   Respiratory:  Negative for hemoptysis.    Cardiovascular:  Negative for chest pain.   Gastrointestinal:  Negative for blood in stool.   Genitourinary:  Negative for hematuria.   Musculoskeletal:  Negative for falls and joint pain.   Skin:  Negative for rash.   Neurological:  Negative for seizures and headaches.   Psychiatric/Behavioral:  Positive for memory loss. The patient does not have insomnia.        I have reviewed all of this patient's past medical and surgical histories as well as family and social  histories and active allergies and medications as documented in the electronic medical record.    Exam:  Gen Appearance, well developed/nourished in no apparent distress  CV: 2+ distal pulses with no edema or swelling  Neuro:  MS: Awake, alert, oriented to place, person, time, situation. Sustains attention. Recent recall is moderately impaired/remote memory intact, Language is full to spontaneous speech/comprehension. Fund of Knowledge is full. He is no longer able to name the President. Clock drawing is impaired today-poor planning and inaccurate placement of 12.   Mood is euthymic  CN: PERRL, Extraoccular movements and visual fields are full. Normal facial sensation and strength, Hearing symmetric, Tongue and Palate are midline and strong. Shoulder Shrug symmetric and strong.  Motor: Normal bulk, tone, no abnormal movements. 5/5 strength bilateral upper/lower extremities with 2+ reflexes and clonus  Sensory: symmetric to temp, and vibration.  Romberg negative  Cerebellar: Finger-nose,Heal-shin, Rapid alternating movements intact  Gait: arthralgic gait; ambulating with walker for stability.     Labs:  3/2017 TSH normal  8/2020 B12, TSH, CBC, and CMP reviewed-unremarkable, aside from mild CKD.     Imaging:   CT Head 8/2020:   No acute intracranial findings.     Age-appropriate cerebral volume loss with mild moderate patchy decreased attenuation supratentorial white matter while nonspecific suggestive for chronic ischemic change.     No evidence for acute intracranial hemorrhage.  Clinical correlation and further evaluation as warranted.     9/2016 MRI brain: Age-appropriate generalized volume loss with mild/moderate degree of T2/FLAIR signal abnormality within the supratentorial white matter  while nonspecific suggest chronic microvascular ischemic change.    No evidence for acute infarction or enhancing lesion.    Assessment/Plan: Alissaadair JANELLE Hernandez Jr. is a 83 y.o. male with evidence Alzheimer's dementia.     I  recommend:   1. Cognitive impairment/worsening memory can occur after cardiac surgeries, particularly bypass, as discussed with patient.   -memory loss has progressed and is now suggestive of Alzheimer's dementia.   -supportive care and prognosis were discussed with son today. Suggested book the 36 hour day.   -patient lives along and has good support from family throughout the day. No need for 24 hour supervision yet, but advised to monitor for wandering, weight loss, hallucinations, etc.       -Additionally his hypothyroidism, hypogonadism, KARL, and CKD diagnosis increase risk of memory complaints.   -he takes Namenda per PCP, as well as Prevagen. No longer on Neuriva.     -note increased falls and incontinence on Effexor prior. Mood euthymic currently.     2. CT Head and labs unrevealing for cause of his memory complaints. No MRI's, due to his loop recorder.     3. No longer on CPAP for KARL after weight loss.    4. Not driving since CABG.     5. He is on Eliquis for A-fib history. Fall precautions were discussed.     RTC 1 year or sooner with worsening

## 2023-12-11 ENCOUNTER — TELEPHONE (OUTPATIENT)
Dept: NEUROLOGY | Facility: CLINIC | Age: 83
End: 2023-12-11
Payer: MEDICARE

## 2024-01-29 RX ORDER — ALLOPURINOL 300 MG/1
300 TABLET ORAL DAILY
Qty: 90 TABLET | Refills: 0 | Status: SHIPPED | OUTPATIENT
Start: 2024-01-29 | End: 2024-05-02 | Stop reason: SDUPTHER

## 2024-01-29 NOTE — TELEPHONE ENCOUNTER
Care Due:                  Date            Visit Type   Department     Provider  --------------------------------------------------------------------------------                                             [facility]  Last Visit: 10-      [facility]          FRANCES Major                               -                              [facility]  Next Visit: 04-      CARE ([facility])   MEDICINE       Andrew Major                                                            Last  Test          Frequency    Reason                     Performed    Due Date  --------------------------------------------------------------------------------    CMP.........  12 months..  allopurinoL..............  10-   01-    Uric Acid...  12 months..  allopurinoL..............  Not Found    Overdue    [employer] Embedded Care Due Messages. Reference number: [id number].   1/29/2024 7:38:16 AM CST

## 2024-01-30 NOTE — TELEPHONE ENCOUNTER
Refill Routing Note   Medication(s) are not appropriate for processing by [facility] for the following reason(s):        Required labs outdated    [facility] action(s):  Defer     Requires labs : Yes             Appointments  past 12m or future 3m with PCP    Date Provider   Last Visit   10/4/2023 Andrew Major MD   Next Visit   4/1/2024 Andrew Major MD   ED visits in past 90 days: 0        Note composed:6:35 PM 01/29/2024

## 2024-03-03 DIAGNOSIS — R41.3 MEMORY LOSS: ICD-10-CM

## 2024-03-03 DIAGNOSIS — G31.84 MILD COGNITIVE IMPAIRMENT: ICD-10-CM

## 2024-03-04 ENCOUNTER — TELEPHONE (OUTPATIENT)
Dept: FAMILY MEDICINE | Facility: CLINIC | Age: 84
End: 2024-03-04
Payer: COMMERCIAL

## 2024-03-04 RX ORDER — MEMANTINE HYDROCHLORIDE 10 MG/1
10 TABLET ORAL 2 TIMES DAILY
Qty: 180 TABLET | Refills: 1 | Status: SHIPPED | OUTPATIENT
Start: 2024-03-04 | End: 2025-03-04

## 2024-03-04 NOTE — TELEPHONE ENCOUNTER
----- Message from Almas Gifford sent at 3/4/2024  8:52 AM CST -----  Contact: Pts meghan Hernandez Jr.  MRN: [id number]  : 1940  PCP: Andrew Major  Home Phone      [phone]  Work Phone      Not on file.  Mobile          [phone]      MESSAGE:     Pts son called requesting refill of medication memantine (NAMENDA) 10 MG Tab. Pt would like medication to be sent to [facility]            Please advise  Jason  [phone]

## 2024-04-01 DIAGNOSIS — E03.4 HYPOTHYROIDISM DUE TO ACQUIRED ATROPHY OF THYROID: ICD-10-CM

## 2024-04-01 NOTE — TELEPHONE ENCOUNTER
Care Due:                  Date            Visit Type   Department     Provider  --------------------------------------------------------------------------------                                             [facility]  Last Visit: 10-      [facility]       Andrew Major  Next Visit: None Scheduled  None         None Found                                                            Last  Test          Frequency    Reason                     Performed    Due Date  --------------------------------------------------------------------------------    CMP.........  12 months..  allopurinoL..............  10-   04-    Uric Acid...  12 months..  allopurinoL..............  Not Found    Overdue    [employer] Embedded Care Due Messages. Reference number: [id number].   4/01/2024 8:16:14 AM CDT

## 2024-04-02 RX ORDER — LEVOTHYROXINE SODIUM 100 UG/1
TABLET ORAL
Qty: 90 TABLET | Refills: 0 | Status: SHIPPED | OUTPATIENT
Start: 2024-04-02

## 2024-04-02 NOTE — TELEPHONE ENCOUNTER
Refill Routing Note   Medication(s) are not appropriate for processing by [facility] for the following reason(s):        No active prescription written by provider  Required labs outdated    [facility] action(s):  Defer   Requires labs : Yes        Medication Therapy Plan: Last ordered: 6 months ago (9/22/2023) by Emiliano Cam MD      Appointments  past 12m or future 3m with PCP    Date Provider   Last Visit   10/4/2023 Andrew Major MD   Next Visit   4/1/2024 Andrew Major MD   ED visits in past 90 days: 0        Note composed:10:15 AM 04/02/2024

## 2024-04-12 NOTE — PT/OT/SLP PROGRESS
"Physical Therapy Treatment    Patient Name:  Domonique Hernandez Jr.   MRN:  [id number]    Recommendations:     Discharge Recommendations:  nursing facility, basic,nursing facility, skilled   Discharge Equipment Recommendations: none   Barriers to discharge: Decreased caregiver support    Assessment:     Domonique Hernandez Jr. is a 81 y.o. male admitted with a medical diagnosis of SBO (small bowel obstruction).  He presents with the following impairments/functional limitations:  weakness,impaired endurance,impaired balance,gait instability,impaired functional mobilty,decreased safety awareness,impaired self care skills. Patient tolerated PT session today. Increased gait distances with RW and no sign of distress and no  fatigue today.    Rehab Prognosis: Fair; patient would benefit from acute skilled PT services to address these deficits and reach maximum level of function.    Recent Surgery: Procedure(s) (LRB):  LAPAROTOMY, EXPLORATORY (N/A) 6 Days Post-Op    Plan:     During this hospitalization, patient to be seen daily to address the identified rehab impairments via gait training,therapeutic activities,therapeutic exercises and progress toward the following goals:    · Plan of Care Expires:  12/16/21    Subjective     Chief Complaint: Patient reports feeling better  Patient/Family Comments/goals: "To walk better"  Pain/Comfort:  · Pain Rating 1: 0/10      Objective:     Communicated with patient  prior to session.  Patient found supine with SCD,telemetry,peripheral IV upon PT entry to room.     General Precautions: Standard, fall   Orthopedic Precautions:N/A   Braces: N/A  Respiratory Status: Room air     Functional Mobility:  · Bed Mobility:     · Rolling Left:  independence  · Rolling Right: independence  · Scooting: independence  · Supine to Sit: independence and supervision  · Sit to Supine: independence and supervision  · Transfers:     · Sit to Stand:  supervision with rolling walker  · Gait: Supervision x ~150 feet with " IMTS Focus Note:    I was paged to evaluate Roxi Villaseñor at bedside at 1:22 AM.    Subjective:    I was alerted by nursing staff that patient had MRI showed concern for hemorrhagic transformation.  On bedside evaluation patient was sleepy after I woke her.  Patient was aphasic.    Objective:    Physical Exam  Constitutional:       Appearance: She is not ill-appearing.   HENT:      Head: Normocephalic and atraumatic.   Eyes:      Extraocular Movements: Extraocular movements intact.      Pupils: Pupils are equal, round, and reactive to light.   Skin:     General: Skin is warm and dry.   Neurological:      Mental Status: She is alert. She is confused.      Cranial Nerves: No cranial nerve deficit.      Motor: No weakness or pronator drift.      Coordination: Finger-Nose-Finger Test normal.          Visit Vitals  BP (!) 156/88   Pulse 78   Temp 97.9 °F (36.6 °C) (Oral)   Resp 18   Ht 5' 4\" (1.626 m)   Wt 83.3 kg (183 lb 10.3 oz)   LMP  (LMP Unknown)   SpO2 93%   BMI 31.52 kg/m²     Radiology imaging reviewed.  Results for orders placed or performed during the hospital encounter of 04/11/24 (from the past 48 hour(s))   CT HEAD LEVEL 1    Impression    IMPRESSION:      1.  A moderate size region of acute infarction is present involving the  anterior left temporal lobe associated with regional segmental acute  thrombosis of the sylvian fissure M2 branch. Regional mass effect is  present but with no shift of midline structures. No hemorrhage is present.    Findings were called immediately to AARON GOLDBERG, MD on 4/11/2024 11:39  AM.     I, Attending Radiologist Samir Woods MD, have reviewed the images and  report and concur with these findings interpreted by Resident Radiologist,  Louie Bobo DO.    Electronically Signed by: Samir Woods MD  Signed on: 4/11/2024 11:57 AM  Created on Workstation ID: [id number]  Signed on Workstation ID: [id number]   CTA HEAD AND NECK LEVEL 1    Impression    IMPRESSION:    CTA  NECK:  1.  No hemodynamically significant extracranial stenosis or occlusion.  2.  Scattered nonflow-limiting atherosclerosis of the aortic arch, great  vessel origins and carotid bifurcations.     CTA HEAD:  1.  Acute occlusion of the left left MCA inferior division M2 trunk. Of  note, the left MCA demonstrates variant trifurcated M2 anatomy.    CT PERFUSION:  1.  Small left MCA inferior division territory core infarct predominantly  within the left temporal lobe (11 cc) with slightly larger surrounding area  of penumbra with a 12 cc mismatch volume and 2.1 mismatch ratio.    Findings were discussed with Dr. Erin Goldberg on 4/11/24 by Dr. Aaron Goldberg. By Dr. Amanda    I, Attending Radiologist Robby Almeida DO, have reviewed the images and  report and concur with these findings interpreted by Resident Radiologist,  Louie Bobo DO.    Electronically Signed by: Robby Almeida DO  Signed on: 4/11/2024 12:26 PM  Created on Workstation ID: [id number]  Signed on Workstation ID: [id number]   CT CEREBRAL PERFUSION W CONTRAST LEVEL 1    Impression    IMPRESSION:    CTA NECK:  1.  No hemodynamically significant extracranial stenosis or occlusion.  2.  Scattered nonflow-limiting atherosclerosis of the aortic arch, great  vessel origins and carotid bifurcations.     CTA HEAD:  1.  Acute occlusion of the left left MCA inferior division M2 trunk. Of  note, the left MCA demonstrates variant trifurcated M2 anatomy.    CT PERFUSION:  1.  Small left MCA inferior division territory core infarct predominantly  within the left temporal lobe (11 cc) with slightly larger surrounding area  of penumbra with a 12 cc mismatch volume and 2.1 mismatch ratio.    Findings were discussed with Dr. Erin Goldberg on 4/11/24 by Dr. Aaron Goldberg. By Dr. Bobo.    KERMIT, Attending Radiologist Robby Almeida DO, have reviewed the images and  report and concur with these findings interpreted by Resident Radiologist,  Louie Bobo  RW. Reciprocal gait with slight cues for changing directions.      AM-PAC 6 CLICK MOBILITY  Turning over in bed (including adjusting bedclothes, sheets and blankets)?: 4  Sitting down on and standing up from a chair with arms (e.g., wheelchair, bedside commode, etc.): 3  Moving from lying on back to sitting on the side of the bed?: 3  Moving to and from a bed to a chair (including a wheelchair)?: 3  Need to walk in hospital room?: 3  Climbing 3-5 steps with a railing?: 2  Basic Mobility Total Score: 18       Therapeutic Activities and Exercises:   Worked on safety measures on out of bed actiivity; Gait trng x ~150 feet with RW and supervision.    Patient left HOB elevated with all lines intact, call button in reach, bed alarm on and nursing notified..    GOALS:   Multidisciplinary Problems     Physical Therapy Goals        Problem: Physical Therapy Goal    Goal Priority Disciplines Outcome Goal Variances Interventions   Physical Therapy Goal     PT, PT/OT Ongoing, Progressing     Description: Goals to be met by: 21    Patient will increase functional independence with mobility by performin. Supine to sit with Modified Independent  2. Sit to supine with Modified Independent  3. Bed to chair transfer with Supervision or Set-up Assistancewith or without rolling walker using Step Transfer TECHNIQUE  4. Gait  x ~100  feet with Supervision or Set-up Assistance with or without rolling walker  5. Lower extremity exercise program x10 reps per handout, with assistance as needed                      Time Tracking:     PT Received On: 21  PT Start Time: 0653     PT Stop Time: 0713  PT Total Time (min): 20 min     Billable Minutes: Gait Training 20 mins.    Treatment Type: Treatment  PT/PTA: PT           2021   .    Electronically Signed by: Robby Almeida DO  Signed on: 4/11/2024 12:26 PM  Created on Workstation ID: [id number]  Signed on Workstation ID: [id number]   XR CHEST PA OR AP 1 VIEW    Impression    IMPRESSION:    1. Mildly increased pulmonary vascularity and cardiac enlargement  concerning for congestive changes.    Electronically Signed by: Stephan Lozada MD  Signed on: 4/11/2024 12:28 PM  Created on Workstation ID: [id number]  Signed on Workstation ID: [id number]   MRI BRAIN WO CONTRAST    Impression    IMPRESSION:     Evolving left temporal lobe infarct with imaging changes suggesting early  hemorrhagic transformation.    I, Attending Radiologist Andrez Armendariz MD, have reviewed the images and  report and concur with these findings interpreted by Resident Radiologist,  Laith Freeman DO.    Electronically Signed by: Andrez Armendariz MD  Signed on: 4/12/2024 12:10 AM  Created on Workstation ID: [id number]  Signed on Workstation ID: [id number]    Lab data reviewed-CBC stable, repeat INR stable.    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  She was last known well at 9:30 p.m. on 04/10/2024.  Initial head CT showed a moderate size region of acute infarction involving the anterior left temporal lobe with a regional segmental acute thrombosis of the sylvian fissure M2 branch.  Regional mass effect present no midline shift.  No hemorrhage present.  Neuro stroke was consulted in the ED who thought that the etiology of the stroke was likely cardioembolic in the setting of her new onset AFib and recommended a MRI brain without contrast to assess stroke burden and recommend anticoagulation timing.  At approximately 1:00 a.m. MRI results showed concern for acute hemorrhagic  transformation of an ischemic stroke.  Bedside evaluation of patient noted she was roughly at baseline from her deficits based on documentation from earlier teams. Discussed case with neuroICU attending who did not feel there was an acute indication for her to go to the unit. I did page the listed on call for non urgent issues stroke team for guidance on the situation, as she did not have an acute neuro change warranting a neuro stat, but the stroke team never got back to me.    Addendum:  Repeat CT head showed continued evidence of hemorrhagic transformation without evidence of intracranial hemorrhage.  Literature review to guide management https://www.frontiersin.org/journals/neurology/articles/10.3389/fneur.2021.264308/full    With the available literature as above guiding treatment decisions, the patient displayed consistent exam findings and only evidence of hemorrhagic transformation on imaging.  We will continue course as laid out previously.      Plan   -Obtain repeat head CT noncontrast to assess/confirm hemorrhage   - unless there is overt changes to neuro status (change in NIH) we will maintain the blood pressure goal as stated per Neurology.  - Obtain INR to evaluate for coagulopathies  - close monitoring of neuro status, with low threshold to consult neuro ICU if there was any clinical deterioration or change in neuro status    Dr. Victor M MONTES is assuming care of this patient today (4/12/2024) at 7am, please page them with questions after that time.    Frankie Baltazar, DO  Internal Medicine PGY-1  4/12/2024 1:22 AM  Pager# [phone]

## 2024-05-02 RX ORDER — ALLOPURINOL 300 MG/1
300 TABLET ORAL DAILY
Qty: 90 TABLET | Refills: 0 | Status: SHIPPED | OUTPATIENT
Start: 2024-05-02

## 2024-05-02 NOTE — TELEPHONE ENCOUNTER
No care due was identified.  [facility] Embedded Care Due Messages. Reference number: [id number].   5/02/2024 11:53:39 AM CDT

## 2024-05-02 NOTE — TELEPHONE ENCOUNTER
Refill Routing Note   Medication(s) are not appropriate for processing by [facility] for the following reason(s):        Required labs outdated    [facility] action(s):  Defer               Appointments  past 12m or future 3m with PCP    Date Provider   Last Visit   10/4/2023 Andrew Major MD   Next Visit   Visit date not found Andrew Major MD   ED visits in past 90 days: 0        Note composed:4:24 PM 05/02/2024

## 2024-06-27 ENCOUNTER — CLINICAL SUPPORT (OUTPATIENT)
Dept: FAMILY MEDICINE | Facility: CLINIC | Age: 84
End: 2024-06-27
Payer: MEDICARE

## 2024-06-27 ENCOUNTER — OFFICE VISIT (OUTPATIENT)
Dept: FAMILY MEDICINE | Facility: CLINIC | Age: 84
End: 2024-06-27
Payer: MEDICARE

## 2024-06-27 VITALS
HEART RATE: 70 BPM | SYSTOLIC BLOOD PRESSURE: 122 MMHG | DIASTOLIC BLOOD PRESSURE: 82 MMHG | RESPIRATION RATE: 18 BRPM | WEIGHT: 235.44 LBS | OXYGEN SATURATION: 98 % | HEIGHT: 68 IN | BODY MASS INDEX: 35.68 KG/M2

## 2024-06-27 DIAGNOSIS — R31.9 HEMATURIA, UNSPECIFIED TYPE: Primary | ICD-10-CM

## 2024-06-27 DIAGNOSIS — Z87.442 HISTORY OF KIDNEY STONES: ICD-10-CM

## 2024-06-27 DIAGNOSIS — N30.00 ACUTE CYSTITIS WITHOUT HEMATURIA: ICD-10-CM

## 2024-06-27 LAB
BACTERIA SPEC CULT: ABNORMAL /HPF
BILIRUB SERPL-MCNC: NORMAL MG/DL
BLOOD URINE, POC: NORMAL
CASTS: ABNORMAL
COLOR, POC UA: NORMAL
CRYSTALS: ABNORMAL
GLUCOSE UR QL STRIP: NORMAL
KETONES UR QL STRIP: NORMAL
LEUKOCYTE ESTERASE URINE, POC: NORMAL
NITRITE, POC UA: NORMAL
PH, POC UA: 6
PROTEIN, POC: NORMAL
RBC CELLS COUNTED: ABNORMAL
SPECIFIC GRAVITY, POC UA: 1.02
UROBILINOGEN, POC UA: 1
WHITE BLOOD CELLS: ABNORMAL

## 2024-06-27 PROCEDURE — 87086 URINE CULTURE/COLONY COUNT: CPT | Performed by: FAMILY MEDICINE

## 2024-06-27 PROCEDURE — 99999 PR PBB SHADOW E&M-EST. PATIENT-LVL IV: CPT | Mod: PBBFAC,,, | Performed by: NURSE PRACTITIONER

## 2024-06-27 RX ORDER — SULFAMETHOXAZOLE AND TRIMETHOPRIM 800; 160 MG/1; MG/1
1 TABLET ORAL 2 TIMES DAILY
Qty: 20 TABLET | Refills: 0 | Status: SHIPPED | OUTPATIENT
Start: 2024-06-27 | End: 2024-07-07

## 2024-06-27 NOTE — PROGRESS NOTES
Subjective:       Patient ID: Domonique Hernandez Jr. is a 84 y.o. male.    Chief Complaint: Hematuria (Pt is here for blood in his urine. Pt is here with his son. Pt's son said he noticed the blood in his father urine two days ago. Pt has not been given any medication over the counter. Pt mentioned not having any burning or discomfort going to the restroom. Pt's son mentioned his father missing an appt with urology a while back. )    Here with his son with symptoms for 2-3 days.     Hematuria  The current episode started in the past 7 days. Pertinent negatives include no abdominal pain, fever, nausea, vomiting or sore throat. His past medical history is significant for hypertension and kidney stones.     Review of Systems   Constitutional: Negative.  Negative for appetite change, fatigue and fever.   HENT: Negative.  Negative for congestion, ear pain and sore throat.    Eyes: Negative.  Negative for visual disturbance.   Respiratory: Negative.  Negative for cough, shortness of breath and wheezing.    Cardiovascular: Negative.    Gastrointestinal: Negative.  Negative for abdominal pain, diarrhea, nausea and vomiting.   Genitourinary:  Positive for hematuria. Negative for difficulty urinating.   Musculoskeletal: Negative.    Skin: Negative.  Negative for rash.   Neurological: Negative.  Negative for headaches.   Psychiatric/Behavioral: Negative.  Negative for sleep disturbance. The patient is not nervous/anxious.    All other systems reviewed and are negative.      Objective:      Physical Exam  Vitals and nursing note reviewed.   Constitutional:       General: He is not in acute distress.     Appearance: Normal appearance. He is well-developed.   HENT:      Head: Normocephalic and atraumatic.   Eyes:      Pupils: Pupils are equal, round, and reactive to light.   Cardiovascular:      Rate and Rhythm: Normal rate and regular rhythm.      Pulses: Normal pulses.      Heart sounds: Normal heart sounds. No murmur  heard.  Pulmonary:      Effort: Pulmonary effort is normal. No respiratory distress.      Breath sounds: Normal breath sounds.   Abdominal:      Tenderness: There is no right CVA tenderness or left CVA tenderness.   Musculoskeletal:         General: Normal range of motion.      Cervical back: Normal range of motion and neck supple.   Skin:     General: Skin is warm and dry.   Neurological:      Mental Status: He is alert and oriented to person, place, and time.   Psychiatric:         Mood and Affect: Mood normal.         Assessment:       1. Hematuria, unspecified type    2. Acute cystitis without hematuria    3. History of kidney stones        Plan:     1. Hematuria, unspecified type  -     POCT URINE DIPSTICK WITH MICROSCOPE, AUTOMATED  -     POCT Urine Sediment Exam  -     Urine culture    2. Acute cystitis without hematuria  -     sulfamethoxazole-trimethoprim 800-160mg (BACTRIM DS) 800-160 mg Tab; Take 1 tablet by mouth 2 (two) times daily. for 10 days  Dispense: 20 tablet; Refill: 0    3. History of kidney stones  -     Ambulatory referral/consult to Urology; Future; Expected date: 07/04/2024    RTC to see Dr. Major

## 2024-06-28 LAB
BACTERIA UR CULT: NORMAL
BACTERIA UR CULT: NORMAL

## 2024-07-10 ENCOUNTER — OFFICE VISIT (OUTPATIENT)
Dept: FAMILY MEDICINE | Facility: CLINIC | Age: 84
End: 2024-07-10
Payer: MEDICARE

## 2024-07-10 ENCOUNTER — CLINICAL SUPPORT (OUTPATIENT)
Dept: FAMILY MEDICINE | Facility: CLINIC | Age: 84
End: 2024-07-10
Payer: MEDICARE

## 2024-07-10 VITALS
HEIGHT: 68 IN | OXYGEN SATURATION: 96 % | BODY MASS INDEX: 34.9 KG/M2 | SYSTOLIC BLOOD PRESSURE: 110 MMHG | WEIGHT: 230.25 LBS | HEART RATE: 75 BPM | RESPIRATION RATE: 16 BRPM | DIASTOLIC BLOOD PRESSURE: 72 MMHG

## 2024-07-10 DIAGNOSIS — Z87.442 HISTORY OF KIDNEY STONES: Primary | ICD-10-CM

## 2024-07-10 DIAGNOSIS — E03.4 HYPOTHYROIDISM DUE TO ACQUIRED ATROPHY OF THYROID: ICD-10-CM

## 2024-07-10 DIAGNOSIS — R41.3 MEMORY LOSS: ICD-10-CM

## 2024-07-10 DIAGNOSIS — M10.9 GOUT, ARTHRITIS: ICD-10-CM

## 2024-07-10 DIAGNOSIS — G31.84 MILD COGNITIVE IMPAIRMENT: ICD-10-CM

## 2024-07-10 LAB
ALBUMIN SERPL BCP-MCNC: 3.7 G/DL (ref 3.5–5.2)
ALP SERPL-CCNC: 139 U/L (ref 55–135)
ALT SERPL W/O P-5'-P-CCNC: 25 U/L (ref 10–44)
ANION GAP SERPL CALC-SCNC: 9 MMOL/L (ref 8–16)
AST SERPL-CCNC: 29 U/L (ref 10–40)
BASOPHILS # BLD AUTO: 0.05 K/UL (ref 0–0.2)
BASOPHILS NFR BLD: 0.5 % (ref 0–1.9)
BILIRUB SERPL-MCNC: 0.9 MG/DL (ref 0.1–1)
BUN SERPL-MCNC: 41 MG/DL (ref 8–23)
CALCIUM SERPL-MCNC: 10.5 MG/DL (ref 8.7–10.5)
CHLORIDE SERPL-SCNC: 108 MMOL/L (ref 95–110)
CHOLEST SERPL-MCNC: 137 MG/DL (ref 120–199)
CHOLEST/HDLC SERPL: 6 {RATIO} (ref 2–5)
CO2 SERPL-SCNC: 21 MMOL/L (ref 23–29)
CREAT SERPL-MCNC: 1.9 MG/DL (ref 0.5–1.4)
DIFFERENTIAL METHOD BLD: ABNORMAL
EOSINOPHIL # BLD AUTO: 0.3 K/UL (ref 0–0.5)
EOSINOPHIL NFR BLD: 3.5 % (ref 0–8)
ERYTHROCYTE [DISTWIDTH] IN BLOOD BY AUTOMATED COUNT: 15.2 % (ref 11.5–14.5)
EST. GFR  (NO RACE VARIABLE): 35 ML/MIN/1.73 M^2
GLUCOSE SERPL-MCNC: 121 MG/DL (ref 70–110)
HCT VFR BLD AUTO: 48.2 % (ref 40–54)
HDLC SERPL-MCNC: 23 MG/DL (ref 40–75)
HDLC SERPL: 16.8 % (ref 20–50)
HGB BLD-MCNC: 15.7 G/DL (ref 14–18)
IMM GRANULOCYTES # BLD AUTO: 0.03 K/UL (ref 0–0.04)
IMM GRANULOCYTES NFR BLD AUTO: 0.3 % (ref 0–0.5)
LDLC SERPL CALC-MCNC: 77.8 MG/DL (ref 63–159)
LYMPHOCYTES # BLD AUTO: 1.2 K/UL (ref 1–4.8)
LYMPHOCYTES NFR BLD: 13.4 % (ref 18–48)
MCH RBC QN AUTO: 32 PG (ref 27–31)
MCHC RBC AUTO-ENTMCNC: 32.6 G/DL (ref 32–36)
MCV RBC AUTO: 98 FL (ref 82–98)
MONOCYTES # BLD AUTO: 0.6 K/UL (ref 0.3–1)
MONOCYTES NFR BLD: 6.5 % (ref 4–15)
NEUTROPHILS # BLD AUTO: 7 K/UL (ref 1.8–7.7)
NEUTROPHILS NFR BLD: 75.8 % (ref 38–73)
NONHDLC SERPL-MCNC: 114 MG/DL
NRBC BLD-RTO: 0 /100 WBC
PLATELET # BLD AUTO: 197 K/UL (ref 150–450)
PMV BLD AUTO: 11.1 FL (ref 9.2–12.9)
POTASSIUM SERPL-SCNC: 5.3 MMOL/L (ref 3.5–5.1)
PROT SERPL-MCNC: 7.1 G/DL (ref 6–8.4)
RBC # BLD AUTO: 4.91 M/UL (ref 4.6–6.2)
SODIUM SERPL-SCNC: 138 MMOL/L (ref 136–145)
TRIGL SERPL-MCNC: 181 MG/DL (ref 30–150)
TSH SERPL DL<=0.005 MIU/L-ACNC: 1.6 UIU/ML (ref 0.4–4)
URATE SERPL-MCNC: 6.2 MG/DL (ref 3.4–7)
VIT B12 SERPL-MCNC: 499 PG/ML (ref 210–950)
WBC # BLD AUTO: 9.24 K/UL (ref 3.9–12.7)

## 2024-07-10 PROCEDURE — 82607 VITAMIN B-12: CPT | Performed by: FAMILY MEDICINE

## 2024-07-10 PROCEDURE — 1159F MED LIST DOCD IN RCRD: CPT | Mod: CPTII,S$GLB,, | Performed by: FAMILY MEDICINE

## 2024-07-10 PROCEDURE — 80061 LIPID PANEL: CPT | Performed by: FAMILY MEDICINE

## 2024-07-10 PROCEDURE — 99999 PR PBB SHADOW E&M-EST. PATIENT-LVL III: CPT | Mod: PBBFAC,,, | Performed by: FAMILY MEDICINE

## 2024-07-10 PROCEDURE — 36415 COLL VENOUS BLD VENIPUNCTURE: CPT | Mod: S$GLB,,, | Performed by: FAMILY MEDICINE

## 2024-07-10 PROCEDURE — 84443 ASSAY THYROID STIM HORMONE: CPT | Performed by: FAMILY MEDICINE

## 2024-07-10 PROCEDURE — 3078F DIAST BP <80 MM HG: CPT | Mod: CPTII,S$GLB,, | Performed by: FAMILY MEDICINE

## 2024-07-10 PROCEDURE — 1100F PTFALLS ASSESS-DOCD GE2>/YR: CPT | Mod: CPTII,S$GLB,, | Performed by: FAMILY MEDICINE

## 2024-07-10 PROCEDURE — 1160F RVW MEDS BY RX/DR IN RCRD: CPT | Mod: CPTII,S$GLB,, | Performed by: FAMILY MEDICINE

## 2024-07-10 PROCEDURE — 3074F SYST BP LT 130 MM HG: CPT | Mod: CPTII,S$GLB,, | Performed by: FAMILY MEDICINE

## 2024-07-10 PROCEDURE — 1157F ADVNC CARE PLAN IN RCRD: CPT | Mod: CPTII,S$GLB,, | Performed by: FAMILY MEDICINE

## 2024-07-10 PROCEDURE — 99214 OFFICE O/P EST MOD 30 MIN: CPT | Mod: S$GLB,,, | Performed by: FAMILY MEDICINE

## 2024-07-10 PROCEDURE — 1126F AMNT PAIN NOTED NONE PRSNT: CPT | Mod: CPTII,S$GLB,, | Performed by: FAMILY MEDICINE

## 2024-07-10 PROCEDURE — 84550 ASSAY OF BLOOD/URIC ACID: CPT | Performed by: FAMILY MEDICINE

## 2024-07-10 PROCEDURE — 80053 COMPREHEN METABOLIC PANEL: CPT | Performed by: FAMILY MEDICINE

## 2024-07-10 PROCEDURE — 3288F FALL RISK ASSESSMENT DOCD: CPT | Mod: CPTII,S$GLB,, | Performed by: FAMILY MEDICINE

## 2024-07-10 PROCEDURE — 85025 COMPLETE CBC W/AUTO DIFF WBC: CPT | Performed by: FAMILY MEDICINE

## 2024-07-10 RX ORDER — LEVOTHYROXINE SODIUM 100 UG/1
TABLET ORAL
Qty: 90 TABLET | Refills: 1 | Status: SHIPPED | OUTPATIENT
Start: 2024-07-10

## 2024-07-10 RX ORDER — ALLOPURINOL 300 MG/1
300 TABLET ORAL DAILY
Qty: 90 TABLET | Refills: 1 | Status: SHIPPED | OUTPATIENT
Start: 2024-07-10

## 2024-07-10 RX ORDER — PANTOPRAZOLE SODIUM 20 MG/1
20 TABLET, DELAYED RELEASE ORAL DAILY
Qty: 30 TABLET | Refills: 5 | Status: SHIPPED | OUTPATIENT
Start: 2024-07-10 | End: 2025-07-10

## 2024-07-10 RX ORDER — MEMANTINE HYDROCHLORIDE 10 MG/1
10 TABLET ORAL 2 TIMES DAILY
Qty: 180 TABLET | Refills: 1 | Status: SHIPPED | OUTPATIENT
Start: 2024-07-10 | End: 2025-07-10

## 2024-07-10 NOTE — PROGRESS NOTES
His renal function has decreased   I am reducing his protonix to 20mg daily  He needs to make sure he is drinking enough water daily.   Enough to keep his urine light yellow to clear. Usually this means at least 6  8oz glasses of water a day

## 2024-07-10 NOTE — PROGRESS NOTES
Subjective:       Patient ID: Domonique Hernandez Jr. is a 84 y.o. male.    Chief Complaint: Follow-up (Pt here for 6 mth f/u. )    Pt is a 84 y.o. male who presents for evaluation and management of   Encounter Diagnoses   Name Primary?    Hypothyroidism due to acquired atrophy of thyroid     Mild cognitive impairment     Memory loss     History of kidney stones Yes    Gout, arthritis    .  Doing well on current meds. Denies any side effects. Prevention is up to date.    Review of Systems   Constitutional:  Negative for fever.   Respiratory:  Negative for shortness of breath.    Cardiovascular:  Negative for chest pain.   Gastrointestinal:  Negative for anal bleeding and blood in stool.   Genitourinary:  Negative for dysuria.   Neurological:  Negative for dizziness and light-headedness.   Psychiatric/Behavioral:  Positive for confusion. Negative for agitation and sleep disturbance.        Objective:      Physical Exam  Constitutional:       Appearance: Normal appearance. He is well-developed. He is not ill-appearing.   HENT:      Head: Normocephalic and atraumatic.      Right Ear: External ear normal.      Left Ear: External ear normal.      Nose: Nose normal.      Mouth/Throat:      Mouth: Mucous membranes are moist.      Pharynx: No oropharyngeal exudate or posterior oropharyngeal erythema.   Eyes:      General: No scleral icterus.        Right eye: No discharge.         Left eye: No discharge.      Conjunctiva/sclera: Conjunctivae normal.      Pupils: Pupils are equal, round, and reactive to light.   Neck:      Thyroid: No thyromegaly.      Vascular: No JVD.      Trachea: No tracheal deviation.   Cardiovascular:      Rate and Rhythm: Normal rate and regular rhythm.      Heart sounds: Normal heart sounds. No murmur heard.  Pulmonary:      Effort: Pulmonary effort is normal. No respiratory distress.      Breath sounds: Normal breath sounds. No wheezing or rales.   Chest:      Chest wall: No tenderness.   Abdominal:       General: Bowel sounds are normal. There is no distension.      Palpations: Abdomen is soft. There is no mass.      Tenderness: There is no abdominal tenderness. There is no guarding or rebound.   Musculoskeletal:         General: Normal range of motion.      Cervical back: Normal range of motion and neck supple.      Right lower leg: No edema.      Left lower leg: No edema.   Lymphadenopathy:      Cervical: No cervical adenopathy.   Skin:     General: Skin is warm and dry.   Neurological:      Mental Status: He is alert. He is disoriented.      Cranial Nerves: No cranial nerve deficit.      Motor: No abnormal muscle tone.      Coordination: Coordination normal.      Deep Tendon Reflexes: Reflexes are normal and symmetric. Reflexes normal.      Comments: Oriented to person and somewhat to place. No time    Psychiatric:         Behavior: Behavior normal.         Thought Content: Thought content normal.         Judgment: Judgment normal.         Assessment:       1. History of kidney stones    2. Hypothyroidism due to acquired atrophy of thyroid    3. Mild cognitive impairment    4. Memory loss    5. Gout, arthritis        Plan:   1. History of kidney stones    2. Hypothyroidism due to acquired atrophy of thyroid  Overview:  Lab Results   Component Value Date    TSH 0.470 02/23/2022     Levothyroxine 100mcg     Orders:  -     levothyroxine (SYNTHROID) 100 MCG tablet; TAKE 1 TABLET BY MOUTH ONCE DAILY  Dispense: 90 tablet; Refill: 1  -     CBC Auto Differential; Future; Expected date: 07/10/2024  -     Comprehensive Metabolic Panel; Future; Expected date: 07/10/2024  -     Lipid Panel; Future; Expected date: 07/10/2024  -     TSH; Future; Expected date: 07/10/2024    3. Mild cognitive impairment  -     memantine (NAMENDA) 10 MG Tab; Take 1 tablet (10 mg total) by mouth 2 (two) times daily.  Dispense: 180 tablet; Refill: 1    4. Memory loss  -     memantine (NAMENDA) 10 MG Tab; Take 1 tablet (10 mg total) by mouth 2  (two) times daily.  Dispense: 180 tablet; Refill: 1  -     Vitamin B12; Future; Expected date: 07/10/2024    5. Gout, arthritis  -     allopurinoL (ZYLOPRIM) 300 MG tablet; Take 1 tablet (300 mg total) by mouth once daily.  Dispense: 90 tablet; Refill: 1  -     Uric Acid; Future; Expected date: 07/10/2024      No follow-ups on file.

## 2024-07-11 DIAGNOSIS — N18.32 STAGE 3B CHRONIC KIDNEY DISEASE: Primary | ICD-10-CM

## 2024-07-11 NOTE — PROGRESS NOTES
B12 is normal.   See my previous remarks on his CMP. Can we repeat a BMP in 1 month to recheck his kidney function? Orders in. Thanks!

## 2024-08-13 ENCOUNTER — CLINICAL SUPPORT (OUTPATIENT)
Dept: FAMILY MEDICINE | Facility: CLINIC | Age: 84
End: 2024-08-13
Payer: MEDICARE

## 2024-08-13 DIAGNOSIS — N18.32 STAGE 3B CHRONIC KIDNEY DISEASE: ICD-10-CM

## 2024-08-13 LAB
ANION GAP SERPL CALC-SCNC: 10 MMOL/L (ref 8–16)
BUN SERPL-MCNC: 22 MG/DL (ref 8–23)
CALCIUM SERPL-MCNC: 10 MG/DL (ref 8.7–10.5)
CHLORIDE SERPL-SCNC: 107 MMOL/L (ref 95–110)
CO2 SERPL-SCNC: 24 MMOL/L (ref 23–29)
CREAT SERPL-MCNC: 1.4 MG/DL (ref 0.5–1.4)
EST. GFR  (NO RACE VARIABLE): 50 ML/MIN/1.73 M^2
GLUCOSE SERPL-MCNC: 102 MG/DL (ref 70–110)
POTASSIUM SERPL-SCNC: 4.4 MMOL/L (ref 3.5–5.1)
SODIUM SERPL-SCNC: 141 MMOL/L (ref 136–145)

## 2024-08-13 PROCEDURE — 80048 BASIC METABOLIC PNL TOTAL CA: CPT | Performed by: FAMILY MEDICINE

## 2024-08-13 PROCEDURE — 36415 COLL VENOUS BLD VENIPUNCTURE: CPT | Mod: S$GLB,,, | Performed by: FAMILY MEDICINE

## 2024-09-05 NOTE — ANESTHESIA POSTPROCEDURE EVALUATION
Anesthesia Post Evaluation    Patient: Domonique Hernandez Jr.    Procedure(s) Performed: Procedure(s) (LRB):  COLONOSCOPY (N/A)    Final Anesthesia Type: general  Patient location during evaluation: GI PACU  Patient participation: Yes- Able to Participate  Level of consciousness: awake and alert  Post-procedure vital signs: reviewed and stable  Pain management: adequate  Airway patency: patent  PONV status at discharge: No PONV  Anesthetic complications: no      Cardiovascular status: blood pressure returned to baseline  Respiratory status: unassisted  Hydration status: euvolemic  Follow-up not needed.          Vitals Value Taken Time   /78 8/13/2019  3:58 PM   Temp 36.7 °C (98 °F) 8/13/2019  3:29 PM   Pulse 78 8/13/2019  3:58 PM   Resp 19 8/13/2019  3:58 PM   SpO2 99 % 8/13/2019  3:58 PM         No case tracking events are documented in the log.      Pain/Arben Score: Arben Score: 10 (8/13/2019  3:32 PM)         D/W Dr. Ventura. Pt should go to  for evaluation.   RN to call tomorrow for symptom update.    Discussed with pt. She does not want to go to UC.   She would like to see PCP.   Advised will send message to PCP for review and recommendations.     Feeling fatigued, light headed and dizzy. Patients BP is 99/66 and has been down to 80's / 58. Sitting at the park and not feeling right, feeling dizzy. Patient reports drink \"quite a bit of water\" this morning. Denies falling or tripping.    Left message to call clinic back with symptom update.    Patient called said she still isnt feeling well. Patient said she still has the same symptoms. Patient wanted to make a appointment.   Patient called with symptoms 8 days ago  Pharmacy verified below     Patient called with recommendations. Message left for patient to be evaluated in [facility]   Patient has the following symptoms  Fatigue, nausea, headaches on and off, chills. Pt had a negative covid test today.    Pt did have the covid vaccine.   The symptoms have been present for 5 days    Patient's pharmacy is [facility]    Patient's pharmacy verified in Epic Yes    Callback Number:    Telephone Information:   Mobile [phone]         Best Availability:  anytime  Can A Detailed Message Be Left? Yes    Additional Info:  none     Pt reports stomach ache, fatigue, headaches, intermittent chills for 5 days.   Pt reports some mild SOB. She reports her breath feels restricted when trying to take a deep breath in. Not worse with activity.  Pt reports some center chest tightness the past 5 days. Constant; not worsening. Does not radiate.  She is unsure if she has a fever.   Pt reports napping daily, which is unusual for her.   Denies nausea, vomiting, diarrhea.   Denies  symptoms, FALCON.   Pt fully vaccinated and had negative covid test today.   She is concerned for possible cardiac cause.   She would like to see PCP.   No open appointments until Wednesday with PCP.  Advised will send message to PCP for review and recommendations.  Pt verbalized understanding and agreement with plan of care.        With the chills, less likely to be cardiac.  Could try urgent care.  Discussed with Radha   Average build Average build Average build Average build Average build

## 2024-10-16 ENCOUNTER — OFFICE VISIT (OUTPATIENT)
Dept: NEUROLOGY | Facility: CLINIC | Age: 84
End: 2024-10-16
Payer: MEDICARE

## 2024-10-16 VITALS
HEIGHT: 68 IN | HEART RATE: 61 BPM | SYSTOLIC BLOOD PRESSURE: 120 MMHG | RESPIRATION RATE: 16 BRPM | OXYGEN SATURATION: 97 % | BODY MASS INDEX: 35.24 KG/M2 | WEIGHT: 232.5 LBS | DIASTOLIC BLOOD PRESSURE: 62 MMHG

## 2024-10-16 DIAGNOSIS — I48.0 PAROXYSMAL ATRIAL FIBRILLATION: ICD-10-CM

## 2024-10-16 DIAGNOSIS — F03.B0 MODERATE DEMENTIA WITHOUT BEHAVIORAL DISTURBANCE, PSYCHOTIC DISTURBANCE, MOOD DISTURBANCE, OR ANXIETY, UNSPECIFIED DEMENTIA TYPE: Primary | ICD-10-CM

## 2024-10-16 PROCEDURE — 99999 PR PBB SHADOW E&M-EST. PATIENT-LVL IV: CPT | Mod: PBBFAC,,, | Performed by: NURSE PRACTITIONER

## 2024-10-16 PROCEDURE — 1126F AMNT PAIN NOTED NONE PRSNT: CPT | Mod: CPTII,S$GLB,, | Performed by: NURSE PRACTITIONER

## 2024-10-16 PROCEDURE — 1159F MED LIST DOCD IN RCRD: CPT | Mod: CPTII,S$GLB,, | Performed by: NURSE PRACTITIONER

## 2024-10-16 PROCEDURE — 3074F SYST BP LT 130 MM HG: CPT | Mod: CPTII,S$GLB,, | Performed by: NURSE PRACTITIONER

## 2024-10-16 PROCEDURE — 99214 OFFICE O/P EST MOD 30 MIN: CPT | Mod: S$GLB,,, | Performed by: NURSE PRACTITIONER

## 2024-10-16 PROCEDURE — 1157F ADVNC CARE PLAN IN RCRD: CPT | Mod: CPTII,S$GLB,, | Performed by: NURSE PRACTITIONER

## 2024-10-16 PROCEDURE — 3078F DIAST BP <80 MM HG: CPT | Mod: CPTII,S$GLB,, | Performed by: NURSE PRACTITIONER

## 2024-10-16 NOTE — PROGRESS NOTES
HPI: Domonique Hernandez Jr. is a 84 y.o. male with short term memory loss, which became worse after his CABG on 2020, without any changes on CT Head. Suspect mild cognitive impairment. He has HTN, HLD, COPD, hypothyroidism, CKD and KARL. History of A-fib.     He presents today with report of patient's non-stop talking and saying things that don't make sense. He is here today with his son. He seems to be talking to someone. He has spoken to his  wife. This occurs day and night, but it worse at night. This began a few months ago.     No wandering, but he was found on the ground once. Unclear if there was an actual fall.     Short term memory loss is worse over time.     He sleeps well at night.     He lives alone, but his son lives close-by and checks on him a few times each day. His son lives 5 minutes away.     His son brings him his meals. He also eats at the [employer], which he goes to almost daily.     He does not use his CPAP. His PCP advised that he likely didn't need this after significant weight loss. He is no longer snoring.     Mood is euthymic, aside from mild annoyance. No crying spells.     He sleeps well at night. If he does not sleep well, his memory is more affected the following day.     He does have hearing loss. He does not use hearing aids, as they are uncomfortable.     He is a , and no longer has a girlfriend.     Appetite is variable. Some days he has a great appetite, and other days, he eats very little.     He does not drive.    Review of Systems   Constitutional:  Negative for fever.   Eyes:  Negative for double vision.   Respiratory:  Negative for hemoptysis.    Cardiovascular:  Negative for chest pain.   Gastrointestinal:  Negative for blood in stool.   Genitourinary:  Negative for hematuria.   Musculoskeletal:  Negative for falls and joint pain.   Skin:  Negative for rash.   Neurological:  Negative for seizures and headaches.   Psychiatric/Behavioral:  Positive for  memory loss. The patient does not have insomnia.        I have reviewed all of this patient's past medical and surgical histories as well as family and social histories and active allergies and medications as documented in the electronic medical record.    Exam:  Gen Appearance, well developed/nourished in no apparent distress  CV: 2+ distal pulses with no edema or swelling  Neuro:  MS: Awake, alert, oriented to place, person, time, situation. Sustains attention. Recent recall is moderately impaired/remote memory intact, Language is full to spontaneous speech/comprehension. Fund of Knowledge is full. He is no longer able to name the President. Clock drawing is impaired today-poor planning and inaccurate placement of 12.   Mood is euthymic  CN: PERRL, Extraoccular movements and visual fields are full. Normal facial sensation and strength, Hearing symmetric, Tongue and Palate are midline and strong. Shoulder Shrug symmetric and strong.  Motor: Normal bulk, tone, no abnormal movements. 5/5 strength bilateral upper/lower extremities with 2+ reflexes and clonus  Sensory: symmetric to temp, and vibration.  Romberg negative  Cerebellar: Finger-nose,Heal-shin, Rapid alternating movements intact  Gait: arthralgic gait; ambulating with walker for stability.     Labs:  3/2017 TSH normal  8/2020 B12, TSH, CBC, and CMP reviewed-unremarkable, aside from mild CKD.     Imaging:   CT Head 8/2020:   No acute intracranial findings.     Age-appropriate cerebral volume loss with mild moderate patchy decreased attenuation supratentorial white matter while nonspecific suggestive for chronic ischemic change.     No evidence for acute intracranial hemorrhage.  Clinical correlation and further evaluation as warranted.     9/2016 MRI brain: Age-appropriate generalized volume loss with mild/moderate degree of T2/FLAIR signal abnormality within the supratentorial white matter  while nonspecific suggest chronic microvascular ischemic  change.    No evidence for acute infarction or enhancing lesion.    Assessment/Plan: Domonique Hernandez Jr. is a 84 y.o. male with evidence Alzheimer's dementia.     I recommend:   1. Cognitive impairment/worsening memory can occur after cardiac surgeries, particularly bypass, as discussed with patient.   -memory loss has progressed and is now suggestive of Alzheimer's dementia.   -supportive care and prognosis were discussed with son today. Suggested book the 36 hour day.     -patient lives alone and has great support from family throughout the day. No need for 24 hour supervision yet, but advised to monitor for wandering, weight loss, hallucinations, etc.     -advised family to place a camera in the home with an alarm for extra measures, since patient could possibly be having hallucinations. Family does not wish to try medication for this at this time, but we could try a low dose of Seroquel at any point if needed. Family will notify clinic if needed.     -caregiver role strain present. Discussed assisted living and nursing home placement today with worsening if family is unable to continue to care for patient in this capacity.     -Additionally his hypothyroidism, hypogonadism, KARL, and CKD diagnosis increase risk of memory complaints.   -he takes Namenda per PCP, as well as Prevagen. No longer on Neuriva.     -note increased falls and incontinence on Effexor prior. Mood euthymic currently.     2. CT Head and labs unrevealing for cause of his memory complaints. No MRI's, due to his loop recorder.     3. No longer on CPAP for KARL after weight loss.    4. Not driving since CABG.     5. He is on Eliquis for A-fib history. Fall precautions were discussed.     RTC 1 year or sooner with worsening

## 2025-01-08 ENCOUNTER — OFFICE VISIT (OUTPATIENT)
Dept: FAMILY MEDICINE | Facility: CLINIC | Age: 85
End: 2025-01-08
Payer: MEDICARE

## 2025-01-08 VITALS
DIASTOLIC BLOOD PRESSURE: 68 MMHG | BODY MASS INDEX: 34.95 KG/M2 | SYSTOLIC BLOOD PRESSURE: 120 MMHG | HEIGHT: 68 IN | WEIGHT: 230.63 LBS | HEART RATE: 70 BPM | OXYGEN SATURATION: 96 % | RESPIRATION RATE: 16 BRPM

## 2025-01-08 DIAGNOSIS — F03.B0 MODERATE DEMENTIA WITHOUT BEHAVIORAL DISTURBANCE, PSYCHOTIC DISTURBANCE, MOOD DISTURBANCE, OR ANXIETY, UNSPECIFIED DEMENTIA TYPE: ICD-10-CM

## 2025-01-08 DIAGNOSIS — I48.0 PAROXYSMAL ATRIAL FIBRILLATION: ICD-10-CM

## 2025-01-08 DIAGNOSIS — L21.9 SEBORRHEIC DERMATITIS: ICD-10-CM

## 2025-01-08 DIAGNOSIS — N18.31 CHRONIC KIDNEY DISEASE, STAGE 3A: ICD-10-CM

## 2025-01-08 DIAGNOSIS — E78.2 MIXED HYPERLIPIDEMIA: ICD-10-CM

## 2025-01-08 DIAGNOSIS — I65.23 CAROTID ARTERY CALCIFICATION, BILATERAL: Primary | ICD-10-CM

## 2025-01-08 DIAGNOSIS — I70.0 THORACIC AORTIC ATHEROSCLEROSIS: ICD-10-CM

## 2025-01-08 DIAGNOSIS — K21.9 GASTROESOPHAGEAL REFLUX DISEASE WITHOUT ESOPHAGITIS: ICD-10-CM

## 2025-01-08 DIAGNOSIS — I10 PRIMARY HYPERTENSION: ICD-10-CM

## 2025-01-08 DIAGNOSIS — E03.4 HYPOTHYROIDISM DUE TO ACQUIRED ATROPHY OF THYROID: ICD-10-CM

## 2025-01-08 DIAGNOSIS — G47.33 OSA (OBSTRUCTIVE SLEEP APNEA): ICD-10-CM

## 2025-01-08 PROBLEM — R53.81 DEBILITY: Status: RESOLVED | Noted: 2021-01-07 | Resolved: 2025-01-08

## 2025-01-08 PROBLEM — R31.29 MICROSCOPIC HEMATURIA: Status: RESOLVED | Noted: 2022-09-22 | Resolved: 2025-01-08

## 2025-01-08 PROBLEM — E83.42 HYPOMAGNESEMIA: Status: RESOLVED | Noted: 2021-12-17 | Resolved: 2025-01-08

## 2025-01-08 PROBLEM — E87.6 HYPOKALEMIA: Status: RESOLVED | Noted: 2021-03-23 | Resolved: 2025-01-08

## 2025-01-08 PROBLEM — E29.1 MALE HYPOGONADISM: Status: RESOLVED | Noted: 2017-05-31 | Resolved: 2025-01-08

## 2025-01-08 PROBLEM — R13.12 OROPHARYNGEAL DYSPHAGIA: Status: RESOLVED | Noted: 2021-03-02 | Resolved: 2025-01-08

## 2025-01-08 PROCEDURE — G2211 COMPLEX E/M VISIT ADD ON: HCPCS | Mod: S$GLB,,, | Performed by: FAMILY MEDICINE

## 2025-01-08 PROCEDURE — 1101F PT FALLS ASSESS-DOCD LE1/YR: CPT | Mod: CPTII,S$GLB,, | Performed by: FAMILY MEDICINE

## 2025-01-08 PROCEDURE — 3074F SYST BP LT 130 MM HG: CPT | Mod: CPTII,S$GLB,, | Performed by: FAMILY MEDICINE

## 2025-01-08 PROCEDURE — 1157F ADVNC CARE PLAN IN RCRD: CPT | Mod: CPTII,S$GLB,, | Performed by: FAMILY MEDICINE

## 2025-01-08 PROCEDURE — 3078F DIAST BP <80 MM HG: CPT | Mod: CPTII,S$GLB,, | Performed by: FAMILY MEDICINE

## 2025-01-08 PROCEDURE — 99215 OFFICE O/P EST HI 40 MIN: CPT | Mod: S$GLB,,, | Performed by: FAMILY MEDICINE

## 2025-01-08 PROCEDURE — 1126F AMNT PAIN NOTED NONE PRSNT: CPT | Mod: CPTII,S$GLB,, | Performed by: FAMILY MEDICINE

## 2025-01-08 PROCEDURE — 3288F FALL RISK ASSESSMENT DOCD: CPT | Mod: CPTII,S$GLB,, | Performed by: FAMILY MEDICINE

## 2025-01-08 PROCEDURE — 1159F MED LIST DOCD IN RCRD: CPT | Mod: CPTII,S$GLB,, | Performed by: FAMILY MEDICINE

## 2025-01-08 PROCEDURE — 99999 PR PBB SHADOW E&M-EST. PATIENT-LVL III: CPT | Mod: PBBFAC,,, | Performed by: FAMILY MEDICINE

## 2025-01-08 RX ORDER — KETOCONAZOLE 20 MG/G
CREAM TOPICAL DAILY
Qty: 60 G | Refills: 5 | Status: SHIPPED | OUTPATIENT
Start: 2025-01-08

## 2025-01-08 NOTE — PROGRESS NOTES
Subjective:       Patient ID: Domonique Hernandez Jr. is a 84 y.o. male.    Chief Complaint: Follow-up (Pt here for 6 mth f/u. )    Pt is a 84 y.o. male who presents for evaluation and management of   Encounter Diagnoses   Name Primary?    Carotid artery calcification, bilateral Yes    Gastroesophageal reflux disease without esophagitis     Mixed hyperlipidemia     Primary hypertension     Hypothyroidism due to acquired atrophy of thyroid     KARL (obstructive sleep apnea)     Paroxysmal atrial fibrillation     Thoracic aortic atherosclerosis     Chronic kidney disease, stage 3a     Moderate dementia without behavioral disturbance, psychotic disturbance, mood disturbance, or anxiety, unspecified dementia type     Seborrheic dermatitis    .  Doing well on current meds. Denies any side effects. Prevention is up to date.  Review of Systems   Constitutional:  Negative for fever.   Respiratory:  Negative for shortness of breath.    Cardiovascular:  Negative for chest pain.   Gastrointestinal:  Negative for anal bleeding and blood in stool.   Genitourinary:  Negative for dysuria.   Neurological:  Negative for dizziness and light-headedness.   Psychiatric/Behavioral:  Positive for confusion.        Objective:      Physical Exam  Constitutional:       Appearance: Normal appearance. He is well-developed. He is not ill-appearing.   HENT:      Head: Normocephalic and atraumatic.      Right Ear: External ear normal.      Left Ear: External ear normal.      Nose: Nose normal.      Mouth/Throat:      Mouth: Mucous membranes are moist.      Pharynx: No oropharyngeal exudate or posterior oropharyngeal erythema.   Eyes:      General: No scleral icterus.        Right eye: No discharge.         Left eye: No discharge.      Conjunctiva/sclera: Conjunctivae normal.      Pupils: Pupils are equal, round, and reactive to light.   Neck:      Thyroid: No thyromegaly.      Vascular: No JVD.      Trachea: No tracheal deviation.   Cardiovascular:       Rate and Rhythm: Normal rate and regular rhythm.      Heart sounds: Normal heart sounds. No murmur heard.  Pulmonary:      Effort: Pulmonary effort is normal. No respiratory distress.      Breath sounds: Normal breath sounds. No wheezing or rales.   Chest:      Chest wall: No tenderness.   Abdominal:      General: Bowel sounds are normal. There is no distension.      Palpations: Abdomen is soft. There is no mass.      Tenderness: There is no abdominal tenderness. There is no guarding or rebound.   Musculoskeletal:         General: Normal range of motion.      Cervical back: Normal range of motion and neck supple.      Right lower leg: No edema.      Left lower leg: No edema.   Lymphadenopathy:      Cervical: No cervical adenopathy.   Skin:     General: Skin is warm and dry.   Neurological:      Mental Status: He is alert. He is disoriented.      Cranial Nerves: No cranial nerve deficit.      Motor: No abnormal muscle tone.      Coordination: Coordination normal.      Deep Tendon Reflexes: Reflexes are normal and symmetric. Reflexes normal.      Comments: O to person only      Psychiatric:         Behavior: Behavior normal.         Thought Content: Thought content normal.         Judgment: Judgment normal.         Assessment:       1. Carotid artery calcification, bilateral    2. Gastroesophageal reflux disease without esophagitis    3. Mixed hyperlipidemia    4. Primary hypertension    5. Hypothyroidism due to acquired atrophy of thyroid    6. KARL (obstructive sleep apnea)    7. Paroxysmal atrial fibrillation    8. Thoracic aortic atherosclerosis    9. Chronic kidney disease, stage 3a    10. Moderate dementia without behavioral disturbance, psychotic disturbance, mood disturbance, or anxiety, unspecified dementia type    11. Seborrheic dermatitis        Plan:   1. Carotid artery calcification, bilateral  Overview:  ASA  lipitor 20         2. Gastroesophageal reflux disease without esophagitis  Overview:  Protonix          3. Mixed hyperlipidemia  Overview:  Lab Results   Component Value Date    LDLCALC 77.8 07/10/2024     atorvastatin 20         4. Primary hypertension  Overview:  Controlled on metoprolol only         5. Hypothyroidism due to acquired atrophy of thyroid  Overview:  Lab Results   Component Value Date    TSH 1.599 07/10/2024     Levothyroxine 100mcg       6. KARL (obstructive sleep apnea)  Overview:  No longer on Cpap at home   No longer snores         7. Paroxysmal atrial fibrillation  Overview:  Sees Dr. Fair at [facility]   Metoprolol       8. Thoracic aortic atherosclerosis  Overview:  ASA 81   Lipitor 20         9. Chronic kidney disease, stage 3a  Overview:  BMP  Lab Results   Component Value Date     08/13/2024    K 4.4 08/13/2024     08/13/2024    CO2 24 08/13/2024    BUN 22 08/13/2024    CREATININE 1.4 08/13/2024    CALCIUM 10.0 08/13/2024    ANIONGAP 10 08/13/2024    EGFRNORACEVR 50 (A) 08/13/2024     No NSAIDs         10. Moderate dementia without behavioral disturbance, psychotic disturbance, mood disturbance, or anxiety, unspecified dementia type  Overview:  He lives alone   Does not cook or drive.  His son lives a mile away and picks him up everyday to bring him to the senior center in [place].   No issues with sleep or mood   On namenda         11. Seborrheic dermatitis  -     ketoconazole (NIZORAL) 2 % cream; Apply topically once daily.  Dispense: 60 g; Refill: 5      No follow-ups on file.

## 2025-01-11 DIAGNOSIS — E03.4 HYPOTHYROIDISM DUE TO ACQUIRED ATROPHY OF THYROID: ICD-10-CM

## 2025-01-11 RX ORDER — LEVOTHYROXINE SODIUM 100 UG/1
TABLET ORAL
Qty: 90 TABLET | Refills: 1 | Status: SHIPPED | OUTPATIENT
Start: 2025-01-11

## 2025-01-11 NOTE — TELEPHONE ENCOUNTER
No care due was identified.  Health [facility] Embedded Care Due Messages. Reference number: [id number].   1/11/2025 7:54:25 AM CST

## 2025-01-11 NOTE — TELEPHONE ENCOUNTER
Refill Decision Note   Domonique Mary  is requesting a refill authorization.  Brief Assessment and Rationale for Refill:  Approve     Medication Therapy Plan:         Comments:     Note composed:11:44 AM 01/11/2025

## 2025-01-25 DIAGNOSIS — M10.9 GOUT, ARTHRITIS: ICD-10-CM

## 2025-01-25 NOTE — TELEPHONE ENCOUNTER
No care due was identified.  Health [facility] Embedded Care Due Messages. Reference number: [id number].   1/25/2025 8:08:45 AM CST

## 2025-01-26 RX ORDER — ALLOPURINOL 300 MG/1
300 TABLET ORAL DAILY
Qty: 90 TABLET | Refills: 1 | Status: SHIPPED | OUTPATIENT
Start: 2025-01-26

## 2025-02-28 NOTE — NURSING
Pt left floor in bed with surgery team. No signs of distress noted. NG tube clamped. SCDs on.   right trigger finger

## 2025-03-06 NOTE — TELEPHONE ENCOUNTER
Refill Routing Note   Medication(s) are not appropriate for processing by [facility] for the following reason(s):      Medication outside of protocol    [facility] action(s):  Route Care Due:  None identified            Appointments  past 12m or future 3m with PCP    Date Provider   Last Visit   7/21/2021 Emiliano Cam MD   Next Visit   Visit date not found Emiliano Cam MD   ED visits in past 90 days: 1        Note composed:2:46 PM 09/01/2023          
none

## 2025-03-08 DIAGNOSIS — G31.84 MILD COGNITIVE IMPAIRMENT: ICD-10-CM

## 2025-03-08 DIAGNOSIS — R41.3 MEMORY LOSS: ICD-10-CM

## 2025-03-09 RX ORDER — MEMANTINE HYDROCHLORIDE 10 MG/1
10 TABLET ORAL 2 TIMES DAILY
Qty: 180 TABLET | Refills: 1 | Status: SHIPPED | OUTPATIENT
Start: 2025-03-09 | End: 2026-03-09

## 2025-03-09 RX ORDER — PANTOPRAZOLE SODIUM 20 MG/1
20 TABLET, DELAYED RELEASE ORAL DAILY
Qty: 90 TABLET | Refills: 3 | Status: SHIPPED | OUTPATIENT
Start: 2025-03-09 | End: 2026-03-09

## 2025-03-09 NOTE — TELEPHONE ENCOUNTER
Refill Routing Note   Medication(s) are not appropriate for processing by [facility] for the following reason(s):        Outside of protocol    [facility] action(s):  Route               Appointments  past 12m or future 3m with PCP    Date Provider   Last Visit   1/8/2025 Andrew Major MD   Next Visit   7/16/2025 Andrew Major MD   ED visits in past 90 days: 0        Note composed:10:05 AM 03/09/2025

## 2025-03-09 NOTE — TELEPHONE ENCOUNTER
No care due was identified.  Health [facility] Embedded Care Due Messages. Reference number: [id number].   3/08/2025 8:16:19 PM CST

## 2025-03-10 NOTE — TELEPHONE ENCOUNTER
Refill Decision Note   Domonique Hernandez  is requesting a refill authorization.  Brief Assessment and Rationale for Refill:  Approve     Medication Therapy Plan:         Comments:     Note composed:7:30 PM 03/09/2025             Appointments     Last Visit   1/8/2025 Andrew Major MD   Next Visit   3/8/2025 Andrew Major MD

## 2025-03-24 DIAGNOSIS — Z00.00 ENCOUNTER FOR MEDICARE ANNUAL WELLNESS EXAM: ICD-10-CM

## 2025-06-09 LAB
ALBUMIN/GLOB SERPL: 1.5 {RATIO}
ALBUMIN: 4.1
ALP ISOS SERPL LEV INH-CCNC: 135 U/L
ALT SERPL-CCNC: 14 UNIT/L
AST: 23
BILIRUB SERPL-MCNC: 0.9 MG/DL (ref 0.1–1.4)
BUN SERPL-MCNC: 21 MG/DL
CALCIUM SERPL-MCNC: 9.8 MG/DL
CHLORIDE SERPL-SCNC: 105 MMOL/L (ref 99–108)
CHOLEST SERPL-MSCNC: 141 MG/DL (ref 0–200)
CHOLEST/HDLC SERPL: 5 {RATIO}
CO2 SERPL-SCNC: 33 MMOL/L
CREAT SERPL-MCNC: 1.1 MG/DL
EGFR: 63.9
GLOBULIN SER-MCNC: 2.7 G/DL
GLUCOSE SERPL-MCNC: 90 MG/DL
HBA1C MFR BLD: 5.3 % (ref 4–6)
HDLC SERPL-MCNC: 28 MG/DL (ref 35–70)
LDL CHOLESTEROL DIRECT: 95 MG/DL
NONHDLC SERPL-MCNC: 113 MG/DL
POTASSIUM SERPL-SCNC: 4.6 MMOL/L (ref 3.4–5.3)
PROTEIN TOTAL: 6.8
SODIUM BLD-SCNC: 138 MMOL/L (ref 137–147)
TRIGL SERPL-MCNC: 161 MG/DL (ref 40–160)
VLDL CHOLESTEROL: 32 MG/DL

## 2025-07-16 ENCOUNTER — OFFICE VISIT (OUTPATIENT)
Dept: FAMILY MEDICINE | Facility: CLINIC | Age: 85
End: 2025-07-16
Payer: MEDICARE

## 2025-07-16 VITALS
OXYGEN SATURATION: 94 % | WEIGHT: 213.5 LBS | BODY MASS INDEX: 32.36 KG/M2 | SYSTOLIC BLOOD PRESSURE: 120 MMHG | DIASTOLIC BLOOD PRESSURE: 68 MMHG | HEART RATE: 70 BPM | RESPIRATION RATE: 16 BRPM | HEIGHT: 68 IN

## 2025-07-16 DIAGNOSIS — M1A.09X0 IDIOPATHIC CHRONIC GOUT OF MULTIPLE SITES WITHOUT TOPHUS: ICD-10-CM

## 2025-07-16 DIAGNOSIS — E78.2 MIXED HYPERLIPIDEMIA: ICD-10-CM

## 2025-07-16 DIAGNOSIS — N13.8 BPH WITH URINARY OBSTRUCTION: ICD-10-CM

## 2025-07-16 DIAGNOSIS — F03.B0 MODERATE DEMENTIA WITHOUT BEHAVIORAL DISTURBANCE, PSYCHOTIC DISTURBANCE, MOOD DISTURBANCE, OR ANXIETY, UNSPECIFIED DEMENTIA TYPE: ICD-10-CM

## 2025-07-16 DIAGNOSIS — I70.0 THORACIC AORTIC ATHEROSCLEROSIS: ICD-10-CM

## 2025-07-16 DIAGNOSIS — G47.33 OSA (OBSTRUCTIVE SLEEP APNEA): Primary | ICD-10-CM

## 2025-07-16 DIAGNOSIS — I10 PRIMARY HYPERTENSION: ICD-10-CM

## 2025-07-16 DIAGNOSIS — N18.31 CHRONIC KIDNEY DISEASE, STAGE 3A: ICD-10-CM

## 2025-07-16 DIAGNOSIS — K21.9 GASTROESOPHAGEAL REFLUX DISEASE WITHOUT ESOPHAGITIS: ICD-10-CM

## 2025-07-16 DIAGNOSIS — K59.00 CONSTIPATION, UNSPECIFIED CONSTIPATION TYPE: ICD-10-CM

## 2025-07-16 DIAGNOSIS — I48.0 PAROXYSMAL ATRIAL FIBRILLATION: ICD-10-CM

## 2025-07-16 DIAGNOSIS — N40.1 BPH WITH URINARY OBSTRUCTION: ICD-10-CM

## 2025-07-16 DIAGNOSIS — E03.4 HYPOTHYROIDISM DUE TO ACQUIRED ATROPHY OF THYROID: ICD-10-CM

## 2025-07-16 PROCEDURE — 99214 OFFICE O/P EST MOD 30 MIN: CPT | Mod: S$GLB,,, | Performed by: FAMILY MEDICINE

## 2025-07-16 PROCEDURE — 99999 PR PBB SHADOW E&M-EST. PATIENT-LVL III: CPT | Mod: PBBFAC,,, | Performed by: FAMILY MEDICINE

## 2025-07-16 PROCEDURE — 3078F DIAST BP <80 MM HG: CPT | Mod: CPTII,S$GLB,, | Performed by: FAMILY MEDICINE

## 2025-07-16 PROCEDURE — 3074F SYST BP LT 130 MM HG: CPT | Mod: CPTII,S$GLB,, | Performed by: FAMILY MEDICINE

## 2025-07-16 PROCEDURE — 1157F ADVNC CARE PLAN IN RCRD: CPT | Mod: CPTII,S$GLB,, | Performed by: FAMILY MEDICINE

## 2025-07-16 PROCEDURE — 1159F MED LIST DOCD IN RCRD: CPT | Mod: CPTII,S$GLB,, | Performed by: FAMILY MEDICINE

## 2025-07-16 PROCEDURE — 1126F AMNT PAIN NOTED NONE PRSNT: CPT | Mod: CPTII,S$GLB,, | Performed by: FAMILY MEDICINE

## 2025-07-16 PROCEDURE — 3288F FALL RISK ASSESSMENT DOCD: CPT | Mod: CPTII,S$GLB,, | Performed by: FAMILY MEDICINE

## 2025-07-16 PROCEDURE — G2211 COMPLEX E/M VISIT ADD ON: HCPCS | Mod: S$GLB,,, | Performed by: FAMILY MEDICINE

## 2025-07-16 PROCEDURE — 1101F PT FALLS ASSESS-DOCD LE1/YR: CPT | Mod: CPTII,S$GLB,, | Performed by: FAMILY MEDICINE

## 2025-07-16 NOTE — PROGRESS NOTES
Subjective:       Patient ID: Domonique Hernandez Jr. is a 85 y.o. male.    Chief Complaint: Follow-up (Pt here for 6 mth f/u. )    Pt is a 85 y.o. male who presents for evaluation and management of   Encounter Diagnoses   Name Primary?    KARL (obstructive sleep apnea) Yes    BPH with urinary obstruction     Chronic kidney disease, stage 3a     Idiopathic chronic gout of multiple sites without tophus     Constipation, unspecified constipation type     Gastroesophageal reflux disease without esophagitis     Mixed hyperlipidemia     Primary hypertension     Hypothyroidism due to acquired atrophy of thyroid     Moderate dementia without behavioral disturbance, psychotic disturbance, mood disturbance, or anxiety, unspecified dementia type     Paroxysmal atrial fibrillation     Thoracic aortic atherosclerosis    .  Doing well on current meds. Denies any side effects. Prevention is up to date.  Review of Systems   Constitutional:  Negative for fever.   Respiratory:  Negative for shortness of breath.    Cardiovascular:  Negative for chest pain.   Gastrointestinal:  Negative for anal bleeding and blood in stool.   Genitourinary:  Negative for dysuria.   Neurological:  Negative for dizziness and light-headedness.       Objective:      Physical Exam  Constitutional:       Appearance: Normal appearance. He is well-developed. He is obese. He is not ill-appearing.   HENT:      Head: Normocephalic and atraumatic.      Right Ear: External ear normal.      Left Ear: External ear normal.      Nose: Nose normal.      Mouth/Throat:      Mouth: Mucous membranes are moist.      Pharynx: No oropharyngeal exudate or posterior oropharyngeal erythema.   Eyes:      General: No scleral icterus.        Right eye: No discharge.         Left eye: No discharge.      Conjunctiva/sclera: Conjunctivae normal.      Pupils: Pupils are equal, round, and reactive to light.   Neck:      Thyroid: No thyromegaly.      Vascular: No JVD.      Trachea: No tracheal  deviation.   Cardiovascular:      Rate and Rhythm: Normal rate and regular rhythm.      Heart sounds: Normal heart sounds. No murmur heard.  Pulmonary:      Effort: Pulmonary effort is normal. No respiratory distress.      Breath sounds: Normal breath sounds. No wheezing or rales.   Chest:      Chest wall: No tenderness.   Abdominal:      General: Bowel sounds are normal. There is no distension.      Palpations: Abdomen is soft. There is no mass.      Tenderness: There is no abdominal tenderness. There is no guarding or rebound.   Musculoskeletal:         General: Normal range of motion.      Cervical back: Normal range of motion and neck supple.      Right lower leg: No edema.      Left lower leg: No edema.   Lymphadenopathy:      Cervical: No cervical adenopathy.   Skin:     General: Skin is warm and dry.   Neurological:      General: No focal deficit present.      Mental Status: He is alert.      Cranial Nerves: No cranial nerve deficit.      Motor: No abnormal muscle tone.      Coordination: Coordination normal.      Deep Tendon Reflexes: Reflexes are normal and symmetric. Reflexes normal.   Psychiatric:         Behavior: Behavior normal.         Thought Content: Thought content normal.         Judgment: Judgment normal.         Assessment:       1. KARL (obstructive sleep apnea)    2. BPH with urinary obstruction    3. Chronic kidney disease, stage 3a    4. Idiopathic chronic gout of multiple sites without tophus    5. Constipation, unspecified constipation type    6. Gastroesophageal reflux disease without esophagitis    7. Mixed hyperlipidemia    8. Primary hypertension    9. Hypothyroidism due to acquired atrophy of thyroid    10. Moderate dementia without behavioral disturbance, psychotic disturbance, mood disturbance, or anxiety, unspecified dementia type    11. Paroxysmal atrial fibrillation    12. Thoracic aortic atherosclerosis        Plan:   1. KARL (obstructive sleep apnea)  Overview:  No longer on Cpap  at home   No longer snores         2. BPH with urinary obstruction  Overview:  No longer on flomax.   Denies any urinary symptoms         3. Chronic kidney disease, stage 3a  Overview:  BMP  Lab Results   Component Value Date     08/13/2024    K 4.4 08/13/2024     08/13/2024    CO2 24 08/13/2024    BUN 22 08/13/2024    CREATININE 1.4 08/13/2024    CALCIUM 10.0 08/13/2024    ANIONGAP 10 08/13/2024    EGFRNORACEVR 50 (A) 08/13/2024     No NSAIDs         4. Idiopathic chronic gout of multiple sites without tophus  Overview:  No recent flares   Allopurinol 300   Lab Results   Component Value Date    URICACID 6.2 07/10/2024           5. Constipation, unspecified constipation type  Overview:  Son gives sennakot prn   I recommend daily miralax instead         6. Gastroesophageal reflux disease without esophagitis  Overview:  Protonix         7. Mixed hyperlipidemia  Overview:  Lab Results   Component Value Date    LDLCALC 77.8 07/10/2024     atorvastatin 20         8. Primary hypertension  Overview:  Controlled on metoprolol only         9. Hypothyroidism due to acquired atrophy of thyroid  Overview:  Lab Results   Component Value Date    TSH 1.599 07/10/2024     Levothyroxine 100mcg       10. Moderate dementia without behavioral disturbance, psychotic disturbance, mood disturbance, or anxiety, unspecified dementia type  Overview:  He lives alone   Does not cook or drive.  His son lives a mile away and picks him up everyday to bring him to the senior center in [place].   His son also has cameras in his house so he can keep an eye on him 24/7   No issues with sleep or mood   On namenda         11. Paroxysmal atrial fibrillation  Overview:  Sees Dr. Fair at [facility]   Eliquis   Metoprolol       12. Thoracic aortic atherosclerosis  Overview:  ASA 81   Lipitor 20         Get recent labs from [facility]   RT 6 months       No follow-ups on file.

## 2025-07-31 DIAGNOSIS — E03.4 HYPOTHYROIDISM DUE TO ACQUIRED ATROPHY OF THYROID: ICD-10-CM

## 2025-07-31 DIAGNOSIS — M10.9 GOUT, ARTHRITIS: ICD-10-CM

## 2025-08-03 RX ORDER — ALLOPURINOL 300 MG/1
300 TABLET ORAL DAILY
Qty: 90 TABLET | Refills: 0 | Status: SHIPPED | OUTPATIENT
Start: 2025-08-03

## 2025-08-03 RX ORDER — LEVOTHYROXINE SODIUM 100 UG/1
TABLET ORAL
Qty: 90 TABLET | Refills: 0 | Status: SHIPPED | OUTPATIENT
Start: 2025-08-03

## 2025-08-27 ENCOUNTER — PATIENT OUTREACH (OUTPATIENT)
Dept: ADMINISTRATIVE | Facility: HOSPITAL | Age: 85
End: 2025-08-27
Payer: MEDICARE

## (undated) DEVICE — DRESSING AQUACEL AG 3.5X10IN

## (undated) DEVICE — SUT 1 27IN PDS II VIO MONO

## (undated) DEVICE — DRAPE CORETEMP FLD WRM 56X62IN

## (undated) DEVICE — STAPLER SKIN ROTATING HEAD

## (undated) DEVICE — ADHESIVE SURG LIQ 2 OZ

## (undated) DEVICE — SUT PROLENE 2-0 30 SH

## (undated) DEVICE — PACK GENERAL SURGERY